# Patient Record
Sex: FEMALE | Race: BLACK OR AFRICAN AMERICAN | NOT HISPANIC OR LATINO
[De-identification: names, ages, dates, MRNs, and addresses within clinical notes are randomized per-mention and may not be internally consistent; named-entity substitution may affect disease eponyms.]

---

## 2017-01-19 ENCOUNTER — OTHER (OUTPATIENT)
Age: 77
End: 2017-01-19

## 2017-03-02 ENCOUNTER — APPOINTMENT (OUTPATIENT)
Dept: ORTHOPEDIC SURGERY | Facility: CLINIC | Age: 77
End: 2017-03-02

## 2017-03-02 VITALS
HEIGHT: 62 IN | WEIGHT: 161 LBS | BODY MASS INDEX: 29.63 KG/M2 | SYSTOLIC BLOOD PRESSURE: 120 MMHG | DIASTOLIC BLOOD PRESSURE: 72 MMHG | HEART RATE: 82 BPM

## 2017-05-05 ENCOUNTER — APPOINTMENT (OUTPATIENT)
Dept: ORTHOPEDIC SURGERY | Facility: CLINIC | Age: 77
End: 2017-05-05

## 2017-05-05 VITALS
DIASTOLIC BLOOD PRESSURE: 69 MMHG | WEIGHT: 161 LBS | HEIGHT: 62 IN | SYSTOLIC BLOOD PRESSURE: 108 MMHG | BODY MASS INDEX: 29.63 KG/M2 | HEART RATE: 76 BPM

## 2017-05-08 LAB
BASOPHILS # BLD AUTO: 0.03 K/UL
BASOPHILS NFR BLD AUTO: 0.5 %
CRP SERPL-MCNC: 9.3 MG/DL
EOSINOPHIL # BLD AUTO: 0.12 K/UL
EOSINOPHIL NFR BLD AUTO: 1.9 %
ERYTHROCYTE [SEDIMENTATION RATE] IN BLOOD BY WESTERGREN METHOD: 120 MM/HR
HCT VFR BLD CALC: 30.5 %
HGB BLD-MCNC: 10.3 G/DL
IMM GRANULOCYTES NFR BLD AUTO: 0.2 %
LYMPHOCYTES # BLD AUTO: 1.39 K/UL
LYMPHOCYTES NFR BLD AUTO: 22 %
MAN DIFF?: NORMAL
MCHC RBC-ENTMCNC: 32.4 PG
MCHC RBC-ENTMCNC: 33.8 GM/DL
MCV RBC AUTO: 95.9 FL
MONOCYTES # BLD AUTO: 0.51 K/UL
MONOCYTES NFR BLD AUTO: 8.1 %
NEUTROPHILS # BLD AUTO: 4.25 K/UL
NEUTROPHILS NFR BLD AUTO: 67.3 %
PLATELET # BLD AUTO: 259 K/UL
RBC # BLD: 3.18 M/UL
RBC # FLD: 17.5 %
WBC # FLD AUTO: 6.31 K/UL

## 2017-05-24 ENCOUNTER — EMERGENCY (EMERGENCY)
Facility: HOSPITAL | Age: 77
LOS: 1 days | Discharge: HOME | End: 2017-05-24
Attending: EMERGENCY MEDICINE | Admitting: EMERGENCY MEDICINE
Payer: MEDICARE

## 2017-05-24 VITALS
TEMPERATURE: 98 F | RESPIRATION RATE: 16 BRPM | HEART RATE: 75 BPM | SYSTOLIC BLOOD PRESSURE: 114 MMHG | DIASTOLIC BLOOD PRESSURE: 57 MMHG | OXYGEN SATURATION: 100 %

## 2017-05-24 LAB
ALBUMIN SERPL ELPH-MCNC: 3.4 G/DL — SIGNIFICANT CHANGE UP (ref 3.3–5)
ALP SERPL-CCNC: 162 U/L — HIGH (ref 40–120)
ALT FLD-CCNC: 43 U/L — HIGH (ref 4–33)
APPEARANCE UR: CLEAR — SIGNIFICANT CHANGE UP
AST SERPL-CCNC: 70 U/L — HIGH (ref 4–32)
BACTERIA # UR AUTO: SIGNIFICANT CHANGE UP
BASE EXCESS BLDV CALC-SCNC: 6.2 MMOL/L — SIGNIFICANT CHANGE UP
BASOPHILS # BLD AUTO: 0.02 K/UL — SIGNIFICANT CHANGE UP (ref 0–0.2)
BASOPHILS NFR BLD AUTO: 0.3 % — SIGNIFICANT CHANGE UP (ref 0–2)
BILIRUB SERPL-MCNC: < 0.2 MG/DL — LOW (ref 0.2–1.2)
BILIRUB UR-MCNC: NEGATIVE — SIGNIFICANT CHANGE UP
BLOOD GAS VENOUS - CREATININE: 1.03 MG/DL — SIGNIFICANT CHANGE UP (ref 0.5–1.3)
BLOOD UR QL VISUAL: NEGATIVE — SIGNIFICANT CHANGE UP
BUN SERPL-MCNC: 23 MG/DL — SIGNIFICANT CHANGE UP (ref 7–23)
CALCIUM SERPL-MCNC: 8.9 MG/DL — SIGNIFICANT CHANGE UP (ref 8.4–10.5)
CHLORIDE BLDV-SCNC: 104 MMOL/L — SIGNIFICANT CHANGE UP (ref 96–108)
CHLORIDE SERPL-SCNC: 98 MMOL/L — SIGNIFICANT CHANGE UP (ref 98–107)
CK MB BLD-MCNC: 1.2 — SIGNIFICANT CHANGE UP (ref 0–2.5)
CK MB BLD-MCNC: 2.04 NG/ML — SIGNIFICANT CHANGE UP (ref 1–4.7)
CK SERPL-CCNC: 167 U/L — SIGNIFICANT CHANGE UP (ref 25–170)
CO2 SERPL-SCNC: 26 MMOL/L — SIGNIFICANT CHANGE UP (ref 22–31)
COLOR SPEC: YELLOW — SIGNIFICANT CHANGE UP
CREAT SERPL-MCNC: 1.1 MG/DL — SIGNIFICANT CHANGE UP (ref 0.5–1.3)
EOSINOPHIL # BLD AUTO: 0.12 K/UL — SIGNIFICANT CHANGE UP (ref 0–0.5)
EOSINOPHIL NFR BLD AUTO: 1.8 % — SIGNIFICANT CHANGE UP (ref 0–6)
EPI CELLS # UR: SIGNIFICANT CHANGE UP
GAS PNL BLDV: 132 MMOL/L — LOW (ref 136–146)
GLUCOSE BLDV-MCNC: 126 — HIGH (ref 70–99)
GLUCOSE SERPL-MCNC: 125 MG/DL — HIGH (ref 70–99)
GLUCOSE UR-MCNC: NEGATIVE — SIGNIFICANT CHANGE UP
HCO3 BLDV-SCNC: 29 MMOL/L — HIGH (ref 20–27)
HCT VFR BLD CALC: 31.9 % — LOW (ref 34.5–45)
HCT VFR BLDV CALC: 32 % — LOW (ref 34.5–45)
HGB BLD-MCNC: 10.7 G/DL — LOW (ref 11.5–15.5)
HGB BLDV-MCNC: 10.4 G/DL — LOW (ref 11.5–15.5)
IMM GRANULOCYTES NFR BLD AUTO: 0.2 % — SIGNIFICANT CHANGE UP (ref 0–1.5)
KETONES UR-MCNC: NEGATIVE — SIGNIFICANT CHANGE UP
LACTATE BLDV-MCNC: 0.9 MMOL/L — SIGNIFICANT CHANGE UP (ref 0.5–2)
LEUKOCYTE ESTERASE UR-ACNC: HIGH
LYMPHOCYTES # BLD AUTO: 1.33 K/UL — SIGNIFICANT CHANGE UP (ref 1–3.3)
LYMPHOCYTES # BLD AUTO: 20 % — SIGNIFICANT CHANGE UP (ref 13–44)
MCHC RBC-ENTMCNC: 31.4 PG — SIGNIFICANT CHANGE UP (ref 27–34)
MCHC RBC-ENTMCNC: 33.5 % — SIGNIFICANT CHANGE UP (ref 32–36)
MCV RBC AUTO: 93.5 FL — SIGNIFICANT CHANGE UP (ref 80–100)
MONOCYTES # BLD AUTO: 0.59 K/UL — SIGNIFICANT CHANGE UP (ref 0–0.9)
MONOCYTES NFR BLD AUTO: 8.9 % — SIGNIFICANT CHANGE UP (ref 2–14)
NEUTROPHILS # BLD AUTO: 4.58 K/UL — SIGNIFICANT CHANGE UP (ref 1.8–7.4)
NEUTROPHILS NFR BLD AUTO: 68.8 % — SIGNIFICANT CHANGE UP (ref 43–77)
NITRITE UR-MCNC: NEGATIVE — SIGNIFICANT CHANGE UP
NT-PROBNP SERPL-SCNC: 20.15 PG/ML — SIGNIFICANT CHANGE UP
PCO2 BLDV: 49 MMHG — SIGNIFICANT CHANGE UP (ref 41–51)
PH BLDV: 7.41 PH — SIGNIFICANT CHANGE UP (ref 7.32–7.43)
PH UR: 6 — SIGNIFICANT CHANGE UP (ref 4.6–8)
PLATELET # BLD AUTO: 258 K/UL — SIGNIFICANT CHANGE UP (ref 150–400)
PMV BLD: 9.5 FL — SIGNIFICANT CHANGE UP (ref 7–13)
PO2 BLDV: 53 MMHG — HIGH (ref 35–40)
POTASSIUM BLDV-SCNC: 6.3 MMOL/L — CRITICAL HIGH (ref 3.4–4.5)
POTASSIUM SERPL-MCNC: 5.4 MMOL/L — HIGH (ref 3.5–5.3)
POTASSIUM SERPL-SCNC: 5.4 MMOL/L — HIGH (ref 3.5–5.3)
PROT SERPL-MCNC: 8.5 G/DL — HIGH (ref 6–8.3)
PROT UR-MCNC: 30 — HIGH
RBC # BLD: 3.41 M/UL — LOW (ref 3.8–5.2)
RBC # FLD: 16.3 % — HIGH (ref 10.3–14.5)
RBC CASTS # UR COMP ASSIST: SIGNIFICANT CHANGE UP (ref 0–?)
SAO2 % BLDV: 85 % — SIGNIFICANT CHANGE UP (ref 60–85)
SODIUM SERPL-SCNC: 138 MMOL/L — SIGNIFICANT CHANGE UP (ref 135–145)
SP GR SPEC: 1.02 — SIGNIFICANT CHANGE UP (ref 1–1.03)
TROPONIN T SERPL-MCNC: < 0.06 NG/ML — SIGNIFICANT CHANGE UP (ref 0–0.06)
UROBILINOGEN FLD QL: NORMAL E.U. — SIGNIFICANT CHANGE UP (ref 0.1–0.2)
WBC # BLD: 6.65 K/UL — SIGNIFICANT CHANGE UP (ref 3.8–10.5)
WBC # FLD AUTO: 6.65 K/UL — SIGNIFICANT CHANGE UP (ref 3.8–10.5)
WBC UR QL: >50 — HIGH (ref 0–?)

## 2017-05-24 PROCEDURE — 99220: CPT | Mod: GC

## 2017-05-24 PROCEDURE — 71020: CPT | Mod: 26

## 2017-05-24 RX ORDER — ACETAMINOPHEN 500 MG
650 TABLET ORAL ONCE
Qty: 0 | Refills: 0 | Status: COMPLETED | OUTPATIENT
Start: 2017-05-24 | End: 2017-05-24

## 2017-05-24 RX ORDER — ASPIRIN/CALCIUM CARB/MAGNESIUM 324 MG
81 TABLET ORAL DAILY
Qty: 0 | Refills: 0 | Status: DISCONTINUED | OUTPATIENT
Start: 2017-05-24 | End: 2017-05-28

## 2017-05-24 RX ORDER — GABAPENTIN 400 MG/1
100 CAPSULE ORAL THREE TIMES A DAY
Qty: 0 | Refills: 0 | Status: DISCONTINUED | OUTPATIENT
Start: 2017-05-24 | End: 2017-05-28

## 2017-05-24 RX ORDER — PANTOPRAZOLE SODIUM 20 MG/1
40 TABLET, DELAYED RELEASE ORAL
Qty: 0 | Refills: 0 | Status: DISCONTINUED | OUTPATIENT
Start: 2017-05-24 | End: 2017-05-28

## 2017-05-24 RX ORDER — AZITHROMYCIN 500 MG/1
500 TABLET, FILM COATED ORAL ONCE
Qty: 0 | Refills: 0 | Status: COMPLETED | OUTPATIENT
Start: 2017-05-24 | End: 2017-05-24

## 2017-05-24 RX ORDER — FUROSEMIDE 40 MG
40 TABLET ORAL DAILY
Qty: 0 | Refills: 0 | Status: DISCONTINUED | OUTPATIENT
Start: 2017-05-24 | End: 2017-05-28

## 2017-05-24 RX ORDER — IPRATROPIUM/ALBUTEROL SULFATE 18-103MCG
3 AEROSOL WITH ADAPTER (GRAM) INHALATION ONCE
Qty: 0 | Refills: 0 | Status: DISCONTINUED | OUTPATIENT
Start: 2017-05-24 | End: 2017-05-28

## 2017-05-24 RX ORDER — POTASSIUM CHLORIDE 20 MEQ
20 PACKET (EA) ORAL DAILY
Qty: 0 | Refills: 0 | Status: DISCONTINUED | OUTPATIENT
Start: 2017-05-24 | End: 2017-05-24

## 2017-05-24 RX ORDER — ATORVASTATIN CALCIUM 80 MG/1
80 TABLET, FILM COATED ORAL AT BEDTIME
Qty: 0 | Refills: 0 | Status: DISCONTINUED | OUTPATIENT
Start: 2017-05-24 | End: 2017-05-28

## 2017-05-24 RX ADMIN — ATORVASTATIN CALCIUM 80 MILLIGRAM(S): 80 TABLET, FILM COATED ORAL at 23:44

## 2017-05-24 RX ADMIN — AZITHROMYCIN 500 MILLIGRAM(S): 500 TABLET, FILM COATED ORAL at 23:45

## 2017-05-24 RX ADMIN — Medication 40 MILLIGRAM(S): at 23:45

## 2017-05-24 RX ADMIN — Medication 650 MILLIGRAM(S): at 19:16

## 2017-05-24 NOTE — ED PROVIDER NOTE - MEDICAL DECISION MAKING DETAILS
75 y/o female h/o chf, copd, htn, gerd here with uri sx x1 wk:  cough, sob, runny nose.  Over past 2 hours, however, chest tightness and sob which is new for her.  Viral URI vs pna vs acs vs copd exac vs chf exac.  Obtain cbc, cmp, ce's, bnp, cxr, ekg.  If neg infectious w/u, will need cardiac r/o.

## 2017-05-24 NOTE — ED ADULT TRIAGE NOTE - CHIEF COMPLAINT QUOTE
Pt c/o weakness x 7 days, recently dc'd off of oxycodone 2 weeks ago for knee pain. Pt states she is forcing herself to eat, denies chest pain, SOB, n/v/d, dizziness.

## 2017-05-24 NOTE — ED ADULT NURSE NOTE - OBJECTIVE STATEMENT
Pt presents to ED R#18 Aox4, in NAD, respirations even and unlabored, c/o generalized weakness, anorexia, and intermittent dyspnea on exertion  x 2 weeks. Denies N/V/D/ abdominal pain/ cough/ fevers/ chills/ other acute complaints. Ambulates with walker at baseline. VSS. Rectal temp 100.1; MD aware. Also c/o L knee pain. Denies acute trauma/ injury to the area; has been hospitalized for LLE cellulitis. IV inserted, BW collected and sent to lab. VSS. NSR on CM. Will continue to monitor.

## 2017-05-24 NOTE — ED PROVIDER NOTE - PMH
CHF (congestive heart failure)    COPD (chronic obstructive pulmonary disease)    GERD (gastroesophageal reflux disease)    HTN (hypertension)

## 2017-05-24 NOTE — ED PROVIDER NOTE - PROGRESS NOTE DETAILS
Cesar: PMD Raji Christiansen called. Left a Voicemail. MD CHO:  Pt will be obs in CDU for acs w/u, copd exac.  Voicemail x2 left for Dr. Christiansen notifying him that his pt will be in the CDU.

## 2017-05-24 NOTE — ED ADULT NURSE REASSESSMENT NOTE - NS ED NURSE REASSESS COMMENT FT1
report received at shift change from JACEY Porter, pt remains AAOx3, VS as noted, pt in NAD, awaiting report and transport to CDU, will continue to monitor pt.

## 2017-05-24 NOTE — ED PROVIDER NOTE - ATTENDING CONTRIBUTION TO CARE
DR. BUI, ATTENDING MD-  I performed a face to face bedside interview with patient regarding history of present illness, review of symptoms and past medical history. I completed an independent physical exam.  I have discussed patient's plan of care with the resident.   Documentation as above in the note.    75 y/o female h/o chf, copd, htn, gerd here with uri sx x1 wk:  cough, sob, runny nose.  Over past 2 hours, however, chest tightness and sob which is new for her.  Denies f/c, ha, neck stiffness, abd pain, n/v/d, dysuria, rash.  Afebrile, vs wnl, fatigued, diminished at bases otherwise ctabil, s1s2 rrr no m/r/g, abd soft non ttp no r/g, no cva tenderness b/l, leg swelling b/l, no rash.  Viral URI vs pna vs acs vs copd exac vs chf exac.  Obtain cbc, cmp, ce's, bnp, cxr, ekg.  If neg infectious w/u, will need cardiac r/o.

## 2017-05-24 NOTE — ED PROVIDER NOTE - OBJECTIVE STATEMENT
76yF hx COPD, CHF, HTN, HLD, CVA (residual rt side weakness), left knee replacement s/p septic knee s/p hardware removal, LINDA on Bipap, GERD, UE DVT (no on A/C 2/2 hx RP bleed) p/w midsternal chest tightness and sob x 2hrs (4-6pm today). Chest tightness is relieved when lying flat, still present but less severe, sob has since resolved. Pt has had a dry cough and rhinorrhea x 2d, no fever. Also has been feeling generally weak x 1wk, had an appt to see PMD Kuldip on Fri for this weakness but missed the appt.

## 2017-05-24 NOTE — ED PROVIDER NOTE - MUSCULOSKELETAL, MLM
Spine appears normal, range of motion is not limited, no muscle or joint tenderness. Left knee very swollen, pt states this is chronic, appears deformed, pt states this is also chronic, no lower extremity edema or tenderness

## 2017-05-24 NOTE — ED PROVIDER NOTE - CARE PLAN
Principal Discharge DX:	Chest tightness  Secondary Diagnosis:	COPD exacerbation  Secondary Diagnosis:	URI (upper respiratory infection)

## 2017-05-25 VITALS
SYSTOLIC BLOOD PRESSURE: 120 MMHG | HEART RATE: 69 BPM | DIASTOLIC BLOOD PRESSURE: 67 MMHG | TEMPERATURE: 98 F | RESPIRATION RATE: 14 BRPM | OXYGEN SATURATION: 95 %

## 2017-05-25 LAB
APPEARANCE UR: CLEAR — SIGNIFICANT CHANGE UP
BILIRUB UR-MCNC: NEGATIVE — SIGNIFICANT CHANGE UP
BLOOD UR QL VISUAL: NEGATIVE — SIGNIFICANT CHANGE UP
BUN SERPL-MCNC: 24 MG/DL — HIGH (ref 7–23)
CALCIUM SERPL-MCNC: 9.9 MG/DL — SIGNIFICANT CHANGE UP (ref 8.4–10.5)
CHLORIDE SERPL-SCNC: 98 MMOL/L — SIGNIFICANT CHANGE UP (ref 98–107)
CK MB BLD-MCNC: 1.82 NG/ML — SIGNIFICANT CHANGE UP (ref 1–4.7)
CK MB BLD-MCNC: SIGNIFICANT CHANGE UP (ref 0–2.5)
CK SERPL-CCNC: 141 U/L — SIGNIFICANT CHANGE UP (ref 25–170)
CO2 SERPL-SCNC: 28 MMOL/L — SIGNIFICANT CHANGE UP (ref 22–31)
COLOR SPEC: SIGNIFICANT CHANGE UP
CREAT SERPL-MCNC: 1.17 MG/DL — SIGNIFICANT CHANGE UP (ref 0.5–1.3)
GLUCOSE SERPL-MCNC: 107 MG/DL — HIGH (ref 70–99)
GLUCOSE UR-MCNC: NEGATIVE — SIGNIFICANT CHANGE UP
HBA1C BLD-MCNC: 6.3 % — HIGH (ref 4–5.6)
KETONES UR-MCNC: NEGATIVE — SIGNIFICANT CHANGE UP
LEUKOCYTE ESTERASE UR-ACNC: HIGH
MUCOUS THREADS # UR AUTO: SIGNIFICANT CHANGE UP
NITRITE UR-MCNC: NEGATIVE — SIGNIFICANT CHANGE UP
PH UR: 6 — SIGNIFICANT CHANGE UP (ref 4.6–8)
POTASSIUM SERPL-MCNC: 3.9 MMOL/L — SIGNIFICANT CHANGE UP (ref 3.5–5.3)
POTASSIUM SERPL-SCNC: 3.9 MMOL/L — SIGNIFICANT CHANGE UP (ref 3.5–5.3)
PROT UR-MCNC: NEGATIVE — SIGNIFICANT CHANGE UP
RBC CASTS # UR COMP ASSIST: SIGNIFICANT CHANGE UP (ref 0–?)
SODIUM SERPL-SCNC: 140 MMOL/L — SIGNIFICANT CHANGE UP (ref 135–145)
SP GR SPEC: 1.01 — SIGNIFICANT CHANGE UP (ref 1–1.03)
SPECIMEN SOURCE: SIGNIFICANT CHANGE UP
TROPONIN T SERPL-MCNC: < 0.06 NG/ML — SIGNIFICANT CHANGE UP (ref 0–0.06)
UROBILINOGEN FLD QL: NORMAL E.U. — SIGNIFICANT CHANGE UP (ref 0.1–0.2)
WBC UR QL: SIGNIFICANT CHANGE UP (ref 0–?)

## 2017-05-25 PROCEDURE — 99217: CPT | Mod: GC

## 2017-05-25 PROCEDURE — 78452 HT MUSCLE IMAGE SPECT MULT: CPT | Mod: 26

## 2017-05-25 PROCEDURE — 93018 CV STRESS TEST I&R ONLY: CPT | Mod: GC

## 2017-05-25 PROCEDURE — 93016 CV STRESS TEST SUPVJ ONLY: CPT | Mod: GC

## 2017-05-25 RX ORDER — ALBUTEROL 90 UG/1
2 AEROSOL, METERED ORAL
Qty: 1 | Refills: 0
Start: 2017-05-25 | End: 2017-06-24

## 2017-05-25 RX ORDER — CEPHALEXIN 500 MG
1 CAPSULE ORAL
Qty: 10 | Refills: 0
Start: 2017-05-25 | End: 2017-05-30

## 2017-05-25 RX ORDER — ALBUTEROL 90 UG/1
2 AEROSOL, METERED ORAL
Qty: 0 | Refills: 0 | DISCHARGE
Start: 2017-05-25 | End: 2017-06-24

## 2017-05-25 RX ORDER — CEFTRIAXONE 500 MG/1
1 INJECTION, POWDER, FOR SOLUTION INTRAMUSCULAR; INTRAVENOUS ONCE
Qty: 0 | Refills: 0 | Status: COMPLETED | OUTPATIENT
Start: 2017-05-25 | End: 2017-05-25

## 2017-05-25 RX ORDER — AZITHROMYCIN 500 MG/1
1 TABLET, FILM COATED ORAL
Qty: 5 | Refills: 0
Start: 2017-05-25 | End: 2017-05-30

## 2017-05-25 RX ADMIN — GABAPENTIN 100 MILLIGRAM(S): 400 CAPSULE ORAL at 06:48

## 2017-05-25 RX ADMIN — Medication 81 MILLIGRAM(S): at 11:58

## 2017-05-25 RX ADMIN — Medication 40 MILLIGRAM(S): at 06:50

## 2017-05-25 RX ADMIN — GABAPENTIN 100 MILLIGRAM(S): 400 CAPSULE ORAL at 15:31

## 2017-05-25 RX ADMIN — Medication 100 MILLIGRAM(S): at 18:22

## 2017-05-25 RX ADMIN — PANTOPRAZOLE SODIUM 40 MILLIGRAM(S): 20 TABLET, DELAYED RELEASE ORAL at 06:50

## 2017-05-25 RX ADMIN — CEFTRIAXONE 100 GRAM(S): 500 INJECTION, POWDER, FOR SOLUTION INTRAMUSCULAR; INTRAVENOUS at 12:28

## 2017-05-25 NOTE — ED CDU PROVIDER NOTE - OBJECTIVE STATEMENT
76yF hx COPD, CHF, HTN, HLD, CVA (residual rt side weakness), left knee replacement s/p septic knee s/p hardware removal, LINDA on Bipap, GERD, UE DVT (no on A/C 2/2 hx RP bleed) p/w midsternal chest tightness and sob x 2hrs (4-6pm today). Chest tightness is relieved when lying flat, still present but less severe, sob has since resolved. Pt has had a dry cough and rhinorrhea x 2d, no fever. Also has been feeling generally weak x 1wk, had an appt to see PMD Kuldip on Fri for this weakness but missed the appt.    CDU TAYLER Slade: Agree with above. Pt is a 75 y/o F hx COPD (not on home O2), CHF, HTN, CVA presenting with cough x 2 days with shortness of breath today. States she felt like she was coming down with a cold, however became short of breath during a coughing episode earlier today. Pt then developed chest tightness, which prompted ED visit. Pt was supposed to see her PMD Dr. Mcintyre and have an echo done, but missed her appt. Presently states she feels better after the breathing treatment. Sent to the CDU for ACS R/O and tx of COPD exacerbation. No acute complaints.

## 2017-05-25 NOTE — ED CDU PROVIDER NOTE - ATTENDING CONTRIBUTION TO CARE
7 y/o F with shortness of breath sp coughing episode earlier,  Sent to the CDU for abx/steroids to tx COPD exacerbation and stress test to R/O ACS. ***GEN - NAD; well appearing; A+O x3 ***HEAD - NC/AT ***EYES/NOSE - PEERL, EOMI, mucous membranes moist, no discharge ***PULMONARY - mild end expiratory wheeze, symmetric breath sounds. ***CARDIAC -s1s2, RRR, no M,G,R***ABDOMEN - +BS, ND, NT, soft, no guarding, no rebound, no masses e ***EXTREMITIES - symmetric pulsess, no edema ***SKIN - no rash or bruising ***NEUROLOGIC - alert, CN 2-12 intact ***PSYCH - insight and judgment nl  plan to obtain stress and treat copd excerbation

## 2017-05-25 NOTE — ED CDU PROVIDER NOTE - PROGRESS NOTE DETAILS
TAYLER Slade: Pt reassessed, c/o chronic knee pain for which she takes Oxycodone at home every 8 hours. Percocet given. CEx2 wnl, no events on tele, will f/u with stress and echo in the am. TAYLER Slade: Discusses necessity of echo with [facility] stress lab PA, no urgent need for echo to be done during the CDU stay as no concern for acute valvular pathology. Will continue with stress test to evaluate for cardiac ischemia.  pt reamined stable overnight no acute distress. pt awaiting stress this AM . also will repeat UA to evaluate for UTI due to conecern that initial sample might have been contaminated TAYLER Roblero - received sign out from TAYLER Oden. pt seen and examined by dr. escamilla and I. 77 y/o female with hx of COPD, CHF, HTN, HLD, CVA with right residual weakness, GERD, UE DVT, p/w CP and SOB x 1 day sent to CDU for r/o ACS. NAD, resting comfortably. no complaints overnight, no CP, SOB, palpitations. Plan: repeat U/A, awaiting stress test, will continue to monitor TAYLER Roblero - received sign out from TAYLER Oden. pt seen and examined by dr. escamilla and I. 75 y/o female with hx of COPD, CHF, HTN, HLD, CVA with right residual weakness, GERD, UE DVT, p/w CP and SOB x 1 day sent to CDU for r/o ACS. NAD, resting comfortably. no complaints overnight, no CP, SOB, palpitations. Plan: repeat U/A, awaiting stress test, will continue to monitor. TAYLER Roblero - pt stable, NAD. spoke with nuclear medicine, will perform resting imaging this afternoon, pt went for stress this morning. left message for Dr. Shai Strickland (outpt orthopedist) to arrange f/u for chronic knee pain, left call back number, however have not heard back. pt's left knee pain and swelling is at baseline. walks with walker at baseline. can f/u as outpt as per dr. escamilla. given percocet for pain, pt states she will f.u this week. TAYLER Roblero - called pcp dr. waldo pacheco left message with  at , awaiting call back. Discharge Note    pt remained stable no cp. I was informed by Dr. Steven Vergara of [facility] stress with [facility] in[facility]iro infaa[facility]ct and does not recommend admission or cath. pt recommended to undego cardiology follow up as out pt Dr. Xavier: I performed a face to face bedside interview with patient regarding history of present illness, review of symptoms and past medical history. I completed an independent physical exam.  I have discussed patient's plan of care with PA.   I agree with note as stated above, having amended the EMR as needed to reflect my findings.   This includes HISTORY OF PRESENT ILLNESS, HIV, PAST MEDICAL/SURGICAL/FAMILY/SOCIAL HISTORY, ALLERGIES AND HOME MEDICATIONS, REVIEW OF SYSTEMS, PHYSICAL EXAM, and any PROGRESS NOTES during the time I functioned as the attending physician for this patient. Return to the ER immediately for any worsening symptoms, concerns, chest pain, fevers, shortness of breath, vomiting, abdominal pain, rashes, neck pain, back pain, numbness, paresthesias, pain or any difficulties at all.  Please follow up with your own private physician or our medical clinic at [phone] in the next 2-3 days.  Find a doctor at [phone].  Copies of your tests were provided to you for follow-up.  You must address all your findings with your doctor. TAYLER Roblero - social work aware of pt, will try and arrange transportation home.

## 2017-05-25 NOTE — ED CDU PROVIDER NOTE - DETAILS
75 y/o F with shortness of breath sp coughing episode earlier, likely due to copd however pt subsequently developed chest tightness. Sent to the CDU for abx/steroids to tx COPD exacerbation and stress test to R/O ACS. Will also do echo, since pt was scheduled for this past Friday, but missed her appointment.

## 2017-05-25 NOTE — ED CDU PROVIDER NOTE - MEDICAL DECISION MAKING DETAILS
77 y/o F with shortness of breath sp coughing episode earlier, likely due to copd however pt subsequently developed chest tightness. Sent to the CDU for abx/steroids to tx COPD exacerbation and stress test to R/O ACS. Will also do echo, since pt was scheduled for this past Friday, but missed her appointment.

## 2017-05-25 NOTE — ED CDU PROVIDER NOTE - PLAN OF CARE
Follow up with your primary care physician within 24-48 hours. See Cardiology this week, referral list given. Take copy of results with you. You may take an over the counter aspirin once daily until further evaluation by a physician. Take antibiotics as prescribed sent to your pharmacy, make sure you finish the full course. Return to ER for worsening symptoms, fever, chills, cough, shortness of breathe, abdominal pain, nausea, sweating, vomiting, bloody urine, flank pain or any other concerns. Follow up with your primary care physician within 24-48 hours. See Cardiology this week, referral list given. Take copy of results with you. You may take an over the counter aspirin once daily until further evaluation by a physician. Take full course of antibiotics, use inhaler as needed as prescribed and take Prednisone (steroid) as prescribed sent to your pharmacy. Return to ER for worsening symptoms, fever, chills, cough, shortness of breathe, abdominal pain, nausea, sweating, vomiting, bloody urine, flank pain or any other concerns. Follow up with your primary care physician within 24-48 hours. See Cardiology within 48 hours, referral list given. Take copy of results with you. You may take an over the counter aspirin once daily until further evaluation by a physician. Take full course of antibiotics, use inhaler as needed as prescribed and take Prednisone (steroid) as prescribed sent to your pharmacy. Return to ER for worsening symptoms, fever, chills, cough, shortness of breathe, abdominal pain, nausea, sweating, vomiting, bloody urine, flank pain or any other concerns. Follow up with your primary care physician within 24-48 hours. See Cardiology within 48 hours, referral list given. Take copy of results with you. You may take an over the counter aspirin once daily until further evaluation by a physician. Take full course of antibiotics for cough and urinary tract infection. Use inhaler as needed as prescribed and take Prednisone (steroid) as prescribed sent to your pharmacy. Return to ER for worsening symptoms, fever, chills, cough, shortness of breathe, abdominal pain, nausea, sweating, vomiting, bloody urine, flank pain or any other concerns.

## 2017-05-26 LAB
BACTERIA UR CULT: SIGNIFICANT CHANGE UP
SPECIMEN SOURCE: SIGNIFICANT CHANGE UP

## 2017-05-27 LAB — BACTERIA UR CULT: SIGNIFICANT CHANGE UP

## 2017-05-29 LAB
BACTERIA BLD CULT: SIGNIFICANT CHANGE UP
BACTERIA BLD CULT: SIGNIFICANT CHANGE UP

## 2017-07-06 ENCOUNTER — APPOINTMENT (OUTPATIENT)
Dept: ORTHOPEDIC SURGERY | Facility: CLINIC | Age: 77
End: 2017-07-06

## 2017-07-06 VITALS
SYSTOLIC BLOOD PRESSURE: 121 MMHG | WEIGHT: 161 LBS | BODY MASS INDEX: 29.63 KG/M2 | DIASTOLIC BLOOD PRESSURE: 71 MMHG | HEIGHT: 62 IN | HEART RATE: 85 BPM

## 2018-07-30 ENCOUNTER — APPOINTMENT (OUTPATIENT)
Dept: PULMONOLOGY | Facility: CLINIC | Age: 78
End: 2018-07-30
Payer: MEDICARE

## 2018-07-30 VITALS
RESPIRATION RATE: 16 BRPM | SYSTOLIC BLOOD PRESSURE: 100 MMHG | DIASTOLIC BLOOD PRESSURE: 63 MMHG | HEART RATE: 60 BPM | TEMPERATURE: 98 F | BODY MASS INDEX: 29.63 KG/M2 | HEIGHT: 62 IN | OXYGEN SATURATION: 99 % | WEIGHT: 161 LBS

## 2018-07-30 PROCEDURE — 71046 X-RAY EXAM CHEST 2 VIEWS: CPT

## 2018-07-30 PROCEDURE — 99204 OFFICE O/P NEW MOD 45 MIN: CPT | Mod: 25

## 2018-07-30 PROCEDURE — 94060 EVALUATION OF WHEEZING: CPT

## 2018-08-07 ENCOUNTER — RECORD ABSTRACTING (OUTPATIENT)
Age: 78
End: 2018-08-07

## 2018-08-27 ENCOUNTER — APPOINTMENT (OUTPATIENT)
Dept: PULMONOLOGY | Facility: CLINIC | Age: 78
End: 2018-08-27

## 2019-01-01 ENCOUNTER — TRANSCRIPTION ENCOUNTER (OUTPATIENT)
Age: 79
End: 2019-01-01

## 2019-01-01 ENCOUNTER — INPATIENT (INPATIENT)
Facility: HOSPITAL | Age: 79
LOS: 5 days | Discharge: ANOTHER HOSPITAL | End: 2019-12-26
Attending: INTERNAL MEDICINE | Admitting: INTERNAL MEDICINE
Payer: MEDICARE

## 2019-01-01 ENCOUNTER — EMERGENCY (EMERGENCY)
Facility: HOSPITAL | Age: 79
LOS: 1 days | Discharge: HOME | End: 2019-01-01
Attending: EMERGENCY MEDICINE | Admitting: EMERGENCY MEDICINE
Payer: MEDICARE

## 2019-01-01 ENCOUNTER — RESULT REVIEW (OUTPATIENT)
Age: 79
End: 2019-01-01

## 2019-01-01 ENCOUNTER — EMERGENCY (EMERGENCY)
Facility: HOSPITAL | Age: 79
LOS: 1 days | Discharge: HOME | End: 2019-01-01
Attending: EMERGENCY MEDICINE
Payer: MEDICARE

## 2019-01-01 ENCOUNTER — APPOINTMENT (OUTPATIENT)
Dept: HOME HEALTH SERVICES | Facility: HOME HEALTH | Age: 79
End: 2019-01-01
Payer: MEDICARE

## 2019-01-01 ENCOUNTER — INPATIENT (INPATIENT)
Facility: HOSPITAL | Age: 79
LOS: 4 days | Discharge: HOME HEALTH | End: 2019-11-04
Attending: INTERNAL MEDICINE | Admitting: INTERNAL MEDICINE
Payer: MEDICARE

## 2019-01-01 VITALS — TEMPERATURE: 98 F | RESPIRATION RATE: 18 BRPM | HEART RATE: 105 BPM | OXYGEN SATURATION: 95 %

## 2019-01-01 VITALS
SYSTOLIC BLOOD PRESSURE: 116 MMHG | HEART RATE: 72 BPM | OXYGEN SATURATION: 99 % | TEMPERATURE: 97 F | DIASTOLIC BLOOD PRESSURE: 62 MMHG | RESPIRATION RATE: 18 BRPM

## 2019-01-01 VITALS
HEART RATE: 73 BPM | SYSTOLIC BLOOD PRESSURE: 132 MMHG | OXYGEN SATURATION: 100 % | TEMPERATURE: 98 F | DIASTOLIC BLOOD PRESSURE: 66 MMHG | RESPIRATION RATE: 16 BRPM

## 2019-01-01 VITALS
HEART RATE: 73 BPM | RESPIRATION RATE: 16 BRPM | SYSTOLIC BLOOD PRESSURE: 122 MMHG | DIASTOLIC BLOOD PRESSURE: 73 MMHG | TEMPERATURE: 98 F | OXYGEN SATURATION: 98 %

## 2019-01-01 VITALS
HEART RATE: 97 BPM | TEMPERATURE: 98 F | RESPIRATION RATE: 16 BRPM | DIASTOLIC BLOOD PRESSURE: 95 MMHG | SYSTOLIC BLOOD PRESSURE: 133 MMHG | OXYGEN SATURATION: 96 %

## 2019-01-01 VITALS
TEMPERATURE: 97.6 F | SYSTOLIC BLOOD PRESSURE: 104 MMHG | HEART RATE: 77 BPM | OXYGEN SATURATION: 98 % | DIASTOLIC BLOOD PRESSURE: 65 MMHG | RESPIRATION RATE: 16 BRPM

## 2019-01-01 VITALS
OXYGEN SATURATION: 100 % | SYSTOLIC BLOOD PRESSURE: 123 MMHG | HEART RATE: 78 BPM | RESPIRATION RATE: 18 BRPM | DIASTOLIC BLOOD PRESSURE: 73 MMHG | TEMPERATURE: 98 F

## 2019-01-01 VITALS
DIASTOLIC BLOOD PRESSURE: 67 MMHG | HEART RATE: 73 BPM | RESPIRATION RATE: 16 BRPM | OXYGEN SATURATION: 98 % | SYSTOLIC BLOOD PRESSURE: 109 MMHG | TEMPERATURE: 98.2 F

## 2019-01-01 VITALS
TEMPERATURE: 98 F | RESPIRATION RATE: 16 BRPM | HEART RATE: 102 BPM | DIASTOLIC BLOOD PRESSURE: 64 MMHG | SYSTOLIC BLOOD PRESSURE: 117 MMHG | OXYGEN SATURATION: 97 %

## 2019-01-01 VITALS
TEMPERATURE: 98 F | SYSTOLIC BLOOD PRESSURE: 97 MMHG | HEART RATE: 93 BPM | OXYGEN SATURATION: 99 % | DIASTOLIC BLOOD PRESSURE: 54 MMHG | RESPIRATION RATE: 18 BRPM

## 2019-01-01 DIAGNOSIS — N26.1 ATROPHY OF KIDNEY (TERMINAL): ICD-10-CM

## 2019-01-01 DIAGNOSIS — Z29.9 ENCOUNTER FOR PROPHYLACTIC MEASURES, UNSPECIFIED: ICD-10-CM

## 2019-01-01 DIAGNOSIS — Z98.890 OTHER SPECIFIED POSTPROCEDURAL STATES: Chronic | ICD-10-CM

## 2019-01-01 DIAGNOSIS — G81.93 CEREBROVASCULAR DISEASE, UNSPECIFIED: ICD-10-CM

## 2019-01-01 DIAGNOSIS — Z96.652 PRESENCE OF LEFT ARTIFICIAL KNEE JOINT: Chronic | ICD-10-CM

## 2019-01-01 DIAGNOSIS — Z90.5 ACQUIRED ABSENCE OF KIDNEY: Chronic | ICD-10-CM

## 2019-01-01 DIAGNOSIS — Z00.00 ENCOUNTER FOR GENERAL ADULT MEDICAL EXAMINATION W/OUT ABNORMAL FINDINGS: ICD-10-CM

## 2019-01-01 DIAGNOSIS — N20.0 CALCULUS OF KIDNEY: ICD-10-CM

## 2019-01-01 DIAGNOSIS — K85.80 OTHER ACUTE PANCREATITIS WITHOUT NECROSIS OR INFECTION: ICD-10-CM

## 2019-01-01 DIAGNOSIS — N39.0 URINARY TRACT INFECTION, SITE NOT SPECIFIED: ICD-10-CM

## 2019-01-01 DIAGNOSIS — I50.32 CHRONIC DIASTOLIC (CONGESTIVE) HEART FAILURE: ICD-10-CM

## 2019-01-01 DIAGNOSIS — I67.9 CEREBROVASCULAR DISEASE, UNSPECIFIED: ICD-10-CM

## 2019-01-01 DIAGNOSIS — J44.9 CHRONIC OBSTRUCTIVE PULMONARY DISEASE, UNSPECIFIED: ICD-10-CM

## 2019-01-01 DIAGNOSIS — R05 COUGH: ICD-10-CM

## 2019-01-01 DIAGNOSIS — I50.9 HEART FAILURE, UNSPECIFIED: ICD-10-CM

## 2019-01-01 DIAGNOSIS — I10 ESSENTIAL (PRIMARY) HYPERTENSION: ICD-10-CM

## 2019-01-01 DIAGNOSIS — R42 DIZZINESS AND GIDDINESS: ICD-10-CM

## 2019-01-01 DIAGNOSIS — E11.9 TYPE 2 DIABETES MELLITUS WITHOUT COMPLICATIONS: ICD-10-CM

## 2019-01-01 DIAGNOSIS — Z71.89 OTHER SPECIFIED COUNSELING: ICD-10-CM

## 2019-01-01 LAB
ALBUMIN SERPL ELPH-MCNC: 3.3 G/DL — SIGNIFICANT CHANGE UP (ref 3.3–5)
ALBUMIN SERPL ELPH-MCNC: 3.5 G/DL — SIGNIFICANT CHANGE UP (ref 3.3–5)
ALBUMIN SERPL ELPH-MCNC: 3.7 G/DL — SIGNIFICANT CHANGE UP (ref 3.3–5)
ALBUMIN SERPL ELPH-MCNC: 3.8 G/DL — SIGNIFICANT CHANGE UP (ref 3.3–5)
ALBUMIN SERPL ELPH-MCNC: 4.1 G/DL — SIGNIFICANT CHANGE UP (ref 3.3–5)
ALBUMIN SERPL ELPH-MCNC: 4.1 G/DL — SIGNIFICANT CHANGE UP (ref 3.3–5)
ALP SERPL-CCNC: 106 U/L — SIGNIFICANT CHANGE UP (ref 40–120)
ALP SERPL-CCNC: 110 U/L — SIGNIFICANT CHANGE UP (ref 40–120)
ALP SERPL-CCNC: 113 U/L — SIGNIFICANT CHANGE UP (ref 40–120)
ALP SERPL-CCNC: 79 U/L — SIGNIFICANT CHANGE UP (ref 40–120)
ALP SERPL-CCNC: 87 U/L — SIGNIFICANT CHANGE UP (ref 40–120)
ALP SERPL-CCNC: 87 U/L — SIGNIFICANT CHANGE UP (ref 40–120)
ALP SERPL-CCNC: 89 U/L — SIGNIFICANT CHANGE UP (ref 40–120)
ALP SERPL-CCNC: 89 U/L — SIGNIFICANT CHANGE UP (ref 40–120)
ALT FLD-CCNC: 16 U/L — SIGNIFICANT CHANGE UP (ref 4–33)
ALT FLD-CCNC: 17 U/L — SIGNIFICANT CHANGE UP (ref 4–33)
ALT FLD-CCNC: 19 U/L — SIGNIFICANT CHANGE UP (ref 4–33)
ALT FLD-CCNC: 19 U/L — SIGNIFICANT CHANGE UP (ref 4–33)
ALT FLD-CCNC: 21 U/L — SIGNIFICANT CHANGE UP (ref 4–33)
ALT FLD-CCNC: 24 U/L — SIGNIFICANT CHANGE UP (ref 4–33)
ANION GAP SERPL CALC-SCNC: 10 MMO/L — SIGNIFICANT CHANGE UP (ref 7–14)
ANION GAP SERPL CALC-SCNC: 10 MMO/L — SIGNIFICANT CHANGE UP (ref 7–14)
ANION GAP SERPL CALC-SCNC: 11 MMO/L — SIGNIFICANT CHANGE UP (ref 7–14)
ANION GAP SERPL CALC-SCNC: 12 MMO/L — SIGNIFICANT CHANGE UP (ref 7–14)
ANION GAP SERPL CALC-SCNC: 13 MMO/L — SIGNIFICANT CHANGE UP (ref 7–14)
ANION GAP SERPL CALC-SCNC: 14 MMO/L — SIGNIFICANT CHANGE UP (ref 7–14)
ANION GAP SERPL CALC-SCNC: 8 MMO/L — SIGNIFICANT CHANGE UP (ref 7–14)
APPEARANCE UR: CLEAR — SIGNIFICANT CHANGE UP
APPEARANCE: CLEAR
AST SERPL-CCNC: 21 U/L — SIGNIFICANT CHANGE UP (ref 4–32)
AST SERPL-CCNC: 22 U/L — SIGNIFICANT CHANGE UP (ref 4–32)
AST SERPL-CCNC: 22 U/L — SIGNIFICANT CHANGE UP (ref 4–32)
AST SERPL-CCNC: 23 U/L — SIGNIFICANT CHANGE UP (ref 4–32)
AST SERPL-CCNC: 24 U/L — SIGNIFICANT CHANGE UP (ref 4–32)
AST SERPL-CCNC: 24 U/L — SIGNIFICANT CHANGE UP (ref 4–32)
AST SERPL-CCNC: 29 U/L — SIGNIFICANT CHANGE UP (ref 4–32)
AST SERPL-CCNC: 30 U/L — SIGNIFICANT CHANGE UP (ref 4–32)
BACTERIA # UR AUTO: NEGATIVE — SIGNIFICANT CHANGE UP
BACTERIA # UR AUTO: SIGNIFICANT CHANGE UP
BACTERIA # UR AUTO: SIGNIFICANT CHANGE UP
BACTERIA UR CULT: NORMAL
BACTERIA UR CULT: SIGNIFICANT CHANGE UP
BACTERIA: NEGATIVE
BASE EXCESS BLDV CALC-SCNC: 3.6 MMOL/L — SIGNIFICANT CHANGE UP
BASE EXCESS BLDV CALC-SCNC: 7.6 MMOL/L — SIGNIFICANT CHANGE UP
BASOPHILS # BLD AUTO: 0.03 K/UL — SIGNIFICANT CHANGE UP (ref 0–0.2)
BASOPHILS # BLD AUTO: 0.04 K/UL — SIGNIFICANT CHANGE UP (ref 0–0.2)
BASOPHILS # BLD AUTO: 0.05 K/UL — SIGNIFICANT CHANGE UP (ref 0–0.2)
BASOPHILS NFR BLD AUTO: 0.4 % — SIGNIFICANT CHANGE UP (ref 0–2)
BASOPHILS NFR BLD AUTO: 0.4 % — SIGNIFICANT CHANGE UP (ref 0–2)
BASOPHILS NFR BLD AUTO: 0.5 % — SIGNIFICANT CHANGE UP (ref 0–2)
BASOPHILS NFR BLD AUTO: 0.5 % — SIGNIFICANT CHANGE UP (ref 0–2)
BASOPHILS NFR BLD AUTO: 0.6 % — SIGNIFICANT CHANGE UP (ref 0–2)
BASOPHILS NFR BLD AUTO: 0.7 % — SIGNIFICANT CHANGE UP (ref 0–2)
BASOPHILS NFR BLD AUTO: 0.8 % — SIGNIFICANT CHANGE UP (ref 0–2)
BILIRUB SERPL-MCNC: 0.2 MG/DL — SIGNIFICANT CHANGE UP (ref 0.2–1.2)
BILIRUB SERPL-MCNC: 0.3 MG/DL — SIGNIFICANT CHANGE UP (ref 0.2–1.2)
BILIRUB SERPL-MCNC: 0.3 MG/DL — SIGNIFICANT CHANGE UP (ref 0.2–1.2)
BILIRUB SERPL-MCNC: < 0.2 MG/DL — LOW (ref 0.2–1.2)
BILIRUB SERPL-MCNC: < 0.2 MG/DL — LOW (ref 0.2–1.2)
BILIRUB UR-MCNC: NEGATIVE — SIGNIFICANT CHANGE UP
BILIRUBIN URINE: NEGATIVE
BLOOD GAS VENOUS - CREATININE: 1.14 MG/DL — SIGNIFICANT CHANGE UP (ref 0.5–1.3)
BLOOD GAS VENOUS - FIO2: 21 — SIGNIFICANT CHANGE UP
BLOOD UR QL VISUAL: NEGATIVE — SIGNIFICANT CHANGE UP
BLOOD UR QL VISUAL: NEGATIVE — SIGNIFICANT CHANGE UP
BLOOD UR QL VISUAL: SIGNIFICANT CHANGE UP
BLOOD URINE: NEGATIVE
BUN SERPL-MCNC: 18 MG/DL — SIGNIFICANT CHANGE UP (ref 7–23)
BUN SERPL-MCNC: 20 MG/DL — SIGNIFICANT CHANGE UP (ref 7–23)
BUN SERPL-MCNC: 23 MG/DL — SIGNIFICANT CHANGE UP (ref 7–23)
BUN SERPL-MCNC: 23 MG/DL — SIGNIFICANT CHANGE UP (ref 7–23)
BUN SERPL-MCNC: 24 MG/DL — HIGH (ref 7–23)
BUN SERPL-MCNC: 24 MG/DL — HIGH (ref 7–23)
BUN SERPL-MCNC: 26 MG/DL — HIGH (ref 7–23)
BUN SERPL-MCNC: 27 MG/DL — HIGH (ref 7–23)
BUN SERPL-MCNC: 28 MG/DL — HIGH (ref 7–23)
BUN SERPL-MCNC: 28 MG/DL — HIGH (ref 7–23)
BUN SERPL-MCNC: 32 MG/DL — HIGH (ref 7–23)
BUN SERPL-MCNC: 34 MG/DL — HIGH (ref 7–23)
BUN SERPL-MCNC: 35 MG/DL — HIGH (ref 7–23)
CALCIUM SERPL-MCNC: 10.2 MG/DL — SIGNIFICANT CHANGE UP (ref 8.4–10.5)
CALCIUM SERPL-MCNC: 10.5 MG/DL — SIGNIFICANT CHANGE UP (ref 8.4–10.5)
CALCIUM SERPL-MCNC: 9.2 MG/DL — SIGNIFICANT CHANGE UP (ref 8.4–10.5)
CALCIUM SERPL-MCNC: 9.3 MG/DL — SIGNIFICANT CHANGE UP (ref 8.4–10.5)
CALCIUM SERPL-MCNC: 9.4 MG/DL — SIGNIFICANT CHANGE UP (ref 8.4–10.5)
CALCIUM SERPL-MCNC: 9.5 MG/DL — SIGNIFICANT CHANGE UP (ref 8.4–10.5)
CALCIUM SERPL-MCNC: 9.6 MG/DL — SIGNIFICANT CHANGE UP (ref 8.4–10.5)
CALCIUM SERPL-MCNC: 9.6 MG/DL — SIGNIFICANT CHANGE UP (ref 8.4–10.5)
CALCIUM SERPL-MCNC: 9.7 MG/DL — SIGNIFICANT CHANGE UP (ref 8.4–10.5)
CALCIUM SERPL-MCNC: 9.8 MG/DL — SIGNIFICANT CHANGE UP (ref 8.4–10.5)
CALCIUM SERPL-MCNC: 9.8 MG/DL — SIGNIFICANT CHANGE UP (ref 8.4–10.5)
CALCIUM SERPL-MCNC: 9.9 MG/DL — SIGNIFICANT CHANGE UP (ref 8.4–10.5)
CALCIUM SERPL-MCNC: 9.9 MG/DL — SIGNIFICANT CHANGE UP (ref 8.4–10.5)
CHLORIDE BLDV-SCNC: 100 MMOL/L — SIGNIFICANT CHANGE UP (ref 96–108)
CHLORIDE SERPL-SCNC: 100 MMOL/L — SIGNIFICANT CHANGE UP (ref 98–107)
CHLORIDE SERPL-SCNC: 95 MMOL/L — LOW (ref 98–107)
CHLORIDE SERPL-SCNC: 96 MMOL/L — LOW (ref 98–107)
CHLORIDE SERPL-SCNC: 97 MMOL/L — LOW (ref 98–107)
CHLORIDE SERPL-SCNC: 98 MMOL/L — SIGNIFICANT CHANGE UP (ref 98–107)
CHLORIDE SERPL-SCNC: 98 MMOL/L — SIGNIFICANT CHANGE UP (ref 98–107)
CHLORIDE SERPL-SCNC: 99 MMOL/L — SIGNIFICANT CHANGE UP (ref 98–107)
CHOLEST SERPL-MCNC: 123 MG/DL — SIGNIFICANT CHANGE UP (ref 120–199)
CO2 SERPL-SCNC: 24 MMOL/L — SIGNIFICANT CHANGE UP (ref 22–31)
CO2 SERPL-SCNC: 25 MMOL/L — SIGNIFICANT CHANGE UP (ref 22–31)
CO2 SERPL-SCNC: 28 MMOL/L — SIGNIFICANT CHANGE UP (ref 22–31)
CO2 SERPL-SCNC: 28 MMOL/L — SIGNIFICANT CHANGE UP (ref 22–31)
CO2 SERPL-SCNC: 29 MMOL/L — SIGNIFICANT CHANGE UP (ref 22–31)
CO2 SERPL-SCNC: 30 MMOL/L — SIGNIFICANT CHANGE UP (ref 22–31)
CO2 SERPL-SCNC: 31 MMOL/L — SIGNIFICANT CHANGE UP (ref 22–31)
CO2 SERPL-SCNC: 31 MMOL/L — SIGNIFICANT CHANGE UP (ref 22–31)
CO2 SERPL-SCNC: 32 MMOL/L — HIGH (ref 22–31)
CO2 SERPL-SCNC: 32 MMOL/L — HIGH (ref 22–31)
CO2 SERPL-SCNC: 36 MMOL/L — HIGH (ref 22–31)
COLOR SPEC: SIGNIFICANT CHANGE UP
COLOR SPEC: YELLOW — SIGNIFICANT CHANGE UP
COLOR SPEC: YELLOW — SIGNIFICANT CHANGE UP
COLOR: YELLOW
CREAT SERPL-MCNC: 0.88 MG/DL — SIGNIFICANT CHANGE UP (ref 0.5–1.3)
CREAT SERPL-MCNC: 0.9 MG/DL — SIGNIFICANT CHANGE UP (ref 0.5–1.3)
CREAT SERPL-MCNC: 0.9 MG/DL — SIGNIFICANT CHANGE UP (ref 0.5–1.3)
CREAT SERPL-MCNC: 0.92 MG/DL — SIGNIFICANT CHANGE UP (ref 0.5–1.3)
CREAT SERPL-MCNC: 0.95 MG/DL — SIGNIFICANT CHANGE UP (ref 0.5–1.3)
CREAT SERPL-MCNC: 0.97 MG/DL — SIGNIFICANT CHANGE UP (ref 0.5–1.3)
CREAT SERPL-MCNC: 0.97 MG/DL — SIGNIFICANT CHANGE UP (ref 0.5–1.3)
CREAT SERPL-MCNC: 1.02 MG/DL — SIGNIFICANT CHANGE UP (ref 0.5–1.3)
CREAT SERPL-MCNC: 1.06 MG/DL — SIGNIFICANT CHANGE UP (ref 0.5–1.3)
CREAT SERPL-MCNC: 1.08 MG/DL — SIGNIFICANT CHANGE UP (ref 0.5–1.3)
CREAT SERPL-MCNC: 1.11 MG/DL — SIGNIFICANT CHANGE UP (ref 0.5–1.3)
CREAT SERPL-MCNC: 1.13 MG/DL — SIGNIFICANT CHANGE UP (ref 0.5–1.3)
CREAT SERPL-MCNC: 1.17 MG/DL — SIGNIFICANT CHANGE UP (ref 0.5–1.3)
CREAT SERPL-MCNC: 1.18 MG/DL — SIGNIFICANT CHANGE UP (ref 0.5–1.3)
CREAT SERPL-MCNC: 1.18 MG/DL — SIGNIFICANT CHANGE UP (ref 0.5–1.3)
EOSINOPHIL # BLD AUTO: 0.09 K/UL — SIGNIFICANT CHANGE UP (ref 0–0.5)
EOSINOPHIL # BLD AUTO: 0.11 K/UL — SIGNIFICANT CHANGE UP (ref 0–0.5)
EOSINOPHIL # BLD AUTO: 0.11 K/UL — SIGNIFICANT CHANGE UP (ref 0–0.5)
EOSINOPHIL # BLD AUTO: 0.14 K/UL — SIGNIFICANT CHANGE UP (ref 0–0.5)
EOSINOPHIL # BLD AUTO: 0.15 K/UL — SIGNIFICANT CHANGE UP (ref 0–0.5)
EOSINOPHIL # BLD AUTO: 0.16 K/UL — SIGNIFICANT CHANGE UP (ref 0–0.5)
EOSINOPHIL # BLD AUTO: 0.17 K/UL — SIGNIFICANT CHANGE UP (ref 0–0.5)
EOSINOPHIL # BLD AUTO: 0.17 K/UL — SIGNIFICANT CHANGE UP (ref 0–0.5)
EOSINOPHIL # BLD AUTO: 0.19 K/UL — SIGNIFICANT CHANGE UP (ref 0–0.5)
EOSINOPHIL NFR BLD AUTO: 1.5 % — SIGNIFICANT CHANGE UP (ref 0–6)
EOSINOPHIL NFR BLD AUTO: 1.9 % — SIGNIFICANT CHANGE UP (ref 0–6)
EOSINOPHIL NFR BLD AUTO: 2 % — SIGNIFICANT CHANGE UP (ref 0–6)
EOSINOPHIL NFR BLD AUTO: 2 % — SIGNIFICANT CHANGE UP (ref 0–6)
EOSINOPHIL NFR BLD AUTO: 2.3 % — SIGNIFICANT CHANGE UP (ref 0–6)
EOSINOPHIL NFR BLD AUTO: 2.9 % — SIGNIFICANT CHANGE UP (ref 0–6)
EOSINOPHIL NFR BLD AUTO: 3 % — SIGNIFICANT CHANGE UP (ref 0–6)
EOSINOPHIL NFR BLD AUTO: 3.4 % — SIGNIFICANT CHANGE UP (ref 0–6)
EOSINOPHIL NFR BLD AUTO: 3.8 % — SIGNIFICANT CHANGE UP (ref 0–6)
EOSINOPHIL NFR BLD AUTO: 3.9 % — SIGNIFICANT CHANGE UP (ref 0–6)
EOSINOPHIL NFR BLD AUTO: 4 % — SIGNIFICANT CHANGE UP (ref 0–6)
ERYTHROCYTE [SEDIMENTATION RATE] IN BLOOD: 36 MM/HR — HIGH (ref 4–25)
ERYTHROCYTE [SEDIMENTATION RATE] IN BLOOD: 42 MM/HR — HIGH (ref 4–25)
FOLATE SERPL-MCNC: 16.7 NG/ML — SIGNIFICANT CHANGE UP (ref 4.7–20)
GAS PNL BLDV: 133 MMOL/L — LOW (ref 136–146)
GAS PNL BLDV: 135 MMOL/L — LOW (ref 136–146)
GLUCOSE BLDC GLUCOMTR-MCNC: 103 MG/DL — HIGH (ref 70–99)
GLUCOSE BLDC GLUCOMTR-MCNC: 104 MG/DL — HIGH (ref 70–99)
GLUCOSE BLDC GLUCOMTR-MCNC: 106 MG/DL — HIGH (ref 70–99)
GLUCOSE BLDC GLUCOMTR-MCNC: 106 MG/DL — HIGH (ref 70–99)
GLUCOSE BLDC GLUCOMTR-MCNC: 119 MG/DL — HIGH (ref 70–99)
GLUCOSE BLDC GLUCOMTR-MCNC: 122 MG/DL — HIGH (ref 70–99)
GLUCOSE BLDC GLUCOMTR-MCNC: 124 MG/DL — HIGH (ref 70–99)
GLUCOSE BLDC GLUCOMTR-MCNC: 125 MG/DL — HIGH (ref 70–99)
GLUCOSE BLDC GLUCOMTR-MCNC: 126 MG/DL — HIGH (ref 70–99)
GLUCOSE BLDC GLUCOMTR-MCNC: 127 MG/DL — HIGH (ref 70–99)
GLUCOSE BLDC GLUCOMTR-MCNC: 131 MG/DL — HIGH (ref 70–99)
GLUCOSE BLDC GLUCOMTR-MCNC: 131 MG/DL — HIGH (ref 70–99)
GLUCOSE BLDC GLUCOMTR-MCNC: 133 MG/DL — HIGH (ref 70–99)
GLUCOSE BLDC GLUCOMTR-MCNC: 138 MG/DL — HIGH (ref 70–99)
GLUCOSE BLDC GLUCOMTR-MCNC: 139 MG/DL — HIGH (ref 70–99)
GLUCOSE BLDC GLUCOMTR-MCNC: 139 MG/DL — HIGH (ref 70–99)
GLUCOSE BLDC GLUCOMTR-MCNC: 141 MG/DL — HIGH (ref 70–99)
GLUCOSE BLDC GLUCOMTR-MCNC: 143 MG/DL — HIGH (ref 70–99)
GLUCOSE BLDC GLUCOMTR-MCNC: 148 MG/DL — HIGH (ref 70–99)
GLUCOSE BLDC GLUCOMTR-MCNC: 156 MG/DL — HIGH (ref 70–99)
GLUCOSE BLDC GLUCOMTR-MCNC: 160 MG/DL — HIGH (ref 70–99)
GLUCOSE BLDC GLUCOMTR-MCNC: 166 MG/DL — HIGH (ref 70–99)
GLUCOSE BLDC GLUCOMTR-MCNC: 168 MG/DL — HIGH (ref 70–99)
GLUCOSE BLDC GLUCOMTR-MCNC: 170 MG/DL — HIGH (ref 70–99)
GLUCOSE BLDC GLUCOMTR-MCNC: 190 MG/DL — HIGH (ref 70–99)
GLUCOSE BLDC GLUCOMTR-MCNC: 206 MG/DL — HIGH (ref 70–99)
GLUCOSE BLDC GLUCOMTR-MCNC: 216 MG/DL — HIGH (ref 70–99)
GLUCOSE BLDC GLUCOMTR-MCNC: 217 MG/DL — HIGH (ref 70–99)
GLUCOSE BLDC GLUCOMTR-MCNC: 218 MG/DL — HIGH (ref 70–99)
GLUCOSE BLDC GLUCOMTR-MCNC: 222 MG/DL — HIGH (ref 70–99)
GLUCOSE BLDC GLUCOMTR-MCNC: 225 MG/DL — HIGH (ref 70–99)
GLUCOSE BLDC GLUCOMTR-MCNC: 264 MG/DL — HIGH (ref 70–99)
GLUCOSE BLDV-MCNC: 106 MG/DL — HIGH (ref 70–99)
GLUCOSE BLDV-MCNC: 107 MG/DL — HIGH (ref 70–99)
GLUCOSE QUALITATIVE U: NEGATIVE
GLUCOSE SERPL-MCNC: 105 MG/DL — HIGH (ref 70–99)
GLUCOSE SERPL-MCNC: 108 MG/DL — HIGH (ref 70–99)
GLUCOSE SERPL-MCNC: 116 MG/DL — HIGH (ref 70–99)
GLUCOSE SERPL-MCNC: 120 MG/DL — HIGH (ref 70–99)
GLUCOSE SERPL-MCNC: 121 MG/DL — HIGH (ref 70–99)
GLUCOSE SERPL-MCNC: 123 MG/DL — HIGH (ref 70–99)
GLUCOSE SERPL-MCNC: 141 MG/DL — HIGH (ref 70–99)
GLUCOSE SERPL-MCNC: 145 MG/DL — HIGH (ref 70–99)
GLUCOSE SERPL-MCNC: 147 MG/DL — HIGH (ref 70–99)
GLUCOSE SERPL-MCNC: 152 MG/DL — HIGH (ref 70–99)
GLUCOSE SERPL-MCNC: 152 MG/DL — HIGH (ref 70–99)
GLUCOSE SERPL-MCNC: 236 MG/DL — HIGH (ref 70–99)
GLUCOSE SERPL-MCNC: 279 MG/DL — HIGH (ref 70–99)
GLUCOSE SERPL-MCNC: 94 MG/DL — SIGNIFICANT CHANGE UP (ref 70–99)
GLUCOSE SERPL-MCNC: 99 MG/DL — SIGNIFICANT CHANGE UP (ref 70–99)
GLUCOSE UR-MCNC: NEGATIVE — SIGNIFICANT CHANGE UP
HBA1C BLD-MCNC: 5.8 % — HIGH (ref 4–5.6)
HCO3 BLDV-SCNC: 26 MMOL/L — SIGNIFICANT CHANGE UP (ref 20–27)
HCO3 BLDV-SCNC: 29 MMOL/L — HIGH (ref 20–27)
HCT VFR BLD CALC: 34.4 % — LOW (ref 34.5–45)
HCT VFR BLD CALC: 34.9 % — SIGNIFICANT CHANGE UP (ref 34.5–45)
HCT VFR BLD CALC: 35 % — SIGNIFICANT CHANGE UP (ref 34.5–45)
HCT VFR BLD CALC: 36 % — SIGNIFICANT CHANGE UP (ref 34.5–45)
HCT VFR BLD CALC: 36.1 % — SIGNIFICANT CHANGE UP (ref 34.5–45)
HCT VFR BLD CALC: 36.4 % — SIGNIFICANT CHANGE UP (ref 34.5–45)
HCT VFR BLD CALC: 36.4 % — SIGNIFICANT CHANGE UP (ref 34.5–45)
HCT VFR BLD CALC: 36.5 % — SIGNIFICANT CHANGE UP (ref 34.5–45)
HCT VFR BLD CALC: 37.1 % — SIGNIFICANT CHANGE UP (ref 34.5–45)
HCT VFR BLD CALC: 38.1 % — SIGNIFICANT CHANGE UP (ref 34.5–45)
HCT VFR BLD CALC: 38.8 % — SIGNIFICANT CHANGE UP (ref 34.5–45)
HCT VFR BLD CALC: 39.1 % — SIGNIFICANT CHANGE UP (ref 34.5–45)
HCT VFR BLD CALC: 39.3 % — SIGNIFICANT CHANGE UP (ref 34.5–45)
HCT VFR BLD CALC: 40.1 % — SIGNIFICANT CHANGE UP (ref 34.5–45)
HCT VFR BLD CALC: 40.5 % — SIGNIFICANT CHANGE UP (ref 34.5–45)
HCT VFR BLDV CALC: 35.8 % — SIGNIFICANT CHANGE UP (ref 34.5–45)
HCT VFR BLDV CALC: 36.5 % — SIGNIFICANT CHANGE UP (ref 34.5–45)
HDLC SERPL-MCNC: 48 MG/DL — SIGNIFICANT CHANGE UP (ref 45–65)
HGB BLD-MCNC: 10.9 G/DL — LOW (ref 11.5–15.5)
HGB BLD-MCNC: 11.3 G/DL — LOW (ref 11.5–15.5)
HGB BLD-MCNC: 11.4 G/DL — LOW (ref 11.5–15.5)
HGB BLD-MCNC: 11.6 G/DL — SIGNIFICANT CHANGE UP (ref 11.5–15.5)
HGB BLD-MCNC: 11.7 G/DL — SIGNIFICANT CHANGE UP (ref 11.5–15.5)
HGB BLD-MCNC: 11.9 G/DL — SIGNIFICANT CHANGE UP (ref 11.5–15.5)
HGB BLD-MCNC: 11.9 G/DL — SIGNIFICANT CHANGE UP (ref 11.5–15.5)
HGB BLD-MCNC: 12.2 G/DL — SIGNIFICANT CHANGE UP (ref 11.5–15.5)
HGB BLD-MCNC: 12.4 G/DL — SIGNIFICANT CHANGE UP (ref 11.5–15.5)
HGB BLD-MCNC: 12.4 G/DL — SIGNIFICANT CHANGE UP (ref 11.5–15.5)
HGB BLD-MCNC: 12.7 G/DL — SIGNIFICANT CHANGE UP (ref 11.5–15.5)
HGB BLD-MCNC: 12.9 G/DL — SIGNIFICANT CHANGE UP (ref 11.5–15.5)
HGB BLD-MCNC: 13.1 G/DL — SIGNIFICANT CHANGE UP (ref 11.5–15.5)
HGB BLDV-MCNC: 11.6 G/DL — SIGNIFICANT CHANGE UP (ref 11.5–15.5)
HGB BLDV-MCNC: 11.8 G/DL — SIGNIFICANT CHANGE UP (ref 11.5–15.5)
HYALINE CASTS # UR AUTO: NEGATIVE — SIGNIFICANT CHANGE UP
HYALINE CASTS: 0 /LPF
IMM GRANULOCYTES NFR BLD AUTO: 0.2 % — SIGNIFICANT CHANGE UP (ref 0–1.5)
IMM GRANULOCYTES NFR BLD AUTO: 0.3 % — SIGNIFICANT CHANGE UP (ref 0–1.5)
IMM GRANULOCYTES NFR BLD AUTO: 0.3 % — SIGNIFICANT CHANGE UP (ref 0–1.5)
IMM GRANULOCYTES NFR BLD AUTO: 0.4 % — SIGNIFICANT CHANGE UP (ref 0–1.5)
IMM GRANULOCYTES NFR BLD AUTO: 0.6 % — SIGNIFICANT CHANGE UP (ref 0–1.5)
KETONES UR-MCNC: NEGATIVE — SIGNIFICANT CHANGE UP
KETONES URINE: NEGATIVE
LACTATE BLDV-MCNC: 2.6 MMOL/L — HIGH (ref 0.5–2)
LEUKOCYTE ESTERASE UR-ACNC: SIGNIFICANT CHANGE UP
LEUKOCYTE ESTERASE URINE: ABNORMAL
LIDOCAIN IGE QN: 728.9 U/L — HIGH (ref 7–60)
LIPID PNL WITH DIRECT LDL SERPL: 75 MG/DL — SIGNIFICANT CHANGE UP
LYMPHOCYTES # BLD AUTO: 0.79 K/UL — LOW (ref 1–3.3)
LYMPHOCYTES # BLD AUTO: 0.97 K/UL — LOW (ref 1–3.3)
LYMPHOCYTES # BLD AUTO: 1 K/UL — SIGNIFICANT CHANGE UP (ref 1–3.3)
LYMPHOCYTES # BLD AUTO: 1.02 K/UL — SIGNIFICANT CHANGE UP (ref 1–3.3)
LYMPHOCYTES # BLD AUTO: 1.06 K/UL — SIGNIFICANT CHANGE UP (ref 1–3.3)
LYMPHOCYTES # BLD AUTO: 1.14 K/UL — SIGNIFICANT CHANGE UP (ref 1–3.3)
LYMPHOCYTES # BLD AUTO: 1.32 K/UL — SIGNIFICANT CHANGE UP (ref 1–3.3)
LYMPHOCYTES # BLD AUTO: 1.37 K/UL — SIGNIFICANT CHANGE UP (ref 1–3.3)
LYMPHOCYTES # BLD AUTO: 1.4 K/UL — SIGNIFICANT CHANGE UP (ref 1–3.3)
LYMPHOCYTES # BLD AUTO: 1.46 K/UL — SIGNIFICANT CHANGE UP (ref 1–3.3)
LYMPHOCYTES # BLD AUTO: 1.5 K/UL — SIGNIFICANT CHANGE UP (ref 1–3.3)
LYMPHOCYTES # BLD AUTO: 16.6 % — SIGNIFICANT CHANGE UP (ref 13–44)
LYMPHOCYTES # BLD AUTO: 18.4 % — SIGNIFICANT CHANGE UP (ref 13–44)
LYMPHOCYTES # BLD AUTO: 18.9 % — SIGNIFICANT CHANGE UP (ref 13–44)
LYMPHOCYTES # BLD AUTO: 19 % — SIGNIFICANT CHANGE UP (ref 13–44)
LYMPHOCYTES # BLD AUTO: 20.4 % — SIGNIFICANT CHANGE UP (ref 13–44)
LYMPHOCYTES # BLD AUTO: 20.6 % — SIGNIFICANT CHANGE UP (ref 13–44)
LYMPHOCYTES # BLD AUTO: 22.7 % — SIGNIFICANT CHANGE UP (ref 13–44)
LYMPHOCYTES # BLD AUTO: 23.6 % — SIGNIFICANT CHANGE UP (ref 13–44)
LYMPHOCYTES # BLD AUTO: 24 % — SIGNIFICANT CHANGE UP (ref 13–44)
LYMPHOCYTES # BLD AUTO: 27 % — SIGNIFICANT CHANGE UP (ref 13–44)
LYMPHOCYTES # BLD AUTO: 27.1 % — SIGNIFICANT CHANGE UP (ref 13–44)
MAGNESIUM SERPL-MCNC: 1.5 MG/DL — LOW (ref 1.6–2.6)
MAGNESIUM SERPL-MCNC: 1.6 MG/DL — SIGNIFICANT CHANGE UP (ref 1.6–2.6)
MAGNESIUM SERPL-MCNC: 1.7 MG/DL — SIGNIFICANT CHANGE UP (ref 1.6–2.6)
MAGNESIUM SERPL-MCNC: 1.8 MG/DL — SIGNIFICANT CHANGE UP (ref 1.6–2.6)
MAGNESIUM SERPL-MCNC: 1.9 MG/DL — SIGNIFICANT CHANGE UP (ref 1.6–2.6)
MAGNESIUM SERPL-MCNC: 1.9 MG/DL — SIGNIFICANT CHANGE UP (ref 1.6–2.6)
MCHC RBC-ENTMCNC: 30.8 PG — SIGNIFICANT CHANGE UP (ref 27–34)
MCHC RBC-ENTMCNC: 30.9 % — LOW (ref 32–36)
MCHC RBC-ENTMCNC: 30.9 PG — SIGNIFICANT CHANGE UP (ref 27–34)
MCHC RBC-ENTMCNC: 31 PG — SIGNIFICANT CHANGE UP (ref 27–34)
MCHC RBC-ENTMCNC: 31.2 % — LOW (ref 32–36)
MCHC RBC-ENTMCNC: 31.5 PG — SIGNIFICANT CHANGE UP (ref 27–34)
MCHC RBC-ENTMCNC: 31.7 % — LOW (ref 32–36)
MCHC RBC-ENTMCNC: 31.7 PG — SIGNIFICANT CHANGE UP (ref 27–34)
MCHC RBC-ENTMCNC: 31.8 PG — SIGNIFICANT CHANGE UP (ref 27–34)
MCHC RBC-ENTMCNC: 31.9 % — LOW (ref 32–36)
MCHC RBC-ENTMCNC: 32 PG — SIGNIFICANT CHANGE UP (ref 27–34)
MCHC RBC-ENTMCNC: 32.1 % — SIGNIFICANT CHANGE UP (ref 32–36)
MCHC RBC-ENTMCNC: 32.2 % — SIGNIFICANT CHANGE UP (ref 32–36)
MCHC RBC-ENTMCNC: 32.3 % — SIGNIFICANT CHANGE UP (ref 32–36)
MCHC RBC-ENTMCNC: 32.4 % — SIGNIFICANT CHANGE UP (ref 32–36)
MCHC RBC-ENTMCNC: 32.4 PG — SIGNIFICANT CHANGE UP (ref 27–34)
MCHC RBC-ENTMCNC: 32.4 PG — SIGNIFICANT CHANGE UP (ref 27–34)
MCHC RBC-ENTMCNC: 32.5 % — SIGNIFICANT CHANGE UP (ref 32–36)
MCHC RBC-ENTMCNC: 32.6 % — SIGNIFICANT CHANGE UP (ref 32–36)
MCHC RBC-ENTMCNC: 32.6 PG — SIGNIFICANT CHANGE UP (ref 27–34)
MCHC RBC-ENTMCNC: 32.7 % — SIGNIFICANT CHANGE UP (ref 32–36)
MCHC RBC-ENTMCNC: 32.7 % — SIGNIFICANT CHANGE UP (ref 32–36)
MCHC RBC-ENTMCNC: 32.7 PG — SIGNIFICANT CHANGE UP (ref 27–34)
MCHC RBC-ENTMCNC: 32.8 % — SIGNIFICANT CHANGE UP (ref 32–36)
MCV RBC AUTO: 100.3 FL — HIGH (ref 80–100)
MCV RBC AUTO: 101.4 FL — HIGH (ref 80–100)
MCV RBC AUTO: 103.3 FL — HIGH (ref 80–100)
MCV RBC AUTO: 97.1 FL — SIGNIFICANT CHANGE UP (ref 80–100)
MCV RBC AUTO: 97.5 FL — SIGNIFICANT CHANGE UP (ref 80–100)
MCV RBC AUTO: 98 FL — SIGNIFICANT CHANGE UP (ref 80–100)
MCV RBC AUTO: 98.3 FL — SIGNIFICANT CHANGE UP (ref 80–100)
MCV RBC AUTO: 98.4 FL — SIGNIFICANT CHANGE UP (ref 80–100)
MCV RBC AUTO: 98.9 FL — SIGNIFICANT CHANGE UP (ref 80–100)
MCV RBC AUTO: 99.2 FL — SIGNIFICANT CHANGE UP (ref 80–100)
MCV RBC AUTO: 99.2 FL — SIGNIFICANT CHANGE UP (ref 80–100)
MCV RBC AUTO: 99.5 FL — SIGNIFICANT CHANGE UP (ref 80–100)
MCV RBC AUTO: 99.8 FL — SIGNIFICANT CHANGE UP (ref 80–100)
MICROSCOPIC-UA: NORMAL
MONOCYTES # BLD AUTO: 0.39 K/UL — SIGNIFICANT CHANGE UP (ref 0–0.9)
MONOCYTES # BLD AUTO: 0.44 K/UL — SIGNIFICANT CHANGE UP (ref 0–0.9)
MONOCYTES # BLD AUTO: 0.45 K/UL — SIGNIFICANT CHANGE UP (ref 0–0.9)
MONOCYTES # BLD AUTO: 0.49 K/UL — SIGNIFICANT CHANGE UP (ref 0–0.9)
MONOCYTES # BLD AUTO: 0.59 K/UL — SIGNIFICANT CHANGE UP (ref 0–0.9)
MONOCYTES # BLD AUTO: 0.59 K/UL — SIGNIFICANT CHANGE UP (ref 0–0.9)
MONOCYTES # BLD AUTO: 0.62 K/UL — SIGNIFICANT CHANGE UP (ref 0–0.9)
MONOCYTES # BLD AUTO: 0.64 K/UL — SIGNIFICANT CHANGE UP (ref 0–0.9)
MONOCYTES # BLD AUTO: 0.64 K/UL — SIGNIFICANT CHANGE UP (ref 0–0.9)
MONOCYTES # BLD AUTO: 0.65 K/UL — SIGNIFICANT CHANGE UP (ref 0–0.9)
MONOCYTES # BLD AUTO: 0.73 K/UL — SIGNIFICANT CHANGE UP (ref 0–0.9)
MONOCYTES NFR BLD AUTO: 10.2 % — SIGNIFICANT CHANGE UP (ref 2–14)
MONOCYTES NFR BLD AUTO: 10.3 % — SIGNIFICANT CHANGE UP (ref 2–14)
MONOCYTES NFR BLD AUTO: 10.5 % — SIGNIFICANT CHANGE UP (ref 2–14)
MONOCYTES NFR BLD AUTO: 10.5 % — SIGNIFICANT CHANGE UP (ref 2–14)
MONOCYTES NFR BLD AUTO: 11.2 % — SIGNIFICANT CHANGE UP (ref 2–14)
MONOCYTES NFR BLD AUTO: 12 % — SIGNIFICANT CHANGE UP (ref 2–14)
MONOCYTES NFR BLD AUTO: 13.2 % — SIGNIFICANT CHANGE UP (ref 2–14)
MONOCYTES NFR BLD AUTO: 8.7 % — SIGNIFICANT CHANGE UP (ref 2–14)
MONOCYTES NFR BLD AUTO: 8.8 % — SIGNIFICANT CHANGE UP (ref 2–14)
MONOCYTES NFR BLD AUTO: 8.9 % — SIGNIFICANT CHANGE UP (ref 2–14)
MONOCYTES NFR BLD AUTO: 9.4 % — SIGNIFICANT CHANGE UP (ref 2–14)
NEUTROPHILS # BLD AUTO: 2.57 K/UL — SIGNIFICANT CHANGE UP (ref 1.8–7.4)
NEUTROPHILS # BLD AUTO: 2.65 K/UL — SIGNIFICANT CHANGE UP (ref 1.8–7.4)
NEUTROPHILS # BLD AUTO: 2.81 K/UL — SIGNIFICANT CHANGE UP (ref 1.8–7.4)
NEUTROPHILS # BLD AUTO: 3.08 K/UL — SIGNIFICANT CHANGE UP (ref 1.8–7.4)
NEUTROPHILS # BLD AUTO: 3.26 K/UL — SIGNIFICANT CHANGE UP (ref 1.8–7.4)
NEUTROPHILS # BLD AUTO: 3.31 K/UL — SIGNIFICANT CHANGE UP (ref 1.8–7.4)
NEUTROPHILS # BLD AUTO: 3.34 K/UL — SIGNIFICANT CHANGE UP (ref 1.8–7.4)
NEUTROPHILS # BLD AUTO: 3.76 K/UL — SIGNIFICANT CHANGE UP (ref 1.8–7.4)
NEUTROPHILS # BLD AUTO: 4.48 K/UL — SIGNIFICANT CHANGE UP (ref 1.8–7.4)
NEUTROPHILS # BLD AUTO: 4.91 K/UL — SIGNIFICANT CHANGE UP (ref 1.8–7.4)
NEUTROPHILS # BLD AUTO: 5.15 K/UL — SIGNIFICANT CHANGE UP (ref 1.8–7.4)
NEUTROPHILS NFR BLD AUTO: 56.9 % — SIGNIFICANT CHANGE UP (ref 43–77)
NEUTROPHILS NFR BLD AUTO: 58.1 % — SIGNIFICANT CHANGE UP (ref 43–77)
NEUTROPHILS NFR BLD AUTO: 59 % — SIGNIFICANT CHANGE UP (ref 43–77)
NEUTROPHILS NFR BLD AUTO: 61.9 % — SIGNIFICANT CHANGE UP (ref 43–77)
NEUTROPHILS NFR BLD AUTO: 61.9 % — SIGNIFICANT CHANGE UP (ref 43–77)
NEUTROPHILS NFR BLD AUTO: 66.4 % — SIGNIFICANT CHANGE UP (ref 43–77)
NEUTROPHILS NFR BLD AUTO: 67 % — SIGNIFICANT CHANGE UP (ref 43–77)
NEUTROPHILS NFR BLD AUTO: 67.1 % — SIGNIFICANT CHANGE UP (ref 43–77)
NEUTROPHILS NFR BLD AUTO: 68.6 % — SIGNIFICANT CHANGE UP (ref 43–77)
NEUTROPHILS NFR BLD AUTO: 70.1 % — SIGNIFICANT CHANGE UP (ref 43–77)
NEUTROPHILS NFR BLD AUTO: 70.4 % — SIGNIFICANT CHANGE UP (ref 43–77)
NITRITE UR-MCNC: NEGATIVE — SIGNIFICANT CHANGE UP
NITRITE URINE: NEGATIVE
NRBC # FLD: 0 K/UL — SIGNIFICANT CHANGE UP (ref 0–0)
PCO2 BLDV: 60 MMHG — HIGH (ref 41–51)
PCO2 BLDV: 62 MMHG — HIGH (ref 41–51)
PH BLDV: 7.3 PH — LOW (ref 7.32–7.43)
PH BLDV: 7.36 PH — SIGNIFICANT CHANGE UP (ref 7.32–7.43)
PH UR: 5.5 — SIGNIFICANT CHANGE UP (ref 5–8)
PH UR: 6 — SIGNIFICANT CHANGE UP (ref 5–8)
PH UR: 6 — SIGNIFICANT CHANGE UP (ref 5–8)
PH URINE: 7.5
PHOSPHATE SERPL-MCNC: 2.6 MG/DL — SIGNIFICANT CHANGE UP (ref 2.5–4.5)
PHOSPHATE SERPL-MCNC: 3.4 MG/DL — SIGNIFICANT CHANGE UP (ref 2.5–4.5)
PHOSPHATE SERPL-MCNC: 3.8 MG/DL — SIGNIFICANT CHANGE UP (ref 2.5–4.5)
PHOSPHATE SERPL-MCNC: 4.1 MG/DL — SIGNIFICANT CHANGE UP (ref 2.5–4.5)
PLATELET # BLD AUTO: 160 K/UL — SIGNIFICANT CHANGE UP (ref 150–400)
PLATELET # BLD AUTO: 176 K/UL — SIGNIFICANT CHANGE UP (ref 150–400)
PLATELET # BLD AUTO: 181 K/UL — SIGNIFICANT CHANGE UP (ref 150–400)
PLATELET # BLD AUTO: 183 K/UL — SIGNIFICANT CHANGE UP (ref 150–400)
PLATELET # BLD AUTO: 184 K/UL — SIGNIFICANT CHANGE UP (ref 150–400)
PLATELET # BLD AUTO: 186 K/UL — SIGNIFICANT CHANGE UP (ref 150–400)
PLATELET # BLD AUTO: 190 K/UL — SIGNIFICANT CHANGE UP (ref 150–400)
PLATELET # BLD AUTO: 193 K/UL — SIGNIFICANT CHANGE UP (ref 150–400)
PLATELET # BLD AUTO: 196 K/UL — SIGNIFICANT CHANGE UP (ref 150–400)
PLATELET # BLD AUTO: 196 K/UL — SIGNIFICANT CHANGE UP (ref 150–400)
PLATELET # BLD AUTO: 197 K/UL — SIGNIFICANT CHANGE UP (ref 150–400)
PLATELET # BLD AUTO: 202 K/UL — SIGNIFICANT CHANGE UP (ref 150–400)
PLATELET # BLD AUTO: 207 K/UL — SIGNIFICANT CHANGE UP (ref 150–400)
PLATELET # BLD AUTO: 210 K/UL — SIGNIFICANT CHANGE UP (ref 150–400)
PLATELET # BLD AUTO: 232 K/UL — SIGNIFICANT CHANGE UP (ref 150–400)
PMV BLD: 10.1 FL — SIGNIFICANT CHANGE UP (ref 7–13)
PMV BLD: 10.4 FL — SIGNIFICANT CHANGE UP (ref 7–13)
PMV BLD: 10.5 FL — SIGNIFICANT CHANGE UP (ref 7–13)
PMV BLD: 10.7 FL — SIGNIFICANT CHANGE UP (ref 7–13)
PMV BLD: 10.8 FL — SIGNIFICANT CHANGE UP (ref 7–13)
PMV BLD: 10.8 FL — SIGNIFICANT CHANGE UP (ref 7–13)
PMV BLD: 10.9 FL — SIGNIFICANT CHANGE UP (ref 7–13)
PMV BLD: 11 FL — SIGNIFICANT CHANGE UP (ref 7–13)
PMV BLD: 11.1 FL — SIGNIFICANT CHANGE UP (ref 7–13)
PMV BLD: 9.8 FL — SIGNIFICANT CHANGE UP (ref 7–13)
PMV BLD: 9.9 FL — SIGNIFICANT CHANGE UP (ref 7–13)
PO2 BLDV: 57 MMHG — HIGH (ref 35–40)
PO2 BLDV: < 24 MMHG — LOW (ref 35–40)
POTASSIUM BLDV-SCNC: 3.8 MMOL/L — SIGNIFICANT CHANGE UP (ref 3.4–4.5)
POTASSIUM BLDV-SCNC: 5.4 MMOL/L — HIGH (ref 3.4–4.5)
POTASSIUM SERPL-MCNC: 3.5 MMOL/L — SIGNIFICANT CHANGE UP (ref 3.5–5.3)
POTASSIUM SERPL-MCNC: 3.8 MMOL/L — SIGNIFICANT CHANGE UP (ref 3.5–5.3)
POTASSIUM SERPL-MCNC: 4 MMOL/L — SIGNIFICANT CHANGE UP (ref 3.5–5.3)
POTASSIUM SERPL-MCNC: 4.1 MMOL/L — SIGNIFICANT CHANGE UP (ref 3.5–5.3)
POTASSIUM SERPL-MCNC: 4.1 MMOL/L — SIGNIFICANT CHANGE UP (ref 3.5–5.3)
POTASSIUM SERPL-MCNC: 4.2 MMOL/L — SIGNIFICANT CHANGE UP (ref 3.5–5.3)
POTASSIUM SERPL-MCNC: 4.2 MMOL/L — SIGNIFICANT CHANGE UP (ref 3.5–5.3)
POTASSIUM SERPL-MCNC: 4.3 MMOL/L — SIGNIFICANT CHANGE UP (ref 3.5–5.3)
POTASSIUM SERPL-MCNC: 4.4 MMOL/L — SIGNIFICANT CHANGE UP (ref 3.5–5.3)
POTASSIUM SERPL-MCNC: 4.8 MMOL/L — SIGNIFICANT CHANGE UP (ref 3.5–5.3)
POTASSIUM SERPL-MCNC: 4.9 MMOL/L — SIGNIFICANT CHANGE UP (ref 3.5–5.3)
POTASSIUM SERPL-MCNC: 4.9 MMOL/L — SIGNIFICANT CHANGE UP (ref 3.5–5.3)
POTASSIUM SERPL-MCNC: 5 MMOL/L — SIGNIFICANT CHANGE UP (ref 3.5–5.3)
POTASSIUM SERPL-SCNC: 3.5 MMOL/L — SIGNIFICANT CHANGE UP (ref 3.5–5.3)
POTASSIUM SERPL-SCNC: 3.8 MMOL/L — SIGNIFICANT CHANGE UP (ref 3.5–5.3)
POTASSIUM SERPL-SCNC: 4 MMOL/L — SIGNIFICANT CHANGE UP (ref 3.5–5.3)
POTASSIUM SERPL-SCNC: 4.1 MMOL/L — SIGNIFICANT CHANGE UP (ref 3.5–5.3)
POTASSIUM SERPL-SCNC: 4.1 MMOL/L — SIGNIFICANT CHANGE UP (ref 3.5–5.3)
POTASSIUM SERPL-SCNC: 4.2 MMOL/L — SIGNIFICANT CHANGE UP (ref 3.5–5.3)
POTASSIUM SERPL-SCNC: 4.2 MMOL/L — SIGNIFICANT CHANGE UP (ref 3.5–5.3)
POTASSIUM SERPL-SCNC: 4.3 MMOL/L — SIGNIFICANT CHANGE UP (ref 3.5–5.3)
POTASSIUM SERPL-SCNC: 4.4 MMOL/L — SIGNIFICANT CHANGE UP (ref 3.5–5.3)
POTASSIUM SERPL-SCNC: 4.8 MMOL/L — SIGNIFICANT CHANGE UP (ref 3.5–5.3)
POTASSIUM SERPL-SCNC: 4.9 MMOL/L — SIGNIFICANT CHANGE UP (ref 3.5–5.3)
POTASSIUM SERPL-SCNC: 4.9 MMOL/L — SIGNIFICANT CHANGE UP (ref 3.5–5.3)
POTASSIUM SERPL-SCNC: 5 MMOL/L — SIGNIFICANT CHANGE UP (ref 3.5–5.3)
PROT SERPL-MCNC: 6.8 G/DL — SIGNIFICANT CHANGE UP (ref 6–8.3)
PROT SERPL-MCNC: 7.1 G/DL — SIGNIFICANT CHANGE UP (ref 6–8.3)
PROT SERPL-MCNC: 7.2 G/DL — SIGNIFICANT CHANGE UP (ref 6–8.3)
PROT SERPL-MCNC: 7.3 G/DL — SIGNIFICANT CHANGE UP (ref 6–8.3)
PROT SERPL-MCNC: 7.4 G/DL — SIGNIFICANT CHANGE UP (ref 6–8.3)
PROT SERPL-MCNC: 7.5 G/DL — SIGNIFICANT CHANGE UP (ref 6–8.3)
PROT SERPL-MCNC: 8.1 G/DL — SIGNIFICANT CHANGE UP (ref 6–8.3)
PROT SERPL-MCNC: 8.3 G/DL — SIGNIFICANT CHANGE UP (ref 6–8.3)
PROT UR-MCNC: 30 — SIGNIFICANT CHANGE UP
PROTEIN URINE: NEGATIVE
RBC # BLD: 3.33 M/UL — LOW (ref 3.8–5.2)
RBC # BLD: 3.49 M/UL — LOW (ref 3.8–5.2)
RBC # BLD: 3.55 M/UL — LOW (ref 3.8–5.2)
RBC # BLD: 3.56 M/UL — LOW (ref 3.8–5.2)
RBC # BLD: 3.65 M/UL — LOW (ref 3.8–5.2)
RBC # BLD: 3.67 M/UL — LOW (ref 3.8–5.2)
RBC # BLD: 3.71 M/UL — LOW (ref 3.8–5.2)
RBC # BLD: 3.74 M/UL — LOW (ref 3.8–5.2)
RBC # BLD: 3.75 M/UL — LOW (ref 3.8–5.2)
RBC # BLD: 3.83 M/UL — SIGNIFICANT CHANGE UP (ref 3.8–5.2)
RBC # BLD: 3.9 M/UL — SIGNIFICANT CHANGE UP (ref 3.8–5.2)
RBC # BLD: 3.93 M/UL — SIGNIFICANT CHANGE UP (ref 3.8–5.2)
RBC # BLD: 4.02 M/UL — SIGNIFICANT CHANGE UP (ref 3.8–5.2)
RBC # BLD: 4.03 M/UL — SIGNIFICANT CHANGE UP (ref 3.8–5.2)
RBC # BLD: 4.12 M/UL — SIGNIFICANT CHANGE UP (ref 3.8–5.2)
RBC # FLD: 15 % — HIGH (ref 10.3–14.5)
RBC # FLD: 15 % — HIGH (ref 10.3–14.5)
RBC # FLD: 15.1 % — HIGH (ref 10.3–14.5)
RBC # FLD: 15.2 % — HIGH (ref 10.3–14.5)
RBC # FLD: 15.4 % — HIGH (ref 10.3–14.5)
RBC # FLD: 15.5 % — HIGH (ref 10.3–14.5)
RBC # FLD: 15.8 % — HIGH (ref 10.3–14.5)
RBC # FLD: 15.9 % — HIGH (ref 10.3–14.5)
RBC # FLD: 15.9 % — HIGH (ref 10.3–14.5)
RBC CASTS # UR COMP ASSIST: SIGNIFICANT CHANGE UP (ref 0–?)
RED BLOOD CELLS URINE: 6 /HPF
SAO2 % BLDV: 32.3 % — LOW (ref 60–85)
SAO2 % BLDV: 84.3 % — SIGNIFICANT CHANGE UP (ref 60–85)
SODIUM SERPL-SCNC: 136 MMOL/L — SIGNIFICANT CHANGE UP (ref 135–145)
SODIUM SERPL-SCNC: 137 MMOL/L — SIGNIFICANT CHANGE UP (ref 135–145)
SODIUM SERPL-SCNC: 137 MMOL/L — SIGNIFICANT CHANGE UP (ref 135–145)
SODIUM SERPL-SCNC: 138 MMOL/L — SIGNIFICANT CHANGE UP (ref 135–145)
SODIUM SERPL-SCNC: 138 MMOL/L — SIGNIFICANT CHANGE UP (ref 135–145)
SODIUM SERPL-SCNC: 139 MMOL/L — SIGNIFICANT CHANGE UP (ref 135–145)
SODIUM SERPL-SCNC: 140 MMOL/L — SIGNIFICANT CHANGE UP (ref 135–145)
SODIUM SERPL-SCNC: 140 MMOL/L — SIGNIFICANT CHANGE UP (ref 135–145)
SODIUM SERPL-SCNC: 141 MMOL/L — SIGNIFICANT CHANGE UP (ref 135–145)
SODIUM SERPL-SCNC: 141 MMOL/L — SIGNIFICANT CHANGE UP (ref 135–145)
SODIUM SERPL-SCNC: 142 MMOL/L — SIGNIFICANT CHANGE UP (ref 135–145)
SODIUM SERPL-SCNC: 142 MMOL/L — SIGNIFICANT CHANGE UP (ref 135–145)
SP GR SPEC: 1.02 — SIGNIFICANT CHANGE UP (ref 1–1.04)
SPECIFIC GRAVITY URINE: 1.02
SPECIMEN SOURCE: SIGNIFICANT CHANGE UP
SQUAMOUS # UR AUTO: SIGNIFICANT CHANGE UP
SQUAMOUS EPITHELIAL CELLS: 5 /HPF
SURGICAL PATHOLOGY STUDY: SIGNIFICANT CHANGE UP
T PALLIDUM AB TITR SER: NEGATIVE — SIGNIFICANT CHANGE UP
TRIGL SERPL-MCNC: 32 MG/DL — SIGNIFICANT CHANGE UP (ref 10–149)
TRIGL SERPL-MCNC: 66 MG/DL — SIGNIFICANT CHANGE UP (ref 10–149)
TROPONIN T, HIGH SENSITIVITY: 49 NG/L — SIGNIFICANT CHANGE UP (ref ?–14)
TSH SERPL-MCNC: 1.95 UIU/ML — SIGNIFICANT CHANGE UP (ref 0.27–4.2)
UROBILINOGEN FLD QL: NORMAL — SIGNIFICANT CHANGE UP
UROBILINOGEN URINE: NORMAL
VIT B12 SERPL-MCNC: 868 PG/ML — SIGNIFICANT CHANGE UP (ref 200–900)
WBC # BLD: 4.16 K/UL — SIGNIFICANT CHANGE UP (ref 3.8–10.5)
WBC # BLD: 4.28 K/UL — SIGNIFICANT CHANGE UP (ref 3.8–10.5)
WBC # BLD: 4.75 K/UL — SIGNIFICANT CHANGE UP (ref 3.8–10.5)
WBC # BLD: 4.84 K/UL — SIGNIFICANT CHANGE UP (ref 3.8–10.5)
WBC # BLD: 4.99 K/UL — SIGNIFICANT CHANGE UP (ref 3.8–10.5)
WBC # BLD: 5.33 K/UL — SIGNIFICANT CHANGE UP (ref 3.8–10.5)
WBC # BLD: 5.41 K/UL — SIGNIFICANT CHANGE UP (ref 3.8–10.5)
WBC # BLD: 5.51 K/UL — SIGNIFICANT CHANGE UP (ref 3.8–10.5)
WBC # BLD: 5.53 K/UL — SIGNIFICANT CHANGE UP (ref 3.8–10.5)
WBC # BLD: 5.61 K/UL — SIGNIFICANT CHANGE UP (ref 3.8–10.5)
WBC # BLD: 5.99 K/UL — SIGNIFICANT CHANGE UP (ref 3.8–10.5)
WBC # BLD: 6.11 K/UL — SIGNIFICANT CHANGE UP (ref 3.8–10.5)
WBC # BLD: 6.66 K/UL — SIGNIFICANT CHANGE UP (ref 3.8–10.5)
WBC # BLD: 7.16 K/UL — SIGNIFICANT CHANGE UP (ref 3.8–10.5)
WBC # BLD: 7.35 K/UL — SIGNIFICANT CHANGE UP (ref 3.8–10.5)
WBC # FLD AUTO: 4.16 K/UL — SIGNIFICANT CHANGE UP (ref 3.8–10.5)
WBC # FLD AUTO: 4.28 K/UL — SIGNIFICANT CHANGE UP (ref 3.8–10.5)
WBC # FLD AUTO: 4.75 K/UL — SIGNIFICANT CHANGE UP (ref 3.8–10.5)
WBC # FLD AUTO: 4.84 K/UL — SIGNIFICANT CHANGE UP (ref 3.8–10.5)
WBC # FLD AUTO: 4.99 K/UL — SIGNIFICANT CHANGE UP (ref 3.8–10.5)
WBC # FLD AUTO: 5.33 K/UL — SIGNIFICANT CHANGE UP (ref 3.8–10.5)
WBC # FLD AUTO: 5.41 K/UL — SIGNIFICANT CHANGE UP (ref 3.8–10.5)
WBC # FLD AUTO: 5.51 K/UL — SIGNIFICANT CHANGE UP (ref 3.8–10.5)
WBC # FLD AUTO: 5.53 K/UL — SIGNIFICANT CHANGE UP (ref 3.8–10.5)
WBC # FLD AUTO: 5.61 K/UL — SIGNIFICANT CHANGE UP (ref 3.8–10.5)
WBC # FLD AUTO: 5.99 K/UL — SIGNIFICANT CHANGE UP (ref 3.8–10.5)
WBC # FLD AUTO: 6.11 K/UL — SIGNIFICANT CHANGE UP (ref 3.8–10.5)
WBC # FLD AUTO: 6.66 K/UL — SIGNIFICANT CHANGE UP (ref 3.8–10.5)
WBC # FLD AUTO: 7.16 K/UL — SIGNIFICANT CHANGE UP (ref 3.8–10.5)
WBC # FLD AUTO: 7.35 K/UL — SIGNIFICANT CHANGE UP (ref 3.8–10.5)
WBC UR QL: >50 — HIGH (ref 0–?)
WBC UR QL: >50 — HIGH (ref 0–?)
WBC UR QL: HIGH (ref 0–?)
WHITE BLOOD CELLS URINE: 34 /HPF
YEAST BUDDING # UR COMP ASSIST: SIGNIFICANT CHANGE UP

## 2019-01-01 PROCEDURE — 99223 1ST HOSP IP/OBS HIGH 75: CPT

## 2019-01-01 PROCEDURE — 99284 EMERGENCY DEPT VISIT MOD MDM: CPT

## 2019-01-01 PROCEDURE — 93306 TTE W/DOPPLER COMPLETE: CPT | Mod: 26

## 2019-01-01 PROCEDURE — 99345 HOME/RES VST NEW HIGH MDM 75: CPT | Mod: 25

## 2019-01-01 PROCEDURE — 99284 EMERGENCY DEPT VISIT MOD MDM: CPT | Mod: GC

## 2019-01-01 PROCEDURE — 94640 AIRWAY INHALATION TREATMENT: CPT

## 2019-01-01 PROCEDURE — 74174 CTA ABD&PLVS W/CONTRAST: CPT | Mod: 26

## 2019-01-01 PROCEDURE — 70498 CT ANGIOGRAPHY NECK: CPT | Mod: 26

## 2019-01-01 PROCEDURE — G0506: CPT

## 2019-01-01 PROCEDURE — 99350 HOME/RES VST EST HIGH MDM 60: CPT

## 2019-01-01 PROCEDURE — 71045 X-RAY EXAM CHEST 1 VIEW: CPT | Mod: 26

## 2019-01-01 PROCEDURE — 70496 CT ANGIOGRAPHY HEAD: CPT | Mod: 26

## 2019-01-01 PROCEDURE — 99283 EMERGENCY DEPT VISIT LOW MDM: CPT | Mod: 25

## 2019-01-01 PROCEDURE — 88312 SPECIAL STAINS GROUP 1: CPT | Mod: 26

## 2019-01-01 PROCEDURE — 99497 ADVNCD CARE PLAN 30 MIN: CPT

## 2019-01-01 PROCEDURE — 88305 TISSUE EXAM BY PATHOLOGIST: CPT | Mod: 26

## 2019-01-01 PROCEDURE — 99222 1ST HOSP IP/OBS MODERATE 55: CPT | Mod: GC

## 2019-01-01 RX ORDER — INSULIN LISPRO 100/ML
VIAL (ML) SUBCUTANEOUS AT BEDTIME
Refills: 0 | Status: DISCONTINUED | OUTPATIENT
Start: 2019-01-01 | End: 2019-01-01

## 2019-01-01 RX ORDER — GABAPENTIN 400 MG/1
100 CAPSULE ORAL THREE TIMES A DAY
Refills: 0 | Status: DISCONTINUED | OUTPATIENT
Start: 2019-01-01 | End: 2019-01-01

## 2019-01-01 RX ORDER — ONDANSETRON 8 MG/1
4 TABLET, FILM COATED ORAL ONCE
Refills: 0 | Status: COMPLETED | OUTPATIENT
Start: 2019-01-01 | End: 2019-01-01

## 2019-01-01 RX ORDER — MECLIZINE HCL 12.5 MG
1 TABLET ORAL
Qty: 30 | Refills: 0
Start: 2019-01-01 | End: 2019-01-01

## 2019-01-01 RX ORDER — ACETAMINOPHEN 500 MG
975 TABLET ORAL ONCE
Refills: 0 | Status: COMPLETED | OUTPATIENT
Start: 2019-01-01 | End: 2019-01-01

## 2019-01-01 RX ORDER — ASPIRIN/CALCIUM CARB/MAGNESIUM 324 MG
81 TABLET ORAL DAILY
Refills: 0 | Status: DISCONTINUED | OUTPATIENT
Start: 2019-01-01 | End: 2019-01-01

## 2019-01-01 RX ORDER — ACETAMINOPHEN 500 MG
650 TABLET ORAL EVERY 6 HOURS
Refills: 0 | Status: DISCONTINUED | OUTPATIENT
Start: 2019-01-01 | End: 2019-01-01

## 2019-01-01 RX ORDER — MORPHINE SULFATE 50 MG/1
4 CAPSULE, EXTENDED RELEASE ORAL ONCE
Refills: 0 | Status: DISCONTINUED | OUTPATIENT
Start: 2019-01-01 | End: 2019-01-01

## 2019-01-01 RX ORDER — ATORVASTATIN CALCIUM 80 MG/1
40 TABLET, FILM COATED ORAL AT BEDTIME
Refills: 0 | Status: DISCONTINUED | OUTPATIENT
Start: 2019-01-01 | End: 2019-01-01

## 2019-01-01 RX ORDER — SIMETHICONE 80 MG/1
80 TABLET, CHEWABLE ORAL ONCE
Refills: 0 | Status: COMPLETED | OUTPATIENT
Start: 2019-01-01 | End: 2019-01-01

## 2019-01-01 RX ORDER — ATORVASTATIN CALCIUM 80 MG/1
80 TABLET, FILM COATED ORAL AT BEDTIME
Refills: 0 | Status: DISCONTINUED | OUTPATIENT
Start: 2019-01-01 | End: 2019-01-01

## 2019-01-01 RX ORDER — MECLIZINE HCL 12.5 MG
12.5 TABLET ORAL DAILY
Refills: 0 | Status: DISCONTINUED | OUTPATIENT
Start: 2019-01-01 | End: 2019-01-01

## 2019-01-01 RX ORDER — FOSFOMYCIN TROMETHAMINE 3 G/1
3 POWDER ORAL ONCE
Refills: 0 | Status: COMPLETED | OUTPATIENT
Start: 2019-01-01 | End: 2019-01-01

## 2019-01-01 RX ORDER — CEFTRIAXONE 500 MG/1
1000 INJECTION, POWDER, FOR SOLUTION INTRAMUSCULAR; INTRAVENOUS EVERY 24 HOURS
Refills: 0 | Status: DISCONTINUED | OUTPATIENT
Start: 2019-01-01 | End: 2019-01-01

## 2019-01-01 RX ORDER — IPRATROPIUM/ALBUTEROL SULFATE 18-103MCG
3 AEROSOL WITH ADAPTER (GRAM) INHALATION ONCE
Refills: 0 | Status: COMPLETED | OUTPATIENT
Start: 2019-01-01 | End: 2019-01-01

## 2019-01-01 RX ORDER — PIPERACILLIN AND TAZOBACTAM 4; .5 G/20ML; G/20ML
3.38 INJECTION, POWDER, LYOPHILIZED, FOR SOLUTION INTRAVENOUS ONCE
Refills: 0 | Status: COMPLETED | OUTPATIENT
Start: 2019-01-01 | End: 2019-01-01

## 2019-01-01 RX ORDER — PREDNISONE 20 MG/1
20 TABLET ORAL
Qty: 10 | Refills: 0 | Status: DISCONTINUED | COMMUNITY
Start: 2019-01-01 | End: 2019-01-01

## 2019-01-01 RX ORDER — PANTOPRAZOLE SODIUM 20 MG/1
40 TABLET, DELAYED RELEASE ORAL
Refills: 0 | Status: DISCONTINUED | OUTPATIENT
Start: 2019-01-01 | End: 2019-01-01

## 2019-01-01 RX ORDER — TIOTROPIUM BROMIDE 18 UG/1
1 CAPSULE ORAL; RESPIRATORY (INHALATION) DAILY
Refills: 0 | Status: DISCONTINUED | OUTPATIENT
Start: 2019-01-01 | End: 2019-01-01

## 2019-01-01 RX ORDER — FAMOTIDINE 10 MG/ML
20 INJECTION INTRAVENOUS DAILY
Refills: 0 | Status: DISCONTINUED | OUTPATIENT
Start: 2019-01-01 | End: 2019-01-01

## 2019-01-01 RX ORDER — MECLIZINE HCL 12.5 MG
25 TABLET ORAL ONCE
Refills: 0 | Status: COMPLETED | OUTPATIENT
Start: 2019-01-01 | End: 2019-01-01

## 2019-01-01 RX ORDER — CEPHALEXIN 500 MG
500 CAPSULE ORAL ONCE
Refills: 0 | Status: COMPLETED | OUTPATIENT
Start: 2019-01-01 | End: 2019-01-01

## 2019-01-01 RX ORDER — FAMOTIDINE 10 MG/ML
20 INJECTION INTRAVENOUS ONCE
Refills: 0 | Status: COMPLETED | OUTPATIENT
Start: 2019-01-01 | End: 2019-01-01

## 2019-01-01 RX ORDER — ENOXAPARIN SODIUM 100 MG/ML
40 INJECTION SUBCUTANEOUS DAILY
Refills: 0 | Status: DISCONTINUED | OUTPATIENT
Start: 2019-01-01 | End: 2019-01-01

## 2019-01-01 RX ORDER — OXYCODONE HYDROCHLORIDE 30 MG/1
30 TABLET ORAL
Qty: 90 | Refills: 0 | Status: DISCONTINUED | COMMUNITY
Start: 2017-01-15 | End: 2019-01-01

## 2019-01-01 RX ORDER — MAGNESIUM SULFATE 500 MG/ML
1 VIAL (ML) INJECTION ONCE
Refills: 0 | Status: COMPLETED | OUTPATIENT
Start: 2019-01-01 | End: 2019-01-01

## 2019-01-01 RX ORDER — GLUCAGON INJECTION, SOLUTION 0.5 MG/.1ML
1 INJECTION, SOLUTION SUBCUTANEOUS ONCE
Refills: 0 | Status: DISCONTINUED | OUTPATIENT
Start: 2019-01-01 | End: 2019-01-01

## 2019-01-01 RX ORDER — SODIUM CHLORIDE 9 MG/ML
1000 INJECTION INTRAMUSCULAR; INTRAVENOUS; SUBCUTANEOUS ONCE
Refills: 0 | Status: COMPLETED | OUTPATIENT
Start: 2019-01-01 | End: 2019-01-01

## 2019-01-01 RX ORDER — CEPHALEXIN 500 MG
1 CAPSULE ORAL
Qty: 14 | Refills: 0
Start: 2019-01-01 | End: 2019-01-01

## 2019-01-01 RX ORDER — OXYCODONE 5 MG/1
5 TABLET ORAL EVERY 6 HOURS
Qty: 90 | Refills: 0 | Status: DISCONTINUED | COMMUNITY
Start: 2017-03-02 | End: 2019-01-01

## 2019-01-01 RX ORDER — LIDOCAINE 4 G/100G
1 CREAM TOPICAL EVERY 24 HOURS
Refills: 0 | Status: DISCONTINUED | OUTPATIENT
Start: 2019-01-01 | End: 2019-01-01

## 2019-01-01 RX ORDER — SODIUM CHLORIDE 9 MG/ML
1000 INJECTION, SOLUTION INTRAVENOUS
Refills: 0 | Status: DISCONTINUED | OUTPATIENT
Start: 2019-01-01 | End: 2019-01-01

## 2019-01-01 RX ORDER — SODIUM CHLORIDE 9 MG/ML
1000 INJECTION INTRAMUSCULAR; INTRAVENOUS; SUBCUTANEOUS
Refills: 0 | Status: DISCONTINUED | OUTPATIENT
Start: 2019-01-01 | End: 2019-01-01

## 2019-01-01 RX ORDER — PANTOPRAZOLE SODIUM 20 MG/1
1 TABLET, DELAYED RELEASE ORAL
Qty: 0 | Refills: 0 | DISCHARGE

## 2019-01-01 RX ORDER — POTASSIUM CHLORIDE 20 MEQ
1 PACKET (EA) ORAL
Qty: 0 | Refills: 0 | DISCHARGE

## 2019-01-01 RX ORDER — DEXTROSE 50 % IN WATER 50 %
12.5 SYRINGE (ML) INTRAVENOUS ONCE
Refills: 0 | Status: DISCONTINUED | OUTPATIENT
Start: 2019-01-01 | End: 2019-01-01

## 2019-01-01 RX ORDER — LEVOFLOXACIN 500 MG/1
500 TABLET, FILM COATED ORAL DAILY
Qty: 7 | Refills: 0 | Status: DISCONTINUED | COMMUNITY
Start: 2019-01-01 | End: 2019-01-01

## 2019-01-01 RX ORDER — CHOLECALCIFEROL (VITAMIN D3) 125 MCG
1000 CAPSULE ORAL DAILY
Refills: 0 | Status: DISCONTINUED | OUTPATIENT
Start: 2019-01-01 | End: 2019-01-01

## 2019-01-01 RX ORDER — DEXTROSE 50 % IN WATER 50 %
25 SYRINGE (ML) INTRAVENOUS ONCE
Refills: 0 | Status: DISCONTINUED | OUTPATIENT
Start: 2019-01-01 | End: 2019-01-01

## 2019-01-01 RX ORDER — CHOLECALCIFEROL (VITAMIN D3) 125 MCG
1 CAPSULE ORAL
Qty: 0 | Refills: 0 | DISCHARGE

## 2019-01-01 RX ORDER — DEXTROSE 50 % IN WATER 50 %
15 SYRINGE (ML) INTRAVENOUS ONCE
Refills: 0 | Status: DISCONTINUED | OUTPATIENT
Start: 2019-01-01 | End: 2019-01-01

## 2019-01-01 RX ORDER — INSULIN LISPRO 100/ML
VIAL (ML) SUBCUTANEOUS
Refills: 0 | Status: DISCONTINUED | OUTPATIENT
Start: 2019-01-01 | End: 2019-01-01

## 2019-01-01 RX ORDER — ALBUTEROL 90 UG/1
2 AEROSOL, METERED ORAL EVERY 4 HOURS
Refills: 0 | Status: DISCONTINUED | OUTPATIENT
Start: 2019-01-01 | End: 2019-01-01

## 2019-01-01 RX ORDER — ACLIDINIUM BROMIDE 400 UG/1
1 POWDER, METERED RESPIRATORY (INHALATION)
Qty: 0 | Refills: 0 | DISCHARGE

## 2019-01-01 RX ORDER — FERROUS SULFATE 325(65) MG
1 TABLET ORAL
Qty: 0 | Refills: 0 | DISCHARGE

## 2019-01-01 RX ORDER — CALCIUM CARBONATE 500(1250)
1 TABLET ORAL
Refills: 0 | Status: DISCONTINUED | OUTPATIENT
Start: 2019-01-01 | End: 2019-01-01

## 2019-01-01 RX ADMIN — CEFTRIAXONE 100 MILLIGRAM(S): 500 INJECTION, POWDER, FOR SOLUTION INTRAMUSCULAR; INTRAVENOUS at 05:50

## 2019-01-01 RX ADMIN — Medication 650 MILLIGRAM(S): at 05:40

## 2019-01-01 RX ADMIN — SODIUM CHLORIDE 42 MILLILITER(S): 9 INJECTION, SOLUTION INTRAVENOUS at 15:32

## 2019-01-01 RX ADMIN — TIOTROPIUM BROMIDE 1 CAPSULE(S): 18 CAPSULE ORAL; RESPIRATORY (INHALATION) at 13:20

## 2019-01-01 RX ADMIN — GABAPENTIN 100 MILLIGRAM(S): 400 CAPSULE ORAL at 05:32

## 2019-01-01 RX ADMIN — Medication 1 TABLET(S): at 17:23

## 2019-01-01 RX ADMIN — Medication 81 MILLIGRAM(S): at 12:35

## 2019-01-01 RX ADMIN — Medication 650 MILLIGRAM(S): at 11:30

## 2019-01-01 RX ADMIN — GABAPENTIN 100 MILLIGRAM(S): 400 CAPSULE ORAL at 22:28

## 2019-01-01 RX ADMIN — ENOXAPARIN SODIUM 40 MILLIGRAM(S): 100 INJECTION SUBCUTANEOUS at 13:21

## 2019-01-01 RX ADMIN — Medication 30 MILLILITER(S): at 22:43

## 2019-01-01 RX ADMIN — GABAPENTIN 100 MILLIGRAM(S): 400 CAPSULE ORAL at 07:01

## 2019-01-01 RX ADMIN — Medication 25 MILLIGRAM(S): at 16:29

## 2019-01-01 RX ADMIN — Medication 1 TABLET(S): at 12:45

## 2019-01-01 RX ADMIN — GABAPENTIN 100 MILLIGRAM(S): 400 CAPSULE ORAL at 05:42

## 2019-01-01 RX ADMIN — PANTOPRAZOLE SODIUM 40 MILLIGRAM(S): 20 TABLET, DELAYED RELEASE ORAL at 05:18

## 2019-01-01 RX ADMIN — PIPERACILLIN AND TAZOBACTAM 200 GRAM(S): 4; .5 INJECTION, POWDER, LYOPHILIZED, FOR SOLUTION INTRAVENOUS at 23:01

## 2019-01-01 RX ADMIN — GABAPENTIN 100 MILLIGRAM(S): 400 CAPSULE ORAL at 05:55

## 2019-01-01 RX ADMIN — GABAPENTIN 100 MILLIGRAM(S): 400 CAPSULE ORAL at 14:14

## 2019-01-01 RX ADMIN — Medication 81 MILLIGRAM(S): at 11:58

## 2019-01-01 RX ADMIN — LIDOCAINE 1 PATCH: 4 CREAM TOPICAL at 06:43

## 2019-01-01 RX ADMIN — GABAPENTIN 100 MILLIGRAM(S): 400 CAPSULE ORAL at 21:13

## 2019-01-01 RX ADMIN — Medication 4: at 13:05

## 2019-01-01 RX ADMIN — Medication 975 MILLIGRAM(S): at 01:16

## 2019-01-01 RX ADMIN — GABAPENTIN 100 MILLIGRAM(S): 400 CAPSULE ORAL at 11:58

## 2019-01-01 RX ADMIN — GABAPENTIN 100 MILLIGRAM(S): 400 CAPSULE ORAL at 22:48

## 2019-01-01 RX ADMIN — Medication 0: at 22:29

## 2019-01-01 RX ADMIN — Medication 650 MILLIGRAM(S): at 02:01

## 2019-01-01 RX ADMIN — Medication 1 TABLET(S): at 12:31

## 2019-01-01 RX ADMIN — Medication 81 MILLIGRAM(S): at 12:14

## 2019-01-01 RX ADMIN — Medication 81 MILLIGRAM(S): at 12:03

## 2019-01-01 RX ADMIN — Medication 3 MILLILITER(S): at 22:43

## 2019-01-01 RX ADMIN — ATORVASTATIN CALCIUM 80 MILLIGRAM(S): 80 TABLET, FILM COATED ORAL at 22:28

## 2019-01-01 RX ADMIN — Medication 650 MILLIGRAM(S): at 06:25

## 2019-01-01 RX ADMIN — Medication 1 TABLET(S): at 12:35

## 2019-01-01 RX ADMIN — Medication 12.5 MILLIGRAM(S): at 12:31

## 2019-01-01 RX ADMIN — GABAPENTIN 100 MILLIGRAM(S): 400 CAPSULE ORAL at 06:32

## 2019-01-01 RX ADMIN — LIDOCAINE 1 PATCH: 4 CREAM TOPICAL at 16:32

## 2019-01-01 RX ADMIN — Medication 81 MILLIGRAM(S): at 12:44

## 2019-01-01 RX ADMIN — SIMETHICONE 80 MILLIGRAM(S): 80 TABLET, CHEWABLE ORAL at 03:31

## 2019-01-01 RX ADMIN — Medication 81 MILLIGRAM(S): at 11:21

## 2019-01-01 RX ADMIN — Medication 1 TABLET(S): at 12:14

## 2019-01-01 RX ADMIN — Medication 1000 UNIT(S): at 12:14

## 2019-01-01 RX ADMIN — GABAPENTIN 100 MILLIGRAM(S): 400 CAPSULE ORAL at 21:20

## 2019-01-01 RX ADMIN — Medication 81 MILLIGRAM(S): at 11:40

## 2019-01-01 RX ADMIN — GABAPENTIN 100 MILLIGRAM(S): 400 CAPSULE ORAL at 12:45

## 2019-01-01 RX ADMIN — Medication 650 MILLIGRAM(S): at 13:01

## 2019-01-01 RX ADMIN — ATORVASTATIN CALCIUM 80 MILLIGRAM(S): 80 TABLET, FILM COATED ORAL at 21:54

## 2019-01-01 RX ADMIN — Medication 650 MILLIGRAM(S): at 11:10

## 2019-01-01 RX ADMIN — Medication 81 MILLIGRAM(S): at 13:59

## 2019-01-01 RX ADMIN — Medication 1 TABLET(S): at 12:38

## 2019-01-01 RX ADMIN — ENOXAPARIN SODIUM 40 MILLIGRAM(S): 100 INJECTION SUBCUTANEOUS at 12:30

## 2019-01-01 RX ADMIN — Medication 12.5 MILLIGRAM(S): at 13:21

## 2019-01-01 RX ADMIN — Medication 1: at 21:30

## 2019-01-01 RX ADMIN — Medication 1 TABLET(S): at 18:10

## 2019-01-01 RX ADMIN — LIDOCAINE 1 PATCH: 4 CREAM TOPICAL at 06:01

## 2019-01-01 RX ADMIN — Medication 4: at 18:25

## 2019-01-01 RX ADMIN — Medication 81 MILLIGRAM(S): at 13:20

## 2019-01-01 RX ADMIN — ATORVASTATIN CALCIUM 80 MILLIGRAM(S): 80 TABLET, FILM COATED ORAL at 22:22

## 2019-01-01 RX ADMIN — Medication 81 MILLIGRAM(S): at 12:30

## 2019-01-01 RX ADMIN — Medication 650 MILLIGRAM(S): at 04:40

## 2019-01-01 RX ADMIN — PANTOPRAZOLE SODIUM 40 MILLIGRAM(S): 20 TABLET, DELAYED RELEASE ORAL at 05:42

## 2019-01-01 RX ADMIN — Medication 1 TABLET(S): at 11:58

## 2019-01-01 RX ADMIN — MORPHINE SULFATE 4 MILLIGRAM(S): 50 CAPSULE, EXTENDED RELEASE ORAL at 02:00

## 2019-01-01 RX ADMIN — GABAPENTIN 100 MILLIGRAM(S): 400 CAPSULE ORAL at 13:59

## 2019-01-01 RX ADMIN — GABAPENTIN 100 MILLIGRAM(S): 400 CAPSULE ORAL at 21:54

## 2019-01-01 RX ADMIN — GABAPENTIN 100 MILLIGRAM(S): 400 CAPSULE ORAL at 06:02

## 2019-01-01 RX ADMIN — CEFTRIAXONE 100 MILLIGRAM(S): 500 INJECTION, POWDER, FOR SOLUTION INTRAMUSCULAR; INTRAVENOUS at 06:18

## 2019-01-01 RX ADMIN — Medication 2: at 08:57

## 2019-01-01 RX ADMIN — Medication 650 MILLIGRAM(S): at 10:51

## 2019-01-01 RX ADMIN — ATORVASTATIN CALCIUM 80 MILLIGRAM(S): 80 TABLET, FILM COATED ORAL at 21:14

## 2019-01-01 RX ADMIN — Medication 1 TABLET(S): at 18:31

## 2019-01-01 RX ADMIN — GABAPENTIN 100 MILLIGRAM(S): 400 CAPSULE ORAL at 05:18

## 2019-01-01 RX ADMIN — ONDANSETRON 4 MILLIGRAM(S): 8 TABLET, FILM COATED ORAL at 21:12

## 2019-01-01 RX ADMIN — ATORVASTATIN CALCIUM 80 MILLIGRAM(S): 80 TABLET, FILM COATED ORAL at 22:33

## 2019-01-01 RX ADMIN — TIOTROPIUM BROMIDE 1 CAPSULE(S): 18 CAPSULE ORAL; RESPIRATORY (INHALATION) at 12:45

## 2019-01-01 RX ADMIN — Medication 650 MILLIGRAM(S): at 10:47

## 2019-01-01 RX ADMIN — LIDOCAINE 1 PATCH: 4 CREAM TOPICAL at 20:19

## 2019-01-01 RX ADMIN — Medication 1: at 18:30

## 2019-01-01 RX ADMIN — Medication 650 MILLIGRAM(S): at 22:50

## 2019-01-01 RX ADMIN — GABAPENTIN 100 MILLIGRAM(S): 400 CAPSULE ORAL at 21:31

## 2019-01-01 RX ADMIN — Medication 12.5 MILLIGRAM(S): at 12:35

## 2019-01-01 RX ADMIN — GABAPENTIN 100 MILLIGRAM(S): 400 CAPSULE ORAL at 12:35

## 2019-01-01 RX ADMIN — ENOXAPARIN SODIUM 40 MILLIGRAM(S): 100 INJECTION SUBCUTANEOUS at 11:59

## 2019-01-01 RX ADMIN — Medication 2: at 09:00

## 2019-01-01 RX ADMIN — Medication 650 MILLIGRAM(S): at 11:52

## 2019-01-01 RX ADMIN — Medication 1000 UNIT(S): at 12:03

## 2019-01-01 RX ADMIN — LIDOCAINE 1 PATCH: 4 CREAM TOPICAL at 07:48

## 2019-01-01 RX ADMIN — LIDOCAINE 1 PATCH: 4 CREAM TOPICAL at 21:16

## 2019-01-01 RX ADMIN — PANTOPRAZOLE SODIUM 40 MILLIGRAM(S): 20 TABLET, DELAYED RELEASE ORAL at 07:01

## 2019-01-01 RX ADMIN — Medication 1000 UNIT(S): at 11:22

## 2019-01-01 RX ADMIN — PANTOPRAZOLE SODIUM 40 MILLIGRAM(S): 20 TABLET, DELAYED RELEASE ORAL at 05:36

## 2019-01-01 RX ADMIN — Medication 650 MILLIGRAM(S): at 02:50

## 2019-01-01 RX ADMIN — Medication 500 MILLIGRAM(S): at 19:37

## 2019-01-01 RX ADMIN — PANTOPRAZOLE SODIUM 40 MILLIGRAM(S): 20 TABLET, DELAYED RELEASE ORAL at 06:02

## 2019-01-01 RX ADMIN — PANTOPRAZOLE SODIUM 40 MILLIGRAM(S): 20 TABLET, DELAYED RELEASE ORAL at 06:43

## 2019-01-01 RX ADMIN — LIDOCAINE 1 PATCH: 4 CREAM TOPICAL at 09:09

## 2019-01-01 RX ADMIN — Medication 4: at 13:10

## 2019-01-01 RX ADMIN — Medication 12.5 MILLIGRAM(S): at 12:45

## 2019-01-01 RX ADMIN — GABAPENTIN 100 MILLIGRAM(S): 400 CAPSULE ORAL at 14:33

## 2019-01-01 RX ADMIN — LIDOCAINE 1 PATCH: 4 CREAM TOPICAL at 20:08

## 2019-01-01 RX ADMIN — ENOXAPARIN SODIUM 40 MILLIGRAM(S): 100 INJECTION SUBCUTANEOUS at 13:59

## 2019-01-01 RX ADMIN — GABAPENTIN 100 MILLIGRAM(S): 400 CAPSULE ORAL at 22:23

## 2019-01-01 RX ADMIN — Medication 2: at 18:10

## 2019-01-01 RX ADMIN — Medication 1000 UNIT(S): at 12:05

## 2019-01-01 RX ADMIN — Medication 650 MILLIGRAM(S): at 23:40

## 2019-01-01 RX ADMIN — Medication 650 MILLIGRAM(S): at 08:35

## 2019-01-01 RX ADMIN — Medication 1000 UNIT(S): at 12:35

## 2019-01-01 RX ADMIN — PANTOPRAZOLE SODIUM 40 MILLIGRAM(S): 20 TABLET, DELAYED RELEASE ORAL at 06:32

## 2019-01-01 RX ADMIN — Medication 2: at 18:06

## 2019-01-01 RX ADMIN — GABAPENTIN 100 MILLIGRAM(S): 400 CAPSULE ORAL at 13:20

## 2019-01-01 RX ADMIN — Medication 1000 UNIT(S): at 13:21

## 2019-01-01 RX ADMIN — MORPHINE SULFATE 4 MILLIGRAM(S): 50 CAPSULE, EXTENDED RELEASE ORAL at 01:50

## 2019-01-01 RX ADMIN — ATORVASTATIN CALCIUM 80 MILLIGRAM(S): 80 TABLET, FILM COATED ORAL at 22:48

## 2019-01-01 RX ADMIN — Medication 1 TABLET(S): at 11:21

## 2019-01-01 RX ADMIN — TIOTROPIUM BROMIDE 1 CAPSULE(S): 18 CAPSULE ORAL; RESPIRATORY (INHALATION) at 18:34

## 2019-01-01 RX ADMIN — ATORVASTATIN CALCIUM 80 MILLIGRAM(S): 80 TABLET, FILM COATED ORAL at 21:20

## 2019-01-01 RX ADMIN — GABAPENTIN 100 MILLIGRAM(S): 400 CAPSULE ORAL at 22:55

## 2019-01-01 RX ADMIN — Medication 1 TABLET(S): at 13:59

## 2019-01-01 RX ADMIN — Medication 650 MILLIGRAM(S): at 11:15

## 2019-01-01 RX ADMIN — Medication 1000 UNIT(S): at 12:44

## 2019-01-01 RX ADMIN — LIDOCAINE 1 PATCH: 4 CREAM TOPICAL at 18:23

## 2019-01-01 RX ADMIN — GABAPENTIN 100 MILLIGRAM(S): 400 CAPSULE ORAL at 12:38

## 2019-01-01 RX ADMIN — FAMOTIDINE 20 MILLIGRAM(S): 10 INJECTION INTRAVENOUS at 21:12

## 2019-01-01 RX ADMIN — Medication 81 MILLIGRAM(S): at 12:38

## 2019-01-01 RX ADMIN — Medication 1 TABLET(S): at 12:03

## 2019-01-01 RX ADMIN — Medication 650 MILLIGRAM(S): at 03:40

## 2019-01-01 RX ADMIN — Medication 12.5 MILLIGRAM(S): at 13:59

## 2019-01-01 RX ADMIN — PANTOPRAZOLE SODIUM 40 MILLIGRAM(S): 20 TABLET, DELAYED RELEASE ORAL at 06:18

## 2019-01-01 RX ADMIN — GABAPENTIN 100 MILLIGRAM(S): 400 CAPSULE ORAL at 15:49

## 2019-01-01 RX ADMIN — PANTOPRAZOLE SODIUM 40 MILLIGRAM(S): 20 TABLET, DELAYED RELEASE ORAL at 05:54

## 2019-01-01 RX ADMIN — LIDOCAINE 1 PATCH: 4 CREAM TOPICAL at 19:59

## 2019-01-01 RX ADMIN — Medication 12.5 MILLIGRAM(S): at 12:38

## 2019-01-01 RX ADMIN — FOSFOMYCIN TROMETHAMINE 3 GRAM(S): 3 POWDER ORAL at 22:11

## 2019-01-01 RX ADMIN — LIDOCAINE 1 PATCH: 4 CREAM TOPICAL at 08:54

## 2019-01-01 RX ADMIN — Medication 1 TABLET(S): at 12:05

## 2019-01-01 RX ADMIN — Medication 650 MILLIGRAM(S): at 11:39

## 2019-01-01 RX ADMIN — Medication 1000 UNIT(S): at 12:38

## 2019-01-01 RX ADMIN — Medication 12.5 MILLIGRAM(S): at 12:14

## 2019-01-01 RX ADMIN — Medication 1 TABLET(S): at 11:40

## 2019-01-01 RX ADMIN — Medication 100 GRAM(S): at 11:34

## 2019-01-01 RX ADMIN — Medication 81 MILLIGRAM(S): at 12:05

## 2019-01-01 RX ADMIN — GABAPENTIN 100 MILLIGRAM(S): 400 CAPSULE ORAL at 06:18

## 2019-01-01 RX ADMIN — LIDOCAINE 1 PATCH: 4 CREAM TOPICAL at 04:34

## 2019-01-01 RX ADMIN — Medication 12.5 MILLIGRAM(S): at 22:28

## 2019-01-01 RX ADMIN — LIDOCAINE 1 PATCH: 4 CREAM TOPICAL at 08:30

## 2019-01-01 RX ADMIN — GABAPENTIN 100 MILLIGRAM(S): 400 CAPSULE ORAL at 16:13

## 2019-01-01 RX ADMIN — Medication 12.5 MILLIGRAM(S): at 11:59

## 2019-01-01 RX ADMIN — ENOXAPARIN SODIUM 40 MILLIGRAM(S): 100 INJECTION SUBCUTANEOUS at 18:30

## 2019-01-01 RX ADMIN — TIOTROPIUM BROMIDE 1 CAPSULE(S): 18 CAPSULE ORAL; RESPIRATORY (INHALATION) at 11:58

## 2019-01-01 RX ADMIN — Medication 650 MILLIGRAM(S): at 10:10

## 2019-01-01 RX ADMIN — Medication 1 TABLET(S): at 22:48

## 2019-01-01 RX ADMIN — Medication 650 MILLIGRAM(S): at 08:02

## 2019-01-01 RX ADMIN — Medication 650 MILLIGRAM(S): at 12:39

## 2019-01-01 RX ADMIN — Medication 12.5 MILLIGRAM(S): at 12:05

## 2019-01-01 RX ADMIN — Medication 12.5 MILLIGRAM(S): at 11:21

## 2019-01-01 RX ADMIN — GABAPENTIN 100 MILLIGRAM(S): 400 CAPSULE ORAL at 18:36

## 2019-01-01 RX ADMIN — Medication 650 MILLIGRAM(S): at 10:44

## 2019-01-01 RX ADMIN — LIDOCAINE 1 PATCH: 4 CREAM TOPICAL at 08:00

## 2019-01-01 RX ADMIN — Medication 4: at 12:36

## 2019-01-01 RX ADMIN — Medication 1 TABLET(S): at 05:42

## 2019-01-01 RX ADMIN — Medication 1000 UNIT(S): at 13:59

## 2019-01-01 RX ADMIN — Medication 650 MILLIGRAM(S): at 03:50

## 2019-01-01 RX ADMIN — GABAPENTIN 100 MILLIGRAM(S): 400 CAPSULE ORAL at 06:43

## 2019-01-01 RX ADMIN — Medication 1000 UNIT(S): at 11:41

## 2019-01-01 RX ADMIN — Medication 1 TABLET(S): at 18:34

## 2019-01-01 RX ADMIN — ATORVASTATIN CALCIUM 80 MILLIGRAM(S): 80 TABLET, FILM COATED ORAL at 21:13

## 2019-01-01 RX ADMIN — GABAPENTIN 100 MILLIGRAM(S): 400 CAPSULE ORAL at 05:40

## 2019-01-01 RX ADMIN — GABAPENTIN 100 MILLIGRAM(S): 400 CAPSULE ORAL at 05:36

## 2019-01-01 RX ADMIN — Medication 975 MILLIGRAM(S): at 02:00

## 2019-01-01 RX ADMIN — Medication 1 TABLET(S): at 13:21

## 2019-01-01 RX ADMIN — Medication 12.5 MILLIGRAM(S): at 11:40

## 2019-01-01 RX ADMIN — Medication 1000 UNIT(S): at 11:58

## 2019-01-01 RX ADMIN — Medication 1000 UNIT(S): at 13:46

## 2019-01-01 RX ADMIN — PANTOPRAZOLE SODIUM 40 MILLIGRAM(S): 20 TABLET, DELAYED RELEASE ORAL at 05:40

## 2019-01-01 RX ADMIN — ATORVASTATIN CALCIUM 80 MILLIGRAM(S): 80 TABLET, FILM COATED ORAL at 21:15

## 2019-01-01 RX ADMIN — GABAPENTIN 100 MILLIGRAM(S): 400 CAPSULE ORAL at 22:33

## 2019-01-01 RX ADMIN — Medication 650 MILLIGRAM(S): at 01:01

## 2019-01-01 RX ADMIN — ATORVASTATIN CALCIUM 80 MILLIGRAM(S): 80 TABLET, FILM COATED ORAL at 21:30

## 2019-01-01 RX ADMIN — Medication 650 MILLIGRAM(S): at 12:31

## 2019-01-01 RX ADMIN — PANTOPRAZOLE SODIUM 40 MILLIGRAM(S): 20 TABLET, DELAYED RELEASE ORAL at 05:32

## 2019-06-07 ENCOUNTER — APPOINTMENT (OUTPATIENT)
Dept: ORTHOPEDIC SURGERY | Facility: CLINIC | Age: 79
End: 2019-06-07

## 2019-06-22 ENCOUNTER — INPATIENT (INPATIENT)
Facility: HOSPITAL | Age: 79
LOS: 6 days | Discharge: NURSING, REHAB OR LONG-TERM CARE | End: 2019-06-29
Attending: INTERNAL MEDICINE | Admitting: INTERNAL MEDICINE
Payer: MEDICARE

## 2019-06-22 VITALS
SYSTOLIC BLOOD PRESSURE: 135 MMHG | RESPIRATION RATE: 18 BRPM | TEMPERATURE: 100 F | DIASTOLIC BLOOD PRESSURE: 48 MMHG | HEART RATE: 61 BPM | OXYGEN SATURATION: 100 %

## 2019-06-22 DIAGNOSIS — Z98.890 OTHER SPECIFIED POSTPROCEDURAL STATES: Chronic | ICD-10-CM

## 2019-06-22 DIAGNOSIS — Z90.5 ACQUIRED ABSENCE OF KIDNEY: Chronic | ICD-10-CM

## 2019-06-22 DIAGNOSIS — Z96.652 PRESENCE OF LEFT ARTIFICIAL KNEE JOINT: Chronic | ICD-10-CM

## 2019-06-22 DIAGNOSIS — R26.2 DIFFICULTY IN WALKING, NOT ELSEWHERE CLASSIFIED: ICD-10-CM

## 2019-06-22 LAB
ALBUMIN SERPL ELPH-MCNC: 3.5 G/DL — SIGNIFICANT CHANGE UP (ref 3.3–5)
ALP SERPL-CCNC: 123 U/L — HIGH (ref 40–120)
ALT FLD-CCNC: 43 U/L — HIGH (ref 4–33)
ANION GAP SERPL CALC-SCNC: 14 MMO/L — SIGNIFICANT CHANGE UP (ref 7–14)
ANISOCYTOSIS BLD QL: SLIGHT — SIGNIFICANT CHANGE UP
APPEARANCE UR: CLEAR — SIGNIFICANT CHANGE UP
AST SERPL-CCNC: 66 U/L — HIGH (ref 4–32)
BACTERIA # UR AUTO: SIGNIFICANT CHANGE UP
BASOPHILS # BLD AUTO: 0.03 K/UL — SIGNIFICANT CHANGE UP (ref 0–0.2)
BASOPHILS NFR BLD AUTO: 0.4 % — SIGNIFICANT CHANGE UP (ref 0–2)
BASOPHILS NFR SPEC: 0.9 % — SIGNIFICANT CHANGE UP (ref 0–2)
BILIRUB SERPL-MCNC: 0.3 MG/DL — SIGNIFICANT CHANGE UP (ref 0.2–1.2)
BILIRUB UR-MCNC: NEGATIVE — SIGNIFICANT CHANGE UP
BLASTS # FLD: 0 % — SIGNIFICANT CHANGE UP (ref 0–0)
BLOOD UR QL VISUAL: SIGNIFICANT CHANGE UP
BUN SERPL-MCNC: 15 MG/DL — SIGNIFICANT CHANGE UP (ref 7–23)
CALCIUM SERPL-MCNC: 9.2 MG/DL — SIGNIFICANT CHANGE UP (ref 8.4–10.5)
CHLORIDE SERPL-SCNC: 99 MMOL/L — SIGNIFICANT CHANGE UP (ref 98–107)
CO2 SERPL-SCNC: 25 MMOL/L — SIGNIFICANT CHANGE UP (ref 22–31)
COLOR SPEC: YELLOW — SIGNIFICANT CHANGE UP
CREAT SERPL-MCNC: 1.18 MG/DL — SIGNIFICANT CHANGE UP (ref 0.5–1.3)
DACRYOCYTES BLD QL SMEAR: SLIGHT — SIGNIFICANT CHANGE UP
ELLIPTOCYTES BLD QL SMEAR: SLIGHT — SIGNIFICANT CHANGE UP
EOSINOPHIL # BLD AUTO: 0.08 K/UL — SIGNIFICANT CHANGE UP (ref 0–0.5)
EOSINOPHIL NFR BLD AUTO: 1.1 % — SIGNIFICANT CHANGE UP (ref 0–6)
EOSINOPHIL NFR FLD: 0 % — SIGNIFICANT CHANGE UP (ref 0–6)
GIANT PLATELETS BLD QL SMEAR: PRESENT — SIGNIFICANT CHANGE UP
GLUCOSE SERPL-MCNC: 140 MG/DL — HIGH (ref 70–99)
GLUCOSE UR-MCNC: NEGATIVE — SIGNIFICANT CHANGE UP
HCT VFR BLD CALC: 37 % — SIGNIFICANT CHANGE UP (ref 34.5–45)
HGB BLD-MCNC: 12.2 G/DL — SIGNIFICANT CHANGE UP (ref 11.5–15.5)
HYALINE CASTS # UR AUTO: NEGATIVE — SIGNIFICANT CHANGE UP
IMM GRANULOCYTES NFR BLD AUTO: 0.5 % — SIGNIFICANT CHANGE UP (ref 0–1.5)
KETONES UR-MCNC: NEGATIVE — SIGNIFICANT CHANGE UP
LEUKOCYTE ESTERASE UR-ACNC: SIGNIFICANT CHANGE UP
LYMPHOCYTES # BLD AUTO: 1.06 K/UL — SIGNIFICANT CHANGE UP (ref 1–3.3)
LYMPHOCYTES # BLD AUTO: 14.4 % — SIGNIFICANT CHANGE UP (ref 13–44)
LYMPHOCYTES NFR SPEC AUTO: 12.2 % — LOW (ref 13–44)
MACROCYTES BLD QL: SLIGHT — SIGNIFICANT CHANGE UP
MAGNESIUM SERPL-MCNC: 1.1 MG/DL — LOW (ref 1.6–2.6)
MCHC RBC-ENTMCNC: 32 PG — SIGNIFICANT CHANGE UP (ref 27–34)
MCHC RBC-ENTMCNC: 33 % — SIGNIFICANT CHANGE UP (ref 32–36)
MCV RBC AUTO: 97.1 FL — SIGNIFICANT CHANGE UP (ref 80–100)
METAMYELOCYTES # FLD: 0 % — SIGNIFICANT CHANGE UP (ref 0–1)
MICROCYTES BLD QL: SLIGHT — SIGNIFICANT CHANGE UP
MONOCYTES # BLD AUTO: 0.63 K/UL — SIGNIFICANT CHANGE UP (ref 0–0.9)
MONOCYTES NFR BLD AUTO: 8.5 % — SIGNIFICANT CHANGE UP (ref 2–14)
MONOCYTES NFR BLD: 6.9 % — SIGNIFICANT CHANGE UP (ref 2–9)
MYELOCYTES NFR BLD: 0 % — SIGNIFICANT CHANGE UP (ref 0–0)
NEUTROPHIL AB SER-ACNC: 77.4 % — HIGH (ref 43–77)
NEUTROPHILS # BLD AUTO: 5.53 K/UL — SIGNIFICANT CHANGE UP (ref 1.8–7.4)
NEUTROPHILS NFR BLD AUTO: 75.1 % — SIGNIFICANT CHANGE UP (ref 43–77)
NEUTS BAND # BLD: 0 % — SIGNIFICANT CHANGE UP (ref 0–6)
NITRITE UR-MCNC: NEGATIVE — SIGNIFICANT CHANGE UP
NRBC # FLD: 0 K/UL — SIGNIFICANT CHANGE UP (ref 0–0)
OTHER - HEMATOLOGY %: 0 — SIGNIFICANT CHANGE UP
PH UR: 6 — SIGNIFICANT CHANGE UP (ref 5–8)
PHOSPHATE SERPL-MCNC: 3.7 MG/DL — SIGNIFICANT CHANGE UP (ref 2.5–4.5)
PLATELET # BLD AUTO: 143 K/UL — LOW (ref 150–400)
PLATELET COUNT - ESTIMATE: SIGNIFICANT CHANGE UP
PMV BLD: 11.1 FL — SIGNIFICANT CHANGE UP (ref 7–13)
POIKILOCYTOSIS BLD QL AUTO: SLIGHT — SIGNIFICANT CHANGE UP
POTASSIUM SERPL-MCNC: 4.4 MMOL/L — SIGNIFICANT CHANGE UP (ref 3.5–5.3)
POTASSIUM SERPL-SCNC: 4.4 MMOL/L — SIGNIFICANT CHANGE UP (ref 3.5–5.3)
PROMYELOCYTES # FLD: 0 % — SIGNIFICANT CHANGE UP (ref 0–0)
PROT SERPL-MCNC: 7.9 G/DL — SIGNIFICANT CHANGE UP (ref 6–8.3)
PROT UR-MCNC: 50 — SIGNIFICANT CHANGE UP
RBC # BLD: 3.81 M/UL — SIGNIFICANT CHANGE UP (ref 3.8–5.2)
RBC # FLD: 14.7 % — HIGH (ref 10.3–14.5)
RBC CASTS # UR COMP ASSIST: HIGH (ref 0–?)
REVIEW TO FOLLOW: YES — SIGNIFICANT CHANGE UP
SCHISTOCYTES BLD QL AUTO: SLIGHT — SIGNIFICANT CHANGE UP
SODIUM SERPL-SCNC: 138 MMOL/L — SIGNIFICANT CHANGE UP (ref 135–145)
SP GR SPEC: 1.02 — SIGNIFICANT CHANGE UP (ref 1–1.04)
SQUAMOUS # UR AUTO: SIGNIFICANT CHANGE UP
TARGETS BLD QL SMEAR: SLIGHT — SIGNIFICANT CHANGE UP
UROBILINOGEN FLD QL: NORMAL — SIGNIFICANT CHANGE UP
VARIANT LYMPHS # BLD: 2.6 % — SIGNIFICANT CHANGE UP
WBC # BLD: 7.37 K/UL — SIGNIFICANT CHANGE UP (ref 3.8–10.5)
WBC # FLD AUTO: 7.37 K/UL — SIGNIFICANT CHANGE UP (ref 3.8–10.5)
WBC UR QL: >50 — HIGH (ref 0–?)

## 2019-06-22 PROCEDURE — 73564 X-RAY EXAM KNEE 4 OR MORE: CPT | Mod: 26,LT,RT

## 2019-06-22 PROCEDURE — 99223 1ST HOSP IP/OBS HIGH 75: CPT

## 2019-06-22 PROCEDURE — 71046 X-RAY EXAM CHEST 2 VIEWS: CPT | Mod: 26

## 2019-06-22 RX ORDER — CEFTRIAXONE 500 MG/1
1000 INJECTION, POWDER, FOR SOLUTION INTRAMUSCULAR; INTRAVENOUS EVERY 24 HOURS
Refills: 0 | Status: DISCONTINUED | OUTPATIENT
Start: 2019-06-23 | End: 2019-06-25

## 2019-06-22 RX ORDER — ASPIRIN/CALCIUM CARB/MAGNESIUM 324 MG
81 TABLET ORAL DAILY
Refills: 0 | Status: DISCONTINUED | OUTPATIENT
Start: 2019-06-22 | End: 2019-06-29

## 2019-06-22 RX ORDER — CEFTRIAXONE 500 MG/1
1000 INJECTION, POWDER, FOR SOLUTION INTRAMUSCULAR; INTRAVENOUS ONCE
Refills: 0 | Status: COMPLETED | OUTPATIENT
Start: 2019-06-22 | End: 2019-06-22

## 2019-06-22 RX ORDER — FERROUS SULFATE 325(65) MG
325 TABLET ORAL
Refills: 0 | Status: DISCONTINUED | OUTPATIENT
Start: 2019-06-22 | End: 2019-06-29

## 2019-06-22 RX ORDER — IPRATROPIUM/ALBUTEROL SULFATE 18-103MCG
3 AEROSOL WITH ADAPTER (GRAM) INHALATION ONCE
Refills: 0 | Status: COMPLETED | OUTPATIENT
Start: 2019-06-22 | End: 2019-06-22

## 2019-06-22 RX ORDER — PANTOPRAZOLE SODIUM 20 MG/1
40 TABLET, DELAYED RELEASE ORAL
Refills: 0 | Status: DISCONTINUED | OUTPATIENT
Start: 2019-06-22 | End: 2019-06-29

## 2019-06-22 RX ORDER — ENOXAPARIN SODIUM 100 MG/ML
40 INJECTION SUBCUTANEOUS EVERY 24 HOURS
Refills: 0 | Status: DISCONTINUED | OUTPATIENT
Start: 2019-06-22 | End: 2019-06-29

## 2019-06-22 RX ORDER — MAGNESIUM SULFATE 500 MG/ML
2 VIAL (ML) INJECTION ONCE
Refills: 0 | Status: COMPLETED | OUTPATIENT
Start: 2019-06-22 | End: 2019-06-22

## 2019-06-22 RX ORDER — POLYETHYLENE GLYCOL 3350 17 G/17G
17 POWDER, FOR SOLUTION ORAL AT BEDTIME
Refills: 0 | Status: DISCONTINUED | OUTPATIENT
Start: 2019-06-22 | End: 2019-06-29

## 2019-06-22 RX ORDER — SODIUM CHLORIDE 9 MG/ML
1000 INJECTION, SOLUTION INTRAVENOUS
Refills: 0 | Status: COMPLETED | OUTPATIENT
Start: 2019-06-22 | End: 2019-06-23

## 2019-06-22 RX ORDER — GABAPENTIN 400 MG/1
100 CAPSULE ORAL THREE TIMES A DAY
Refills: 0 | Status: DISCONTINUED | OUTPATIENT
Start: 2019-06-22 | End: 2019-06-29

## 2019-06-22 RX ORDER — ATORVASTATIN CALCIUM 80 MG/1
80 TABLET, FILM COATED ORAL AT BEDTIME
Refills: 0 | Status: DISCONTINUED | OUTPATIENT
Start: 2019-06-22 | End: 2019-06-29

## 2019-06-22 RX ORDER — CHOLECALCIFEROL (VITAMIN D3) 125 MCG
1000 CAPSULE ORAL DAILY
Refills: 0 | Status: DISCONTINUED | OUTPATIENT
Start: 2019-06-22 | End: 2019-06-29

## 2019-06-22 RX ORDER — OXYCODONE HYDROCHLORIDE 5 MG/1
5 TABLET ORAL EVERY 6 HOURS
Refills: 0 | Status: DISCONTINUED | OUTPATIENT
Start: 2019-06-22 | End: 2019-06-27

## 2019-06-22 RX ORDER — DOCUSATE SODIUM 100 MG
100 CAPSULE ORAL THREE TIMES A DAY
Refills: 0 | Status: DISCONTINUED | OUTPATIENT
Start: 2019-06-22 | End: 2019-06-29

## 2019-06-22 RX ORDER — IPRATROPIUM/ALBUTEROL SULFATE 18-103MCG
3 AEROSOL WITH ADAPTER (GRAM) INHALATION EVERY 6 HOURS
Refills: 0 | Status: DISCONTINUED | OUTPATIENT
Start: 2019-06-22 | End: 2019-06-29

## 2019-06-22 RX ADMIN — ENOXAPARIN SODIUM 40 MILLIGRAM(S): 100 INJECTION SUBCUTANEOUS at 23:12

## 2019-06-22 RX ADMIN — Medication 30 MILLIGRAM(S): at 19:43

## 2019-06-22 RX ADMIN — SODIUM CHLORIDE 100 MILLILITER(S): 9 INJECTION, SOLUTION INTRAVENOUS at 23:13

## 2019-06-22 RX ADMIN — Medication 3 MILLILITER(S): at 19:43

## 2019-06-22 RX ADMIN — ATORVASTATIN CALCIUM 80 MILLIGRAM(S): 80 TABLET, FILM COATED ORAL at 23:32

## 2019-06-22 RX ADMIN — Medication 50 GRAM(S): at 19:48

## 2019-06-22 RX ADMIN — CEFTRIAXONE 100 MILLIGRAM(S): 500 INJECTION, POWDER, FOR SOLUTION INTRAMUSCULAR; INTRAVENOUS at 19:43

## 2019-06-22 NOTE — H&P ADULT - PROBLEM SELECTOR PLAN 2
- occurring in ED, but w/o respiratory distress, wheezing or sign of increased respiratory effort at the time of being seen  - also complaining of cough productive of whitish sputum (no cough appreciated during evaluation by writer)  - on ventolin HFA and Tudorza Pressair PTA; held for now  - s/p prednisone 30 mg, magnesium 2 grams and Duo-nebs x 3 in the ED; continuing prednisone 20 mg x 3 doses, Duo-nebs  - already on ceftriaxone for UTI; azithromycin not added since patient improved  - HOB elevation  - if recurrent symptoms, would seek Pulmonology's input (per PMD's indication)

## 2019-06-22 NOTE — H&P ADULT - PROBLEM SELECTOR PLAN 4
- X-fay demonstrates "Age-indeterminate fracture through the left knee cement spacer with lateral displacement of the distal fragment. Surrounding soft tissue edema..."  - pain/tenderness, swelling of the left knee  - surgeries x 3 - including knee replacement in the past  - Orthopedic consult (Dr. Strickland)

## 2019-06-22 NOTE — H&P ADULT - PROBLEM SELECTOR PLAN 3
- chronic issue, and chiefly due to the left knee, which is significantly swollen, tender  - X-fay demonstrates "Age-indeterminate fracture through the left knee cement spacer with lateral displacement of the distal fragment. Surrounding soft tissue edema..."  - s/p L-knee surgery x 3 - per patient (2010, 2011, 2016)  - last fall ~ 1.5 months ago   - Orthopedic consult (Dr. Strickland)  - already on vitamin D supplement  - f/u AM lab-work  - out of bed to chair, fall precautions  - ambulation with walker when appropriate (uses walker at baseline)

## 2019-06-22 NOTE — ED ADULT NURSE NOTE - OBJECTIVE STATEMENT
Pt a&ox3 c/o inability to walk starting this morning, pt usually ambulatory with walker, pt reports increased weakness, breathing even and unlabored, denies cp/discomfort, denies headache/dizziness, abd soft, non-tender, non-distended, skin is cool dry and intact, ivl placed, labs sent, will continue to monitor.

## 2019-06-22 NOTE — ED PROVIDER NOTE - GASTROINTESTINAL NEGATIVE STATEMENT, MLM
no abdominal pain, no bloating, no constipation, no diarrhea, no nausea and no vomiting. EKG done in PST

## 2019-06-22 NOTE — ED PROVIDER NOTE - OBJECTIVE STATEMENT
77 yo female with PMH of left knee replacement, HTN, COPD, CHF, presents to the ED sent in by her PMD for difficulty ambulating at home. Pt states she has been feeling "shaky" over the past two weeks and noted particular difficulty getting around at home and getting up from lying or seated positions for about two days. Pt went to an urgent care Tuesday of this past week due to the weakness and shakiness and was given levofloxacin for "an infection." States she only had a physical exam at that time, no urine or bloodwork or CXR done at at that time. Denies improvement on antibiotic which shs states she has been taking. Pt lives at home with her dakayleene who is wheelchair bound due to illness and her grandson who has autism but helps her ambulate at home as much as he can.     PMD: Raji Larsen

## 2019-06-22 NOTE — H&P ADULT - PROBLEM SELECTOR PLAN 1
- burning & itching w/ micturition, sweating, chills, as well as L-CVA area pain/tenderness  - nitrite negative infection with microscopic hematuria  - likely causing generalized weakness, in baseline difficulty ambulating  - started on ceftriaxone; continued  - ensure optimal hydration  - f/u culture  - f/u renal ultrasound (only has left kidney, per pt)

## 2019-06-22 NOTE — H&P ADULT - NSHPSOCIALHISTORY_GEN_ALL_CORE
Lives with daughter who is wheelchair dependent and grandson who is autistic   Lives with daughter who is wheelchair dependent and grandson who is autistic  No personal history of smoking  History of second hand smoking  No history of alcohol use  No history of illegal drug use    Ambulates with the aid of a walker at baseline

## 2019-06-22 NOTE — H&P ADULT - PROBLEM SELECTOR PLAN 6
- no recent HgbA1c level for comparision (6.3 in 2017)  - consistent carb diet  - holding off on ISS per FS for now; recommend starting insulin coverage if HgbA1c level elevated (and especially since patient currently prescribed steroid)

## 2019-06-22 NOTE — H&P ADULT - NSICDXFAMILYHX_GEN_ALL_CORE_FT
FAMILY HISTORY:  No pertinent family history in first degree relatives FAMILY HISTORY:  FH: breast cancer, ~ mother  FH: CHF (congestive heart failure), ~ mother  FH: COPD (chronic obstructive pulmonary disease), ~ daughter (non-smoker)  FH: diabetes mellitus, ~ mother, grandmother

## 2019-06-22 NOTE — ED PROVIDER NOTE - CLINICAL SUMMARY MEDICAL DECISION MAKING FREE TEXT BOX
Anupam: Difficulty walking for a few days. No focal weakness. 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 Limited help at home. Labs, xrays.

## 2019-06-22 NOTE — ED PROVIDER NOTE - MUSCULOSKELETAL, MLM
Grossly deformed but non-tender left knee; limited ROM of left knee, no appreciable effusion, no overlying skin changes; Spine appears normal, other range of motion is not limited, no other muscle or joint tenderness

## 2019-06-22 NOTE — ED ADULT NURSE NOTE - NSIMPLEMENTINTERV_GEN_ALL_ED
Implemented All Universal Safety Interventions:  [place] to call system. Call bell, personal items and telephone within reach. Instruct patient to call for assistance. Room bathroom lighting operational. Non-slip footwear when patient is off stretcher. Physically safe environment: no spills, clutter or unnecessary equipment. Stretcher in lowest position, wheels locked, appropriate side rails in place.

## 2019-06-22 NOTE — H&P ADULT - HISTORY OF PRESENT ILLNESS
78 year old female, with past history significant for CHF, COPD, GERD, HTN, L-knee replacement, presented to the ED secondary to difficulty ambulating.  Seen and evaluated at bedside;    Vital signs upon ED presentation as follows: BP = 135/48, HR = 61, RR = 18, T = 37.5 C (99.5 F), O2 Sat = 100% on RA.  Noted with magnesium level of 1.1 and UA with signs of infection.  X-ray of B/L Knees significant for "Age-indeterminate fracture through the left knee cement spacer with lateral displacement of the distal fragment. Surrounding soft tissue edema." Diagnosed with Difficulty Walking.  Prescribed magnesium sulfate 2 grams, ceftriaxone 1 gram, Duo-nebs x 3 and prednisone 30 mg x one in the ED. 78 year old female, with past history significant for CHF, COPD, GERD, HTN, L-knee replacement, presented to the ED secondary to difficulty ambulating.  Seen and evaluated at bedside; NAD, but appears to be experiencing some painful discomfort on the left lower back area.  Patient relates feeling unwell for approximately 2 weeks.  [place] [facility] [place] prescribed an antibiotic - once daily x 7 days (?levofloxacin).  However, despite compliance, patient's condition did not improve.  Suffered with seating and chills throughout the period, with fever only on the weekend prior.  Had frequent cough productive of thick whitish phlegm, especially at nights.  Experiences midsternal pleuritic chest pain with coughing.  On the night prior to coming to the ED, patient had profound weakness, to the extent of not being able to go the bathroom with use of a walker; urinated in diaper.  Went to scheduled appointment with PMD today and was advised to come to the ED for evaluation/management.    Also, patient complains of significant pain of the left knee; has had knee replacements x 3 of said area, but continues to suffer with persistent pain and swelling of the area.  Has fallen due to same; last fall was approximately one month ago.  In addition, patient relates burning and itching with micturition, as well as left back pain.  Has suffered with same in the past.    Vital signs upon ED presentation as follows: BP = 135/48, HR = 61, RR = 18, T = 37.5 C (99.5 F), O2 Sat = 100% on RA.  Noted with magnesium level of 1.1 and UA with signs of infection.  X-ray of B/L Knees significant for "Age-indeterminate fracture through the left knee cement spacer with lateral displacement of the distal fragment. Surrounding soft tissue edema." Diagnosed with Difficulty Walking.  Prescribed magnesium sulfate 2 grams, ceftriaxone 1 gram, Duo-nebs x 3 and prednisone 30 mg x one in the ED.

## 2019-06-22 NOTE — H&P ADULT - NSHPLABSRESULTS_GEN_ALL_CORE
12.2   7.37  )-----------( 143      ( 2019 17:22 )             37.0           138  |  99  |  15  ----------------------------<  140<H>  4.4   |  25  |  1.18    Ca    9.2      2019 17:22  Phos  3.7       Mg     1.1         TPro  7.9  /  Alb  3.5  /  TBili  0.3  /  DBili  x   /  AST  66<H>  /  ALT  43<H>  /  AlkPhos  123<H>      Urinalysis Basic - ( 2019 17:22 )    Color: YELLOW / Appearance: CLEAR / S.023 / pH: 6.0  Gluc: NEGATIVE / Ketone: NEGATIVE  / Bili: NEGATIVE / Urobili: NORMAL   Blood: TRACE / Protein: 50 / Nitrite: NEGATIVE   Leuk Esterase: LARGE / RBC: 6-10 / WBC >50   Sq Epi: FEW / Non Sq Epi: x / Bacteria: FEW    Lactate Trend    CAPILLARY BLOOD GLUCOSE

## 2019-06-22 NOTE — H&P ADULT - NSICDXPASTSURGICALHX_GEN_ALL_CORE_FT
PAST SURGICAL HISTORY:  History of left knee surgery ~ replacement PAST SURGICAL HISTORY:  History of left knee replacement PAST SURGICAL HISTORY:  History of left knee replacement ~ x 3 (2010, 2011, 2016) PAST SURGICAL HISTORY:  H/O breast biopsy ~ 3 times on right, 2 times on left    H/O right nephrectomy ~ [facility]    History of left knee replacement ~ x 3 (2010, 2011, 2016)

## 2019-06-22 NOTE — ED ADULT TRIAGE NOTE - CHIEF COMPLAINT QUOTE
Patient brought to Er from PCP by EMS for decrease in ambulation. Pt had 3 knee replacements and c/o difficulty ambulating, non weight bearing and swelling to left knee.

## 2019-06-22 NOTE — H&P ADULT - ASSESSMENT
78 year old female, with past history significant for CHF, COPD, GERD, HTN, L-knee replacement, presented to the ED secondary to difficulty ambulating.  Vital signs upon ED presentation as follows: BP = 135/48, HR = 61, RR = 18, T = 37.5 C (99.5 F), O2 Sat = 100% on RA.  Noted with magnesium level of 1.1 and UA with signs of infection.  X-ray of B/L Knees significant for "Age-indeterminate fracture through the left knee cement spacer with lateral displacement of the distal fragment. Surrounding soft tissue edema." Diagnosed with Difficulty Walking.  Admitted for further management of:

## 2019-06-22 NOTE — ED PROVIDER NOTE - CARE PLAN
Principal Discharge DX:	Difficulty walking Principal Discharge DX:	Difficulty walking  Secondary Diagnosis:	COPD exacerbation

## 2019-06-22 NOTE — H&P ADULT - MUSCULOSKELETAL COMMENTS
persistent pain & swelling of the left knee, which limits movements.  Left lower back pain significant swelling of the left knee with area of bogginess, some warmth, tenderness

## 2019-06-22 NOTE — H&P ADULT - NSICDXPASTMEDICALHX_GEN_ALL_CORE_FT
PAST MEDICAL HISTORY:  CHF (congestive heart failure) ~ diastolic, Stage I    COPD (chronic obstructive pulmonary disease)     GERD (gastroesophageal reflux disease)     HTN (hypertension) PAST MEDICAL HISTORY:  CHF (congestive heart failure) ~ diastolic, Stage I    COPD (chronic obstructive pulmonary disease)     GERD (gastroesophageal reflux disease)     HTN (hypertension)     Type 2 diabetes mellitus PAST MEDICAL HISTORY:  CHF (congestive heart failure) ~ diastolic, Stage I    Constipation     COPD (chronic obstructive pulmonary disease)     Dyslipidemia     GERD (gastroesophageal reflux disease)     HTN (hypertension)     Kidney stones     Type 2 diabetes mellitus     Vitamin D deficiency

## 2019-06-22 NOTE — ED PROVIDER NOTE - ATTENDING CONTRIBUTION TO CARE
I performed a face-to-face evaluation of the patient and performed a history and physical examination. I agree with the history and physical examination.    Older F, COPD, p/w difficulty walking. UA shows UTI. Pt. had home O2; no longer has it; unsure why not. Appears SOB, coughing, accessory muscle use. Nebs, steroids, Abx, CXR.

## 2019-06-23 DIAGNOSIS — R26.2 DIFFICULTY IN WALKING, NOT ELSEWHERE CLASSIFIED: ICD-10-CM

## 2019-06-23 DIAGNOSIS — E83.42 HYPOMAGNESEMIA: ICD-10-CM

## 2019-06-23 DIAGNOSIS — J44.1 CHRONIC OBSTRUCTIVE PULMONARY DISEASE WITH (ACUTE) EXACERBATION: ICD-10-CM

## 2019-06-23 DIAGNOSIS — T84.019A BROKEN INTERNAL JOINT PROSTHESIS, UNSPECIFIED SITE, INITIAL ENCOUNTER: ICD-10-CM

## 2019-06-23 DIAGNOSIS — E11.69 TYPE 2 DIABETES MELLITUS WITH OTHER SPECIFIED COMPLICATION: ICD-10-CM

## 2019-06-23 DIAGNOSIS — E55.9 VITAMIN D DEFICIENCY, UNSPECIFIED: ICD-10-CM

## 2019-06-23 DIAGNOSIS — N39.0 URINARY TRACT INFECTION, SITE NOT SPECIFIED: ICD-10-CM

## 2019-06-23 LAB
ALBUMIN SERPL ELPH-MCNC: 3.5 G/DL — SIGNIFICANT CHANGE UP (ref 3.3–5)
ALP SERPL-CCNC: 117 U/L — SIGNIFICANT CHANGE UP (ref 40–120)
ALT FLD-CCNC: 38 U/L — HIGH (ref 4–33)
ANION GAP SERPL CALC-SCNC: 16 MMO/L — HIGH (ref 7–14)
AST SERPL-CCNC: 50 U/L — HIGH (ref 4–32)
BASOPHILS # BLD AUTO: 0.01 K/UL — SIGNIFICANT CHANGE UP (ref 0–0.2)
BASOPHILS NFR BLD AUTO: 0.1 % — SIGNIFICANT CHANGE UP (ref 0–2)
BILIRUB SERPL-MCNC: < 0.2 MG/DL — LOW (ref 0.2–1.2)
BUN SERPL-MCNC: 19 MG/DL — SIGNIFICANT CHANGE UP (ref 7–23)
CALCIUM SERPL-MCNC: 8.6 MG/DL — SIGNIFICANT CHANGE UP (ref 8.4–10.5)
CHLORIDE SERPL-SCNC: 96 MMOL/L — LOW (ref 98–107)
CO2 SERPL-SCNC: 24 MMOL/L — SIGNIFICANT CHANGE UP (ref 22–31)
CREAT SERPL-MCNC: 1.15 MG/DL — SIGNIFICANT CHANGE UP (ref 0.5–1.3)
EOSINOPHIL # BLD AUTO: 0 K/UL — SIGNIFICANT CHANGE UP (ref 0–0.5)
EOSINOPHIL NFR BLD AUTO: 0 % — SIGNIFICANT CHANGE UP (ref 0–6)
GLUCOSE BLDC GLUCOMTR-MCNC: 151 MG/DL — HIGH (ref 70–99)
GLUCOSE BLDC GLUCOMTR-MCNC: 154 MG/DL — HIGH (ref 70–99)
GLUCOSE BLDC GLUCOMTR-MCNC: 175 MG/DL — HIGH (ref 70–99)
GLUCOSE BLDC GLUCOMTR-MCNC: 232 MG/DL — HIGH (ref 70–99)
GLUCOSE SERPL-MCNC: 175 MG/DL — HIGH (ref 70–99)
HBA1C BLD-MCNC: 6.4 % — HIGH (ref 4–5.6)
HCT VFR BLD CALC: 34.2 % — LOW (ref 34.5–45)
HGB BLD-MCNC: 11.2 G/DL — LOW (ref 11.5–15.5)
IMM GRANULOCYTES NFR BLD AUTO: 0.3 % — SIGNIFICANT CHANGE UP (ref 0–1.5)
LYMPHOCYTES # BLD AUTO: 0.56 K/UL — LOW (ref 1–3.3)
LYMPHOCYTES # BLD AUTO: 8.1 % — LOW (ref 13–44)
MAGNESIUM SERPL-MCNC: 1.2 MG/DL — LOW (ref 1.6–2.6)
MCHC RBC-ENTMCNC: 31.6 PG — SIGNIFICANT CHANGE UP (ref 27–34)
MCHC RBC-ENTMCNC: 32.7 % — SIGNIFICANT CHANGE UP (ref 32–36)
MCV RBC AUTO: 96.6 FL — SIGNIFICANT CHANGE UP (ref 80–100)
MONOCYTES # BLD AUTO: 0.14 K/UL — SIGNIFICANT CHANGE UP (ref 0–0.9)
MONOCYTES NFR BLD AUTO: 2 % — SIGNIFICANT CHANGE UP (ref 2–14)
NEUTROPHILS # BLD AUTO: 6.17 K/UL — SIGNIFICANT CHANGE UP (ref 1.8–7.4)
NEUTROPHILS NFR BLD AUTO: 89.5 % — HIGH (ref 43–77)
NRBC # FLD: 0.03 K/UL — SIGNIFICANT CHANGE UP (ref 0–0)
PHOSPHATE SERPL-MCNC: 4 MG/DL — SIGNIFICANT CHANGE UP (ref 2.5–4.5)
PLATELET # BLD AUTO: 160 K/UL — SIGNIFICANT CHANGE UP (ref 150–400)
PMV BLD: 9.6 FL — SIGNIFICANT CHANGE UP (ref 7–13)
POTASSIUM SERPL-MCNC: 3.3 MMOL/L — LOW (ref 3.5–5.3)
POTASSIUM SERPL-SCNC: 3.3 MMOL/L — LOW (ref 3.5–5.3)
PROT SERPL-MCNC: 7.3 G/DL — SIGNIFICANT CHANGE UP (ref 6–8.3)
RBC # BLD: 3.54 M/UL — LOW (ref 3.8–5.2)
RBC # FLD: 14.7 % — HIGH (ref 10.3–14.5)
SODIUM SERPL-SCNC: 136 MMOL/L — SIGNIFICANT CHANGE UP (ref 135–145)
TSH SERPL-MCNC: 0.47 UIU/ML — SIGNIFICANT CHANGE UP (ref 0.27–4.2)
VIT B12 SERPL-MCNC: 989 PG/ML — HIGH (ref 200–900)
WBC # BLD: 6.9 K/UL — SIGNIFICANT CHANGE UP (ref 3.8–10.5)
WBC # FLD AUTO: 6.9 K/UL — SIGNIFICANT CHANGE UP (ref 3.8–10.5)

## 2019-06-23 PROCEDURE — 76770 US EXAM ABDO BACK WALL COMP: CPT | Mod: 26

## 2019-06-23 RX ORDER — INSULIN LISPRO 100/ML
VIAL (ML) SUBCUTANEOUS
Refills: 0 | Status: DISCONTINUED | OUTPATIENT
Start: 2019-06-23 | End: 2019-06-29

## 2019-06-23 RX ORDER — MAGNESIUM SULFATE 500 MG/ML
2 VIAL (ML) INJECTION ONCE
Refills: 0 | Status: COMPLETED | OUTPATIENT
Start: 2019-06-23 | End: 2019-06-23

## 2019-06-23 RX ORDER — DEXTROSE 50 % IN WATER 50 %
25 SYRINGE (ML) INTRAVENOUS ONCE
Refills: 0 | Status: DISCONTINUED | OUTPATIENT
Start: 2019-06-23 | End: 2019-06-29

## 2019-06-23 RX ORDER — GLUCAGON INJECTION, SOLUTION 0.5 MG/.1ML
1 INJECTION, SOLUTION SUBCUTANEOUS ONCE
Refills: 0 | Status: DISCONTINUED | OUTPATIENT
Start: 2019-06-23 | End: 2019-06-29

## 2019-06-23 RX ORDER — DEXTROSE 50 % IN WATER 50 %
15 SYRINGE (ML) INTRAVENOUS ONCE
Refills: 0 | Status: DISCONTINUED | OUTPATIENT
Start: 2019-06-23 | End: 2019-06-29

## 2019-06-23 RX ORDER — MAGNESIUM SULFATE 500 MG/ML
2 VIAL (ML) INJECTION ONCE
Refills: 0 | Status: DISCONTINUED | OUTPATIENT
Start: 2019-06-23 | End: 2019-06-23

## 2019-06-23 RX ORDER — POTASSIUM CHLORIDE 20 MEQ
40 PACKET (EA) ORAL EVERY 4 HOURS
Refills: 0 | Status: COMPLETED | OUTPATIENT
Start: 2019-06-23 | End: 2019-06-23

## 2019-06-23 RX ORDER — SODIUM CHLORIDE 9 MG/ML
1000 INJECTION, SOLUTION INTRAVENOUS
Refills: 0 | Status: DISCONTINUED | OUTPATIENT
Start: 2019-06-23 | End: 2019-06-29

## 2019-06-23 RX ORDER — DEXTROSE 50 % IN WATER 50 %
12.5 SYRINGE (ML) INTRAVENOUS ONCE
Refills: 0 | Status: DISCONTINUED | OUTPATIENT
Start: 2019-06-23 | End: 2019-06-29

## 2019-06-23 RX ADMIN — GABAPENTIN 100 MILLIGRAM(S): 400 CAPSULE ORAL at 21:05

## 2019-06-23 RX ADMIN — Medication 3 MILLILITER(S): at 16:04

## 2019-06-23 RX ADMIN — Medication 2: at 08:48

## 2019-06-23 RX ADMIN — PANTOPRAZOLE SODIUM 40 MILLIGRAM(S): 20 TABLET, DELAYED RELEASE ORAL at 05:45

## 2019-06-23 RX ADMIN — Medication 325 MILLIGRAM(S): at 18:33

## 2019-06-23 RX ADMIN — POLYETHYLENE GLYCOL 3350 17 GRAM(S): 17 POWDER, FOR SOLUTION ORAL at 21:05

## 2019-06-23 RX ADMIN — Medication 1 TABLET(S): at 13:44

## 2019-06-23 RX ADMIN — ENOXAPARIN SODIUM 40 MILLIGRAM(S): 100 INJECTION SUBCUTANEOUS at 21:06

## 2019-06-23 RX ADMIN — GABAPENTIN 100 MILLIGRAM(S): 400 CAPSULE ORAL at 05:45

## 2019-06-23 RX ADMIN — Medication 100 MILLIGRAM(S): at 05:45

## 2019-06-23 RX ADMIN — Medication 81 MILLIGRAM(S): at 13:43

## 2019-06-23 RX ADMIN — ATORVASTATIN CALCIUM 80 MILLIGRAM(S): 80 TABLET, FILM COATED ORAL at 21:05

## 2019-06-23 RX ADMIN — Medication 1: at 13:43

## 2019-06-23 RX ADMIN — Medication 20 MILLIGRAM(S): at 05:45

## 2019-06-23 RX ADMIN — Medication 40 MILLIEQUIVALENT(S): at 13:44

## 2019-06-23 RX ADMIN — Medication 40 MILLIEQUIVALENT(S): at 08:29

## 2019-06-23 RX ADMIN — Medication 50 GRAM(S): at 08:29

## 2019-06-23 RX ADMIN — Medication 325 MILLIGRAM(S): at 05:45

## 2019-06-23 RX ADMIN — SODIUM CHLORIDE 100 MILLILITER(S): 9 INJECTION, SOLUTION INTRAVENOUS at 08:28

## 2019-06-23 RX ADMIN — GABAPENTIN 100 MILLIGRAM(S): 400 CAPSULE ORAL at 13:44

## 2019-06-23 RX ADMIN — Medication 3 MILLILITER(S): at 04:52

## 2019-06-23 RX ADMIN — Medication 3 MILLILITER(S): at 23:38

## 2019-06-23 RX ADMIN — Medication 1000 UNIT(S): at 13:44

## 2019-06-23 RX ADMIN — Medication 3 MILLILITER(S): at 09:53

## 2019-06-23 RX ADMIN — Medication 100 MILLIGRAM(S): at 13:43

## 2019-06-23 RX ADMIN — Medication 40 MILLIEQUIVALENT(S): at 18:32

## 2019-06-23 RX ADMIN — Medication 100 MILLIGRAM(S): at 21:05

## 2019-06-23 RX ADMIN — Medication 3 MILLILITER(S): at 00:28

## 2019-06-23 RX ADMIN — CEFTRIAXONE 100 MILLIGRAM(S): 500 INJECTION, POWDER, FOR SOLUTION INTRAMUSCULAR; INTRAVENOUS at 18:32

## 2019-06-23 NOTE — CONSULT NOTE ADULT - SUBJECTIVE AND OBJECTIVE BOX
Patient is a 78y old  Female who presents with a chief complaint of Difficulty walking, UTI, L-knee fracture (2019 15:06)      HPI:  78 year old female, with past history significant for CHF, COPD, GERD, HTN, L-knee replacement, presented to the ED secondary to difficulty ambulating.  Seen and evaluated at bedside; NAD, but appears to be experiencing some painful discomfort on the left lower back area.  Patient relates feeling unwell for approximately 2 weeks.  Went to the [facility] [place] on Tuesday and was prescribed an antibiotic - once daily x 7 days (?levofloxacin).  However, despite compliance, patient's condition did not improve.  Suffered with seating and chills throughout the period, with fever only on the weekend prior.  Had frequent cough productive of thick whitish phlegm, especially at nights.  Experiences midsternal pleuritic chest pain with coughing.  On the night prior to coming to the ED, patient had profound weakness, to the extent of not being able to go the bathroom with use of a walker; urinated in diaper.  Went to scheduled appointment with PMD today and was advised to come to the ED for evaluation/management.    Also, patient complains of significant pain of the left knee; has had knee replacements x 3 of said area, but continues to suffer with persistent pain and swelling of the area.  Has fallen due to same; last fall was approximately one month ago.  In addition, patient relates burning and itching with micturition, as well as left back pain.  Has suffered with same in the past.    Vital signs upon ED presentation as follows: BP = 135/48, HR = 61, RR = 18, T = 37.5 C (99.5 F), O2 Sat = 100% on RA.  Noted with magnesium level of 1.1 and UA with signs of infection.  X-ray of B/L Knees significant for "Age-indeterminate fracture through the left knee cement spacer with lateral displacement of the distal fragment. Surrounding soft tissue edema." Diagnosed with Difficulty Walking.  Prescribed magnesium sulfate 2 grams, ceftriaxone 1 gram, Duo-nebs x 3 and prednisone 30 mg x one in the ED. (2019 20:48)      REVIEW OF SYSTEMS:    CONSTITUTIONAL: No fever, weight loss, or fatigue  EYES: No eye pain, visual disturbances, or discharge  ENMT:  No sore throat  NECK: No pain or stiffness  RESPIRATORY: No cough, wheezing, chills or hemoptysis; No shortness of breath  CARDIOVASCULAR: No chest pain, palpitations, dizziness, or leg swelling  GASTROINTESTINAL: No abdominal or epigastric pain. No nausea, vomiting, or hematemesis; No diarrhea or constipation. No melena or hematochezia.  GENITOURINARY: No dysuria, frequency, hematuria, or incontinence  NEUROLOGICAL: No headaches, memory loss, loss of strength, numbness, or tremors  SKIN: No itching, burning, rashes, or lesions   LYMPH NODES: No enlarged glands  MUSCULOSKELETAL: No joint pain or swelling; No muscle, back, or extremity pain      PAST MEDICAL & SURGICAL HISTORY:  Vitamin D deficiency  Dyslipidemia  Constipation  Kidney stones  Type 2 diabetes mellitus  GERD (gastroesophageal reflux disease)  COPD (chronic obstructive pulmonary disease)  HTN (hypertension)  CHF (congestive heart failure): ~ diastolic, Stage I  H/O breast biopsy: ~ 3 times on right, 2 times on left  H/O right nephrectomy: ~ [facility]  History of left knee replacement: ~ x 3 (, , )      Allergies    No Known Allergies    Intolerances        FAMILY HISTORY:  FH: COPD (chronic obstructive pulmonary disease): ~ daughter (non-smoker)  FH: diabetes mellitus: ~ mother, grandmother  FH: CHF (congestive heart failure): ~ mother  FH: breast cancer: ~ mother      SOCIAL HISTORY:        MEDICATIONS  (STANDING):  ALBUTerol/ipratropium for Nebulization 3 milliLiter(s) Nebulizer every 6 hours  aspirin enteric coated 81 milliGRAM(s) Oral daily  atorvastatin 80 milliGRAM(s) Oral at bedtime  calcium carbonate 1250 mG  + Vitamin D (OsCal 500 + D) 1 Tablet(s) Oral daily  cefTRIAXone   IVPB 1000 milliGRAM(s) IV Intermittent every 24 hours  cholecalciferol 1000 Unit(s) Oral daily  dextrose 5%. 1000 milliLiter(s) (50 mL/Hr) IV Continuous <Continuous>  dextrose 50% Injectable 12.5 Gram(s) IV Push once  dextrose 50% Injectable 25 Gram(s) IV Push once  dextrose 50% Injectable 25 Gram(s) IV Push once  docusate sodium 100 milliGRAM(s) Oral three times a day  enoxaparin Injectable 40 milliGRAM(s) SubCutaneous every 24 hours  ferrous    sulfate 325 milliGRAM(s) Oral two times a day  gabapentin 100 milliGRAM(s) Oral three times a day  insulin lispro (HumaLOG) corrective regimen sliding scale   SubCutaneous three times a day before meals  metolazone 5 milliGRAM(s) Oral daily  multivitamin 1 Tablet(s) Oral daily  pantoprazole    Tablet 40 milliGRAM(s) Oral before breakfast  polyethylene glycol 3350 17 Gram(s) Oral at bedtime  predniSONE   Tablet 20 milliGRAM(s) Oral daily    MEDICATIONS  (PRN):  dextrose 40% Gel 15 Gram(s) Oral once PRN Blood Glucose LESS THAN 70 milliGRAM(s)/deciliter  glucagon  Injectable 1 milliGRAM(s) IntraMuscular once PRN Glucose LESS THAN 70 milligrams/deciliter  oxyCODONE    IR 5 milliGRAM(s) Oral every 6 hours PRN Moderate Pain (4 - 6)      Vital Signs Last 24 Hrs  T(C): 36.8 (2019 12:06), Max: 37 (2019 05:43)  T(F): 98.3 (2019 12:06), Max: 98.6 (2019 05:43)  HR: 82 (2019 16:04) (71 - 89)  BP: 124/69 (2019 12:06) (111/63 - 134/82)  BP(mean): --  RR: 18 (2019 12:06) (17 - 18)  SpO2: 98% (2019 12:06) (97% - 98%)    PHYSICAL EXAM:    GENERAL: NAD, well-groomed  HEAD:  Atraumatic, Normocephalic  EYES: EOMI, PERRLA, conjunctiva and sclera clear  ENMT: No tonsillar erythema, exudates, or enlargement; Moist mucous membranes  NECK: Supple, No JVD  CHEST/LUNG: Clear to percussion bilaterally; No rales, rhonchi, wheezing, or rubs  HEART: Regular rate and rhythm; No murmurs, rubs, or gallops  ABDOMEN: Soft, Nontender, Nondistended; Bowel sounds present  EXTREMITIES:  2+ Peripheral Pulses, No clubbing, cyanosis, or edema  LYMPH: No lymphadenopathy noted  SKIN: No rashes or lesions    LABS:  CBC Full  -  ( 2019 04:55 )  WBC Count : 6.90 K/uL  RBC Count : 3.54 M/uL  Hemoglobin : 11.2 g/dL  Hematocrit : 34.2 %  Platelet Count - Automated : 160 K/uL  Mean Cell Volume : 96.6 fL  Mean Cell Hemoglobin : 31.6 pg  Mean Cell Hemoglobin Concentration : 32.7 %  Auto Neutrophil # : 6.17 K/uL  Auto Lymphocyte # : 0.56 K/uL  Auto Monocyte # : 0.14 K/uL  Auto Eosinophil # : 0.00 K/uL  Auto Basophil # : 0.01 K/uL  Auto Neutrophil % : 89.5 %  Auto Lymphocyte % : 8.1 %  Auto Monocyte % : 2.0 %  Auto Eosinophil % : 0.0 %  Auto Basophil % : 0.1 %          136  |  96<L>  |  19  ----------------------------<  175<H>  3.3<L>   |  24  |  1.15    Ca    8.6      2019 04:58  Phos  4.0       Mg     1.2         TPro  7.3  /  Alb  3.5  /  TBili  < 0.2<L>  /  DBili  x   /  AST  50<H>  /  ALT  38<H>  /  AlkPhos  117        LIVER FUNCTIONS - ( 2019 04:58 )  Alb: 3.5 g/dL / Pro: 7.3 g/dL / ALK PHOS: 117 u/L / ALT: 38 u/L / AST: 50 u/L / GGT: x                               MICROBIOLOGY:        Urinalysis Basic - ( 2019 17:22 )    Color: YELLOW / Appearance: CLEAR / S.023 / pH: 6.0  Gluc: NEGATIVE / Ketone: NEGATIVE  / Bili: NEGATIVE / Urobili: NORMAL   Blood: TRACE / Protein: 50 / Nitrite: NEGATIVE   Leuk Esterase: LARGE / RBC: 6-10 / WBC >50   Sq Epi: FEW / Non Sq Epi: x / Bacteria: FEW                RADIOLOGY: Patient is a 78y old  Female who presents with a chief complaint of Difficulty walking, UTI, L-knee fracture (2019 15:06)      HPI:  78 year old female, with past history significant for CHF, COPD, GERD, HTN, L-knee replacement, presented to the ED secondary to difficulty ambulating.  Seen and evaluated at bedside; NAD, but appears to be experiencing some painful discomfort on the left lower back area.  Patient relates feeling unwell for approximately 2 weeks.  [place] [facility] [place] prescribed an antibiotic - once daily x 7 days (?levofloxacin).  However, despite compliance, patient's condition did not improve.  Suffered with shaking and chills throughout the period, with fever only on the weekend prior.      Had frequent cough productive of thick whitish phlegm, especially at nights.  Experiences midsternal pleuritic chest pain with coughing.  On the night prior to coming to the ED, patient had profound weakness, to the extent of not being able to go the bathroom with use of a walker; urinated in diaper.  Went to scheduled appointment with PMD today and was advised to come to the ED for evaluation/management.    Also, patient complains of significant pain of the left knee; has had knee replacements x 3 of said area, but continues to suffer with persistent pain and swelling of the area.  Has fallen due to same; last fall was approximately one month ago.  In addition, patient relates burning and itching with micturition, as well as left back pain.  Has suffered with same in the past.    Vital signs upon ED presentation as follows: BP = 135/48, HR = 61, RR = 18, T = 37.5 C (99.5 F), O2 Sat = 100% on RA.  Noted with magnesium level of 1.1 and UA with signs of infection.  X-ray of B/L Knees significant for "Age-indeterminate fracture through the left knee cement spacer with lateral displacement of the distal fragment. Surrounding soft tissue edema." Diagnosed with Difficulty Walking.  Prescribed magnesium sulfate 2 grams, ceftriaxone 1 gram, Duo-nebs x 3 and prednisone 30 mg x one in the ED. (2019 20:48)      REVIEW OF SYSTEMS:    CONSTITUTIONAL: No fever, weight loss, or fatigue  EYES: No eye pain, visual disturbances, or discharge  ENMT:  No sore throat  NECK: No pain or stiffness  RESPIRATORY: No cough, wheezing, chills or hemoptysis; No shortness of breath  CARDIOVASCULAR: No chest pain, palpitations, dizziness, or leg swelling  GASTROINTESTINAL: No abdominal or epigastric pain. No nausea, vomiting, or hematemesis; No diarrhea or constipation. No melena or hematochezia.  GENITOURINARY: No dysuria, frequency, hematuria, or incontinence  NEUROLOGICAL: No headaches, memory loss, loss of strength, numbness, or tremors  SKIN: No itching, burning, rashes, or lesions   LYMPH NODES: No enlarged glands  MUSCULOSKELETAL: No joint pain or swelling; No muscle, back, or extremity pain      PAST MEDICAL & SURGICAL HISTORY:  Vitamin D deficiency  Dyslipidemia  Constipation  Kidney stones  Type 2 diabetes mellitus  GERD (gastroesophageal reflux disease)  COPD (chronic obstructive pulmonary disease)  HTN (hypertension)  CHF (congestive heart failure): ~ diastolic, Stage I  H/O breast biopsy: ~ 3 times on right, 2 times on left  H/O right nephrectomy: ~ [facility]  History of left knee replacement: ~ x 3 (, , )      Allergies    No Known Allergies    Intolerances        FAMILY HISTORY:  FH: COPD (chronic obstructive pulmonary disease): ~ daughter (non-smoker)  FH: diabetes mellitus: ~ mother, grandmother  FH: CHF (congestive heart failure): ~ mother  FH: breast cancer: ~ mother      SOCIAL HISTORY:      	Lives with daughter who is wheelchair dependent and grandson who is autistic  	No personal history of smoking  	History of second hand smoking  	No history of alcohol use  	No history of illegal drug use    	Ambulates with the aid of a walker at baseline      MEDICATIONS  (STANDING):  ALBUTerol/ipratropium for Nebulization 3 milliLiter(s) Nebulizer every 6 hours  aspirin enteric coated 81 milliGRAM(s) Oral daily  atorvastatin 80 milliGRAM(s) Oral at bedtime  calcium carbonate 1250 mG  + Vitamin D (OsCal 500 + D) 1 Tablet(s) Oral daily  cefTRIAXone   IVPB 1000 milliGRAM(s) IV Intermittent every 24 hours  cholecalciferol 1000 Unit(s) Oral daily  dextrose 5%. 1000 milliLiter(s) (50 mL/Hr) IV Continuous <Continuous>  dextrose 50% Injectable 12.5 Gram(s) IV Push once  dextrose 50% Injectable 25 Gram(s) IV Push once  dextrose 50% Injectable 25 Gram(s) IV Push once  docusate sodium 100 milliGRAM(s) Oral three times a day  enoxaparin Injectable 40 milliGRAM(s) SubCutaneous every 24 hours  ferrous    sulfate 325 milliGRAM(s) Oral two times a day  gabapentin 100 milliGRAM(s) Oral three times a day  insulin lispro (HumaLOG) corrective regimen sliding scale   SubCutaneous three times a day before meals  metolazone 5 milliGRAM(s) Oral daily  multivitamin 1 Tablet(s) Oral daily  pantoprazole    Tablet 40 milliGRAM(s) Oral before breakfast  polyethylene glycol 3350 17 Gram(s) Oral at bedtime  predniSONE   Tablet 20 milliGRAM(s) Oral daily    MEDICATIONS  (PRN):  dextrose 40% Gel 15 Gram(s) Oral once PRN Blood Glucose LESS THAN 70 milliGRAM(s)/deciliter  glucagon  Injectable 1 milliGRAM(s) IntraMuscular once PRN Glucose LESS THAN 70 milligrams/deciliter  oxyCODONE    IR 5 milliGRAM(s) Oral every 6 hours PRN Moderate Pain (4 - 6)      Vital Signs Last 24 Hrs  T(C): 36.8 (2019 12:06), Max: 37 (2019 05:43)  T(F): 98.3 (2019 12:06), Max: 98.6 (2019 05:43)  HR: 82 (2019 16:04) (71 - 89)  BP: 124/69 (2019 12:06) (111/63 - 134/82)  BP(mean): --  RR: 18 (2019 12:06) (17 - 18)  SpO2: 98% (2019 12:06) (97% - 98%)    PHYSICAL EXAM:    GENERAL: NAD, well-groomed  HEAD:  Atraumatic, Normocephalic  EYES: EOMI, PERRLA, conjunctiva and sclera clear  ENMT: No tonsillar erythema, exudates, or enlargement; Moist mucous membranes  NECK: Supple, No JVD  CHEST/LUNG: Clear to percussion bilaterally; No rales, rhonchi, wheezing, or rubs  HEART: Regular rate and rhythm; No murmurs, rubs, or gallops  ABDOMEN: Soft, Nontender, Nondistended; Bowel sounds present  EXTREMITIES:  2+ Peripheral Pulses, No clubbing, cyanosis, or edema  LYMPH: No lymphadenopathy noted  SKIN: No rashes or lesions    LABS:  CBC Full  -  ( 2019 04:55 )  WBC Count : 6.90 K/uL  RBC Count : 3.54 M/uL  Hemoglobin : 11.2 g/dL  Hematocrit : 34.2 %  Platelet Count - Automated : 160 K/uL  Mean Cell Volume : 96.6 fL  Mean Cell Hemoglobin : 31.6 pg  Mean Cell Hemoglobin Concentration : 32.7 %  Auto Neutrophil # : 6.17 K/uL  Auto Lymphocyte # : 0.56 K/uL  Auto Monocyte # : 0.14 K/uL  Auto Eosinophil # : 0.00 K/uL  Auto Basophil # : 0.01 K/uL  Auto Neutrophil % : 89.5 %  Auto Lymphocyte % : 8.1 %  Auto Monocyte % : 2.0 %  Auto Eosinophil % : 0.0 %  Auto Basophil % : 0.1 %          136  |  96<L>  |  19  ----------------------------<  175<H>  3.3<L>   |  24  |  1.15    Ca    8.6      2019 04:58  Phos  4.0       Mg     1.2         TPro  7.3  /  Alb  3.5  /  TBili  < 0.2<L>  /  DBili  x   /  AST  50<H>  /  ALT  38<H>  /  AlkPhos  117        LIVER FUNCTIONS - ( 2019 04:58 )  Alb: 3.5 g/dL / Pro: 7.3 g/dL / ALK PHOS: 117 u/L / ALT: 38 u/L / AST: 50 u/L / GGT: x                               MICROBIOLOGY:        Urinalysis Basic - ( 2019 17:22 )    Color: YELLOW / Appearance: CLEAR / S.023 / pH: 6.0  Gluc: NEGATIVE / Ketone: NEGATIVE  / Bili: NEGATIVE / Urobili: NORMAL   Blood: TRACE / Protein: 50 / Nitrite: NEGATIVE   Leuk Esterase: LARGE / RBC: 6-10 / WBC >50   Sq Epi: FEW / Non Sq Epi: x / Bacteria: FEW                RADIOLOGY:    < from: US Kidney and Bladder (19 @ 13:38) >  FINDINGS:    Right kidney: The right kidney is not visualized.    Left kidney:  9.8 cm. No renal mass, hydronephrosis or calculi.    Urinary bladder: The bladder is sonographically normal.    IMPRESSION:     1.  The right kidney is not visualized.  2.  No left hydronephrosis.  3.  A left renal calculus shown in 2016 is not visualized by sonography.    < end of copied text >        < from: Xray Chest 2 Views PA/Lat (19 @ 17:48) >  Impression:    Areas of linear atelectasis are noted throughout the lungs. No pleural   effusion or pneumothorax. The cardiac silhouette is poorly evaluated on   this projection. Unchanged severe S-shaped scoliosis of the spine and   degenerative changes of the bilateral shoulders.    < end of copied text >              < from: Xray Knee 4 Views, Bilateral (19 @ 17:47) >  FINDINGS:   Left:  Previously seen was a left-sided impregnated cement spacer with distal   femoral and proximal tibial metal plate reinforcement and surrounding   antibiotic impregnated cement beads inserted in its place. In the   interim, the patient has since development fracture through the cement   with resulting lateral displacement of the distal fragment. The soft   tissues are on the left knee are edematous and there are multiple   well-corticated osseous fragments likely representing heterotopic   ossification.    Right:  Status post right knee arthroplasty with patellar resurfacing. The   hardware is intact and there is no fracture or dislocation. The soft   tissues are unremarkable.      IMPRESSION:   Age-indeterminate fracture through the left knee cement spacer with   lateral displacement of the distal fragment. Surrounding soft tissue   edema.    < end of copied text > Patient is a 78y old  Female who presents with a chief complaint of Difficulty walking, UTI, L-knee fracture (2019 15:06)      HPI:  78 year old female, with past history significant for CHF, COPD, GERD, HTN, L-knee replacement, presented to the ED secondary to difficulty ambulating.  Seen and evaluated at bedside; NAD, but appears to be experiencing some painful discomfort on the left lower back area.  Patient relates feeling unwell for approximately 2 weeks.  [place] [facility] [place] and was prescribed an antibiotic - once daily x 7 days (?levofloxacin).  However, despite compliance, patient's condition did not improve.  Suffered with shaking and chills throughout the period, with fever only on the weekend prior.      Had frequent cough productive of thick whitish phlegm, especially at nights.  Experiences midsternal pleuritic chest pain with coughing.  On the night prior to coming to the ED, patient had profound weakness, to the extent of not being able to go the bathroom with use of a walker; urinated in diaper.  Went to scheduled appointment with PMD today and was advised to come to the ED for evaluation/management.    Also, patient complains of significant pain of the left knee; has had knee replacements x 3 of said area, but continues to suffer with persistent pain and swelling of the area.  Has fallen due to same; last fall was approximately one month ago.  In addition, patient relates burning and itching with micturition, as well as left back pain.  Has suffered with same in the past.    Vital signs upon ED presentation as follows: BP = 135/48, HR = 61, RR = 18, T = 37.5 C (99.5 F), O2 Sat = 100% on RA.  Noted with magnesium level of 1.1 and UA with signs of infection.  X-ray of B/L Knees significant for "Age-indeterminate fracture through the left knee cement spacer with lateral displacement of the distal fragment. Surrounding soft tissue edema." Diagnosed with Difficulty Walking.  Prescribed magnesium sulfate 2 grams, ceftriaxone 1 gram, Duo-nebs x 3 and prednisone 30 mg x one in the ED. (2019 20:48)      REVIEW OF SYSTEMS:    CONSTITUTIONAL: No fever, weight loss, or fatigue  EYES: No eye pain, visual disturbances, or discharge  ENMT:  No sore throat  NECK: No pain or stiffness  RESPIRATORY: No cough, wheezing, chills or hemoptysis; No shortness of breath  CARDIOVASCULAR: No chest pain, palpitations, dizziness, or leg swelling  GASTROINTESTINAL: No abdominal or epigastric pain. No nausea, vomiting, or hematemesis; No diarrhea or constipation. No melena or hematochezia.  GENITOURINARY: No dysuria, frequency, hematuria, or incontinence  NEUROLOGICAL: No headaches, memory loss, loss of strength, numbness, or tremors  SKIN: No itching, burning, rashes, or lesions   LYMPH NODES: No enlarged glands  MUSCULOSKELETAL: L flank pain, L knee pain      PAST MEDICAL & SURGICAL HISTORY:  Vitamin D deficiency  Dyslipidemia  Constipation  Kidney stones  Type 2 diabetes mellitus  GERD (gastroesophageal reflux disease)  COPD (chronic obstructive pulmonary disease)  HTN (hypertension)  CHF (congestive heart failure): ~ diastolic, Stage I  H/O breast biopsy: ~ 3 times on right, 2 times on left  H/O right nephrectomy: ~ [facility]  History of left knee replacement: ~ x 3 (, , )      Allergies    No Known Allergies    Intolerances        FAMILY HISTORY:  FH: COPD (chronic obstructive pulmonary disease): ~ daughter (non-smoker)  FH: diabetes mellitus: ~ mother, grandmother  FH: CHF (congestive heart failure): ~ mother  FH: breast cancer: ~ mother      SOCIAL HISTORY:      	Lives with daughter who is wheelchair dependent and grandson who is autistic  	No personal history of smoking  	History of second hand smoking  	No history of alcohol use  	No history of illegal drug use    	Ambulates with the aid of a walker at baseline      MEDICATIONS  (STANDING):  ALBUTerol/ipratropium for Nebulization 3 milliLiter(s) Nebulizer every 6 hours  aspirin enteric coated 81 milliGRAM(s) Oral daily  atorvastatin 80 milliGRAM(s) Oral at bedtime  calcium carbonate 1250 mG  + Vitamin D (OsCal 500 + D) 1 Tablet(s) Oral daily  cefTRIAXone   IVPB 1000 milliGRAM(s) IV Intermittent every 24 hours  cholecalciferol 1000 Unit(s) Oral daily  dextrose 5%. 1000 milliLiter(s) (50 mL/Hr) IV Continuous <Continuous>  dextrose 50% Injectable 12.5 Gram(s) IV Push once  dextrose 50% Injectable 25 Gram(s) IV Push once  dextrose 50% Injectable 25 Gram(s) IV Push once  docusate sodium 100 milliGRAM(s) Oral three times a day  enoxaparin Injectable 40 milliGRAM(s) SubCutaneous every 24 hours  ferrous    sulfate 325 milliGRAM(s) Oral two times a day  gabapentin 100 milliGRAM(s) Oral three times a day  insulin lispro (HumaLOG) corrective regimen sliding scale   SubCutaneous three times a day before meals  metolazone 5 milliGRAM(s) Oral daily  multivitamin 1 Tablet(s) Oral daily  pantoprazole    Tablet 40 milliGRAM(s) Oral before breakfast  polyethylene glycol 3350 17 Gram(s) Oral at bedtime  predniSONE   Tablet 20 milliGRAM(s) Oral daily    MEDICATIONS  (PRN):  dextrose 40% Gel 15 Gram(s) Oral once PRN Blood Glucose LESS THAN 70 milliGRAM(s)/deciliter  glucagon  Injectable 1 milliGRAM(s) IntraMuscular once PRN Glucose LESS THAN 70 milligrams/deciliter  oxyCODONE    IR 5 milliGRAM(s) Oral every 6 hours PRN Moderate Pain (4 - 6)      Vital Signs Last 24 Hrs  T(C): 36.8 (2019 12:06), Max: 37 (2019 05:43)  T(F): 98.3 (2019 12:06), Max: 98.6 (2019 05:43)  HR: 82 (2019 16:04) (71 - 89)  BP: 124/69 (2019 12:06) (111/63 - 134/82)  BP(mean): --  RR: 18 (2019 12:06) (17 - 18)  SpO2: 98% (2019 12:06) (97% - 98%)    PHYSICAL EXAM:    GENERAL: NAD, well-groomed  HEAD:  Atraumatic, Normocephalic  EYES: EOMI, PERRLA, conjunctiva and sclera clear  ENMT: No tonsillar erythema, exudates, or enlargement; Moist mucous membranes  NECK: Supple, No JVD  CHEST/LUNG: Clear to percussion bilaterally; No rales, rhonchi, wheezing, or rubs  HEART: Regular rate and rhythm; No murmurs, rubs, or gallops  ABDOMEN: Soft, Nontender, Nondistended; Bowel sounds present  EXTREMITIES:  2+ Peripheral Pulses, No clubbing, cyanosis, or edema  LYMPH: No lymphadenopathy noted  SKIN: b/l TKR scars.  L knee appears chronically swollen.  No erythema    LABS:  CBC Full  -  ( 2019 04:55 )  WBC Count : 6.90 K/uL  RBC Count : 3.54 M/uL  Hemoglobin : 11.2 g/dL  Hematocrit : 34.2 %  Platelet Count - Automated : 160 K/uL  Mean Cell Volume : 96.6 fL  Mean Cell Hemoglobin : 31.6 pg  Mean Cell Hemoglobin Concentration : 32.7 %  Auto Neutrophil # : 6.17 K/uL  Auto Lymphocyte # : 0.56 K/uL  Auto Monocyte # : 0.14 K/uL  Auto Eosinophil # : 0.00 K/uL  Auto Basophil # : 0.01 K/uL  Auto Neutrophil % : 89.5 %  Auto Lymphocyte % : 8.1 %  Auto Monocyte % : 2.0 %  Auto Eosinophil % : 0.0 %  Auto Basophil % : 0.1 %          136  |  96<L>  |  19  ----------------------------<  175<H>  3.3<L>   |  24  |  1.15    Ca    8.6      2019 04:58  Phos  4.0       Mg     1.2         TPro  7.3  /  Alb  3.5  /  TBili  < 0.2<L>  /  DBili  x   /  AST  50<H>  /  ALT  38<H>  /  AlkPhos  117        LIVER FUNCTIONS - ( 2019 04:58 )  Alb: 3.5 g/dL / Pro: 7.3 g/dL / ALK PHOS: 117 u/L / ALT: 38 u/L / AST: 50 u/L / GGT: x                               MICROBIOLOGY:        Urinalysis Basic - ( 2019 17:22 )    Color: YELLOW / Appearance: CLEAR / S.023 / pH: 6.0  Gluc: NEGATIVE / Ketone: NEGATIVE  / Bili: NEGATIVE / Urobili: NORMAL   Blood: TRACE / Protein: 50 / Nitrite: NEGATIVE   Leuk Esterase: LARGE / RBC: 6-10 / WBC >50   Sq Epi: FEW / Non Sq Epi: x / Bacteria: FEW                RADIOLOGY:    < from: US Kidney and Bladder (19 @ 13:38) >  FINDINGS:    Right kidney: The right kidney is not visualized.    Left kidney:  9.8 cm. No renal mass, hydronephrosis or calculi.    Urinary bladder: The bladder is sonographically normal.    IMPRESSION:     1.  The right kidney is not visualized.  2.  No left hydronephrosis.  3.  A left renal calculus shown in 2016 is not visualized by sonography.    < end of copied text >        < from: Xray Chest 2 Views PA/Lat (19 @ 17:48) >  Impression:    Areas of linear atelectasis are noted throughout the lungs. No pleural   effusion or pneumothorax. The cardiac silhouette is poorly evaluated on   this projection. Unchanged severe S-shaped scoliosis of the spine and   degenerative changes of the bilateral shoulders.    < end of copied text >              < from: Xray Knee 4 Views, Bilateral (19 @ 17:47) >  FINDINGS:   Left:  Previously seen was a left-sided impregnated cement spacer with distal   femoral and proximal tibial metal plate reinforcement and surrounding   antibiotic impregnated cement beads inserted in its place. In the   interim, the patient has since development fracture through the cement   with resulting lateral displacement of the distal fragment. The soft   tissues are on the left knee are edematous and there are multiple   well-corticated osseous fragments likely representing heterotopic   ossification.    Right:  Status post right knee arthroplasty with patellar resurfacing. The   hardware is intact and there is no fracture or dislocation. The soft   tissues are unremarkable.      IMPRESSION:   Age-indeterminate fracture through the left knee cement spacer with   lateral displacement of the distal fragment. Surrounding soft tissue   edema.    < end of copied text >

## 2019-06-23 NOTE — PROGRESS NOTE ADULT - PROBLEM SELECTOR PLAN 3
- chronic issue, and chiefly due to the left knee, which is significantly swollen, tender  - X-fay demonstrates "Age-indeterminate fracture through the left knee cement spacer with lateral displacement of the distal fragment. Surrounding soft tissue edema..."  - s/p L-knee surgery x 3 - per patient (2010, 2011, 2016)  - last fall ~ 1.5 months ago   - Orthopedic consult (Dr. Strickland)  - already on vitamin D supplement  - out of bed to chair, fall precautions  - ambulation with walker when appropriate (uses walker at baseline)

## 2019-06-23 NOTE — PROGRESS NOTE ADULT - ATTENDING COMMENTS
Advanced care planning was discussed with patient and family.  Advanced care planning forms were reviewed and discussed.  Differential diagnosis and plan of care discussed with patient after the evaluation.   Pain assessed and judicious use of narcotics when appropriate was discussed.  Importance of Fall prevention discussed.  Counseling on Smoking and Alcohol cessation was offered when appropriate.  Counseling on Diet, exercise, and medication compliance was done.

## 2019-06-23 NOTE — CONSULT NOTE ADULT - ASSESSMENT
78 year old female, with past history significant for CHF, COPD, GERD, HTN, L-knee replacement, presented to the ED secondary to difficulty ambulating.  Seen and evaluated at bedside; NAD, but appears to be experiencing some painful discomfort on the left lower back area.  Patient relates feeling unwell for approximately 2 weeks.  Went to the [facility] on Tuesday and was prescribed an antibiotic - once daily x 7 days (?levofloxacin).  However, despite compliance, patient's condition did not improve.  Suffered with shaking and chills throughout the period, with fever only on the weekend prior.      Had frequent cough productive of thick whitish phlegm, especially at nights.  Experiences midsternal pleuritic chest pain with coughing.  On the night prior to coming to the ED, patient had profound weakness, to the extent of not being able to go the bathroom with use of a walker; urinated in diaper.  Went to scheduled appointment with PMD today and was advised to come to the ED for evaluation/management.    Also, patient complains of significant pain of the left knee; has had knee replacements x 3 of said area, but continues to suffer with persistent pain and swelling of the area.  Has fallen due to same; last fall was approximately one month ago.  In addition, patient relates burning and itching with micturition, as well as left back pain.  Has suffered with same in the past.    Vital signs upon ED presentation as follows: BP = 135/48, HR = 61, RR = 18, T = 37.5 C (99.5 F), O2 Sat = 100% on RA.  Noted with magnesium level of 1.1 and UA with signs of infection.  X-ray of B/L Knees significant for "Age-indeterminate fracture through the left knee cement spacer with lateral displacement of the distal fragment. Surrounding soft tissue edema." Diagnosed with Difficulty Walking.  Prescribed magnesium sulfate 2 grams, ceftriaxone 1 gram, Duo-nebs x 3 and prednisone 30 mg x one in the ED. (22 Jun 2019 20:48)    UTI:    - Pt with L sided CVA tenderness, burning/itching on urination, c/o sweats/chills.  UA (+) with microscopic hematuria.   Agree with rocephin for acute cystitis vs. pylenephritis.  No obstructive stones seen in L kideny.  Pt w/ho R nephrectomy.     - f/u ucx, bcx      L knee swelling:     - Pt with h/o prosthetic knee, s/p removal and has cement spacer in place.  Xray shows   "Age-indeterminate fracture through the left knee cement spacer with lateral displacement of the distal fragment. Surrounding soft tissue edema...".   No erythema.  f/u with  Orthopedic consult (Dr. Strickland)      Will follow,    Gianna Sloan  [phone]

## 2019-06-24 ENCOUNTER — TRANSCRIPTION ENCOUNTER (OUTPATIENT)
Age: 79
End: 2019-06-24

## 2019-06-24 LAB
ALBUMIN SERPL ELPH-MCNC: 3.5 G/DL — SIGNIFICANT CHANGE UP (ref 3.3–5)
ALP SERPL-CCNC: 111 U/L — SIGNIFICANT CHANGE UP (ref 40–120)
ALT FLD-CCNC: 36 U/L — HIGH (ref 4–33)
ANION GAP SERPL CALC-SCNC: 11 MMO/L — SIGNIFICANT CHANGE UP (ref 7–14)
AST SERPL-CCNC: 41 U/L — HIGH (ref 4–32)
BACTERIA UR CULT: SIGNIFICANT CHANGE UP
BASOPHILS # BLD AUTO: 0.02 K/UL — SIGNIFICANT CHANGE UP (ref 0–0.2)
BASOPHILS NFR BLD AUTO: 0.2 % — SIGNIFICANT CHANGE UP (ref 0–2)
BILIRUB SERPL-MCNC: 0.2 MG/DL — SIGNIFICANT CHANGE UP (ref 0.2–1.2)
BUN SERPL-MCNC: 25 MG/DL — HIGH (ref 7–23)
CALCIUM SERPL-MCNC: 8.9 MG/DL — SIGNIFICANT CHANGE UP (ref 8.4–10.5)
CHLORIDE SERPL-SCNC: 101 MMOL/L — SIGNIFICANT CHANGE UP (ref 98–107)
CO2 SERPL-SCNC: 28 MMOL/L — SIGNIFICANT CHANGE UP (ref 22–31)
CREAT SERPL-MCNC: 1.12 MG/DL — SIGNIFICANT CHANGE UP (ref 0.5–1.3)
EOSINOPHIL # BLD AUTO: 0.05 K/UL — SIGNIFICANT CHANGE UP (ref 0–0.5)
EOSINOPHIL NFR BLD AUTO: 0.5 % — SIGNIFICANT CHANGE UP (ref 0–6)
GLUCOSE BLDC GLUCOMTR-MCNC: 133 MG/DL — HIGH (ref 70–99)
GLUCOSE BLDC GLUCOMTR-MCNC: 138 MG/DL — HIGH (ref 70–99)
GLUCOSE BLDC GLUCOMTR-MCNC: 168 MG/DL — HIGH (ref 70–99)
GLUCOSE BLDC GLUCOMTR-MCNC: 201 MG/DL — HIGH (ref 70–99)
GLUCOSE SERPL-MCNC: 134 MG/DL — HIGH (ref 70–99)
HCT VFR BLD CALC: 33.3 % — LOW (ref 34.5–45)
HGB BLD-MCNC: 11.2 G/DL — LOW (ref 11.5–15.5)
IMM GRANULOCYTES NFR BLD AUTO: 0.3 % — SIGNIFICANT CHANGE UP (ref 0–1.5)
LYMPHOCYTES # BLD AUTO: 1.18 K/UL — SIGNIFICANT CHANGE UP (ref 1–3.3)
LYMPHOCYTES # BLD AUTO: 11.9 % — LOW (ref 13–44)
MAGNESIUM SERPL-MCNC: 2 MG/DL — SIGNIFICANT CHANGE UP (ref 1.6–2.6)
MCHC RBC-ENTMCNC: 32.5 PG — SIGNIFICANT CHANGE UP (ref 27–34)
MCHC RBC-ENTMCNC: 33.6 % — SIGNIFICANT CHANGE UP (ref 32–36)
MCV RBC AUTO: 96.5 FL — SIGNIFICANT CHANGE UP (ref 80–100)
MONOCYTES # BLD AUTO: 0.78 K/UL — SIGNIFICANT CHANGE UP (ref 0–0.9)
MONOCYTES NFR BLD AUTO: 7.9 % — SIGNIFICANT CHANGE UP (ref 2–14)
NEUTROPHILS # BLD AUTO: 7.86 K/UL — HIGH (ref 1.8–7.4)
NEUTROPHILS NFR BLD AUTO: 79.2 % — HIGH (ref 43–77)
NRBC # FLD: 0 K/UL — SIGNIFICANT CHANGE UP (ref 0–0)
PHOSPHATE SERPL-MCNC: 2.7 MG/DL — SIGNIFICANT CHANGE UP (ref 2.5–4.5)
PLATELET # BLD AUTO: 165 K/UL — SIGNIFICANT CHANGE UP (ref 150–400)
PMV BLD: 10.2 FL — SIGNIFICANT CHANGE UP (ref 7–13)
POTASSIUM SERPL-MCNC: 4.5 MMOL/L — SIGNIFICANT CHANGE UP (ref 3.5–5.3)
POTASSIUM SERPL-SCNC: 4.5 MMOL/L — SIGNIFICANT CHANGE UP (ref 3.5–5.3)
PROT SERPL-MCNC: 7.1 G/DL — SIGNIFICANT CHANGE UP (ref 6–8.3)
RBC # BLD: 3.45 M/UL — LOW (ref 3.8–5.2)
RBC # FLD: 14.8 % — HIGH (ref 10.3–14.5)
SODIUM SERPL-SCNC: 140 MMOL/L — SIGNIFICANT CHANGE UP (ref 135–145)
SPECIMEN SOURCE: SIGNIFICANT CHANGE UP
WBC # BLD: 9.92 K/UL — SIGNIFICANT CHANGE UP (ref 3.8–10.5)
WBC # FLD AUTO: 9.92 K/UL — SIGNIFICANT CHANGE UP (ref 3.8–10.5)

## 2019-06-24 PROCEDURE — 73562 X-RAY EXAM OF KNEE 3: CPT | Mod: 26,LT

## 2019-06-24 RX ORDER — IPRATROPIUM/ALBUTEROL SULFATE 18-103MCG
3 AEROSOL WITH ADAPTER (GRAM) INHALATION ONCE
Refills: 0 | Status: COMPLETED | OUTPATIENT
Start: 2019-06-24 | End: 2019-06-24

## 2019-06-24 RX ORDER — CEFUROXIME AXETIL 250 MG
1 TABLET ORAL
Qty: 22 | Refills: 0
Start: 2019-06-24 | End: 2019-07-04

## 2019-06-24 RX ADMIN — Medication 3 MILLILITER(S): at 18:34

## 2019-06-24 RX ADMIN — Medication 325 MILLIGRAM(S): at 18:14

## 2019-06-24 RX ADMIN — Medication 3 MILLILITER(S): at 03:52

## 2019-06-24 RX ADMIN — ENOXAPARIN SODIUM 40 MILLIGRAM(S): 100 INJECTION SUBCUTANEOUS at 21:31

## 2019-06-24 RX ADMIN — Medication 20 MILLIGRAM(S): at 05:29

## 2019-06-24 RX ADMIN — Medication 100 MILLIGRAM(S): at 12:36

## 2019-06-24 RX ADMIN — GABAPENTIN 100 MILLIGRAM(S): 400 CAPSULE ORAL at 05:29

## 2019-06-24 RX ADMIN — Medication 3 MILLILITER(S): at 10:41

## 2019-06-24 RX ADMIN — Medication 81 MILLIGRAM(S): at 12:37

## 2019-06-24 RX ADMIN — Medication 325 MILLIGRAM(S): at 05:29

## 2019-06-24 RX ADMIN — Medication 3 MILLILITER(S): at 23:20

## 2019-06-24 RX ADMIN — Medication 1000 UNIT(S): at 12:36

## 2019-06-24 RX ADMIN — PANTOPRAZOLE SODIUM 40 MILLIGRAM(S): 20 TABLET, DELAYED RELEASE ORAL at 05:30

## 2019-06-24 RX ADMIN — GABAPENTIN 100 MILLIGRAM(S): 400 CAPSULE ORAL at 12:37

## 2019-06-24 RX ADMIN — Medication 1 TABLET(S): at 12:37

## 2019-06-24 RX ADMIN — Medication 100 MILLIGRAM(S): at 05:29

## 2019-06-24 RX ADMIN — GABAPENTIN 100 MILLIGRAM(S): 400 CAPSULE ORAL at 21:31

## 2019-06-24 RX ADMIN — Medication 2: at 12:36

## 2019-06-24 RX ADMIN — CEFTRIAXONE 100 MILLIGRAM(S): 500 INJECTION, POWDER, FOR SOLUTION INTRAMUSCULAR; INTRAVENOUS at 19:54

## 2019-06-24 RX ADMIN — ATORVASTATIN CALCIUM 80 MILLIGRAM(S): 80 TABLET, FILM COATED ORAL at 21:31

## 2019-06-24 RX ADMIN — Medication 100 MILLIGRAM(S): at 21:30

## 2019-06-24 NOTE — DISCHARGE NOTE PROVIDER - CARE PROVIDERS DIRECT ADDRESSES
[email or web address],DirectAddress_Unknown

## 2019-06-24 NOTE — CONSULT NOTE ADULT - SUBJECTIVE AND OBJECTIVE BOX
78y Female with a history of a chronic L knee abx fracture/dislocation >2 years presents with weakness, UTI, and COPD exacerbation. The patient had a R TKR > 10 yrs ago at [facility] and a L TKR at [facility] in 2009. She had a revision L TKR with abx spacer placement in 2016 with Dr. Strickland. Patient was last seen in the office by Dr. Ghosh in 2017 where XRays showed the abx spacer was fractured./dislocated. Since then the patient has been walking on the knee with a walker. She reports no new issues with the knee except for occasional pain. The patient had a fall 4 weeks ago but no recent falls since. Over the last week the patient reports subjective fever/chills and not feeling well so came to the hospital on Saturday 6/22. Found to be UTI + with a COPD exacerbation. Knee XRays showing the chronic fracture/dislocation. Patient denies current pain or any complaints regarding either of her knees.       ROS: 10 point review of systems otherwise negative unless noted in HPI    PMH:  Vitamin D deficiency  Dyslipidemia  Constipation  Kidney stones  Type 2 diabetes mellitus  GERD (gastroesophageal reflux disease)  COPD (chronic obstructive pulmonary disease)  HTN (hypertension)  CHF (congestive heart failure)  No pertinent past medical history    PSH:  H/O breast biopsy  H/O right nephrectomy  History of left knee replacement  History of left knee surgery  No significant past surgical history    AH:    Meds: See med rec    T(C): 36.4 (06-24-19 @ 05:28)  HR: 72 (06-24-19 @ 05:28)  BP: 122/76 (06-24-19 @ 05:28)  RR: 16 (06-24-19 @ 05:28)  SpO2: 97% (06-24-19 @ 05:28)  Wt(kg): --    Gen: NAD  Resp: Unlabored breathing  PE LLE:  Skin intact  Large visible deformity of the L knee  No erythema   Instability to vaurs/valgus stress  No pain with knee ROM, extend to 0 flex to 45 (limited due to physical block)  SILT DP/SP/Ashley/Saph,   +EHL/FHL/TA/Gastroc,   DP+, soft compartments, no calf ttp.      Imaging:  XR demonstrating fracture/dislocation of abx spacer

## 2019-06-24 NOTE — DISCHARGE NOTE PROVIDER - CARE PROVIDER_API CALL
Shai Strickland)  Orthopaedic Surgery  [address], [address]  [place] [address]  Phone: [phone]  Fax: [phone]  Follow Up Time: 2 weeks    Your Primary Care Provider,   Phone: (   )    -  Fax: (   )    -  Follow Up Time: Shai Strickland)  Orthopaedic Surgery  [address], [address]  [place] [address]  Phone: [phone]  Fax: [phone]  Follow Up Time: 2 weeks    Raji Christiansen)  Internal Medicine  [address] [place] [address]  Phone: [phone]  Fax: [phone]  Follow Up Time: 1 week Shai Strickland)  Orthopaedic Surgery  [address], [address]  [place] [address]  Phone: [phone]  Fax: [phone]  Follow Up Time: 1 week    Raji Christiansen)  Internal Medicine  [address] [place] [address]  Phone: [phone]  Fax: [phone]  Follow Up Time: 1 week

## 2019-06-24 NOTE — DISCHARGE NOTE PROVIDER - HOSPITAL COURSE
1. Urinary tract infection with pyuria.    - Urine Cx remains negative to date    - ID consulted: Continue Rocephin switch to ____ for total of 14 days    - Renal US (6/23): The right kidney is not visualized; No left hydronephrosis; A left renal calculus shown in 2016 is not visualized by sonography.        2.COPD exacerbation.    - Occurred in ED, but w/o respiratory distress, wheezing or sign of increased respiratory effort; cough productive of whitish sputum    - On Ventolin HFA rescue inhaler    - On Tudorza at home, held while on Duo-nebs x3    - s/p prednisone 30 mg, magnesium 2 grams; continued prednisone 20 mg x 3 doses    - On ceftriaxone for UTI; azithromycin not added since patient improved clinically and remained afebrile    - [facility] for symptomatic control and comfort        3. Difficulty walking.    - Chronic issue, and chiefly due to the left knee, which is significantly swollen, tender and dislocated    - X-ray demonstrates "Age-indeterminate fracture through the left knee cement spacer with lateral displacement of the distal fragment. Surrounding soft tissue edema."    - s/p L-knee surgery x 3 - per patient (2010, 2011, 2016)    - Orthopedic consult: no acute interventions needed at this time, follow up outpatient with Dr. Strickland within 7 - 10 day of discharge.    - Vitamin D supplementation    - Last fall ~ 1.5 months ago; ambulation with walker at baseline        4. Fracture of prosthetic knee, initial encounter.    - X-ray demonstrates "Age-indeterminate fracture through the left knee cement spacer with lateral displacement of the distal fragment. Surrounding soft tissue edema."    - Pain/tenderness, swelling of the left knee    - Orthopedic consult: follow up outpatient with Dr. Strickland within 7 - 10 day of discharge.        5. Hypomagnesemia.    - Repleated all electrolytes --> resolved        6. Type 2 diabetes mellitus.    - No recent HgbA1c level for comparison (6.3 in 2017)    - Consistent carb diet    - Holding off on ISS per FS for now; recommend starting insulin coverage if HgbA1c level elevated (and especially since patient currently prescribed steroid).        7. Vitamin D deficiency.     - Continued vitamin D3 1000 IU daily.         On ____ this case was reviewed with ____, the patient is medically stable and optimized for discharge. 1. Urinary tract infection with pyuria.    - Urine Cx remains negative to date    - Ceftriaxone 1g q24h    - ID consulted: Switch to Ceftin 250 mg BID for total of 14 days (6/23 - 7/6) to treat for possible pyelonephritis    - Renal US (6/23): The right kidney is not visualized; No left hydronephrosis; A left renal calculus shown in 2016 is not visualized by sonography.        2.COPD exacerbation.    - Occurred in ED, but w/o respiratory distress, wheezing or sign of increased respiratory effort; cough productive of whitish sputum    - On Ventolin HFA rescue inhaler    - On Tudorza at home, held while on Duo-nebs x3    - s/p prednisone 30 mg, magnesium 2 grams; continued prednisone 20 mg x 3 doses    - On ceftriaxone for UTI; azithromycin not added since patient improved clinically and remained afebrile    - [facility] for symptomatic control and comfort        3. Difficulty walking.    - Chronic issue, and chiefly due to the left knee, which is significantly swollen, tender and dislocated    - X-ray demonstrates "Age-indeterminate fracture through the left knee cement spacer with lateral displacement of the distal fragment. Surrounding soft tissue edema."    - s/p L-knee surgery x 3 - per patient (2010, 2011, 2016)    - Orthopedic consult: no acute interventions needed at this time, follow up outpatient with Dr. Strickland within 7 - 10 day of discharge.    - Vitamin D supplementation    - Last fall ~ 1.5 months ago; ambulation with walker at baseline        4. Fracture of prosthetic knee, initial encounter.    - X-ray demonstrates "Age-indeterminate fracture through the left knee cement spacer with lateral displacement of the distal fragment. Surrounding soft tissue edema."    - Pain/tenderness, swelling of the left knee    - Orthopedic consult: follow up outpatient with Dr. Strickland within 7 - 10 day of discharge.        5. Hypomagnesemia.    - Repleated all electrolytes --> resolved        6. Type 2 diabetes mellitus.    - No recent HgbA1c level for comparison (6.3 in 2017)    - Consistent carb diet    - Holding off on ISS per FS for now; recommend starting insulin coverage if HgbA1c level elevated (and especially since patient currently prescribed steroid).        7. Vitamin D deficiency.     - Continued vitamin D3 1000 IU daily.         On ____ this case was reviewed with Dr. Moyer , the patient is medically stable and optimized for discharge. 1. Urinary tract infection with pyuria.    - Urine Cx remains negative to date    - Ceftriaxone 1g q24h    - ID consulted: Switch to Cefuroxime 250 mg BID for total of 14 days (6/23 - 7/6) to treat for possible pyelonephritis    - Renal US (6/23): The right kidney is not visualized; No left hydronephrosis; A left renal calculus shown in 2016 is not visualized by sonography.        2.COPD exacerbation.    - Occurred in ED, but w/o respiratory distress, wheezing or sign of increased respiratory effort; cough productive of whitish sputum    - On Ventolin HFA rescue inhaler    - On Tudorza at home, held while on Duo-nebs x3    - s/p prednisone 30 mg, magnesium 2 grams; continued prednisone 20 mg x 3 doses    - On ceftriaxone for UTI; azithromycin not added since patient improved clinically and remained afebrile    - [facility] elevation for symptomatic control and comfort        3. Difficulty walking.    - Chronic issue, and chiefly due to the left knee, which is significantly swollen, tender and dislocated    - X-ray demonstrates "Age-indeterminate fracture through the left knee cement spacer with lateral displacement of the distal fragment. Surrounding soft tissue edema."    - s/p L-knee surgery x 3 - per patient (2010, 2011, 2016)    - Orthopedic consult: no acute interventions needed at this time, follow up outpatient with Dr. Strickland within 7 - 10 day of discharge.    - Vitamin D supplementation    - Last fall ~ 1.5 months ago; ambulation with walker at baseline        4. Fracture of prosthetic knee, initial encounter.    - X-ray demonstrates "Age-indeterminate fracture through the left knee cement spacer with lateral displacement of the distal fragment. Surrounding soft tissue edema."    - Pain/tenderness, swelling of the left knee    - X-ray L knee: Posterolaterally dislocated left knee with underlying tibiofemoral/patellofemoral cement spacer and 2 separate underlying cement reinforcing metal plates unchanged compared, to 6/22/2019 study, however, new since the 6/30/2016 study, Scattered lucent zones at the cement bone interfaces of the cement spacer material compatible with associated loosening/debonding.    - Orthopedic consult: follow up outpatient with Dr. Strickland within 7 - 10 day of discharge.        5. Hypomagnesemia.    - Repleated all electrolytes --> resolved        6. Type 2 diabetes mellitus.    - No recent HgbA1c level for comparison (6.3 in 2017)    - Consistent carb diet    - Holding off on ISS per FS for now; recommend starting insulin coverage if HgbA1c level elevated (and especially since patient currently prescribed steroid).        7. Vitamin D deficiency.     - Continued vitamin D3 1000 IU daily.         On ____ this case was reviewed with Dr. Moyer , the patient is medically stable and optimized for discharge. 1. Urinary tract infection with pyuria.    - Urine Cx remains negative to date    - Ceftriaxone 1g q24h    - ID consulted: Switch to Cefuroxime 250 mg BID for total of 14 days (6/23 - 7/6) to treat for possible pyelonephritis    - Renal US (6/23): The right kidney is not visualized; No left hydronephrosis; A left renal calculus shown in 2016 is not visualized by sonography.        2.COPD exacerbation.    - Occurred in ED, but w/o respiratory distress, wheezing or sign of increased respiratory effort; cough productive of whitish sputum    - On Ventolin HFA rescue inhaler    - On Tudorza at home, held while on Duo-nebs x3    - s/p prednisone 30 mg, magnesium 2 grams; continued prednisone 20 mg x 3 doses    - On ceftriaxone for UTI; azithromycin not added since patient improved clinically and remained afebrile    - [facility] elevation for symptomatic control and comfort        3. Difficulty walking.    - Chronic issue, and chiefly due to the left knee, which is significantly swollen, tender and dislocated    - X-ray demonstrates "Age-indeterminate fracture through the left knee cement spacer with lateral displacement of the distal fragment. Surrounding soft tissue edema."    - s/p L-knee surgery x 3 - per patient (2010, 2011, 2016)    - Orthopedic consult: no acute interventions needed at this time, follow up outpatient with Dr. Strickland within 7 - 10 day of discharge.    - Vitamin D supplementation    - Last fall ~ 1.5 months ago; ambulation with walker at baseline        4. Fracture of prosthetic knee, initial encounter.    - X-ray demonstrates "Age-indeterminate fracture through the left knee cement spacer with lateral displacement of the distal fragment. Surrounding soft tissue edema."    - Pain/tenderness, swelling of the left knee    - X-ray L knee: Posterolaterally dislocated left knee with underlying tibiofemoral/patellofemoral cement spacer and 2 separate underlying cement reinforcing metal plates unchanged compared, to 6/22/2019 study, however, new since the 6/30/2016 study, Scattered lucent zones at the cement bone interfaces of the cement spacer material compatible with associated loosening/debonding.    - Orthopedic consult: follow up outpatient with Dr. Strickland within 7 - 10 day of discharge.        5. Hypomagnesemia.    - Repleated all electrolytes --> resolved        6. Type 2 diabetes mellitus.    - No recent HgbA1c level for comparison (6.3 in 2017)    - Consistent carb diet    - Holding off on ISS per FS for now; recommend starting insulin coverage if HgbA1c level elevated (and especially since patient currently prescribed steroid).        7. Vitamin D deficiency.     - Continued vitamin D3 1000 IU daily.         On 6/25 this case was reviewed with Dr. Moyer , the patient is medically stable and optimized for discharge. 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 Patient relates feeling unwell for approximately 2 weeks.  Went to the [facility] and was prescribed an antibiotic - once daily x 7 days (?levofloxacin).  However, despite compliance, patient's condition did not improve.  Suffered with shaking and chills throughout the period, with fever only on the weekend prior. Had frequent cough productive of thick whitish phlegm, especially at nights.  Experiences midsternal pleuritic chest pain with coughing.  On the night prior to coming to the ED, patient had profound weakness, to the extent of not being able to go the bathroom with use of a walker; urinated in diaper.  Went to scheduled appointment with PMD today and was advised to come to the ED for evaluation/management.        Vital signs upon ED presentation as follows: BP = 135/48, HR = 61, RR = 18, T = 37.5 C (99.5 F), O2 Sat = 100% on RA. Pt with L sided CVA tenderness, burning/itching on urination, c/o sweats/chills.  UA (+) with microscopic hematuria, ID was consulted.  No obstructive stones seen in L kidney.  Pt w/ho R nephrectomy. UCx and BCx no growth to date.  Ucx may have been drawn after abx administered, so may be low yield.  Being treated on empiric basis, pt clinically improving. Was initially on Rocephin, switched to PO Ceftin 250mg BID to complete total 14 day course to treat for possible pyelo and probiotics.         Also, patient complained of significant pain of the left knee; has had knee replacements x 3 of said area, but continues to suffer with persistent pain and swelling of the area.  Has fallen due to same; last fall was approximately one month ago.  In addition, patient relates burning and itching with micturition, as well as left back pain. X-ray of B/L Knees significant for "Age-indeterminate fracture through the left knee cement spacer with lateral displacement of the distal fragment. Surrounding soft tissue edema." Diagnosed with Difficulty Walking. She had a revision L TKR with abx spacer placement in 2016 with Dr. Strickland. Patient was last seen in the office by Dr. Ghosh in 2017 where XRays showed the abx spacer was fractured./dislocated. Since then the patient has been walking on the knee with a walker. Ortho was consulted for chronic L knee abx spacer fx/dislocation, no inpatient interventions as patient  remains a poor surgical candidate. Patient to follow-up with Dr. Strickland in 7-10 days upon discharge.         Pt undergoing neurological w/u for LLE weakness/knee pain. CT head    and LE Duplex---    Patient is medically stable for discharge to rehab facility, no acute c/o SOB, chest pain, N/V, vision changes, headaches, abdominal pain. Cleared by ID and neurology. Patient to complete antibiotic course of PO Ceftin 250mg BID to until 7/6/19 possible pyelonephritis.          Urinary tract infection with pyuria    - +UA, Urine Cx remains negative to date    - Ceftriaxone 1g q24h    - ID consulted: Switch to Cefuroxime 250 mg BID for total of 14 days (6/23 - 7/6) to treat for possible pyelonephritis; probiotics     - Renal US (6/23): The right kidney is not visualized; No left hydronephrosis; A left renal calculus shown in 2016 is not visualized by sonography        COPD exacerbation    - Cough improved now, Improved    - Occurred in ED, but w/o respiratory distress, wheezing or sign of increased respiratory effort, cough productive of whitish sputum    - On Ventolin HFA rescue inhaler    - On Tudorza at home, held while on Duo-nebs     - s/p prednisone 30 mg, magnesium 2 grams; continued prednisone 20 mg x 3 doses    - [facility] elevation for symptomatic control and comfort        Difficulty walking    - Chronic issue, and chiefly due to the left knee, which is significantly swollen, tender and dislocated, has complained of same in the past     - X-ray demonstrates "Age-indeterminate fracture through the left knee cement spacer with lateral displacement of the distal fragment. Surrounding soft tissue edema."    - s/p L-knee surgery x 3 - per patient (2010, 2011, 2016)    - Orthopedic consulted: no acute interventions needed at this time as patient is poor surgical candidate, follow up outpatient with Dr. Strickland within 7 - 10 day of discharge.    - c/w Vitamin D supplementation    - c/w Oxy for pain, bowel regimen     - Last fall ~ 1.5 months ago; ambulation with walker at baseline        Fracture of prosthetic knee, initial encounter    - X-ray demonstrates "Age-indeterminate fracture through the left knee cement spacer with lateral displacement of the distal fragment. Surrounding soft tissue edema."    - Pain/tenderness, swelling of the left knee    - X-ray L knee: Posterolaterally dislocated left knee with underlying tibiofemoral/patellofemoral cement spacer and 2 separate underlying cement reinforcing metal plates unchanged compared, to 6/22/2019 study, however, new since the 6/30/2016 study, Scattered lucent zones at the cement bone interfaces of the cement spacer material compatible with associated loosening/debonding.    - Orthopedic consulted: follow up outpatient with Dr. Strickland within 7 - 10 day of discharge.        CHF/Hx stroke    - c/w ASA/lipitor , diuretic     - Neurology consulted for LLE weakness/pain-  LE Duplex- negative; CT Head----        Hypomagnesemia    - Repleated all electrolytes --> resolved        Type 2 diabetes mellitus    - No recent HgbA1c level for comparison (6.3 in 2017), currently 6.4%    - c/w Consistent carb diet    - ISS with hold parameters         Vitamin D deficiency    - c/w vitamin D3 1000 IU daily        DVT ppx: lovenox         DISPO: Rehab        6/29: Patient is medically stable and optimized for discharge to rehab facility, cleared by ID, Ortho and neurology. Patient to complete antibiotic course of PO Ceftin 250mg BID to until 7/6/19 possible pyelonephritis. Plan of care discussed with Dr. Moyer. 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 Patient relates feeling unwell for approximately 2 weeks.  Went to the [facility] and was prescribed an antibiotic - once daily x 7 days (?levofloxacin).  However, despite compliance, patient's condition did not improve.  Suffered with shaking and chills throughout the period, with fever only on the weekend prior. Had frequent cough productive of thick whitish phlegm, especially at nights.  Experiences midsternal pleuritic chest pain with coughing.  On the night prior to coming to the ED, patient had profound weakness, to the extent of not being able to go the bathroom with use of a walker; urinated in diaper.  Went to scheduled appointment with PMD today and was advised to come to the ED for evaluation/management.        Vital signs upon ED presentation as follows: BP = 135/48, HR = 61, RR = 18, T = 37.5 C (99.5 F), O2 Sat = 100% on RA. Pt with L sided CVA tenderness, burning/itching on urination, c/o sweats/chills.  UA (+) with microscopic hematuria, ID was consulted.  No obstructive stones seen in L kidney.  Pt w/ho R nephrectomy. UCx and BCx no growth to date.  Ucx may have been drawn after abx administered, so may be low yield.  Being treated on empiric basis, pt clinically improving. Was initially on Rocephin, switched to PO Ceftin 250mg BID to complete total 14 day course to treat for possible pyelo and probiotics.         Also, patient complained of significant pain of the left knee; has had knee replacements x 3 of said area, but continues to suffer with persistent pain and swelling of the area.  Has fallen due to same; last fall was approximately one month ago.  In addition, patient relates burning and itching with micturition, as well as left back pain. X-ray of B/L Knees significant for "Age-indeterminate fracture through the left knee cement spacer with lateral displacement of the distal fragment. Surrounding soft tissue edema." Diagnosed with Difficulty Walking. She had a revision L TKR with abx spacer placement in 2016 with Dr. Strickland. Patient was last seen in the office by Dr. Ghosh in 2017 where XRays showed the abx spacer was fractured./dislocated. Since then the patient has been walking on the knee with a walker. Ortho was consulted for chronic L knee abx spacer fx/dislocation, no inpatient interventions as patient  remains a poor surgical candidate. Patient to follow-up with Dr. Strickland in 7-10 days upon discharge.         Pt undergoing neurological w/u for LLE weakness/knee pain. CT head-negative and LE Duplex-negative. No further interventions at this time, d/w attending.     Patient is medically stable for discharge to rehab facility, no acute c/o SOB, chest pain, N/V, vision changes, headaches, abdominal pain. Cleared by ID and neurology. Patient to complete antibiotic course of PO Ceftin 250mg BID to until 7/6/19 possible pyelonephritis.          Urinary tract infection with pyuria    - +UA, Urine Cx remains negative to date    - Ceftriaxone 1g q24h    - ID consulted: Switch to Cefuroxime 250 mg BID for total of 14 days (6/23 - 7/6) to treat for possible pyelonephritis; probiotics     - Renal US (6/23): The right kidney is not visualized; No left hydronephrosis; A left renal calculus shown in 2016 is not visualized by sonography        COPD exacerbation    - Cough improved now, Improved    - Occurred in ED, but w/o respiratory distress, wheezing or sign of increased respiratory effort, cough productive of whitish sputum    - On Ventolin HFA rescue inhaler    - On Tudorza at home, held while on Duo-nebs     - s/p prednisone 30 mg, magnesium 2 grams; continued prednisone 20 mg x 3 doses    - [facility] elevation for symptomatic control and comfort        Difficulty walking    - Chronic issue, and chiefly due to the left knee, which is significantly swollen, tender and dislocated, has complained of same in the past     - X-ray demonstrates "Age-indeterminate fracture through the left knee cement spacer with lateral displacement of the distal fragment. Surrounding soft tissue edema."    - s/p L-knee surgery x 3 - per patient (2010, 2011, 2016)    - Orthopedic consulted: no acute interventions needed at this time as patient is poor surgical candidate, follow up outpatient with Dr. Strickland within 7 - 10 day of discharge.    - c/w Vitamin D supplementation    - c/w Oxy for pain, bowel regimen     - Last fall ~ 1.5 months ago; ambulation with walker at baseline        Fracture of prosthetic knee, initial encounter    - X-ray demonstrates "Age-indeterminate fracture through the left knee cement spacer with lateral displacement of the distal fragment. Surrounding soft tissue edema."    - Pain/tenderness, swelling of the left knee    - X-ray L knee: Posterolaterally dislocated left knee with underlying tibiofemoral/patellofemoral cement spacer and 2 separate underlying cement reinforcing metal plates unchanged compared, to 6/22/2019 study, however, new since the 6/30/2016 study, Scattered lucent zones at the cement bone interfaces of the cement spacer material compatible with associated loosening/debonding.    - Orthopedic consulted: follow up outpatient with Dr. Strickland within 7 - 10 day of discharge.        CHF/Hx stroke    - c/w ASA/lipitor , diuretic     - Neurology consulted for LLE weakness/pain-  LE Duplex- negative; CT Head----        Hypomagnesemia    - Repleated all electrolytes --> resolved        Type 2 diabetes mellitus    - No recent HgbA1c level for comparison (6.3 in 2017), currently 6.4%    - c/w Consistent carb diet    - ISS with hold parameters         Vitamin D deficiency    - c/w vitamin D3 1000 IU daily        DVT ppx: lovenox         DISPO: Rehab- family and pt agreeable for [facility] facility         6/29: Patient is medically stable and optimized for discharge to rehab facility, cleared by ID, Ortho and neurology. Patient to complete antibiotic course of PO Ceftin 250mg BID to until 7/6/19 possible pyelonephritis. Plan of care and CT head findings discussed with Dr. Moyer. Attempted to call daughter multiple times to inform of discharge, however currently unavailable and no voicemail option.

## 2019-06-24 NOTE — DISCHARGE NOTE PROVIDER - INSTRUCTIONS
Consisten Carb Diet  GERD Diet  DASH Diet Consistent Carbohydrates Diet w/ evening snack,  GERD Diet; low in salt and cholesterol

## 2019-06-24 NOTE — DISCHARGE NOTE PROVIDER - NSDCCPCAREPLAN_GEN_ALL_CORE_FT
PRINCIPAL DISCHARGE DIAGNOSIS  Diagnosis: Urinary tract infection with pyuria  Assessment and Plan of Treatment: You were treated with a course of Ceftriaxone while admitted and switched to ____. Please continue taking your antibiotics as directed for a total of 14 days. Continue to monitor for signs/symptoms of continued infection, such as, fever/chills, burning/pain with urination, urinary frequency/hesitancy, cloudy urine, or blood in urine. Always wipe from front to back after using the bathroom. Never wipe twice with the same tissue. Drink plenty of water.      SECONDARY DISCHARGE DIAGNOSES  Diagnosis: Vitamin D deficiency  Assessment and Plan of Treatment: Please continue taking your Vitamin D as directed. Please follow up with your primary care provider for further care and recommendations.    Diagnosis: Type 2 diabetes mellitus with other specified complication, without long-term current use of insulin  Assessment and Plan of Treatment: Please maintain a carbohydrate diet (Meaning eating the same amount of carbohydrates at the same time each day). Monitor for signs/symptoms of low blood glucose, such as, dizziness, altered mental status, or cool/clammy skin. In addition, monitor for signs/symptoms of high blood glucose, such as, feeling hot, dry, fatigued, or with increased thirst/urination. Make regular podiatry appointments in order to have feet checked for wounds and uncontrolled toe nail growth to prevent infections, as well as, appointments with an ophthalmologist to monitor your vision. Follow up with your primary care provider for further management within 1 - 2 weeks of discharge.      Diagnosis: Fracture of prosthetic knee, initial encounter  Assessment and Plan of Treatment: Orthopedics was consulted and determined there was no need for acute intervention at this time. Please follow up with Dr. Strickland within 7 - 10 days of discharge.    Diagnosis: Hypomagnesemia  Assessment and Plan of Treatment: You were noted to have a low magnesium level while admitted. You were given supplemental magnesium and your levels normalized. Please follow up with your primary care provider for further care and recommendations and blood work within 1-2 weeks of discharge.    Diagnosis: COPD exacerbation  Assessment and Plan of Treatment: Continue your inhalers and annual pulmonary function tests with your outpatient provider. Monitor for symptoms of exacerbation, such as, shortness of breath, hyperventilation, or any other difficulties breathing and report to the emergency room in the event that your rescue inhaler has not resolved your symptoms. Follow up with you primary care provider for continued monitoring within 1 week of discharge. PRINCIPAL DISCHARGE DIAGNOSIS  Diagnosis: Urinary tract infection with pyuria  Assessment and Plan of Treatment: You were treated with a course of Ceftriaxone while admitted and switched to Cefuroxime 250 mg orally, twice per day. Please continue taking your antibiotics as directed for a total of 14 days (until 7/6). Continue to monitor for signs/symptoms of continued infection, such as, fever/chills, burning/pain with urination, urinary frequency/hesitancy, cloudy urine, or blood in urine. Always wipe from front to back after using the bathroom. Never wipe twice with the same tissue. Drink plenty of water.      SECONDARY DISCHARGE DIAGNOSES  Diagnosis: Vitamin D deficiency  Assessment and Plan of Treatment: Please continue taking your Vitamin D as directed. Please follow up with your primary care provider for further care and recommendations.    Diagnosis: Type 2 diabetes mellitus with other specified complication, without long-term current use of insulin  Assessment and Plan of Treatment: Please maintain a carbohydrate diet (Meaning eating the same amount of carbohydrates at the same time each day). Monitor for signs/symptoms of low blood glucose, such as, dizziness, altered mental status, or cool/clammy skin. In addition, monitor for signs/symptoms of high blood glucose, such as, feeling hot, dry, fatigued, or with increased thirst/urination. Make regular podiatry appointments in order to have feet checked for wounds and uncontrolled toe nail growth to prevent infections, as well as, appointments with an ophthalmologist to monitor your vision. Follow up with your primary care provider for further management within 1 - 2 weeks of discharge.      Diagnosis: Fracture of prosthetic knee, initial encounter  Assessment and Plan of Treatment: Orthopedics was consulted and determined there was no need for acute intervention at this time. Please follow up with Dr. Strickland within 7 - 10 days of discharge.    Diagnosis: Hypomagnesemia  Assessment and Plan of Treatment: You were noted to have a low magnesium level while admitted. You were given supplemental magnesium and your levels normalized. Please follow up with your primary care provider for further care and recommendations and blood work within 1-2 weeks of discharge.    Diagnosis: COPD exacerbation  Assessment and Plan of Treatment: Continue your inhalers and annual pulmonary function tests with your outpatient provider. Monitor for symptoms of exacerbation, such as, shortness of breath, hyperventilation, or any other difficulties breathing and report to the emergency room in the event that your rescue inhaler has not resolved your symptoms. Follow up with you primary care provider for continued monitoring within 1 week of discharge. PRINCIPAL DISCHARGE DIAGNOSIS  Diagnosis: Urinary tract infection with pyuria  Assessment and Plan of Treatment: You were treated with a course of Ceftriaxone while admitted and switched to Cefuroxime 250 mg orally, twice per day. Take your first dose @ 8:30 PM tonight (6/25). Please continue taking your antibiotics as directed for a total of 14 days (until 7/6). Continue to monitor for signs/symptoms of continued infection, such as, fever/chills, burning/pain with urination, urinary frequency/hesitancy, cloudy urine, or blood in urine. Always wipe from front to back after using the bathroom. Never wipe twice with the same tissue. Drink plenty of water.      SECONDARY DISCHARGE DIAGNOSES  Diagnosis: Vitamin D deficiency  Assessment and Plan of Treatment: Please continue taking your Vitamin D as directed. Please follow up with your primary care provider for further care and recommendations.    Diagnosis: Type 2 diabetes mellitus with other specified complication, without long-term current use of insulin  Assessment and Plan of Treatment: Please maintain a carbohydrate diet (Meaning eating the same amount of carbohydrates at the same time each day). Monitor for signs/symptoms of low blood glucose, such as, dizziness, altered mental status, or cool/clammy skin. In addition, monitor for signs/symptoms of high blood glucose, such as, feeling hot, dry, fatigued, or with increased thirst/urination. Make regular podiatry appointments in order to have feet checked for wounds and uncontrolled toe nail growth to prevent infections, as well as, appointments with an ophthalmologist to monitor your vision. Follow up with your primary care provider for further management within 1 - 2 weeks of discharge.      Diagnosis: Fracture of prosthetic knee, initial encounter  Assessment and Plan of Treatment: Orthopedics was consulted and determined there was no need for acute intervention at this time. Please follow up with Dr. Strickland within 7 - 10 days of discharge.    Diagnosis: Hypomagnesemia  Assessment and Plan of Treatment: You were noted to have a low magnesium level while admitted. You were given supplemental magnesium and your levels normalized. Please follow up with your primary care provider for further care and recommendations and blood work within 1-2 weeks of discharge.    Diagnosis: COPD exacerbation  Assessment and Plan of Treatment: Continue your inhalers and annual pulmonary function tests with your outpatient provider. Monitor for symptoms of exacerbation, such as, shortness of breath, hyperventilation, or any other difficulties breathing and report to the emergency room in the event that your rescue inhaler has not resolved your symptoms. Follow up with you primary care provider for continued monitoring within 1 week of discharge. PRINCIPAL DISCHARGE DIAGNOSIS  Diagnosis: Urinary tract infection with pyuria  Assessment and Plan of Treatment: Continue your antibiotics: Cefuroxime 250 mg orally, twice per day. Please continue taking your antibiotics as directed for a total of 14 days (until 7/6). Continue probiotics as directed. Continue to monitor for signs/symptoms of continued infection, such as, fever/chills, burning/pain with urination, urinary frequency/hesitancy, cloudy urine, or blood in urine. Always wipe from front to back after using the bathroom. Never wipe twice with the same tissue. Drink plenty of water.      SECONDARY DISCHARGE DIAGNOSES  Diagnosis: COPD exacerbation  Assessment and Plan of Treatment: Improved. Continue your inhalers and annual pulmonary function tests with your outpatient provider. Maintain head of bed elevated for comfort and symptomatic control. Monitor for symptoms of exacerbation, such as, shortness of breath, hyperventilation, or any other difficulties breathing and report to the emergency room in the event that your rescue inhaler has not resolved your symptoms. Follow up with you primary care provider for continued monitoring within 1 week of discharge.    Diagnosis: Fracture of prosthetic knee, initial encounter  Assessment and Plan of Treatment: Orthopedics was consulted and determined there was no need for acute intervention or surgery at this time. Please follow up with Dr. Strickland within 7 - 10 days of discharge, call to make an appt. [phone]    Diagnosis: Difficulty walking  Assessment and Plan of Treatment: You reported you have a chronic issue, and chiefly due to the left knee, which is significantly swollen, tender and dislocated.  X-ray demonstrates "Age-indeterminate fracture through the left knee cement spacer with lateral displacement of the distal fragment. Surrounding soft tissue edema." Orthopedics was consulted and determined there was no need for acute intervention or surgery at this time.   Continue with supplements and pain medications as needed.  Maintain a bowel regimen. Use a walker to ambulate with assistance as tolerated.   Please follow up with Dr. Strickland within 7 - 10 days of discharge, call to make an appt. 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 You were seen by Neurology:   Continue to take your aspirin and lipitor.    Diagnosis: Hypomagnesemia  Assessment and Plan of Treatment: Resolved. Please follow up with your primary care provider for further care and recommendations and blood work within 1-2 weeks of discharge.    Diagnosis: Type 2 diabetes mellitus with other specified complication, without long-term current use of insulin  Assessment and Plan of Treatment: Stable, HgbA1C 6.4%. Please maintain a carbohydrate diet (Meaning eating the same amount of carbohydrates at the same time each day). Monitor for signs/symptoms of low blood glucose, such as, dizziness, altered mental status, or cool/clammy skin. In addition, monitor for signs/symptoms of high blood glucose, such as, feeling hot, dry, fatigued, or with increased thirst/urination. Make regular podiatry appointments in order to have feet checked for wounds and uncontrolled toe nail growth to prevent infections, as well as, appointments with an ophthalmologist to monitor your vision. Follow up with your primary care provider or endocrinologist for further management within 1 - 2 weeks of discharge.      Diagnosis: Vitamin D deficiency  Assessment and Plan of Treatment: Please continue taking your supplements as directed. Please follow up with your primary care provider for further care and recommendations. PRINCIPAL DISCHARGE DIAGNOSIS  Diagnosis: Urinary tract infection with pyuria  Assessment and Plan of Treatment: Continue your antibiotics: Cefuroxime 250 mg orally, twice per day. Please continue taking your antibiotics as directed for a total of 14 days (until 7/6). Continue probiotics as directed. Continue to monitor for signs/symptoms of continued infection, such as, fever/chills, burning/pain with urination, urinary frequency/hesitancy, cloudy urine, or blood in urine. Always wipe from front to back after using the bathroom. Never wipe twice with the same tissue. Drink plenty of water.      SECONDARY DISCHARGE DIAGNOSES  Diagnosis: COPD exacerbation  Assessment and Plan of Treatment: Improved. Continue your inhalers and annual pulmonary function tests with your outpatient provider. Maintain head of bed elevated for comfort and symptomatic control. Monitor for symptoms of exacerbation, such as, shortness of breath, hyperventilation, or any other difficulties breathing and report to the emergency room in the event that your rescue inhaler has not resolved your symptoms. Follow up with you primary care provider for continued monitoring within 1 week of discharge.    Diagnosis: Fracture of prosthetic knee, initial encounter  Assessment and Plan of Treatment: Orthopedics was consulted and determined there was no need for acute intervention or surgery at this time. Please follow up with Dr. Strickland within 7 - 10 days of discharge, call to make an appt. [phone]    Diagnosis: Difficulty walking  Assessment and Plan of Treatment: You reported you have a chronic issue, and chiefly due to the left knee, which is significantly swollen, tender and dislocated.  X-ray demonstrates "Age-indeterminate fracture through the left knee cement spacer with lateral displacement of the distal fragment. Surrounding soft tissue edema." Orthopedics was consulted and determined there was no need for acute intervention or surgery at this time.   Continue with supplements and pain medications as needed.  Maintain a bowel regimen. Use a walker to ambulate with assistance as tolerated.   Please follow up with Dr. Strickland within 7 - 10 days of discharge, call to make an appt. [phone]    Diagnosis: Weakness due to old stroke  Assessment and Plan of Treatment: You complained of left leg pain and weakness. You were seen by Neurology, no acuee findings on the CAT scan of head. Lower extremity ultrasound negative.  Continue to take your aspirin and lipitor.    Diagnosis: Hypomagnesemia  Assessment and Plan of Treatment: Resolved. Please follow up with your primary care provider for further care and recommendations and blood work within 1-2 weeks of discharge.    Diagnosis: Type 2 diabetes mellitus with other specified complication, without long-term current use of insulin  Assessment and Plan of Treatment: Stable, HgbA1C 6.4%. Please maintain a carbohydrate diet (Meaning eating the same amount of carbohydrates at the same time each day). Monitor for signs/symptoms of low blood glucose, such as, dizziness, altered mental status, or cool/clammy skin. In addition, monitor for signs/symptoms of high blood glucose, such as, feeling hot, dry, fatigued, or with increased thirst/urination. Make regular podiatry appointments in order to have feet checked for wounds and uncontrolled toe nail growth to prevent infections, as well as, appointments with an ophthalmologist to monitor your vision. Follow up with your primary care provider or endocrinologist for further management within 1 - 2 weeks of discharge.      Diagnosis: Vitamin D deficiency  Assessment and Plan of Treatment: Please continue taking your supplements as directed. Please follow up with your primary care provider for further care and recommendations.

## 2019-06-24 NOTE — DISCHARGE NOTE PROVIDER - NSDCACTIVITY_GEN_ALL_CORE
No restrictions Showering allowed/Walking - Outdoors allowed/Walking - Indoors allowed/Bathing allowed

## 2019-06-24 NOTE — DISCHARGE NOTE PROVIDER - NSFOLLOWUPCLINICS_GEN_ALL_ED_FT
[facility] Endocrinology  Endocrinology  [address] [place] [address]  Phone: [phone]  Fax:   Follow Up Time: Routine    [facility] Pulmonolgy and Sleep Medicine  Pulmonology  [address] [place] [address]  Phone: [phone]  Fax:   Follow Up Time: Routine

## 2019-06-24 NOTE — PROVIDER CONTACT NOTE (OTHER) - REASON
patient with increased work of breathing, O2 saturation 97% on room air,  patient missed 4pm, treatment due to be off the floor

## 2019-06-24 NOTE — CONSULT NOTE ADULT - ASSESSMENT
A/P: 78yoF with chronic L knee abx spacer fx/dislocation    - Patient with no complaints regarding L knee at this time, walks with walker, no recent change in ambulation status  - 7/17: Determined to be a poor surgical candidate for L TK revision  - Follow-up dedicated L knee Xrays   - Follow-up Ucx, Bcxs  - No acute orthopedic intervention at this time  - UTI/COPD exacerbation treatment per medicine  - Can follow-up with Dr. Strickland in 7-10 days on DC   - Will discuss with attending  [id number] A/P: 78yoF with chronic L knee abx spacer fx/dislocation    - Patient with no complaints regarding L knee at this time, walks with walker, no recent change in ambulation status  - 7/17: Determined to be a poor surgical candidate for L TK revision, remains a poor surgical candidate   - Follow-up dedicated L knee Xrays   - Follow-up Ucx, Bcxs  - No acute orthopedic intervention at this time  - UTI/COPD exacerbation treatment per medicine  - Can follow-up with Dr. Strickland in 7-10 days on discharge  - Discussed with Attending   [id number]

## 2019-06-24 NOTE — DISCHARGE NOTE PROVIDER - PROVIDER TOKENS
PROVIDER:[TOKEN:[[id number]],FOLLOWUP:[2 weeks]],FREE:[LAST:[Your Primary Care Provider],PHONE:[(   )    -],FAX:[(   )    -]] PROVIDER:[TOKEN:[[id number]],FOLLOWUP:[2 weeks]],PROVIDER:[TOKEN:[[id number]],FOLLOWUP:[1 week]] PROVIDER:[TOKEN:[[id number]],FOLLOWUP:[1 week]],PROVIDER:[TOKEN:[[id number]],FOLLOWUP:[1 week]]

## 2019-06-25 LAB
ALBUMIN SERPL ELPH-MCNC: 3.6 G/DL — SIGNIFICANT CHANGE UP (ref 3.3–5)
ALP SERPL-CCNC: 116 U/L — SIGNIFICANT CHANGE UP (ref 40–120)
ALT FLD-CCNC: 32 U/L — SIGNIFICANT CHANGE UP (ref 4–33)
ANION GAP SERPL CALC-SCNC: 11 MMO/L — SIGNIFICANT CHANGE UP (ref 7–14)
AST SERPL-CCNC: 33 U/L — HIGH (ref 4–32)
BASOPHILS # BLD AUTO: 0.02 K/UL — SIGNIFICANT CHANGE UP (ref 0–0.2)
BASOPHILS NFR BLD AUTO: 0.2 % — SIGNIFICANT CHANGE UP (ref 0–2)
BILIRUB SERPL-MCNC: 0.2 MG/DL — SIGNIFICANT CHANGE UP (ref 0.2–1.2)
BUN SERPL-MCNC: 40 MG/DL — HIGH (ref 7–23)
CALCIUM SERPL-MCNC: 9.7 MG/DL — SIGNIFICANT CHANGE UP (ref 8.4–10.5)
CHLORIDE SERPL-SCNC: 98 MMOL/L — SIGNIFICANT CHANGE UP (ref 98–107)
CO2 SERPL-SCNC: 28 MMOL/L — SIGNIFICANT CHANGE UP (ref 22–31)
CREAT SERPL-MCNC: 1.13 MG/DL — SIGNIFICANT CHANGE UP (ref 0.5–1.3)
EOSINOPHIL # BLD AUTO: 0.09 K/UL — SIGNIFICANT CHANGE UP (ref 0–0.5)
EOSINOPHIL NFR BLD AUTO: 0.9 % — SIGNIFICANT CHANGE UP (ref 0–6)
GLUCOSE BLDC GLUCOMTR-MCNC: 145 MG/DL — HIGH (ref 70–99)
GLUCOSE BLDC GLUCOMTR-MCNC: 152 MG/DL — HIGH (ref 70–99)
GLUCOSE BLDC GLUCOMTR-MCNC: 247 MG/DL — HIGH (ref 70–99)
GLUCOSE BLDC GLUCOMTR-MCNC: 260 MG/DL — HIGH (ref 70–99)
GLUCOSE SERPL-MCNC: 159 MG/DL — HIGH (ref 70–99)
HCT VFR BLD CALC: 36.5 % — SIGNIFICANT CHANGE UP (ref 34.5–45)
HGB BLD-MCNC: 11.9 G/DL — SIGNIFICANT CHANGE UP (ref 11.5–15.5)
IMM GRANULOCYTES NFR BLD AUTO: 0.2 % — SIGNIFICANT CHANGE UP (ref 0–1.5)
LYMPHOCYTES # BLD AUTO: 1.02 K/UL — SIGNIFICANT CHANGE UP (ref 1–3.3)
LYMPHOCYTES # BLD AUTO: 10.6 % — LOW (ref 13–44)
MAGNESIUM SERPL-MCNC: 1.7 MG/DL — SIGNIFICANT CHANGE UP (ref 1.6–2.6)
MCHC RBC-ENTMCNC: 32.2 PG — SIGNIFICANT CHANGE UP (ref 27–34)
MCHC RBC-ENTMCNC: 32.6 % — SIGNIFICANT CHANGE UP (ref 32–36)
MCV RBC AUTO: 98.6 FL — SIGNIFICANT CHANGE UP (ref 80–100)
MONOCYTES # BLD AUTO: 0.78 K/UL — SIGNIFICANT CHANGE UP (ref 0–0.9)
MONOCYTES NFR BLD AUTO: 8.1 % — SIGNIFICANT CHANGE UP (ref 2–14)
NEUTROPHILS # BLD AUTO: 7.72 K/UL — HIGH (ref 1.8–7.4)
NEUTROPHILS NFR BLD AUTO: 80 % — HIGH (ref 43–77)
NRBC # FLD: 0 K/UL — SIGNIFICANT CHANGE UP (ref 0–0)
PHOSPHATE SERPL-MCNC: 3 MG/DL — SIGNIFICANT CHANGE UP (ref 2.5–4.5)
PLATELET # BLD AUTO: 179 K/UL — SIGNIFICANT CHANGE UP (ref 150–400)
PMV BLD: 10.4 FL — SIGNIFICANT CHANGE UP (ref 7–13)
POTASSIUM SERPL-MCNC: 4.6 MMOL/L — SIGNIFICANT CHANGE UP (ref 3.5–5.3)
POTASSIUM SERPL-SCNC: 4.6 MMOL/L — SIGNIFICANT CHANGE UP (ref 3.5–5.3)
PROT SERPL-MCNC: 7.3 G/DL — SIGNIFICANT CHANGE UP (ref 6–8.3)
RBC # BLD: 3.7 M/UL — LOW (ref 3.8–5.2)
RBC # FLD: 15 % — HIGH (ref 10.3–14.5)
SODIUM SERPL-SCNC: 137 MMOL/L — SIGNIFICANT CHANGE UP (ref 135–145)
WBC # BLD: 9.65 K/UL — SIGNIFICANT CHANGE UP (ref 3.8–10.5)
WBC # FLD AUTO: 9.65 K/UL — SIGNIFICANT CHANGE UP (ref 3.8–10.5)

## 2019-06-25 RX ORDER — IPRATROPIUM/ALBUTEROL SULFATE 18-103MCG
3 AEROSOL WITH ADAPTER (GRAM) INHALATION ONCE
Refills: 0 | Status: COMPLETED | OUTPATIENT
Start: 2019-06-25 | End: 2019-06-25

## 2019-06-25 RX ORDER — CEFUROXIME AXETIL 250 MG
1 TABLET ORAL
Qty: 0 | Refills: 0 | DISCHARGE
Start: 2019-06-25 | End: 2019-07-06

## 2019-06-25 RX ORDER — CEFUROXIME AXETIL 250 MG
250 TABLET ORAL EVERY 12 HOURS
Refills: 0 | Status: DISCONTINUED | OUTPATIENT
Start: 2019-06-25 | End: 2019-06-29

## 2019-06-25 RX ORDER — CEFUROXIME AXETIL 250 MG
500 TABLET ORAL EVERY 12 HOURS
Refills: 0 | Status: DISCONTINUED | OUTPATIENT
Start: 2019-06-25 | End: 2019-06-25

## 2019-06-25 RX ORDER — CEFUROXIME AXETIL 250 MG
1 TABLET ORAL
Qty: 23 | Refills: 0
Start: 2019-06-25 | End: 2019-07-05

## 2019-06-25 RX ADMIN — Medication 100 MILLIGRAM(S): at 05:10

## 2019-06-25 RX ADMIN — Medication 3 MILLILITER(S): at 05:11

## 2019-06-25 RX ADMIN — Medication 3: at 12:41

## 2019-06-25 RX ADMIN — ENOXAPARIN SODIUM 40 MILLIGRAM(S): 100 INJECTION SUBCUTANEOUS at 20:57

## 2019-06-25 RX ADMIN — Medication 3 MILLILITER(S): at 22:55

## 2019-06-25 RX ADMIN — Medication 325 MILLIGRAM(S): at 17:05

## 2019-06-25 RX ADMIN — POLYETHYLENE GLYCOL 3350 17 GRAM(S): 17 POWDER, FOR SOLUTION ORAL at 17:49

## 2019-06-25 RX ADMIN — Medication 81 MILLIGRAM(S): at 11:16

## 2019-06-25 RX ADMIN — GABAPENTIN 100 MILLIGRAM(S): 400 CAPSULE ORAL at 20:57

## 2019-06-25 RX ADMIN — Medication 100 MILLIGRAM(S): at 20:57

## 2019-06-25 RX ADMIN — Medication 1 TABLET(S): at 11:16

## 2019-06-25 RX ADMIN — ATORVASTATIN CALCIUM 80 MILLIGRAM(S): 80 TABLET, FILM COATED ORAL at 20:57

## 2019-06-25 RX ADMIN — GABAPENTIN 100 MILLIGRAM(S): 400 CAPSULE ORAL at 05:10

## 2019-06-25 RX ADMIN — Medication 3 MILLILITER(S): at 10:00

## 2019-06-25 RX ADMIN — Medication 100 MILLIGRAM(S): at 13:18

## 2019-06-25 RX ADMIN — Medication 20 MILLIGRAM(S): at 05:09

## 2019-06-25 RX ADMIN — Medication 1000 UNIT(S): at 11:16

## 2019-06-25 RX ADMIN — Medication 3 MILLILITER(S): at 14:02

## 2019-06-25 RX ADMIN — OXYCODONE HYDROCHLORIDE 5 MILLIGRAM(S): 5 TABLET ORAL at 12:00

## 2019-06-25 RX ADMIN — GABAPENTIN 100 MILLIGRAM(S): 400 CAPSULE ORAL at 13:18

## 2019-06-25 RX ADMIN — Medication 250 MILLIGRAM(S): at 20:56

## 2019-06-25 RX ADMIN — Medication 3 MILLILITER(S): at 15:41

## 2019-06-25 RX ADMIN — PANTOPRAZOLE SODIUM 40 MILLIGRAM(S): 20 TABLET, DELAYED RELEASE ORAL at 05:10

## 2019-06-25 RX ADMIN — Medication 2: at 17:05

## 2019-06-25 RX ADMIN — OXYCODONE HYDROCHLORIDE 5 MILLIGRAM(S): 5 TABLET ORAL at 11:30

## 2019-06-25 RX ADMIN — Medication 325 MILLIGRAM(S): at 05:10

## 2019-06-26 LAB
ANION GAP SERPL CALC-SCNC: 12 MMO/L — SIGNIFICANT CHANGE UP (ref 7–14)
BUN SERPL-MCNC: 35 MG/DL — HIGH (ref 7–23)
CALCIUM SERPL-MCNC: 9.2 MG/DL — SIGNIFICANT CHANGE UP (ref 8.4–10.5)
CHLORIDE SERPL-SCNC: 97 MMOL/L — LOW (ref 98–107)
CO2 SERPL-SCNC: 29 MMOL/L — SIGNIFICANT CHANGE UP (ref 22–31)
CREAT SERPL-MCNC: 0.94 MG/DL — SIGNIFICANT CHANGE UP (ref 0.5–1.3)
GLUCOSE BLDC GLUCOMTR-MCNC: 173 MG/DL — HIGH (ref 70–99)
GLUCOSE BLDC GLUCOMTR-MCNC: 174 MG/DL — HIGH (ref 70–99)
GLUCOSE BLDC GLUCOMTR-MCNC: 223 MG/DL — HIGH (ref 70–99)
GLUCOSE BLDC GLUCOMTR-MCNC: 226 MG/DL — HIGH (ref 70–99)
GLUCOSE SERPL-MCNC: 168 MG/DL — HIGH (ref 70–99)
HCT VFR BLD CALC: 36 % — SIGNIFICANT CHANGE UP (ref 34.5–45)
HGB BLD-MCNC: 12 G/DL — SIGNIFICANT CHANGE UP (ref 11.5–15.5)
MAGNESIUM SERPL-MCNC: 1.6 MG/DL — SIGNIFICANT CHANGE UP (ref 1.6–2.6)
MCHC RBC-ENTMCNC: 32.3 PG — SIGNIFICANT CHANGE UP (ref 27–34)
MCHC RBC-ENTMCNC: 33.3 % — SIGNIFICANT CHANGE UP (ref 32–36)
MCV RBC AUTO: 97 FL — SIGNIFICANT CHANGE UP (ref 80–100)
NRBC # FLD: 0 K/UL — SIGNIFICANT CHANGE UP (ref 0–0)
PHOSPHATE SERPL-MCNC: 3.5 MG/DL — SIGNIFICANT CHANGE UP (ref 2.5–4.5)
PLATELET # BLD AUTO: 187 K/UL — SIGNIFICANT CHANGE UP (ref 150–400)
PMV BLD: 10.4 FL — SIGNIFICANT CHANGE UP (ref 7–13)
POTASSIUM SERPL-MCNC: 4.1 MMOL/L — SIGNIFICANT CHANGE UP (ref 3.5–5.3)
POTASSIUM SERPL-SCNC: 4.1 MMOL/L — SIGNIFICANT CHANGE UP (ref 3.5–5.3)
RBC # BLD: 3.71 M/UL — LOW (ref 3.8–5.2)
RBC # FLD: 15.1 % — HIGH (ref 10.3–14.5)
SODIUM SERPL-SCNC: 138 MMOL/L — SIGNIFICANT CHANGE UP (ref 135–145)
WBC # BLD: 8.67 K/UL — SIGNIFICANT CHANGE UP (ref 3.8–10.5)
WBC # FLD AUTO: 8.67 K/UL — SIGNIFICANT CHANGE UP (ref 3.8–10.5)

## 2019-06-26 RX ADMIN — ENOXAPARIN SODIUM 40 MILLIGRAM(S): 100 INJECTION SUBCUTANEOUS at 21:23

## 2019-06-26 RX ADMIN — Medication 100 MILLIGRAM(S): at 21:22

## 2019-06-26 RX ADMIN — Medication 3 MILLILITER(S): at 21:52

## 2019-06-26 RX ADMIN — Medication 3 MILLILITER(S): at 10:04

## 2019-06-26 RX ADMIN — ATORVASTATIN CALCIUM 80 MILLIGRAM(S): 80 TABLET, FILM COATED ORAL at 21:22

## 2019-06-26 RX ADMIN — Medication 1000 UNIT(S): at 11:46

## 2019-06-26 RX ADMIN — GABAPENTIN 100 MILLIGRAM(S): 400 CAPSULE ORAL at 14:15

## 2019-06-26 RX ADMIN — Medication 81 MILLIGRAM(S): at 11:46

## 2019-06-26 RX ADMIN — Medication 250 MILLIGRAM(S): at 08:53

## 2019-06-26 RX ADMIN — Medication 2: at 12:22

## 2019-06-26 RX ADMIN — GABAPENTIN 100 MILLIGRAM(S): 400 CAPSULE ORAL at 21:22

## 2019-06-26 RX ADMIN — Medication 325 MILLIGRAM(S): at 05:02

## 2019-06-26 RX ADMIN — Medication 1 TABLET(S): at 11:46

## 2019-06-26 RX ADMIN — GABAPENTIN 100 MILLIGRAM(S): 400 CAPSULE ORAL at 05:03

## 2019-06-26 RX ADMIN — Medication 100 MILLIGRAM(S): at 14:15

## 2019-06-26 RX ADMIN — Medication 325 MILLIGRAM(S): at 17:40

## 2019-06-26 RX ADMIN — OXYCODONE HYDROCHLORIDE 5 MILLIGRAM(S): 5 TABLET ORAL at 11:51

## 2019-06-26 RX ADMIN — OXYCODONE HYDROCHLORIDE 5 MILLIGRAM(S): 5 TABLET ORAL at 12:22

## 2019-06-26 RX ADMIN — Medication 1: at 17:40

## 2019-06-26 RX ADMIN — Medication 250 MILLIGRAM(S): at 21:23

## 2019-06-26 RX ADMIN — Medication 3 MILLILITER(S): at 15:42

## 2019-06-26 RX ADMIN — Medication 100 MILLIGRAM(S): at 05:03

## 2019-06-26 RX ADMIN — PANTOPRAZOLE SODIUM 40 MILLIGRAM(S): 20 TABLET, DELAYED RELEASE ORAL at 05:03

## 2019-06-26 RX ADMIN — Medication 1: at 08:52

## 2019-06-26 RX ADMIN — Medication 3 MILLILITER(S): at 04:11

## 2019-06-27 LAB
ANION GAP SERPL CALC-SCNC: 14 MMO/L — SIGNIFICANT CHANGE UP (ref 7–14)
BUN SERPL-MCNC: 34 MG/DL — HIGH (ref 7–23)
CALCIUM SERPL-MCNC: 9.6 MG/DL — SIGNIFICANT CHANGE UP (ref 8.4–10.5)
CHLORIDE SERPL-SCNC: 96 MMOL/L — LOW (ref 98–107)
CO2 SERPL-SCNC: 30 MMOL/L — SIGNIFICANT CHANGE UP (ref 22–31)
CREAT SERPL-MCNC: 0.9 MG/DL — SIGNIFICANT CHANGE UP (ref 0.5–1.3)
GLUCOSE BLDC GLUCOMTR-MCNC: 154 MG/DL — HIGH (ref 70–99)
GLUCOSE BLDC GLUCOMTR-MCNC: 173 MG/DL — HIGH (ref 70–99)
GLUCOSE BLDC GLUCOMTR-MCNC: 216 MG/DL — HIGH (ref 70–99)
GLUCOSE BLDC GLUCOMTR-MCNC: 227 MG/DL — HIGH (ref 70–99)
GLUCOSE SERPL-MCNC: 179 MG/DL — HIGH (ref 70–99)
POTASSIUM SERPL-MCNC: 3.8 MMOL/L — SIGNIFICANT CHANGE UP (ref 3.5–5.3)
POTASSIUM SERPL-SCNC: 3.8 MMOL/L — SIGNIFICANT CHANGE UP (ref 3.5–5.3)
SODIUM SERPL-SCNC: 140 MMOL/L — SIGNIFICANT CHANGE UP (ref 135–145)

## 2019-06-27 PROCEDURE — 93010 ELECTROCARDIOGRAM REPORT: CPT

## 2019-06-27 RX ORDER — OXYCODONE HYDROCHLORIDE 5 MG/1
5 TABLET ORAL EVERY 6 HOURS
Refills: 0 | Status: DISCONTINUED | OUTPATIENT
Start: 2019-06-27 | End: 2019-06-29

## 2019-06-27 RX ORDER — LACTOBACILLUS ACIDOPHILUS 100MM CELL
1 CAPSULE ORAL DAILY
Refills: 0 | Status: DISCONTINUED | OUTPATIENT
Start: 2019-06-27 | End: 2019-06-29

## 2019-06-27 RX ORDER — SENNA PLUS 8.6 MG/1
2 TABLET ORAL AT BEDTIME
Refills: 0 | Status: DISCONTINUED | OUTPATIENT
Start: 2019-06-27 | End: 2019-06-29

## 2019-06-27 RX ADMIN — Medication 3 MILLILITER(S): at 15:37

## 2019-06-27 RX ADMIN — Medication 30 MILLILITER(S): at 18:07

## 2019-06-27 RX ADMIN — Medication 100 MILLIGRAM(S): at 05:25

## 2019-06-27 RX ADMIN — Medication 3 MILLILITER(S): at 21:53

## 2019-06-27 RX ADMIN — Medication 81 MILLIGRAM(S): at 13:04

## 2019-06-27 RX ADMIN — Medication 1 TABLET(S): at 13:04

## 2019-06-27 RX ADMIN — GABAPENTIN 100 MILLIGRAM(S): 400 CAPSULE ORAL at 13:04

## 2019-06-27 RX ADMIN — ENOXAPARIN SODIUM 40 MILLIGRAM(S): 100 INJECTION SUBCUTANEOUS at 21:29

## 2019-06-27 RX ADMIN — GABAPENTIN 100 MILLIGRAM(S): 400 CAPSULE ORAL at 21:29

## 2019-06-27 RX ADMIN — Medication 325 MILLIGRAM(S): at 17:12

## 2019-06-27 RX ADMIN — GABAPENTIN 100 MILLIGRAM(S): 400 CAPSULE ORAL at 05:25

## 2019-06-27 RX ADMIN — Medication 1 TABLET(S): at 13:03

## 2019-06-27 RX ADMIN — POLYETHYLENE GLYCOL 3350 17 GRAM(S): 17 POWDER, FOR SOLUTION ORAL at 21:29

## 2019-06-27 RX ADMIN — Medication 3 MILLILITER(S): at 09:38

## 2019-06-27 RX ADMIN — Medication 2: at 08:36

## 2019-06-27 RX ADMIN — Medication 250 MILLIGRAM(S): at 21:29

## 2019-06-27 RX ADMIN — Medication 325 MILLIGRAM(S): at 05:25

## 2019-06-27 RX ADMIN — Medication 1: at 17:48

## 2019-06-27 RX ADMIN — Medication 100 MILLIGRAM(S): at 21:29

## 2019-06-27 RX ADMIN — ATORVASTATIN CALCIUM 80 MILLIGRAM(S): 80 TABLET, FILM COATED ORAL at 21:29

## 2019-06-27 RX ADMIN — Medication 250 MILLIGRAM(S): at 08:36

## 2019-06-27 RX ADMIN — Medication 2: at 13:03

## 2019-06-27 RX ADMIN — Medication 3 MILLILITER(S): at 03:45

## 2019-06-27 RX ADMIN — PANTOPRAZOLE SODIUM 40 MILLIGRAM(S): 20 TABLET, DELAYED RELEASE ORAL at 05:26

## 2019-06-27 RX ADMIN — Medication 1000 UNIT(S): at 13:04

## 2019-06-27 NOTE — CHART NOTE - NSCHARTNOTEFT_GEN_A_CORE
Pt c/o dizziness, nerve pain radiating down LLE, no chest pain, SOB, N/V, blurry vision. No tingling reported, full sensation, pedal pulses palpable.   Pt with age-indeterminate fracture to left knee; as per attending Dr. Moyer- need to r/o any neurological etiologies. Neurology consulted, will f/u official recs.

## 2019-06-27 NOTE — CHART NOTE - NSCHARTNOTEFT_GEN_A_CORE
This evening, patient c/o mid epigastric pain/discomfort. No c/o left side chest pain, SOB, N/V, vision changes. Pt states "feel like heartburn."   VSS. EKG ordered, will follow up. Maalox given. This evening, patient c/o mid epigastric pain/discomfort. No c/o left side chest pain, SOB, N/V, dizziness, or vision changes. Pt states "it feel like heartburn, taste bitter-like"   VSS, afebrile, HR stable. EKG ordered. Maalox given with some relief.

## 2019-06-27 NOTE — CHART NOTE - NSCHARTNOTEFT_GEN_A_CORE
Neurology Consult Service    Case discussed with Dr. Angel Bahena, attending neurologist. Recommend ordering lower extremity dopplers to rule out DVT.    Patient to be evaluated by neurology service on 6/28/19.    Alirio Eagle MD  PGY-2  Neurology

## 2019-06-28 ENCOUNTER — APPOINTMENT (OUTPATIENT)
Dept: ORTHOPEDIC SURGERY | Facility: CLINIC | Age: 79
End: 2019-06-28

## 2019-06-28 LAB
ANION GAP SERPL CALC-SCNC: 11 MMO/L — SIGNIFICANT CHANGE UP (ref 7–14)
BACTERIA BLD CULT: SIGNIFICANT CHANGE UP
BACTERIA BLD CULT: SIGNIFICANT CHANGE UP
BUN SERPL-MCNC: 37 MG/DL — HIGH (ref 7–23)
CALCIUM SERPL-MCNC: 9.7 MG/DL — SIGNIFICANT CHANGE UP (ref 8.4–10.5)
CHLORIDE SERPL-SCNC: 97 MMOL/L — LOW (ref 98–107)
CO2 SERPL-SCNC: 31 MMOL/L — SIGNIFICANT CHANGE UP (ref 22–31)
CREAT SERPL-MCNC: 0.96 MG/DL — SIGNIFICANT CHANGE UP (ref 0.5–1.3)
GLUCOSE BLDC GLUCOMTR-MCNC: 154 MG/DL — HIGH (ref 70–99)
GLUCOSE BLDC GLUCOMTR-MCNC: 179 MG/DL — HIGH (ref 70–99)
GLUCOSE BLDC GLUCOMTR-MCNC: 190 MG/DL — HIGH (ref 70–99)
GLUCOSE BLDC GLUCOMTR-MCNC: 193 MG/DL — HIGH (ref 70–99)
GLUCOSE SERPL-MCNC: 211 MG/DL — HIGH (ref 70–99)
POTASSIUM SERPL-MCNC: 4.1 MMOL/L — SIGNIFICANT CHANGE UP (ref 3.5–5.3)
POTASSIUM SERPL-SCNC: 4.1 MMOL/L — SIGNIFICANT CHANGE UP (ref 3.5–5.3)
SODIUM SERPL-SCNC: 139 MMOL/L — SIGNIFICANT CHANGE UP (ref 135–145)

## 2019-06-28 PROCEDURE — 99221 1ST HOSP IP/OBS SF/LOW 40: CPT | Mod: GC

## 2019-06-28 PROCEDURE — 93970 EXTREMITY STUDY: CPT | Mod: 26

## 2019-06-28 RX ADMIN — Medication 1 TABLET(S): at 13:42

## 2019-06-28 RX ADMIN — GABAPENTIN 100 MILLIGRAM(S): 400 CAPSULE ORAL at 21:21

## 2019-06-28 RX ADMIN — GABAPENTIN 100 MILLIGRAM(S): 400 CAPSULE ORAL at 05:56

## 2019-06-28 RX ADMIN — OXYCODONE HYDROCHLORIDE 5 MILLIGRAM(S): 5 TABLET ORAL at 14:13

## 2019-06-28 RX ADMIN — Medication 250 MILLIGRAM(S): at 21:21

## 2019-06-28 RX ADMIN — POLYETHYLENE GLYCOL 3350 17 GRAM(S): 17 POWDER, FOR SOLUTION ORAL at 21:21

## 2019-06-28 RX ADMIN — PANTOPRAZOLE SODIUM 40 MILLIGRAM(S): 20 TABLET, DELAYED RELEASE ORAL at 05:57

## 2019-06-28 RX ADMIN — ENOXAPARIN SODIUM 40 MILLIGRAM(S): 100 INJECTION SUBCUTANEOUS at 21:22

## 2019-06-28 RX ADMIN — Medication 100 MILLIGRAM(S): at 21:21

## 2019-06-28 RX ADMIN — Medication 325 MILLIGRAM(S): at 17:27

## 2019-06-28 RX ADMIN — OXYCODONE HYDROCHLORIDE 5 MILLIGRAM(S): 5 TABLET ORAL at 13:43

## 2019-06-28 RX ADMIN — Medication 100 MILLIGRAM(S): at 05:57

## 2019-06-28 RX ADMIN — SENNA PLUS 2 TABLET(S): 8.6 TABLET ORAL at 21:21

## 2019-06-28 RX ADMIN — ATORVASTATIN CALCIUM 80 MILLIGRAM(S): 80 TABLET, FILM COATED ORAL at 21:21

## 2019-06-28 RX ADMIN — Medication 325 MILLIGRAM(S): at 05:56

## 2019-06-28 RX ADMIN — Medication 250 MILLIGRAM(S): at 08:44

## 2019-06-28 RX ADMIN — Medication 1: at 17:27

## 2019-06-28 RX ADMIN — Medication 3 MILLILITER(S): at 03:37

## 2019-06-28 RX ADMIN — Medication 3 MILLILITER(S): at 15:56

## 2019-06-28 RX ADMIN — Medication 3 MILLILITER(S): at 09:36

## 2019-06-28 RX ADMIN — Medication 1000 UNIT(S): at 13:42

## 2019-06-28 RX ADMIN — Medication 1: at 13:44

## 2019-06-28 RX ADMIN — Medication 81 MILLIGRAM(S): at 13:42

## 2019-06-28 RX ADMIN — Medication 3 MILLILITER(S): at 23:14

## 2019-06-28 RX ADMIN — GABAPENTIN 100 MILLIGRAM(S): 400 CAPSULE ORAL at 13:43

## 2019-06-28 RX ADMIN — Medication 1: at 08:44

## 2019-06-28 NOTE — PROGRESS NOTE ADULT - SUBJECTIVE AND OBJECTIVE BOX
Infectious Diseases progress note:    Subjective:  Events noted.  Yesterday pt with dizziness, and pain radiating down L leg/knee.  Neurology eval in progress.      ROS:  CONSTITUTIONAL:  No fever, chills, rigors  CARDIOVASCULAR:  No chest pain or palpitations  RESPIRATORY:   No SOB, cough, dyspnea on exertion.  No wheezing  GASTROINTESTINAL:  No abd pain, N/V, diarrhea/constipation  EXTREMITIES:  No swelling or joint pain  GENITOURINARY:  No burning on urination, increased frequency or urgency.  No flank pain  NEUROLOGIC:  No HA, visual disturbances  SKIN: No rashes    Allergies    No Known Allergies    Intolerances        ANTIBIOTICS/RELEVANT:  antimicrobials  cefuroxime   Tablet 250 milliGRAM(s) Oral every 12 hours    immunologic:    OTHER:  ALBUTerol/ipratropium for Nebulization 3 milliLiter(s) Nebulizer every 6 hours  aspirin enteric coated 81 milliGRAM(s) Oral daily  atorvastatin 80 milliGRAM(s) Oral at bedtime  calcium carbonate 1250 mG  + Vitamin D (OsCal 500 + D) 1 Tablet(s) Oral daily  cholecalciferol 1000 Unit(s) Oral daily  dextrose 40% Gel 15 Gram(s) Oral once PRN  dextrose 5%. 1000 milliLiter(s) IV Continuous <Continuous>  dextrose 50% Injectable 12.5 Gram(s) IV Push once  dextrose 50% Injectable 25 Gram(s) IV Push once  dextrose 50% Injectable 25 Gram(s) IV Push once  docusate sodium 100 milliGRAM(s) Oral three times a day  enoxaparin Injectable 40 milliGRAM(s) SubCutaneous every 24 hours  ferrous    sulfate 325 milliGRAM(s) Oral two times a day  gabapentin 100 milliGRAM(s) Oral three times a day  glucagon  Injectable 1 milliGRAM(s) IntraMuscular once PRN  insulin lispro (HumaLOG) corrective regimen sliding scale   SubCutaneous three times a day before meals  lactobacillus acidophilus 1 Tablet(s) Oral daily  metolazone 5 milliGRAM(s) Oral daily  multivitamin 1 Tablet(s) Oral daily  oxyCODONE    IR 5 milliGRAM(s) Oral every 6 hours PRN  pantoprazole    Tablet 40 milliGRAM(s) Oral before breakfast  polyethylene glycol 3350 17 Gram(s) Oral at bedtime  senna 2 Tablet(s) Oral at bedtime      Objective:  Vital Signs Last 24 Hrs  T(C): 36.8 (28 Jun 2019 05:54), Max: 36.9 (27 Jun 2019 12:54)  T(F): 98.3 (28 Jun 2019 05:54), Max: 98.5 (27 Jun 2019 12:54)  HR: 82 (28 Jun 2019 09:36) (70 - 92)  BP: 157/73 (28 Jun 2019 05:54) (130/83 - 157/73)  BP(mean): --  RR: 16 (28 Jun 2019 05:54) (16 - 18)  SpO2: 98% (28 Jun 2019 09:36) (95% - 99%)    PHYSICAL EXAM:  Constitutional:NAD  Eyes:DON, EOMI  Ear/Nose/Throat: no thrush, mucositis.  Moist mucous membranes	  Neck:no JVD, no lymphadenopathy, supple  Respiratory: CTA vernon  Cardiovascular: S1S2 RRR, no murmurs  Gastrointestinal:soft, nontender,  nondistended (+) BS  Extremities: chronic appearing L knee swelling.  No erythema or open wounds  Skin:  no rashes, open wounds or ulcerations        LABS:    06-28    139  |  97<L>  |  37<H>  ----------------------------<  211<H>  4.1   |  31  |  0.96    Ca    9.7      28 Jun 2019 07:18              MICROBIOLOGY:    Culture - Blood (06.23.19 @ 01:09)    Culture - Blood:   NO ORGANISMS ISOLATED    Specimen Source: BLOOD VENOUS    Culture - Blood (06.23.19 @ 01:09)    Culture - Blood:   NO ORGANISMS ISOLATED    Specimen Source: BLOOD PERIPHERAL    Culture - Urine (06.22.19 @ 19:49)    Culture - Urine:   NO GROWTH AT 24 HOURS    Specimen Source: URINE MIDSTREAM          RADIOLOGY & ADDITIONAL STUDIES:    < from: Xray Knee 3 Views, Left (06.24.19 @ 18:07) >  IMPRESSION:  Redemonstrated posterolaterally dislocated left knee with underlying   tibiofemoral/patellofemoral cement spacer and 2 separate underlying   cement reinforcing metal plates unchanged compared, to 6/22/2019 study   however new since the 6/30/2016 study.    Scattered lucent zones at the cement bone interfaces of the cement spacer   material compatible with associated loosening/debonding.    Generalized osteopenia otherwise no discrete lytic or blastic lesions.    No tracking gas collections or gross radiographic evidence for   osteomyelitis.    < end of copied text >        < from: US Kidney and Bladder (06.23.19 @ 13:38) >  FINDINGS:    Right kidney: The right kidney is not visualized.    Left kidney:  9.8 cm. No renal mass, hydronephrosis or calculi.    Urinary bladder: The bladder is sonographically normal.    IMPRESSION:     1.  The right kidney is not visualized.  2.  No left hydronephrosis.  3.  A left renal calculus shown in 2016 is not visualized by sonography.    < end of copied text >        < from: Xray Chest 2 Views PA/Lat (06.22.19 @ 17:48) >  Impression:    Areas of linear atelectasis are noted throughout the lungs. No pleural   effusion or pneumothorax. The cardiac silhouette is poorly evaluated on   this projection. Unchanged severe S-shaped scoliosis of the spine and   degenerative changes of the bilateral shoulders.    < end of copied text >
Patient is a 78y old  Female who presents with a chief complaint of Difficulty walking, UTI, L-knee fracture (28 Jun 2019 12:21)      INTERVAL HPI/OVERNIGHT EVENTS:  T(C): 36.7 (06-28-19 @ 15:36), Max: 36.9 (06-27-19 @ 17:45)  HR: 80 (06-28-19 @ 15:57) (70 - 92)  BP: 144/73 (06-28-19 @ 15:36) (130/83 - 157/73)  RR: 18 (06-28-19 @ 15:36) (16 - 18)  SpO2: 97% (06-28-19 @ 15:57) (95% - 98%)  Wt(kg): --  I&O's Summary      LABS:    06-28    139  |  97<L>  |  37<H>  ----------------------------<  211<H>  4.1   |  31  |  0.96    Ca    9.7      28 Jun 2019 07:18          CAPILLARY BLOOD GLUCOSE      POCT Blood Glucose.: 154 mg/dL (28 Jun 2019 17:02)  POCT Blood Glucose.: 190 mg/dL (28 Jun 2019 13:40)  POCT Blood Glucose.: 179 mg/dL (28 Jun 2019 08:30)  POCT Blood Glucose.: 154 mg/dL (27 Jun 2019 21:23)  POCT Blood Glucose.: 173 mg/dL (27 Jun 2019 17:17)            MEDICATIONS  (STANDING):  ALBUTerol/ipratropium for Nebulization 3 milliLiter(s) Nebulizer every 6 hours  aspirin enteric coated 81 milliGRAM(s) Oral daily  atorvastatin 80 milliGRAM(s) Oral at bedtime  calcium carbonate 1250 mG  + Vitamin D (OsCal 500 + D) 1 Tablet(s) Oral daily  cefuroxime   Tablet 250 milliGRAM(s) Oral every 12 hours  cholecalciferol 1000 Unit(s) Oral daily  dextrose 5%. 1000 milliLiter(s) (50 mL/Hr) IV Continuous <Continuous>  dextrose 50% Injectable 12.5 Gram(s) IV Push once  dextrose 50% Injectable 25 Gram(s) IV Push once  dextrose 50% Injectable 25 Gram(s) IV Push once  docusate sodium 100 milliGRAM(s) Oral three times a day  enoxaparin Injectable 40 milliGRAM(s) SubCutaneous every 24 hours  ferrous    sulfate 325 milliGRAM(s) Oral two times a day  gabapentin 100 milliGRAM(s) Oral three times a day  insulin lispro (HumaLOG) corrective regimen sliding scale   SubCutaneous three times a day before meals  lactobacillus acidophilus 1 Tablet(s) Oral daily  metolazone 5 milliGRAM(s) Oral daily  multivitamin 1 Tablet(s) Oral daily  pantoprazole    Tablet 40 milliGRAM(s) Oral before breakfast  polyethylene glycol 3350 17 Gram(s) Oral at bedtime  senna 2 Tablet(s) Oral at bedtime    MEDICATIONS  (PRN):  dextrose 40% Gel 15 Gram(s) Oral once PRN Blood Glucose LESS THAN 70 milliGRAM(s)/deciliter  glucagon  Injectable 1 milliGRAM(s) IntraMuscular once PRN Glucose LESS THAN 70 milligrams/deciliter  oxyCODONE    IR 5 milliGRAM(s) Oral every 6 hours PRN Moderate Pain (4 - 6)          PHYSICAL EXAM:  GENERAL: NAD, well-groomed, well-developed  HEAD:  Atraumatic, Normocephalic  CHEST/LUNG: Clear to percussion bilaterally; No rales, rhonchi, wheezing, or rubs  HEART: Regular rate and rhythm; No murmurs, rubs, or gallops  ABDOMEN: Soft, Nontender, Nondistended; Bowel sounds present  EXTREMITIES:  2+ Peripheral Pulses, No clubbing, cyanosis, or edema  LYMPH: No lymphadenopathy noted  SKIN: No rashes or lesions    Care Discussed with Consultants/Other Providers [ ] YES  [ ] NO
Infectious Diseases progress note:    Subjective:  No acute o/n events.  No complaints, afebrile.  Pt pending placement.    ROS:  CONSTITUTIONAL:  No fever, chills, rigors  CARDIOVASCULAR:  No chest pain or palpitations  RESPIRATORY:   No SOB, cough, dyspnea on exertion.  No wheezing  GASTROINTESTINAL:  No abd pain, N/V, diarrhea/constipation  EXTREMITIES:  No swelling or joint pain  GENITOURINARY:  No burning on urination, increased frequency or urgency.  No flank pain  NEUROLOGIC:  No HA, visual disturbances  SKIN: No rashes    Allergies    No Known Allergies    Intolerances        ANTIBIOTICS/RELEVANT:  antimicrobials  cefuroxime   Tablet 250 milliGRAM(s) Oral every 12 hours    immunologic:    OTHER:  ALBUTerol/ipratropium for Nebulization 3 milliLiter(s) Nebulizer every 6 hours  aspirin enteric coated 81 milliGRAM(s) Oral daily  atorvastatin 80 milliGRAM(s) Oral at bedtime  calcium carbonate 1250 mG  + Vitamin D (OsCal 500 + D) 1 Tablet(s) Oral daily  cholecalciferol 1000 Unit(s) Oral daily  dextrose 40% Gel 15 Gram(s) Oral once PRN  dextrose 5%. 1000 milliLiter(s) IV Continuous <Continuous>  dextrose 50% Injectable 12.5 Gram(s) IV Push once  dextrose 50% Injectable 25 Gram(s) IV Push once  dextrose 50% Injectable 25 Gram(s) IV Push once  docusate sodium 100 milliGRAM(s) Oral three times a day  enoxaparin Injectable 40 milliGRAM(s) SubCutaneous every 24 hours  ferrous    sulfate 325 milliGRAM(s) Oral two times a day  gabapentin 100 milliGRAM(s) Oral three times a day  glucagon  Injectable 1 milliGRAM(s) IntraMuscular once PRN  insulin lispro (HumaLOG) corrective regimen sliding scale   SubCutaneous three times a day before meals  metolazone 5 milliGRAM(s) Oral daily  multivitamin 1 Tablet(s) Oral daily  oxyCODONE    IR 5 milliGRAM(s) Oral every 6 hours PRN  pantoprazole    Tablet 40 milliGRAM(s) Oral before breakfast  polyethylene glycol 3350 17 Gram(s) Oral at bedtime      Objective:  Vital Signs Last 24 Hrs  T(C): 36.9 (26 Jun 2019 12:17), Max: 36.9 (26 Jun 2019 12:17)  T(F): 98.4 (26 Jun 2019 12:17), Max: 98.4 (26 Jun 2019 12:17)  HR: 87 (26 Jun 2019 15:43) (72 - 96)  BP: 110/61 (26 Jun 2019 12:17) (110/61 - 125/64)  BP(mean): --  RR: 18 (26 Jun 2019 12:17) (17 - 18)  SpO2: 96% (26 Jun 2019 15:43) (96% - 99%)    PHYSICAL EXAM:  Constitutional:NAD  Eyes:DON, EOMI  Ear/Nose/Throat: no thrush, mucositis.  Moist mucous membranes	  Neck:no JVD, no lymphadenopathy, supple  Respiratory: CTA vernon  Cardiovascular: S1S2 RRR, no murmurs  Gastrointestinal:soft, nontender,  nondistended (+) BS  Extremities: L knee chronic swelling.  b/l TKR healed scars  Skin:  no rashes, open wounds or ulcerations        LABS:                        12.0   8.67  )-----------( 187      ( 26 Jun 2019 07:00 )             36.0     06-26    138  |  97<L>  |  35<H>  ----------------------------<  168<H>  4.1   |  29  |  0.94    Ca    9.2      26 Jun 2019 07:00  Phos  3.5     06-26  Mg     1.6     06-26    TPro  7.3  /  Alb  3.6  /  TBili  0.2  /  DBili  x   /  AST  33<H>  /  ALT  32  /  AlkPhos  116  06-25                            MICROBIOLOGY:    Culture - Blood (06.23.19 @ 01:09)    Culture - Blood:   NO ORGANISMS ISOLATED  NO ORGANISMS ISOLATED AT 72 HRS.    Specimen Source: BLOOD VENOUS    Culture - Blood (06.23.19 @ 01:09)    Culture - Blood:   NO ORGANISMS ISOLATED  NO ORGANISMS ISOLATED AT 72 HRS.    Specimen Source: BLOOD PERIPHERAL    Culture - Urine (06.22.19 @ 19:49)    Culture - Urine:   NO GROWTH AT 24 HOURS    Specimen Source: URINE MIDSTREAM          RADIOLOGY & ADDITIONAL STUDIES:    < from: Xray Knee 3 Views, Left (06.24.19 @ 18:07) >  IMPRESSION:  Redemonstrated posterolaterally dislocated left knee with underlying   tibiofemoral/patellofemoral cement spacer and 2 separate underlying   cement reinforcing metal plates unchanged compared, to 6/22/2019 study   however new since the 6/30/2016 study.    Scattered lucent zones at the cement bone interfaces of the cement spacer   material compatible with associated loosening/debonding.    Generalized osteopenia otherwise no discrete lytic or blastic lesions.    No tracking gas collections or gross radiographic evidence for   osteomyelitis.    < end of copied text >
Infectious Diseases progress note:    Subjective:  No acute o/n events.  No flank pain, dysuria, abd pain, diarrhea.  Felt slightly dizzy today when sitting up.  Othewise hemodynamically stable.      ROS:  CONSTITUTIONAL:  No fever, chills, rigors  CARDIOVASCULAR:  No chest pain or palpitations  RESPIRATORY:   No SOB, cough, dyspnea on exertion.  No wheezing  GASTROINTESTINAL:  No abd pain, N/V, diarrhea/constipation  EXTREMITIES:  No swelling or joint pain  GENITOURINARY:  No burning on urination, increased frequency or urgency.  No flank pain  NEUROLOGIC:  No HA, visual disturbances  SKIN: No rashes    Allergies    No Known Allergies    Intolerances        ANTIBIOTICS/RELEVANT:  antimicrobials  cefuroxime   Tablet 250 milliGRAM(s) Oral every 12 hours    immunologic:    OTHER:  ALBUTerol/ipratropium for Nebulization 3 milliLiter(s) Nebulizer every 6 hours  aspirin enteric coated 81 milliGRAM(s) Oral daily  atorvastatin 80 milliGRAM(s) Oral at bedtime  calcium carbonate 1250 mG  + Vitamin D (OsCal 500 + D) 1 Tablet(s) Oral daily  cholecalciferol 1000 Unit(s) Oral daily  dextrose 40% Gel 15 Gram(s) Oral once PRN  dextrose 5%. 1000 milliLiter(s) IV Continuous <Continuous>  dextrose 50% Injectable 12.5 Gram(s) IV Push once  dextrose 50% Injectable 25 Gram(s) IV Push once  dextrose 50% Injectable 25 Gram(s) IV Push once  docusate sodium 100 milliGRAM(s) Oral three times a day  enoxaparin Injectable 40 milliGRAM(s) SubCutaneous every 24 hours  ferrous    sulfate 325 milliGRAM(s) Oral two times a day  gabapentin 100 milliGRAM(s) Oral three times a day  glucagon  Injectable 1 milliGRAM(s) IntraMuscular once PRN  insulin lispro (HumaLOG) corrective regimen sliding scale   SubCutaneous three times a day before meals  lactobacillus acidophilus 1 Tablet(s) Oral daily  metolazone 5 milliGRAM(s) Oral daily  multivitamin 1 Tablet(s) Oral daily  oxyCODONE    IR 5 milliGRAM(s) Oral every 6 hours PRN  pantoprazole    Tablet 40 milliGRAM(s) Oral before breakfast  polyethylene glycol 3350 17 Gram(s) Oral at bedtime  senna 2 Tablet(s) Oral at bedtime      Objective:  Vital Signs Last 24 Hrs  T(C): 36.9 (27 Jun 2019 12:54), Max: 37 (26 Jun 2019 20:17)  T(F): 98.5 (27 Jun 2019 12:54), Max: 98.6 (26 Jun 2019 20:17)  HR: 88 (27 Jun 2019 12:54) (78 - 100)  BP: 133/77 (27 Jun 2019 12:54) (120/67 - 133/77)  BP(mean): --  RR: 18 (27 Jun 2019 12:54) (17 - 18)  SpO2: 99% (27 Jun 2019 12:54) (96% - 99%)    PHYSICAL EXAM:  Constitutional:NAD  Eyes:DON, EOMI  Ear/Nose/Throat: no thrush, mucositis.  Moist mucous membranes	  Neck:no JVD, no lymphadenopathy, supple  Respiratory: CTA vernon  Cardiovascular: S1S2 RRR, no murmurs  Gastrointestinal:soft, nontender,  nondistended (+) BS  Extremities:  L knee chronic swelling.  No erythema.  b/l TKR healed scars.   Skin:  no rashes, open wounds or ulcerations        LABS:                        12.0   8.67  )-----------( 187      ( 26 Jun 2019 07:00 )             36.0     06-27    140  |  96<L>  |  34<H>  ----------------------------<  179<H>  3.8   |  30  |  0.90    Ca    9.6      27 Jun 2019 06:06  Phos  3.5     06-26  Mg     1.6     06-26                MICROBIOLOGY:    Culture - Blood (06.23.19 @ 01:09)    Culture - Blood:   NO ORGANISMS ISOLATED  NO ORGANISMS ISOLATED AT 96 HOURS    Specimen Source: BLOOD VENOUS    Culture - Blood (06.23.19 @ 01:09)    Culture - Blood:   NO ORGANISMS ISOLATED  NO ORGANISMS ISOLATED AT 96 HOURS    Specimen Source: BLOOD PERIPHERAL    Culture - Urine (06.22.19 @ 19:49)    Culture - Urine:   NO GROWTH AT 24 HOURS    Specimen Source: URINE MIDSTREAM          RADIOLOGY & ADDITIONAL STUDIES:    < from: Xray Knee 3 Views, Left (06.24.19 @ 18:07) >    IMPRESSION:  Redemonstrated posterolaterally dislocated left knee with underlying   tibiofemoral/patellofemoral cement spacer and 2 separate underlying   cement reinforcing metal plates unchanged compared, to 6/22/2019 study   however new since the 6/30/2016 study.    Scattered lucent zones at the cement bone interfaces of the cement spacer   material compatible with associated loosening/debonding.    Generalized osteopenia otherwise no discrete lytic or blastic lesions.    No tracking gas collections or gross radiographic evidence for   osteomyelitis.    < end of copied text >        < from: US Kidney and Bladder (06.23.19 @ 13:38) >  FINDINGS:    Right kidney: The right kidney is not visualized.    Left kidney:  9.8 cm. No renal mass, hydronephrosis or calculi.    Urinary bladder: The bladder is sonographically normal.    IMPRESSION:     1.  The right kidney is not visualized.  2.  No left hydronephrosis.  3.  A left renal calculus shown in 2016 is not visualized by sonography.    < end of copied text >
Infectious Diseases progress note:    Subjective:  No acute o/n events.  States L flank pain improved.  No fever/chills.  Seen by Orthopedics earlier today.     ROS:  CONSTITUTIONAL:  No fever, chills, rigors  CARDIOVASCULAR:  No chest pain or palpitations  RESPIRATORY:   No SOB, cough, dyspnea on exertion.  No wheezing  GASTROINTESTINAL:  No abd pain, N/V, diarrhea/constipation  EXTREMITIES:  No swelling or joint pain  GENITOURINARY:  No burning on urination, increased frequency or urgency.  No flank pain  NEUROLOGIC:  No HA, visual disturbances  SKIN: No rashes    Allergies    No Known Allergies    Intolerances        ANTIBIOTICS/RELEVANT:  antimicrobials  cefTRIAXone   IVPB 1000 milliGRAM(s) IV Intermittent every 24 hours    immunologic:    OTHER:  ALBUTerol/ipratropium for Nebulization 3 milliLiter(s) Nebulizer every 6 hours  aspirin enteric coated 81 milliGRAM(s) Oral daily  atorvastatin 80 milliGRAM(s) Oral at bedtime  calcium carbonate 1250 mG  + Vitamin D (OsCal 500 + D) 1 Tablet(s) Oral daily  cholecalciferol 1000 Unit(s) Oral daily  dextrose 40% Gel 15 Gram(s) Oral once PRN  dextrose 5%. 1000 milliLiter(s) IV Continuous <Continuous>  dextrose 50% Injectable 12.5 Gram(s) IV Push once  dextrose 50% Injectable 25 Gram(s) IV Push once  dextrose 50% Injectable 25 Gram(s) IV Push once  docusate sodium 100 milliGRAM(s) Oral three times a day  enoxaparin Injectable 40 milliGRAM(s) SubCutaneous every 24 hours  ferrous    sulfate 325 milliGRAM(s) Oral two times a day  gabapentin 100 milliGRAM(s) Oral three times a day  glucagon  Injectable 1 milliGRAM(s) IntraMuscular once PRN  insulin lispro (HumaLOG) corrective regimen sliding scale   SubCutaneous three times a day before meals  metolazone 5 milliGRAM(s) Oral daily  multivitamin 1 Tablet(s) Oral daily  oxyCODONE    IR 5 milliGRAM(s) Oral every 6 hours PRN  pantoprazole    Tablet 40 milliGRAM(s) Oral before breakfast  polyethylene glycol 3350 17 Gram(s) Oral at bedtime  predniSONE   Tablet 20 milliGRAM(s) Oral daily      Objective:  Vital Signs Last 24 Hrs  T(C): 36.4 (2019 12:27), Max: 36.5 (2019 21:04)  T(F): 97.6 (2019 12:27), Max: 97.7 (2019 21:04)  HR: 78 (2019 12:27) (72 - 84)  BP: 123/75 (2019 12:27) (108/64 - 123/75)  BP(mean): --  RR: 18 (2019 12:27) (16 - 18)  SpO2: 97% (2019 12:27) (93% - 97%)    PHYSICAL EXAM:  Constitutional:NAD  Eyes:DON, EOMI  Ear/Nose/Throat: no thrush, mucositis.  Moist mucous membranes	  Neck:no JVD, no lymphadenopathy, supple  Respiratory: CTA vernon  Cardiovascular: S1S2 RRR, no murmurs  Gastrointestinal:soft, nontender,  nondistended (+) BS  Extremities: L knee swelling - stable.  No erythema or increased warmth  Skin:  no rashes, open wounds or ulcerations        LABS:                        11.2   9.92  )-----------( 165      ( 2019 05:40 )             33.3         140  |  101  |  25<H>  ----------------------------<  134<H>  4.5   |  28  |  1.12    Ca    8.9      2019 05:40  Phos  2.7       Mg     2.0         TPro  7.1  /  Alb  3.5  /  TBili  0.2  /  DBili  x   /  AST  41<H>  /  ALT  36<H>  /  AlkPhos  111        Urinalysis Basic - ( 2019 17:22 )    Color: YELLOW / Appearance: CLEAR / S.023 / pH: 6.0  Gluc: NEGATIVE / Ketone: NEGATIVE  / Bili: NEGATIVE / Urobili: NORMAL   Blood: TRACE / Protein: 50 / Nitrite: NEGATIVE   Leuk Esterase: LARGE / RBC: 6-10 / WBC >50   Sq Epi: FEW / Non Sq Epi: x / Bacteria: FEW                          MICROBIOLOGY:    Culture - Blood (19 @ 01:09)    Culture - Blood:   NO ORGANISMS ISOLATED  NO ORGANISMS ISOLATED AT 24 HOURS    Specimen Source: BLOOD VENOUS    Culture - Blood (19 @ 01:09)    Culture - Blood:   NO ORGANISMS ISOLATED  NO ORGANISMS ISOLATED AT 24 HOURS    Specimen Source: BLOOD PERIPHERAL    Culture - Urine (19 @ 19:49)    Culture - Urine:   NO GROWTH AT 24 HOURS    Specimen Source: URINE MIDSTREAM          RADIOLOGY & ADDITIONAL STUDIES:    < from: US Kidney and Bladder (19 @ 13:38) >  FINDINGS:    Right kidney: The right kidney is not visualized.    Left kidney:  9.8 cm. No renal mass, hydronephrosis or calculi.    Urinary bladder: The bladder is sonographically normal.    IMPRESSION:     1.  The right kidney is not visualized.  2.  No left hydronephrosis.  3.  A left renal calculus shown in 2016 is not visualized by sonography.    < end of copied text >          < from: Xray Chest 2 Views PA/Lat (19 @ 17:48) >  Impression:    Areas of linear atelectasis are noted throughout the lungs. No pleural   effusion or pneumothorax. The cardiac silhouette is poorly evaluated on   this projection. Unchanged severe S-shaped scoliosis of the spine and   degenerative changes of the bilateral shoulders.    < end of copied text >          < from: Xray Knee 4 Views, Bilateral (19 @ 17:47) >  FINDINGS:   Left:  Previously seen was a left-sided impregnated cement spacer with distal   femoral and proximal tibial metal plate reinforcement and surrounding   antibiotic impregnated cement beads inserted in its place. In the   interim, the patient has since development fracture through the cement   with resulting lateral displacement of the distal fragment. The soft   tissues are on the left knee are edematous and there are multiple   well-corticated osseous fragments likely representing heterotopic   ossification.    Right:  Status post right knee arthroplasty with patellar resurfacing. The   hardware is intact and there is no fracture or dislocation. The soft   tissues are unremarkable.      IMPRESSION:   Age-indeterminate fracture through the left knee cement spacer with   lateral displacement of the distal fragment. Surrounding soft tissue   edema.    < end of copied text >
Infectious Diseases progress note:    Subjective: NAD, afebrile.  Denies flank pain, dysuria/chills    ROS:  CONSTITUTIONAL:  No fever, chills, rigors  CARDIOVASCULAR:  No chest pain or palpitations  RESPIRATORY:   No SOB, cough, dyspnea on exertion.  No wheezing  GASTROINTESTINAL:  No abd pain, N/V, diarrhea/constipation  EXTREMITIES:  No swelling or joint pain  GENITOURINARY:  No burning on urination, increased frequency or urgency.  No flank pain  NEUROLOGIC:  No HA, visual disturbances  SKIN: No rashes    Allergies    No Known Allergies    Intolerances        ANTIBIOTICS/RELEVANT:  antimicrobials  cefTRIAXone   IVPB 1000 milliGRAM(s) IV Intermittent every 24 hours    immunologic:    OTHER:  ALBUTerol/ipratropium for Nebulization 3 milliLiter(s) Nebulizer every 6 hours  aspirin enteric coated 81 milliGRAM(s) Oral daily  atorvastatin 80 milliGRAM(s) Oral at bedtime  calcium carbonate 1250 mG  + Vitamin D (OsCal 500 + D) 1 Tablet(s) Oral daily  cholecalciferol 1000 Unit(s) Oral daily  dextrose 40% Gel 15 Gram(s) Oral once PRN  dextrose 5%. 1000 milliLiter(s) IV Continuous <Continuous>  dextrose 50% Injectable 12.5 Gram(s) IV Push once  dextrose 50% Injectable 25 Gram(s) IV Push once  dextrose 50% Injectable 25 Gram(s) IV Push once  docusate sodium 100 milliGRAM(s) Oral three times a day  enoxaparin Injectable 40 milliGRAM(s) SubCutaneous every 24 hours  ferrous    sulfate 325 milliGRAM(s) Oral two times a day  gabapentin 100 milliGRAM(s) Oral three times a day  glucagon  Injectable 1 milliGRAM(s) IntraMuscular once PRN  insulin lispro (HumaLOG) corrective regimen sliding scale   SubCutaneous three times a day before meals  metolazone 5 milliGRAM(s) Oral daily  multivitamin 1 Tablet(s) Oral daily  oxyCODONE    IR 5 milliGRAM(s) Oral every 6 hours PRN  pantoprazole    Tablet 40 milliGRAM(s) Oral before breakfast  polyethylene glycol 3350 17 Gram(s) Oral at bedtime      Objective:  Vital Signs Last 24 Hrs  T(C): 37 (25 Jun 2019 14:17), Max: 37 (25 Jun 2019 14:17)  T(F): 98.6 (25 Jun 2019 14:17), Max: 98.6 (25 Jun 2019 14:17)  HR: 88 (25 Jun 2019 14:17) (72 - 91)  BP: 127/74 (25 Jun 2019 14:17) (108/67 - 131/82)  BP(mean): --  RR: 18 (25 Jun 2019 14:17) (17 - 22)  SpO2: 99% (25 Jun 2019 14:17) (97% - 99%)    PHYSICAL EXAM:  Constitutional:NAD  Eyes:DON, EOMI  Ear/Nose/Throat: no thrush, mucositis.  Moist mucous membranes	  Neck:no JVD, no lymphadenopathy, supple  Respiratory: CTA vernon  Cardiovascular: S1S2 RRR, no murmurs  Gastrointestinal:soft, nontender,  nondistended (+) BS  Extremities: L knee joint swelling.  No erythema  Skin:  no rashes, open wounds or ulcerations        LABS:                        11.9   9.65  )-----------( 179      ( 25 Jun 2019 05:20 )             36.5     06-25    137  |  98  |  40<H>  ----------------------------<  159<H>  4.6   |  28  |  1.13    Ca    9.7      25 Jun 2019 05:20  Phos  3.0     06-25  Mg     1.7     06-25    TPro  7.3  /  Alb  3.6  /  TBili  0.2  /  DBili  x   /  AST  33<H>  /  ALT  32  /  AlkPhos  116  06-25                            MICROBIOLOGY:    Culture - Blood (06.23.19 @ 01:09)    Culture - Blood:   NO ORGANISMS ISOLATED  NO ORGANISMS ISOLATED AT 48 HRS.    Specimen Source: BLOOD VENOUS    Culture - Blood (06.23.19 @ 01:09)    Culture - Blood:   NO ORGANISMS ISOLATED  NO ORGANISMS ISOLATED AT 48 HRS.    Specimen Source: BLOOD PERIPHERAL    Culture - Urine (06.22.19 @ 19:49)    Culture - Urine:   NO GROWTH AT 24 HOURS    Specimen Source: URINE MIDSTREAM          RADIOLOGY & ADDITIONAL STUDIES:    < from: Xray Knee 3 Views, Left (06.24.19 @ 18:07) >  IMPRESSION:  Redemonstrated posterolaterally dislocated left knee with underlying   tibiofemoral/patellofemoral cement spacer and 2 separate underlying   cement reinforcing metal plates unchanged compared, to 6/22/2019 study   however new since the 6/30/2016 study.    Scattered lucent zones at the cement bone interfaces of the cement spacer   material compatible with associated loosening/debonding.    Generalized osteopenia otherwise no discrete lytic or blastic lesions.    No tracking gas collections or gross radiographic evidence for   osteomyelitis.    < end of copied text >        < from: US Kidney and Bladder (06.23.19 @ 13:38) >    FINDINGS:    Right kidney: The right kidney is not visualized.    Left kidney:  9.8 cm. No renal mass, hydronephrosis or calculi.    Urinary bladder: The bladder is sonographically normal.    IMPRESSION:     1.  The right kidney is not visualized.  2.  No left hydronephrosis.  3.  A left renal calculus shown in 2016 is not visualized by sonography.    < end of copied text >
Patient is a 78y old  Female who presents with a chief complaint of Difficulty walking, UTI, L-knee fracture (24 Jun 2019 17:17)      INTERVAL HPI/OVERNIGHT EVENTS:  T(C): 36.4 (06-24-19 @ 12:27), Max: 36.5 (06-23-19 @ 21:04)  HR: 78 (06-24-19 @ 12:27) (72 - 82)  BP: 123/75 (06-24-19 @ 12:27) (108/64 - 123/75)  RR: 18 (06-24-19 @ 12:27) (16 - 18)  SpO2: 97% (06-24-19 @ 12:27) (93% - 97%)  Wt(kg): --  I&O's Summary      LABS:                        11.2   9.92  )-----------( 165      ( 24 Jun 2019 05:40 )             33.3     06-24    140  |  101  |  25<H>  ----------------------------<  134<H>  4.5   |  28  |  1.12    Ca    8.9      24 Jun 2019 05:40  Phos  2.7     06-24  Mg     2.0     06-24    TPro  7.1  /  Alb  3.5  /  TBili  0.2  /  DBili  x   /  AST  41<H>  /  ALT  36<H>  /  AlkPhos  111  06-24        CAPILLARY BLOOD GLUCOSE      POCT Blood Glucose.: 133 mg/dL (24 Jun 2019 17:18)  POCT Blood Glucose.: 201 mg/dL (24 Jun 2019 12:20)  POCT Blood Glucose.: 138 mg/dL (24 Jun 2019 08:16)  POCT Blood Glucose.: 151 mg/dL (23 Jun 2019 21:35)            MEDICATIONS  (STANDING):  ALBUTerol/ipratropium for Nebulization 3 milliLiter(s) Nebulizer every 6 hours  aspirin enteric coated 81 milliGRAM(s) Oral daily  atorvastatin 80 milliGRAM(s) Oral at bedtime  calcium carbonate 1250 mG  + Vitamin D (OsCal 500 + D) 1 Tablet(s) Oral daily  cefTRIAXone   IVPB 1000 milliGRAM(s) IV Intermittent every 24 hours  cholecalciferol 1000 Unit(s) Oral daily  dextrose 5%. 1000 milliLiter(s) (50 mL/Hr) IV Continuous <Continuous>  dextrose 50% Injectable 12.5 Gram(s) IV Push once  dextrose 50% Injectable 25 Gram(s) IV Push once  dextrose 50% Injectable 25 Gram(s) IV Push once  docusate sodium 100 milliGRAM(s) Oral three times a day  enoxaparin Injectable 40 milliGRAM(s) SubCutaneous every 24 hours  ferrous    sulfate 325 milliGRAM(s) Oral two times a day  gabapentin 100 milliGRAM(s) Oral three times a day  insulin lispro (HumaLOG) corrective regimen sliding scale   SubCutaneous three times a day before meals  metolazone 5 milliGRAM(s) Oral daily  multivitamin 1 Tablet(s) Oral daily  pantoprazole    Tablet 40 milliGRAM(s) Oral before breakfast  polyethylene glycol 3350 17 Gram(s) Oral at bedtime  predniSONE   Tablet 20 milliGRAM(s) Oral daily    MEDICATIONS  (PRN):  dextrose 40% Gel 15 Gram(s) Oral once PRN Blood Glucose LESS THAN 70 milliGRAM(s)/deciliter  glucagon  Injectable 1 milliGRAM(s) IntraMuscular once PRN Glucose LESS THAN 70 milligrams/deciliter  oxyCODONE    IR 5 milliGRAM(s) Oral every 6 hours PRN Moderate Pain (4 - 6)          PHYSICAL EXAM:  GENERAL: frail  CHEST/LUNG: Clear to percussion bilaterally; No rales, rhonchi, wheezing, or rubs  HEART: Regular rate and rhythm; No murmurs, rubs, or gallops  ABDOMEN: Soft, Nontender, Nondistended; Bowel sounds present  EXTREMITIES:  edema+    Care Discussed with Consultants/Other Providers [ ] YES  [ ] NO
Patient is a 78y old  Female who presents with a chief complaint of Difficulty walking, UTI, L-knee fracture (24 Jun 2019 18:01)      INTERVAL HPI/OVERNIGHT EVENTS:  T(C): 37 (06-25-19 @ 14:17), Max: 37 (06-25-19 @ 14:17)  HR: 88 (06-25-19 @ 14:17) (72 - 91)  BP: 127/74 (06-25-19 @ 14:17) (108/67 - 131/82)  RR: 18 (06-25-19 @ 14:17) (17 - 22)  SpO2: 99% (06-25-19 @ 14:17) (97% - 99%)  Wt(kg): --  I&O's Summary      LABS:                        11.9   9.65  )-----------( 179      ( 25 Jun 2019 05:20 )             36.5     06-25    137  |  98  |  40<H>  ----------------------------<  159<H>  4.6   |  28  |  1.13    Ca    9.7      25 Jun 2019 05:20  Phos  3.0     06-25  Mg     1.7     06-25    TPro  7.3  /  Alb  3.6  /  TBili  0.2  /  DBili  x   /  AST  33<H>  /  ALT  32  /  AlkPhos  116  06-25        CAPILLARY BLOOD GLUCOSE      POCT Blood Glucose.: 260 mg/dL (25 Jun 2019 12:17)  POCT Blood Glucose.: 145 mg/dL (25 Jun 2019 08:25)  POCT Blood Glucose.: 168 mg/dL (24 Jun 2019 21:49)  POCT Blood Glucose.: 133 mg/dL (24 Jun 2019 17:18)            MEDICATIONS  (STANDING):  ALBUTerol/ipratropium for Nebulization 3 milliLiter(s) Nebulizer every 6 hours  aspirin enteric coated 81 milliGRAM(s) Oral daily  atorvastatin 80 milliGRAM(s) Oral at bedtime  calcium carbonate 1250 mG  + Vitamin D (OsCal 500 + D) 1 Tablet(s) Oral daily  cefTRIAXone   IVPB 1000 milliGRAM(s) IV Intermittent every 24 hours  cholecalciferol 1000 Unit(s) Oral daily  dextrose 5%. 1000 milliLiter(s) (50 mL/Hr) IV Continuous <Continuous>  dextrose 50% Injectable 12.5 Gram(s) IV Push once  dextrose 50% Injectable 25 Gram(s) IV Push once  dextrose 50% Injectable 25 Gram(s) IV Push once  docusate sodium 100 milliGRAM(s) Oral three times a day  enoxaparin Injectable 40 milliGRAM(s) SubCutaneous every 24 hours  ferrous    sulfate 325 milliGRAM(s) Oral two times a day  gabapentin 100 milliGRAM(s) Oral three times a day  insulin lispro (HumaLOG) corrective regimen sliding scale   SubCutaneous three times a day before meals  metolazone 5 milliGRAM(s) Oral daily  multivitamin 1 Tablet(s) Oral daily  pantoprazole    Tablet 40 milliGRAM(s) Oral before breakfast  polyethylene glycol 3350 17 Gram(s) Oral at bedtime    MEDICATIONS  (PRN):  dextrose 40% Gel 15 Gram(s) Oral once PRN Blood Glucose LESS THAN 70 milliGRAM(s)/deciliter  glucagon  Injectable 1 milliGRAM(s) IntraMuscular once PRN Glucose LESS THAN 70 milligrams/deciliter  oxyCODONE    IR 5 milliGRAM(s) Oral every 6 hours PRN Moderate Pain (4 - 6)          PHYSICAL EXAM:  GENERAL: NAD, well-groomed, well-developed  HEAD:  Atraumatic, Normocephalic  CHEST/LUNG: Clear to percussion bilaterally; No rales, rhonchi, wheezing, or rubs  HEART: Regular rate and rhythm; No murmurs, rubs, or gallops  ABDOMEN: Soft, Nontender, Nondistended; Bowel sounds present  EXTREMITIES:  2+ Peripheral Pulses, No clubbing, cyanosis, or edema  LYMPH: No lymphadenopathy noted  SKIN: No rashes or lesions    Care Discussed with Consultants/Other Providers [ ] YES  [ ] NO
Patient is a 78y old  Female who presents with a chief complaint of Difficulty walking, UTI, L-knee fracture (25 Jun 2019 15:30)      INTERVAL HPI/OVERNIGHT EVENTS:  T(C): 36.9 (06-26-19 @ 12:17), Max: 36.9 (06-26-19 @ 12:17)  HR: 87 (06-26-19 @ 15:43) (72 - 96)  BP: 110/61 (06-26-19 @ 12:17) (110/61 - 125/64)  RR: 18 (06-26-19 @ 12:17) (17 - 18)  SpO2: 96% (06-26-19 @ 15:43) (96% - 99%)  Wt(kg): --  I&O's Summary      LABS:                        12.0   8.67  )-----------( 187      ( 26 Jun 2019 07:00 )             36.0     06-26    138  |  97<L>  |  35<H>  ----------------------------<  168<H>  4.1   |  29  |  0.94    Ca    9.2      26 Jun 2019 07:00  Phos  3.5     06-26  Mg     1.6     06-26    TPro  7.3  /  Alb  3.6  /  TBili  0.2  /  DBili  x   /  AST  33<H>  /  ALT  32  /  AlkPhos  116  06-25        CAPILLARY BLOOD GLUCOSE      POCT Blood Glucose.: 173 mg/dL (26 Jun 2019 17:30)  POCT Blood Glucose.: 226 mg/dL (26 Jun 2019 12:13)  POCT Blood Glucose.: 174 mg/dL (26 Jun 2019 08:41)  POCT Blood Glucose.: 152 mg/dL (25 Jun 2019 21:27)            MEDICATIONS  (STANDING):  ALBUTerol/ipratropium for Nebulization 3 milliLiter(s) Nebulizer every 6 hours  aspirin enteric coated 81 milliGRAM(s) Oral daily  atorvastatin 80 milliGRAM(s) Oral at bedtime  calcium carbonate 1250 mG  + Vitamin D (OsCal 500 + D) 1 Tablet(s) Oral daily  cefuroxime   Tablet 250 milliGRAM(s) Oral every 12 hours  cholecalciferol 1000 Unit(s) Oral daily  dextrose 5%. 1000 milliLiter(s) (50 mL/Hr) IV Continuous <Continuous>  dextrose 50% Injectable 12.5 Gram(s) IV Push once  dextrose 50% Injectable 25 Gram(s) IV Push once  dextrose 50% Injectable 25 Gram(s) IV Push once  docusate sodium 100 milliGRAM(s) Oral three times a day  enoxaparin Injectable 40 milliGRAM(s) SubCutaneous every 24 hours  ferrous    sulfate 325 milliGRAM(s) Oral two times a day  gabapentin 100 milliGRAM(s) Oral three times a day  insulin lispro (HumaLOG) corrective regimen sliding scale   SubCutaneous three times a day before meals  metolazone 5 milliGRAM(s) Oral daily  multivitamin 1 Tablet(s) Oral daily  pantoprazole    Tablet 40 milliGRAM(s) Oral before breakfast  polyethylene glycol 3350 17 Gram(s) Oral at bedtime    MEDICATIONS  (PRN):  dextrose 40% Gel 15 Gram(s) Oral once PRN Blood Glucose LESS THAN 70 milliGRAM(s)/deciliter  glucagon  Injectable 1 milliGRAM(s) IntraMuscular once PRN Glucose LESS THAN 70 milligrams/deciliter  oxyCODONE    IR 5 milliGRAM(s) Oral every 6 hours PRN Moderate Pain (4 - 6)          PHYSICAL EXAM:  GENERAL: NAD, well-groomed, well-developed  HEAD:  Atraumatic, Normocephalic  CHEST/LUNG: Clear to percussion bilaterally; No rales, rhonchi, wheezing, or rubs  HEART: Regular rate and rhythm; No murmurs, rubs, or gallops  ABDOMEN: Soft, Nontender, Nondistended; Bowel sounds present  EXTREMITIES:  Left thigh wrapped in ACE wrap    Care Discussed with Consultants/Other Providers [ ] YES  [ ] NO
Patient is a 78y old  Female who presents with a chief complaint of Difficulty walking, UTI, L-knee fracture (27 Jun 2019 13:25)      INTERVAL HPI/OVERNIGHT EVENTS:  T(C): 36.9 (06-27-19 @ 17:45), Max: 37 (06-26-19 @ 20:17)  HR: 92 (06-27-19 @ 17:45) (78 - 100)  BP: 130/83 (06-27-19 @ 17:45) (120/67 - 133/77)  RR: 18 (06-27-19 @ 17:45) (17 - 18)  SpO2: 97% (06-27-19 @ 17:45) (96% - 99%)  Wt(kg): --  I&O's Summary      LABS:                        12.0   8.67  )-----------( 187      ( 26 Jun 2019 07:00 )             36.0     06-27    140  |  96<L>  |  34<H>  ----------------------------<  179<H>  3.8   |  30  |  0.90    Ca    9.6      27 Jun 2019 06:06  Phos  3.5     06-26  Mg     1.6     06-26          CAPILLARY BLOOD GLUCOSE      POCT Blood Glucose.: 173 mg/dL (27 Jun 2019 17:17)  POCT Blood Glucose.: 227 mg/dL (27 Jun 2019 12:54)  POCT Blood Glucose.: 216 mg/dL (27 Jun 2019 08:27)  POCT Blood Glucose.: 223 mg/dL (26 Jun 2019 22:11)            MEDICATIONS  (STANDING):  ALBUTerol/ipratropium for Nebulization 3 milliLiter(s) Nebulizer every 6 hours  aspirin enteric coated 81 milliGRAM(s) Oral daily  atorvastatin 80 milliGRAM(s) Oral at bedtime  calcium carbonate 1250 mG  + Vitamin D (OsCal 500 + D) 1 Tablet(s) Oral daily  cefuroxime   Tablet 250 milliGRAM(s) Oral every 12 hours  cholecalciferol 1000 Unit(s) Oral daily  dextrose 5%. 1000 milliLiter(s) (50 mL/Hr) IV Continuous <Continuous>  dextrose 50% Injectable 12.5 Gram(s) IV Push once  dextrose 50% Injectable 25 Gram(s) IV Push once  dextrose 50% Injectable 25 Gram(s) IV Push once  docusate sodium 100 milliGRAM(s) Oral three times a day  enoxaparin Injectable 40 milliGRAM(s) SubCutaneous every 24 hours  ferrous    sulfate 325 milliGRAM(s) Oral two times a day  gabapentin 100 milliGRAM(s) Oral three times a day  insulin lispro (HumaLOG) corrective regimen sliding scale   SubCutaneous three times a day before meals  lactobacillus acidophilus 1 Tablet(s) Oral daily  metolazone 5 milliGRAM(s) Oral daily  multivitamin 1 Tablet(s) Oral daily  pantoprazole    Tablet 40 milliGRAM(s) Oral before breakfast  polyethylene glycol 3350 17 Gram(s) Oral at bedtime  senna 2 Tablet(s) Oral at bedtime    MEDICATIONS  (PRN):  dextrose 40% Gel 15 Gram(s) Oral once PRN Blood Glucose LESS THAN 70 milliGRAM(s)/deciliter  glucagon  Injectable 1 milliGRAM(s) IntraMuscular once PRN Glucose LESS THAN 70 milligrams/deciliter  oxyCODONE    IR 5 milliGRAM(s) Oral every 6 hours PRN Moderate Pain (4 - 6)          PHYSICAL EXAM:  GENERAL: NAD, well-groomed, well-developed  HEAD:  Atraumatic, Normocephalic  CHEST/LUNG: Clear to percussion bilaterally; No rales, rhonchi, wheezing, or rubs  HEART: Regular rate and rhythm; No murmurs, rubs, or gallops  ABDOMEN: Soft, Nontender, Nondistended; Bowel sounds present  EXTREMITIES:  2+ Peripheral Pulses, No clubbing, cyanosis, or edema  LYMPH: No lymphadenopathy noted  SKIN: No rashes or lesions    Care Discussed with Consultants/Other Providers [ ] YES  [ ] NO
Subjective: Patient seen and examined. No new events except as noted.   doing better  mild dysuria     REVIEW OF SYSTEMS:    CONSTITUTIONAL: No weakness, fevers or chills  EYES/ENT: No visual changes;  No vertigo or throat pain   NECK: No pain or stiffness  RESPIRATORY: No cough, wheezing, hemoptysis; No shortness of breath  CARDIOVASCULAR: No chest pain or palpitations  GASTROINTESTINAL: No abdominal or epigastric pain. No nausea, vomiting, or hematemesis; No diarrhea or constipation. No melena or hematochezia.  GENITOURINARY: + dysuria   NEUROLOGICAL: No numbness or weakness  SKIN: No itching, burning, rashes, or lesions   All other review of systems is negative unless indicated above.    MEDICATIONS:  MEDICATIONS  (STANDING):  ALBUTerol/ipratropium for Nebulization 3 milliLiter(s) Nebulizer every 6 hours  aspirin enteric coated 81 milliGRAM(s) Oral daily  atorvastatin 80 milliGRAM(s) Oral at bedtime  calcium carbonate 1250 mG  + Vitamin D (OsCal 500 + D) 1 Tablet(s) Oral daily  cefTRIAXone   IVPB 1000 milliGRAM(s) IV Intermittent every 24 hours  cholecalciferol 1000 Unit(s) Oral daily  dextrose 5%. 1000 milliLiter(s) (50 mL/Hr) IV Continuous <Continuous>  dextrose 50% Injectable 12.5 Gram(s) IV Push once  dextrose 50% Injectable 25 Gram(s) IV Push once  dextrose 50% Injectable 25 Gram(s) IV Push once  docusate sodium 100 milliGRAM(s) Oral three times a day  enoxaparin Injectable 40 milliGRAM(s) SubCutaneous every 24 hours  ferrous    sulfate 325 milliGRAM(s) Oral two times a day  gabapentin 100 milliGRAM(s) Oral three times a day  insulin lispro (HumaLOG) corrective regimen sliding scale   SubCutaneous three times a day before meals  metolazone 5 milliGRAM(s) Oral daily  multivitamin 1 Tablet(s) Oral daily  pantoprazole    Tablet 40 milliGRAM(s) Oral before breakfast  polyethylene glycol 3350 17 Gram(s) Oral at bedtime  potassium chloride    Tablet ER 40 milliEquivalent(s) Oral every 4 hours  predniSONE   Tablet 20 milliGRAM(s) Oral daily      PHYSICAL EXAM:  T(C): 36.8 (19 @ 12:06), Max: 37.2 (19 @ 15:50)  HR: 83 (19 @ 12:06) (71 - 89)  BP: 124/69 (19 @ 12:06) (111/63 - 135/80)  RR: 18 (19 @ 12:06) (17 - 20)  SpO2: 98% (19 @ 12:06) (97% - 100%)  Wt(kg): --  I&O's Summary    Height (cm): 154.94 ( @ 22:23)  Weight (kg): 74.5 ( @ 22:23)  BMI (kg/m2): 31 ( @ 22:23)  BSA (m2): 1.74 ( @ 22:23)    Appearance: Normal, obese 	  HEENT:   Normal oral mucosa, PERRL, EOMI	  Lymphatic: No lymphadenopathy , no edema  Cardiovascular: Normal S1 S2, No JVD, No murmurs , Peripheral pulses palpable 2+ bilaterally  Respiratory: Lungs clear to auscultation, normal effort 	  Gastrointestinal:  Soft, Non-tender, + BS	  Skin: No rashes, No ecchymoses, No cyanosis, warm to touch  Musculoskeletal: Normal range of motion, normal strength  Psychiatry:  Mood & affect appropriate  Ext: No edema, LE chronic changes       All labs, Imaging and EKGs personally reviewed                           11.2   6.90  )-----------( 160      ( 2019 04:55 )             34.2                   136  |  96<L>  |  19  ----------------------------<  175<H>  3.3<L>   |  24  |  1.15    Ca    8.6      2019 04:58  Phos  4.0       Mg     1.2         TPro  7.3  /  Alb  3.5  /  TBili  < 0.2<L>  /  DBili  x   /  AST  50<H>  /  ALT  38<H>  /  AlkPhos  117                         Urinalysis Basic - ( 2019 17:22 )    Color: YELLOW / Appearance: CLEAR / S.023 / pH: 6.0  Gluc: NEGATIVE / Ketone: NEGATIVE  / Bili: NEGATIVE / Urobili: NORMAL   Blood: TRACE / Protein: 50 / Nitrite: NEGATIVE   Leuk Esterase: LARGE / RBC: 6-10 / WBC >50   Sq Epi: FEW / Non Sq Epi: x / Bacteria: FEW      < from: US Kidney and Bladder (19 @ 13:38) >  IMPRESSION:     1.  The right kidney is not visualized.  2.  No left hydronephrosis.  3.  A left renal calculus shown in 2016 is not visualized by sonography.    < end of copied text >

## 2019-06-28 NOTE — CONSULT NOTE ADULT - REASON FOR ADMISSION
Difficulty walking, UTI, L-knee fracture

## 2019-06-28 NOTE — CONSULT NOTE ADULT - SUBJECTIVE AND OBJECTIVE BOX
history obtained from chart     HPI:      78 year old female, with past history significant for CHF, stroke with right sided residual weakness,COPD, GERD, HTN, L-knee replacement, presented to the ED secondary to difficulty ambulating.  Patient complains of significant pain of the left knee; has had knee replacements x 3 of said area, but continues to suffer with persistent pain and swelling of the area.  Has fallen due to same; last fall was approximately one month ago.  Patient is currently getting treatment for UTI.   X-ray of B/L Knees significant for "Age-indeterminate fracture through the left knee cement spacer with lateral displacement of the distal fragment. Surrounding soft tissue edema.      MEDICATIONS  (STANDING):  ALBUTerol/ipratropium for Nebulization 3 milliLiter(s) Nebulizer every 6 hours  aspirin enteric coated 81 milliGRAM(s) Oral daily  atorvastatin 80 milliGRAM(s) Oral at bedtime  calcium carbonate 1250 mG  + Vitamin D (OsCal 500 + D) 1 Tablet(s) Oral daily  cefuroxime   Tablet 250 milliGRAM(s) Oral every 12 hours  cholecalciferol 1000 Unit(s) Oral daily  dextrose 5%. 1000 milliLiter(s) (50 mL/Hr) IV Continuous <Continuous>  dextrose 50% Injectable 12.5 Gram(s) IV Push once  dextrose 50% Injectable 25 Gram(s) IV Push once  dextrose 50% Injectable 25 Gram(s) IV Push once  docusate sodium 100 milliGRAM(s) Oral three times a day  enoxaparin Injectable 40 milliGRAM(s) SubCutaneous every 24 hours  ferrous    sulfate 325 milliGRAM(s) Oral two times a day  gabapentin 100 milliGRAM(s) Oral three times a day  insulin lispro (HumaLOG) corrective regimen sliding scale   SubCutaneous three times a day before meals  lactobacillus acidophilus 1 Tablet(s) Oral daily  metolazone 5 milliGRAM(s) Oral daily  multivitamin 1 Tablet(s) Oral daily  pantoprazole    Tablet 40 milliGRAM(s) Oral before breakfast  polyethylene glycol 3350 17 Gram(s) Oral at bedtime  senna 2 Tablet(s) Oral at bedtime    MEDICATIONS  (PRN):  dextrose 40% Gel 15 Gram(s) Oral once PRN Blood Glucose LESS THAN 70 milliGRAM(s)/deciliter  glucagon  Injectable 1 milliGRAM(s) IntraMuscular once PRN Glucose LESS THAN 70 milligrams/deciliter  oxyCODONE    IR 5 milliGRAM(s) Oral every 6 hours PRN Moderate Pain (4 - 6)      PAST MEDICAL & SURGICAL HISTORY:  Vitamin D deficiency  Dyslipidemia  Constipation  Kidney stones  Type 2 diabetes mellitus  GERD (gastroesophageal reflux disease)  COPD (chronic obstructive pulmonary disease)  HTN (hypertension)  CHF (congestive heart failure): ~ diastolic, Stage I  H/O breast biopsy: ~ 3 times on right, 2 times on left  H/O right nephrectomy: ~ [facility]  History of left knee replacement: ~ x 3 (2010, 2011, 2016)      FAMILY HISTORY:  FH: COPD (chronic obstructive pulmonary disease): ~ daughter (non-smoker)  FH: diabetes mellitus: ~ mother, grandmother  FH: CHF (congestive heart failure): ~ mother  FH: breast cancer: ~ mother      Allergies    No Known Allergies    Intolerances    SHx - No smoking, No ETOH, No drug abuse      Vital Signs Last 24 Hrs  T(C): 36.8 (28 Jun 2019 05:54), Max: 36.9 (27 Jun 2019 12:54)  T(F): 98.3 (28 Jun 2019 05:54), Max: 98.5 (27 Jun 2019 12:54)  HR: 70 (28 Jun 2019 05:54) (70 - 100)  BP: 157/73 (28 Jun 2019 05:54) (128/84 - 157/73)  BP(mean): --  RR: 16 (28 Jun 2019 05:54) (16 - 18)  SpO2: 97% (28 Jun 2019 05:54) (95% - 99%)    General Exam:   General appearance: No acute distress             Neurological Exam:    Mental Status: Orientated to self, date and place.  Attention intact.  No dysarthria, aphasia or neglect.  Cranial Nerves: PERRL, EOMI, CN V1-3 intact to light touch and pinprick. nasolabial flatteing on right,  Tongue midline.    Motor:   Strength:  drift in RUE and RLE (from old stoke), minimal movement in LLE due to pain   Dysmetria: None to finger-nose-finger on LUE   Tremor: No resting, postural or action tremor.  No myoclonus.  Sensation: intact to light touch,   Gait: deferred     Other:    06-28    139  |  97<L>  |  37<H>  ----------------------------<  211<H>  4.1   |  31  |  0.96    Ca    9.7      28 Jun 2019 07:18      06-28    139  |  97<L>  |  37<H>  ----------------------------<  211<H>  4.1   |  31  |  0.96    Ca    9.7      28 Jun 2019 07:18          Radiology    CT head     pending

## 2019-06-28 NOTE — PROGRESS NOTE ADULT - REASON FOR ADMISSION
Difficulty walking, UTI, L-knee fracture

## 2019-06-28 NOTE — PROGRESS NOTE ADULT - PROVIDER SPECIALTY LIST ADULT
Hospitalist
Infectious Disease
Hospitalist
Infectious Disease
Internal Medicine

## 2019-06-28 NOTE — CONSULT NOTE ADULT - ASSESSMENT
78 year old female, with past history significant for CHF, stroke with right sided residual weakness, COPD, GERD, HTN, L-knee replacement, presented to the ED secondary to difficulty ambulating.  Patient complains of significant pain of the left knee; has had knee replacements x 3 of said area, but continues to suffer with persistent pain and swelling of the area.  Has fallen due to same; last fall was approximately one month ago.  Patient is currently getting treatment for UTI.   X-ray of B/L Knees significant for "Age-indeterminate fracture through the left knee cement spacer with lateral displacement of the distal fragment. Surrounding soft tissue edema. Neuro exam showed right sided weakness due to old stroke.   LLE was painful around knee for last few weeks.     Impression     Left lower extremity weakness from painful knee     Plan     CT head to rule out any new stroke   cont medical care   continue Aspirin 81 mg 78 year old female, with past history significant for CHF, stroke with right sided residual weakness, COPD, GERD, HTN, L-knee replacement, presented to the ED secondary to difficulty ambulating.  Patient complains of significant pain of the left knee; has had knee replacements x 3 of said area, but continues to suffer with persistent pain and swelling of the area.  Has fallen due to same; last fall was approximately one month ago.  Patient is currently getting treatment for UTI.   X-ray of B/L Knees significant for "Age-indeterminate fracture through the left knee cement spacer with lateral displacement of the distal fragment. Surrounding soft tissue edema. Neuro exam showed right sided weakness due to old stroke.   LLE was painful around knee for last few weeks.     Impression     Left lower extremity weakness from painful knee     Plan     CT head to rule out any new stroke   cont medical care   continue Aspirin 81 mg   Lower extremity dopplers reviewed and were negative for DVT

## 2019-06-28 NOTE — CONSULT NOTE ADULT - ATTENDING COMMENTS
Patient seen and examined with neurology team and above note reviewed and edited and I agree with assessment and plan as outlined. LEft knee pain assessed and LE extremity doppler obtained and negative.  Would obtain CT head to rule out potential for ARCELIA territory infarct.  Continue medical management and supportive care.

## 2019-06-28 NOTE — PROGRESS NOTE ADULT - ASSESSMENT
78 year old female, with past history significant for CHF, COPD, GERD, HTN, L-knee replacement, presented to the ED secondary to difficulty ambulating.  Seen and evaluated at bedside; NAD, but appears to be experiencing some painful discomfort on the left lower back area.  Patient relates feeling unwell for approximately 2 weeks.  Went to the [facility] [place] on Tuesday and was prescribed an antibiotic - once daily x 7 days (?levofloxacin).  However, despite compliance, patient's condition did not improve.  Suffered with shaking and chills throughout the period, with fever only on the weekend prior.      Had frequent cough productive of thick whitish phlegm, especially at nights.  Experiences midsternal pleuritic chest pain with coughing.  On the night prior to coming to the ED, patient had profound weakness, to the extent of not being able to go the bathroom with use of a walker; urinated in diaper.  Went to scheduled appointment with PMD today and was advised to come to the ED for evaluation/management.    Also, patient complains of significant pain of the left knee; has had knee replacements x 3 of said area, but continues to suffer with persistent pain and swelling of the area.  Has fallen due to same; last fall was approximately one month ago.  In addition, patient relates burning and itching with micturition, as well as left back pain.  Has suffered with same in the past.    Vital signs upon ED presentation as follows: BP = 135/48, HR = 61, RR = 18, T = 37.5 C (99.5 F), O2 Sat = 100% on RA.  Noted with magnesium level of 1.1 and UA with signs of infection.  X-ray of B/L Knees significant for "Age-indeterminate fracture through the left knee cement spacer with lateral displacement of the distal fragment. Surrounding soft tissue edema." Diagnosed with Difficulty Walking.  Prescribed magnesium sulfate 2 grams, ceftriaxone 1 gram, Duo-nebs x 3 and prednisone 30 mg x one in the ED. (22 Jun 2019 20:48)    UTI:    - Pt with L sided CVA tenderness, burning/itching on urination, c/o sweats/chills.  UA (+) with microscopic hematuria.   Agree with rocephin for acute cystitis vs. pylenephritis.  No obstructive stones seen in L vangie.  Pt w/ho R nephrectomy.     - Ucx, bcx no growth to date.  Ucx may have been drawn after abx administered, so may be low yield.  Would treat on empiric basis, pt clinically improving.      - Rocephin, switched to PO ceftin 250mg bid to complete total 14 day course to treat for possible pyelo.     - Probiotics      L knee swelling:     - Pt with h/o prosthetic knee, s/p removal and has cement spacer in place.  Xray shows   "Age-indeterminate fracture through the left knee cement spacer with lateral displacement of the distal fragment. Surrounding soft tissue edema...".   No erythema.  Orthopedic consult noted - for outpt f/u.  No acute surgical intervention.       * Ok to d/c from ID standpoint.  Pt undergoing neurological w/u for LLE weakness/knee pain.  Pending CT head.      Gianna Sloan  [phone]
Problem/Plan - 1:  ·  Problem: Urinary tract infection with pyuria.  Plan: UA/Urine Cx  Cont PO antibiotics  ID eval appreciated    Problem/Plan - 2:  ·  Problem: COPD exacerbation.  Plan: improved cough   occurring in ED, but w/o respiratory distress, wheezing or sign of increased respiratory effort at the time of being seen  - also complaining of cough productive of whitish sputum (no cough appreciated during evaluation by writer)  - on ventolin HFA and Tudorza Pressair PTA; held for now- s/p prednisone 30 mg, magnesium 2 grams and Duo-nebs x 3 in the ED; continuing prednisone 20 mg x 3 doses, Duo-nebs  - improved    Problem/Plan - 3:  ·  Problem: Difficulty walking.  Plan: - chronic issue, and chiefly due to the left knee, which is significantly swollen, tender  - X-fay demonstrates "Age-indeterminate fracture through the left knee cement spacer with lateral displacement of the distal fragment. Surrounding soft tissue edema..."  - s/p L-knee surgery x 3 - per patient (2010, 2011, 2016)  - last fall ~ 1.5 months ago - Orthopedic consult  appreciated    Problem/Plan - 4:  ·  Problem: Fracture of prosthetic knee, initial encounter.  Plan: - X-fay demonstrates "Age-indeterminate fracture through the left knee cement spacer with lateral displacement of the distal fragment. Surrounding soft tissue edema..."  - pain/tenderness, swelling of the left knee  - surgeries x 3 - including knee replacement in the past- Orthopedic consult (Dr. Strickland).     stable for dc
78 year old female, with past history significant for CHF, COPD, GERD, HTN, L-knee replacement, presented to the ED secondary to difficulty ambulating.  Seen and evaluated at bedside; NAD, but appears to be experiencing some painful discomfort on the left lower back area.  Patient relates feeling unwell for approximately 2 weeks.  Went to the [facility] [place] on Tuesday and was prescribed an antibiotic - once daily x 7 days (?levofloxacin).  However, despite compliance, patient's condition did not improve.  Suffered with shaking and chills throughout the period, with fever only on the weekend prior.      Had frequent cough productive of thick whitish phlegm, especially at nights.  Experiences midsternal pleuritic chest pain with coughing.  On the night prior to coming to the ED, patient had profound weakness, to the extent of not being able to go the bathroom with use of a walker; urinated in diaper.  Went to scheduled appointment with PMD today and was advised to come to the ED for evaluation/management.    Also, patient complains of significant pain of the left knee; has had knee replacements x 3 of said area, but continues to suffer with persistent pain and swelling of the area.  Has fallen due to same; last fall was approximately one month ago.  In addition, patient relates burning and itching with micturition, as well as left back pain.  Has suffered with same in the past.    Vital signs upon ED presentation as follows: BP = 135/48, HR = 61, RR = 18, T = 37.5 C (99.5 F), O2 Sat = 100% on RA.  Noted with magnesium level of 1.1 and UA with signs of infection.  X-ray of B/L Knees significant for "Age-indeterminate fracture through the left knee cement spacer with lateral displacement of the distal fragment. Surrounding soft tissue edema." Diagnosed with Difficulty Walking.  Prescribed magnesium sulfate 2 grams, ceftriaxone 1 gram, Duo-nebs x 3 and prednisone 30 mg x one in the ED. (22 Jun 2019 20:48)    UTI:    - Pt with L sided CVA tenderness, burning/itching on urination, c/o sweats/chills.  UA (+) with microscopic hematuria.   Agree with rocephin for acute cystitis vs. pylenephritis.  No obstructive stones seen in L vangie.  Pt w/ho R nephrectomy.     - Ucx, bcx no growth to date.  Ucx may have been drawn after abx administered, so may be low yield.  Would treat on empiric basis, pt clinically improving.      - Rocephin, switched to PO ceftin 250mg bid to complete total 14 day course to treat for possible pyelo.       L knee swelling:     - Pt with h/o prosthetic knee, s/p removal and has cement spacer in place.  Xray shows   "Age-indeterminate fracture through the left knee cement spacer with lateral displacement of the distal fragment. Surrounding soft tissue edema...".   No erythema.  Orthopedic consult noted - for outpt f/u.  No acute surgical intervention.       * Ok to d/c from ID standpoint.  Pt pending placement      Gianna Sloan  [phone]
78 year old female, with past history significant for CHF, COPD, GERD, HTN, L-knee replacement, presented to the ED secondary to difficulty ambulating.  Seen and evaluated at bedside; NAD, but appears to be experiencing some painful discomfort on the left lower back area.  Patient relates feeling unwell for approximately 2 weeks.  Went to the [facility] [place] on Tuesday and was prescribed an antibiotic - once daily x 7 days (?levofloxacin).  However, despite compliance, patient's condition did not improve.  Suffered with shaking and chills throughout the period, with fever only on the weekend prior.      Had frequent cough productive of thick whitish phlegm, especially at nights.  Experiences midsternal pleuritic chest pain with coughing.  On the night prior to coming to the ED, patient had profound weakness, to the extent of not being able to go the bathroom with use of a walker; urinated in diaper.  Went to scheduled appointment with PMD today and was advised to come to the ED for evaluation/management.    Also, patient complains of significant pain of the left knee; has had knee replacements x 3 of said area, but continues to suffer with persistent pain and swelling of the area.  Has fallen due to same; last fall was approximately one month ago.  In addition, patient relates burning and itching with micturition, as well as left back pain.  Has suffered with same in the past.    Vital signs upon ED presentation as follows: BP = 135/48, HR = 61, RR = 18, T = 37.5 C (99.5 F), O2 Sat = 100% on RA.  Noted with magnesium level of 1.1 and UA with signs of infection.  X-ray of B/L Knees significant for "Age-indeterminate fracture through the left knee cement spacer with lateral displacement of the distal fragment. Surrounding soft tissue edema." Diagnosed with Difficulty Walking.  Prescribed magnesium sulfate 2 grams, ceftriaxone 1 gram, Duo-nebs x 3 and prednisone 30 mg x one in the ED. (22 Jun 2019 20:48)    UTI:    - Pt with L sided CVA tenderness, burning/itching on urination, c/o sweats/chills.  UA (+) with microscopic hematuria.   Agree with rocephin for acute cystitis vs. pylenephritis.  No obstructive stones seen in L vangie.  Pt w/ho R nephrectomy.     - Ucx, bcx no growth to date.  Ucx may have been drawn after abx administered, so may be low yield.  Would treat on empiric basis, pt clinically improving.      - Cont rocephin, can switch to PO ceftin 250mg bid to complete total 14 day course to treat for possible pyelo.       L knee swelling:     - Pt with h/o prosthetic knee, s/p removal and has cement spacer in place.  Xray shows   "Age-indeterminate fracture through the left knee cement spacer with lateral displacement of the distal fragment. Surrounding soft tissue edema...".   No erythema.  Orthopedic consult noted - for outpt f/u.  No acute surgical intervention.       Will follow,    Gianna Sloan  [phone]
78 year old female, with past history significant for CHF, COPD, GERD, HTN, L-knee replacement, presented to the ED secondary to difficulty ambulating.  Seen and evaluated at bedside; NAD, but appears to be experiencing some painful discomfort on the left lower back area.  Patient relates feeling unwell for approximately 2 weeks.  Went to the [facility] [place] on Tuesday and was prescribed an antibiotic - once daily x 7 days (?levofloxacin).  However, despite compliance, patient's condition did not improve.  Suffered with shaking and chills throughout the period, with fever only on the weekend prior.      Had frequent cough productive of thick whitish phlegm, especially at nights.  Experiences midsternal pleuritic chest pain with coughing.  On the night prior to coming to the ED, patient had profound weakness, to the extent of not being able to go the bathroom with use of a walker; urinated in diaper.  Went to scheduled appointment with PMD today and was advised to come to the ED for evaluation/management.    Also, patient complains of significant pain of the left knee; has had knee replacements x 3 of said area, but continues to suffer with persistent pain and swelling of the area.  Has fallen due to same; last fall was approximately one month ago.  In addition, patient relates burning and itching with micturition, as well as left back pain.  Has suffered with same in the past.    Vital signs upon ED presentation as follows: BP = 135/48, HR = 61, RR = 18, T = 37.5 C (99.5 F), O2 Sat = 100% on RA.  Noted with magnesium level of 1.1 and UA with signs of infection.  X-ray of B/L Knees significant for "Age-indeterminate fracture through the left knee cement spacer with lateral displacement of the distal fragment. Surrounding soft tissue edema." Diagnosed with Difficulty Walking.  Prescribed magnesium sulfate 2 grams, ceftriaxone 1 gram, Duo-nebs x 3 and prednisone 30 mg x one in the ED. (22 Jun 2019 20:48)    UTI:    - Pt with L sided CVA tenderness, burning/itching on urination, c/o sweats/chills.  UA (+) with microscopic hematuria.   Agree with rocephin for acute cystitis vs. pylenephritis.  No obstructive stones seen in L vangie.  Pt w/ho R nephrectomy.     - Ucx, bcx no growth to date.  Ucx may have been drawn after abx administered, so may be low yield.  Would treat on empiric basis, pt clinically improving.      - Rocephin, switched to PO ceftin 250mg bid to complete total 14 day course to treat for possible pyelo.     - Probiotics      L knee swelling:     - Pt with h/o prosthetic knee, s/p removal and has cement spacer in place.  Xray shows   "Age-indeterminate fracture through the left knee cement spacer with lateral displacement of the distal fragment. Surrounding soft tissue edema...".   No erythema.  Orthopedic consult noted - for outpt f/u.  No acute surgical intervention.       * Ok to d/c from ID standpoint.  Pt pending placement      Gianna Maddoxi  [phone]
78 year old female, with past history significant for CHF, COPD, GERD, HTN, L-knee replacement, presented to the ED secondary to difficulty ambulating.  Seen and evaluated at bedside; NAD, but appears to be experiencing some painful discomfort on the left lower back area.  Patient relates feeling unwell for approximately 2 weeks.  Went to the [facility] [place] on Tuesday and was prescribed an antibiotic - once daily x 7 days (?levofloxacin).  However, despite compliance, patient's condition did not improve.  Suffered with shaking and chills throughout the period, with fever only on the weekend prior.      Had frequent cough productive of thick whitish phlegm, especially at nights.  Experiences midsternal pleuritic chest pain with coughing.  On the night prior to coming to the ED, patient had profound weakness, to the extent of not being able to go the bathroom with use of a walker; urinated in diaper.  Went to scheduled appointment with PMD today and was advised to come to the ED for evaluation/management.    Also, patient complains of significant pain of the left knee; has had knee replacements x 3 of said area, but continues to suffer with persistent pain and swelling of the area.  Has fallen due to same; last fall was approximately one month ago.  In addition, patient relates burning and itching with micturition, as well as left back pain.  Has suffered with same in the past.    Vital signs upon ED presentation as follows: BP = 135/48, HR = 61, RR = 18, T = 37.5 C (99.5 F), O2 Sat = 100% on RA.  Noted with magnesium level of 1.1 and UA with signs of infection.  X-ray of B/L Knees significant for "Age-indeterminate fracture through the left knee cement spacer with lateral displacement of the distal fragment. Surrounding soft tissue edema." Diagnosed with Difficulty Walking.  Prescribed magnesium sulfate 2 grams, ceftriaxone 1 gram, Duo-nebs x 3 and prednisone 30 mg x one in the ED. (22 Jun 2019 20:48)    UTI:    - Pt with L sided CVA tenderness, burning/itching on urination, c/o sweats/chills.  UA (+) with microscopic hematuria.   Agree with rocephin for acute cystitis vs. pylenephritis.  No obstructive stones seen in L vangie.  Pt w/ho R nephrectomy.     - Ucx, bcx no growth to date.  Ucx may have been drawn after abx administered, so may be low yield.  Would treat on empiric basis, pt clinically improving.      - Cont rocephin, can switch to PO ceftin 250mg bid to complete total 14 day course to treat for possible pyelo.       L knee swelling:     - Pt with h/o prosthetic knee, s/p removal and has cement spacer in place.  Xray shows   "Age-indeterminate fracture through the left knee cement spacer with lateral displacement of the distal fragment. Surrounding soft tissue edema...".   No erythema.  Orthopedic consult noted - for outpt f/u.  No acute surgical intervention.       * Ok to d/c from ID standpoint      Gianna Sloan  [phone]
Problem/Plan - 1:  ·  Problem: Urinary tract infection with pyuria.  Plan: UA/Urine Cx  Cont PO antibiotics  ID eval appreciated    Problem/Plan - 2:  ·  Problem: COPD exacerbation.  Plan: improved cough   occurring in ED, but w/o respiratory distress, wheezing or sign of increased respiratory effort at the time of being seen  - also complaining of cough productive of whitish sputum (no cough appreciated during evaluation by writer)  - on ventolin HFA and Tudorza Pressair PTA; held for now- s/p prednisone 30 mg, magnesium 2 grams and Duo-nebs x 3 in the ED; continuing prednisone 20 mg x 3 doses, Duo-nebs  - improved    Problem/Plan - 3:  ·  Problem: Difficulty walking.  Plan: - chronic issue, and chiefly due to the left knee, which is significantly swollen, tender  - X-fay demonstrates "Age-indeterminate fracture through the left knee cement spacer with lateral displacement of the distal fragment. Surrounding soft tissue edema..."  - s/p L-knee surgery x 3 - per patient (2010, 2011, 2016)  - last fall ~ 1.5 months ago   - Orthopedic consult  appreciated    Problem/Plan - 4:  ·  Problem: Fracture of prosthetic knee, initial encounter.  Plan: - X-fay demonstrates "Age-indeterminate fracture through the left knee cement spacer with lateral displacement of the distal fragment. Surrounding soft tissue edema..."  - pain/tenderness, swelling of the left knee  - surgeries x 3 - including knee replacement in the past- Orthopedic consult (Dr. Strickland).       Medically stable for dc , pending placement
Problem/Plan - 1:  ·  Problem: Urinary tract infection with pyuria.  Plan: UA/Urine Cx  Cont PO antibiotics  ID eval appreciated    Problem/Plan - 2:  ·  Problem: COPD exacerbation.  Plan: improved cough   occurring in ED, but w/o respiratory distress, wheezing or sign of increased respiratory effort at the time of being seen  - also complaining of cough productive of whitish sputum (no cough appreciated during evaluation by writer)  - on ventolin HFA and Tudorza Pressair PTA; held for now- s/p prednisone 30 mg, magnesium 2 grams and Duo-nebs x 3 in the ED; continuing prednisone 20 mg x 3 doses, Duo-nebs  - improved    Problem/Plan - 3:  ·  Problem: Difficulty walking.  Plan: - chronic issue, and chiefly due to the left knee, which is significantly swollen, tender  - X-fay demonstrates "Age-indeterminate fracture through the left knee cement spacer with lateral displacement of the distal fragment. Surrounding soft tissue edema..."  - s/p L-knee surgery x 3 - per patient (2010, 2011, 2016)  - last fall ~ 1.5 months ago - Orthopedic consult  appreciated    Problem/Plan - 4:  ·  Problem: Fracture of prosthetic knee, initial encounter.  Plan: - X-fay demonstrates "Age-indeterminate fracture through the left knee cement spacer with lateral displacement of the distal fragment. Surrounding soft tissue edema..."  - pain/tenderness, swelling of the left knee  - surgeries x 3 - including knee replacement in the past- Orthopedic consult (Dr. Strickland).       Medically stable for dc , pending placement
Problem/Plan - 1:  ·  Problem: Urinary tract infection with pyuria.  Plan: UA/Urine Cx  Cont rocephin  ID eval appreciated    Problem/Plan - 2:  ·  Problem: COPD exacerbation.  Plan: improved cough   occurring in ED, but w/o respiratory distress, wheezing or sign of increased respiratory effort at the time of being seen  - also complaining of cough productive of whitish sputum (no cough appreciated during evaluation by writer)  - on ventolin HFA and Tudorza Pressair PTA; held for now- s/p prednisone 30 mg, magnesium 2 grams and Duo-nebs x 3 in the ED; continuing prednisone 20 mg x 3 doses, Duo-nebs  - improved    Problem/Plan - 3:  ·  Problem: Difficulty walking.  Plan: - chronic issue, and chiefly due to the left knee, which is significantly swollen, tender  - X-fay demonstrates "Age-indeterminate fracture through the left knee cement spacer with lateral displacement of the distal fragment. Surrounding soft tissue edema..."  - s/p L-knee surgery x 3 - per patient (2010, 2011, 2016)  - last fall ~ 1.5 months ago   - Orthopedic consult  appreciated    Problem/Plan - 4:  ·  Problem: Fracture of prosthetic knee, initial encounter.  Plan: - X-fay demonstrates "Age-indeterminate fracture through the left knee cement spacer with lateral displacement of the distal fragment. Surrounding soft tissue edema..."  - pain/tenderness, swelling of the left knee  - surgeries x 3 - including knee replacement in the past  - Orthopedic consult (Dr. Strickland).     Problem/Plan - 5:  ·  Problem: Hypomagnesemia.  Plan: replace all electrolytes.       dc planning in am if stable .
Problem/Plan - 1:  ·  Problem: Urinary tract infection with pyuria.  Plan: UA/Urine Cx  Cont rocephin  ID eval appreciated    Problem/Plan - 2:  ·  Problem: COPD exacerbation.  Plan: improved cough   occurring in ED, but w/o respiratory distress, wheezing or sign of increased respiratory effort at the time of being seen  - also complaining of cough productive of whitish sputum (no cough appreciated during evaluation by writer)  - on ventolin HFA and Tudorza Pressair PTA; held for now- s/p prednisone 30 mg, magnesium 2 grams and Duo-nebs x 3 in the ED; continuing prednisone 20 mg x 3 doses, Duo-nebs  - improved    Problem/Plan - 3:  ·  Problem: Difficulty walking.  Plan: - chronic issue, and chiefly due to the left knee, which is significantly swollen, tender  - X-fay demonstrates "Age-indeterminate fracture through the left knee cement spacer with lateral displacement of the distal fragment. Surrounding soft tissue edema..."  - s/p L-knee surgery x 3 - per patient (2010, 2011, 2016)  - last fall ~ 1.5 months ago   - Orthopedic consult  appreciated    Problem/Plan - 4:  ·  Problem: Fracture of prosthetic knee, initial encounter.  Plan: - X-fay demonstrates "Age-indeterminate fracture through the left knee cement spacer with lateral displacement of the distal fragment. Surrounding soft tissue edema..."  - pain/tenderness, swelling of the left knee  - surgeries x 3 - including knee replacement in the past  - Orthopedic consult (Dr. Strickland).   Problem/Plan - 5:  ·  Problem: Hypomagnesemia.  Plan: replace all electrolytes.       dc planning today
78 year old female, with past history significant for CHF, COPD, GERD, HTN, L-knee replacement, presented to the ED secondary to difficulty ambulating.  Vital signs upon ED presentation as follows: BP = 135/48, HR = 61, RR = 18, T = 37.5 C (99.5 F), O2 Sat = 100% on RA.  Noted with magnesium level of 1.1 and UA with signs of infection.  X-ray of B/L Knees significant for "Age-indeterminate fracture through the left knee cement spacer with lateral displacement of the distal fragment. Surrounding soft tissue edema." Diagnosed with Difficulty Walking.  Admitted for further management of:

## 2019-06-29 ENCOUNTER — TRANSCRIPTION ENCOUNTER (OUTPATIENT)
Age: 79
End: 2019-06-29

## 2019-06-29 VITALS
SYSTOLIC BLOOD PRESSURE: 116 MMHG | HEART RATE: 87 BPM | TEMPERATURE: 99 F | DIASTOLIC BLOOD PRESSURE: 67 MMHG | OXYGEN SATURATION: 99 % | RESPIRATION RATE: 17 BRPM

## 2019-06-29 DIAGNOSIS — I69.998 OTHER SEQUELAE FOLLOWING UNSPECIFIED CEREBROVASCULAR DISEASE: ICD-10-CM

## 2019-06-29 DIAGNOSIS — Z86.79 PERSONAL HISTORY OF OTHER DISEASES OF THE CIRCULATORY SYSTEM: ICD-10-CM

## 2019-06-29 LAB
ANION GAP SERPL CALC-SCNC: 11 MMO/L — SIGNIFICANT CHANGE UP (ref 7–14)
BUN SERPL-MCNC: 34 MG/DL — HIGH (ref 7–23)
CALCIUM SERPL-MCNC: 10 MG/DL — SIGNIFICANT CHANGE UP (ref 8.4–10.5)
CHLORIDE SERPL-SCNC: 98 MMOL/L — SIGNIFICANT CHANGE UP (ref 98–107)
CO2 SERPL-SCNC: 31 MMOL/L — SIGNIFICANT CHANGE UP (ref 22–31)
CREAT SERPL-MCNC: 0.94 MG/DL — SIGNIFICANT CHANGE UP (ref 0.5–1.3)
GLUCOSE BLDC GLUCOMTR-MCNC: 121 MG/DL — HIGH (ref 70–99)
GLUCOSE BLDC GLUCOMTR-MCNC: 133 MG/DL — HIGH (ref 70–99)
GLUCOSE BLDC GLUCOMTR-MCNC: 154 MG/DL — HIGH (ref 70–99)
GLUCOSE SERPL-MCNC: 137 MG/DL — HIGH (ref 70–99)
POTASSIUM SERPL-MCNC: 4.1 MMOL/L — SIGNIFICANT CHANGE UP (ref 3.5–5.3)
POTASSIUM SERPL-SCNC: 4.1 MMOL/L — SIGNIFICANT CHANGE UP (ref 3.5–5.3)
SODIUM SERPL-SCNC: 140 MMOL/L — SIGNIFICANT CHANGE UP (ref 135–145)

## 2019-06-29 PROCEDURE — 70450 CT HEAD/BRAIN W/O DYE: CPT | Mod: 26

## 2019-06-29 RX ORDER — SENNA PLUS 8.6 MG/1
2 TABLET ORAL
Qty: 0 | Refills: 0 | DISCHARGE
Start: 2019-06-29

## 2019-06-29 RX ORDER — LACTOBACILLUS ACIDOPHILUS 100MM CELL
1 CAPSULE ORAL
Qty: 0 | Refills: 0 | DISCHARGE
Start: 2019-06-29

## 2019-06-29 RX ORDER — OXYCODONE HYDROCHLORIDE 5 MG/1
1 TABLET ORAL
Qty: 0 | Refills: 0 | DISCHARGE
Start: 2019-06-29

## 2019-06-29 RX ADMIN — PANTOPRAZOLE SODIUM 40 MILLIGRAM(S): 20 TABLET, DELAYED RELEASE ORAL at 05:30

## 2019-06-29 RX ADMIN — Medication 3 MILLILITER(S): at 10:04

## 2019-06-29 RX ADMIN — Medication 1 TABLET(S): at 14:05

## 2019-06-29 RX ADMIN — Medication 1000 UNIT(S): at 14:07

## 2019-06-29 RX ADMIN — Medication 81 MILLIGRAM(S): at 14:05

## 2019-06-29 RX ADMIN — Medication 250 MILLIGRAM(S): at 09:00

## 2019-06-29 RX ADMIN — Medication 325 MILLIGRAM(S): at 05:30

## 2019-06-29 RX ADMIN — Medication 1 TABLET(S): at 14:07

## 2019-06-29 RX ADMIN — Medication 100 MILLIGRAM(S): at 14:08

## 2019-06-29 RX ADMIN — GABAPENTIN 100 MILLIGRAM(S): 400 CAPSULE ORAL at 05:30

## 2019-06-29 RX ADMIN — GABAPENTIN 100 MILLIGRAM(S): 400 CAPSULE ORAL at 14:06

## 2019-06-29 NOTE — DISCHARGE NOTE NURSING/CASE MANAGEMENT/SOCIAL WORK - NSDCDPATPORTLINK_GEN_ALL_CORE
You can access the [facility] Patient Portal, offered by [facility], by registering with the following website: [email or web address]

## 2019-07-29 ENCOUNTER — INBOUND DOCUMENT (OUTPATIENT)
Age: 79
End: 2019-07-29

## 2019-10-30 PROBLEM — K59.00 CONSTIPATION, UNSPECIFIED: Chronic | Status: ACTIVE | Noted: 2019-06-22

## 2019-10-30 PROBLEM — N20.0 CALCULUS OF KIDNEY: Chronic | Status: ACTIVE | Noted: 2019-06-22

## 2019-10-30 PROBLEM — E11.9 TYPE 2 DIABETES MELLITUS WITHOUT COMPLICATIONS: Chronic | Status: ACTIVE | Noted: 2019-06-22

## 2019-10-30 PROBLEM — E78.5 HYPERLIPIDEMIA, UNSPECIFIED: Chronic | Status: ACTIVE | Noted: 2019-06-22

## 2019-10-30 PROBLEM — E55.9 VITAMIN D DEFICIENCY, UNSPECIFIED: Chronic | Status: ACTIVE | Noted: 2019-06-22

## 2019-10-30 NOTE — ED PROVIDER NOTE - PMH
CHF (congestive heart failure)  ~ diastolic, Stage I  Constipation    COPD (chronic obstructive pulmonary disease)    Dyslipidemia    GERD (gastroesophageal reflux disease)    HTN (hypertension)    Kidney stones    Type 2 diabetes mellitus    Vitamin D deficiency

## 2019-10-30 NOTE — CONSULT NOTE ADULT - ATTENDING COMMENTS
Pt with hx of CHF, COPD, HTN, left knee replacement, p/w complaint of "not feeling good". Pt describes general sense of dizziness when she first came to the hospital, that has improved. She is also SOB. She describes previous hospitalization for something similar and was attributed to anemia. She is a poor historian but describes vaguely being told in the past that she was pre-diabetic, she's unsure of her diagnosis of CHF.    On review of chart pt has a nuclear stress test in 2017 that showed right ventricular dysfunction c/w diastolic heart failure. She also has had A1c from 2019 of 6.7, which fluctuated between 6.2 and 6.8 since then. last checked in June of this year and was 6.2. She is not on any diabetic medications.    ` Pt with hx of CHF, COPD, HTN, left knee replacement, p/w complaint of "not feeling good". Pt describes general sense of dizziness when she first came to the hospital, that has improved. She is also SOB. She describes previous hospitalization for something similar and was attributed to anemia. She is a poor historian but describes vaguely being told in the past that she was pre-diabetic, she's unsure of her diagnosis of CHF.    On review of chart pt has a nuclear stress test in 2017 that showed right ventricular dysfunction c/w diastolic heart failure. She also has had A1c from 2019 of 6.7, which fluctuated between 6.2 and 6.8 since then. last checked in June of this year and was 6.2. She is not on any diabetic medications.    CTH and CTA were normal. Exam notable only for large osseous growth on the distal end of the femur. On review of previous x-rays pt has dislocation of the knee joint with rods, due to previous infected hardware and dehiscence of the bone which was unable to be repaired.     - Would check TTE  - check A1c, ABG/VBG for hypercarbia due to COPD, TSH, B12, Folate,   - dizziness does not seems to be vertigo or of central origin.     `

## 2019-10-30 NOTE — ED PROVIDER NOTE - ADMIT DISPOSITION PRESENT ON ADMISSION SEPSIS
EXAM DATE/TIME:  03/11/2018 15:26 

 

[facility] COMPARISON:     

No previous studies available for comparison.

 

 

INDICATIONS :     

Dizziness today.

                      

 

RADIATION DOSE:     

56.35 CTDIvol (mGy) 

 

 

 

MEDICAL HISTORY :     

Multple sclerosis.  

 

SURGICAL HISTORY :      

Appendectomy. 

 

ENCOUNTER:      

Initial

 

ACUITY:      

1 day

 

PAIN SCALE:      

0/10

 

LOCATION:       

Bilateral  head

 

TECHNIQUE:     

Multiple contiguous axial images were obtained of the head.  Using automated exposure control and adj
ustment of the mA and/or kV according to patient size, radiation dose was kept as low as reasonably a
chievable to obtain optimal diagnostic quality images.   DICOM format image data is available electro
nically for review and comparison.  

 

FINDINGS:     

 

CEREBRUM:     

The ventricles are normal for age.  No evidence of midline shift, mass lesion, hemorrhage or acute in
farction.  No extra-axial fluid collections are seen.

 

POSTERIOR FOSSA:     

The cerebellum and brainstem are intact.  The 4th ventricle is midline.  The cerebellopontine angle i
s unremarkable.

 

EXTRACRANIAL:     

The visualized portion of the orbits is intact. Fluid in right maxillary sinus.

 

SKULL:     

The calvaria is intact.  No evidence of skull fracture.

 

CONCLUSION:     

1. No acute intracranial abnormalities. Right maxillary sinusitis.

 

 

 

 Flaco Smith MD on March 11, 2018 at 15:57           

Board Certified Radiologist.

 This report was verified electronically.
No

## 2019-10-30 NOTE — CONSULT NOTE ADULT - SUBJECTIVE AND OBJECTIVE BOX
HPI: 78 year old female, with past history significant for CHF, stroke with right sided residual weakness,COPD, GERD, HTN, L-knee replacement p/w dizziness x2 weeks.     ROS: as above    PAST MEDICAL & SURGICAL HISTORY:  Vitamin D deficiency  Dyslipidemia  Constipation  Kidney stones  Type 2 diabetes mellitus  GERD (gastroesophageal reflux disease)  COPD (chronic obstructive pulmonary disease)  HTN (hypertension)  CHF (congestive heart failure): ~ diastolic, Stage I  H/O breast biopsy: ~ 3 times on right, 2 times on left  H/O right nephrectomy: ~ [facility]  History of left knee replacement: ~ x 3 (2010, 2011, 2016)    FAMILY HISTORY:  FH: COPD (chronic obstructive pulmonary disease): ~ daughter (non-smoker)  FH: diabetes mellitus: ~ mother, grandmother  FH: CHF (congestive heart failure): ~ mother  FH: breast cancer: ~ mother    SOCIAL HISTORY:   T/E/D:   Occupation:   Lives with:     MEDICATIONS (HOME):  Home Medications:  aspirin 81 mg oral delayed release tablet: 1 tab(s) orally once a day (05 Oct 2016 11:26)  atorvastatin 80 mg oral tablet: 1 tab(s) orally once a day (at bedtime) (05 Oct 2016 11:26)  Calcium 500+D oral tablet, chewable: 1 tab(s) orally 2 times a day (29 Sep 2016 20:02)  cefuroxime 250 mg oral tablet: 1 tab(s) orally 2 times a day, for UTI, dosing @ 8:30 and 20:30 through 7/6 (26 Jun 2019 11:08)  docusate sodium 100 mg oral capsule: 1 cap(s) orally 3 times a day (29 Sep 2016 20:02)  ferrous sulfate 325 mg (65 mg elemental iron) oral tablet: 1 tab(s) orally 2 times a day (22 Jun 2019 23:19)  gabapentin 100 mg oral capsule: 1 cap(s) orally 3 times a day (05 Oct 2016 15:12)  Klor-Con 20 mEq oral powder for reconstitution: 1 each orally once a day (29 Sep 2016 20:02)  lactobacillus acidophilus oral capsule: 1 tab(s) orally once a day (29 Jun 2019 08:07)  multivitamin: 1 tab(s) orally once a day (29 Sep 2016 20:02)  oxyCODONE 5 mg oral tablet: 1 tab(s) orally every 6 hours, As needed, Moderate Pain (4 - 6) (29 Jun 2019 08:07)  pantoprazole 40 mg oral delayed release tablet: 1 tab(s) orally once a day (22 Jun 2019 23:22)  polyethylene glycol 3350 oral powder for reconstitution: 17 gram(s) orally once a day (29 Sep 2016 20:02)  senna oral tablet: 2 tab(s) orally once a day (at bedtime) (29 Jun 2019 08:07)  Tudorza Pressair 400 mcg/inh inhalation powder: 1 puff(s) inhaled 2 times a day (22 Jun 2019 23:22)  Ventolin HFA 90 mcg/inh inhalation aerosol: 2 puff(s) inhaled every 4 hours, As Needed (22 Jun 2019 23:18)  Vitamin D3 1000 intl units oral tablet: 1 tab(s) orally once a day (22 Jun 2019 23:23)  Zaroxolyn: 5 milligram(s) orally once a day (29 Sep 2016 20:02)    MEDICATIONS  (STANDING):  meclizine 25 milliGRAM(s) Oral Once    MEDICATIONS  (PRN):    ALLERGIES/INTOLERANCES:  Allergies  No Known Allergies    Intolerances    VITALS & EXAMINATION:  Vital Signs Last 24 Hrs  T(C): 36.8 (30 Oct 2019 12:48), Max: 36.8 (30 Oct 2019 12:48)  T(F): 98.3 (30 Oct 2019 12:48), Max: 98.3 (30 Oct 2019 12:48)  HR: 73 (30 Oct 2019 12:48) (73 - 73)  BP: 122/73 (30 Oct 2019 12:48) (122/73 - 122/73)  BP(mean): --  RR: 16 (30 Oct 2019 12:48) (16 - 16)  SpO2: 98% (30 Oct 2019 12:48) (98% - 98%)    MS: Awake, alert, oriented to person, place, situation, time. Normal affect. Follows all commands.    Language: Speech is clear, fluent with good repetition & comprehension     CNs: eyes moving spontaneously in all directions. well developed masseter muscles b/l. No facial asymmetry b/l. Symmetric palate elevation in midline. Gag reflex deferred. Tongue midline, normal movements, no atrophy.    Motor: Normal muscle bulk & tone. No noticeable tremor or seizure. No pronator drift.  Moving all extremities equally without laterality    Sensation: Intact to LT    Coordination: No dysmetria to FTN      LABORATORY:  CBC   Chem       LFTs   Coagulopathy   Lipid Panel   A1c   Cardiac enzymes     U/A   CSF  Immunological  Other    STUDIES & IMAGING:  Studies (EKG, EEG, EMG, etc):     Radiology (XR, CT, MR, U/S, TTE/STEVE): HPI: 78 year old female, with past history significant for CHF, stroke with right sided residual weakness,COPD, GERD, HTN, L-knee replacement p/w dizziness x2 weeks. Patient a poor historian; as per patient, issue has been going on for the past two weeks on and off but reports events happening in August, reports that it has occurred both with anemia and low blood sugar but has been unable to say when this occurred or if her blood sugar was checked during prior events. As per patient, not lightheadedness, more room spinning in quality. Denies focal weakness although present on exam, altered sensorium, blurred vision, slurred speech.     NIHSS 0, MRS 0    ROS: as above    PAST MEDICAL & SURGICAL HISTORY:  Vitamin D deficiency  Dyslipidemia  Constipation  Kidney stones  Type 2 diabetes mellitus  GERD (gastroesophageal reflux disease)  COPD (chronic obstructive pulmonary disease)  HTN (hypertension)  CHF (congestive heart failure): ~ diastolic, Stage I  H/O breast biopsy: ~ 3 times on right, 2 times on left  H/O right nephrectomy: ~ [facility]  History of left knee replacement: ~ x 3 (2010, 2011, 2016)    FAMILY HISTORY:  FH: COPD (chronic obstructive pulmonary disease): ~ daughter (non-smoker)  FH: diabetes mellitus: ~ mother, grandmother  FH: CHF (congestive heart failure): ~ mother  FH: breast cancer: ~ mother    SOCIAL HISTORY:   T/E/D:   Occupation:   Lives with:     MEDICATIONS (HOME):  Home Medications:  aspirin 81 mg oral delayed release tablet: 1 tab(s) orally once a day (05 Oct 2016 11:26)  atorvastatin 80 mg oral tablet: 1 tab(s) orally once a day (at bedtime) (05 Oct 2016 11:26)  Calcium 500+D oral tablet, chewable: 1 tab(s) orally 2 times a day (29 Sep 2016 20:02)  cefuroxime 250 mg oral tablet: 1 tab(s) orally 2 times a day, for UTI, dosing @ 8:30 and 20:30 through 7/6 (26 Jun 2019 11:08)  docusate sodium 100 mg oral capsule: 1 cap(s) orally 3 times a day (29 Sep 2016 20:02)  ferrous sulfate 325 mg (65 mg elemental iron) oral tablet: 1 tab(s) orally 2 times a day (22 Jun 2019 23:19)  gabapentin 100 mg oral capsule: 1 cap(s) orally 3 times a day (05 Oct 2016 15:12)  Klor-Con 20 mEq oral powder for reconstitution: 1 each orally once a day (29 Sep 2016 20:02)  lactobacillus acidophilus oral capsule: 1 tab(s) orally once a day (29 Jun 2019 08:07)  multivitamin: 1 tab(s) orally once a day (29 Sep 2016 20:02)  oxyCODONE 5 mg oral tablet: 1 tab(s) orally every 6 hours, As needed, Moderate Pain (4 - 6) (29 Jun 2019 08:07)  pantoprazole 40 mg oral delayed release tablet: 1 tab(s) orally once a day (22 Jun 2019 23:22)  polyethylene glycol 3350 oral powder for reconstitution: 17 gram(s) orally once a day (29 Sep 2016 20:02)  senna oral tablet: 2 tab(s) orally once a day (at bedtime) (29 Jun 2019 08:07)  Tudorza Pressair 400 mcg/inh inhalation powder: 1 puff(s) inhaled 2 times a day (22 Jun 2019 23:22)  Ventolin HFA 90 mcg/inh inhalation aerosol: 2 puff(s) inhaled every 4 hours, As Needed (22 Jun 2019 23:18)  Vitamin D3 1000 intl units oral tablet: 1 tab(s) orally once a day (22 Jun 2019 23:23)  Zaroxolyn: 5 milligram(s) orally once a day (29 Sep 2016 20:02)    MEDICATIONS  (STANDING):  meclizine 25 milliGRAM(s) Oral Once    MEDICATIONS  (PRN):    ALLERGIES/INTOLERANCES:  Allergies  No Known Allergies    Intolerances    VITALS & EXAMINATION:  Vital Signs Last 24 Hrs  T(C): 36.8 (30 Oct 2019 12:48), Max: 36.8 (30 Oct 2019 12:48)  T(F): 98.3 (30 Oct 2019 12:48), Max: 98.3 (30 Oct 2019 12:48)  HR: 73 (30 Oct 2019 12:48) (73 - 73)  BP: 122/73 (30 Oct 2019 12:48) (122/73 - 122/73)  BP(mean): --  RR: 16 (30 Oct 2019 12:48) (16 - 16)  SpO2: 98% (30 Oct 2019 12:48) (98% - 98%)    MS: Awake, alert, oriented to person, place, situation, time. Normal affect. Follows all commands.    Language: Speech is clear, fluent with good repetition & comprehension     CNs: eyes moving spontaneously in all directions, HINTS -, no nystagmus. well developed masseter muscles b/l. No facial asymmetry b/l. Symmetric palate elevation in midline. Gag reflex deferred. Tongue midline, normal movements, no atrophy.    Motor: Normal muscle bulk & tone. No noticeable tremor or seizure. No pronator drift.  RLE 4-/5 hip flex, 4/5 knee flex/ext, 5/5 dorsi/plantar  LLE 2/5 hip flex, unable to test knee flex/ext, 5/5 dorsi/plantar    Sensation: Intact to LT    Coordination: No dysmetria to FTN, CHAGO intact      LABORATORY:  CBC   Chem       LFTs   Coagulopathy   Lipid Panel   A1c   Cardiac enzymes     U/A   CSF  Immunological  Other    STUDIES & IMAGING:  Studies (EKG, EEG, EMG, etc):     Radiology (XR, CT, MR, U/S, TTE/STEVE):

## 2019-10-30 NOTE — ED PROVIDER NOTE - OBJECTIVE STATEMENT
Pt is a 80 y/o F PMHx COPD, GERD, CVA (right sided weakness), CHF, Left knee replacement requiring revision 2/2 infection p/w dizziness x 2 weeks.  Pt reports intermittent room spinning dizziness for past 2 weeks w/o any specific triggering, aggravating, or alleviating factors lasting for seconds to minutes.  Pt states she is able to walk with her walker at baseline w/o any difficulty.  Pt states she has had same room spinning dizziness in the past for which she states she was told to take Metformin.  Pt denies any fevers, chills, nausea, vomiting, chest pain, SOB, palpitations, headache, lightheadedness, neck pain/stiffness, visual/auditory disturbances, numbness, weakness, head trauma, falls, use of blood thinners or any other specific complaints.

## 2019-10-30 NOTE — ED PROVIDER NOTE - ATTENDING CONTRIBUTION TO CARE
Dr Bloch, ATTENDING MD-  I performed a face to face bedside interview with patient regarding history of present illness, review of symptoms and past medical history. I completed an independent physical exam.  I have discussed patient's plan of care with PA.   Documentation as above in the note.  Patien alert, oriented, HEENT full EOM, Perrl, 2-12 intact, no nystagmus, neck no bruits, heart sounds nml lungs clear abd soft nontender, left knee deformity, swollen no heat, nontender( s/p knee replacement infection).no edema, pulses intact. sensation intact. right arm weakness.( old cva)

## 2019-10-30 NOTE — ED PROVIDER NOTE - PROGRESS NOTE DETAILS
Mylene Davis M.D. Resident  Patient can ambulate very slowly with walker and states it is hard for her to get to her appointments. Unlikely to get follow up/ additional testing recommended by neuro. Admit. PMD Dr. Christiansen admits to Dr. ELIGIO Moyer. Dr Moyer called and accepted patient.

## 2019-10-30 NOTE — ED ADULT NURSE NOTE - OBJECTIVE STATEMENT
Pt awake and alert x 3 vs wnl pt reports feeling dizzy  , Pt reports ambulates with cane at home has bad knees. Pt denies sob or chest pain at this time. Awaiting further evaluation and dispo.

## 2019-10-30 NOTE — CONSULT NOTE ADULT - ASSESSMENT
78 year old female, with past history significant for CHF, stroke with right sided residual weakness,COPD, GERD, HTN, L-knee replacement p/w dizziness x2 weeks. 78 year old female, with past history significant for CHF, stroke with right sided residual weakness,COPD, GERD, HTN, L-knee replacement p/w dizziness x2 weeks. Intermittent room spinning dizziness of uncertain etiology; most likely peripheral given episodic nature of events but would rule out cardiac dysfunction such as arrhythmia. Possibly 2/2 TIA, although patient without overt neurologic dysfunction during event; would consider vessel imaging to rule out vertebrobasilar hypoperfusion.     Recs:  CT head w/o contrast and CTA H/N to look at the cerebral vasculature.     If negative:  MR head w/o contrast(can be done outpatient)  Aspirin for secondary stroke prevention at this time. Atorvastatin 80, titrate the dose according to LDL.   TTE with bubble study and telemetry to look for a cardiac source of embolism(can be done outpatient).   HbA1C and LDL.     Follow up outpatient w/ Dr. Isai Ndiaye([phone]) for further workup and management 78 year old female, with past history significant for CHF, stroke with right sided residual weakness,COPD, GERD, HTN, L-knee replacement p/w dizziness x2 weeks. Intermittent room spinning dizziness of uncertain etiology; most likely peripheral given episodic nature of events but would rule out cardiac dysfunction such as arrhythmia. Possibly 2/2 TIA, although patient without overt neurologic dysfunction during event; would consider vessel imaging to rule out vertebrobasilar hypoperfusion.     Recs:  CT head w/o contrast and CTA H/N to look at the cerebral vasculature.     If negative:  MR head w/o contrast(can be done outpatient)  Aspirin for secondary stroke prevention at this time. Atorvastatin 80, titrate the dose according to LDL.   TTE with bubble study and telemetry to look for a cardiac source of embolism(can be done outpatient).   HbA1C and LDL.   Orthostatic vitals    Follow up outpatient w/ Dr. Isai Ndiaye([phone]) for further workup and management

## 2019-10-30 NOTE — ED PROVIDER NOTE - PSH
H/O breast biopsy  ~ 3 times on right, 2 times on left  H/O right nephrectomy  ~ [facility]  History of left knee replacement  ~ x 3 (2010, 2011, 2016)

## 2019-10-30 NOTE — ED ADULT TRIAGE NOTE - CHIEF COMPLAINT QUOTE
Pt c/o intermittent dizziness and weakness x  few weeks.  Denies N/V, chest pain Pt c/o intermittent dizziness and weakness x  few weeks.  Denies N/V, chest pain.

## 2019-10-30 NOTE — ED ADULT NURSE NOTE - ISOLATION TYPE:

## 2019-10-30 NOTE — ED PROCEDURE NOTE - PROCEDURE ADDITIONAL DETAILS
ED focused US guided PIV placement [id number].    Indication poor access.      Findings: compressible ( left      )  AC vein.  Using direct US guidance, under clean conditions, a long 20Ga peripheral catheter introduced into vessel.  US performed after procedure, catheter in vein, no complications.     Impression:  successful US guided (left     ) AC PIV placement.  See report in chart.

## 2019-10-31 NOTE — H&P ADULT - PROBLEM SELECTOR PLAN 1
- Neuro consulted. Likely 2/2 CVA vs. UTI.  - MR head w/o contrast ordered  - Aspirin for secondary stroke prevention at this time. Atorvastatin 40, titrate the dose according to LDL.   - TTE with bubble study and telemetry to look for a cardiac source of embolism if all other w/u negative.  - HbA1C and LDL.   Orthostatic vitals

## 2019-10-31 NOTE — PROGRESS NOTE ADULT - ASSESSMENT
79 year old female, with past history significant for CHF, stroke with right sided residual weakness,COPD, GERD, HTN, L-knee replacement p/w dizziness x2 weeks.    Problem/Plan - 1:  ·  Problem: Dizziness.  Plan: - Neuro consulted. Likely 2/2 CVA vs. UTI.  - MR head w/o contrast ordered  - Aspirin for secondary stroke prevention at this time. Atorvastatin 40, titrate the dose according to LDL.   - TTE with bubble study and telemetry to look for a cardiac source of embolism if all other w/u negative.  - HbA1C and LDL.   Orthostatic vitals.     Problem/Plan - 2:  ·  Problem: UTI (urinary tract infection).  Plan: - UA positive. Will treat empirically with ceftriaxone for 3 days.  - Check UCx.     Problem/Plan - 3:  ·  Problem: COPD (chronic obstructive pulmonary disease).  Plan: - Resp stable.  - Will resume home meds once med rec available.     Problem/Plan - 4:  ·  Problem: HTN (hypertension).  Plan: - BP within normal limit.  - continue to monitor.   - Will resume home meds once med rec available. Pharmacist emailed.     Problem/Plan - 5:  ·  Problem: Need for prophylactic measure.  Plan: - DVT ppx w/ lovenox.

## 2019-10-31 NOTE — H&P ADULT - PROBLEM SELECTOR PLAN 4
- BP within normal limit.  - continue to monitor.   - Will resume home meds once med rec available. Pharmacist emailed.

## 2019-10-31 NOTE — H&P ADULT - ASSESSMENT
78 year old female, with past history significant for CHF, stroke with right sided residual weakness,COPD, GERD, HTN, L-knee replacement p/w dizziness x2 weeks. 79 year old female, with past history significant for CHF, stroke with right sided residual weakness,COPD, GERD, HTN, L-knee replacement p/w dizziness x2 weeks.

## 2019-10-31 NOTE — H&P ADULT - NSICDXPASTSURGICALHX_GEN_ALL_CORE_FT
PAST SURGICAL HISTORY:  H/O breast biopsy ~ 3 times on right, 2 times on left    H/O right nephrectomy ~ [facility]    History of left knee replacement ~ x 3 (2010, 2011, 2016)

## 2019-10-31 NOTE — DISCHARGE NOTE PROVIDER - HOSPITAL COURSE
79 F PMHx COPD, CHF, CVA (Rt sided residual weakness), GERD, HTN, S/P Lt knee replacement p/w dizziness x 2 weeks. 79 F PMHx COPD, CHF, CVA (Rt sided residual weakness), GERD, HTN, S/P Lt knee replacement p/w dizziness x 2 weeks.     Dizziness.      -Likely 2/2 CVA vs. UTI.    -Neuro Cx - does not seem like vertigo or of central origin     -Meclizine     -MR head w/o contrast - pt unable to lie flat. Neuro aware, may be done outpatient     -CXR with B/L subsegmental atelectasis.    -Orthostatics negative     -echo - EF 62%     1. Mitral annular calcification, otherwise normal mitral    valve. Minimal mitral regurgitation.    2. Normal left ventricular internal dimensions and wall    thicknesses.    3. Normal left ventricular systolic function. No segmental    wall motion abnormalities.    4. Mild diastolic dysfunction (Stage I).    5. Normalright ventricular size and function.    6. Estimated right ventricular systolic pressure equals 71    mm Hg, assuming right atrial pressure equals 10 mm Hg,    consistent with severe pulmonary hypertension.    7. A bubble study was performed with the intravenous    injection of agitated saline.  Following contrast    injection, no obvious bubbles were seen in the left heart.    8. The coronary sinus is markedly dilated.  A separate    bubble study was performed with the intravenous injection    of agitated saline contrast from a left upper extremity    vein.  This bubble study confirmed the presence of a    persistent left superior vena cava (LSVC).            UTI     -UA positive.     -CTX x3 days        CVA    -ASA, statin     -Lipid panel WNL         COPD     -Albuterol PRN, Tudorxa (interchange with Spiriva)     -Stable        PPX meausre    -Lovenox         11/4 stable for d/c today to rehab but family is refusing. d/c home with home care

## 2019-10-31 NOTE — H&P ADULT - NSHPLABSRESULTS_GEN_ALL_CORE
(10-30 @ 15:20)                      12.2  5.53 )-----------( 210                 39.1    Neutrophils = 3.26 (59.0%)  Lymphocytes = 1.50 (27.1%)  Eosinophils = 0.11 (2.0%)  Basophils = 0.03 (0.5%)  Monocytes = 0.62 (11.2%)  Bands = --%    10-30    140  |  99  |  26<H>  ----------------------------<  121<H>  4.8   |  29  |  1.13    Ca    9.8      30 Oct 2019 15:20    TPro  8.3  /  Alb  4.1  /  TBili  0.2  /  DBili  x   /  AST  30  /  ALT  24  /  AlkPhos  113  10-30    Urinalysis Basic - ( 30 Oct 2019 17:25 )    Color: LIGHT YELLOW / Appearance: CLEAR / S.022 / pH: 6.0  Gluc: NEGATIVE / Ketone: NEGATIVE  / Bili: NEGATIVE / Urobili: NORMAL   Blood: NEGATIVE / Protein: 30 / Nitrite: NEGATIVE   Leuk Esterase: LARGE / RBC: 0-2 / WBC >50   Sq Epi: FEW / Non Sq Epi: x / Bacteria: NEGATIVE    EKG: occasional PVCs, sinus

## 2019-10-31 NOTE — H&P ADULT - HISTORY OF PRESENT ILLNESS
78 year old female, with past history significant for CHF, stroke with right sided residual weakness,COPD, GERD, HTN, L-knee replacement p/w dizziness x2 weeks. Patient a poor historian; as per patient, issue has been going on for the past two weeks on and off but reports events happening in August, reports that it has occurred both with anemia and low blood sugar but has been unable to say when this occurred or if her blood sugar was checked during prior events. As per patient, not lightheadedness, more room spinning in quality. Denies focal weakness although present on exam, altered sensorium, blurred vision, slurred speech. 79 year old female, with past history significant for CHF, stroke with right sided residual weakness,COPD, GERD, HTN, L-knee replacement p/w dizziness x2 weeks. Patient a poor historian; as per patient, issue has been going on for the past two weeks on and off but reports events happening in August, reports that it has occurred both with anemia and low blood sugar but has been unable to say when this occurred or if her blood sugar was checked during prior events. As per patient, not lightheadedness, more room spinning in quality. Denies focal weakness although present on exam, altered sensorium, blurred vision, slurred speech.

## 2019-10-31 NOTE — H&P ADULT - NSICDXPASTMEDICALHX_GEN_ALL_CORE_FT
PAST MEDICAL HISTORY:  CHF (congestive heart failure) ~ diastolic, Stage I    Constipation     COPD (chronic obstructive pulmonary disease)     Dyslipidemia     GERD (gastroesophageal reflux disease)     HTN (hypertension)     Kidney stones     Type 2 diabetes mellitus     Vitamin D deficiency

## 2019-10-31 NOTE — PHYSICAL THERAPY INITIAL EVALUATION ADULT - RANGE OF MOTION EXAMINATION, REHAB EVAL
Left UE ROM was WFL (within functional limits)/bilateral lower extremity ROM was WFL (within functional limits)

## 2019-10-31 NOTE — DISCHARGE NOTE PROVIDER - CARE PROVIDERS DIRECT ADDRESSES
[facility] ED  [place] [address]  Phone: [phone]             October 8, 2017    Patient: Juan Mendes   YOB: 2003   Date of Visit: 10/8/2017       To Whom It May Concern:    Juan Mendes was seen and treated in our emergency department on 10/8/2017. He may return to school on 10/10/17.       Sincerely,             Signature:____[facility] Emergency Room Dept______________________________
,DirectAddress_Unknown

## 2019-10-31 NOTE — DISCHARGE NOTE PROVIDER - NSDCMRMEDTOKEN_GEN_ALL_CORE_FT
aspirin 81 mg oral delayed release tablet: 1 tab(s) orally once a day  atorvastatin 80 mg oral tablet: 1 tab(s) orally once a day (at bedtime)  Calcium 600+D oral tablet: 1 tab(s) orally 2 times a day  gabapentin 100 mg oral capsule: 1 cap(s) orally 3 times a day  metFORMIN 500 mg oral tablet: 1 tab(s) orally once a day with lunch  metOLazone 5 mg oral tablet: 1 tab(s) orally once a day  Multiple Vitamins oral tablet: 1 tab(s) orally once a day  pantoprazole 40 mg oral delayed release tablet: 1 tab(s) orally once a day  potassium chloride 20 mEq oral tablet, extended release: 1 tab(s) orally once a day  Tudorza Pressair 400 mcg/inh inhalation powder: 1 puff(s) inhaled 2 times a day  Ventolin HFA 90 mcg/inh inhalation aerosol: 2 puff(s) inhaled every 4 hours, As Needed  Vitamin D3 1000 intl units oral tablet: 1 tab(s) orally once a day aspirin 81 mg oral delayed release tablet: 1 tab(s) orally once a day  atorvastatin 80 mg oral tablet: 1 tab(s) orally once a day (at bedtime)  gabapentin 100 mg oral capsule: 1 cap(s) orally 3 times a day  meclizine 12.5 mg oral tablet: 1 tab(s) orally once a day  metFORMIN 500 mg oral tablet: 1 tab(s) orally once a day with lunch  Multiple Vitamins oral tablet: 1 tab(s) orally once a day  pantoprazole 40 mg oral delayed release tablet: 1 tab(s) orally once a day  Tudorza Pressair 400 mcg/inh inhalation powder: 1 puff(s) inhaled 2 times a day  Vitamin D3 1000 intl units oral tablet: 1 tab(s) orally once a day

## 2019-10-31 NOTE — CHART NOTE - NSCHARTNOTEFT_GEN_A_CORE
Pt unable to tolerate MRI 2/2 unable to lie flat secondary to COPD. Neurology aware.     ADS  [phone] (pager)

## 2019-10-31 NOTE — PHYSICAL THERAPY INITIAL EVALUATION ADULT - ACTIVE RANGE OF MOTION EXAMINATION, REHAB EVAL
bilateral  lower extremity Active ROM was WFL (within functional limits)/Left UE Active ROM was WFL (within functional limits)/right shoulder flexion ~45 degrees, right elbow/hand/wrist WFL, irregularity of left Knee

## 2019-10-31 NOTE — PHYSICAL THERAPY INITIAL EVALUATION ADULT - ADDITIONAL COMMENTS
Pt reports that she lives in a private house with her daughter and grandson with no steps to enter; ramp outside; bedroom/bathroom on first floor. Prior to hospital admission pt reports that she was completely independent and used a rolling walker with ambulation. Pt denies any recent falls.    Pt left comfortable in bed, NAD, all lines intact, all precautions maintained, with call bell in reach, bed alarm on, and RN aware of PT evaluation.

## 2019-10-31 NOTE — PATIENT PROFILE ADULT - LIVES WITH
pt lives with autistic grandson and daughter who cannot walk; daughter has home attendant/adult child(mariluz)

## 2019-10-31 NOTE — PHYSICAL THERAPY INITIAL EVALUATION ADULT - PERTINENT HX OF CURRENT PROBLEM, REHAB EVAL
79 year old female, with past history significant for CHF, stroke with right sided residual weakness, COPD, GERD, HTN, Left knee replacement p/w dizziness x2 weeks.

## 2019-10-31 NOTE — DISCHARGE NOTE PROVIDER - NSDCCPCAREPLAN_GEN_ALL_CORE_FT
PRINCIPAL DISCHARGE DIAGNOSIS  Diagnosis: Dizziness  Assessment and Plan of Treatment:       SECONDARY DISCHARGE DIAGNOSES  Diagnosis: COPD (chronic obstructive pulmonary disease)  Assessment and Plan of Treatment: COPD (chronic obstructive pulmonary disease)    Diagnosis: HTN (hypertension)  Assessment and Plan of Treatment: HTN (hypertension) PRINCIPAL DISCHARGE DIAGNOSIS  Diagnosis: Dizziness  Assessment and Plan of Treatment: continue present medications  f/u with pmd in 2-3 days      SECONDARY DISCHARGE DIAGNOSES  Diagnosis: COPD (chronic obstructive pulmonary disease)  Assessment and Plan of Treatment: Smoking cessation, continue current medications as prescribed. Follow up with your routine physician's appointments.    Diagnosis: HTN (hypertension)  Assessment and Plan of Treatment: Low sodium and fat diet, continue anti-hypertensive medications, and follow up with primary care physician.

## 2019-10-31 NOTE — PHYSICAL THERAPY INITIAL EVALUATION ADULT - DIAGNOSIS, PT EVAL
Pt admitted for Dizziness; pt presents with decreased strength, decreased balance, decreased safety awareness, and decreased aerobic capacity/endurance.

## 2019-10-31 NOTE — DISCHARGE NOTE PROVIDER - CARE PROVIDER_API CALL
Raji Christiansen)  Internal Medicine  [address] [place] [address]  Phone: [phone]  Fax: [phone]  Follow Up Time:

## 2019-10-31 NOTE — H&P ADULT - NSHPPHYSICALEXAM_GEN_ALL_CORE
T(C): 36.7 (10-30-19 @ 16:16), Max: 36.8 (10-30-19 @ 12:48)  HR: 67 (10-30-19 @ 19:58) (67 - 78)  BP: 137/77 (10-30-19 @ 19:58) (122/73 - 137/77)  RR: 18 (10-30-19 @ 19:58) (16 - 18)  SpO2: 100% (10-30-19 @ 19:58) (98% - 100%)  Wt(kg): --  GENERAL: NAD, well-developed  HEAD:  Atraumatic, Normocephalic  EYES: EOMI, PERRLA, conjunctiva and sclera clear  NECK: Supple, No JVD  CHEST/LUNG: Clear to auscultation bilaterally; No wheeze  HEART: Regular rate and rhythm; No murmurs, rubs, or gallops  ABDOMEN: Soft, Nontender, Nondistended; Bowel sounds present  EXTREMITIES:  2+ Peripheral Pulses, No clubbing, cyanosis, or edema  PSYCH: AAOx3  NEUROLOGY: non-focal  SKIN: No rashes or lesions

## 2019-10-31 NOTE — PROGRESS NOTE ADULT - SUBJECTIVE AND OBJECTIVE BOX
Patient is a 79y old  Female who presents with a chief complaint of Dizziness (31 Oct 2019 13:35)      INTERVAL HPI/OVERNIGHT EVENTS:  T(C): 36.7 (10-31-19 @ 18:46), Max: 37.2 (10-31-19 @ 13:58)  HR: 74 (10-31-19 @ 18:46) (65 - 74)  BP: 116/75 (10-31-19 @ 18:46) (111/58 - 126/75)  RR: 16 (10-31-19 @ 18:46) (16 - 18)  SpO2: 100% (10-31-19 @ 18:46) (100% - 100%)  Wt(kg): --  I&O's Summary      LABS:                        11.7   5.41  )-----------( 186      ( 31 Oct 2019 06:15 )             36.5     10-31    137  |  100  |  28<H>  ----------------------------<  94  4.9   |  25  |  1.18    Ca    9.4      31 Oct 2019 06:15  Phos  4.1     10-31  Mg     1.6     10-31    TPro  7.5  /  Alb  3.5  /  TBili  < 0.2<L>  /  DBili  x   /  AST  29  /  ALT  21  /  AlkPhos  106  10-31      Urinalysis Basic - ( 30 Oct 2019 17:25 )    Color: LIGHT YELLOW / Appearance: CLEAR / S.022 / pH: 6.0  Gluc: NEGATIVE / Ketone: NEGATIVE  / Bili: NEGATIVE / Urobili: NORMAL   Blood: NEGATIVE / Protein: 30 / Nitrite: NEGATIVE   Leuk Esterase: LARGE / RBC: 0-2 / WBC >50   Sq Epi: FEW / Non Sq Epi: x / Bacteria: NEGATIVE      CAPILLARY BLOOD GLUCOSE            Urinalysis Basic - ( 30 Oct 2019 17:25 )    Color: LIGHT YELLOW / Appearance: CLEAR / S.022 / pH: 6.0  Gluc: NEGATIVE / Ketone: NEGATIVE  / Bili: NEGATIVE / Urobili: NORMAL   Blood: NEGATIVE / Protein: 30 / Nitrite: NEGATIVE   Leuk Esterase: LARGE / RBC: 0-2 / WBC >50   Sq Epi: FEW / Non Sq Epi: x / Bacteria: NEGATIVE        MEDICATIONS  (STANDING):  aspirin enteric coated 81 milliGRAM(s) Oral daily  atorvastatin 80 milliGRAM(s) Oral at bedtime  calcium carbonate    500 mG (Tums) Chewable 1 Tablet(s) Chew two times a day  cefTRIAXone   IVPB 1000 milliGRAM(s) IV Intermittent every 24 hours  cholecalciferol 1000 Unit(s) Oral daily  enoxaparin Injectable 40 milliGRAM(s) SubCutaneous daily  gabapentin 100 milliGRAM(s) Oral three times a day  meclizine 12.5 milliGRAM(s) Oral daily  multivitamin 1 Tablet(s) Oral daily  pantoprazole    Tablet 40 milliGRAM(s) Oral before breakfast  tiotropium 18 MICROgram(s) Capsule 1 Capsule(s) Inhalation daily    MEDICATIONS  (PRN):  acetaminophen   Tablet .. 650 milliGRAM(s) Oral every 6 hours PRN Temp greater or equal to 38C (100.4F), Mild Pain (1 - 3)  ALBUTerol    90 MICROgram(s) HFA Inhaler 2 Puff(s) Inhalation every 4 hours PRN Shortness of Breath and/or Wheezing          PHYSICAL EXAM:  GENERAL: NAD, well-groomed, well-developed  HEAD:  Atraumatic, Normocephalic  CHEST/LUNG: Clear to percussion bilaterally; No rales, rhonchi, wheezing, or rubs  HEART: Regular rate and rhythm; No murmurs, rubs, or gallops  ABDOMEN: Soft, Nontender, Nondistended; Bowel sounds present  EXTREMITIES:  2+ Peripheral Pulses, No clubbing, cyanosis, or edema  LYMPH: No lymphadenopathy noted  SKIN: No rashes or lesions    Care Discussed with Consultants/Other Providers [ ] YES  [ ] NO

## 2019-10-31 NOTE — PHYSICAL THERAPY INITIAL EVALUATION ADULT - PASSIVE RANGE OF MOTION EXAMINATION, REHAB EVAL
bilateral lower extremity Passive ROM was WFL (within functional limits)/right shoulder flexion ~80 degrees, right elbow/hand/wrist WFL/Left UE Passive ROM was WFL (within functional limits)

## 2019-10-31 NOTE — H&P ADULT - NSHPREVIEWOFSYSTEMS_GEN_ALL_CORE
CONSTITUTIONAL: No weakness, fevers or chills  EYES/ENT: No visual changes;  No vertigo or throat pain   NECK: No pain or stiffness  RESPIRATORY: No cough, wheezing, hemoptysis; No shortness of breath  CARDIOVASCULAR: No chest pain or palpitations  GASTROINTESTINAL: No abdominal or epigastric pain. No nausea, vomiting, or hematemesis; No diarrhea or constipation. No melena or hematochezia.  GENITOURINARY: No dysuria, frequency or hematuria  NEUROLOGICAL: dizziness intermittently  SKIN: No itching, burning, rashes, or lesions   PSYCH: No Depression, no anxiety  All other review of systems is negative unless indicated above.

## 2019-11-01 NOTE — CONSULT NOTE ADULT - SUBJECTIVE AND OBJECTIVE BOX
HISTORY OF PRESENT ILLNESS: HPI:    79 year old female, with past history significant for CHF, stroke (2016) with right sided residual weakness, COPD, GERD, HTN, L-knee replacement p/w dizziness x2 weeks. [Patient does not follow with cardiology and has not had any recent ischemic work up. Last NST in May of 2017 was abnormal noted for defect at inferior wall c/w infarct, no evidence of ischemia. Patient states that symptoms have been ongoing for the past two weeks. Patient had a recent admission in June of this year for difficulty ambulating, was treated and d/c. Of note pt has hx of prev left knee surgery, LE noted asymmetrical to right L>R, no c/o pain, looks swollen. Reports previous episodes of hypoglycemia. Patient endorses dizziness and lightheadedness without syncope or LOC. Denies chest pain, sob, bass, palpitations, abd pain, n/v/c, le edema. Cardiology consulted for CHF management, dizziness w/up.        PAST MEDICAL & SURGICAL HISTORY:  Vitamin D deficiency  Dyslipidemia  Constipation  Kidney stones  Type 2 diabetes mellitus  GERD (gastroesophageal reflux disease)  COPD (chronic obstructive pulmonary disease)  HTN (hypertension)  CHF (congestive heart failure): ~ diastolic, Stage I  H/O breast biopsy: ~ 3 times on right, 2 times on left  H/O right nephrectomy: ~ [facility]  History of left knee replacement: ~ x 3 (2010, 2011, 2016)      MEDICATIONS:  MEDICATIONS  (STANDING):  aspirin enteric coated 81 milliGRAM(s) Oral daily  atorvastatin 80 milliGRAM(s) Oral at bedtime  calcium carbonate    500 mG (Tums) Chewable 1 Tablet(s) Chew two times a day  cholecalciferol 1000 Unit(s) Oral daily  enoxaparin Injectable 40 milliGRAM(s) SubCutaneous daily  gabapentin 100 milliGRAM(s) Oral three times a day  meclizine 12.5 milliGRAM(s) Oral daily  multivitamin 1 Tablet(s) Oral daily  pantoprazole    Tablet 40 milliGRAM(s) Oral before breakfast  tiotropium 18 MICROgram(s) Capsule 1 Capsule(s) Inhalation daily      Allergies    No Known Allergies    Intolerances      FAMILY HISTORY:  FH: COPD (chronic obstructive pulmonary disease): ~ daughter (non-smoker)  FH: diabetes mellitus: ~ mother, grandmother  FH: CHF (congestive heart failure): ~ mother  FH: breast cancer: ~ mother    Non-contributary for premature coronary disease or sudden cardiac death    SOCIAL HISTORY:    [ X] Non-smoker  [ ] Smoker  [ ] Alcohol      REVIEW OF SYSTEMS:  [ ]chest pain  [  ]shortness of breath  [  ]palpitations  [  ]syncope  [ ]near syncope [ ]upper extremity weakness   [ ] lower extremity weakness  [  ]diplopia  [  ]altered mental status   [  ]fevers  [ ]chills [ ]nausea  [ ]vomitting  [  ]dysphagia    [ ]abdominal pain  [ ]melena  [ ]BRBPR    [  ]epistaxis  [  ]rash    [ ]lower extremity edema ? Left Lower Ext. / Knee  - appears chronic        [X ] All others negative	  [ ] Unable to obtain      LABS:	 	    CARDIAC MARKERS:                          11.6   5.33  )-----------( 190      ( 01 Nov 2019 06:15 )             36.4     Hb Trend: 11.6<--    11-01    139  |  98  |  26<H>  ----------------------------<  99  4.2   |  31  |  1.11    Ca    9.3      01 Nov 2019 06:15  Phos  3.8     11-01  Mg     1.6     11-01    TPro  7.5  /  Alb  3.5  /  TBili  < 0.2<L>  /  DBili  x   /  AST  29  /  ALT  21  /  AlkPhos  106  10-31    Creatinine Trend: 1.11<--, 1.18<--, 1.13<--    Coags:      proBNP:   Lipid Profile:   HgA1c: Hemoglobin A1C, Whole Blood: 5.8 % (11-01 @ 06:15)    TSH: Thyroid Stimulating Hormone, Serum: 1.95 uIU/mL (11-01 @ 06:15)    PHYSICAL EXAM:  T(C): 36.7 (11-01-19 @ 13:55), Max: 36.8 (10-31-19 @ 22:46)  HR: 75 (11-01-19 @ 13:55) (64 - 75)  BP: 117/71 (11-01-19 @ 13:55) (108/75 - 117/71)  RR: 18 (11-01-19 @ 13:55) (16 - 18)  SpO2: 100% (11-01-19 @ 13:55) (100% - 100%)  Wt(kg): --  I&O's Summary    31 Oct 2019 07:01  -  01 Nov 2019 07:00  --------------------------------------------------------  IN: 437 mL / OUT: 600 mL / NET: -163 mL      Gen: Appears well in NAD  HEENT:  (-)icterus (-)pallor  CV: N S1 S2 1/6 DAMIAN (+)2 Pulses B/l  Resp:  Clear to ausculatation B/L, normal effort  GI: (+) BS Soft, NT, ND  Lymph:  (-)Edema, (-)obvious lymphadenopathy  Skin: Warm to touch, Normal turgor  Psych: Appropriate mood and affect      TELEMETRY: 	  SR with PVCs     ECG:  	  < from: 12 Lead ECG (10.30.19 @ 13:00) >  Sinus rhythm with occasional Premature ventricular complexes  Possible Left atrial enlargement  Borderline ECG    < end of copied text >    RADIOLOGY:         CXR:     < from: Xray Chest 1 View AP/PA (10.30.19 @ 17:21) >  IMPRESSION:     Bilateral subsegmental atelectasis.    DAVIN MÉNDEZ M.D., RADIOLOGY RESIDENT  This document has been electronically signed.  PREET MADDOX M.D., ATTENDING RADIOLOGIST  This document has been electronically signed. Oct 31 2019  9:03AM    < end of copied text >    < from: Transthoracic Echocardiogram (11.01.19 @ 08:33) >  CONCLUSIONS:  1. Mitral annular calcification, otherwise normal mitral  valve. Minimal mitral regurgitation.  2. Normal left ventricular internal dimensions and wall  thicknesses.  3. Normal left ventricular systolic function. No segmental  wall motion abnormalities.  4. Mild diastolic dysfunction (Stage I).  5. Normalright ventricular size and function.  6. Estimated right ventricular systolic pressure equals 71  mm Hg, assuming right atrial pressure equals 10 mm Hg,  consistent with severe pulmonary hypertension.  7. A bubble study was performed with the intravenous  injection of agitated saline.  Following contrast  injection, no obvious bubbles were seen in the left heart.  8. The coronary sinus is markedly dilated.  A separate  bubble study was performed with the intravenous injection  of agitated saline contrast from a left upper extremity  vein.  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  ------------------------------------------------------------------------  Confirmed on  11/1/2019 - 11:25:15 by Reinaldo Edwards M.D.    < end of copied text >      < from: CT Angio Neck w/ IV Cont (10.30.19 @ 18:44) >  IMPRESSION:   CT BRAIN:   No CT evidence of acute territorial infarct, intracranial hemorrhage,   brain edema, or mass effect.     CT ANGIOGRAPHY NECK:   No significant stenosis. No dissection.    CT ANGIOGRAPHY BRAIN:   No significant stenosis or aneurysm.      PATRICK MCMILLAN M.D., RADIOLOGY RESIDENT  This document has been electronically signed.  KAMERON BEAL M.D., ATTENDING RADIOLOGIST  This document has been electronically signed. Oct 30 2019  8:28PM    < end of copied text >        ASSESSMENT/PLAN: 	    79 year old female, with past history significant for CHF, stroke (2016) with right sided residual weakness, COPD, GERD, HTN, L-knee replacement p/w dizziness x2 weeks. [Patient does not follow with cardiology and has not had any recent ischemic work up. Last NST in May of 2017 was abnormal noted for defect at inferior wall c/w infarct, no evidence of ischemia. Patient states that symptoms have been ongoing for the past two weeks.  Cardiology consulted for CHF management, dizziness w/up.     -- no cp, sob, or anginal symptoms   -- echo as noted above  -- CTA Head and Neck as noted - negative   -- would check orthostatics  -- dizziness w/u per neuro, note appreciated, f/u reccs per primary team   -- HISTORY OF PRESENT ILLNESS: HPI:    79 year old female, with past history significant for CHF, stroke (2016) with right sided residual weakness, COPD, GERD, HTN, L-knee replacement p/w dizziness x2 weeks. [Patient does not follow with cardiology and has not had any recent ischemic work up. Last NST in May of 2017 was abnormal noted for defect at inferior wall c/w infarct, no evidence of ischemia. Patient states that symptoms have been ongoing for the past two weeks. Patient had a recent admission in June of this year for difficulty ambulating, was treated and d/c. Of note pt has hx of prev left knee surgery, LE noted asymmetrical to right L>R, no c/o pain, looks swollen. Reports previous episodes of hypoglycemia. Patient endorses dizziness and lightheadedness without syncope or LOC. Denies chest pain, sob, bass, palpitations, abd pain, n/v/c, le edema. Cardiology consulted for CHF management, dizziness from cardio embolic source.         PAST MEDICAL & SURGICAL HISTORY:  Vitamin D deficiency  Dyslipidemia  Constipation  Kidney stones  Type 2 diabetes mellitus  GERD (gastroesophageal reflux disease)  COPD (chronic obstructive pulmonary disease)  HTN (hypertension)  CHF (congestive heart failure): ~ diastolic, Stage I  H/O breast biopsy: ~ 3 times on right, 2 times on left  H/O right nephrectomy: ~ [facility]  History of left knee replacement: ~ x 3 (2010, 2011, 2016)      MEDICATIONS:  MEDICATIONS  (STANDING):  aspirin enteric coated 81 milliGRAM(s) Oral daily  atorvastatin 80 milliGRAM(s) Oral at bedtime  calcium carbonate    500 mG (Tums) Chewable 1 Tablet(s) Chew two times a day  cholecalciferol 1000 Unit(s) Oral daily  enoxaparin Injectable 40 milliGRAM(s) SubCutaneous daily  gabapentin 100 milliGRAM(s) Oral three times a day  meclizine 12.5 milliGRAM(s) Oral daily  multivitamin 1 Tablet(s) Oral daily  pantoprazole    Tablet 40 milliGRAM(s) Oral before breakfast  tiotropium 18 MICROgram(s) Capsule 1 Capsule(s) Inhalation daily      Allergies    No Known Allergies    Intolerances      FAMILY HISTORY:  FH: COPD (chronic obstructive pulmonary disease): ~ daughter (non-smoker)  FH: diabetes mellitus: ~ mother, grandmother  FH: CHF (congestive heart failure): ~ mother  FH: breast cancer: ~ mother    Non-contributary for premature coronary disease or sudden cardiac death    SOCIAL HISTORY:    [ X] Non-smoker  [ ] Smoker  [ ] Alcohol      REVIEW OF SYSTEMS:  [ ]chest pain  [  ]shortness of breath  [  ]palpitations  [  ]syncope  [ ]near syncope [ ]upper extremity weakness   [ ] lower extremity weakness  [  ]diplopia  [  ]altered mental status   [  ]fevers  [ ]chills [ ]nausea  [ ]vomitting  [  ]dysphagia    [ ]abdominal pain  [ ]melena  [ ]BRBPR    [  ]epistaxis  [  ]rash    [ ]lower extremity edema ? Left Lower Ext. / Knee  - appears chronic        [X ] All others negative	  [ ] Unable to obtain      LABS:	 	    CARDIAC MARKERS:                          11.6   5.33  )-----------( 190      ( 01 Nov 2019 06:15 )             36.4     Hb Trend: 11.6<--    11-01    139  |  98  |  26<H>  ----------------------------<  99  4.2   |  31  |  1.11    Ca    9.3      01 Nov 2019 06:15  Phos  3.8     11-01  Mg     1.6     11-01    TPro  7.5  /  Alb  3.5  /  TBili  < 0.2<L>  /  DBili  x   /  AST  29  /  ALT  21  /  AlkPhos  106  10-31    Creatinine Trend: 1.11<--, 1.18<--, 1.13<--    Coags:      proBNP:   Lipid Profile:   HgA1c: Hemoglobin A1C, Whole Blood: 5.8 % (11-01 @ 06:15)    TSH: Thyroid Stimulating Hormone, Serum: 1.95 uIU/mL (11-01 @ 06:15)    PHYSICAL EXAM:  T(C): 36.7 (11-01-19 @ 13:55), Max: 36.8 (10-31-19 @ 22:46)  HR: 75 (11-01-19 @ 13:55) (64 - 75)  BP: 117/71 (11-01-19 @ 13:55) (108/75 - 117/71)  RR: 18 (11-01-19 @ 13:55) (16 - 18)  SpO2: 100% (11-01-19 @ 13:55) (100% - 100%)  Wt(kg): --  I&O's Summary    31 Oct 2019 07:01  -  01 Nov 2019 07:00  --------------------------------------------------------  IN: 437 mL / OUT: 600 mL / NET: -163 mL      Gen: Appears well in NAD  HEENT:  (-)icterus (-)pallor  CV: N S1 S2 1/6 DAMIAN (+)2 Pulses B/l  Resp:  Clear to ausculatation B/L, normal effort  GI: (+) BS Soft, NT, ND  Lymph:  (-)Edema, (-)obvious lymphadenopathy  Skin: Warm to touch, Normal turgor  Psych: Appropriate mood and affect      TELEMETRY: 	  SR with PVCs     ECG:  	  < from: 12 Lead ECG (10.30.19 @ 13:00) >  Sinus rhythm with occasional Premature ventricular complexes  Possible Left atrial enlargement  Borderline ECG    < end of copied text >    RADIOLOGY:         CXR:     < from: Xray Chest 1 View AP/PA (10.30.19 @ 17:21) >  IMPRESSION:     Bilateral subsegmental atelectasis.    DAVIN MÉNDEZ M.D., RADIOLOGY RESIDENT  This document has been electronically signed.  PREET MADDOX M.D., ATTENDING RADIOLOGIST  This document has been electronically signed. Oct 31 2019  9:03AM    < end of copied text >    < from: Transthoracic Echocardiogram (11.01.19 @ 08:33) >  CONCLUSIONS:  1. Mitral annular calcification, otherwise normal mitral  valve. Minimal mitral regurgitation.  2. Normal left ventricular internal dimensions and wall  thicknesses.  3. Normal left ventricular systolic function. No segmental  wall motion abnormalities.  4. Mild diastolic dysfunction (Stage I).  5. Normalright ventricular size and function.  6. Estimated right ventricular systolic pressure equals 71  mm Hg, assuming right atrial pressure equals 10 mm Hg,  consistent with severe pulmonary hypertension.  7. A bubble study was performed with the intravenous  injection of agitated saline.  Following contrast  injection, no obvious bubbles were seen in the left heart.  8. The coronary sinus is markedly dilated.  A separate  bubble study was performed with the intravenous injection  of agitated saline contrast from a left upper extremity  vein.  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  ------------------------------------------------------------------------  Confirmed on  11/1/2019 - 11:25:15 by Reinaldo Edwards M.D.    < end of copied text >      < from: CT Angio Neck w/ IV Cont (10.30.19 @ 18:44) >  IMPRESSION:   CT BRAIN:   No CT evidence of acute territorial infarct, intracranial hemorrhage,   brain edema, or mass effect.     CT ANGIOGRAPHY NECK:   No significant stenosis. No dissection.    CT ANGIOGRAPHY BRAIN:   No significant stenosis or aneurysm.      PATRICK MCMILLAN M.D., RADIOLOGY RESIDENT  This document has been electronically signed.  KAMERON BEAL M.D., ATTENDING RADIOLOGIST  This document has been electronically signed. Oct 30 2019  8:28PM    < end of copied text >        ASSESSMENT/PLAN: 	    79 year old female, with past history significant for CHF, stroke (2016) with right sided residual weakness, COPD, GERD, HTN, L-knee replacement p/w dizziness x2 weeks. [Patient does not follow with cardiology and has not had any recent ischemic work up. Last NST in May of 2017 was abnormal noted for defect at inferior wall c/w infarct, no evidence of ischemia. Patient states that symptoms have been ongoing for the past two weeks.  Cardiology consulted for CHF management, dizziness from cardio embolic source.    -- no cp, sob, or anginal symptoms   -- echo as noted above  -- cont telemetry, monitor for malignant arrhythmia  -- CTA Head and Neck as noted - negative   -- would check orthostatics  -- dizziness w/u per neuro, note appreciated, f/u reccs per primary team   -- further work up pending pts clinical course and discussion w/attending

## 2019-11-01 NOTE — PROGRESS NOTE ADULT - ASSESSMENT
Problem/Plan - 1:  ·  Problem: Dizziness.  Plan: - Neuro consulted. Likely 2/2 CVA vs. UTI.  - neuro and cards fu   - management as per neuro     Problem/Plan - 2:  ·  Problem: UTI (urinary tract infection).  Plan: - UA positive.   - ID fu   - Check UCx.     Problem/Plan - 3:  ·  Problem: COPD (chronic obstructive pulmonary disease).  Plan: - Resp stable.  - Will resume home meds once med rec available.     Problem/Plan - 4:  ·  Problem: HTN (hypertension).  Plan: - BP within normal limit.  - continue to monitor.   - Will resume home meds once med rec available. Pharmacist emailed.

## 2019-11-01 NOTE — CONSULT NOTE ADULT - PROVIDER SPECIALTY LIST ADULT
Infectious Disease
Cardiology
Neurology
I will SWITCH the dose or number of times a day I take the medications listed below when I get home from the hospital:  None

## 2019-11-01 NOTE — PROGRESS NOTE ADULT - SUBJECTIVE AND OBJECTIVE BOX
Patient is a 79y old  Female who presents with a chief complaint of Dizziness (2019 15:31)      INTERVAL HPI/OVERNIGHT EVENTS:  T(C): 36.7 (19 @ 13:55), Max: 36.8 (10-31-19 @ 22:46)  HR: 75 (19 @ 13:55) (64 - 75)  BP: 117/71 (19 @ 13:55) (108/75 - 117/71)  RR: 18 (19 @ 13:55) (16 - 18)  SpO2: 100% (19 @ 13:55) (100% - 100%)  Wt(kg): --  I&O's Summary    31 Oct 2019 07:01  -  2019 07:00  --------------------------------------------------------  IN: 437 mL / OUT: 600 mL / NET: -163 mL        LABS:                        11.6   5.33  )-----------( 190      ( 2019 06:15 )             36.4         139  |  98  |  26<H>  ----------------------------<  99  4.2   |  31  |  1.11    Ca    9.3      2019 06:15  Phos  3.8       Mg     1.6         TPro  7.5  /  Alb  3.5  /  TBili  < 0.2<L>  /  DBili  x   /  AST  29  /  ALT  21  /  AlkPhos  106  10-31      Urinalysis Basic - ( 30 Oct 2019 17:25 )    Color: LIGHT YELLOW / Appearance: CLEAR / S.022 / pH: 6.0  Gluc: NEGATIVE / Ketone: NEGATIVE  / Bili: NEGATIVE / Urobili: NORMAL   Blood: NEGATIVE / Protein: 30 / Nitrite: NEGATIVE   Leuk Esterase: LARGE / RBC: 0-2 / WBC >50   Sq Epi: FEW / Non Sq Epi: x / Bacteria: NEGATIVE      CAPILLARY BLOOD GLUCOSE            Urinalysis Basic - ( 30 Oct 2019 17:25 )    Color: LIGHT YELLOW / Appearance: CLEAR / S.022 / pH: 6.0  Gluc: NEGATIVE / Ketone: NEGATIVE  / Bili: NEGATIVE / Urobili: NORMAL   Blood: NEGATIVE / Protein: 30 / Nitrite: NEGATIVE   Leuk Esterase: LARGE / RBC: 0-2 / WBC >50   Sq Epi: FEW / Non Sq Epi: x / Bacteria: NEGATIVE        MEDICATIONS  (STANDING):  aspirin enteric coated 81 milliGRAM(s) Oral daily  atorvastatin 80 milliGRAM(s) Oral at bedtime  calcium carbonate    500 mG (Tums) Chewable 1 Tablet(s) Chew two times a day  cholecalciferol 1000 Unit(s) Oral daily  enoxaparin Injectable 40 milliGRAM(s) SubCutaneous daily  gabapentin 100 milliGRAM(s) Oral three times a day  meclizine 12.5 milliGRAM(s) Oral daily  multivitamin 1 Tablet(s) Oral daily  pantoprazole    Tablet 40 milliGRAM(s) Oral before breakfast  tiotropium 18 MICROgram(s) Capsule 1 Capsule(s) Inhalation daily    MEDICATIONS  (PRN):  acetaminophen   Tablet .. 650 milliGRAM(s) Oral every 6 hours PRN Temp greater or equal to 38C (100.4F), Mild Pain (1 - 3)  ALBUTerol    90 MICROgram(s) HFA Inhaler 2 Puff(s) Inhalation every 4 hours PRN Shortness of Breath and/or Wheezing          PHYSICAL EXAM:  GENERAL: NAD, well-groomed, well-developed  HEAD:  Atraumatic, Normocephalic  CHEST/LUNG: Clear to percussion bilaterally; No rales, rhonchi, wheezing, or rubs  HEART: Regular rate and rhythm; No murmurs, rubs, or gallops  ABDOMEN: Soft, Nontender, Nondistended; Bowel sounds present  EXTREMITIES:  2+ Peripheral Pulses, No clubbing, cyanosis, or edema  LYMPH: No lymphadenopathy noted  SKIN: No rashes or lesions    Care Discussed with Consultants/Other Providers [ ] YES  [ ] NO

## 2019-11-01 NOTE — CONSULT NOTE ADULT - ASSESSMENT
79 year old female, with past history significant for CHF, stroke with right sided residual weakness,COPD, GERD, HTN, L-knee replacement p/w dizziness x2 weeks. Patient a poor historian; as per patient, issue has been going on for the past two weeks on and off but reports events happening in August, reports that it has occurred both with anemia and low blood sugar but has been unable to say when this occurred or if her blood sugar was checked during prior events. As per patient, not lightheadedness, more room spinning in quality. Denies focal weakness although present on exam, altered sensorium, blurred vision, slurred speech. (31 Oct 2019 00:16)    ER vss.  Pt afebrile, no leukocytosis.  UA large LE, >50 WBCs, <50K nl  musa.   Pt currently denies dysuria, frequency, flank pain.  ID consult called for further recommendations.      Asymptomatic bacteruria:    - Doubt UTI.  Pt with < 50K normal  musa.  Currently denies urinary symptoms.  Urine culture results likely reflect colonization, not infection.    - D/c rocephin and monitor off abx.     - Monitor for worsening fever, WBC, urinary symptoms, abd pain.    Will follow,    Gianna Sloan  [phone]

## 2019-11-01 NOTE — CONSULT NOTE ADULT - ATTENDING COMMENTS
Patient seen and examined.  Agree with above.   Admitted with dizziness with no LOC  TTE with persistent SVC and normal LV function  Monitor on tele - tele remains unremarkable thus far  Check orthostatics    Lewis Snow MD

## 2019-11-02 NOTE — PROGRESS NOTE ADULT - SUBJECTIVE AND OBJECTIVE BOX
Infectious Diseases progress note:    Subjective: NAD, afebrile.  No acute o/n events.  No leukocytosis.     ROS:  CONSTITUTIONAL:  No fever, chills, rigors  CARDIOVASCULAR:  No chest pain or palpitations  RESPIRATORY:   No SOB, cough, dyspnea on exertion.  No wheezing  GASTROINTESTINAL:  No abd pain, N/V, diarrhea/constipation  EXTREMITIES:  No swelling or joint pain  GENITOURINARY:  No burning on urination, increased frequency or urgency.  No flank pain  NEUROLOGIC:  No HA, visual disturbances  SKIN: No rashes    Allergies    No Known Allergies    Intolerances        ANTIBIOTICS/RELEVANT:  antimicrobials    immunologic:    OTHER:  acetaminophen   Tablet .. 650 milliGRAM(s) Oral every 6 hours PRN  ALBUTerol    90 MICROgram(s) HFA Inhaler 2 Puff(s) Inhalation every 4 hours PRN  aspirin enteric coated 81 milliGRAM(s) Oral daily  atorvastatin 80 milliGRAM(s) Oral at bedtime  calcium carbonate    500 mG (Tums) Chewable 1 Tablet(s) Chew two times a day  cholecalciferol 1000 Unit(s) Oral daily  dextrose 40% Gel 15 Gram(s) Oral once PRN  dextrose 5%. 1000 milliLiter(s) IV Continuous <Continuous>  dextrose 50% Injectable 12.5 Gram(s) IV Push once  dextrose 50% Injectable 25 Gram(s) IV Push once  dextrose 50% Injectable 25 Gram(s) IV Push once  enoxaparin Injectable 40 milliGRAM(s) SubCutaneous daily  gabapentin 100 milliGRAM(s) Oral three times a day  glucagon  Injectable 1 milliGRAM(s) IntraMuscular once PRN  insulin lispro (HumaLOG) corrective regimen sliding scale   SubCutaneous three times a day before meals  insulin lispro (HumaLOG) corrective regimen sliding scale   SubCutaneous at bedtime  meclizine 12.5 milliGRAM(s) Oral daily  multivitamin 1 Tablet(s) Oral daily  pantoprazole    Tablet 40 milliGRAM(s) Oral before breakfast  tiotropium 18 MICROgram(s) Capsule 1 Capsule(s) Inhalation daily      Objective:  Vital Signs Last 24 Hrs  T(C): 36.8 (02 Nov 2019 13:18), Max: 36.8 (01 Nov 2019 20:33)  T(F): 98.2 (02 Nov 2019 13:18), Max: 98.2 (01 Nov 2019 20:33)  HR: 74 (02 Nov 2019 13:18) (66 - 87)  BP: 127/69 (02 Nov 2019 13:18) (113/64 - 127/69)  BP(mean): --  RR: 18 (02 Nov 2019 13:18) (18 - 18)  SpO2: 100% (02 Nov 2019 13:18) (100% - 100%)    PHYSICAL EXAM:  Constitutional:NAD  Eyes:DON, EOMI  Ear/Nose/Throat: no thrush, mucositis.  Moist mucous membranes	  Neck:no JVD, no lymphadenopathy, supple  Respiratory: CTA vernon  Cardiovascular: S1S2 RRR, no murmurs  Gastrointestinal:soft, nontender,  nondistended (+) BS  Extremities:no e/e/c  Skin:  no rashes, open wounds or ulcerations        LABS:                        12.9   4.28  )-----------( 193      ( 02 Nov 2019 06:13 )             39.3     11-02    142  |  99  |  28<H>  ----------------------------<  145<H>  4.1   |  32<H>  |  1.02    Ca    9.6      02 Nov 2019 06:13  Phos  3.8     11-01  Mg     1.7     11-02            MICROBIOLOGY:    Culture - Urine in AM (10.30.19 @ 18:24)    Culture - Urine:   NGUF^Normal genitourinary musa  COLONY COUNT: 10,000-49,000 CFU/mL    Specimen Source: URINE MIDSTREAM    Urinalysis (10.30.19 @ 17:25)    Color: LIGHT YELLOW    Urine Appearance: CLEAR    Glucose: NEGATIVE    Bilirubin: NEGATIVE    Ketone - Urine: NEGATIVE    Specific Gravity: 1.022    Blood: NEGATIVE    pH - Urine: 6.0    Protein, Urine: 30    Urobilinogen: NORMAL    Nitrite: NEGATIVE    Leukocyte Esterase Concentration: LARGE    Red Blood Cell - Urine: 0-2    White Blood Cell - Urine: >50    Hyaline Casts: NEGATIVE    Bacteria: NEGATIVE    Squamous Epithelial: FEW          RADIOLOGY & ADDITIONAL STUDIES:    < from: Xray Chest 1 View AP/PA (10.30.19 @ 17:21) >  FINDINGS:    LUNGS: Bilateral subsegmental atelectasis.    PLEURA: No pleural effusion or pneumothorax.    HEART AND MEDIASTINUM: Heart size cannot be assessed in this single   projection.    SKELETON: S-shaped scoliosis of the spine. 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      IMPRESSION:     Bilateral subsegmental atelectasis.    < end of copied text >

## 2019-11-02 NOTE — PROGRESS NOTE ADULT - SUBJECTIVE AND OBJECTIVE BOX
Patient is a 79y old  Female who presents with a chief complaint of Dizziness (02 Nov 2019 16:06)      INTERVAL HPI/OVERNIGHT EVENTS:  T(C): 37.1 (11-02-19 @ 18:11), Max: 37.1 (11-02-19 @ 18:11)  HR: 91 (11-02-19 @ 18:11) (66 - 91)  BP: 142/83 (11-02-19 @ 18:11) (113/64 - 142/83)  RR: 18 (11-02-19 @ 18:11) (18 - 18)  SpO2: 99% (11-02-19 @ 18:11) (99% - 100%)  Wt(kg): --  I&O's Summary    01 Nov 2019 07:01  -  02 Nov 2019 07:00  --------------------------------------------------------  IN: 890 mL / OUT: 1065 mL / NET: -175 mL    02 Nov 2019 08:01  -  02 Nov 2019 18:29  --------------------------------------------------------  IN: 0 mL / OUT: 125 mL / NET: -125 mL        LABS:                        12.9   4.28  )-----------( 193      ( 02 Nov 2019 06:13 )             39.3     11-02    142  |  99  |  28<H>  ----------------------------<  145<H>  4.1   |  32<H>  |  1.02    Ca    9.6      02 Nov 2019 06:13  Phos  3.8     11-01  Mg     1.7     11-02          CAPILLARY BLOOD GLUCOSE      POCT Blood Glucose.: 131 mg/dL (02 Nov 2019 17:21)  POCT Blood Glucose.: 135 mg/dL (02 Nov 2019 12:41)  POCT Blood Glucose.: 121 mg/dL (02 Nov 2019 08:53)            MEDICATIONS  (STANDING):  aspirin enteric coated 81 milliGRAM(s) Oral daily  atorvastatin 80 milliGRAM(s) Oral at bedtime  calcium carbonate    500 mG (Tums) Chewable 1 Tablet(s) Chew two times a day  cholecalciferol 1000 Unit(s) Oral daily  dextrose 5%. 1000 milliLiter(s) (50 mL/Hr) IV Continuous <Continuous>  dextrose 50% Injectable 12.5 Gram(s) IV Push once  dextrose 50% Injectable 25 Gram(s) IV Push once  dextrose 50% Injectable 25 Gram(s) IV Push once  enoxaparin Injectable 40 milliGRAM(s) SubCutaneous daily  gabapentin 100 milliGRAM(s) Oral three times a day  insulin lispro (HumaLOG) corrective regimen sliding scale   SubCutaneous three times a day before meals  insulin lispro (HumaLOG) corrective regimen sliding scale   SubCutaneous at bedtime  meclizine 12.5 milliGRAM(s) Oral daily  multivitamin 1 Tablet(s) Oral daily  pantoprazole    Tablet 40 milliGRAM(s) Oral before breakfast  tiotropium 18 MICROgram(s) Capsule 1 Capsule(s) Inhalation daily    MEDICATIONS  (PRN):  acetaminophen   Tablet .. 650 milliGRAM(s) Oral every 6 hours PRN Temp greater or equal to 38C (100.4F), Mild Pain (1 - 3)  ALBUTerol    90 MICROgram(s) HFA Inhaler 2 Puff(s) Inhalation every 4 hours PRN Shortness of Breath and/or Wheezing  dextrose 40% Gel 15 Gram(s) Oral once PRN Blood Glucose LESS THAN 70 milliGRAM(s)/deciliter  glucagon  Injectable 1 milliGRAM(s) IntraMuscular once PRN Glucose LESS THAN 70 milligrams/deciliter          PHYSICAL EXAM:  GENERAL: NAD, well-groomed, well-developed  HEAD:  Atraumatic, Normocephalic  CHEST/LUNG: Clear to percussion bilaterally; No rales, rhonchi, wheezing, or rubs  HEART: Regular rate and rhythm; No murmurs, rubs, or gallops  ABDOMEN: Soft, Nontender, Nondistended; Bowel sounds present  EXTREMITIES:  2+ Peripheral Pulses, No clubbing, cyanosis, or edema  LYMPH: No lymphadenopathy noted  SKIN: No rashes or lesions    Care Discussed with Consultants/Other Providers [ ] YES  [ ] NO

## 2019-11-02 NOTE — PROGRESS NOTE ADULT - ASSESSMENT
79 year old female, with past history significant for CHF, stroke with right sided residual weakness,COPD, GERD, HTN, L-knee replacement p/w dizziness x2 weeks. Patient a poor historian; as per patient, issue has been going on for the past two weeks on and off but reports events happening in August, reports that it has occurred both with anemia and low blood sugar but has been unable to say when this occurred or if her blood sugar was checked during prior events. As per patient, not lightheadedness, more room spinning in quality. Denies focal weakness although present on exam, altered sensorium, blurred vision, slurred speech. (31 Oct 2019 00:16)    ER vss.  Pt afebrile, no leukocytosis.  UA large LE, >50 WBCs, <50K nl  musa.   Pt currently denies dysuria, frequency, flank pain.  ID consult called for further recommendations.      Asymptomatic bacteruria:    - Doubt UTI.  Pt with < 50K normal  musa.  Currently denies urinary symptoms.  Urine culture results likely reflect colonization, not infection.    - D/c rocephin and monitor off abx.     - Monitor for worsening fever, WBC, urinary symptoms, abd pain.  Pt remains stable off abx.    No acute ID issues at this time.      Will follow,    Gianna Sloan  [phone]

## 2019-11-02 NOTE — PROGRESS NOTE ADULT - ATTENDING COMMENTS
Patient seen and examined.  Agree with above.   Follow up neuro  Monitor tele  Consider pulm eval for severe pulmonary HTN    Lewis Snow MD

## 2019-11-02 NOTE — PROGRESS NOTE ADULT - SUBJECTIVE AND OBJECTIVE BOX
Patient denies chest pain or shortness of breath.   Review of systems otherwise (-)  	    MEDICATIONS:  MEDICATIONS  (STANDING):  aspirin enteric coated 81 milliGRAM(s) Oral daily  atorvastatin 80 milliGRAM(s) Oral at bedtime  calcium carbonate    500 mG (Tums) Chewable 1 Tablet(s) Chew two times a day  cholecalciferol 1000 Unit(s) Oral daily  dextrose 5%. 1000 milliLiter(s) (50 mL/Hr) IV Continuous <Continuous>  dextrose 50% Injectable 12.5 Gram(s) IV Push once  dextrose 50% Injectable 25 Gram(s) IV Push once  dextrose 50% Injectable 25 Gram(s) IV Push once  enoxaparin Injectable 40 milliGRAM(s) SubCutaneous daily  gabapentin 100 milliGRAM(s) Oral three times a day  insulin lispro (HumaLOG) corrective regimen sliding scale   SubCutaneous three times a day before meals  insulin lispro (HumaLOG) corrective regimen sliding scale   SubCutaneous at bedtime  meclizine 12.5 milliGRAM(s) Oral daily  multivitamin 1 Tablet(s) Oral daily  pantoprazole    Tablet 40 milliGRAM(s) Oral before breakfast  tiotropium 18 MICROgram(s) Capsule 1 Capsule(s) Inhalation daily      LABS:	 	    CARDIAC MARKERS:                          12.9   4.28  )-----------( 193      ( 02 Nov 2019 06:13 )             39.3     Hemoglobin: 12.9 g/dL (11-02 @ 06:13)  Hemoglobin: 11.6 g/dL (11-01 @ 06:15)  Hemoglobin: 11.7 g/dL (10-31 @ 06:15)  Hemoglobin: 12.2 g/dL (10-30 @ 15:20)      11-02    142  |  99  |  28<H>  ----------------------------<  145<H>  4.1   |  32<H>  |  1.02    Ca    9.6      02 Nov 2019 06:13  Phos  3.8     11-01  Mg     1.7     11-02      Creatinine Trend: 1.02<--, 1.11<--, 1.18<--, 1.13<--    COAGS:       proBNP:   Lipid Profile:   HgA1c:   TSH:       PHYSICAL EXAM:  T(C): 36.4 (11-02-19 @ 05:37), Max: 36.8 (11-01-19 @ 20:33)  HR: 66 (11-02-19 @ 05:37) (66 - 87)  BP: 113/64 (11-02-19 @ 05:37) (113/64 - 120/61)  RR: 18 (11-02-19 @ 05:37) (18 - 18)  SpO2: 100% (11-02-19 @ 05:37) (100% - 100%)  Wt(kg): --  I&O's Summary    01 Nov 2019 07:01  -  02 Nov 2019 07:00  --------------------------------------------------------  IN: 890 mL / OUT: 1065 mL / NET: -175 mL      Weight (kg): 76.5 (11-01 @ 20:33)    Gen: Appears well in NAD  HEENT:  (-)icterus (-)pallor  CV: N S1 S2 1/6 DAMIAN (+)2 Pulses B/l  Resp:  Clear to ausculatation B/L, normal effort  GI: (+) BS Soft, NT, ND  Lymph:  (-)Edema, (-)obvious lymphadenopathy  Skin: Warm to touch, Normal turgor  Psych: Appropriate mood and affect      TELEMETRY: 	  SR 70's, PVCs     ASSESSMENT/PLAN: 	    79 year old female, with past history significant for CHF, stroke (2016) with right sided residual weakness, COPD, GERD, HTN, L-knee replacement p/w dizziness x2 weeks. [Patient does not follow with cardiology and has not had any recent ischemic work up. Last NST in May of 2017 was abnormal noted for defect at inferior wall c/w infarct, no evidence of ischemia. Patient states that symptoms have been ongoing for the past two weeks.  Cardiology consulted for CHF management, dizziness from cardio embolic source.    -- no cp, sob, or anginal symptoms   -- orthostatics negative    -- cont telemetry, monitor for malignant arrhythmia  -- echo as noted above  -- CTA Head and Neck as noted - negative   -- dizziness w/u per neuro, note appreciated, f/u reccs per primary team   -- do not anticipate inpt cardiac work up at this time

## 2019-11-03 NOTE — PROGRESS NOTE ADULT - SUBJECTIVE AND OBJECTIVE BOX
Patient denies chest pain or shortness of breath.   Review of systems otherwise (-)  	    MEDICATIONS  (STANDING):  aspirin enteric coated 81 milliGRAM(s) Oral daily  atorvastatin 80 milliGRAM(s) Oral at bedtime  calcium carbonate    500 mG (Tums) Chewable 1 Tablet(s) Chew two times a day  cholecalciferol 1000 Unit(s) Oral daily  dextrose 5%. 1000 milliLiter(s) (50 mL/Hr) IV Continuous <Continuous>  dextrose 50% Injectable 12.5 Gram(s) IV Push once  dextrose 50% Injectable 25 Gram(s) IV Push once  dextrose 50% Injectable 25 Gram(s) IV Push once  enoxaparin Injectable 40 milliGRAM(s) SubCutaneous daily  gabapentin 100 milliGRAM(s) Oral three times a day  insulin lispro (HumaLOG) corrective regimen sliding scale   SubCutaneous three times a day before meals  insulin lispro (HumaLOG) corrective regimen sliding scale   SubCutaneous at bedtime  meclizine 12.5 milliGRAM(s) Oral daily  multivitamin 1 Tablet(s) Oral daily  pantoprazole    Tablet 40 milliGRAM(s) Oral before breakfast  tiotropium 18 MICROgram(s) Capsule 1 Capsule(s) Inhalation daily    MEDICATIONS  (PRN):  acetaminophen   Tablet .. 650 milliGRAM(s) Oral every 6 hours PRN Temp greater or equal to 38C (100.4F), Mild Pain (1 - 3)  ALBUTerol    90 MICROgram(s) HFA Inhaler 2 Puff(s) Inhalation every 4 hours PRN Shortness of Breath and/or Wheezing  dextrose 40% Gel 15 Gram(s) Oral once PRN Blood Glucose LESS THAN 70 milliGRAM(s)/deciliter  glucagon  Injectable 1 milliGRAM(s) IntraMuscular once PRN Glucose LESS THAN 70 milligrams/deciliter      LABS:                            13.1   4.16  )-----------( 196      ( 03 Nov 2019 07:53 )             40.5     137  |  95<L>  |  24<H>  ----------------------------<  152<H>  3.5   |  32<H>  |  0.95    Ca    9.7      03 Nov 2019 07:53  Mg     1.8     11-03    Creatinine Trend: 0.95<--, 1.02<--, 1.11<--, 1.18<--, 1.13<--     PHYSICAL EXAM  Vital Signs Last 24 Hrs  T(C): 36.7 (03 Nov 2019 05:31), Max: 37.1 (02 Nov 2019 18:11)  T(F): 98 (03 Nov 2019 05:31), Max: 98.7 (02 Nov 2019 18:11)  HR: 69 (03 Nov 2019 05:31) (69 - 91)  BP: 119/72 (03 Nov 2019 05:31) (119/72 - 142/83)  RR: 18 (03 Nov 2019 05:31) (18 - 18)  SpO2: 98% (03 Nov 2019 05:31) (98% - 99%)      Gen: Appears well in NAD  HEENT:  (-)icterus (-)pallor  CV: N S1 S2 1/6 DAMIAN (+)2 Pulses B/l  Resp:  Clear to ausculatation B/L, normal effort  GI: (+) BS Soft, NT, ND  Lymph:  (-)Edema, (-)obvious lymphadenopathy  Skin: Warm to touch, Normal turgor  Psych: Appropriate mood and affect      TELEMETRY: 	  SR 70's, PVCs       < from: Transthoracic Echocardiogram (11.01.19 @ 08:33) >  CONCLUSIONS:  1. Mitral annular calcification, otherwise normal mitral  valve. Minimal mitral regurgitation.  2. Normal left ventricular internal dimensions and wall  thicknesses.  3. Normal left ventricular systolic function. No segmental  wall motion abnormalities.  4. Mild diastolic dysfunction (Stage I).  5. Normalright ventricular size and function.  6. Estimated right ventricular systolic pressure equals 71  mm Hg, assuming right atrial pressure equals 10 mm Hg,  consistent with severe pulmonary hypertension.  7. A bubble study was performed with the intravenous  injection of agitated saline.  Following contrast  injection, no obvious bubbles were seen in the left heart.  8. The coronary sinus is markedly dilated.  A separate  bubble study was performed with the intravenous injection  of agitated saline contrast from a left upper extremity  vein.  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  ------------------------------------------------------------------------  Confirmed on  11/1/2019 - 11:25:15 by Reinaldo Edwards M.D.    < end of copied text >    < from: Nuclear Stress Test-Pharmacologic (05.25.17 @ 09:28) >  GATED ANALYSIS:  Post-stress gated wall motion analysis was performed (LVEF  > 70%;LVEDV= 43 ml.), revealing normal LV function. The  RV appears midly enlarged and diffusely hypocontractile.  ------------------------------------------------------------------------  IMPRESSIONS:Abnormal Study  * Myocardial Perfusion SPECT results are mildly abnormal.  * There is a small, mild defect in basal inferior wall  that is fixed, suggestive of infarct. However, the  observed defect may be due to attenuation from the  diaphragm. The patient could not lay prone for attenuation  corrected imaging.  * No clear evidence of ischemia.  * Post-stress gated wall motion analysis was performed  (LVEF > 70%;LVEDV = 43 ml.), revealing normal LV function.  The RV appears midly enlarged and diffusely  hypocontractile.  ------------------------------------------------------------------------  Confirmed on  5/25/2017 - 17:27:43 by Max Cabrales M.D.    < end of copied text >      ASSESSMENT/PLAN: 	    79 year old female, with past history significant for CHF, stroke (2016) with right sided residual weakness, COPD, GERD, HTN, L-knee replacement p/w dizziness x2 weeks. Patient does not follow with cardiology and has not had any recent ischemic work up. Last NST in May of 2017 was abnormal noted for defect at inferior wall c/w infarct, no evidence of ischemia. Patient states that symptoms have been ongoing for the past two weeks.      -- no cp, sob, or anginal symptoms   -- orthostatics negative    -- echo as noted above, consider Pulm eval for severe Pulm HTN  -- CTA Head and Neck- negative   -- dizziness w/u per neuro, note appreciated, f/u reccs per primary team

## 2019-11-03 NOTE — PROGRESS NOTE ADULT - ASSESSMENT
Problem/Plan - 1:  ·  Problem: Dizziness.  Plan: - Neuro consulted. Likely 2/2 CVA vs. UTI.  - neuro and cards fu   - management as per neuro     Problem/Plan - 2:  ·  Problem: UTI (urinary tract infection).  Plan: - UA positive.   - ID fu   - Check UCx.   - antibiotics as per ID     Problem/Plan - 3:  ·  Problem: COPD (chronic obstructive pulmonary disease).  Plan: - Resp stable.  - Will resume home meds once med rec available.     Problem/Plan - 4:  ·  Problem: HTN (hypertension).  Plan: - BP within normal limit.  - continue to monitor.

## 2019-11-03 NOTE — PROGRESS NOTE ADULT - SUBJECTIVE AND OBJECTIVE BOX
Patient is a 79y old  Female who presents with a chief complaint of Dizziness (03 Nov 2019 14:06)      INTERVAL HPI/OVERNIGHT EVENTS:  T(C): 36.7 (11-03-19 @ 17:22), Max: 37.1 (11-02-19 @ 18:11)  HR: 65 (11-03-19 @ 17:22) (65 - 91)  BP: 101/63 (11-03-19 @ 17:22) (101/63 - 142/83)  RR: 17 (11-03-19 @ 17:22) (17 - 18)  SpO2: 99% (11-03-19 @ 17:22) (98% - 99%)  Wt(kg): --  I&O's Summary    02 Nov 2019 08:01  -  03 Nov 2019 07:00  --------------------------------------------------------  IN: 0 mL / OUT: 425 mL / NET: -425 mL    03 Nov 2019 07:01  -  03 Nov 2019 17:27  --------------------------------------------------------  IN: 500 mL / OUT: 350 mL / NET: 150 mL        LABS:                        13.1   4.16  )-----------( 196      ( 03 Nov 2019 07:53 )             40.5     11-03    137  |  95<L>  |  24<H>  ----------------------------<  152<H>  3.5   |  32<H>  |  0.95    Ca    9.7      03 Nov 2019 07:53  Mg     1.8     11-03          CAPILLARY BLOOD GLUCOSE      POCT Blood Glucose.: 126 mg/dL (03 Nov 2019 17:26)  POCT Blood Glucose.: 104 mg/dL (03 Nov 2019 12:40)  POCT Blood Glucose.: 123 mg/dL (03 Nov 2019 08:30)  POCT Blood Glucose.: 143 mg/dL (02 Nov 2019 22:08)            MEDICATIONS  (STANDING):  aspirin enteric coated 81 milliGRAM(s) Oral daily  atorvastatin 80 milliGRAM(s) Oral at bedtime  calcium carbonate    500 mG (Tums) Chewable 1 Tablet(s) Chew two times a day  cholecalciferol 1000 Unit(s) Oral daily  dextrose 5%. 1000 milliLiter(s) (50 mL/Hr) IV Continuous <Continuous>  dextrose 50% Injectable 12.5 Gram(s) IV Push once  dextrose 50% Injectable 25 Gram(s) IV Push once  dextrose 50% Injectable 25 Gram(s) IV Push once  enoxaparin Injectable 40 milliGRAM(s) SubCutaneous daily  gabapentin 100 milliGRAM(s) Oral three times a day  insulin lispro (HumaLOG) corrective regimen sliding scale   SubCutaneous three times a day before meals  insulin lispro (HumaLOG) corrective regimen sliding scale   SubCutaneous at bedtime  meclizine 12.5 milliGRAM(s) Oral daily  multivitamin 1 Tablet(s) Oral daily  pantoprazole    Tablet 40 milliGRAM(s) Oral before breakfast  tiotropium 18 MICROgram(s) Capsule 1 Capsule(s) Inhalation daily    MEDICATIONS  (PRN):  acetaminophen   Tablet .. 650 milliGRAM(s) Oral every 6 hours PRN Temp greater or equal to 38C (100.4F), Mild Pain (1 - 3)  ALBUTerol    90 MICROgram(s) HFA Inhaler 2 Puff(s) Inhalation every 4 hours PRN Shortness of Breath and/or Wheezing  dextrose 40% Gel 15 Gram(s) Oral once PRN Blood Glucose LESS THAN 70 milliGRAM(s)/deciliter  glucagon  Injectable 1 milliGRAM(s) IntraMuscular once PRN Glucose LESS THAN 70 milligrams/deciliter          PHYSICAL EXAM:  GENERAL: NAD, well-groomed, well-developed  HEAD:  Atraumatic, Normocephalic  CHEST/LUNG: Clear to percussion bilaterally; No rales, rhonchi, wheezing, or rubs  HEART: Regular rate and rhythm; No murmurs, rubs, or gallops  ABDOMEN: Soft, Nontender, Nondistended; Bowel sounds present  EXTREMITIES:  2+ Peripheral Pulses, No clubbing, cyanosis, or edema  LYMPH: No lymphadenopathy noted  SKIN: No rashes or lesions    Care Discussed with Consultants/Other Providers [ ] YES  [ ] NO

## 2019-11-04 NOTE — PROGRESS NOTE ADULT - ASSESSMENT
79 year old female, with past history significant for CHF, stroke with right sided residual weakness,COPD, GERD, HTN, L-knee replacement p/w dizziness x2 weeks. Patient a poor historian; as per patient, issue has been going on for the past two weeks on and off but reports events happening in August, reports that it has occurred both with anemia and low blood sugar but has been unable to say when this occurred or if her blood sugar was checked during prior events. As per patient, not lightheadedness, more room spinning in quality. Denies focal weakness although present on exam, altered sensorium, blurred vision, slurred speech. (31 Oct 2019 00:16)    ER vss.  Pt afebrile, no leukocytosis.  UA large LE, >50 WBCs, <50K nl  musa.   Pt currently denies dysuria, frequency, flank pain.  ID consult called for further recommendations.      Asymptomatic bacteruria:    - Doubt UTI.  Pt with < 50K normal  msua.  Currently denies urinary symptoms.  Urine culture results likely reflect colonization, not infection.    - D/c rocephin and monitor off abx.     - Monitor for worsening fever, WBC, urinary symptoms, abd pain.  Pt remains stable off abx.    No new localizing symptoms on clinical exam.  No further ID w/u indicated at this time.      Will follow,    Gianna Sloan  [phone]

## 2019-11-04 NOTE — PROGRESS NOTE ADULT - ASSESSMENT
Problem/Plan - 1:  ·  Problem: Dizziness.  Plan: - Neuro consulted. Likely 2/2 CVA vs. UTI.  - neuro and cards fu   - management as per neuro     Problem/Plan - 2:  ·  Problem: UTI (urinary tract infection).  Plan: - UA positive.   - ID fu   - off antibiotics    Problem/Plan - 3:  ·  Problem: COPD (chronic obstructive pulmonary disease).  Plan: - Resp stable.  - Will resume home meds once med rec available.     Problem/Plan - 4:  ·  Problem: HTN (hypertension).  Plan: - BP within normal limit.  - continue to monitor.     stable for dc

## 2019-11-04 NOTE — PROGRESS NOTE ADULT - SUBJECTIVE AND OBJECTIVE BOX
Infectious Diseases progress note:    Subjective: Feeling better.  Denies fever/chills/abd pain/dysuria.  No leukocytosis.      ROS:  CONSTITUTIONAL:  No fever, chills, rigors  CARDIOVASCULAR:  No chest pain or palpitations  RESPIRATORY:   No SOB, cough, dyspnea on exertion.  No wheezing  GASTROINTESTINAL:  No abd pain, N/V, diarrhea/constipation  EXTREMITIES:  No swelling or joint pain  GENITOURINARY:  No burning on urination, increased frequency or urgency.  No flank pain  NEUROLOGIC:  No HA, visual disturbances  SKIN: No rashes    Allergies    No Known Allergies    Intolerances        ANTIBIOTICS/RELEVANT:  antimicrobials    immunologic:    OTHER:  acetaminophen   Tablet .. 650 milliGRAM(s) Oral every 6 hours PRN  ALBUTerol    90 MICROgram(s) HFA Inhaler 2 Puff(s) Inhalation every 4 hours PRN  aspirin enteric coated 81 milliGRAM(s) Oral daily  atorvastatin 80 milliGRAM(s) Oral at bedtime  calcium carbonate    500 mG (Tums) Chewable 1 Tablet(s) Chew two times a day  cholecalciferol 1000 Unit(s) Oral daily  dextrose 40% Gel 15 Gram(s) Oral once PRN  dextrose 5%. 1000 milliLiter(s) IV Continuous <Continuous>  dextrose 50% Injectable 12.5 Gram(s) IV Push once  dextrose 50% Injectable 25 Gram(s) IV Push once  dextrose 50% Injectable 25 Gram(s) IV Push once  enoxaparin Injectable 40 milliGRAM(s) SubCutaneous daily  gabapentin 100 milliGRAM(s) Oral three times a day  glucagon  Injectable 1 milliGRAM(s) IntraMuscular once PRN  insulin lispro (HumaLOG) corrective regimen sliding scale   SubCutaneous three times a day before meals  insulin lispro (HumaLOG) corrective regimen sliding scale   SubCutaneous at bedtime  meclizine 12.5 milliGRAM(s) Oral daily  multivitamin 1 Tablet(s) Oral daily  pantoprazole    Tablet 40 milliGRAM(s) Oral before breakfast  tiotropium 18 MICROgram(s) Capsule 1 Capsule(s) Inhalation daily      Objective:  Vital Signs Last 24 Hrs  T(C): 36.3 (04 Nov 2019 11:57), Max: 36.7 (03 Nov 2019 17:22)  T(F): 97.4 (04 Nov 2019 11:57), Max: 98.1 (03 Nov 2019 17:22)  HR: 72 (04 Nov 2019 11:57) (65 - 72)  BP: 116/62 (04 Nov 2019 11:57) (101/63 - 116/62)  BP(mean): --  RR: 18 (04 Nov 2019 11:57) (17 - 18)  SpO2: 99% (04 Nov 2019 11:57) (99% - 99%)    PHYSICAL EXAM:  Constitutional:NAD  Eyes:DON, EOMI  Ear/Nose/Throat: no thrush, mucositis.  Moist mucous membranes	  Neck:no JVD, no lymphadenopathy, supple  Respiratory: CTA vernon  Cardiovascular: S1S2 RRR, no murmurs  Gastrointestinal:soft, nontender,  nondistended (+) BS  Extremities:no e/e/c  Skin:  no rashes, open wounds or ulcerations        LABS:                        12.4   4.84  )-----------( 183      ( 04 Nov 2019 05:35 )             40.1     11-04    140  |  97<L>  |  32<H>  ----------------------------<  120<H>  4.4   |  30  |  1.17    Ca    9.6      04 Nov 2019 05:35  Mg     1.9     11-04            MICROBIOLOGY:    Culture - Urine in AM (10.30.19 @ 18:24)    Culture - Urine:   NGUF^Normal genitourinary musa  COLONY COUNT: 10,000-49,000 CFU/mL    Specimen Source: URINE MIDSTREAM          RADIOLOGY & ADDITIONAL STUDIES:

## 2019-11-04 NOTE — PROGRESS NOTE ADULT - SUBJECTIVE AND OBJECTIVE BOX
S: Denies chest pain or SOB. Review of systems otherwise (-)  	    MEDICATIONS  (STANDING):  aspirin enteric coated 81 milliGRAM(s) Oral daily  atorvastatin 80 milliGRAM(s) Oral at bedtime  calcium carbonate    500 mG (Tums) Chewable 1 Tablet(s) Chew two times a day  cholecalciferol 1000 Unit(s) Oral daily  dextrose 5%. 1000 milliLiter(s) (50 mL/Hr) IV Continuous <Continuous>  dextrose 50% Injectable 12.5 Gram(s) IV Push once  dextrose 50% Injectable 25 Gram(s) IV Push once  dextrose 50% Injectable 25 Gram(s) IV Push once  enoxaparin Injectable 40 milliGRAM(s) SubCutaneous daily  gabapentin 100 milliGRAM(s) Oral three times a day  insulin lispro (HumaLOG) corrective regimen sliding scale   SubCutaneous three times a day before meals  insulin lispro (HumaLOG) corrective regimen sliding scale   SubCutaneous at bedtime  meclizine 12.5 milliGRAM(s) Oral daily  multivitamin 1 Tablet(s) Oral daily  pantoprazole    Tablet 40 milliGRAM(s) Oral before breakfast  tiotropium 18 MICROgram(s) Capsule 1 Capsule(s) Inhalation daily    MEDICATIONS  (PRN):  acetaminophen   Tablet .. 650 milliGRAM(s) Oral every 6 hours PRN Temp greater or equal to 38C (100.4F), Mild Pain (1 - 3)  ALBUTerol    90 MICROgram(s) HFA Inhaler 2 Puff(s) Inhalation every 4 hours PRN Shortness of Breath and/or Wheezing  dextrose 40% Gel 15 Gram(s) Oral once PRN Blood Glucose LESS THAN 70 milliGRAM(s)/deciliter  glucagon  Injectable 1 milliGRAM(s) IntraMuscular once PRN Glucose LESS THAN 70 milligrams/deciliter      LABS:                            12.4   4.84  )-----------( 183      ( 04 Nov 2019 05:35 )             40.1     Hemoglobin: 12.4 g/dL (11-04 @ 05:35)  Hemoglobin: 13.1 g/dL (11-03 @ 07:53)  Hemoglobin: 12.9 g/dL (11-02 @ 06:13)  Hemoglobin: 11.6 g/dL (11-01 @ 06:15)  Hemoglobin: 11.7 g/dL (10-31 @ 06:15)    11-04    140  |  97<L>  |  32<H>  ----------------------------<  120<H>  4.4   |  30  |  1.17    Ca    9.6      04 Nov 2019 05:35  Mg     1.9     11-04      Creatinine Trend: 1.17<--, 0.95<--, 1.02<--, 1.11<--, 1.18<--, 1.13<--             11-03-19 @ 07:01  -  11-04-19 @ 07:00  --------------------------------------------------------  IN: 900 mL / OUT: 1150 mL / NET: -250 mL        PHYSICAL EXAM  Vital Signs Last 24 Hrs  T(C): 36.4 (04 Nov 2019 05:41), Max: 36.7 (03 Nov 2019 17:22)  T(F): 97.6 (04 Nov 2019 05:41), Max: 98.1 (03 Nov 2019 17:22)  HR: 71 (04 Nov 2019 05:41) (65 - 71)  BP: 113/71 (04 Nov 2019 05:41) (101/63 - 113/71)  BP(mean): --  RR: 17 (04 Nov 2019 05:41) (17 - 17)  SpO2: 99% (04 Nov 2019 05:41) (99% - 99%)      Gen: Appears well in NAD  HEENT:  (-)icterus (-)pallor  CV: N S1 S2 1/6 DAMIAN (+)2 Pulses B/l  Resp:  Clear to auscultation B/L, normal effort  GI: (+) BS Soft, NT, ND  Lymph:  (-)Edema, (-)obvious lymphadenopathy  Skin: Warm to touch, Normal turgor  Psych: Appropriate mood and affect    TELEMETRY: SR    < from: Transthoracic Echocardiogram (11.01.19 @ 08:33) >  CONCLUSIONS:  1. Mitral annular calcification, otherwise normal mitral  valve. Minimal mitral regurgitation.  2. Normal left ventricular internal dimensions and wall  thicknesses.  3. Normal left ventricular systolic function. No segmental  wall motion abnormalities.  4. Mild diastolic dysfunction (Stage I).  5. Normalright ventricular size and function.  6. Estimated right ventricular systolic pressure equals 71  mm Hg, assuming right atrial pressure equals 10 mm Hg,  consistent with severe pulmonary hypertension.  7. A bubble study was performed with the intravenous  injection of agitated saline.  Following contrast  injection, no obvious bubbles were seen in the left heart.  8. The coronary sinus is markedly dilated.  A separate  bubble study was performed with the intravenous injection  of agitated saline contrast from a left upper extremity  vein.  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  ------------------------------------------------------------------------  Confirmed on  11/1/2019 - 11:25:15 by Reinaldo Edwards M.D.    < end of copied text >    < from: Nuclear Stress Test-Pharmacologic (05.25.17 @ 09:28) >  GATED ANALYSIS:  Post-stress gated wall motion analysis was performed (LVEF  > 70%;LVEDV= 43 ml.), revealing normal LV function. The  RV appears midly enlarged and diffusely hypocontractile.  ------------------------------------------------------------------------  IMPRESSIONS:Abnormal Study  * Myocardial Perfusion SPECT results are mildly abnormal.  * There is a small, mild defect in basal inferior wall  that is fixed, suggestive of infarct. However, the  observed defect may be due to attenuation from the  diaphragm. The patient could not lay prone for attenuation  corrected imaging.  * No clear evidence of ischemia.  * Post-stress gated wall motion analysis was performed  (LVEF > 70%;LVEDV = 43 ml.), revealing normal LV function.  The RV appears midly enlarged and diffusely  hypocontractile.  ------------------------------------------------------------------------  Confirmed on  5/25/2017 - 17:27:43 by Max Cabrales M.D.    < end of copied text >      ASSESSMENT/PLAN: 	    79 year old female, with past history significant for CHF, stroke (2016) with right sided residual weakness, COPD, GERD, HTN, L-knee replacement p/w dizziness x2 weeks. Patient does not follow with cardiology and has not had any recent ischemic work up. Last NST in May of 2017 was abnormal noted for defect at inferior wall c/w infarct, no evidence of ischemia. Patient states that symptoms have been ongoing for the past two weeks.      -- Tele remains stable  -- No anginal symptoms  -- Orthostatics negative    -- TTE noted above - consider Pulm eval for severe Pulm HTN  -- CTA Head and Neck- negative   -- Neuro f/u  -- No further inpatient cardiac w/u needed at this time    Ochoa Miranda PA-C  [facility]  Pager: [phone]

## 2019-11-04 NOTE — DISCHARGE NOTE NURSING/CASE MANAGEMENT/SOCIAL WORK - PATIENT PORTAL LINK FT
You can access the FollowMyHealth Patient Portal offered by [facility] by registering at the following website: [email or web address]. By joining [facility]’s FollowMyHealth portal, you will also be able to view your health information using other applications (apps) compatible with our system.

## 2019-11-04 NOTE — PROGRESS NOTE ADULT - SUBJECTIVE AND OBJECTIVE BOX
Patient is a 79y old  Female who presents with a chief complaint of Dizziness (04 Nov 2019 17:19)      INTERVAL HPI/OVERNIGHT EVENTS:  T(C): 36.3 (11-04-19 @ 11:57), Max: 36.7 (11-03-19 @ 21:10)  HR: 72 (11-04-19 @ 11:57) (67 - 72)  BP: 116/62 (11-04-19 @ 11:57) (107/57 - 116/62)  RR: 18 (11-04-19 @ 11:57) (17 - 18)  SpO2: 99% (11-04-19 @ 11:57) (99% - 99%)  Wt(kg): --  I&O's Summary    03 Nov 2019 07:01  -  04 Nov 2019 07:00  --------------------------------------------------------  IN: 900 mL / OUT: 1150 mL / NET: -250 mL        LABS:                        12.4   4.84  )-----------( 183      ( 04 Nov 2019 05:35 )             40.1     11-04    140  |  97<L>  |  32<H>  ----------------------------<  120<H>  4.4   |  30  |  1.17    Ca    9.6      04 Nov 2019 05:35  Mg     1.9     11-04          CAPILLARY BLOOD GLUCOSE      POCT Blood Glucose.: 170 mg/dL (04 Nov 2019 17:48)  POCT Blood Glucose.: 124 mg/dL (04 Nov 2019 12:10)  POCT Blood Glucose.: 139 mg/dL (04 Nov 2019 08:26)  POCT Blood Glucose.: 156 mg/dL (03 Nov 2019 23:00)            MEDICATIONS  (STANDING):  aspirin enteric coated 81 milliGRAM(s) Oral daily  atorvastatin 80 milliGRAM(s) Oral at bedtime  calcium carbonate    500 mG (Tums) Chewable 1 Tablet(s) Chew two times a day  cholecalciferol 1000 Unit(s) Oral daily  dextrose 5%. 1000 milliLiter(s) (50 mL/Hr) IV Continuous <Continuous>  dextrose 50% Injectable 12.5 Gram(s) IV Push once  dextrose 50% Injectable 25 Gram(s) IV Push once  dextrose 50% Injectable 25 Gram(s) IV Push once  enoxaparin Injectable 40 milliGRAM(s) SubCutaneous daily  gabapentin 100 milliGRAM(s) Oral three times a day  insulin lispro (HumaLOG) corrective regimen sliding scale   SubCutaneous three times a day before meals  insulin lispro (HumaLOG) corrective regimen sliding scale   SubCutaneous at bedtime  meclizine 12.5 milliGRAM(s) Oral daily  multivitamin 1 Tablet(s) Oral daily  pantoprazole    Tablet 40 milliGRAM(s) Oral before breakfast  tiotropium 18 MICROgram(s) Capsule 1 Capsule(s) Inhalation daily    MEDICATIONS  (PRN):  acetaminophen   Tablet .. 650 milliGRAM(s) Oral every 6 hours PRN Temp greater or equal to 38C (100.4F), Mild Pain (1 - 3)  ALBUTerol    90 MICROgram(s) HFA Inhaler 2 Puff(s) Inhalation every 4 hours PRN Shortness of Breath and/or Wheezing  dextrose 40% Gel 15 Gram(s) Oral once PRN Blood Glucose LESS THAN 70 milliGRAM(s)/deciliter  glucagon  Injectable 1 milliGRAM(s) IntraMuscular once PRN Glucose LESS THAN 70 milligrams/deciliter          PHYSICAL EXAM:  GENERAL: NAD, well-groomed, well-developed  HEAD:  Atraumatic, Normocephalic  CHEST/LUNG: Clear to percussion bilaterally; No rales, rhonchi, wheezing, or rubs  HEART: Regular rate and rhythm; No murmurs, rubs, or gallops  ABDOMEN: Soft, Nontender, Nondistended; Bowel sounds present  EXTREMITIES:  2+ Peripheral Pulses, No clubbing, cyanosis, or edema  LYMPH: No lymphadenopathy noted  SKIN: No rashes or lesions    Care Discussed with Consultants/Other Providers [ ] YES  [ ] NO

## 2019-11-25 PROBLEM — I67.9: Status: ACTIVE | Noted: 2019-01-01

## 2019-11-26 PROBLEM — N26.1 ATROPHY OF RIGHT KIDNEY: Status: ACTIVE | Noted: 2019-01-01

## 2019-11-26 PROBLEM — N20.0 RECURRENT NEPHROLITHIASIS: Status: ACTIVE | Noted: 2019-01-01

## 2019-11-26 PROBLEM — Z71.89 ADVANCE CARE PLANNING: Status: ACTIVE | Noted: 2019-01-01

## 2019-11-26 NOTE — CHRONIC CARE ASSESSMENT
[Can not Exercise (Disability)] : Exercise: The patient can not exercise due to disability [None] : The patient does not exercise [Diabetic Diet] : diabetic [Low Salt Diet] : low salt [General Adherence] : and is generally adherent [PPS Score: ____] : Palliative Performance Scale (PPS) Score: [unfilled]

## 2019-11-26 NOTE — REASON FOR VISIT
[Initial Annual Medicare Wellness Visit] : an initial annual Medicare wellness visit [Family Member] : family member [Pre-Visit Preparation] : pre-visit preparation was done [Intercurrent Specialty/Sub-specialty Visits] : the patient has intercurrent specialty/sub-specialty visits [FreeTextEntry3] : cardiology [FreeTextEntry1] : COPD, chronic diastolic heart failure, prediabetes, h/o CVA with residual right-sided hemiparesis

## 2019-11-26 NOTE — HISTORY OF PRESENT ILLNESS
[Patient] : patient [Family Member] : family member [FreeTextEntry2] : Ms. Klein is a 79 year old femal with COPD, chronic diastolic heart failure, prediabetes, h/o CVA with residual right-sided hemiparesis. Had recent hospitalization for vertigo which was thought to be due to TIA versus UTI. She continues to have occasional vertigo but it has lessened. Taking meclizine as needed once daily with improvement in symptoms. Symptoms usually last only for a couple of hours. \par \par Patient has COPD and severe pulmonary hypertension. Plans to establish with cardiology and pulmonary. Only using ventolin inhaler, does not have a nebulizer. Was intended to have Tudorza, but never received this from the hospital. \par \mishel Has chronic left knee pain after knee surgery. 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 causes chronic pain. Takes aspirin and tylenol for symptoms. Also has severe scoliosis. \par \mishel Currently complains of cough x 1 week. Nonproductive. Felt warm a few days ago, but no known fevers. Not taking anything for symptoms. Reports on and off shortness of breath which is chronic.

## 2019-11-26 NOTE — PHYSICAL EXAM
[No Acute Distress] : no acute distress [Well Nourished] : well nourished [Well Developed] : well developed [Normal Sclera/Conjunctiva] : normal sclera/conjunctiva [Normal Outer Ear/Nose] : the ears and nose were normal in appearance [EOMI] : extra ocular movement intact [Normal Oropharynx] : the oropharynx was normal [Clear to Auscultation] : lungs were clear to auscultation bilaterally [No Respiratory Distress] : no respiratory distress [No Accessory Muscle Use] : no accessory muscle use [Normal Rate] : heart rate was normal  [Regular Rhythm] : with a regular rhythm [Normal S1, S2] : normal S1 and S2 [No Murmurs] : no murmurs heard [No Edema] : there was no peripheral edema [Normal Bowel Sounds] : normal bowel sounds [Non Tender] : non-tender [Soft] : abdomen soft [Normal Post Cervical Nodes] : no posterior cervical lymphadenopathy [Not Distended] : not distended [Normal Anterior Cervical Nodes] : no anterior cervical lymphadenopathy [No CVA Tenderness] : no ~M costovertebral angle tenderness [No Spinal Tenderness] : no spinal tenderness [Normal Gait] : normal gait [Normal Strength/Tone] : muscle strength and tone were normal [Oriented x3] : oriented to person, place, and time [No Rash] : no rash [Normal Affect] : the affect was normal

## 2019-12-06 NOTE — ED ADULT NURSE NOTE - EXTENSIONS OF SELF_ADULT
Faxed form received from [facility] regarding bilateral baseline screening mammogram.    RN contacted [facility] and was informed that the faxed copy was also faxed to Dr. Maria Eugenia Street's office as well, and that pt had requested that a copy be cc to Dr. Romero's office as well.     Form was placed for scanning into pt medical chart.     LOV: 8/14/19  Scheduled office visit: 2/19/20.  
walker

## 2019-12-13 NOTE — ED PROVIDER NOTE - NS ED ROS FT
GENERAL: no fever, no chills, +generalized weakness  EYES: no change in vision  HEENT: no trouble swallowing or speaking  CARDIAC: no chest pain, no palpitations   PULMONARY: no cough, no shortness of breath, no wheezing  GI: no abdominal pain, no nausea, no vomiting, +diarrhea, no constipation  : no changes in urination, no dysuria, no frequency  SKIN: no rashes  NEURO: no headache, no numbness  MSK: no joint pain, no muscle pain, no back pain, no calf pain     -Joshua Montes, PGY-1

## 2019-12-13 NOTE — PROVIDER CONTACT NOTE (OTHER) - ASSESSMENT
I called [facility], spoke with Steven. He said he can not send another crew to take the walker, he has to send email to his management. But pt needs walker tonight, can not ambulate with out walker at home. So I told Steven to arrange an  to come to ED and take the walker, drop it off with pt at home. Bill back to  Dept.  Trip# [id number]. spoke with daughter Murphy [phone], notified her about the plan. She is in agreement. Notified Deanna RN/Mgr and charge nurse. The walker is at [facility].

## 2019-12-13 NOTE — ED PROVIDER NOTE - CLINICAL SUMMARY MEDICAL DECISION MAKING FREE TEXT BOX
DH: 79 year old female p/w 1 week general malaise, 3 days generalized weakness s/p taking "Prednisone for UTI". Denies fever, chills, cough, abd pain, or dysuria. No new focal deficits on exam. Plan for labs, EKG, CXR, UA/UC, reassess.

## 2019-12-13 NOTE — ED PROVIDER NOTE - NSFOLLOWUPINSTRUCTIONS_ED_ALL_ED_FT
1) You were seen in the emergency department for general malaise. Your urinalysis was positive for an infection. You were given a dose of antibiotics in the emergency department and the rest of the antibiotic was sent to your pharmacy. Please  your antibiotics from the pharmacy and take them as prescribed. Continue taking until finished, even if you feel completely better. Inform your primary doctor if you experience rash, shortness of breath, abdominal pain, or diarrhea.   2) Continue taking all medications as prescribed.   3) Return to the ER for any new or concerning symptoms. 1) You were seen in the emergency department for general malaise. Your urinalysis was positive for an infection. You do not need to take the antibiotics at the pharmacy as you had a one time dose of fosfomycin here that is adequate to treat your urinary tract infection. Inform your primary doctor if you experience rash, shortness of breath, abdominal pain, or diarrhea.   2) Continue taking all medications as prescribed.   3) Return to the ER for any new or concerning symptoms.

## 2019-12-13 NOTE — ED ADULT TRIAGE NOTE - CHIEF COMPLAINT QUOTE
Pt BIBA from home, c/o general malaise, was recently treated for a UTI. Pt was also prescribed Prednisone, but stopped taking after second dose because she felt unwell. Denies fevers, denies cough, denies N/V

## 2019-12-13 NOTE — ED PROVIDER NOTE - OBJECTIVE STATEMENT
79 year old female PMH COPD, HLD, NIDDM, CHF, p/w general malaise. Patient states she was feeling unwell last week, had house call from unknown medical staff that assessed patient, performed UA, and sent patient script for Prednisone. Patient states she took Prednisone for 2 days and began feeling very weak, so she discontinued it. Patient endorses 2 days of watery diarrhea, ~3 episodes per day. Denies fever, chills, cp, sob, abd pain, n/v, dysuria, back pain, or numbness/tingling.

## 2019-12-13 NOTE — ED PROVIDER NOTE - ATTENDING CONTRIBUTION TO CARE
Pt was seen and evaluated by me. Pt is a 78 y/o female with PMHx of COPD, HLD, DM, and CHF who presented to the ED for general malaise X 2 days. Pt states over the past 2 days she has been feeling week. Pt notes she was started on Prednisone last week and notes after a few doses started to have some weakness and had some watery diarrhea. Denies any fever, chills, SOB, chest pain, or abd pain. Pt also notes she was started on Levaquin but not sure why and didn't take any. Lungs CTA b/l. RRR. Abd soft, non-tender. No focal deficits.

## 2019-12-13 NOTE — ED PROVIDER NOTE - PSH
H/O breast biopsy  ~ 3 times on right, 2 times on left  H/O right nephrectomy  ~ [facility]  History of left knee replacement  ~ x 3 (2010, 2011, 2016)

## 2019-12-13 NOTE — ED PROVIDER NOTE - PROGRESS NOTE DETAILS
DH: UA positive - will give abx dose in ED and d/c w/ script. Will attempt to ambulate patient. Repeat trop pending. Patient likely TBDC if delta trop negative. Patient CAOx3, NAD, VSS at this time. Will sign out to incoming resident. DH: Daughter would like to be called after dispo decision - [phone]. Fred PGY1: rpt trop 43 > 49, CHF so less than 20% change, not suspcious for ACS. Fred PGY1: rpt trop 43 > 49, CHF so less than 20% change, not suspcious for ACS. spoke with daughter and aware patient coming home. Patient walking with walker. Patient reevaluated and feeling better. VSS. Reviewed and discussed results with patient. Discussed importance of follow up and return precautions. Patient agrees with plan. Fred PGY1: rpt trop 43 > 49, CHF so less than 20% change, not suspcious for ACS. spoke with daughter and aware patient coming home. Patient walking with walker. Patient very frustrated ambulette is delayed and is concerned about going home in the rain. Also says has no way to get her pharmacy meds over the weekend. Will give fosfomycin 3g here as a one time dose treatment for UTI. Fred PGY1: had patient speak with daughter, aware mother coming home with ambullete and patient more calm now but still frustrated with timing of departure to home and the fact that it's raining. Daughter said very sternly "do not let anything happen to my mother" multiple times on the phone call with me. Will call daughter with update of ambulette time. Fred PGY1: patient has ramp at home, daughter will be at home to let her in. [facility] here to assist patient into home. Spoke with daughter and aware. Given fosfomycin. VSS. Reviewed and discussed results with patient. Discussed importance of follow up and return precautions. Patient agrees with plan.

## 2019-12-13 NOTE — ED PROVIDER NOTE - NEURO NEGATIVE STATEMENT, MLM
no loss of consciousness, no gait abnormality, no headache, no sensory deficits. + generalized weakness

## 2019-12-13 NOTE — ED PROVIDER NOTE - FAMILY HISTORY
Mother  Still living? Unknown  FH: breast cancer, Age at diagnosis: Age Unknown  FH: CHF (congestive heart failure), Age at diagnosis: Age Unknown  FH: diabetes mellitus, Age at diagnosis: Age Unknown     Grandparent  Still living? Unknown  FH: diabetes mellitus, Age at diagnosis: Age Unknown     Child  Still living? Unknown  FH: COPD (chronic obstructive pulmonary disease), Age at diagnosis: Age Unknown

## 2019-12-13 NOTE — PROVIDER CONTACT NOTE (OTHER) - ASSESSMENT
Medical team referred this case as pt needs a ride to her house. Pt is 78 y/o female and as per medical team informed that pt needs BLS for safe discharge and weakness.   Address: [phone] [address]. Writer contacted [facility] [phone] spoke with Ms. Cintron  and confirmation # is [id number] and Marjan contacted [facility] and  time 9:00 pm. Medical team and pt  was notified about the pickup time. Non Emergent ambulance form was signed by MD and has been attached to the pts envelope for EMS. Non Emergent ambulance form has been signed by MD and attached to the pts envelope for EMS. Writer also faxed to [facility] [phone]   and [facility] trip #  [id number] . Writer contacted pts daughter Murphy [phone] and discussed the ETA and plan.

## 2019-12-13 NOTE — ED ADULT NURSE NOTE - OBJECTIVE STATEMENT
Pt is a 79yr old female, A&Ox4 and ambulatory with a walker, complaining of weakness x1 week. Pt reports being diagnosed with a UTI last week and started on Prednisone however did not finish the pack because she began to feel "unwell". Pt had 3 days of watery diarrhea. Upon assessment, pt has residual weakness on the right arm from a previous stroke. +ROM in all extremities. Abdomen is soft, nondistended, and nontender. Breathing even and unlabored. Denies chest pain, SOB, dysuria, increased urinary frequency, headache, dizziness, N/V, and abd. pain at this time. VS as noted. Will continue to monitor.

## 2019-12-13 NOTE — ED PROVIDER NOTE - PHYSICAL EXAMINATION
GENERAL: A&Ox3, non-toxic appearing, no acute distress  HEENT: NCAT, EOMI, oral mucosa moist, normal conjunctiva  RESP: CTAB, no respiratory distress, no wheezes/rhonchi/rales, speaking in full sentences  CV: RRR, no murmurs/rubs/gallops  ABDOMEN: soft, non-tender, non-distended, no guarding, no CVA tenderness  MSK: no visible deformities  NEURO: ARTEAGA, mild residual weakness of RUE   SKIN: warm, normal color, well perfused, no rash  PSYCH: normal affect, animated and talkative    -Joshua Montes, PGY-1 GENERAL: A&Ox3, non-toxic appearing, no acute distress  HEENT: NCAT, EOMI, oral mucosa moist, normal conjunctiva  RESP: CTAB, no respiratory distress, no wheezes/rhonchi/rales, speaking in full sentences  CV: RRR, no murmurs/rubs/gallops  ABDOMEN: soft, non-tender, non-distended, no guarding, no CVA tenderness  MSK: no visible deformities  NEURO: ARTEAGA, mild residual weakness of RUE, left knee w/ chronic instability from prior TKA and washout  SKIN: warm, normal color, well perfused, no rash  PSYCH: normal affect, animated and talkative    -Joshua Montes, PGY-1

## 2019-12-13 NOTE — ED PROVIDER NOTE - DURATION
----- Message from Kemi Carlson sent at 6/1/2017  9:16 AM CDT -----  Contact: pt  Requesting a Hosp F/U,  In a week, pt inpatient at [facility] for; infection of leg  Call back on # [phone] or   thanks   day(s)/2

## 2019-12-14 NOTE — ED CLERICAL - NS ED CLERK NOTE PRE-ARRIVAL INFORMATION; ADDITIONAL PRE-ARRIVAL INFORMATION

## 2019-12-14 NOTE — ED ADULT NURSE NOTE - NSIMPLEMENTINTERV_GEN_ALL_ED
Implemented All Fall Risk Interventions:  [place] to call system. Call bell, personal items and telephone within reach. Instruct patient to call for assistance. Room bathroom lighting operational. Non-slip footwear when patient is off stretcher. Physically safe environment: no spills, clutter or unnecessary equipment. Stretcher in lowest position, wheels locked, appropriate side rails in place. Provide visual cue, wrist band, yellow gown, etc. Monitor gait and stability. Monitor for mental status changes and reorient to person, place, and time. Review medications for side effects contributing to fall risk. Reinforce activity limits and safety measures with patient and family.

## 2019-12-14 NOTE — ED PROVIDER NOTE - PROGRESS NOTE DETAILS
received signout. d/w pt, her epigastric burning is improved with pepcid and zofran. Pt tolerating PO (fig newtons). Pt states she has suffered from this epigastric pain since 2016, no different today. As per pt was sent from daughter because she was concerned after pt had 4 episodes of NBNB vomiting at home. As per chart review alternative MRN pt had 1 time dose of fosfomycin in [facility] so does not need Rx of Keflex for UTI. Called pt daughter Murphy with pt permission, explained ED course and plan for DC, Murphy confirms story and that sh sent pt in because she was worried about having pt at home sick or vomiting, and did not want "something tragic to happen". Daughter is elated to hear mother is tolerating PO and feeling better. Pt is ready and stable for DC, daughter aware pt will be coming home and will await her return. Daughter also aware and verbalizes understanding that non-emergent transport may take several hours to arrive with pt. Called [facility] and arranged for urgent f/u 24-48 hours. All pt and family member questions answered, pt ready and stable for DC. -Teofilo Wright PA-C

## 2019-12-14 NOTE — ED PROVIDER NOTE - ATTENDING CONTRIBUTION TO CARE
79F  now presenting to the ED complaining of vomiting and epigastric pain x1 day, states that she took Prednisone 1 week ago that has been bothering her stomach. Reports chronic gastritis. Patient had four episodes of non bilious non bloody emesis, very small amount. Patient reported the pain is burning in nature and intermittent. Patient also reported diarrhea that started after taking Prednisone last week, which she stopped after only 1 - 2 doses..  Patient was seen at [facility] ED diagnosed with UTI treated with Fosfomycin 3 grams.     ALT MRN [id number]    PMHx: CHF (congestive heart failure)  ~ diastolic, Stage I, Constipation, COPD (chronic obstructive pulmonary disease), Dyslipidemia, GERD (gastroesophageal reflux disease), HTN (hypertension), Kidney stones, Type 2 diabetes mellitus, Vitamin D deficiency.  PSHx: H/O breast biopsy  ~ 3 times on right, 2 times on left, H/O right nephrectomy  ~ [facility], History of left knee replacement  ~ x 3 (2010, 2011, 2016).  Allergies: NKDa  SocHx: Denies ETOH/drugs/smoking    PHYSICAL EXAMINATION:  VITALS REVIEWED.  VS normal  GENERALIZED APPEARANCE:  Comfortable, no acute distress, ambulating without difficulty  SKIN:  Warm, dry, no cyanosis  HEAD:  No obvious scalp lesions  EYES:  Conjunctiva pink, no icterus  ENMT:  Mucus membranes moist, no stridor  NECK:  Supple, non-tender  CHEST AND RESPIRATORY:  Clear to auscultation B/L, good air entry B/L, equal chest expansion  HEART AND CARDIOVASCULAR:  Regular rate, no obvious murmur  ABDOMEN AND GI:  Soft, non-tender, non-distended.  No rebound, no guarding, no CVA-area tenderness  EXTREMITIES:  No deformity, edema, or calf tenderness  NEURO: AAOx3, gross motor and sensory intact    Impression:  UTI diagnosed yesterday at [facility] treated with Fosfomycin; no emergent conditions, abdomen soft, non-tender, patient with chronic gastritis, reports she took Prednisone a week ago which has disrupted her stomach; symptomatic treatment, re-evaluation, if tolerating PO can follow up outpatient as had extensive work up at [facility] yesterday, MRN # [id number]  now presenting to the ED complaining of vomiting and epigastric pain x1 day, states that she took Prednisone 1 week ago that has been bothering her stomach. Reports chronic gastritis. Patient had four episodes of non bilious non bloody emesis, very small amount. Patient reported the pain is burning in nature and intermittent. Patient also reported diarrhea that started after taking Prednisone last week, which she stopped after only 1 - 2 doses..  Patient was seen at [facility] ED diagnosed with UTI treated with Fosfomycin 3 grams.     ALT MRN [id number]    PMHx: CHF (congestive heart failure)  ~ diastolic, Stage I, Constipation, COPD (chronic obstructive pulmonary disease), Dyslipidemia, GERD (gastroesophageal reflux disease), HTN (hypertension), Kidney stones, Type 2 diabetes mellitus, Vitamin D deficiency.  PSHx: H/O breast biopsy  ~ 3 times on right, 2 times on left, H/O right nephrectomy  ~ [facility], History of left knee replacement  ~ x 3 (2010, 2011, 2016).  Allergies: NKDa  SocHx: Denies ETOH/drugs/smoking    PHYSICAL EXAMINATION:  VITALS REVIEWED.  VS normal  GENERALIZED APPEARANCE:  Comfortable, no acute distress, lying in bed  SKIN:  Warm, dry, no cyanosis  HEAD:  No obvious scalp lesions  EYES:  Conjunctiva pink, no icterus  ENMT:  Mucus membranes moist, no stridor  NECK:  Supple, non-tender  CHEST AND RESPIRATORY:  Clear to auscultation B/L, good air entry B/L, equal chest expansion  HEART AND CARDIOVASCULAR:  Regular rate, no obvious murmur  ABDOMEN AND GI:  Soft, non-tender, non-distended.  No rebound, no guarding, no CVA-area tenderness  EXTREMITIES:  No deformity, edema, or calf tenderness  NEURO: AAOx3, gross motor and sensory intact    Impression:  UTI diagnosed yesterday at [facility] treated with Fosfomycin; no emergent conditions, abdomen soft, non-tender, patient with chronic gastritis, reports she took Prednisone a week ago which has disrupted her stomach; symptomatic treatment, re-evaluation, if tolerating PO can follow up outpatient as had extensive work up at [facility] yesterday, MRN #

## 2019-12-14 NOTE — ED PROVIDER NOTE - OBJECTIVE STATEMENT
78 y/o female PMHx CHF, stroke with right sided residual weakness, COPD, GERD, HTN, pre-DM now presenting to the ED complaining of vomiting and epigastric pain x1 day. Patient had four episodes of non bilious non bloody emesis. Patient reported the pain is burning in nature and intermittent. Patient also reported diarrhea that started after taking prednisone last week.  Patient was seen at [facility] ED diagnosed with UTI and prescribed a course of keflex. Patient denied change in cough, CP, SOB, fever chills, back pain, neck pain, dizziness weakness. Patient daughter picked up medications this morning

## 2019-12-14 NOTE — ED PROVIDER NOTE - PATIENT PORTAL LINK FT
You can access the FollowMyHealth Patient Portal offered by [facility] by registering at the following website: [email or web address]. By joining [facility]’s FollowMyHealth portal, you will also be able to view your health information using other applications (apps) compatible with our system.

## 2019-12-14 NOTE — ED ADULT NURSE NOTE - OBJECTIVE STATEMENT
80 yo female from home BIB EMS A&OX4 c/o nausea w/ vomiting, chills/ shakiness, and "slight fever." Pt seen at [facility] yesterday dx w. UTI, administered 1 dose antibiotics yesterday did not  prescription from pharmacy. Pt denies abd pn, diarrhea/dizziness. abd soft nontender. ls equal clr bilat apices ot bases, denies SOB/chx pn. Pt afebrile, VS stable. PT has 20G IV in place on arrival. MD exam pending, will continue to monitor.

## 2019-12-14 NOTE — ED PROVIDER NOTE - NSFOLLOWUPINSTRUCTIONS_ED_ALL_ED_FT
1) Follow-up with your primary care provider in 1-2 days.    2) Continue to take all medications as prescribed. DO NOT TAKE THE ANTIBIOTIC (Keflex) FOR UTI.    3) Rest and drink plenty of fluids. Pain can be managed with Acetaminophen (aka Tylenol) over the counter as directed.    4) Return to the ER for any new or worsening symptoms or concerns.

## 2019-12-14 NOTE — ED PROVIDER NOTE - PMH
CHF (congestive heart failure)    COPD (chronic obstructive pulmonary disease)    HLD (hyperlipidemia)    HTN (hypertension)

## 2019-12-16 NOTE — COUNSELING
[Full Code] : Code Status: Full Code [Limited] : Treatment Guidelines: Limited [Trial of Intubation] : Intubation: Trial of Intubation [[facility] Ver[facility] Date: _____] [facility]/last verification date: [unfilled] [_____] : HCP: [unfilled]

## 2019-12-17 PROBLEM — R05 COUGH: Status: ACTIVE | Noted: 2018-07-30

## 2019-12-17 NOTE — HISTORY OF PRESENT ILLNESS
[Patient] : patient [Family Member] : family member [FreeTextEntry2] : Ms. Klein is a 79 year old femal with COPD, chronic diastolic heart failure, prediabetes, h/o CVA with residual right-sided hemiparesis. \par \par Patient being seen for follow up ED visit on 12/14/19. Went in for nausea, vomiting, lethargy. Found to have UTI and treated with fosfomycin, discharged with Keflex. Reports she still feels weak but getting better. Appetite is stable. No fevers, chills.\par \par Cough is better but still present. Spoke with daughter who reports she is going to schedule patient with pulmonary for regular COPD follow up. \par \par Patient denies chest pain, palpitations, headaches, dizziness, leg swelling, nausea, vomiting, diarrhea, constipation.

## 2019-12-19 NOTE — ED PROVIDER NOTE - PHYSICAL EXAMINATION
GENERAL: no acute distress, non-toxic appearing  HEAD: normocephalic, atraumatic  HEENT: normal conjunctiva, oral mucosa moist, neck supple  CARDIAC: regular rate and rhythm, normal S1 and S2,  no appreciable murmurs  PULM: clear to ascultation bilaterally, no crackles, rales, rhonchi, or wheezing  GI: abdomen nondistended, soft, nontender, no guarding or rebound tenderness  NEURO: alert and oriented x 3, normal speech, PERRLA, EOMI, no focal motor or sensory deficits  MSK: no visible deformities, no peripheral edema, calf tenderness/redness/swelling  SKIN: no visible rashes, dry, well-perfused  PSYCH: appropriate mood and affect

## 2019-12-19 NOTE — ED PROVIDER NOTE - NS ED ROS FT
GENERAL: no fever, chills  HEENT: no cough, congestion, odynophagia, dysphagia  CARDIAC: no chest pain, palpitations, lightheadedness  PULM: no dyspnea, wheezing   GI: + abdominal pain, nausea, vomiting, diarrhea  : no urinary dysuria, frequency, incontinence, hematuria  NEURO: no headache, motor weakness, sensory changes  MSK: no joint or muscle pain  SKIN: no rashes, hives  HEME: no active bleeding, bruising

## 2019-12-19 NOTE — ED PROVIDER NOTE - CLINICAL SUMMARY MEDICAL DECISION MAKING FREE TEXT BOX
79F with hx of COPD, CHF, HTN, HLD, DM presenting with intermittent nausea, vomiting, diarrhea for the past week. Plan - labs, imaging pending labs, supportive care.

## 2019-12-19 NOTE — ED PROVIDER NOTE - OBJECTIVE STATEMENT
79F with hx of COPD, CHF, HTN, HLD, DM presenting with intermittent nausea, vomiting, diarrhea for the past week. Was seen twice in the ED over that time period and sent home after basic labs and supportive care. Patient last threw up yesterday, 1-2 episodes. Felt nauseous today, but did not throw up. Had a few episodes of yellow-colored diarrhea. Tolerating PO. Associated with vague epigastric pain, which is chronic for her. No fever, chills, chest pain, shortness of breath, flank or abdominal pain. No abdominal surgeries. Recently treated for UTI (today last day of Keflex). Denies urinary symptoms.

## 2019-12-19 NOTE — ED ADULT NURSE NOTE - NSIMPLEMENTINTERV_GEN_ALL_ED
Implemented All Fall Risk Interventions:  [place] to call system. Call bell, personal items and telephone within reach. Instruct patient to call for assistance. Room bathroom lighting operational. Non-slip footwear when patient is off stretcher. Physically safe environment: no spills, clutter or unnecessary equipment. Stretcher in lowest position, wheels locked, appropriate side rails in place. Provide visual cue, wrist band, yellow gown, etc. Monitor gait and stability. Monitor for mental status changes and reorient to person, place, and time. Review medications for side effects contributing to fall risk. Reinforce activity limits and safety measures with patient and family.

## 2019-12-19 NOTE — ED ADULT TRIAGE NOTE - CHIEF COMPLAINT QUOTE
Pt stataes that she has had diarrhea since Friday, pt was seen in ED on 12/13 and 12/14, not feeling better.  Past medical history: HTN, HLD, COPD, CHF, DM

## 2019-12-19 NOTE — ED CLERICAL - NS ED CLERK NOTE PRE-ARRIVAL INFORMATION; ADDITIONAL PRE-ARRIVAL INFORMATION

## 2019-12-19 NOTE — ED PROVIDER NOTE - PROGRESS NOTE DETAILS
Tong: elevated lactated on VBG. Will obtain CTA a/p to eval for mesenteric ischemia and provide IVF. Last EF wnl. Jarrod: updated daughter over the phone to let her know the patient will be admitted. Tong: CT negative. Stable for admission for pancreatitis.

## 2019-12-19 NOTE — ED ADULT NURSE NOTE - OBJECTIVE STATEMENT
Pt rcvd to room 24 c/o N/V/D x apprx 1 week. Pt is Aox4, lives at home. Ambulatory with walker at baseline. Skin intact. Pt denies abd pain. States she was recently treated with antbx for UTI. Pts abd is soft/nontender on palpation. Hx: COPD, CHF, HTN, DM, HLD. Pt has significant swelling to L knee which appears deformed. As per pt, this is chronic. Pt able to move all four extremities. Appears mildly SOB at this time. Respirations 17-20. Pending MD orders. Will continue to monitor.

## 2019-12-20 PROBLEM — I10 ESSENTIAL (PRIMARY) HYPERTENSION: Chronic | Status: ACTIVE | Noted: 2019-01-01

## 2019-12-20 PROBLEM — E78.5 HYPERLIPIDEMIA, UNSPECIFIED: Chronic | Status: ACTIVE | Noted: 2019-01-01

## 2019-12-20 PROBLEM — I50.9 HEART FAILURE, UNSPECIFIED: Chronic | Status: ACTIVE | Noted: 2019-01-01

## 2019-12-20 PROBLEM — J44.9 CHRONIC OBSTRUCTIVE PULMONARY DISEASE, UNSPECIFIED: Chronic | Status: ACTIVE | Noted: 2019-01-01

## 2019-12-20 NOTE — H&P ADULT - ASSESSMENT
Patient is a 80 y/o F PMH HTN, HLD, DM2, CVA w/ R sided residual weakness, CHF,  COPD p/w pancreatitis

## 2019-12-20 NOTE — H&P ADULT - NSHPLABSRESULTS_GEN_ALL_CORE
136  |  100  |  27<H>  ----------------------------<  116<H>  4.9   |  24  |  1.06    Ca    9.8      19 Dec 2019 19:30    TPro  7.2  /  Alb  3.5  /  TBili  0.2  /  DBili  x   /  AST  24  /  ALT  17  /  AlkPhos  87                          11.3   7.16  )-----------( 202      ( 19 Dec 2019 19:30 )             34.9     LIVER FUNCTIONS - ( 19 Dec 2019 19:30 )  Alb: 3.5 g/dL / Pro: 7.2 g/dL / ALK PHOS: 87 u/L / ALT: 17 u/L / AST: 24 u/L / GGT: x           Urinalysis Basic - ( 19 Dec 2019 21:10 )    Color: YELLOW / Appearance: CLEAR / S.023 / pH: 5.5  Gluc: NEGATIVE / Ketone: NEGATIVE  / Bili: NEGATIVE / Urobili: NORMAL   Blood: TRACE / Protein: 30 / Nitrite: NEGATIVE   Leuk Esterase: LARGE / RBC: 0-2 / WBC >50   Sq Epi: FEW / Non Sq Epi: x / Bacteria: FEW

## 2019-12-20 NOTE — H&P ADULT - NSICDXPASTSURGICALHX_GEN_ALL_CORE_FT
PAST SURGICAL HISTORY:  H/O breast biopsy ~ 3 times on right, 2 times on left    H/O right nephrectomy ~ [facility]    History of left knee replacement ~ x 3 (2010, 2011, 2016)    No significant past surgical history

## 2019-12-20 NOTE — H&P ADULT - NSICDXPASTMEDICALHX_GEN_ALL_CORE_FT
PAST MEDICAL HISTORY:  CHF (congestive heart failure) ~ diastolic, Stage I    CHF (congestive heart failure)     Constipation     COPD (chronic obstructive pulmonary disease)     COPD (chronic obstructive pulmonary disease)     Dyslipidemia     GERD (gastroesophageal reflux disease)     HLD (hyperlipidemia)     HTN (hypertension)     HTN (hypertension)     Kidney stones     Type 2 diabetes mellitus     Vitamin D deficiency

## 2019-12-20 NOTE — CHART NOTE - NSCHARTNOTEFT_GEN_A_CORE
Notified by Hasmukh MARI pt was c/o dizziness stating she feels her blood sugar is low.  Patient was seen and evaluated. Patient states she takes Meclizine daily for dizziness. Denies diaphoresis, n, v, abd pain, visual disturbances, near-syncope/syncope.   Pt's chart was reviewed pt takes Meclizine 12.5 mg PO daily  Orthostatics were negative. Finger stick blood sugar was negative for hypoglycemia.  Patient started on Meclizine 12.5 mg PO daily.  Will continue to monitor       Vital Signs Last 24 Hrs  T(C): 36.9 (21 Dec 2019 02:29), Max: 36.9 (21 Dec 2019 02:29)  T(F): 98.4 (21 Dec 2019 02:29), Max: 98.4 (21 Dec 2019 02:29)  HR: 79 (21 Dec 2019 02:29) (70 - 91)  BP: 134/86 (21 Dec 2019 02:29) (126/74 - 138/56)  BP(mean): --  RR: 16 (21 Dec 2019 02:29) (16 - 18)  SpO2: 100% (21 Dec 2019 02:29) (100% - 100%)    CAPILLARY BLOOD GLUCOSE      POCT Blood Glucose.: 126 mg/dL (21 Dec 2019 02:23)  POCT Blood Glucose.: 139 mg/dL (20 Dec 2019 21:52)  POCT Blood Glucose.: 216 mg/dL (20 Dec 2019 19:48)  POCT Blood Glucose.: 125 mg/dL (20 Dec 2019 17:41)  POCT Blood Glucose.: 122 mg/dL (20 Dec 2019 12:30)  POCT Blood Glucose.: 106 mg/dL (20 Dec 2019 08:51)

## 2019-12-20 NOTE — CONSULT NOTE ADULT - SUBJECTIVE AND OBJECTIVE BOX
Requesting Physician : Dr. Moyer     Reason for Consultation: Chest pain     HISTORY OF PRESENT ILLNESS:  80 yo F with history of HTN, HLD, DM, history of CVA with residual right sided weakness, COPD, who was admitted with abdominal pain, nausea, and vomiting.  The patient is a poor historian but per the chart her symptoms have been persistent for the past week.  She was admitted and diagnosed with pancreatitis.  The patient reports an episode of chest pain a few weeks ago but cannot provide any further history.  She denies chest pain currently.  She was recently admitted in Nov of 2019 with dizziness at which time TTE was performed showing normal LV function and persistent left SVC.        PAST MEDICAL & SURGICAL HISTORY:  COPD (chronic obstructive pulmonary disease)  CHF (congestive heart failure)  HLD (hyperlipidemia)  HTN (hypertension)  Vitamin D deficiency  Dyslipidemia  Constipation  Kidney stones  Type 2 diabetes mellitus  GERD (gastroesophageal reflux disease)  COPD (chronic obstructive pulmonary disease)  HTN (hypertension)  CHF (congestive heart failure): ~ diastolic, Stage I  No significant past surgical history  H/O breast biopsy: ~ 3 times on right, 2 times on left  H/O right nephrectomy: ~ [facility]  History of left knee replacement: ~ x 3 (2010, 2011, 2016)          MEDICATIONS:  MEDICATIONS  (STANDING):  aspirin enteric coated 81 milliGRAM(s) Oral daily  atorvastatin 80 milliGRAM(s) Oral at bedtime  cholecalciferol 1000 Unit(s) Oral daily  gabapentin 100 milliGRAM(s) Oral three times a day  insulin lispro (HumaLOG) corrective regimen sliding scale   SubCutaneous three times a day before meals  insulin lispro (HumaLOG) corrective regimen sliding scale   SubCutaneous at bedtime  multivitamin 1 Tablet(s) Oral daily  pantoprazole    Tablet 40 milliGRAM(s) Oral before breakfast      Allergies    No Known Allergies    Intolerances        FAMILY HISTORY:  FH: COPD (chronic obstructive pulmonary disease) (Child): ~ daughter (non-smoker)  FH: diabetes mellitus (Grandparent): ~ mother, grandmother  FH: CHF (congestive heart failure): ~ mother  FH: breast cancer: ~ mother    Non-contributary for premature coronary disease or sudden cardiac death    SOCIAL HISTORY:    [x ] Non-smoker  [ ] Smoker  [ ] Alcohol      REVIEW OF SYSTEMS:  [ ]chest pain  [  ]shortness of breath  [  ]palpitations  [  ]syncope  [ ]near syncope [ ]upper extremity weakness   [ ] lower extremity weakness  [  ]diplopia  [  ]altered mental status   [  ]fevers  [ ]chills [x]nausea  [x ]vomitting  [  ]dysphagia    [x ]abdominal pain  [ ]melena  [ ]BRBPR    [  ]epistaxis  [  ]rash    [ ]lower extremity edema        [x ] All others negative	  [ ] Unable to obtain    PHYSICAL EXAM:  T(C): 36.6 (12-20-19 @ 11:59), Max: 36.9 (12-19-19 @ 23:03)  HR: 78 (12-20-19 @ 11:59) (70 - 81)  BP: 126/74 (12-20-19 @ 11:59) (115/78 - 138/56)  RR: 17 (12-20-19 @ 11:59) (16 - 18)  SpO2: 100% (12-20-19 @ 11:59) (100% - 100%)  Wt(kg): --  I&O's Summary    20 Dec 2019 07:01  -  20 Dec 2019 16:36  --------------------------------------------------------  IN: 200 mL / OUT: 1350 mL / NET: -1150 mL        	  Lymphatic: No lymphadenopathy , no edema  Cardiovascular: Normal S1 S2, No JVD, No murmurs , Peripheral pulses palpable 2+ bilaterally  Respiratory: Lungs clear to auscultation, normal effort 	  Gastrointestinal:  Soft, Non-tender, + BS	  Skin: No rashes, No ecchymoses, No cyanosis, warm to touch  Ext: + swelling of left knee (chronic per patient)      TELEMETRY: 	    ECG: SR, PVC's 	  RADIOLOGY:  OTHER:     DIAGNOSTIC TESTING:  [ ] Echocardiogram: < from: Transthoracic Echocardiogram (11.01.19 @ 08:33) >  CONCLUSIONS:  1. Mitral annular calcification, otherwise normal mitral  valve. Minimal mitral regurgitation.  2. Normal left ventricular internal dimensions and wall  thicknesses.  3. Normal left ventricular systolic function. No segmental  wall motion abnormalities.  4. Mild diastolic dysfunction (Stage I).  5. Normalright ventricular size and function.  6. Estimated right ventricular systolic pressure equals 71  mm Hg, assuming right atrial pressure equals 10 mm Hg,  consistent with severe pulmonary hypertension.  7. A bubble study was performed with the intravenous  injection of agitated saline.  Following contrast  injection, no obvious bubbles were seen in the left heart.  8. The coronary sinus is markedly dilated.  A separate  bubble study was performed with the intravenous injection  of agitated saline contrast from a left upper extremity  vein.  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  ---------------------------------------------------------    < end of copied text >    [ ]  Catheterization:  [ ] Stress Test:    	  	  LABS:	 	    CARDIAC MARKERS:                              11.9   4.99  )-----------( 207      ( 20 Dec 2019 09:59 )             37.1     12-20    139  |  97<L>  |  20  ----------------------------<  152<H>  4.3   |  28  |  0.97    Ca    9.5      20 Dec 2019 09:59  Phos  3.4     12-20  Mg     1.5     12-20    TPro  7.3  /  Alb  3.7  /  TBili  0.2  /  DBili  x   /  AST  24  /  ALT  19  /  AlkPhos  89  12-20    proBNP:   Lipid Profile:   HgA1c:   TSH:     ASSESSMENT/PLAN:  80 yo F with history of HTN, HLD, DM, history of CVA with residual right sided weakness, COPD, who was admitted with abdominal pain, nausea, and vomiting    -pt. reports a ? episode of chest pain a few weeks ago that has since resolved  -ECG with no acute st-t changes  -Given atypical, non-anginal symptoms and current pancreatitis, no ischemic evaluation needed at this time  -Treatment of pancreatitis per primary team    Lewis Snow MD

## 2019-12-20 NOTE — H&P ADULT - NSHPPHYSICALEXAM_GEN_ALL_CORE
T(C): 36.9 (12-19-19 @ 23:03), Max: 36.9 (12-19-19 @ 23:03)  HR: 72 (12-20-19 @ 03:18) (72 - 97)  BP: 138/56 (12-20-19 @ 03:18) (115/78 - 138/56)  RR: 17 (12-20-19 @ 03:18) (16 - 18)  SpO2: 100% (12-20-19 @ 03:18) (96% - 100%)    Constitutional: NAD, well-developed, well-nourished  Ears, Nose, Mouth, and Throat: normal external ears and nose, normal hearing, moist oral mucosa, poor dentition  Eyes: normal conjunctiva, EOMI, PERRL  Neck: supple, no JVD  Respiratory: Clear to auscultation bilaterally. No wheezes, rales or rhonchi. Normal respiratory effort  Cardiovascular: RRR, no M/R/G, no edema, 2+ Peripheral Pulses  Gastrointestinal: soft, nontender, nondistended, +BS, no hernia  Skin: warm, dry, no rash  Neurologic: sensation grossly intact, CN grossly intact, +dysarthria, otherwise non-focal exam  Musculoskeletal: no clubbing, no cyanosis, no joint swelling  Psychiatric: AOX3, appropriate mood, affect

## 2019-12-20 NOTE — H&P ADULT - NSHPREVIEWOFSYSTEMS_GEN_ALL_CORE
Constitutional Symptoms: No weakness, fevers, chills, weight loss  Eyes: No visual changes, headache, eye pain, double vision  Ears, Nose, Mouth, Throat: No runny nose, sinus pain, ear pain, tinnitus, sore throat, dysphagia, odynophagia  Cardiovascular: No chest pain, palpitations, edema  Respiratory: No cough, wheezing, hemoptysis, shortness of breath  Gastrointestinal: +abdominal pain, anorexia, nausea/vomiting/diarrhea, no constipation, hematemesis, BRBPR, melena  Genitourinary: No dysuria, frequency, hematuria  Musculoskeletal: No joint pain, joint swelling, decreased ROM  Skin: No pruritus, rashes, lesions, wounds  Neurologic:  No seizures, headache, paraesthesia, numbness, limb weakness    Positives and pertinent negatives noted and all other systems negative.

## 2019-12-20 NOTE — ED ADULT NURSE REASSESSMENT NOTE - NS ED NURSE REASSESS COMMENT FT1
Report received from JACEY Singh. Pt laying on stretcher, a&ox3, calm and cooperative, c/o of back pain, requesting pain meds, provider aware, pending med order. Respirations even/unlabored, nad noted otherwise. will continue to monitor

## 2019-12-20 NOTE — H&P ADULT - HISTORY OF PRESENT ILLNESS
****Patient is a poor historian******  Patient is a 80 y/o F PMH HTN, HLD, DM2, CVA w/ R sided residual weakness, CHF,  COPD p/w intermittent N/V/D, epigastric pain X1 week. 2-3 episodes of NBNB watery diarrhea daily, decreased PO intake (last ate 12/19 during lunch). Unable to describe the abdominal pain any further, unknown alleviating/aggravating factors.

## 2019-12-20 NOTE — H&P ADULT - PROBLEM SELECTOR PLAN 1
Patient not on any new medications that would explain pancreatitis, no ETOH, LFTs WNL, will check triglycerides, s/p 1 L IVNS, morphine, reports feeling well at this time, requesting to trial PO, will f/u. Patient not on any new medications that would explain pancreatitis, no ETOH, LFTs WNL, will check triglycerides, s/p 1 L IVNS, morphine, reports feeling well at this time, requesting to trial PO, avoid IVF further IVF in CHF patient for now, will f/u.

## 2019-12-20 NOTE — PATIENT PROFILE ADULT - LIVES WITH
adult child(mariluz)/pt lives with autistic grandson and daughter who cannot walk; daughter has home attendant

## 2019-12-20 NOTE — H&P ADULT - NSICDXFAMILYHX_GEN_ALL_CORE_FT
FAMILY HISTORY:  FH: breast cancer, ~ mother  FH: CHF (congestive heart failure), ~ mother    Child  Still living? Unknown  FH: COPD (chronic obstructive pulmonary disease), Age at diagnosis: Age Unknown    Grandparent  Still living? Unknown  FH: diabetes mellitus, Age at diagnosis: Age Unknown

## 2019-12-21 NOTE — PROGRESS NOTE ADULT - ASSESSMENT
78 y/o F PMH HTN, HLD, DM2, CVA w/ R sided residual weakness, CHF,  COPD p/w pancreatitis    Problem/Plan - 1:  ·  Problem: Other acute pancreatitis without infection or necrosis.  Plan: gi fu  ivf  advance diet as tolerated     Problem/Plan - 2:  ·  Problem: Essential hypertension.  Plan: c/w home meds.     Problem/Plan - 3:  ·  Problem: Diabetes mellitus type 2, noninsulin dependent.  Plan: FS AC/QHS w/ sliding scale.     Problem/Plan - 4:  ·  Problem: Chronic diastolic congestive heart failure.  Plan: monitor fluid status

## 2019-12-21 NOTE — CONSULT NOTE ADULT - CONSULT REASON
The patient was instructed to follow our dietary recommendations regarding a high lean protein, low carb and low fat diet. Reviewed appropriate amount of water to intake daily. Be sure to take vitamins as recommended. Patient was instructed to get at least 150 minutes of exercise weekly including both cardio and strength training.   
Abdominal pain
Chest pain

## 2019-12-21 NOTE — CONSULT NOTE ADULT - SUBJECTIVE AND OBJECTIVE BOX
HISTORY OF PRESENT ILLNESS:  78 yo F with history of HTN, HLD, DM, history of CVA with residual right sided weakness, COPD, who was admitted with abdominal pain, nausea, and vomiting.  The patient is a poor historian but per the chart her symptoms have been persistent for the past week.  She was admitted and diagnosed with pancreatitis.  The patient reports an episode of chest pain a few weeks ago but cannot provide any further history.  She denies chest pain currently.  She was recently admitted in  with dizziness at which time TTE was performed showing normal LV function and persistent left SVC.        Patient is a 79y old  Female who presents with a chief complaint of N/V/D (21 Dec 2019 19:29)    HPI: 79y Female  presents with a chief complaint of  abdominal pain. Patient states that  her abdominal pain has resolved. She states that she does have chronic GERD and reports episodes of spontaneous vomiting.  She reports episodic chest pain as well.     REVIEW OF SYSTEMS  Constitutional:   No fever, no fatigue, no pallor, no night sweats, no weight loss.  HEENT:   No eye pain, no vision changes, no icterus, no mouth ulcers.  Respiratory:   No shortness of breath, no cough, no respiratory distress.   Cardiovascular:   No chest pain, no palpitations.   Gastrointestinal: No abdominal pain, no nausea, no vomiting , no diarrhea no constipation, no hematochezia, no melena.  Skin:   No rashes, no jaundice, no eczema.   Musculoskeletal:   No joint pain, no swelling, no myalgia.   Neurologic:   No headache, no seizure, no weakness.   Genitourinary:   No dysuria, no decreased urine output.  Psychiatric:  No depression, no anxiety,   Endocrine:   No thyroid disease, no diabetes.  Heme/Lymphatic:   No anemia, no blood transfusions, no lymph node enlargement, no bleeding, no bruising.  ___________________________________________________________________________________________  Allergies    No Known Allergies    Intolerances      MEDICATIONS  (STANDING):  aspirin enteric coated 81 milliGRAM(s) Oral daily  atorvastatin 80 milliGRAM(s) Oral at bedtime  cholecalciferol 1000 Unit(s) Oral daily  gabapentin 100 milliGRAM(s) Oral three times a day  insulin lispro (HumaLOG) corrective regimen sliding scale   SubCutaneous three times a day before meals  insulin lispro (HumaLOG) corrective regimen sliding scale   SubCutaneous at bedtime  lidocaine   Patch 1 Patch Transdermal every 24 hours  meclizine 12.5 milliGRAM(s) Oral daily  multivitamin 1 Tablet(s) Oral daily  pantoprazole    Tablet 40 milliGRAM(s) Oral before breakfast    MEDICATIONS  (PRN):  acetaminophen   Tablet .. 650 milliGRAM(s) Oral every 6 hours PRN Mild Pain (1 - 3), Moderate Pain (4 - 6), Severe Pain (7 - 10)      PAST MEDICAL & SURGICAL HISTORY:  COPD (chronic obstructive pulmonary disease)  CHF (congestive heart failure)  HLD (hyperlipidemia)  HTN (hypertension)  Vitamin D deficiency  Dyslipidemia  Constipation  Kidney stones  Type 2 diabetes mellitus  GERD (gastroesophageal reflux disease)  COPD (chronic obstructive pulmonary disease)  HTN (hypertension)  CHF (congestive heart failure): ~ diastolic, Stage I  No significant past surgical history  H/O breast biopsy: ~ 3 times on right, 2 times on left  H/O right nephrectomy: ~ [facility]  History of left knee replacement: ~ x 3 (, , )    FAMILY HISTORY:  FH: COPD (chronic obstructive pulmonary disease) (Child): ~ daughter (non-smoker)  FH: diabetes mellitus (Grandparent): ~ mother, grandmother  FH: CHF (congestive heart failure): ~ mother  FH: breast cancer: ~ mother    Social History: No hsitory of : Tobacco use, IVDA, EToH  ______________________________________________________________________________________    PHYSICAL EXAM    Daily     Daily Weight in k.5 (21 Dec 2019 07:11)  BMI: 31 (12-20 @ 05:00)  Change in Weight:  Vital Signs Last 24 Hrs  T(C): 36.9 (21 Dec 2019 10:51), Max: 36.9 (21 Dec 2019 02:29)  T(F): 98.4 (21 Dec 2019 10:51), Max: 98.4 (21 Dec 2019 02:29)  HR: 80 (21 Dec 2019 10:51) (79 - 80)  BP: 144/76 (21 Dec 2019 10:51) (134/86 - 147/78)  BP(mean): --  RR: 17 (21 Dec 2019 10:51) (16 - 17)  SpO2: 93% (21 Dec 2019 10:51) (93% - 100%)    General:  Well developed, well nourished, alert and active, no pallor, NAD.  HEENT:    Normal appearance of conjunctiva, ears, nose, lips, oropharynx, and oral mucosa, anicteric.  Neck:  No masses, no asymmetry.  Lymph Nodes:  No lymphadenopathy.   Cardiovascular:  RRR normal S1/S2, no murmur.  Respiratory:  CTA B/L, normal respiratory effort.   Abdominal:   soft, no masses or tenderness, normoactive BS, NT/ND, no HSM.  Extremities:   No clubbing or cyanosis, normal capillary refill, no edema.   Skin:   No rash, jaundice, lesions, eczema.   Musculoskeletal:  No joint swelling, erythema or tenderness.   Neuro: No focal deficits.   Other:   _______________________________________________________________________________________________  Lab Results:                          11.6   6.66  )-----------( 196      ( 21 Dec 2019 07:00 )             36.1     12-    138  |  96<L>  |  18  ----------------------------<  105<H>  4.2   |  29  |  0.92    Ca    10.2      21 Dec 2019 07:00  Phos  3.4     12-20  Mg     1.7     12-    TPro  7.1  /  Alb  3.5  /  TBili  0.2  /  DBili  x   /  AST  23  /  ALT  16  /  AlkPhos  89  12-    LIVER FUNCTIONS - ( 21 Dec 2019 07:00 )  Alb: 3.5 g/dL / Pro: 7.1 g/dL / ALK PHOS: 89 u/L / ALT: 16 u/L / AST: 23 u/L / GGT: x                   Stool Results:          RADIOLOGY RESULTS:    SURGICAL PATHOLOGY:     EXAM:  CT ANGIO ABD PELV (W)AW IC        PROCEDURE DATE:  Dec 20 2019         INTERPRETATION:  CLINICAL INFORMATION: Abdominal pain. Diarrhea.     COMPARISON: CT abdomen pelvis 7/3/2016    PROCEDURE:   CT Angiography of the Abdomen and Pelvis with and without intravenous contrast.  Noncontrast imaging was performed followed by arterial phase and venous phases.  Intravenous contrast: 90 ml Omnipaque 350. 10 ml discarded.  Oral contrast: None.  Sagittal and coronal reformats were performed as well as 3D (MIP) reconstructions.    FINDINGS:    LOWER CHEST: Cardiomegaly. Bibasilar subsegmental atelectasis.    LIVER: Within normal limits.  BILE DUCTS: Normal caliber.  GALLBLADDER: Within normal limits.  SPLEEN: Within normal limits.  PANCREAS: Within normal limits.  ADRENALS: Within normal limits.  KIDNEYS/URETERS: Atrophic right kidney with multiple parenchymal calcification. Bilateral renal cysts. Additional tiny hypodense foci, too small to characterize. Nonobstructive left renal calculus measures 3 mm. No hydronephrosis.    BLADDER: Within normal limits.  REPRODUCTIVE ORGANS: Uterus and adnexa within normal limits.    BOWEL: No bowel obstruction. No hypoenhancing bowel loops. No mesenteric fat infiltration.  PERITONEUM: No ascites.  VESSELS: Atherosclerotic changes.  RETROPERITONEUM/LYMPH NODES: No lymphadenopathy.    ABDOMINAL WALL: Within normal limits.  BONES: Severe thoracolumbar levoscoliosis. Degenerative changes.    IMPRESSION:     No evidence of mesenteric ischemia.                  BRADY CABRERA M.D., RADIOLOGY RESIDENT  This document has been electronically signed.  JOSE ANGEL JEFFERSON M.D., ATTENDING RADIOLOGIST  This document has been electronically signed. Dec 20 2019  1:45AM

## 2019-12-21 NOTE — PHYSICAL THERAPY INITIAL EVALUATION ADULT - MANUAL MUSCLE TESTING RESULTS, REHAB EVAL
Bilateral UE muscle strength grossly 3/5 Throughout; Bilateral LE muscle strength grossly 3/5 Throughout. however, left knee strength grossly 2+/5 throughout.

## 2019-12-21 NOTE — PROGRESS NOTE ADULT - SUBJECTIVE AND OBJECTIVE BOX
Patient is a 79y old  Female who presents with a chief complaint of N/V/D (21 Dec 2019 12:22)      INTERVAL HPI/OVERNIGHT EVENTS:  T(C): 36.9 (19 @ 10:51), Max: 36.9 (19 @ 02:29)  HR: 80 (19 @ 10:51) (79 - 91)  BP: 144/76 (19 @ 10:51) (130/67 - 147/78)  RR: 17 (19 @ 10:51) (16 - 18)  SpO2: 93% (19 @ 10:51) (93% - 100%)  Wt(kg): --  I&O's Summary    20 Dec 2019 07:01  -  21 Dec 2019 07:00  --------------------------------------------------------  IN: 300 mL / OUT: 1650 mL / NET: -1350 mL        LABS:                        11.6   6.66  )-----------( 196      ( 21 Dec 2019 07:00 )             36.1     12-    138  |  96<L>  |  18  ----------------------------<  105<H>  4.2   |  29  |  0.92    Ca    10.2      21 Dec 2019 07:00  Phos  3.4     12-20  Mg     1.7         TPro  7.1  /  Alb  3.5  /  TBili  0.2  /  DBili  x   /  AST  23  /  ALT  16  /  AlkPhos  89  -      Urinalysis Basic - ( 19 Dec 2019 21:10 )    Color: YELLOW / Appearance: CLEAR / S.023 / pH: 5.5  Gluc: NEGATIVE / Ketone: NEGATIVE  / Bili: NEGATIVE / Urobili: NORMAL   Blood: TRACE / Protein: 30 / Nitrite: NEGATIVE   Leuk Esterase: LARGE / RBC: 0-2 / WBC >50   Sq Epi: FEW / Non Sq Epi: x / Bacteria: FEW      CAPILLARY BLOOD GLUCOSE      POCT Blood Glucose.: 103 mg/dL (21 Dec 2019 12:33)  POCT Blood Glucose.: 124 mg/dL (21 Dec 2019 08:35)  POCT Blood Glucose.: 126 mg/dL (21 Dec 2019 02:23)  POCT Blood Glucose.: 139 mg/dL (20 Dec 2019 21:52)  POCT Blood Glucose.: 216 mg/dL (20 Dec 2019 19:48)        Urinalysis Basic - ( 19 Dec 2019 21:10 )    Color: YELLOW / Appearance: CLEAR / S.023 / pH: 5.5  Gluc: NEGATIVE / Ketone: NEGATIVE  / Bili: NEGATIVE / Urobili: NORMAL   Blood: TRACE / Protein: 30 / Nitrite: NEGATIVE   Leuk Esterase: LARGE / RBC: 0-2 / WBC >50   Sq Epi: FEW / Non Sq Epi: x / Bacteria: FEW        MEDICATIONS  (STANDING):  aspirin enteric coated 81 milliGRAM(s) Oral daily  atorvastatin 80 milliGRAM(s) Oral at bedtime  cholecalciferol 1000 Unit(s) Oral daily  gabapentin 100 milliGRAM(s) Oral three times a day  insulin lispro (HumaLOG) corrective regimen sliding scale   SubCutaneous three times a day before meals  insulin lispro (HumaLOG) corrective regimen sliding scale   SubCutaneous at bedtime  lidocaine   Patch 1 Patch Transdermal every 24 hours  meclizine 12.5 milliGRAM(s) Oral daily  multivitamin 1 Tablet(s) Oral daily  pantoprazole    Tablet 40 milliGRAM(s) Oral before breakfast    MEDICATIONS  (PRN):  acetaminophen   Tablet .. 650 milliGRAM(s) Oral every 6 hours PRN Mild Pain (1 - 3), Moderate Pain (4 - 6), Severe Pain (7 - 10)          PHYSICAL EXAM:  GENERAL: NAD, well-groomed, well-developed  HEAD:  Atraumatic, Normocephalic  CHEST/LUNG: Clear to percussion bilaterally; No rales, rhonchi, wheezing, or rubs  HEART: Regular rate and rhythm; No murmurs, rubs, or gallops  ABDOMEN: Soft, Nontender, Nondistended; Bowel sounds present  EXTREMITIES:  2+ Peripheral Pulses, No clubbing, cyanosis, or edema  LYMPH: No lymphadenopathy noted  SKIN: No rashes or lesions    Care Discussed with Consultants/Other Providers [ ] YES  [ ] NO

## 2019-12-21 NOTE — PHYSICAL THERAPY INITIAL EVALUATION ADULT - PERTINENT HX OF CURRENT PROBLEM, REHAB EVAL
Pt. is a 79 year old female admitted to [facility] with heart failure. PMH: COPD, CHF, HLD, HTN,, GERD,

## 2019-12-21 NOTE — PHYSICAL THERAPY INITIAL EVALUATION ADULT - LEVEL OF INDEPENDENCE: SIT/STAND, REHAB EVAL
unable to perform at this time due to pt. presenting with weakness and postural instability, will progress as appropriate.

## 2019-12-21 NOTE — PROGRESS NOTE ADULT - SUBJECTIVE AND OBJECTIVE BOX
Patient denies chest pain or shortness of breath.   Review of systems otherwise (-)  	    MEDICATIONS  (STANDING):  aspirin enteric coated 81 milliGRAM(s) Oral daily  atorvastatin 80 milliGRAM(s) Oral at bedtime  cholecalciferol 1000 Unit(s) Oral daily  gabapentin 100 milliGRAM(s) Oral three times a day  insulin lispro (HumaLOG) corrective regimen sliding scale   SubCutaneous three times a day before meals  insulin lispro (HumaLOG) corrective regimen sliding scale   SubCutaneous at bedtime  meclizine 12.5 milliGRAM(s) Oral daily  multivitamin 1 Tablet(s) Oral daily  pantoprazole    Tablet 40 milliGRAM(s) Oral before breakfast      LABS:	 	                      11.6   6.66  )-----------( 196      ( 21 Dec 2019 07:00 )             36.1     138  |  96<L>  |  18  ----------------------------<  105<H>  4.2   |  29  |  0.92    Ca    10.2      21 Dec 2019 07:00  Phos  3.4     12-20  Mg     1.7     12-21    TPro  7.1  /  Alb  3.5  /  TBili  0.2  /  DBili  x   /  AST  23  /  ALT  16  /  AlkPhos  89  12-21    Creatinine Trend: 0.92<--, 0.97<--, 1.06<--, 1.18<--    PHYSICAL EXAM:  T(C): 36.9 (12-21-19 @ 10:51), Max: 36.9 (12-21-19 @ 02:29)  HR: 80 (12-21-19 @ 10:51) (79 - 91)  BP: 144/76 (12-21-19 @ 10:51) (130/67 - 147/78)  RR: 17 (12-21-19 @ 10:51) (16 - 18)  SpO2: 93% (12-21-19 @ 10:51) (93% - 100%)    Gen: Appears well in NAD  HEENT:  (-)icterus (-)pallor  CV: N S1 S2 1/6 DAMIAN (+)2 Pulses B/l  Resp:  Clear to ausculatation B/L, normal effort  GI: (+) BS Soft, NT, ND  Lymph:  (-)Edema, (-)obvious lymphadenopathy  Skin: Warm to touch, Normal turgor  Psych: Appropriate mood and affect    TELEMETRY: SR	      < from: Transthoracic Echocardiogram (11.01.19 @ 08:33) >  CONCLUSIONS:  1. Mitral annular calcification, otherwise normal mitral  valve. Minimal mitral regurgitation.  2. Normal left ventricular internal dimensions and wall  thicknesses.  3. Normal left ventricular systolic function. No segmental  wall motion abnormalities.  4. Mild diastolic dysfunction (Stage I).  5. Normalright ventricular size and function.  6. Estimated right ventricular systolic pressure equals 71  mm Hg, assuming right atrial pressure equals 10 mm Hg,  consistent with severe pulmonary hypertension.  7. A bubble study was performed with the intravenous  injection of agitated saline.  Following contrast  injection, no obvious bubbles were seen in the left heart.  8. The coronary sinus is markedly dilated.  A separate  bubble study was performed with the intravenous injection  of agitated saline contrast from a left upper extremity  vein.  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  ------------------------------------------------------------------------  Confirmed on  11/1/2019 - 11:25:15 by Reinaldo Edwards M.D.    < end of copied text >      ASSESSMENT/PLAN: 	      78 yo F with history of HTN, HLD, DM, history of CVA with residual right sided weakness, COPD, who was admitted with abdominal pain, nausea, and vomiting    -pt. reports a ? episode of chest pain a few weeks ago that has since resolved  -ECG with no acute st-t changes  -Given atypical, non-anginal symptoms and current pancreatitis, no ischemic evaluation needed at this time  -Treatment of pancreatitis per primary team

## 2019-12-21 NOTE — CONSULT NOTE ADULT - ASSESSMENT
79 year old female with abdominal pain and elevated lipase.       Abdominal pain.   Clinically seems to have resolved.   A warning sign however is the episodes of vomiting   Plan for EGD on Monday   NPO post MN Sunday night   Low fiber diet for tomorrow       Elevated Lipase   ? Vomiting   No Clinical or  radiographic evidence of pancreatitis   Would monitor levels for now     Knee pain   As per primary team   avoid NSAIDs     Advanced care planning was discussed with patient and family.  Advanced care planning forms were reviewed and discussed.  Risks, benefits and alternatives of gastroenterologic procedures were discussed in detail and all questions were answered.

## 2019-12-22 NOTE — PROGRESS NOTE ADULT - SUBJECTIVE AND OBJECTIVE BOX
Summary:   79y  Female      Subjective:       Objective:    MEDICATIONS  (STANDING):  aspirin enteric coated 81 milliGRAM(s) Oral daily  atorvastatin 80 milliGRAM(s) Oral at bedtime  cholecalciferol 1000 Unit(s) Oral daily  gabapentin 100 milliGRAM(s) Oral three times a day  insulin lispro (HumaLOG) corrective regimen sliding scale   SubCutaneous three times a day before meals  insulin lispro (HumaLOG) corrective regimen sliding scale   SubCutaneous at bedtime  lidocaine   Patch 1 Patch Transdermal every 24 hours  meclizine 12.5 milliGRAM(s) Oral daily  multivitamin 1 Tablet(s) Oral daily  pantoprazole    Tablet 40 milliGRAM(s) Oral before breakfast    MEDICATIONS  (PRN):  acetaminophen   Tablet .. 650 milliGRAM(s) Oral every 6 hours PRN Mild Pain (1 - 3), Moderate Pain (4 - 6), Severe Pain (7 - 10)              Vital Signs Last 24 Hrs  T(C): 36.8 (22 Dec 2019 12:01), Max: 36.8 (22 Dec 2019 12:01)  T(F): 98.3 (22 Dec 2019 12:01), Max: 98.3 (22 Dec 2019 12:01)  HR: 77 (22 Dec 2019 12:01) (73 - 77)  BP: 108/56 (22 Dec 2019 12:01) (108/56 - 127/61)  BP(mean): --  RR: 15 (22 Dec 2019 12:01) (15 - 16)  SpO2: 99% (22 Dec 2019 12:01) (99% - 100%)      General:  Well developed, well nourished, alert and active, no pallor, NAD.  HEENT:    Normal appearance of conjunctiva, ears, nose, lips, oropharynx, and oral mucosa, anicteric.  Neck:  No masses, no asymmetry.  Lymph Nodes:  No lymphadenopathy.   Cardiovascular:  RRR normal S1/S2, no murmur.  Respiratory:  CTA B/L, normal respiratory effort.   Abdominal:   soft, no masses or tenderness, normoactive BS, NT/ND, no HSM.  Extremities:   No clubbing or cyanosis, normal capillary refill, no edema.   Skin:   No rash, jaundice, lesions, eczema.   Musculoskeletal:  No joint swelling, erythema or tenderness.   Neuro: No focal deficits.   Other:       LABS:                        11.4   5.61  )-----------( 197      ( 22 Dec 2019 05:58 )             35.0     12-22    139  |  96<L>  |  23  ----------------------------<  123<H>  3.8   |  30  |  0.90    Ca    9.2      22 Dec 2019 05:58  Mg     1.6     12-22    TPro  6.8  /  Alb  3.3  /  TBili  0.3  /  DBili  x   /  AST  22  /  ALT  17  /  AlkPhos  79  12-22          RADIOLOGY & ADDITIONAL TESTS:

## 2019-12-22 NOTE — PROGRESS NOTE ADULT - ASSESSMENT
80 y/o F PMH HTN, HLD, DM2, CVA w/ R sided residual weakness, CHF,  COPD p/w pancreatitis    Problem/Plan - 1:  ·  Problem: Other acute pancreatitis without infection or necrosis.  Plan: gi fu  ivf  advance diet as tolerated   EGD in am     Problem/Plan - 2:  ·  Problem: Essential hypertension.  Plan: c/w home meds.     Problem/Plan - 3:  ·  Problem: Diabetes mellitus type 2, noninsulin dependent.  Plan: FS AC/QHS w/ sliding scale.     Problem/Plan - 4:  ·  Problem: Chronic diastolic congestive heart failure.  Plan: monitor fluid status

## 2019-12-22 NOTE — PROGRESS NOTE ADULT - SUBJECTIVE AND OBJECTIVE BOX
Patient denies chest pain or shortness of breath.   tolerating diet and c/o feeling hungry .  Review of systems otherwise (-)  	    MEDICATIONS  (STANDING):  aspirin enteric coated 81 milliGRAM(s) Oral daily  atorvastatin 80 milliGRAM(s) Oral at bedtime  cholecalciferol 1000 Unit(s) Oral daily  gabapentin 100 milliGRAM(s) Oral three times a day  insulin lispro (HumaLOG) corrective regimen sliding scale   SubCutaneous three times a day before meals  insulin lispro (HumaLOG) corrective regimen sliding scale   SubCutaneous at bedtime  lidocaine   Patch 1 Patch Transdermal every 24 hours  meclizine 12.5 milliGRAM(s) Oral daily  multivitamin 1 Tablet(s) Oral daily  pantoprazole    Tablet 40 milliGRAM(s) Oral before breakfast    MEDICATIONS  (PRN):  acetaminophen   Tablet .. 650 milliGRAM(s) Oral every 6 hours PRN Mild Pain (1 - 3), Moderate Pain (4 - 6), Severe Pain (7 - 10)      LABS:                            11.4   5.61  )-----------( 197      ( 22 Dec 2019 05:58 )             35.0     Hemoglobin: 11.4 g/dL (12-22 @ 05:58)  Hemoglobin: 11.6 g/dL (12-21 @ 07:00)  Hemoglobin: 11.9 g/dL (12-20 @ 09:59)  Hemoglobin: 11.3 g/dL (12-19 @ 19:30)    12-22    139  |  96<L>  |  23  ----------------------------<  123<H>  3.8   |  30  |  0.90    Ca    9.2      22 Dec 2019 05:58  Mg     1.6     12-22    TPro  6.8  /  Alb  3.3  /  TBili  0.3  /  DBili  x   /  AST  22  /  ALT  17  /  AlkPhos  79  12-22    Creatinine Trend: 0.90<--, 0.92<--, 0.97<--, 1.06<--, 1.18<--           PHYSICAL EXAM  Vital Signs Last 24 Hrs  T(C): 36.8 (22 Dec 2019 12:01), Max: 36.8 (22 Dec 2019 12:01)  T(F): 98.3 (22 Dec 2019 12:01), Max: 98.3 (22 Dec 2019 12:01)  HR: 77 (22 Dec 2019 12:01) (73 - 77)  BP: 108/56 (22 Dec 2019 12:01) (108/56 - 127/61)  BP(mean): --  RR: 15 (22 Dec 2019 12:01) (15 - 16)  SpO2: 99% (22 Dec 2019 12:01) (99% - 100%)    Gen: Appears well in NAD  HEENT:  (-)icterus (-)pallor  CV: N S1 S2 1/6 DAMIAN (+)2 Pulses B/l  Resp:  Clear to ausculatation B/L, normal effort  GI: (+) BS Soft, NT, ND  Lymph:  (-)Edema, (-)obvious lymphadenopathy  Skin: Warm to touch, Normal turgor  Psych: Appropriate mood and affect    TELEMETRY:  not on tele    < from: Nuclear Stress Test-Pharmacologic (05.25.17 @ 09:28) >  IMPRESSIONS:Abnormal Study  * Myocardial Perfusion SPECT results are mildly abnormal.  * There is a small, mild defect in basal inferior wall  that is fixed, suggestive of infarct. However, the  observed defect may be due to attenuation from the  diaphragm. The patient could not lay prone for attenuation  corrected imaging.  * No clear evidence of ischemia.  * Post-stress gated wall motion analysis was performed  (LVEF > 70%;LVEDV = 43 ml.), revealing normal LV function.  The RV appears midly enlarged and diffusely  hypocontractile.    < end of copied text >        < from: Transthoracic Echocardiogram (11.01.19 @ 08:33) >  CONCLUSIONS:  1. Mitral annular calcification, otherwise normal mitral  valve. Minimal mitral regurgitation.  2. Normal left ventricular internal dimensions and wall  thicknesses.  3. Normal left ventricular systolic function. No segmental  wall motion abnormalities.  4. Mild diastolic dysfunction (Stage I).  5. Normalright ventricular size and function.  6. Estimated right ventricular systolic pressure equals 71  mm Hg, assuming right atrial pressure equals 10 mm Hg,  consistent with severe pulmonary hypertension.  7. A bubble study was performed with the intravenous  injection of agitated saline.  Following contrast  injection, no obvious bubbles were seen in the left heart.  8. The coronary sinus is markedly dilated.  A separate  bubble study was performed with the intravenous injection  of agitated saline contrast from a left upper extremity  vein.  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  ------------------------------------------------------------------------  Confirmed on  11/1/2019 - 11:25:15 by Reinaldo Edwards M.D.      < from: CT Angio Abdomen and Pelvis w/ IV Cont (12.20.19 @ 00:03) >    IMPRESSION:     No evidence of mesenteric ischemia.    < end of copied text >        ASSESSMENT/PLAN: 	      78 yo F with history of HTN, HLD, DM, history of CVA with residual right sided weakness, COPD, with historically normal LV function on recent TTE with no clear ischemia on NST in 2017 who was admitted with abdominal pain, nausea, and vomiting :    -pt. reports a possible  episode of chest pain a few weeks ago that has since resolved  -ECG with no acute ischemia  -Given atypical, non-anginal symptoms and current pancreatitis, no ischemic evaluation needed at this time  -Abdominal pain resolved at this time - tolerating PO  -GI noted - patient planned for EGD tomorrow   - per her RCRI score, she is low risk for a low risk procedure Patient denies chest pain or shortness of breath.   tolerating diet and c/o feeling hungry .  Review of systems otherwise (-)  	    MEDICATIONS  (STANDING):  aspirin enteric coated 81 milliGRAM(s) Oral daily  atorvastatin 80 milliGRAM(s) Oral at bedtime  cholecalciferol 1000 Unit(s) Oral daily  gabapentin 100 milliGRAM(s) Oral three times a day  insulin lispro (HumaLOG) corrective regimen sliding scale   SubCutaneous three times a day before meals  insulin lispro (HumaLOG) corrective regimen sliding scale   SubCutaneous at bedtime  lidocaine   Patch 1 Patch Transdermal every 24 hours  meclizine 12.5 milliGRAM(s) Oral daily  multivitamin 1 Tablet(s) Oral daily  pantoprazole    Tablet 40 milliGRAM(s) Oral before breakfast    MEDICATIONS  (PRN):  acetaminophen   Tablet .. 650 milliGRAM(s) Oral every 6 hours PRN Mild Pain (1 - 3), Moderate Pain (4 - 6), Severe Pain (7 - 10)      LABS:                            11.4   5.61  )-----------( 197      ( 22 Dec 2019 05:58 )             35.0     Hemoglobin: 11.4 g/dL (12-22 @ 05:58)  Hemoglobin: 11.6 g/dL (12-21 @ 07:00)  Hemoglobin: 11.9 g/dL (12-20 @ 09:59)  Hemoglobin: 11.3 g/dL (12-19 @ 19:30)    12-22    139  |  96<L>  |  23  ----------------------------<  123<H>  3.8   |  30  |  0.90    Ca    9.2      22 Dec 2019 05:58  Mg     1.6     12-22    TPro  6.8  /  Alb  3.3  /  TBili  0.3  /  DBili  x   /  AST  22  /  ALT  17  /  AlkPhos  79  12-22    Creatinine Trend: 0.90<--, 0.92<--, 0.97<--, 1.06<--, 1.18<--           PHYSICAL EXAM  Vital Signs Last 24 Hrs  T(C): 36.8 (22 Dec 2019 12:01), Max: 36.8 (22 Dec 2019 12:01)  T(F): 98.3 (22 Dec 2019 12:01), Max: 98.3 (22 Dec 2019 12:01)  HR: 77 (22 Dec 2019 12:01) (73 - 77)  BP: 108/56 (22 Dec 2019 12:01) (108/56 - 127/61)  BP(mean): --  RR: 15 (22 Dec 2019 12:01) (15 - 16)  SpO2: 99% (22 Dec 2019 12:01) (99% - 100%)    Gen: Appears well in NAD  HEENT:  (-)icterus (-)pallor  CV: N S1 S2 1/6 DAMIAN (+)2 Pulses B/l  Resp:  Clear to ausculatation B/L, normal effort  GI: (+) BS Soft, NT, ND  Lymph:  (-)Edema, (-)obvious lymphadenopathy  Skin: Warm to touch, Normal turgor  Psych: Appropriate mood and affect    TELEMETRY:  not on tele    < from: Nuclear Stress Test-Pharmacologic (05.25.17 @ 09:28) >  IMPRESSIONS:Abnormal Study  * Myocardial Perfusion SPECT results are mildly abnormal.  * There is a small, mild defect in basal inferior wall  that is fixed, suggestive of infarct. However, the  observed defect may be due to attenuation from the  diaphragm. The patient could not lay prone for attenuation  corrected imaging.  * No clear evidence of ischemia.  * Post-stress gated wall motion analysis was performed  (LVEF > 70%;LVEDV = 43 ml.), revealing normal LV function.  The RV appears midly enlarged and diffusely  hypocontractile.    < end of copied text >        < from: Transthoracic Echocardiogram (11.01.19 @ 08:33) >  CONCLUSIONS:  1. Mitral annular calcification, otherwise normal mitral  valve. Minimal mitral regurgitation.  2. Normal left ventricular internal dimensions and wall  thicknesses.  3. Normal left ventricular systolic function. No segmental  wall motion abnormalities.  4. Mild diastolic dysfunction (Stage I).  5. Normalright ventricular size and function.  6. Estimated right ventricular systolic pressure equals 71  mm Hg, assuming right atrial pressure equals 10 mm Hg,  consistent with severe pulmonary hypertension.  7. A bubble study was performed with the intravenous  injection of agitated saline.  Following contrast  injection, no obvious bubbles were seen in the left heart.  8. The coronary sinus is markedly dilated.  A separate  bubble study was performed with the intravenous injection  of agitated saline contrast from a left upper extremity  vein.  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  ------------------------------------------------------------------------  Confirmed on  11/1/2019 - 11:25:15 by Reinaldo Edwards M.D.      < from: CT Angio Abdomen and Pelvis w/ IV Cont (12.20.19 @ 00:03) >    IMPRESSION:     No evidence of mesenteric ischemia.    < end of copied text >        ASSESSMENT/PLAN: 	      80 yo F with history of HTN, HLD, DM, history of CVA with residual right sided weakness, COPD, with historically normal LV function on recent TTE with no clear ischemia on NST in 2017 who was admitted with abdominal pain, nausea, and vomiting :    -pt. reports a possible  episode of chest pain a few weeks ago that has since resolved - when I asked the patient about this episode, she is unable to clearly recall however, she denies any anginal chest pain. She was reportedly recently prescribed prednisone which her daughter states gave the patient "palpitations" -when she stopped the prednisone, her symptoms resolved.   -ECG with no acute ischemia; recent TTE with no acute ischemia   -Given atypical, non-anginal symptoms and current pancreatitis, no ischemic evaluation needed at this time  -Abdominal pain resolved at this time - tolerating PO  -GI noted - patient planned for EGD tomorrow   - The patient's RCRI score is 1 - thus she is low risk for a low risk procedure   - HD stable no evidence CHF

## 2019-12-23 NOTE — PROGRESS NOTE ADULT - SUBJECTIVE AND OBJECTIVE BOX
Patient denies chest pain or shortness of breath.   c/o feeling hungry, pending EGD today .  Review of systems otherwise (-)  	      acetaminophen   Tablet .. 650 milliGRAM(s) Oral every 6 hours PRN  aspirin enteric coated 81 milliGRAM(s) Oral daily  atorvastatin 80 milliGRAM(s) Oral at bedtime  cholecalciferol 1000 Unit(s) Oral daily  gabapentin 100 milliGRAM(s) Oral three times a day  insulin lispro (HumaLOG) corrective regimen sliding scale   SubCutaneous three times a day before meals  insulin lispro (HumaLOG) corrective regimen sliding scale   SubCutaneous at bedtime  lidocaine   Patch 1 Patch Transdermal every 24 hours  meclizine 12.5 milliGRAM(s) Oral daily  multivitamin 1 Tablet(s) Oral daily  pantoprazole    Tablet 40 milliGRAM(s) Oral before breakfast                          12.7   4.75  )-----------( 184      ( 23 Dec 2019 09:45 )             38.8       Hemoglobin: 12.7 g/dL (12-23 @ 09:45)  Hemoglobin: 11.4 g/dL (12-22 @ 05:58)  Hemoglobin: 11.6 g/dL (12-21 @ 07:00)  Hemoglobin: 11.9 g/dL (12-20 @ 09:59)  Hemoglobin: 11.3 g/dL (12-19 @ 19:30)      12-23    141  |  97<L>  |  23  ----------------------------<  141<H>  4.1   |  36<H>  |  0.97    Ca    9.9      23 Dec 2019 09:45  Mg     1.7     12-23    TPro  7.4  /  Alb  3.8  /  TBili  0.3  /  DBili  x   /  AST  21  /  ALT  17  /  AlkPhos  87  12-23    Creatinine Trend: 0.97<--, 0.90<--, 0.92<--, 0.97<--, 1.06<--, 1.18<--    COAGS:       PHYSICAL EXAM:  Vital Signs last 24 Hours   T(C): 37.1 (12-23-19 @ 10:23), Max: 37.1 (12-23-19 @ 10:21)  HR: 71 (12-23-19 @ 10:23) (71 - 84)  BP: 126/76 (12-23-19 @ 10:23) (108/56 - 126/76)  RR: 18 (12-23-19 @ 10:23) (14 - 18)  SpO2: 100% (12-23-19 @ 10:23) (99% - 100%)  Wt(kg): --    I&O's Summary    22 Dec 2019 07:01  -  23 Dec 2019 07:00  --------------------------------------------------------  IN: 200 mL / OUT: 300 mL / NET: -100 mL        Gen: Appears well in NAD  HEENT:  (-)icterus (-)pallor  CV: N S1 S2 1/6 DAMIAN (+)2 Pulses B/l  Resp:  Clear to auscultation B/L, normal effort  GI: (+) BS Soft, NT, ND  Lymph:  (-)Edema, (-)obvious lymphadenopathy  Skin: Warm to touch, Normal turgor  Psych: Appropriate mood and affect      TELEMETRY:  not on tele    < from: Nuclear Stress Test-Pharmacologic (05.25.17 @ 09:28) >  IMPRESSIONS:Abnormal Study  * Myocardial Perfusion SPECT results are mildly abnormal.  * There is a small, mild defect in basal inferior wall  that is fixed, suggestive of infarct. However, the  observed defect may be due to attenuation from the  diaphragm. The patient could not lay prone for attenuation  corrected imaging.  * No clear evidence of ischemia.  * Post-stress gated wall motion analysis was performed  (LVEF > 70%;LVEDV = 43 ml.), revealing normal LV function.  The RV appears midly enlarged and diffusely  hypocontractile.    < end of copied text >      < from: Transthoracic Echocardiogram (11.01.19 @ 08:33) >  CONCLUSIONS:  1. Mitral annular calcification, otherwise normal mitral  valve. Minimal mitral regurgitation.  2. Normal left ventricular internal dimensions and wall  thicknesses.  3. Normal left ventricular systolic function. No segmental  wall motion abnormalities.  4. Mild diastolic dysfunction (Stage I).  5. Normalright ventricular size and function.  6. Estimated right ventricular systolic pressure equals 71  mm Hg, assuming right atrial pressure equals 10 mm Hg,  consistent with severe pulmonary hypertension.  7. A bubble study was performed with the intravenous  injection of agitated saline.  Following contrast  injection, no obvious bubbles were seen in the left heart.  8. The coronary sinus is markedly dilated.  A separate  bubble study was performed with the intravenous injection  of agitated saline contrast from a left upper extremity  vein.  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  ------------------------------------------------------------------------  Confirmed on  11/1/2019 - 11:25:15 by Reinaldo Edwards M.D.      < from: CT Angio Abdomen and Pelvis w/ IV Cont (12.20.19 @ 00:03) >    IMPRESSION:     No evidence of mesenteric ischemia.    < end of copied text >        ASSESSMENT/PLAN: 	      80 yo F with history of HTN, HLD, DM, history of CVA with residual right sided weakness, COPD, with historically normal LV function on recent TTE with no clear ischemia on NST in 2017 who was admitted with abdominal pain, nausea, and vomiting.       -pt without cp, sob or anginal symptoms   -ECG with no acute ischemia; recent TTE with no acute ischemia   -Given atypical, non-anginal symptoms and current pancreatitis, no ischemic evaluation needed at this time  -GI noted, Abdominal pain resolved, plan for EGD today, f/u results   -The patient's RCRI score is 1 - thus she is low risk for a low risk procedure   -HD stable no evidence CHF   -will follow with you

## 2019-12-23 NOTE — PROGRESS NOTE ADULT - SUBJECTIVE AND OBJECTIVE BOX
Patient is a 79y old  Female who presents with a chief complaint of N/V/D (23 Dec 2019 11:50)      INTERVAL HPI/OVERNIGHT EVENTS:  T(C): 36.2 (12-23-19 @ 16:43), Max: 37.1 (12-23-19 @ 10:21)  HR: 78 (12-23-19 @ 18:00) (71 - 93)  BP: 118/75 (12-23-19 @ 18:00) (98/72 - 126/76)  RR: 25 (12-23-19 @ 18:00) (15 - 25)  SpO2: 95% (12-23-19 @ 18:00) (93% - 100%)  Wt(kg): --  I&O's Summary    22 Dec 2019 07:01  -  23 Dec 2019 07:00  --------------------------------------------------------  IN: 200 mL / OUT: 300 mL / NET: -100 mL    23 Dec 2019 07:01  -  23 Dec 2019 18:16  --------------------------------------------------------  IN: 300 mL / OUT: 0 mL / NET: 300 mL        LABS:                        12.7   4.75  )-----------( 184      ( 23 Dec 2019 09:45 )             38.8     12-23    141  |  97<L>  |  23  ----------------------------<  141<H>  4.1   |  36<H>  |  0.97    Ca    9.9      23 Dec 2019 09:45  Mg     1.7     12-23    TPro  7.4  /  Alb  3.8  /  TBili  0.3  /  DBili  x   /  AST  21  /  ALT  17  /  AlkPhos  87  12-23        CAPILLARY BLOOD GLUCOSE      POCT Blood Glucose.: 131 mg/dL (23 Dec 2019 12:58)  POCT Blood Glucose.: 124 mg/dL (23 Dec 2019 08:41)  POCT Blood Glucose.: 106 mg/dL (22 Dec 2019 21:51)            MEDICATIONS  (STANDING):  aspirin enteric coated 81 milliGRAM(s) Oral daily  atorvastatin 80 milliGRAM(s) Oral at bedtime  cholecalciferol 1000 Unit(s) Oral daily  gabapentin 100 milliGRAM(s) Oral three times a day  insulin lispro (HumaLOG) corrective regimen sliding scale   SubCutaneous three times a day before meals  insulin lispro (HumaLOG) corrective regimen sliding scale   SubCutaneous at bedtime  lactated ringers. 1000 milliLiter(s) (42 mL/Hr) IV Continuous <Continuous>  lidocaine   Patch 1 Patch Transdermal every 24 hours  meclizine 12.5 milliGRAM(s) Oral daily  multivitamin 1 Tablet(s) Oral daily  pantoprazole    Tablet 40 milliGRAM(s) Oral before breakfast    MEDICATIONS  (PRN):  acetaminophen   Tablet .. 650 milliGRAM(s) Oral every 6 hours PRN Mild Pain (1 - 3), Moderate Pain (4 - 6), Severe Pain (7 - 10)          PHYSICAL EXAM:  GENERAL: NAD, well-groomed, well-developed  HEAD:  Atraumatic, Normocephalic  CHEST/LUNG: Clear to percussion bilaterally; No rales, rhonchi, wheezing, or rubs  HEART: Regular rate and rhythm; No murmurs, rubs, or gallops  ABDOMEN: Soft, Nontender, Nondistended; Bowel sounds present  EXTREMITIES:  2+ Peripheral Pulses, No clubbing, cyanosis, or edema  LYMPH: No lymphadenopathy noted  SKIN: No rashes or lesions    Care Discussed with Consultants/Other Providers [ ] YES  [ ] NO

## 2019-12-23 NOTE — PROGRESS NOTE ADULT - ATTENDING COMMENTS
Patient seen and examined.  Agree with above.   No further inpatient cardiac workup needed at this time.   Low risk based on RCRI score for procedures    Lewis Snow MD

## 2019-12-23 NOTE — PROGRESS NOTE ADULT - ASSESSMENT
Problem/Plan - 1:  ·  Problem: Other acute pancreatitis without infection or necrosis.  Plan: gi fu  ivf  advance diet as tolerated   EGD today     Problem/Plan - 2:  ·  Problem: Essential hypertension.  Plan: c/w home meds.     Problem/Plan - 3:  ·  Problem: Diabetes mellitus type 2, noninsulin dependent.  Plan: FS AC/QHS w/ sliding scale.     Problem/Plan - 4:  ·  Problem: Chronic diastolic congestive heart failure.  Plan: monitor fluid status

## 2019-12-24 NOTE — PROGRESS NOTE ADULT - ASSESSMENT
Problem/Plan - 1:  ·  Problem: Other acute pancreatitis without infection or necrosis.  Plan: gi fu  ivf  advance diet as tolerated   sp EGD    Problem/Plan - 2:  ·  Problem: Essential hypertension.  Plan: c/w home meds.     Problem/Plan - 3:  ·  Problem: Diabetes mellitus type 2, noninsulin dependent.  Plan: FS AC/QHS w/ sliding scale.     Problem/Plan - 4:  ·  Problem: Chronic diastolic congestive heart failure.  Plan: monitor fluid status

## 2019-12-24 NOTE — PROGRESS NOTE ADULT - ASSESSMENT
79 year old female with abdominal pain and elevated lipase.       Abdominal pain.   Clinically seems to have resolved.   Advance diet   follow up pathology   Small frequent low fat meals       Elevated Lipase   ? 2/2 Vomiting       Knee pain   As per primary team   avoid NSAIDs     Advanced care planning was discussed with patient and family.  Advanced care planning forms were reviewed and discussed.  Risks, benefits and alternatives of gastroenterologic procedures were discussed in detail and all questions were answered.

## 2019-12-24 NOTE — PROGRESS NOTE ADULT - SUBJECTIVE AND OBJECTIVE BOX
Patient is a 79y old  Female who presents with a chief complaint of N/V/D (24 Dec 2019 15:35)      INTERVAL HPI/OVERNIGHT EVENTS:  T(C): 36.8 (12-24-19 @ 11:32), Max: 37.1 (12-23-19 @ 18:36)  HR: 92 (12-24-19 @ 11:32) (74 - 92)  BP: 118/78 (12-24-19 @ 11:32) (116/77 - 136/69)  RR: 18 (12-24-19 @ 11:32) (17 - 25)  SpO2: 98% (12-24-19 @ 11:32) (95% - 100%)  Wt(kg): --  I&O's Summary    23 Dec 2019 07:01  -  24 Dec 2019 07:00  --------------------------------------------------------  IN: 300 mL / OUT: 0 mL / NET: 300 mL        LABS:                        12.4   5.51  )-----------( 181      ( 24 Dec 2019 06:00 )             38.1     12-24    138  |  96<L>  |  26<H>  ----------------------------<  147<H>  4.0   |  29  |  0.90    Ca    9.9      24 Dec 2019 06:00  Mg     1.7     12-24    TPro  7.4  /  Alb  3.8  /  TBili  0.3  /  DBili  x   /  AST  21  /  ALT  17  /  AlkPhos  87  12-23        CAPILLARY BLOOD GLUCOSE      POCT Blood Glucose.: 218 mg/dL (24 Dec 2019 12:49)  POCT Blood Glucose.: 141 mg/dL (24 Dec 2019 08:32)  POCT Blood Glucose.: 225 mg/dL (23 Dec 2019 21:44)  POCT Blood Glucose.: 127 mg/dL (23 Dec 2019 18:48)            MEDICATIONS  (STANDING):  aspirin enteric coated 81 milliGRAM(s) Oral daily  atorvastatin 80 milliGRAM(s) Oral at bedtime  cholecalciferol 1000 Unit(s) Oral daily  gabapentin 100 milliGRAM(s) Oral three times a day  insulin lispro (HumaLOG) corrective regimen sliding scale   SubCutaneous three times a day before meals  insulin lispro (HumaLOG) corrective regimen sliding scale   SubCutaneous at bedtime  lactated ringers. 1000 milliLiter(s) (42 mL/Hr) IV Continuous <Continuous>  lidocaine   Patch 1 Patch Transdermal every 24 hours  meclizine 12.5 milliGRAM(s) Oral daily  multivitamin 1 Tablet(s) Oral daily  pantoprazole    Tablet 40 milliGRAM(s) Oral before breakfast    MEDICATIONS  (PRN):  acetaminophen   Tablet .. 650 milliGRAM(s) Oral every 6 hours PRN Mild Pain (1 - 3), Moderate Pain (4 - 6), Severe Pain (7 - 10)          PHYSICAL EXAM:  GENERAL: NAD, well-groomed, well-developed  HEAD:  Atraumatic, Normocephalic  CHEST/LUNG: Clear to percussion bilaterally; No rales, rhonchi, wheezing, or rubs  HEART: Regular rate and rhythm; No murmurs, rubs, or gallops  ABDOMEN: Soft, Nontender, Nondistended; Bowel sounds present  EXTREMITIES:  2+ Peripheral Pulses, No clubbing, cyanosis, or edema  LYMPH: No lymphadenopathy noted  SKIN: No rashes or lesions    Care Discussed with Consultants/Other Providers [ ] YES  [ ] NO

## 2019-12-24 NOTE — PROGRESS NOTE ADULT - SUBJECTIVE AND OBJECTIVE BOX
Patient denies chest pain or shortness of breath.    Review of systems otherwise (-)  	      MEDICATIONS  (STANDING):  aspirin enteric coated 81 milliGRAM(s) Oral daily  atorvastatin 80 milliGRAM(s) Oral at bedtime  cholecalciferol 1000 Unit(s) Oral daily  gabapentin 100 milliGRAM(s) Oral three times a day  insulin lispro (HumaLOG) corrective regimen sliding scale   SubCutaneous three times a day before meals  insulin lispro (HumaLOG) corrective regimen sliding scale   SubCutaneous at bedtime  lactated ringers. 1000 milliLiter(s) (42 mL/Hr) IV Continuous <Continuous>  lidocaine   Patch 1 Patch Transdermal every 24 hours  meclizine 12.5 milliGRAM(s) Oral daily  multivitamin 1 Tablet(s) Oral daily  pantoprazole    Tablet 40 milliGRAM(s) Oral before breakfast    MEDICATIONS  (PRN):  acetaminophen   Tablet .. 650 milliGRAM(s) Oral every 6 hours PRN Mild Pain (1 - 3), Moderate Pain (4 - 6), Severe Pain (7 - 10)      LABS:                            12.4   5.51  )-----------( 181      ( 24 Dec 2019 06:00 )             38.1     Hemoglobin: 12.4 g/dL (12-24 @ 06:00)  Hemoglobin: 12.7 g/dL (12-23 @ 09:45)  Hemoglobin: 11.4 g/dL (12-22 @ 05:58)  Hemoglobin: 11.6 g/dL (12-21 @ 07:00)  Hemoglobin: 11.9 g/dL (12-20 @ 09:59)    12-24    138  |  96<L>  |  26<H>  ----------------------------<  147<H>  4.0   |  29  |  0.90    Ca    9.9      24 Dec 2019 06:00  Mg     1.7     12-24    TPro  7.4  /  Alb  3.8  /  TBili  0.3  /  DBili  x   /  AST  21  /  ALT  17  /  AlkPhos  87  12-23    Creatinine Trend: 0.90<--, 0.97<--, 0.90<--, 0.92<--, 0.97<--, 1.06<--           PHYSICAL EXAM  Vital Signs Last 24 Hrs  T(C): 36.8 (24 Dec 2019 11:32), Max: 37.1 (23 Dec 2019 18:36)  T(F): 98.2 (24 Dec 2019 11:32), Max: 98.8 (23 Dec 2019 18:36)  HR: 92 (24 Dec 2019 11:32) (73 - 93)  BP: 118/78 (24 Dec 2019 11:32) (98/72 - 136/69)  BP(mean): --  RR: 18 (24 Dec 2019 11:32) (17 - 25)  SpO2: 98% (24 Dec 2019 11:32) (93% - 100%)        Gen: Appears well in NAD  HEENT:  (-)icterus (-)pallor  CV: N S1 S2 1/6 DAMIAN (+)2 Pulses B/l  Resp:  Clear to auscultation B/L, normal effort  GI: (+) BS Soft, NT, ND  Lymph:  (-)Edema, (-)obvious lymphadenopathy  Skin: Warm to touch, Normal turgor  Psych: Appropriate mood and affect      TELEMETRY:  not on tele    < from: Nuclear Stress Test-Pharmacologic (05.25.17 @ 09:28) >  IMPRESSIONS:Abnormal Study  * Myocardial Perfusion SPECT results are mildly abnormal.  * There is a small, mild defect in basal inferior wall  that is fixed, suggestive of infarct. However, the  observed defect may be due to attenuation from the  diaphragm. The patient could not lay prone for attenuation  corrected imaging.  * No clear evidence of ischemia.  * Post-stress gated wall motion analysis was performed  (LVEF > 70%;LVEDV = 43 ml.), revealing normal LV function.  The RV appears midly enlarged and diffusely  hypocontractile.    < end of copied text >      < from: Transthoracic Echocardiogram (11.01.19 @ 08:33) >  CONCLUSIONS:  1. Mitral annular calcification, otherwise normal mitral  valve. Minimal mitral regurgitation.  2. Normal left ventricular internal dimensions and wall  thicknesses.  3. Normal left ventricular systolic function. No segmental  wall motion abnormalities.  4. Mild diastolic dysfunction (Stage I).  5. Normalright ventricular size and function.  6. Estimated right ventricular systolic pressure equals 71  mm Hg, assuming right atrial pressure equals 10 mm Hg,  consistent with severe pulmonary hypertension.  7. A bubble study was performed with the intravenous  injection of agitated saline.  Following contrast  injection, no obvious bubbles were seen in the left heart.  8. The coronary sinus is markedly dilated.  A separate  bubble study was performed with the intravenous injection  of agitated saline contrast from a left upper extremity  vein.  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  ------------------------------------------------------------------------  Confirmed on  11/1/2019 - 11:25:15 by Reinaldo Edwards M.D.      < from: CT Angio Abdomen and Pelvis w/ IV Cont (12.20.19 @ 00:03) >    IMPRESSION:     No evidence of mesenteric ischemia.    < end of copied text >      < from: Upper Endoscopy (12.23.19 @ 16:10) >  Impression:          - Focal Gastritits                       -No pathology to explain nausea/vomiting  Recommendation:      - [facility] to [facility].                       - Advance diet as tolerated.                       - Await pathology results.    < end of copied text >        ASSESSMENT/PLAN: 	   78 yo F with history of HTN, HLD, DM, history of CVA with residual right sided weakness, COPD, with historically normal LV function on recent TTE with no clear ischemia on NST in 2017 who was admitted with abdominal pain, nausea, and vomiting.       -pt without cp, sob or anginal symptoms   -ECG with no acute ischemia; recent TTE with no acute ischemia   -Given atypical, non-anginal symptoms and current pancreatitis, no ischemic evaluation needed at this time  -GI noted, Abdominal pain resolved - EGD notable for gastritis - patient tolerated procedure well   -orthostatics negative   -HD stable no evidence CHF   -no plans for further inpt cardiac workup

## 2019-12-24 NOTE — PROGRESS NOTE ADULT - SUBJECTIVE AND OBJECTIVE BOX
Summary:   79y  Female      Subjective:       Objective:    MEDICATIONS  (STANDING):  aspirin enteric coated 81 milliGRAM(s) Oral daily  atorvastatin 80 milliGRAM(s) Oral at bedtime  cholecalciferol 1000 Unit(s) Oral daily  gabapentin 100 milliGRAM(s) Oral three times a day  insulin lispro (HumaLOG) corrective regimen sliding scale   SubCutaneous three times a day before meals  insulin lispro (HumaLOG) corrective regimen sliding scale   SubCutaneous at bedtime  lactated ringers. 1000 milliLiter(s) (42 mL/Hr) IV Continuous <Continuous>  lidocaine   Patch 1 Patch Transdermal every 24 hours  meclizine 12.5 milliGRAM(s) Oral daily  multivitamin 1 Tablet(s) Oral daily  pantoprazole    Tablet 40 milliGRAM(s) Oral before breakfast    MEDICATIONS  (PRN):  acetaminophen   Tablet .. 650 milliGRAM(s) Oral every 6 hours PRN Mild Pain (1 - 3), Moderate Pain (4 - 6), Severe Pain (7 - 10)              Vital Signs Last 24 Hrs  T(C): 36.8 (24 Dec 2019 11:32), Max: 37.1 (23 Dec 2019 18:36)  T(F): 98.2 (24 Dec 2019 11:32), Max: 98.8 (23 Dec 2019 18:36)  HR: 92 (24 Dec 2019 11:32) (73 - 93)  BP: 118/78 (24 Dec 2019 11:32) (98/72 - 136/69)  BP(mean): --  RR: 18 (24 Dec 2019 11:32) (17 - 25)  SpO2: 98% (24 Dec 2019 11:32) (93% - 100%)      General:  Well developed, well nourished, alert and active, no pallor, NAD.  HEENT:    Normal appearance of conjunctiva, ears, nose, lips, oropharynx, and oral mucosa, anicteric.  Neck:  No masses, no asymmetry.  Lymph Nodes:  No lymphadenopathy.   Cardiovascular:  RRR normal S1/S2, no murmur.  Respiratory:  CTA B/L, normal respiratory effort.   Abdominal:   soft, no masses or tenderness, normoactive BS, NT/ND, no HSM.  Extremities:   No clubbing or cyanosis, normal capillary refill, no edema.   Skin:   No rash, jaundice, lesions, eczema.   Musculoskeletal:  No joint swelling, erythema or tenderness.   Neuro: No focal deficits.   Other:       LABS:                        12.4   5.51  )-----------( 181      ( 24 Dec 2019 06:00 )             38.1     12-24    138  |  96<L>  |  26<H>  ----------------------------<  147<H>  4.0   |  29  |  0.90    Ca    9.9      24 Dec 2019 06:00  Mg     1.7     12-24    TPro  7.4  /  Alb  3.8  /  TBili  0.3  /  DBili  x   /  AST  21  /  ALT  17  /  AlkPhos  87  12-23          RADIOLOGY & ADDITIONAL TESTS:

## 2019-12-24 NOTE — PROGRESS NOTE ADULT - ATTENDING COMMENTS
Patient seen and examined.  Agree with above.   No further inpatient cardiac workup needed at this time.     Lewis Snow MD

## 2019-12-25 NOTE — PROGRESS NOTE ADULT - SUBJECTIVE AND OBJECTIVE BOX
Patient is a 79y old  Female who presents with a chief complaint of N/V/D (25 Dec 2019 15:22)      INTERVAL HPI/OVERNIGHT EVENTS:  T(C): 36.7 (12-25-19 @ 12:35), Max: 36.9 (12-24-19 @ 21:18)  HR: 72 (12-25-19 @ 12:35) (72 - 80)  BP: 111/72 (12-25-19 @ 12:35) (111/72 - 125/74)  RR: 17 (12-25-19 @ 12:35) (16 - 17)  SpO2: 100% (12-25-19 @ 12:35) (98% - 100%)  Wt(kg): --  I&O's Summary    24 Dec 2019 07:01  -  25 Dec 2019 07:00  --------------------------------------------------------  IN: 600 mL / OUT: 1200 mL / NET: -600 mL        LABS:                        11.6   6.11  )-----------( 176      ( 25 Dec 2019 07:07 )             36.0     12-25    142  |  100  |  35<H>  ----------------------------<  236<H>  4.0   |  30  |  1.08    Ca    9.3      25 Dec 2019 07:07  Mg     1.7     12-25          CAPILLARY BLOOD GLUCOSE      POCT Blood Glucose.: 168 mg/dL (25 Dec 2019 17:22)  POCT Blood Glucose.: 217 mg/dL (25 Dec 2019 12:33)  POCT Blood Glucose.: 166 mg/dL (25 Dec 2019 08:49)  POCT Blood Glucose.: 148 mg/dL (24 Dec 2019 21:38)            MEDICATIONS  (STANDING):  aspirin enteric coated 81 milliGRAM(s) Oral daily  atorvastatin 80 milliGRAM(s) Oral at bedtime  cholecalciferol 1000 Unit(s) Oral daily  gabapentin 100 milliGRAM(s) Oral three times a day  insulin lispro (HumaLOG) corrective regimen sliding scale   SubCutaneous three times a day before meals  insulin lispro (HumaLOG) corrective regimen sliding scale   SubCutaneous at bedtime  lactated ringers. 1000 milliLiter(s) (42 mL/Hr) IV Continuous <Continuous>  lidocaine   Patch 1 Patch Transdermal every 24 hours  meclizine 12.5 milliGRAM(s) Oral daily  multivitamin 1 Tablet(s) Oral daily  pantoprazole    Tablet 40 milliGRAM(s) Oral before breakfast    MEDICATIONS  (PRN):  acetaminophen   Tablet .. 650 milliGRAM(s) Oral every 6 hours PRN Mild Pain (1 - 3), Moderate Pain (4 - 6), Severe Pain (7 - 10)          PHYSICAL EXAM:  GENERAL: NAD, well-groomed, well-developed  HEAD:  Atraumatic, Normocephalic  CHEST/LUNG: Clear to percussion bilaterally; No rales, rhonchi, wheezing, or rubs  HEART: Regular rate and rhythm; No murmurs, rubs, or gallops  ABDOMEN: Soft, Nontender, Nondistended; Bowel sounds present  EXTREMITIES:  2+ Peripheral Pulses, No clubbing, cyanosis, or edema  LYMPH: No lymphadenopathy noted  SKIN: No rashes or lesions    Care Discussed with Consultants/Other Providers [ ] YES  [ ] NO

## 2019-12-25 NOTE — PROGRESS NOTE ADULT - SUBJECTIVE AND OBJECTIVE BOX
Patient denies chest pain or shortness of breath.    Review of systems otherwise (-)  	      acetaminophen   Tablet .. 650 milliGRAM(s) Oral every 6 hours PRN  aspirin enteric coated 81 milliGRAM(s) Oral daily  atorvastatin 80 milliGRAM(s) Oral at bedtime  cholecalciferol 1000 Unit(s) Oral daily  gabapentin 100 milliGRAM(s) Oral three times a day  insulin lispro (HumaLOG) corrective regimen sliding scale   SubCutaneous three times a day before meals  insulin lispro (HumaLOG) corrective regimen sliding scale   SubCutaneous at bedtime  lactated ringers. 1000 milliLiter(s) IV Continuous <Continuous>  lidocaine   Patch 1 Patch Transdermal every 24 hours  meclizine 12.5 milliGRAM(s) Oral daily  multivitamin 1 Tablet(s) Oral daily  pantoprazole    Tablet 40 milliGRAM(s) Oral before breakfast                          11.6   6.11  )-----------( 176      ( 25 Dec 2019 07:07 )             36.0       Hemoglobin: 11.6 g/dL (12-25 @ 07:07)  Hemoglobin: 12.4 g/dL (12-24 @ 06:00)  Hemoglobin: 12.7 g/dL (12-23 @ 09:45)  Hemoglobin: 11.4 g/dL (12-22 @ 05:58)  Hemoglobin: 11.6 g/dL (12-21 @ 07:00)      12-25    142  |  100  |  35<H>  ----------------------------<  236<H>  4.0   |  30  |  1.08    Ca    9.3      25 Dec 2019 07:07  Mg     1.7     12-25      Creatinine Trend: 1.08<--, 0.90<--, 0.97<--, 0.90<--, 0.92<--, 0.97<--    COAGS:       PHYSICAL EXAM:  Vital Signs last 24 Hours   T(C): 36.8 (12-25-19 @ 05:15), Max: 36.9 (12-24-19 @ 21:18)  HR: 73 (12-25-19 @ 05:15) (73 - 92)  BP: 125/74 (12-25-19 @ 05:15) (118/78 - 125/74)  RR: 16 (12-25-19 @ 05:15) (16 - 18)  SpO2: 98% (12-25-19 @ 05:15) (98% - 100%)  Wt(kg): --    I&O's Summary    24 Dec 2019 07:01  -  25 Dec 2019 07:00  --------------------------------------------------------  IN: 600 mL / OUT: 1200 mL / NET: -600 mL        Gen: Appears well in NAD  HEENT:  (-)icterus (-)pallor  CV: N S1 S2 RRR, (+)2 Pulses B/l  Resp:  Clear to auscultation B/L, normal effort  GI: (+) BS Soft, NT, ND  Lymph:  (-)Edema, (-)obvious lymphadenopathy  Skin: Warm to touch, Normal turgor  Psych: Appropriate mood and affect      TELEMETRY:  not on tele    < from: Nuclear Stress Test-Pharmacologic (05.25.17 @ 09:28) >  IMPRESSIONS:Abnormal Study  * Myocardial Perfusion SPECT results are mildly abnormal.  * There is a small, mild defect in basal inferior wall  that is fixed, suggestive of infarct. However, the  observed defect may be due to attenuation from the  diaphragm. The patient could not lay prone for attenuation  corrected imaging.  * No clear evidence of ischemia.  * Post-stress gated wall motion analysis was performed  (LVEF > 70%;LVEDV = 43 ml.), revealing normal LV function.  The RV appears midly enlarged and diffusely  hypocontractile.    < end of copied text >      < from: Transthoracic Echocardiogram (11.01.19 @ 08:33) >  CONCLUSIONS:  1. Mitral annular calcification, otherwise normal mitral  valve. Minimal mitral regurgitation.  2. Normal left ventricular internal dimensions and wall  thicknesses.  3. Normal left ventricular systolic function. No segmental  wall motion abnormalities.  4. Mild diastolic dysfunction (Stage I).  5. Normalright ventricular size and function.  6. Estimated right ventricular systolic pressure equals 71  mm Hg, assuming right atrial pressure equals 10 mm Hg,  consistent with severe pulmonary hypertension.  7. A bubble study was performed with the intravenous  injection of agitated saline.  Following contrast  injection, no obvious bubbles were seen in the left heart.  8. The coronary sinus is markedly dilated.  A separate  bubble study was performed with the intravenous injection  of agitated saline contrast from a left upper extremity  vein.  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  ------------------------------------------------------------------------  Confirmed on  11/1/2019 - 11:25:15 by Reinaldo Edwards M.D.      < from: CT Angio Abdomen and Pelvis w/ IV Cont (12.20.19 @ 00:03) >    IMPRESSION:     No evidence of mesenteric ischemia.    < end of copied text >      < from: Upper Endoscopy (12.23.19 @ 16:10) >  Impression:          - Focal Gastritits                       -No pathology to explain nausea/vomiting  Recommendation:      - Return patient to [facility] for [facility] care.                       - Advance diet as tolerated.                       - Await pathology results.    < end of copied text >        ASSESSMENT/PLAN:   	   80 yo F with history of HTN, HLD, DM, history of CVA with residual right sided weakness, COPD, with historically normal LV function on recent TTE with no clear ischemia on NST in 2017 who was admitted with abdominal pain, nausea, and vomiting.       -pt without cp, sob or anginal symptoms   -ECG with no acute ischemia; recent TTE with no acute ischemia   -Given atypical, non-anginal symptoms and current pancreatitis, no ischemic evaluation needed at this time  -GI noted, Abdominal pain resolved - EGD notable for gastritis - patient tolerated procedure well   -orthostatics negative, HD stable no evidence CHF   -no plans for further inpt cardiac workup   -d/c planning per primary team, pending bed availability at rehab facility

## 2019-12-25 NOTE — PROGRESS NOTE ADULT - ASSESSMENT
79 year old female with abdominal pain and elevated lipase.       Abdominal pain.   Clinically seems to have resolved.   Advance diet   follow up pathology   Small frequent low fat meals       Elevated Lipase   ? 2/2 Vomiting       Knee pain   As per primary team   avoid NSAIDs       Discharge planning       Advanced care planning was discussed with patient and family.  Advanced care planning forms were reviewed and discussed.  Risks, benefits and alternatives of gastroenterologic procedures were discussed in detail and all questions were answered.

## 2019-12-25 NOTE — PROGRESS NOTE ADULT - SUBJECTIVE AND OBJECTIVE BOX
Summary:   79y  Female      Subjective:   No complaints     Objective:    MEDICATIONS  (STANDING):  aspirin enteric coated 81 milliGRAM(s) Oral daily  atorvastatin 80 milliGRAM(s) Oral at bedtime  cholecalciferol 1000 Unit(s) Oral daily  gabapentin 100 milliGRAM(s) Oral three times a day  insulin lispro (HumaLOG) corrective regimen sliding scale   SubCutaneous three times a day before meals  insulin lispro (HumaLOG) corrective regimen sliding scale   SubCutaneous at bedtime  lactated ringers. 1000 milliLiter(s) (42 mL/Hr) IV Continuous <Continuous>  lidocaine   Patch 1 Patch Transdermal every 24 hours  meclizine 12.5 milliGRAM(s) Oral daily  multivitamin 1 Tablet(s) Oral daily  pantoprazole    Tablet 40 milliGRAM(s) Oral before breakfast    MEDICATIONS  (PRN):  acetaminophen   Tablet .. 650 milliGRAM(s) Oral every 6 hours PRN Mild Pain (1 - 3), Moderate Pain (4 - 6), Severe Pain (7 - 10)              Vital Signs Last 24 Hrs  T(C): 36.8 (24 Dec 2019 11:32), Max: 37.1 (23 Dec 2019 18:36)  T(F): 98.2 (24 Dec 2019 11:32), Max: 98.8 (23 Dec 2019 18:36)  HR: 92 (24 Dec 2019 11:32) (73 - 93)  BP: 118/78 (24 Dec 2019 11:32) (98/72 - 136/69)  BP(mean): --  RR: 18 (24 Dec 2019 11:32) (17 - 25)  SpO2: 98% (24 Dec 2019 11:32) (93% - 100%)      General:  Well developed, well nourished, alert and active, no pallor, NAD.  HEENT:    Normal appearance of conjunctiva, ears, nose, lips, oropharynx, and oral mucosa, anicteric.  Neck:  No masses, no asymmetry.  Lymph Nodes:  No lymphadenopathy.   Cardiovascular:  RRR normal S1/S2, no murmur.  Respiratory:  CTA B/L, normal respiratory effort.   Abdominal:   soft, no masses or tenderness, normoactive BS, NT/ND, no HSM.  Extremities:   No clubbing or cyanosis, normal capillary refill, no edema.   Skin:   No rash, jaundice, lesions, eczema.   Musculoskeletal:  No joint swelling, erythema or tenderness.   Neuro: No focal deficits.   Other:       LABS:                        12.4   5.51  )-----------( 181      ( 24 Dec 2019 06:00 )             38.1     12-24    138  |  96<L>  |  26<H>  ----------------------------<  147<H>  4.0   |  29  |  0.90    Ca    9.9      24 Dec 2019 06:00  Mg     1.7     12-24    TPro  7.4  /  Alb  3.8  /  TBili  0.3  /  DBili  x   /  AST  21  /  ALT  17  /  AlkPhos  87  12-23          RADIOLOGY & ADDITIONAL TESTS:

## 2019-12-26 NOTE — DISCHARGE NOTE NURSING/CASE MANAGEMENT/SOCIAL WORK - PATIENT PORTAL LINK FT
You can access the FollowMyHealth Patient Portal offered by [facility] by registering at the following website: [email or web address]. By joining [employer]’s FollowMyHealth portal, you will also be able to view your health information using other applications (apps) compatible with our system.

## 2019-12-26 NOTE — DISCHARGE NOTE PROVIDER - CARE PROVIDER_API CALL
Raji Mcintyre  Follow up with your PCP within 1 week of discharge from rehab  Phone: (   )    -  Fax: (   )    -  Follow Up Time:     Cardiologist,   Phone: (   )    -  Fax: (   )    -  Follow Up Time:

## 2019-12-26 NOTE — DISCHARGE NOTE PROVIDER - NSDCCPCAREPLAN_GEN_ALL_CORE_FT
PRINCIPAL DISCHARGE DIAGNOSIS  Diagnosis: Pancreatitis  Assessment and Plan of Treatment: PRINCIPAL DISCHARGE DIAGNOSIS  Diagnosis: Pancreatitis  Assessment and Plan of Treatment: You were admitted for pancreatitis which resolved on its own.  Continue a low fat diet. You had an EGD which showed gastritis but no other reason for your symptoms, biopsies were sent.   Follow up with GI upon discharge from rehab for biopsy results.          SECONDARY DISCHARGE DIAGNOSES  Diagnosis: Diabetes mellitus type 2, noninsulin dependent  Assessment and Plan of Treatment: Continue consistent carbohydrate diet.  Monitor blood glucose levels throughout the day before meals and at bedtime.  Record blood sugars and bring to outpatient providers appointment in order to be reviewed by your doctor for management modifications.  Be aware of diabetes management symptoms including feeling cool and clammy may be related to low glucose levels.  Feeling hot and dry may indicate high glucose levels.  If  you feel these symptoms, check your blood sugar.  Make regular podiatry appointments in order to have feet checked for wounds and toe nails cut by a doctor to prevent infections.    Diagnosis: Essential hypertension  Assessment and Plan of Treatment: Continue current blood pressure medication regimen as directed. Monitor for any visual changes, headaches or dizziness.  Monitor blood pressure regularly.  Follow up with your PCP for further management for high blood pressure, please call to make appointment within 1 week of discharge    Diagnosis: Chronic diastolic congestive heart failure  Assessment and Plan of Treatment: Continue recommended medication regimen, fluid restriction (Less than 1.5 Liters per day). Monitor for signs/symptoms of fluid overload and electrolyte abnormalities, such as, shortness of breath, cough, swelling, chest discomfort, changes in heart rate, dizziness, fainting, or changes in mental status. Keep track of your weight and call your outpatient physician if there are abrupt changes in weight. Follow-up with your outpatient provider after you've been discharged from the hospital for further care/recommendations.   Continue to take your medications as ordered.  Follow up with your PCP and cardiologist upon discharge from rehab

## 2019-12-26 NOTE — PROGRESS NOTE ADULT - ATTENDING COMMENTS
Patient seen and examined.  Agree with above.   No clinical heart failure or anginal symptoms  No further inpatient cardiac workup needed at this time.     Lewis Snow MD

## 2019-12-26 NOTE — DISCHARGE NOTE PROVIDER - NSDCMRMEDTOKEN_GEN_ALL_CORE_FT
aspirin 81 mg oral delayed release tablet: 1 tab(s) orally once a day  atorvastatin 80 mg oral tablet: 1 tab(s) orally once a day (at bedtime)  gabapentin 100 mg oral capsule: 1 cap(s) orally 3 times a day  meclizine 12.5 mg oral tablet: 1 tab(s) orally once a day  metFORMIN 500 mg oral tablet: 1 tab(s) orally once a day with lunch  Multiple Vitamins oral tablet: 1 tab(s) orally once a day  pantoprazole 40 mg oral delayed release tablet: 1 tab(s) orally once a day  Tudorza Pressair 400 mcg/inh inhalation powder: 1 puff(s) inhaled 2 times a day  Vitamin D3 1000 intl units oral tablet: 1 tab(s) orally once a day

## 2019-12-26 NOTE — DISCHARGE NOTE PROVIDER - PROVIDER TOKENS
FREE:[LAST:[Francisco Javier],FIRST:[Raji],PHONE:[(   )    -],FAX:[(   )    -],ADDRESS:[Follow up with your PCP within 1 week of discharge from rehab]],FREE:[LAST:[Cardiologist],PHONE:[(   )    -],FAX:[(   )    -]]

## 2019-12-26 NOTE — PROGRESS NOTE ADULT - SUBJECTIVE AND OBJECTIVE BOX
INTERVAL HPI/OVERNIGHT EVENTS:    eating well; finished entire dish of breakfast  no abd pain   no n/v  reports bm overnight and this morning; no rectal bleeding, no melena     MEDICATIONS  (STANDING):  aspirin enteric coated 81 milliGRAM(s) Oral daily  atorvastatin 80 milliGRAM(s) Oral at bedtime  cholecalciferol 1000 Unit(s) Oral daily  gabapentin 100 milliGRAM(s) Oral three times a day  insulin lispro (HumaLOG) corrective regimen sliding scale   SubCutaneous three times a day before meals  insulin lispro (HumaLOG) corrective regimen sliding scale   SubCutaneous at bedtime  lactated ringers. 1000 milliLiter(s) (42 mL/Hr) IV Continuous <Continuous>  lidocaine   Patch 1 Patch Transdermal every 24 hours  meclizine 12.5 milliGRAM(s) Oral daily  multivitamin 1 Tablet(s) Oral daily  pantoprazole    Tablet 40 milliGRAM(s) Oral before breakfast    MEDICATIONS  (PRN):  acetaminophen   Tablet .. 650 milliGRAM(s) Oral every 6 hours PRN Mild Pain (1 - 3), Moderate Pain (4 - 6), Severe Pain (7 - 10)      Allergies    No Known Allergies    Intolerances        Review of Systems:    General:  No wt loss, fevers, chills, night sweats, fatigue   Eyes:  Good vision, no reported pain  ENT:  No sore throat, pain, runny nose, dysphagia  CV:  No pain, palpitations, hypo/hypertension  Resp:  No dyspnea, cough, tachypnea, wheezing  GI:  No pain, No nausea, No vomiting, No diarrhea, No constipation, No weight loss, No fever, No pruritis, No rectal bleeding, No melena, No dysphagia  :  No pain, bleeding, incontinence, nocturia  Muscle:  No pain, weakness  Neuro:  No weakness, tingling, memory problems  Psych:  No fatigue, insomnia, mood problems, depression  Endocrine:  No polyuria, polydypsia, cold/heat intolerance  Heme:  No petechiae, ecchymosis, easy bruisability  Skin:  No rash, tattoos, scars, edema      Vital Signs Last 24 Hrs  T(C): 36.8 (26 Dec 2019 05:32), Max: 36.8 (26 Dec 2019 05:32)  T(F): 98.2 (26 Dec 2019 05:32), Max: 98.2 (26 Dec 2019 05:32)  HR: 73 (26 Dec 2019 05:32) (72 - 86)  BP: 121/65 (26 Dec 2019 05:32) (111/72 - 125/86)  BP(mean): --  RR: 17 (26 Dec 2019 05:32) (17 - 18)  SpO2: 98% (26 Dec 2019 05:32) (98% - 100%)    PHYSICAL EXAM:    Constitutional: NAD  HEENT: EOMI, throat clear  Neck: No LAD, supple  Respiratory: CTA and P  Cardiovascular: S1 and S2, RRR, no M  Gastrointestinal: BS+, soft, NT/ND, neg HSM,  Extremities: No peripheral edema, neg clubbing, cyanosis  Vascular: 2+ peripheral pulses  Neurological: A/O x 3, no focal deficits  Psychiatric: Normal mood, normal affect  Skin: No rashes      LABS:                        10.9   5.99  )-----------( 160      ( 26 Dec 2019 06:45 )             34.4     12-26    141  |  98  |  34<H>  ----------------------------<  279<H>  4.0   |  31  |  0.88    Ca    9.3      26 Dec 2019 06:45  Phos  2.6     12-26  Mg     1.9     12-26            RADIOLOGY & ADDITIONAL TESTS:

## 2019-12-26 NOTE — PROGRESS NOTE ADULT - ASSESSMENT
79 year old female with abdominal pain and elevated lipase.       Abdominal pain.   resolved   s/p EGD with Gastritis; fu pathology   continue protonix   maalox prn   Small frequent low fat diet   no gi objection to dc planning per primary team       Elevated Lipase   ? 2/2 Vomiting       Knee pain   As per primary team   avoid NSAIDs           Advanced care planning was discussed with patient and family.  Advanced care planning forms were reviewed and discussed.  Risks, benefits and alternatives of gastroenterologic procedures were discussed in detail and all questions were answered.

## 2019-12-26 NOTE — DISCHARGE NOTE PROVIDER - HOSPITAL COURSE
79 year old female with abdominal pain and elevated lipase            Abdominal pain.     resolved     s/p EGD with Gastritis; fu pathology     continue protonix     maalox prn     Small frequent low fat diet     no gi objection to dc planning per primary team             Elevated Lipase     ? 2/2 Vomiting             Knee pain     As per primary team     avoid NSAIDs         -pt. reports a ? episode of chest pain a few weeks ago that has since resolved    -ECG with no acute st-t changes    -Given atypical, non-anginal symptoms and current pancreatitis, no ischemic evaluation needed at this time    -Treatment of pancreatitis per primary team            -pt is chest pain free, no sob or anginal symptoms, acute pancreatitis - no ischemic eval at this time     -ECG with no acute ischemia; recent TTE with no acute ischemia    -s/p EGD for c/o abd pain, now resolved - EGD w/Gastritis     -orthostatics negative, HD stable no evidence CHF     -no plans for further inpt cardiac workup    -appreciate PT f/u      -d/c planning per primary team, pending bed availability at rehab facility Patient is a 78 y/o F PMH HTN, HLD, DM2, CVA w/ R sided residual weakness, CHF,  COPD p/w abdominal pain, found to have elevated lipase.  Admitted for treatment of acute pancreatitis without infection or necrosis.  Plan: Patient not on any new medications that would explain pancreatitis, no ETOH, LFTs WNL, triglycerides WNL.  In ED patient received 1 L bolus NS, morphine, reports feeling well at this time, requesting to trial PO, further IVF were avoided due to patients underlying CHF.  CT abd and pelvis performed and negative for mesenteric ischemia.  Urine cultures and blood cultures with no growth to date.  Supportive care provided for abdominal pain and it has resolved.  EGD performed and notable for gastritis, biopsies were sent and patient is to follow up as an outpatient.  Patient seen and examined by cardiology, patient was chest pain free on their exam and has no complaints of sob or anginal symptoms and recommends no ischemic eval at this time.  ECG reviewed by cardiology with no acute ischemia.  throughout hospitalization patient complained of intermittent knee pain which is chronic in nature, treated with tylenol, NSAIDS were avoided d/t gastritis.  Patient stable for discharge, discussed with Dr. Moyer.  Patient to go to rehab.

## 2019-12-26 NOTE — PROGRESS NOTE ADULT - SUBJECTIVE AND OBJECTIVE BOX
Patient denies chest pain or shortness of breath.  Review of systems otherwise (-)  	      acetaminophen   Tablet .. 650 milliGRAM(s) Oral every 6 hours PRN  aspirin enteric coated 81 milliGRAM(s) Oral daily  atorvastatin 80 milliGRAM(s) Oral at bedtime  cholecalciferol 1000 Unit(s) Oral daily  gabapentin 100 milliGRAM(s) Oral three times a day  insulin lispro (HumaLOG) corrective regimen sliding scale   SubCutaneous three times a day before meals  insulin lispro (HumaLOG) corrective regimen sliding scale   SubCutaneous at bedtime  lactated ringers. 1000 milliLiter(s) IV Continuous <Continuous>  lidocaine   Patch 1 Patch Transdermal every 24 hours  meclizine 12.5 milliGRAM(s) Oral daily  multivitamin 1 Tablet(s) Oral daily  pantoprazole    Tablet 40 milliGRAM(s) Oral before breakfast                        10.9   5.99  )-----------( 160      ( 26 Dec 2019 06:45 )             34.4       Hemoglobin: 10.9 g/dL (12-26 @ 06:45)  Hemoglobin: 11.6 g/dL (12-25 @ 07:07)  Hemoglobin: 12.4 g/dL (12-24 @ 06:00)  Hemoglobin: 12.7 g/dL (12-23 @ 09:45)  Hemoglobin: 11.4 g/dL (12-22 @ 05:58)      12-26    141  |  98  |  34<H>  ----------------------------<  279<H>  4.0   |  31  |  0.88    Ca    9.3      26 Dec 2019 06:45  Phos  2.6     12-26  Mg     1.9     12-26      Creatinine Trend: 0.88<--, 1.08<--, 0.90<--, 0.97<--, 0.90<--, 0.92<--    COAGS:       PHYSICAL EXAM:  Vital Signs last 24 Hours   T(C): 36.8 (12-26-19 @ 05:32), Max: 36.8 (12-26-19 @ 05:32)  HR: 73 (12-26-19 @ 05:32) (72 - 86)  BP: 121/65 (12-26-19 @ 05:32) (111/72 - 125/86)  RR: 17 (12-26-19 @ 05:32) (17 - 18)  SpO2: 98% (12-26-19 @ 05:32) (98% - 100%)  Wt(kg): --    I&O's Summary      Gen: Appears well in NAD  HEENT:  (-)icterus (-)pallor  CV: N S1 S2 RRR, (+)2 Pulses B/l  Resp:  Clear to auscultation B/L, normal effort  GI: (+) BS Soft, NT, ND  Lymph:  (-)Edema, (-)obvious lymphadenopathy  Skin: Warm to touch, Normal turgor  Psych: Appropriate mood and affect      TELEMETRY:    not on tele    < from: Nuclear Stress Test-Pharmacologic (05.25.17 @ 09:28) >  IMPRESSIONS:Abnormal Study  * Myocardial Perfusion SPECT results are mildly abnormal.  * There is a small, mild defect in basal inferior wall  that is fixed, suggestive of infarct. However, the  observed defect may be due to attenuation from the  diaphragm. The patient could not lay prone for attenuation  corrected imaging.  * No clear evidence of ischemia.  * Post-stress gated wall motion analysis was performed  (LVEF > 70%;LVEDV = 43 ml.), revealing normal LV function.  The RV appears midly enlarged and diffusely  hypocontractile.    < end of copied text >      < from: Transthoracic Echocardiogram (11.01.19 @ 08:33) >  CONCLUSIONS:  1. Mitral annular calcification, otherwise normal mitral  valve. Minimal mitral regurgitation.  2. Normal left ventricular internal dimensions and wall  thicknesses.  3. Normal left ventricular systolic function. No segmental  wall motion abnormalities.  4. Mild diastolic dysfunction (Stage I).  5. Normalright ventricular size and function.  6. Estimated right ventricular systolic pressure equals 71  mm Hg, assuming right atrial pressure equals 10 mm Hg,  consistent with severe pulmonary hypertension.  7. A bubble study was performed with the intravenous  injection of agitated saline.  Following contrast  injection, no obvious bubbles were seen in the left heart.  8. The coronary sinus is markedly dilated.  A separate  bubble study was performed with the intravenous injection  of agitated saline contrast from a left upper extremity  vein.  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  ------------------------------------------------------------------------  Confirmed on  11/1/2019 - 11:25:15 by Reinaldo Edwards M.D.      < from: CT Angio Abdomen and Pelvis w/ IV Cont (12.20.19 @ 00:03) >    IMPRESSION:     No evidence of mesenteric ischemia.    < end of copied text >      < from: Upper Endoscopy (12.23.19 @ 16:10) >  Impression:          - Focal Gastritits                       -No pathology to explain nausea/vomiting  Recommendation:      - Return patient to [facility] for [facility] care.                       - Advance diet as tolerated.                       - Await pathology results.    < end of copied text >        ASSESSMENT/PLAN:   	   80 yo F with history of HTN, HLD, DM, history of CVA with residual right sided weakness, COPD, with historically normal LV function on recent TTE with no clear ischemia on NST in 2017 who was admitted with abdominal pain, nausea, and vomiting.       -pt is chest pain free, no sob or anginal symptoms, acute pancreatitis - no ischemic eval at this time   -ECG with no acute ischemia; recent TTE with no acute ischemia  -s/p EGD for c/o abd pain, now resolved - EGD w/Gastritis   -orthostatics negative, HD stable no evidence CHF   -no plans for further inpt cardiac workup  -appreciate PT f/u    -d/c planning per primary team, pending bed availability at rehab facility

## 2020-01-01 ENCOUNTER — APPOINTMENT (OUTPATIENT)
Dept: HOME HEALTH SERVICES | Facility: HOME HEALTH | Age: 80
End: 2020-01-01
Payer: MEDICARE

## 2020-01-01 ENCOUNTER — APPOINTMENT (OUTPATIENT)
Dept: HOME HEALTH SERVICES | Facility: HOME HEALTH | Age: 80
End: 2020-01-01

## 2020-01-01 ENCOUNTER — TRANSCRIPTION ENCOUNTER (OUTPATIENT)
Age: 80
End: 2020-01-01

## 2020-01-01 ENCOUNTER — INPATIENT (INPATIENT)
Facility: HOSPITAL | Age: 80
LOS: 5 days | Discharge: NURSING, REHAB OR LONG-TERM CARE | End: 2020-07-21
Attending: INTERNAL MEDICINE | Admitting: INTERNAL MEDICINE
Payer: MEDICARE

## 2020-01-01 ENCOUNTER — INPATIENT (INPATIENT)
Facility: HOSPITAL | Age: 80
LOS: 17 days | Discharge: NURSING, REHAB OR LONG-TERM CARE | DRG: 871 | End: 2020-10-09
Attending: INTERNAL MEDICINE | Admitting: INTERNAL MEDICINE
Payer: COMMERCIAL

## 2020-01-01 ENCOUNTER — LABORATORY RESULT (OUTPATIENT)
Age: 80
End: 2020-01-01

## 2020-01-01 VITALS
SYSTOLIC BLOOD PRESSURE: 122 MMHG | HEART RATE: 67 BPM | OXYGEN SATURATION: 98 % | RESPIRATION RATE: 16 BRPM | TEMPERATURE: 98.3 F | DIASTOLIC BLOOD PRESSURE: 64 MMHG

## 2020-01-01 VITALS
OXYGEN SATURATION: 100 % | HEIGHT: 61 IN | HEART RATE: 74 BPM | TEMPERATURE: 98 F | WEIGHT: 149.91 LBS | RESPIRATION RATE: 16 BRPM | SYSTOLIC BLOOD PRESSURE: 144 MMHG | DIASTOLIC BLOOD PRESSURE: 74 MMHG

## 2020-01-01 VITALS
DIASTOLIC BLOOD PRESSURE: 94 MMHG | RESPIRATION RATE: 18 BRPM | WEIGHT: 119.93 LBS | OXYGEN SATURATION: 98 % | TEMPERATURE: 100 F | HEART RATE: 114 BPM | SYSTOLIC BLOOD PRESSURE: 114 MMHG

## 2020-01-01 VITALS
RESPIRATION RATE: 17 BRPM | SYSTOLIC BLOOD PRESSURE: 119 MMHG | OXYGEN SATURATION: 100 % | TEMPERATURE: 99 F | DIASTOLIC BLOOD PRESSURE: 64 MMHG | HEART RATE: 87 BPM

## 2020-01-01 VITALS
DIASTOLIC BLOOD PRESSURE: 87 MMHG | HEART RATE: 101 BPM | OXYGEN SATURATION: 97 % | RESPIRATION RATE: 20 BRPM | TEMPERATURE: 98 F | SYSTOLIC BLOOD PRESSURE: 152 MMHG

## 2020-01-01 DIAGNOSIS — T38.0X5A ADVERSE EFFECT OF GLUCOCORTICOIDS AND SYNTHETIC ANALOGUES, INITIAL ENCOUNTER: ICD-10-CM

## 2020-01-01 DIAGNOSIS — J90 PLEURAL EFFUSION, NOT ELSEWHERE CLASSIFIED: ICD-10-CM

## 2020-01-01 DIAGNOSIS — K21.9 GASTRO-ESOPHAGEAL REFLUX DISEASE W/OUT ESOPHAGITIS: ICD-10-CM

## 2020-01-01 DIAGNOSIS — Z96.652 PRESENCE OF LEFT ARTIFICIAL KNEE JOINT: ICD-10-CM

## 2020-01-01 DIAGNOSIS — R42 DIZZINESS AND GIDDINESS: ICD-10-CM

## 2020-01-01 DIAGNOSIS — F05 DELIRIUM DUE TO KNOWN PHYSIOLOGICAL CONDITION: ICD-10-CM

## 2020-01-01 DIAGNOSIS — Z99.81 DEPENDENCE ON SUPPLEMENTAL OXYGEN: ICD-10-CM

## 2020-01-01 DIAGNOSIS — R73.03 PREDIABETES.: ICD-10-CM

## 2020-01-01 DIAGNOSIS — I69.351 HEMIPLEGIA AND HEMIPARESIS FOLLOWING CEREBRAL INFARCTION AFFECTING RIGHT DOMINANT SIDE: ICD-10-CM

## 2020-01-01 DIAGNOSIS — Z98.890 OTHER SPECIFIED POSTPROCEDURAL STATES: Chronic | ICD-10-CM

## 2020-01-01 DIAGNOSIS — I50.32 CHRONIC DIASTOLIC (CONGESTIVE) HEART FAILURE: ICD-10-CM

## 2020-01-01 DIAGNOSIS — K85.00 IDIOPATHIC ACUTE PANCREATITIS WITHOUT NECROSIS OR INFECTION: ICD-10-CM

## 2020-01-01 DIAGNOSIS — E11.9 TYPE 2 DIABETES MELLITUS WITHOUT COMPLICATIONS: ICD-10-CM

## 2020-01-01 DIAGNOSIS — K21.9 GASTRO-ESOPHAGEAL REFLUX DISEASE WITHOUT ESOPHAGITIS: ICD-10-CM

## 2020-01-01 DIAGNOSIS — I27.20 PULMONARY HYPERTENSION, UNSPECIFIED: ICD-10-CM

## 2020-01-01 DIAGNOSIS — Z79.51 LONG TERM (CURRENT) USE OF INHALED STEROIDS: ICD-10-CM

## 2020-01-01 DIAGNOSIS — E78.5 HYPERLIPIDEMIA, UNSPECIFIED: ICD-10-CM

## 2020-01-01 DIAGNOSIS — R73.9 HYPERGLYCEMIA, UNSPECIFIED: ICD-10-CM

## 2020-01-01 DIAGNOSIS — I11.0 HYPERTENSIVE HEART DISEASE WITH HEART FAILURE: ICD-10-CM

## 2020-01-01 DIAGNOSIS — Z96.652 PRESENCE OF LEFT ARTIFICIAL KNEE JOINT: Chronic | ICD-10-CM

## 2020-01-01 DIAGNOSIS — R35.0 FREQUENCY OF MICTURITION: ICD-10-CM

## 2020-01-01 DIAGNOSIS — Z90.5 ACQUIRED ABSENCE OF KIDNEY: Chronic | ICD-10-CM

## 2020-01-01 DIAGNOSIS — J43.9 EMPHYSEMA, UNSPECIFIED: ICD-10-CM

## 2020-01-01 DIAGNOSIS — Z09 ENCOUNTER FOR FOLLOW-UP EXAMINATION AFTER COMPLETED TREATMENT FOR CONDITIONS OTHER THAN MALIGNANT NEOPLASM: ICD-10-CM

## 2020-01-01 DIAGNOSIS — R25.1 TREMOR, UNSPECIFIED: ICD-10-CM

## 2020-01-01 DIAGNOSIS — J45.901 UNSPECIFIED ASTHMA WITH (ACUTE) EXACERBATION: ICD-10-CM

## 2020-01-01 DIAGNOSIS — J44.9 CHRONIC OBSTRUCTIVE PULMONARY DISEASE, UNSPECIFIED: ICD-10-CM

## 2020-01-01 DIAGNOSIS — J98.11 ATELECTASIS: ICD-10-CM

## 2020-01-01 DIAGNOSIS — I70.0 ATHEROSCLEROSIS OF AORTA: ICD-10-CM

## 2020-01-01 DIAGNOSIS — I50.9 HEART FAILURE, UNSPECIFIED: ICD-10-CM

## 2020-01-01 DIAGNOSIS — N39.0 URINARY TRACT INFECTION, SITE NOT SPECIFIED: ICD-10-CM

## 2020-01-01 DIAGNOSIS — E55.9 VITAMIN D DEFICIENCY, UNSPECIFIED: ICD-10-CM

## 2020-01-01 DIAGNOSIS — Z90.5 ACQUIRED ABSENCE OF KIDNEY: ICD-10-CM

## 2020-01-01 DIAGNOSIS — T48.6X5A ADVERSE EFFECT OF ANTIASTHMATICS, INITIAL ENCOUNTER: ICD-10-CM

## 2020-01-01 DIAGNOSIS — E87.6 HYPOKALEMIA: ICD-10-CM

## 2020-01-01 DIAGNOSIS — D64.9 ANEMIA, UNSPECIFIED: ICD-10-CM

## 2020-01-01 DIAGNOSIS — Z79.4 LONG TERM (CURRENT) USE OF INSULIN: ICD-10-CM

## 2020-01-01 DIAGNOSIS — E11.65 TYPE 2 DIABETES MELLITUS WITH HYPERGLYCEMIA: ICD-10-CM

## 2020-01-01 LAB
-  AMIKACIN: SIGNIFICANT CHANGE UP
-  AMOXICILLIN/CLAVULANIC ACID: SIGNIFICANT CHANGE UP
-  AMPICILLIN/SULBACTAM: SIGNIFICANT CHANGE UP
-  AMPICILLIN: SIGNIFICANT CHANGE UP
-  AZTREONAM: SIGNIFICANT CHANGE UP
-  CEFAZOLIN: SIGNIFICANT CHANGE UP
-  CEFEPIME: SIGNIFICANT CHANGE UP
-  CEFOXITIN: SIGNIFICANT CHANGE UP
-  CEFTRIAXONE: SIGNIFICANT CHANGE UP
-  CIPROFLOXACIN: SIGNIFICANT CHANGE UP
-  COAGULASE NEGATIVE STAPHYLOCOCCUS: SIGNIFICANT CHANGE UP
-  COAGULASE NEGATIVE STAPHYLOCOCCUS: SIGNIFICANT CHANGE UP
-  ERTAPENEM: SIGNIFICANT CHANGE UP
-  GENTAMICIN: SIGNIFICANT CHANGE UP
-  IMIPENEM: SIGNIFICANT CHANGE UP
-  LEVOFLOXACIN: SIGNIFICANT CHANGE UP
-  MEROPENEM: SIGNIFICANT CHANGE UP
-  NITROFURANTOIN: SIGNIFICANT CHANGE UP
-  PIPERACILLIN/TAZOBACTAM: SIGNIFICANT CHANGE UP
-  STREPTOCOCCUS SP. (NOT GRP A, B OR S PNEUMONIAE): SIGNIFICANT CHANGE UP
-  TIGECYCLINE: SIGNIFICANT CHANGE UP
-  TOBRAMYCIN: SIGNIFICANT CHANGE UP
-  TRIMETHOPRIM/SULFAMETHOXAZOLE: SIGNIFICANT CHANGE UP
A1C WITH ESTIMATED AVERAGE GLUCOSE RESULT: 6.7 % — HIGH (ref 4–5.6)
A1C WITH ESTIMATED AVERAGE GLUCOSE RESULT: 7.1 % — HIGH (ref 4–5.6)
A1C WITH ESTIMATED AVERAGE GLUCOSE RESULT: 8.1 % — HIGH (ref 4–5.6)
ALBUMIN SERPL ELPH-MCNC: 2.5 G/DL — LOW (ref 3.3–5)
ALBUMIN SERPL ELPH-MCNC: 2.9 G/DL — LOW (ref 3.3–5)
ALBUMIN SERPL ELPH-MCNC: 2.9 G/DL — LOW (ref 3.3–5)
ALBUMIN SERPL ELPH-MCNC: 3.1 G/DL — LOW (ref 3.3–5)
ALBUMIN SERPL ELPH-MCNC: 3.2 G/DL — LOW (ref 3.3–5)
ALBUMIN SERPL ELPH-MCNC: 3.3 G/DL — SIGNIFICANT CHANGE UP (ref 3.3–5)
ALBUMIN SERPL ELPH-MCNC: 3.6 G/DL — SIGNIFICANT CHANGE UP (ref 3.3–5)
ALBUMIN SERPL ELPH-MCNC: 4 G/DL
ALP BLD-CCNC: 101 U/L
ALP SERPL-CCNC: 108 U/L — SIGNIFICANT CHANGE UP (ref 40–120)
ALP SERPL-CCNC: 114 U/L — SIGNIFICANT CHANGE UP (ref 40–120)
ALP SERPL-CCNC: 134 U/L — HIGH (ref 40–120)
ALP SERPL-CCNC: 79 U/L — SIGNIFICANT CHANGE UP (ref 40–120)
ALP SERPL-CCNC: 81 U/L — SIGNIFICANT CHANGE UP (ref 40–120)
ALP SERPL-CCNC: 86 U/L — SIGNIFICANT CHANGE UP (ref 40–120)
ALP SERPL-CCNC: 89 U/L — SIGNIFICANT CHANGE UP (ref 40–120)
ALT FLD-CCNC: 29 U/L — SIGNIFICANT CHANGE UP (ref 12–78)
ALT FLD-CCNC: 30 U/L — SIGNIFICANT CHANGE UP (ref 10–45)
ALT FLD-CCNC: 33 U/L — SIGNIFICANT CHANGE UP (ref 12–78)
ALT FLD-CCNC: 38 U/L — SIGNIFICANT CHANGE UP (ref 10–45)
ALT FLD-CCNC: 38 U/L — SIGNIFICANT CHANGE UP (ref 12–78)
ALT FLD-CCNC: 47 U/L — HIGH (ref 10–45)
ALT FLD-CCNC: 52 U/L — HIGH (ref 10–45)
ALT SERPL-CCNC: 25 U/L
AMMONIA BLD-MCNC: 33 UMOL/L — SIGNIFICANT CHANGE UP (ref 11–55)
ANION GAP SERPL CALC-SCNC: 1 MMOL/L — LOW (ref 5–17)
ANION GAP SERPL CALC-SCNC: 10 MMOL/L — SIGNIFICANT CHANGE UP (ref 5–17)
ANION GAP SERPL CALC-SCNC: 12 MMOL/L — SIGNIFICANT CHANGE UP (ref 5–17)
ANION GAP SERPL CALC-SCNC: 13 MMOL/L — SIGNIFICANT CHANGE UP (ref 5–17)
ANION GAP SERPL CALC-SCNC: 14 MMOL/L — SIGNIFICANT CHANGE UP (ref 5–17)
ANION GAP SERPL CALC-SCNC: 15 MMOL/L
ANION GAP SERPL CALC-SCNC: 15 MMOL/L — SIGNIFICANT CHANGE UP (ref 5–17)
ANION GAP SERPL CALC-SCNC: 18 MMOL/L — HIGH (ref 5–17)
ANION GAP SERPL CALC-SCNC: 3 MMOL/L — LOW (ref 5–17)
ANION GAP SERPL CALC-SCNC: 4 MMOL/L — LOW (ref 5–17)
ANION GAP SERPL CALC-SCNC: 5 MMOL/L — SIGNIFICANT CHANGE UP (ref 5–17)
ANION GAP SERPL CALC-SCNC: 5 MMOL/L — SIGNIFICANT CHANGE UP (ref 5–17)
ANION GAP SERPL CALC-SCNC: 6 MMOL/L — SIGNIFICANT CHANGE UP (ref 5–17)
ANION GAP SERPL CALC-SCNC: 6 MMOL/L — SIGNIFICANT CHANGE UP (ref 5–17)
ANION GAP SERPL CALC-SCNC: 7 MMOL/L — SIGNIFICANT CHANGE UP (ref 5–17)
ANION GAP SERPL CALC-SCNC: 8 MMOL/L — SIGNIFICANT CHANGE UP (ref 5–17)
ANION GAP SERPL CALC-SCNC: 9 MMOL/L — SIGNIFICANT CHANGE UP (ref 5–17)
ANION GAP SERPL CALC-SCNC: 9 MMOL/L — SIGNIFICANT CHANGE UP (ref 5–17)
APAP SERPL-MCNC: <15 UG/ML — SIGNIFICANT CHANGE UP (ref 10–30)
APPEARANCE UR: CLEAR — SIGNIFICANT CHANGE UP
APTT BLD: 24.3 SEC — LOW (ref 27.5–35.5)
AST SERPL-CCNC: 24 U/L — SIGNIFICANT CHANGE UP (ref 15–37)
AST SERPL-CCNC: 24 U/L — SIGNIFICANT CHANGE UP (ref 15–37)
AST SERPL-CCNC: 26 U/L
AST SERPL-CCNC: 26 U/L — SIGNIFICANT CHANGE UP (ref 10–40)
AST SERPL-CCNC: 26 U/L — SIGNIFICANT CHANGE UP (ref 10–40)
AST SERPL-CCNC: 27 U/L — SIGNIFICANT CHANGE UP (ref 15–37)
AST SERPL-CCNC: 29 U/L — SIGNIFICANT CHANGE UP (ref 10–40)
AST SERPL-CCNC: 42 U/L — HIGH (ref 10–40)
B-OH-BUTYR SERPL-SCNC: 0.1 MMOL/L — SIGNIFICANT CHANGE UP
BACTERIA # UR AUTO: ABNORMAL
BACTERIA # UR AUTO: ABNORMAL
BACTERIA # UR AUTO: NEGATIVE — SIGNIFICANT CHANGE UP
BASE EXCESS BLDA CALC-SCNC: 11.5 MMOL/L — HIGH (ref -2–2)
BASE EXCESS BLDA CALC-SCNC: 12.1 MMOL/L — HIGH (ref -2–2)
BASE EXCESS BLDA CALC-SCNC: 14.2 MMOL/L — HIGH (ref -2–2)
BASE EXCESS BLDA CALC-SCNC: 25.3 MMOL/L — HIGH (ref -2–2)
BASE EXCESS BLDA CALC-SCNC: 5.2 MMOL/L — HIGH (ref -2–2)
BASE EXCESS BLDV CALC-SCNC: 18.9 MMOL/L — HIGH (ref -2–2)
BASOPHILS # BLD AUTO: 0 K/UL — SIGNIFICANT CHANGE UP (ref 0–0.2)
BASOPHILS # BLD AUTO: 0 K/UL — SIGNIFICANT CHANGE UP (ref 0–0.2)
BASOPHILS # BLD AUTO: 0.01 K/UL — SIGNIFICANT CHANGE UP (ref 0–0.2)
BASOPHILS # BLD AUTO: 0.02 K/UL
BASOPHILS # BLD AUTO: 0.03 K/UL — SIGNIFICANT CHANGE UP (ref 0–0.2)
BASOPHILS NFR BLD AUTO: 0 % — SIGNIFICANT CHANGE UP (ref 0–2)
BASOPHILS NFR BLD AUTO: 0 % — SIGNIFICANT CHANGE UP (ref 0–2)
BASOPHILS NFR BLD AUTO: 0.1 % — SIGNIFICANT CHANGE UP (ref 0–2)
BASOPHILS NFR BLD AUTO: 0.3 %
BASOPHILS NFR BLD AUTO: 0.5 % — SIGNIFICANT CHANGE UP (ref 0–2)
BILIRUB SERPL-MCNC: 0.2 MG/DL
BILIRUB SERPL-MCNC: 0.2 MG/DL — SIGNIFICANT CHANGE UP (ref 0.2–1.2)
BILIRUB SERPL-MCNC: 0.2 MG/DL — SIGNIFICANT CHANGE UP (ref 0.2–1.2)
BILIRUB SERPL-MCNC: 0.3 MG/DL — SIGNIFICANT CHANGE UP (ref 0.2–1.2)
BILIRUB UR-MCNC: NEGATIVE — SIGNIFICANT CHANGE UP
BLOOD GAS COMMENTS: SIGNIFICANT CHANGE UP
BLOOD GAS SOURCE: SIGNIFICANT CHANGE UP
BUN SERPL-MCNC: 22 MG/DL
BUN SERPL-MCNC: 22 MG/DL — SIGNIFICANT CHANGE UP (ref 7–23)
BUN SERPL-MCNC: 24 MG/DL — HIGH (ref 7–23)
BUN SERPL-MCNC: 24 MG/DL — HIGH (ref 7–23)
BUN SERPL-MCNC: 25 MG/DL — HIGH (ref 7–23)
BUN SERPL-MCNC: 25 MG/DL — HIGH (ref 7–23)
BUN SERPL-MCNC: 28 MG/DL — HIGH (ref 7–23)
BUN SERPL-MCNC: 29 MG/DL — HIGH (ref 7–23)
BUN SERPL-MCNC: 29 MG/DL — HIGH (ref 7–23)
BUN SERPL-MCNC: 30 MG/DL — HIGH (ref 7–23)
BUN SERPL-MCNC: 30 MG/DL — HIGH (ref 7–23)
BUN SERPL-MCNC: 31 MG/DL — HIGH (ref 7–23)
BUN SERPL-MCNC: 31 MG/DL — HIGH (ref 7–23)
BUN SERPL-MCNC: 32 MG/DL — HIGH (ref 7–23)
BUN SERPL-MCNC: 33 MG/DL — HIGH (ref 7–23)
BUN SERPL-MCNC: 34 MG/DL — HIGH (ref 7–23)
BUN SERPL-MCNC: 34 MG/DL — HIGH (ref 7–23)
BUN SERPL-MCNC: 35 MG/DL — HIGH (ref 7–23)
BUN SERPL-MCNC: 35 MG/DL — HIGH (ref 7–23)
BUN SERPL-MCNC: 36 MG/DL — HIGH (ref 7–23)
BUN SERPL-MCNC: 37 MG/DL — HIGH (ref 7–23)
BUN SERPL-MCNC: 46 MG/DL — HIGH (ref 7–23)
CA-I SERPL-SCNC: 1.06 MMOL/L — LOW (ref 1.12–1.3)
CALCIUM SERPL-MCNC: 8.9 MG/DL — SIGNIFICANT CHANGE UP (ref 8.5–10.1)
CALCIUM SERPL-MCNC: 9 MG/DL — SIGNIFICANT CHANGE UP (ref 8.4–10.5)
CALCIUM SERPL-MCNC: 9 MG/DL — SIGNIFICANT CHANGE UP (ref 8.4–10.5)
CALCIUM SERPL-MCNC: 9.1 MG/DL — SIGNIFICANT CHANGE UP (ref 8.4–10.5)
CALCIUM SERPL-MCNC: 9.1 MG/DL — SIGNIFICANT CHANGE UP (ref 8.4–10.5)
CALCIUM SERPL-MCNC: 9.2 MG/DL — SIGNIFICANT CHANGE UP (ref 8.5–10.1)
CALCIUM SERPL-MCNC: 9.3 MG/DL — SIGNIFICANT CHANGE UP (ref 8.4–10.5)
CALCIUM SERPL-MCNC: 9.4 MG/DL — SIGNIFICANT CHANGE UP (ref 8.4–10.5)
CALCIUM SERPL-MCNC: 9.4 MG/DL — SIGNIFICANT CHANGE UP (ref 8.4–10.5)
CALCIUM SERPL-MCNC: 9.4 MG/DL — SIGNIFICANT CHANGE UP (ref 8.5–10.1)
CALCIUM SERPL-MCNC: 9.4 MG/DL — SIGNIFICANT CHANGE UP (ref 8.5–10.1)
CALCIUM SERPL-MCNC: 9.5 MG/DL — SIGNIFICANT CHANGE UP (ref 8.4–10.5)
CALCIUM SERPL-MCNC: 9.5 MG/DL — SIGNIFICANT CHANGE UP (ref 8.4–10.5)
CALCIUM SERPL-MCNC: 9.5 MG/DL — SIGNIFICANT CHANGE UP (ref 8.5–10.1)
CALCIUM SERPL-MCNC: 9.6 MG/DL — SIGNIFICANT CHANGE UP (ref 8.4–10.5)
CALCIUM SERPL-MCNC: 9.7 MG/DL
CALCIUM SERPL-MCNC: 9.7 MG/DL — SIGNIFICANT CHANGE UP (ref 8.4–10.5)
CHLORIDE BLDV-SCNC: 91 MMOL/L — LOW (ref 96–108)
CHLORIDE SERPL-SCNC: 100 MMOL/L — SIGNIFICANT CHANGE UP (ref 96–108)
CHLORIDE SERPL-SCNC: 102 MMOL/L — SIGNIFICANT CHANGE UP (ref 96–108)
CHLORIDE SERPL-SCNC: 104 MMOL/L — SIGNIFICANT CHANGE UP (ref 96–108)
CHLORIDE SERPL-SCNC: 84 MMOL/L — LOW (ref 96–108)
CHLORIDE SERPL-SCNC: 87 MMOL/L — LOW (ref 96–108)
CHLORIDE SERPL-SCNC: 88 MMOL/L — LOW (ref 96–108)
CHLORIDE SERPL-SCNC: 89 MMOL/L — LOW (ref 96–108)
CHLORIDE SERPL-SCNC: 91 MMOL/L — LOW (ref 96–108)
CHLORIDE SERPL-SCNC: 93 MMOL/L — LOW (ref 96–108)
CHLORIDE SERPL-SCNC: 94 MMOL/L — LOW (ref 96–108)
CHLORIDE SERPL-SCNC: 95 MMOL/L — LOW (ref 96–108)
CHLORIDE SERPL-SCNC: 95 MMOL/L — LOW (ref 96–108)
CHLORIDE SERPL-SCNC: 96 MMOL/L
CHLORIDE SERPL-SCNC: 96 MMOL/L — SIGNIFICANT CHANGE UP (ref 96–108)
CHLORIDE SERPL-SCNC: 97 MMOL/L — SIGNIFICANT CHANGE UP (ref 96–108)
CHLORIDE SERPL-SCNC: 97 MMOL/L — SIGNIFICANT CHANGE UP (ref 96–108)
CHLORIDE SERPL-SCNC: 99 MMOL/L — SIGNIFICANT CHANGE UP (ref 96–108)
CK MB CFR SERPL CALC: 3.1 NG/ML — SIGNIFICANT CHANGE UP (ref 0.5–3.6)
CO2 BLDA-SCNC: 41 MMOL/L — HIGH (ref 22–30)
CO2 BLDA-SCNC: 41 MMOL/L — HIGH (ref 22–30)
CO2 BLDA-SCNC: 42 MMOL/L — HIGH (ref 22–30)
CO2 BLDA-SCNC: 57 MMOL/L — HIGH (ref 22–30)
CO2 BLDV-SCNC: 49 MMOL/L — HIGH (ref 22–30)
CO2 SERPL-SCNC: 29 MMOL/L — SIGNIFICANT CHANGE UP (ref 22–31)
CO2 SERPL-SCNC: 31 MMOL/L — SIGNIFICANT CHANGE UP (ref 22–31)
CO2 SERPL-SCNC: 33 MMOL/L
CO2 SERPL-SCNC: 34 MMOL/L — HIGH (ref 22–31)
CO2 SERPL-SCNC: 34 MMOL/L — HIGH (ref 22–31)
CO2 SERPL-SCNC: 35 MMOL/L — HIGH (ref 22–31)
CO2 SERPL-SCNC: 36 MMOL/L — HIGH (ref 22–31)
CO2 SERPL-SCNC: 37 MMOL/L — HIGH (ref 22–31)
CO2 SERPL-SCNC: 38 MMOL/L — HIGH (ref 22–31)
CO2 SERPL-SCNC: 39 MMOL/L — HIGH (ref 22–31)
CO2 SERPL-SCNC: 39 MMOL/L — HIGH (ref 22–31)
CO2 SERPL-SCNC: 42 MMOL/L — HIGH (ref 22–31)
CO2 SERPL-SCNC: 43 MMOL/L — HIGH (ref 22–31)
CO2 SERPL-SCNC: 44 MMOL/L — HIGH (ref 22–31)
CO2 SERPL-SCNC: 44 MMOL/L — HIGH (ref 22–31)
CO2 SERPL-SCNC: 45 MMOL/L — CRITICAL HIGH (ref 22–31)
CO2 SERPL-SCNC: >45 MMOL/L — CRITICAL HIGH (ref 22–31)
CO2 SERPL-SCNC: >45 MMOL/L — CRITICAL HIGH (ref 22–31)
COLOR SPEC: SIGNIFICANT CHANGE UP
COLOR SPEC: YELLOW — SIGNIFICANT CHANGE UP
COLOR SPEC: YELLOW — SIGNIFICANT CHANGE UP
COMMENT - URINE: SIGNIFICANT CHANGE UP
CREAT SERPL-MCNC: 0.68 MG/DL — SIGNIFICANT CHANGE UP (ref 0.5–1.3)
CREAT SERPL-MCNC: 0.7 MG/DL — SIGNIFICANT CHANGE UP (ref 0.5–1.3)
CREAT SERPL-MCNC: 0.71 MG/DL — SIGNIFICANT CHANGE UP (ref 0.5–1.3)
CREAT SERPL-MCNC: 0.71 MG/DL — SIGNIFICANT CHANGE UP (ref 0.5–1.3)
CREAT SERPL-MCNC: 0.72 MG/DL — SIGNIFICANT CHANGE UP (ref 0.5–1.3)
CREAT SERPL-MCNC: 0.75 MG/DL — SIGNIFICANT CHANGE UP (ref 0.5–1.3)
CREAT SERPL-MCNC: 0.76 MG/DL — SIGNIFICANT CHANGE UP (ref 0.5–1.3)
CREAT SERPL-MCNC: 0.78 MG/DL — SIGNIFICANT CHANGE UP (ref 0.5–1.3)
CREAT SERPL-MCNC: 0.79 MG/DL — SIGNIFICANT CHANGE UP (ref 0.5–1.3)
CREAT SERPL-MCNC: 0.8 MG/DL — SIGNIFICANT CHANGE UP (ref 0.5–1.3)
CREAT SERPL-MCNC: 0.81 MG/DL — SIGNIFICANT CHANGE UP (ref 0.5–1.3)
CREAT SERPL-MCNC: 0.83 MG/DL — SIGNIFICANT CHANGE UP (ref 0.5–1.3)
CREAT SERPL-MCNC: 0.94 MG/DL — SIGNIFICANT CHANGE UP (ref 0.5–1.3)
CREAT SERPL-MCNC: 0.98 MG/DL — SIGNIFICANT CHANGE UP (ref 0.5–1.3)
CREAT SERPL-MCNC: 1.07 MG/DL — SIGNIFICANT CHANGE UP (ref 0.5–1.3)
CREAT SERPL-MCNC: 1.08 MG/DL — SIGNIFICANT CHANGE UP (ref 0.5–1.3)
CREAT SERPL-MCNC: 1.09 MG/DL
CREAT SERPL-MCNC: 1.09 MG/DL — SIGNIFICANT CHANGE UP (ref 0.5–1.3)
CREAT SERPL-MCNC: 1.09 MG/DL — SIGNIFICANT CHANGE UP (ref 0.5–1.3)
CREAT SERPL-MCNC: 1.1 MG/DL — SIGNIFICANT CHANGE UP (ref 0.5–1.3)
CREAT SERPL-MCNC: 1.12 MG/DL — SIGNIFICANT CHANGE UP (ref 0.5–1.3)
CREAT SERPL-MCNC: 1.12 MG/DL — SIGNIFICANT CHANGE UP (ref 0.5–1.3)
CREAT SERPL-MCNC: 1.2 MG/DL — SIGNIFICANT CHANGE UP (ref 0.5–1.3)
CREAT SERPL-MCNC: 1.23 MG/DL — SIGNIFICANT CHANGE UP (ref 0.5–1.3)
CRP SERPL-MCNC: <0.06 MG/DL — SIGNIFICANT CHANGE UP (ref 0–0.4)
CULTURE RESULTS: SIGNIFICANT CHANGE UP
D DIMER BLD IA.RAPID-MCNC: 1040 NG/ML DDU — HIGH
DIFF PNL FLD: ABNORMAL
EOSINOPHIL # BLD AUTO: 0 K/UL — SIGNIFICANT CHANGE UP (ref 0–0.5)
EOSINOPHIL # BLD AUTO: 0 K/UL — SIGNIFICANT CHANGE UP (ref 0–0.5)
EOSINOPHIL # BLD AUTO: 0.01 K/UL — SIGNIFICANT CHANGE UP (ref 0–0.5)
EOSINOPHIL # BLD AUTO: 0.1 K/UL
EOSINOPHIL # BLD AUTO: 0.12 K/UL — SIGNIFICANT CHANGE UP (ref 0–0.5)
EOSINOPHIL NFR BLD AUTO: 0 % — SIGNIFICANT CHANGE UP (ref 0–6)
EOSINOPHIL NFR BLD AUTO: 0 % — SIGNIFICANT CHANGE UP (ref 0–6)
EOSINOPHIL NFR BLD AUTO: 0.1 % — SIGNIFICANT CHANGE UP (ref 0–6)
EOSINOPHIL NFR BLD AUTO: 1.4 %
EOSINOPHIL NFR BLD AUTO: 1.9 % — SIGNIFICANT CHANGE UP (ref 0–6)
EPI CELLS # UR: 1 /HPF — SIGNIFICANT CHANGE UP
EPI CELLS # UR: SIGNIFICANT CHANGE UP
ERYTHROCYTE [SEDIMENTATION RATE] IN BLOOD: 29 MM/HR — HIGH (ref 0–20)
ESTIMATED AVERAGE GLUCOSE: 143 MG/DL
ESTIMATED AVERAGE GLUCOSE: 146 MG/DL — HIGH (ref 68–114)
ESTIMATED AVERAGE GLUCOSE: 157 MG/DL — HIGH (ref 68–114)
ESTIMATED AVERAGE GLUCOSE: 186 MG/DL — HIGH (ref 68–114)
ETHANOL SERPL-MCNC: SIGNIFICANT CHANGE UP MG/DL (ref 0–10)
GAS PNL BLDA: SIGNIFICANT CHANGE UP
GAS PNL BLDV: 137 MMOL/L — SIGNIFICANT CHANGE UP (ref 135–145)
GAS PNL BLDV: SIGNIFICANT CHANGE UP
GLUCOSE BLDC GLUCOMTR-MCNC: 100 MG/DL — HIGH (ref 70–99)
GLUCOSE BLDC GLUCOMTR-MCNC: 104 MG/DL — HIGH (ref 70–99)
GLUCOSE BLDC GLUCOMTR-MCNC: 108 MG/DL — HIGH (ref 70–99)
GLUCOSE BLDC GLUCOMTR-MCNC: 113 MG/DL — HIGH (ref 70–99)
GLUCOSE BLDC GLUCOMTR-MCNC: 120 MG/DL — HIGH (ref 70–99)
GLUCOSE BLDC GLUCOMTR-MCNC: 120 MG/DL — HIGH (ref 70–99)
GLUCOSE BLDC GLUCOMTR-MCNC: 121 MG/DL — HIGH (ref 70–99)
GLUCOSE BLDC GLUCOMTR-MCNC: 123 MG/DL — HIGH (ref 70–99)
GLUCOSE BLDC GLUCOMTR-MCNC: 128 MG/DL — HIGH (ref 70–99)
GLUCOSE BLDC GLUCOMTR-MCNC: 132 MG/DL — HIGH (ref 70–99)
GLUCOSE BLDC GLUCOMTR-MCNC: 133 MG/DL — HIGH (ref 70–99)
GLUCOSE BLDC GLUCOMTR-MCNC: 154 MG/DL — HIGH (ref 70–99)
GLUCOSE BLDC GLUCOMTR-MCNC: 155 MG/DL — HIGH (ref 70–99)
GLUCOSE BLDC GLUCOMTR-MCNC: 158 MG/DL — HIGH (ref 70–99)
GLUCOSE BLDC GLUCOMTR-MCNC: 159 MG/DL — HIGH (ref 70–99)
GLUCOSE BLDC GLUCOMTR-MCNC: 172 MG/DL — HIGH (ref 70–99)
GLUCOSE BLDC GLUCOMTR-MCNC: 178 MG/DL — HIGH (ref 70–99)
GLUCOSE BLDC GLUCOMTR-MCNC: 179 MG/DL — HIGH (ref 70–99)
GLUCOSE BLDC GLUCOMTR-MCNC: 179 MG/DL — HIGH (ref 70–99)
GLUCOSE BLDC GLUCOMTR-MCNC: 181 MG/DL — HIGH (ref 70–99)
GLUCOSE BLDC GLUCOMTR-MCNC: 182 MG/DL — HIGH (ref 70–99)
GLUCOSE BLDC GLUCOMTR-MCNC: 182 MG/DL — HIGH (ref 70–99)
GLUCOSE BLDC GLUCOMTR-MCNC: 183 MG/DL — HIGH (ref 70–99)
GLUCOSE BLDC GLUCOMTR-MCNC: 185 MG/DL — HIGH (ref 70–99)
GLUCOSE BLDC GLUCOMTR-MCNC: 191 MG/DL — HIGH (ref 70–99)
GLUCOSE BLDC GLUCOMTR-MCNC: 192 MG/DL — HIGH (ref 70–99)
GLUCOSE BLDC GLUCOMTR-MCNC: 192 MG/DL — HIGH (ref 70–99)
GLUCOSE BLDC GLUCOMTR-MCNC: 193 MG/DL — HIGH (ref 70–99)
GLUCOSE BLDC GLUCOMTR-MCNC: 196 MG/DL — HIGH (ref 70–99)
GLUCOSE BLDC GLUCOMTR-MCNC: 197 MG/DL — HIGH (ref 70–99)
GLUCOSE BLDC GLUCOMTR-MCNC: 198 MG/DL — HIGH (ref 70–99)
GLUCOSE BLDC GLUCOMTR-MCNC: 199 MG/DL — HIGH (ref 70–99)
GLUCOSE BLDC GLUCOMTR-MCNC: 200 MG/DL — HIGH (ref 70–99)
GLUCOSE BLDC GLUCOMTR-MCNC: 202 MG/DL — HIGH (ref 70–99)
GLUCOSE BLDC GLUCOMTR-MCNC: 202 MG/DL — HIGH (ref 70–99)
GLUCOSE BLDC GLUCOMTR-MCNC: 211 MG/DL — HIGH (ref 70–99)
GLUCOSE BLDC GLUCOMTR-MCNC: 212 MG/DL — HIGH (ref 70–99)
GLUCOSE BLDC GLUCOMTR-MCNC: 213 MG/DL — HIGH (ref 70–99)
GLUCOSE BLDC GLUCOMTR-MCNC: 214 MG/DL — HIGH (ref 70–99)
GLUCOSE BLDC GLUCOMTR-MCNC: 216 MG/DL — HIGH (ref 70–99)
GLUCOSE BLDC GLUCOMTR-MCNC: 217 MG/DL — HIGH (ref 70–99)
GLUCOSE BLDC GLUCOMTR-MCNC: 217 MG/DL — HIGH (ref 70–99)
GLUCOSE BLDC GLUCOMTR-MCNC: 218 MG/DL — HIGH (ref 70–99)
GLUCOSE BLDC GLUCOMTR-MCNC: 219 MG/DL — HIGH (ref 70–99)
GLUCOSE BLDC GLUCOMTR-MCNC: 220 MG/DL — HIGH (ref 70–99)
GLUCOSE BLDC GLUCOMTR-MCNC: 222 MG/DL — HIGH (ref 70–99)
GLUCOSE BLDC GLUCOMTR-MCNC: 223 MG/DL — HIGH (ref 70–99)
GLUCOSE BLDC GLUCOMTR-MCNC: 224 MG/DL — HIGH (ref 70–99)
GLUCOSE BLDC GLUCOMTR-MCNC: 231 MG/DL — HIGH (ref 70–99)
GLUCOSE BLDC GLUCOMTR-MCNC: 233 MG/DL — HIGH (ref 70–99)
GLUCOSE BLDC GLUCOMTR-MCNC: 233 MG/DL — HIGH (ref 70–99)
GLUCOSE BLDC GLUCOMTR-MCNC: 234 MG/DL — HIGH (ref 70–99)
GLUCOSE BLDC GLUCOMTR-MCNC: 237 MG/DL — HIGH (ref 70–99)
GLUCOSE BLDC GLUCOMTR-MCNC: 238 MG/DL — HIGH (ref 70–99)
GLUCOSE BLDC GLUCOMTR-MCNC: 240 MG/DL — HIGH (ref 70–99)
GLUCOSE BLDC GLUCOMTR-MCNC: 241 MG/DL — HIGH (ref 70–99)
GLUCOSE BLDC GLUCOMTR-MCNC: 242 MG/DL — HIGH (ref 70–99)
GLUCOSE BLDC GLUCOMTR-MCNC: 243 MG/DL — HIGH (ref 70–99)
GLUCOSE BLDC GLUCOMTR-MCNC: 244 MG/DL — HIGH (ref 70–99)
GLUCOSE BLDC GLUCOMTR-MCNC: 250 MG/DL — HIGH (ref 70–99)
GLUCOSE BLDC GLUCOMTR-MCNC: 251 MG/DL — HIGH (ref 70–99)
GLUCOSE BLDC GLUCOMTR-MCNC: 251 MG/DL — HIGH (ref 70–99)
GLUCOSE BLDC GLUCOMTR-MCNC: 252 MG/DL — HIGH (ref 70–99)
GLUCOSE BLDC GLUCOMTR-MCNC: 262 MG/DL — HIGH (ref 70–99)
GLUCOSE BLDC GLUCOMTR-MCNC: 265 MG/DL — HIGH (ref 70–99)
GLUCOSE BLDC GLUCOMTR-MCNC: 268 MG/DL — HIGH (ref 70–99)
GLUCOSE BLDC GLUCOMTR-MCNC: 270 MG/DL — HIGH (ref 70–99)
GLUCOSE BLDC GLUCOMTR-MCNC: 271 MG/DL — HIGH (ref 70–99)
GLUCOSE BLDC GLUCOMTR-MCNC: 276 MG/DL — HIGH (ref 70–99)
GLUCOSE BLDC GLUCOMTR-MCNC: 282 MG/DL — HIGH (ref 70–99)
GLUCOSE BLDC GLUCOMTR-MCNC: 291 MG/DL — HIGH (ref 70–99)
GLUCOSE BLDC GLUCOMTR-MCNC: 294 MG/DL — HIGH (ref 70–99)
GLUCOSE BLDC GLUCOMTR-MCNC: 295 MG/DL — HIGH (ref 70–99)
GLUCOSE BLDC GLUCOMTR-MCNC: 298 MG/DL — HIGH (ref 70–99)
GLUCOSE BLDC GLUCOMTR-MCNC: 299 MG/DL — HIGH (ref 70–99)
GLUCOSE BLDC GLUCOMTR-MCNC: 303 MG/DL — HIGH (ref 70–99)
GLUCOSE BLDC GLUCOMTR-MCNC: 303 MG/DL — HIGH (ref 70–99)
GLUCOSE BLDC GLUCOMTR-MCNC: 305 MG/DL — HIGH (ref 70–99)
GLUCOSE BLDC GLUCOMTR-MCNC: 306 MG/DL — HIGH (ref 70–99)
GLUCOSE BLDC GLUCOMTR-MCNC: 307 MG/DL — HIGH (ref 70–99)
GLUCOSE BLDC GLUCOMTR-MCNC: 309 MG/DL — HIGH (ref 70–99)
GLUCOSE BLDC GLUCOMTR-MCNC: 317 MG/DL — HIGH (ref 70–99)
GLUCOSE BLDC GLUCOMTR-MCNC: 325 MG/DL — HIGH (ref 70–99)
GLUCOSE BLDC GLUCOMTR-MCNC: 327 MG/DL — HIGH (ref 70–99)
GLUCOSE BLDC GLUCOMTR-MCNC: 327 MG/DL — HIGH (ref 70–99)
GLUCOSE BLDC GLUCOMTR-MCNC: 332 MG/DL — HIGH (ref 70–99)
GLUCOSE BLDC GLUCOMTR-MCNC: 336 MG/DL — HIGH (ref 70–99)
GLUCOSE BLDC GLUCOMTR-MCNC: 352 MG/DL — HIGH (ref 70–99)
GLUCOSE BLDC GLUCOMTR-MCNC: 358 MG/DL — HIGH (ref 70–99)
GLUCOSE BLDC GLUCOMTR-MCNC: 364 MG/DL — HIGH (ref 70–99)
GLUCOSE BLDC GLUCOMTR-MCNC: 364 MG/DL — HIGH (ref 70–99)
GLUCOSE BLDC GLUCOMTR-MCNC: 375 MG/DL — HIGH (ref 70–99)
GLUCOSE BLDC GLUCOMTR-MCNC: 375 MG/DL — HIGH (ref 70–99)
GLUCOSE BLDC GLUCOMTR-MCNC: 39 MG/DL — CRITICAL LOW (ref 70–99)
GLUCOSE BLDC GLUCOMTR-MCNC: 41 MG/DL — CRITICAL LOW (ref 70–99)
GLUCOSE BLDC GLUCOMTR-MCNC: 42 MG/DL — CRITICAL LOW (ref 70–99)
GLUCOSE BLDC GLUCOMTR-MCNC: 52 MG/DL — LOW (ref 70–99)
GLUCOSE BLDC GLUCOMTR-MCNC: 64 MG/DL — LOW (ref 70–99)
GLUCOSE BLDC GLUCOMTR-MCNC: 67 MG/DL — LOW (ref 70–99)
GLUCOSE BLDC GLUCOMTR-MCNC: 73 MG/DL — SIGNIFICANT CHANGE UP (ref 70–99)
GLUCOSE BLDC GLUCOMTR-MCNC: 84 MG/DL — SIGNIFICANT CHANGE UP (ref 70–99)
GLUCOSE BLDC GLUCOMTR-MCNC: 86 MG/DL — SIGNIFICANT CHANGE UP (ref 70–99)
GLUCOSE BLDC GLUCOMTR-MCNC: 86 MG/DL — SIGNIFICANT CHANGE UP (ref 70–99)
GLUCOSE BLDC GLUCOMTR-MCNC: 94 MG/DL — SIGNIFICANT CHANGE UP (ref 70–99)
GLUCOSE BLDV-MCNC: 395 MG/DL — HIGH (ref 70–99)
GLUCOSE SERPL-MCNC: 103 MG/DL — HIGH (ref 70–99)
GLUCOSE SERPL-MCNC: 117 MG/DL — HIGH (ref 70–99)
GLUCOSE SERPL-MCNC: 152 MG/DL — HIGH (ref 70–99)
GLUCOSE SERPL-MCNC: 156 MG/DL — HIGH (ref 70–99)
GLUCOSE SERPL-MCNC: 170 MG/DL — HIGH (ref 70–99)
GLUCOSE SERPL-MCNC: 186 MG/DL — HIGH (ref 70–99)
GLUCOSE SERPL-MCNC: 199 MG/DL — HIGH (ref 70–99)
GLUCOSE SERPL-MCNC: 199 MG/DL — HIGH (ref 70–99)
GLUCOSE SERPL-MCNC: 224 MG/DL — HIGH (ref 70–99)
GLUCOSE SERPL-MCNC: 232 MG/DL — HIGH (ref 70–99)
GLUCOSE SERPL-MCNC: 245 MG/DL — HIGH (ref 70–99)
GLUCOSE SERPL-MCNC: 246 MG/DL — HIGH (ref 70–99)
GLUCOSE SERPL-MCNC: 252 MG/DL — HIGH (ref 70–99)
GLUCOSE SERPL-MCNC: 261 MG/DL — HIGH (ref 70–99)
GLUCOSE SERPL-MCNC: 275 MG/DL — HIGH (ref 70–99)
GLUCOSE SERPL-MCNC: 282 MG/DL — HIGH (ref 70–99)
GLUCOSE SERPL-MCNC: 297 MG/DL — HIGH (ref 70–99)
GLUCOSE SERPL-MCNC: 300 MG/DL — HIGH (ref 70–99)
GLUCOSE SERPL-MCNC: 317 MG/DL — HIGH (ref 70–99)
GLUCOSE SERPL-MCNC: 325 MG/DL — HIGH (ref 70–99)
GLUCOSE SERPL-MCNC: 375 MG/DL — HIGH (ref 70–99)
GLUCOSE SERPL-MCNC: 480 MG/DL — CRITICAL HIGH (ref 70–99)
GLUCOSE SERPL-MCNC: 57 MG/DL — LOW (ref 70–99)
GLUCOSE UR QL: 100 MG/DL
GLUCOSE UR QL: ABNORMAL
GLUCOSE UR QL: NEGATIVE MG/DL — SIGNIFICANT CHANGE UP
GRAM STN FLD: SIGNIFICANT CHANGE UP
GRAM STN FLD: SIGNIFICANT CHANGE UP
HBA1C MFR BLD HPLC: 6.6 %
HCO3 BLDA-SCNC: 29 MMOL/L — SIGNIFICANT CHANGE UP (ref 21–29)
HCO3 BLDA-SCNC: 39 MMOL/L — HIGH (ref 21–29)
HCO3 BLDA-SCNC: 39 MMOL/L — HIGH (ref 21–29)
HCO3 BLDA-SCNC: 40 MMOL/L — HIGH (ref 21–29)
HCO3 BLDA-SCNC: 54 MMOL/L — HIGH (ref 21–29)
HCO3 BLDV-SCNC: 47 MMOL/L — HIGH (ref 21–29)
HCT VFR BLD CALC: 31.9 % — LOW (ref 34.5–45)
HCT VFR BLD CALC: 33.3 % — LOW (ref 34.5–45)
HCT VFR BLD CALC: 33.5 % — LOW (ref 34.5–45)
HCT VFR BLD CALC: 33.6 % — LOW (ref 34.5–45)
HCT VFR BLD CALC: 33.9 % — LOW (ref 34.5–45)
HCT VFR BLD CALC: 33.9 % — LOW (ref 34.5–45)
HCT VFR BLD CALC: 34.5 % — SIGNIFICANT CHANGE UP (ref 34.5–45)
HCT VFR BLD CALC: 35.3 % — SIGNIFICANT CHANGE UP (ref 34.5–45)
HCT VFR BLD CALC: 35.5 % — SIGNIFICANT CHANGE UP (ref 34.5–45)
HCT VFR BLD CALC: 35.8 % — SIGNIFICANT CHANGE UP (ref 34.5–45)
HCT VFR BLD CALC: 36.1 % — SIGNIFICANT CHANGE UP (ref 34.5–45)
HCT VFR BLD CALC: 36.8 % — SIGNIFICANT CHANGE UP (ref 34.5–45)
HCT VFR BLD CALC: 37.7 %
HCT VFR BLD CALC: 39 % — SIGNIFICANT CHANGE UP (ref 34.5–45)
HCT VFR BLD CALC: 39.6 % — SIGNIFICANT CHANGE UP (ref 34.5–45)
HCT VFR BLD CALC: 40.5 % — SIGNIFICANT CHANGE UP (ref 34.5–45)
HCT VFR BLD CALC: 41.1 % — SIGNIFICANT CHANGE UP (ref 34.5–45)
HCT VFR BLD CALC: 43.9 % — SIGNIFICANT CHANGE UP (ref 34.5–45)
HCT VFR BLD CALC: 44.7 % — SIGNIFICANT CHANGE UP (ref 34.5–45)
HCT VFR BLDA CALC: 44 % — SIGNIFICANT CHANGE UP (ref 39–50)
HGB BLD CALC-MCNC: 14.2 G/DL — SIGNIFICANT CHANGE UP (ref 11.5–15.5)
HGB BLD-MCNC: 10 G/DL — LOW (ref 11.5–15.5)
HGB BLD-MCNC: 10.8 G/DL — LOW (ref 11.5–15.5)
HGB BLD-MCNC: 11.1 G/DL — LOW (ref 11.5–15.5)
HGB BLD-MCNC: 11.2 G/DL — LOW (ref 11.5–15.5)
HGB BLD-MCNC: 11.2 G/DL — LOW (ref 11.5–15.5)
HGB BLD-MCNC: 11.7 G/DL — SIGNIFICANT CHANGE UP (ref 11.5–15.5)
HGB BLD-MCNC: 11.8 G/DL
HGB BLD-MCNC: 11.8 G/DL — SIGNIFICANT CHANGE UP (ref 11.5–15.5)
HGB BLD-MCNC: 12.1 G/DL — SIGNIFICANT CHANGE UP (ref 11.5–15.5)
HGB BLD-MCNC: 12.4 G/DL — SIGNIFICANT CHANGE UP (ref 11.5–15.5)
HGB BLD-MCNC: 12.5 G/DL — SIGNIFICANT CHANGE UP (ref 11.5–15.5)
HGB BLD-MCNC: 13 G/DL — SIGNIFICANT CHANGE UP (ref 11.5–15.5)
HGB BLD-MCNC: 13.1 G/DL — SIGNIFICANT CHANGE UP (ref 11.5–15.5)
HGB BLD-MCNC: 13.8 G/DL — SIGNIFICANT CHANGE UP (ref 11.5–15.5)
HGB BLD-MCNC: 14.4 G/DL — SIGNIFICANT CHANGE UP (ref 11.5–15.5)
HOROWITZ INDEX BLDA+IHG-RTO: 0.21 — SIGNIFICANT CHANGE UP
HOROWITZ INDEX BLDA+IHG-RTO: 28 — SIGNIFICANT CHANGE UP
HOROWITZ INDEX BLDA+IHG-RTO: 28 — SIGNIFICANT CHANGE UP
HYALINE CASTS # UR AUTO: 3 /LPF — HIGH (ref 0–2)
IMM GRANULOCYTES NFR BLD AUTO: 0.3 %
IMM GRANULOCYTES NFR BLD AUTO: 0.3 % — SIGNIFICANT CHANGE UP (ref 0–1.5)
IMM GRANULOCYTES NFR BLD AUTO: 0.4 % — SIGNIFICANT CHANGE UP (ref 0–1.5)
IMM GRANULOCYTES NFR BLD AUTO: 0.5 % — SIGNIFICANT CHANGE UP (ref 0–1.5)
INR BLD: 1.16 RATIO — SIGNIFICANT CHANGE UP (ref 0.88–1.16)
INR BLD: 1.18 RATIO — HIGH (ref 0.88–1.16)
KETONES UR-MCNC: NEGATIVE — SIGNIFICANT CHANGE UP
LACTATE BLDV-MCNC: 3.9 MMOL/L — HIGH (ref 0.7–2)
LEUKOCYTE ESTERASE UR-ACNC: ABNORMAL
LYMPHOCYTES # BLD AUTO: 0.16 K/UL — LOW (ref 1–3.3)
LYMPHOCYTES # BLD AUTO: 0.33 K/UL — LOW (ref 1–3.3)
LYMPHOCYTES # BLD AUTO: 0.51 K/UL — LOW (ref 1–3.3)
LYMPHOCYTES # BLD AUTO: 1.13 K/UL
LYMPHOCYTES # BLD AUTO: 1.44 K/UL — SIGNIFICANT CHANGE UP (ref 1–3.3)
LYMPHOCYTES # BLD AUTO: 2 % — LOW (ref 13–44)
LYMPHOCYTES # BLD AUTO: 23.2 % — SIGNIFICANT CHANGE UP (ref 13–44)
LYMPHOCYTES # BLD AUTO: 4.4 % — LOW (ref 13–44)
LYMPHOCYTES # BLD AUTO: 4.9 % — LOW (ref 13–44)
LYMPHOCYTES NFR BLD AUTO: 16 %
MAGNESIUM SERPL-MCNC: 1.1 MG/DL — LOW (ref 1.6–2.6)
MAGNESIUM SERPL-MCNC: 1.7 MG/DL — SIGNIFICANT CHANGE UP (ref 1.6–2.6)
MAGNESIUM SERPL-MCNC: 1.9 MG/DL — SIGNIFICANT CHANGE UP (ref 1.6–2.6)
MAGNESIUM SERPL-MCNC: 2 MG/DL — SIGNIFICANT CHANGE UP (ref 1.6–2.6)
MAGNESIUM SERPL-MCNC: 2.1 MG/DL — SIGNIFICANT CHANGE UP (ref 1.6–2.6)
MAN DIFF?: NORMAL
MANUAL SMEAR VERIFICATION: SIGNIFICANT CHANGE UP
MCHC RBC-ENTMCNC: 30.6 GM/DL — LOW (ref 32–36)
MCHC RBC-ENTMCNC: 31 GM/DL — LOW (ref 32–36)
MCHC RBC-ENTMCNC: 31.3 GM/DL
MCHC RBC-ENTMCNC: 31.3 GM/DL — LOW (ref 32–36)
MCHC RBC-ENTMCNC: 31.3 PG — SIGNIFICANT CHANGE UP (ref 27–34)
MCHC RBC-ENTMCNC: 31.4 GM/DL — LOW (ref 32–36)
MCHC RBC-ENTMCNC: 31.5 PG — SIGNIFICANT CHANGE UP (ref 27–34)
MCHC RBC-ENTMCNC: 31.6 GM/DL — LOW (ref 32–36)
MCHC RBC-ENTMCNC: 31.9 PG — SIGNIFICANT CHANGE UP (ref 27–34)
MCHC RBC-ENTMCNC: 31.9 PG — SIGNIFICANT CHANGE UP (ref 27–34)
MCHC RBC-ENTMCNC: 32 PG — SIGNIFICANT CHANGE UP (ref 27–34)
MCHC RBC-ENTMCNC: 32 PG — SIGNIFICANT CHANGE UP (ref 27–34)
MCHC RBC-ENTMCNC: 32.1 GM/DL — SIGNIFICANT CHANGE UP (ref 32–36)
MCHC RBC-ENTMCNC: 32.1 PG
MCHC RBC-ENTMCNC: 32.1 PG — SIGNIFICANT CHANGE UP (ref 27–34)
MCHC RBC-ENTMCNC: 32.2 GM/DL — SIGNIFICANT CHANGE UP (ref 32–36)
MCHC RBC-ENTMCNC: 32.2 GM/DL — SIGNIFICANT CHANGE UP (ref 32–36)
MCHC RBC-ENTMCNC: 32.2 PG — SIGNIFICANT CHANGE UP (ref 27–34)
MCHC RBC-ENTMCNC: 32.2 PG — SIGNIFICANT CHANGE UP (ref 27–34)
MCHC RBC-ENTMCNC: 32.3 GM/DL — SIGNIFICANT CHANGE UP (ref 32–36)
MCHC RBC-ENTMCNC: 32.4 GM/DL — SIGNIFICANT CHANGE UP (ref 32–36)
MCHC RBC-ENTMCNC: 32.4 PG — SIGNIFICANT CHANGE UP (ref 27–34)
MCHC RBC-ENTMCNC: 32.4 PG — SIGNIFICANT CHANGE UP (ref 27–34)
MCHC RBC-ENTMCNC: 32.5 PG — SIGNIFICANT CHANGE UP (ref 27–34)
MCHC RBC-ENTMCNC: 32.6 PG — SIGNIFICANT CHANGE UP (ref 27–34)
MCHC RBC-ENTMCNC: 32.6 PG — SIGNIFICANT CHANGE UP (ref 27–34)
MCHC RBC-ENTMCNC: 32.7 GM/DL — SIGNIFICANT CHANGE UP (ref 32–36)
MCHC RBC-ENTMCNC: 32.7 PG — SIGNIFICANT CHANGE UP (ref 27–34)
MCHC RBC-ENTMCNC: 32.7 PG — SIGNIFICANT CHANGE UP (ref 27–34)
MCHC RBC-ENTMCNC: 33 GM/DL — SIGNIFICANT CHANGE UP (ref 32–36)
MCHC RBC-ENTMCNC: 33.1 GM/DL — SIGNIFICANT CHANGE UP (ref 32–36)
MCHC RBC-ENTMCNC: 33.7 GM/DL — SIGNIFICANT CHANGE UP (ref 32–36)
MCV RBC AUTO: 100 FL — SIGNIFICANT CHANGE UP (ref 80–100)
MCV RBC AUTO: 100.3 FL — HIGH (ref 80–100)
MCV RBC AUTO: 101.3 FL — HIGH (ref 80–100)
MCV RBC AUTO: 101.5 FL — HIGH (ref 80–100)
MCV RBC AUTO: 101.9 FL — HIGH (ref 80–100)
MCV RBC AUTO: 102.4 FL
MCV RBC AUTO: 102.5 FL — HIGH (ref 80–100)
MCV RBC AUTO: 102.6 FL — HIGH (ref 80–100)
MCV RBC AUTO: 102.8 FL — HIGH (ref 80–100)
MCV RBC AUTO: 103 FL — HIGH (ref 80–100)
MCV RBC AUTO: 103.1 FL — HIGH (ref 80–100)
MCV RBC AUTO: 96.8 FL — SIGNIFICANT CHANGE UP (ref 80–100)
MCV RBC AUTO: 97.4 FL — SIGNIFICANT CHANGE UP (ref 80–100)
MCV RBC AUTO: 98.1 FL — SIGNIFICANT CHANGE UP (ref 80–100)
MCV RBC AUTO: 98.4 FL — SIGNIFICANT CHANGE UP (ref 80–100)
MCV RBC AUTO: 98.8 FL — SIGNIFICANT CHANGE UP (ref 80–100)
MCV RBC AUTO: 98.9 FL — SIGNIFICANT CHANGE UP (ref 80–100)
MCV RBC AUTO: 99 FL — SIGNIFICANT CHANGE UP (ref 80–100)
MCV RBC AUTO: 99.4 FL — SIGNIFICANT CHANGE UP (ref 80–100)
METHOD TYPE: SIGNIFICANT CHANGE UP
MONOCYTES # BLD AUTO: 0 K/UL — SIGNIFICANT CHANGE UP (ref 0–0.9)
MONOCYTES # BLD AUTO: 0.41 K/UL — SIGNIFICANT CHANGE UP (ref 0–0.9)
MONOCYTES # BLD AUTO: 0.57 K/UL — SIGNIFICANT CHANGE UP (ref 0–0.9)
MONOCYTES # BLD AUTO: 0.66 K/UL — SIGNIFICANT CHANGE UP (ref 0–0.9)
MONOCYTES # BLD AUTO: 0.91 K/UL
MONOCYTES NFR BLD AUTO: 0 % — LOW (ref 2–14)
MONOCYTES NFR BLD AUTO: 10.6 % — SIGNIFICANT CHANGE UP (ref 2–14)
MONOCYTES NFR BLD AUTO: 12.9 %
MONOCYTES NFR BLD AUTO: 5.5 % — SIGNIFICANT CHANGE UP (ref 2–14)
MONOCYTES NFR BLD AUTO: 5.5 % — SIGNIFICANT CHANGE UP (ref 2–14)
NEUTROPHILS # BLD AUTO: 3.93 K/UL — SIGNIFICANT CHANGE UP (ref 1.8–7.4)
NEUTROPHILS # BLD AUTO: 4.9 K/UL
NEUTROPHILS # BLD AUTO: 6.69 K/UL — SIGNIFICANT CHANGE UP (ref 1.8–7.4)
NEUTROPHILS # BLD AUTO: 7.77 K/UL — HIGH (ref 1.8–7.4)
NEUTROPHILS # BLD AUTO: 9.31 K/UL — HIGH (ref 1.8–7.4)
NEUTROPHILS NFR BLD AUTO: 63.5 % — SIGNIFICANT CHANGE UP (ref 43–77)
NEUTROPHILS NFR BLD AUTO: 69.1 %
NEUTROPHILS NFR BLD AUTO: 89 % — HIGH (ref 43–77)
NEUTROPHILS NFR BLD AUTO: 89.6 % — HIGH (ref 43–77)
NEUTROPHILS NFR BLD AUTO: 98 % — HIGH (ref 43–77)
NITRITE UR-MCNC: NEGATIVE — SIGNIFICANT CHANGE UP
NRBC # BLD: 0 /100 WBCS — SIGNIFICANT CHANGE UP (ref 0–0)
NRBC # BLD: 0 /100 — SIGNIFICANT CHANGE UP (ref 0–0)
NRBC # BLD: SIGNIFICANT CHANGE UP /100 WBCS (ref 0–0)
NT-PROBNP SERPL-SCNC: 46 PG/ML — SIGNIFICANT CHANGE UP (ref 0–450)
NT-PROBNP SERPL-SCNC: 544 PG/ML — HIGH (ref 0–300)
ORGANISM # SPEC MICROSCOPIC CNT: SIGNIFICANT CHANGE UP
PCO2 BLDA: 43 MMHG — SIGNIFICANT CHANGE UP (ref 32–46)
PCO2 BLDA: 61 MMHG — HIGH (ref 32–46)
PCO2 BLDA: 66 MMHG — HIGH (ref 32–46)
PCO2 BLDA: 68 MMHG — HIGH (ref 32–46)
PCO2 BLDA: 72 MMHG — CRITICAL HIGH (ref 32–46)
PCO2 BLDV: 63 MMHG — HIGH (ref 35–50)
PH BLD: 7.45 — SIGNIFICANT CHANGE UP (ref 7.35–7.45)
PH BLDA: 7.38 — SIGNIFICANT CHANGE UP (ref 7.35–7.45)
PH BLDA: 7.39 — SIGNIFICANT CHANGE UP (ref 7.35–7.45)
PH BLDA: 7.44 — SIGNIFICANT CHANGE UP (ref 7.35–7.45)
PH BLDA: 7.49 — HIGH (ref 7.35–7.45)
PH BLDV: 7.48 — HIGH (ref 7.35–7.45)
PH UR: 5 — SIGNIFICANT CHANGE UP (ref 5–8)
PH UR: 5 — SIGNIFICANT CHANGE UP (ref 5–8)
PH UR: 6.5 — SIGNIFICANT CHANGE UP (ref 5–8)
PHOSPHATE SERPL-MCNC: 2.6 MG/DL — SIGNIFICANT CHANGE UP (ref 2.5–4.5)
PHOSPHATE SERPL-MCNC: 3.7 MG/DL — SIGNIFICANT CHANGE UP (ref 2.5–4.5)
PHOSPHATE SERPL-MCNC: 3.7 MG/DL — SIGNIFICANT CHANGE UP (ref 2.5–4.5)
PLAT MORPH BLD: NORMAL — SIGNIFICANT CHANGE UP
PLATELET # BLD AUTO: 113 K/UL — LOW (ref 150–400)
PLATELET # BLD AUTO: 124 K/UL — LOW (ref 150–400)
PLATELET # BLD AUTO: 126 K/UL — LOW (ref 150–400)
PLATELET # BLD AUTO: 130 K/UL — LOW (ref 150–400)
PLATELET # BLD AUTO: 131 K/UL — LOW (ref 150–400)
PLATELET # BLD AUTO: 138 K/UL — LOW (ref 150–400)
PLATELET # BLD AUTO: 138 K/UL — LOW (ref 150–400)
PLATELET # BLD AUTO: 142 K/UL — LOW (ref 150–400)
PLATELET # BLD AUTO: 144 K/UL — LOW (ref 150–400)
PLATELET # BLD AUTO: 169 K/UL — SIGNIFICANT CHANGE UP (ref 150–400)
PLATELET # BLD AUTO: 170 K/UL — SIGNIFICANT CHANGE UP (ref 150–400)
PLATELET # BLD AUTO: 170 K/UL — SIGNIFICANT CHANGE UP (ref 150–400)
PLATELET # BLD AUTO: 175 K/UL — SIGNIFICANT CHANGE UP (ref 150–400)
PLATELET # BLD AUTO: 178 K/UL — SIGNIFICANT CHANGE UP (ref 150–400)
PLATELET # BLD AUTO: 181 K/UL — SIGNIFICANT CHANGE UP (ref 150–400)
PLATELET # BLD AUTO: 190 K/UL — SIGNIFICANT CHANGE UP (ref 150–400)
PLATELET # BLD AUTO: 190 K/UL — SIGNIFICANT CHANGE UP (ref 150–400)
PLATELET # BLD AUTO: 203 K/UL
PLATELET # BLD AUTO: 203 K/UL — SIGNIFICANT CHANGE UP (ref 150–400)
PO2 BLDA: 110 MMHG — HIGH (ref 74–108)
PO2 BLDA: 139 MMHG — HIGH (ref 74–108)
PO2 BLDA: 149 MMHG — HIGH (ref 74–108)
PO2 BLDA: 72 MMHG — LOW (ref 74–108)
PO2 BLDA: 99 MMHG — SIGNIFICANT CHANGE UP (ref 74–108)
PO2 BLDV: 41 MMHG — SIGNIFICANT CHANGE UP (ref 25–45)
POTASSIUM BLDV-SCNC: 2.7 MMOL/L — CRITICAL LOW (ref 3.5–5.3)
POTASSIUM SERPL-MCNC: 2.4 MMOL/L — CRITICAL LOW (ref 3.5–5.3)
POTASSIUM SERPL-MCNC: 2.7 MMOL/L — CRITICAL LOW (ref 3.5–5.3)
POTASSIUM SERPL-MCNC: 3.2 MMOL/L — LOW (ref 3.5–5.3)
POTASSIUM SERPL-MCNC: 3.3 MMOL/L — LOW (ref 3.5–5.3)
POTASSIUM SERPL-MCNC: 3.4 MMOL/L — LOW (ref 3.5–5.3)
POTASSIUM SERPL-MCNC: 3.6 MMOL/L — SIGNIFICANT CHANGE UP (ref 3.5–5.3)
POTASSIUM SERPL-MCNC: 3.6 MMOL/L — SIGNIFICANT CHANGE UP (ref 3.5–5.3)
POTASSIUM SERPL-MCNC: 3.7 MMOL/L — SIGNIFICANT CHANGE UP (ref 3.5–5.3)
POTASSIUM SERPL-MCNC: 3.8 MMOL/L — SIGNIFICANT CHANGE UP (ref 3.5–5.3)
POTASSIUM SERPL-MCNC: 3.9 MMOL/L — SIGNIFICANT CHANGE UP (ref 3.5–5.3)
POTASSIUM SERPL-MCNC: 3.9 MMOL/L — SIGNIFICANT CHANGE UP (ref 3.5–5.3)
POTASSIUM SERPL-MCNC: 4.1 MMOL/L — SIGNIFICANT CHANGE UP (ref 3.5–5.3)
POTASSIUM SERPL-MCNC: 4.1 MMOL/L — SIGNIFICANT CHANGE UP (ref 3.5–5.3)
POTASSIUM SERPL-MCNC: 4.2 MMOL/L — SIGNIFICANT CHANGE UP (ref 3.5–5.3)
POTASSIUM SERPL-MCNC: 4.3 MMOL/L — SIGNIFICANT CHANGE UP (ref 3.5–5.3)
POTASSIUM SERPL-MCNC: 4.4 MMOL/L — SIGNIFICANT CHANGE UP (ref 3.5–5.3)
POTASSIUM SERPL-MCNC: 4.5 MMOL/L — SIGNIFICANT CHANGE UP (ref 3.5–5.3)
POTASSIUM SERPL-MCNC: 4.5 MMOL/L — SIGNIFICANT CHANGE UP (ref 3.5–5.3)
POTASSIUM SERPL-MCNC: 4.7 MMOL/L — SIGNIFICANT CHANGE UP (ref 3.5–5.3)
POTASSIUM SERPL-SCNC: 2.4 MMOL/L — CRITICAL LOW (ref 3.5–5.3)
POTASSIUM SERPL-SCNC: 2.7 MMOL/L — CRITICAL LOW (ref 3.5–5.3)
POTASSIUM SERPL-SCNC: 3.2 MMOL/L — LOW (ref 3.5–5.3)
POTASSIUM SERPL-SCNC: 3.3 MMOL/L — LOW (ref 3.5–5.3)
POTASSIUM SERPL-SCNC: 3.4 MMOL/L — LOW (ref 3.5–5.3)
POTASSIUM SERPL-SCNC: 3.5 MMOL/L
POTASSIUM SERPL-SCNC: 3.6 MMOL/L — SIGNIFICANT CHANGE UP (ref 3.5–5.3)
POTASSIUM SERPL-SCNC: 3.6 MMOL/L — SIGNIFICANT CHANGE UP (ref 3.5–5.3)
POTASSIUM SERPL-SCNC: 3.7 MMOL/L — SIGNIFICANT CHANGE UP (ref 3.5–5.3)
POTASSIUM SERPL-SCNC: 3.8 MMOL/L — SIGNIFICANT CHANGE UP (ref 3.5–5.3)
POTASSIUM SERPL-SCNC: 3.9 MMOL/L — SIGNIFICANT CHANGE UP (ref 3.5–5.3)
POTASSIUM SERPL-SCNC: 3.9 MMOL/L — SIGNIFICANT CHANGE UP (ref 3.5–5.3)
POTASSIUM SERPL-SCNC: 4.1 MMOL/L — SIGNIFICANT CHANGE UP (ref 3.5–5.3)
POTASSIUM SERPL-SCNC: 4.1 MMOL/L — SIGNIFICANT CHANGE UP (ref 3.5–5.3)
POTASSIUM SERPL-SCNC: 4.2 MMOL/L — SIGNIFICANT CHANGE UP (ref 3.5–5.3)
POTASSIUM SERPL-SCNC: 4.3 MMOL/L — SIGNIFICANT CHANGE UP (ref 3.5–5.3)
POTASSIUM SERPL-SCNC: 4.4 MMOL/L — SIGNIFICANT CHANGE UP (ref 3.5–5.3)
POTASSIUM SERPL-SCNC: 4.5 MMOL/L — SIGNIFICANT CHANGE UP (ref 3.5–5.3)
POTASSIUM SERPL-SCNC: 4.5 MMOL/L — SIGNIFICANT CHANGE UP (ref 3.5–5.3)
POTASSIUM SERPL-SCNC: 4.7 MMOL/L — SIGNIFICANT CHANGE UP (ref 3.5–5.3)
PROCALCITONIN SERPL-MCNC: 0.11 NG/ML — HIGH (ref 0.02–0.1)
PROT SERPL-MCNC: 5.6 G/DL — LOW (ref 6–8.3)
PROT SERPL-MCNC: 6 G/DL — SIGNIFICANT CHANGE UP (ref 6–8.3)
PROT SERPL-MCNC: 6 G/DL — SIGNIFICANT CHANGE UP (ref 6–8.3)
PROT SERPL-MCNC: 6.8 G/DL
PROT SERPL-MCNC: 6.9 GM/DL — SIGNIFICANT CHANGE UP (ref 6–8.3)
PROT SERPL-MCNC: 7.2 G/DL — SIGNIFICANT CHANGE UP (ref 6–8.3)
PROT SERPL-MCNC: 7.4 GM/DL — SIGNIFICANT CHANGE UP (ref 6–8.3)
PROT SERPL-MCNC: 7.5 GM/DL — SIGNIFICANT CHANGE UP (ref 6–8.3)
PROT UR-MCNC: 100 MG/DL
PROT UR-MCNC: 30 MG/DL
PROT UR-MCNC: ABNORMAL
PROTHROM AB SERPL-ACNC: 12.9 SEC — SIGNIFICANT CHANGE UP (ref 10.6–13.6)
PROTHROM AB SERPL-ACNC: 13.1 SEC — SIGNIFICANT CHANGE UP (ref 10.6–13.6)
RBC # BLD: 3.19 M/UL — LOW (ref 3.8–5.2)
RBC # BLD: 3.3 M/UL — LOW (ref 3.8–5.2)
RBC # BLD: 3.32 M/UL — LOW (ref 3.8–5.2)
RBC # BLD: 3.35 M/UL — LOW (ref 3.8–5.2)
RBC # BLD: 3.41 M/UL — LOW (ref 3.8–5.2)
RBC # BLD: 3.43 M/UL — LOW (ref 3.8–5.2)
RBC # BLD: 3.45 M/UL — LOW (ref 3.8–5.2)
RBC # BLD: 3.46 M/UL — LOW (ref 3.8–5.2)
RBC # BLD: 3.51 M/UL — LOW (ref 3.8–5.2)
RBC # BLD: 3.6 M/UL — LOW (ref 3.8–5.2)
RBC # BLD: 3.64 M/UL — LOW (ref 3.8–5.2)
RBC # BLD: 3.68 M/UL
RBC # BLD: 3.8 M/UL — SIGNIFICANT CHANGE UP (ref 3.8–5.2)
RBC # BLD: 3.84 M/UL — SIGNIFICANT CHANGE UP (ref 3.8–5.2)
RBC # BLD: 3.85 M/UL — SIGNIFICANT CHANGE UP (ref 3.8–5.2)
RBC # BLD: 4.01 M/UL — SIGNIFICANT CHANGE UP (ref 3.8–5.2)
RBC # BLD: 4.09 M/UL — SIGNIFICANT CHANGE UP (ref 3.8–5.2)
RBC # BLD: 4.31 M/UL — SIGNIFICANT CHANGE UP (ref 3.8–5.2)
RBC # BLD: 4.52 M/UL — SIGNIFICANT CHANGE UP (ref 3.8–5.2)
RBC # FLD: 14.1 % — SIGNIFICANT CHANGE UP (ref 10.3–14.5)
RBC # FLD: 14.3 % — SIGNIFICANT CHANGE UP (ref 10.3–14.5)
RBC # FLD: 14.4 % — SIGNIFICANT CHANGE UP (ref 10.3–14.5)
RBC # FLD: 14.4 % — SIGNIFICANT CHANGE UP (ref 10.3–14.5)
RBC # FLD: 14.6 % — HIGH (ref 10.3–14.5)
RBC # FLD: 14.7 % — HIGH (ref 10.3–14.5)
RBC # FLD: 14.7 % — HIGH (ref 10.3–14.5)
RBC # FLD: 14.8 % — HIGH (ref 10.3–14.5)
RBC # FLD: 14.8 % — HIGH (ref 10.3–14.5)
RBC # FLD: 14.9 % — HIGH (ref 10.3–14.5)
RBC # FLD: 15.1 % — HIGH (ref 10.3–14.5)
RBC # FLD: 15.1 % — HIGH (ref 10.3–14.5)
RBC # FLD: 15.2 % — HIGH (ref 10.3–14.5)
RBC # FLD: 15.2 % — HIGH (ref 10.3–14.5)
RBC # FLD: 15.3 % — HIGH (ref 10.3–14.5)
RBC # FLD: 15.3 % — HIGH (ref 10.3–14.5)
RBC # FLD: 16.2 %
RBC BLD AUTO: NORMAL — SIGNIFICANT CHANGE UP
RBC CASTS # UR COMP ASSIST: 9 /HPF — HIGH (ref 0–4)
RBC CASTS # UR COMP ASSIST: SIGNIFICANT CHANGE UP /HPF (ref 0–4)
SALICYLATES SERPL-MCNC: 10.1 MG/DL — LOW (ref 15–30)
SAO2 % BLDA: 100 % — HIGH (ref 92–96)
SAO2 % BLDA: 94 % — SIGNIFICANT CHANGE UP (ref 92–96)
SAO2 % BLDA: 98 % — HIGH (ref 92–96)
SAO2 % BLDA: 99 % — HIGH (ref 92–96)
SAO2 % BLDA: 99 % — HIGH (ref 92–96)
SAO2 % BLDV: 74 % — SIGNIFICANT CHANGE UP (ref 67–88)
SARS-COV-2 IGG SERPL QL IA: NEGATIVE — SIGNIFICANT CHANGE UP
SARS-COV-2 IGG SERPL QL IA: NEGATIVE — SIGNIFICANT CHANGE UP
SARS-COV-2 IGM SERPL IA-ACNC: <0.1 INDEX — SIGNIFICANT CHANGE UP
SARS-COV-2 IGM SERPL IA-ACNC: <3.8 AU/ML — SIGNIFICANT CHANGE UP
SARS-COV-2 RNA SPEC QL NAA+PROBE: SIGNIFICANT CHANGE UP
SODIUM SERPL-SCNC: 136 MMOL/L — SIGNIFICANT CHANGE UP (ref 135–145)
SODIUM SERPL-SCNC: 136 MMOL/L — SIGNIFICANT CHANGE UP (ref 135–145)
SODIUM SERPL-SCNC: 138 MMOL/L — SIGNIFICANT CHANGE UP (ref 135–145)
SODIUM SERPL-SCNC: 138 MMOL/L — SIGNIFICANT CHANGE UP (ref 135–145)
SODIUM SERPL-SCNC: 139 MMOL/L — SIGNIFICANT CHANGE UP (ref 135–145)
SODIUM SERPL-SCNC: 140 MMOL/L — SIGNIFICANT CHANGE UP (ref 135–145)
SODIUM SERPL-SCNC: 141 MMOL/L — SIGNIFICANT CHANGE UP (ref 135–145)
SODIUM SERPL-SCNC: 142 MMOL/L — SIGNIFICANT CHANGE UP (ref 135–145)
SODIUM SERPL-SCNC: 142 MMOL/L — SIGNIFICANT CHANGE UP (ref 135–145)
SODIUM SERPL-SCNC: 144 MMOL/L
SODIUM SERPL-SCNC: 144 MMOL/L — SIGNIFICANT CHANGE UP (ref 135–145)
SODIUM SERPL-SCNC: 144 MMOL/L — SIGNIFICANT CHANGE UP (ref 135–145)
SODIUM SERPL-SCNC: 145 MMOL/L — SIGNIFICANT CHANGE UP (ref 135–145)
SODIUM SERPL-SCNC: 146 MMOL/L — HIGH (ref 135–145)
SODIUM SERPL-SCNC: 147 MMOL/L — HIGH (ref 135–145)
SODIUM SERPL-SCNC: 147 MMOL/L — HIGH (ref 135–145)
SP GR SPEC: 1.02 — SIGNIFICANT CHANGE UP (ref 1.01–1.02)
SPECIMEN SOURCE: SIGNIFICANT CHANGE UP
T4 FREE SERPL-MCNC: 1 NG/DL — SIGNIFICANT CHANGE UP (ref 0.9–1.8)
TROPONIN I SERPL-MCNC: <.015 NG/ML — SIGNIFICANT CHANGE UP (ref 0.01–0.04)
TROPONIN I SERPL-MCNC: <.015 NG/ML — SIGNIFICANT CHANGE UP (ref 0.01–0.04)
TROPONIN T, HIGH SENSITIVITY RESULT: 54 NG/L — HIGH (ref 0–51)
TSH SERPL-MCNC: 0.92 UIU/ML — SIGNIFICANT CHANGE UP (ref 0.27–4.2)
TSH SERPL-MCNC: 1.51 UIU/ML — SIGNIFICANT CHANGE UP (ref 0.27–4.2)
UROBILINOGEN FLD QL: NEGATIVE MG/DL — SIGNIFICANT CHANGE UP
UROBILINOGEN FLD QL: NEGATIVE MG/DL — SIGNIFICANT CHANGE UP
UROBILINOGEN FLD QL: NEGATIVE — SIGNIFICANT CHANGE UP
VIT B12 SERPL-MCNC: 1030 PG/ML — SIGNIFICANT CHANGE UP (ref 232–1245)
WBC # BLD: 10.45 K/UL — SIGNIFICANT CHANGE UP (ref 3.8–10.5)
WBC # BLD: 5.22 K/UL — SIGNIFICANT CHANGE UP (ref 3.8–10.5)
WBC # BLD: 5.49 K/UL — SIGNIFICANT CHANGE UP (ref 3.8–10.5)
WBC # BLD: 5.57 K/UL — SIGNIFICANT CHANGE UP (ref 3.8–10.5)
WBC # BLD: 6.07 K/UL — SIGNIFICANT CHANGE UP (ref 3.8–10.5)
WBC # BLD: 6.2 K/UL — SIGNIFICANT CHANGE UP (ref 3.8–10.5)
WBC # BLD: 6.26 K/UL — SIGNIFICANT CHANGE UP (ref 3.8–10.5)
WBC # BLD: 6.3 K/UL — SIGNIFICANT CHANGE UP (ref 3.8–10.5)
WBC # BLD: 7.47 K/UL — SIGNIFICANT CHANGE UP (ref 3.8–10.5)
WBC # BLD: 7.6 K/UL — SIGNIFICANT CHANGE UP (ref 3.8–10.5)
WBC # BLD: 7.93 K/UL — SIGNIFICANT CHANGE UP (ref 3.8–10.5)
WBC # BLD: 8.16 K/UL — SIGNIFICANT CHANGE UP (ref 3.8–10.5)
WBC # BLD: 8.31 K/UL — SIGNIFICANT CHANGE UP (ref 3.8–10.5)
WBC # BLD: 8.66 K/UL — SIGNIFICANT CHANGE UP (ref 3.8–10.5)
WBC # BLD: 8.76 K/UL — SIGNIFICANT CHANGE UP (ref 3.8–10.5)
WBC # BLD: 9.01 K/UL — SIGNIFICANT CHANGE UP (ref 3.8–10.5)
WBC # BLD: 9.33 K/UL — SIGNIFICANT CHANGE UP (ref 3.8–10.5)
WBC # BLD: 9.8 K/UL — SIGNIFICANT CHANGE UP (ref 3.8–10.5)
WBC # FLD AUTO: 10.45 K/UL — SIGNIFICANT CHANGE UP (ref 3.8–10.5)
WBC # FLD AUTO: 5.22 K/UL — SIGNIFICANT CHANGE UP (ref 3.8–10.5)
WBC # FLD AUTO: 5.49 K/UL — SIGNIFICANT CHANGE UP (ref 3.8–10.5)
WBC # FLD AUTO: 5.57 K/UL — SIGNIFICANT CHANGE UP (ref 3.8–10.5)
WBC # FLD AUTO: 6.07 K/UL — SIGNIFICANT CHANGE UP (ref 3.8–10.5)
WBC # FLD AUTO: 6.2 K/UL — SIGNIFICANT CHANGE UP (ref 3.8–10.5)
WBC # FLD AUTO: 6.26 K/UL — SIGNIFICANT CHANGE UP (ref 3.8–10.5)
WBC # FLD AUTO: 6.3 K/UL — SIGNIFICANT CHANGE UP (ref 3.8–10.5)
WBC # FLD AUTO: 7.08 K/UL
WBC # FLD AUTO: 7.47 K/UL — SIGNIFICANT CHANGE UP (ref 3.8–10.5)
WBC # FLD AUTO: 7.6 K/UL — SIGNIFICANT CHANGE UP (ref 3.8–10.5)
WBC # FLD AUTO: 7.93 K/UL — SIGNIFICANT CHANGE UP (ref 3.8–10.5)
WBC # FLD AUTO: 8.16 K/UL — SIGNIFICANT CHANGE UP (ref 3.8–10.5)
WBC # FLD AUTO: 8.31 K/UL — SIGNIFICANT CHANGE UP (ref 3.8–10.5)
WBC # FLD AUTO: 8.66 K/UL — SIGNIFICANT CHANGE UP (ref 3.8–10.5)
WBC # FLD AUTO: 8.76 K/UL — SIGNIFICANT CHANGE UP (ref 3.8–10.5)
WBC # FLD AUTO: 9.01 K/UL — SIGNIFICANT CHANGE UP (ref 3.8–10.5)
WBC # FLD AUTO: 9.33 K/UL — SIGNIFICANT CHANGE UP (ref 3.8–10.5)
WBC # FLD AUTO: 9.8 K/UL — SIGNIFICANT CHANGE UP (ref 3.8–10.5)
WBC UR QL: 59 /HPF — HIGH (ref 0–5)
WBC UR QL: ABNORMAL
WBC UR QL: ABNORMAL

## 2020-01-01 PROCEDURE — 85025 COMPLETE CBC W/AUTO DIFF WBC: CPT

## 2020-01-01 PROCEDURE — 86140 C-REACTIVE PROTEIN: CPT

## 2020-01-01 PROCEDURE — 99232 SBSQ HOSP IP/OBS MODERATE 35: CPT

## 2020-01-01 PROCEDURE — 71045 X-RAY EXAM CHEST 1 VIEW: CPT

## 2020-01-01 PROCEDURE — 85014 HEMATOCRIT: CPT

## 2020-01-01 PROCEDURE — 97166 OT EVAL MOD COMPLEX 45 MIN: CPT

## 2020-01-01 PROCEDURE — 93010 ELECTROCARDIOGRAM REPORT: CPT

## 2020-01-01 PROCEDURE — 71275 CT ANGIOGRAPHY CHEST: CPT

## 2020-01-01 PROCEDURE — 86769 SARS-COV-2 COVID-19 ANTIBODY: CPT

## 2020-01-01 PROCEDURE — 94660 CPAP INITIATION&MGMT: CPT

## 2020-01-01 PROCEDURE — 71275 CT ANGIOGRAPHY CHEST: CPT | Mod: 26

## 2020-01-01 PROCEDURE — 82962 GLUCOSE BLOOD TEST: CPT

## 2020-01-01 PROCEDURE — 82947 ASSAY GLUCOSE BLOOD QUANT: CPT

## 2020-01-01 PROCEDURE — 70450 CT HEAD/BRAIN W/O DYE: CPT | Mod: 26

## 2020-01-01 PROCEDURE — 87150 DNA/RNA AMPLIFIED PROBE: CPT

## 2020-01-01 PROCEDURE — 82435 ASSAY OF BLOOD CHLORIDE: CPT

## 2020-01-01 PROCEDURE — 84484 ASSAY OF TROPONIN QUANT: CPT

## 2020-01-01 PROCEDURE — 99223 1ST HOSP IP/OBS HIGH 75: CPT

## 2020-01-01 PROCEDURE — 99232 SBSQ HOSP IP/OBS MODERATE 35: CPT | Mod: GC

## 2020-01-01 PROCEDURE — 99285 EMERGENCY DEPT VISIT HI MDM: CPT

## 2020-01-01 PROCEDURE — 93005 ELECTROCARDIOGRAM TRACING: CPT

## 2020-01-01 PROCEDURE — 82010 KETONE BODYS QUAN: CPT

## 2020-01-01 PROCEDURE — 82330 ASSAY OF CALCIUM: CPT

## 2020-01-01 PROCEDURE — 80048 BASIC METABOLIC PNL TOTAL CA: CPT

## 2020-01-01 PROCEDURE — 84439 ASSAY OF FREE THYROXINE: CPT

## 2020-01-01 PROCEDURE — 97530 THERAPEUTIC ACTIVITIES: CPT

## 2020-01-01 PROCEDURE — 99350 HOME/RES VST EST HIGH MDM 60: CPT | Mod: 95

## 2020-01-01 PROCEDURE — 71045 X-RAY EXAM CHEST 1 VIEW: CPT | Mod: 26

## 2020-01-01 PROCEDURE — 85652 RBC SED RATE AUTOMATED: CPT

## 2020-01-01 PROCEDURE — 99222 1ST HOSP IP/OBS MODERATE 55: CPT

## 2020-01-01 PROCEDURE — U0003: CPT

## 2020-01-01 PROCEDURE — 99349 HOME/RES VST EST MOD MDM 40: CPT | Mod: 95

## 2020-01-01 PROCEDURE — 82803 BLOOD GASES ANY COMBINATION: CPT

## 2020-01-01 PROCEDURE — 80053 COMPREHEN METABOLIC PANEL: CPT

## 2020-01-01 PROCEDURE — 99350 HOME/RES VST EST HIGH MDM 60: CPT

## 2020-01-01 PROCEDURE — 82565 ASSAY OF CREATININE: CPT

## 2020-01-01 PROCEDURE — 84100 ASSAY OF PHOSPHORUS: CPT

## 2020-01-01 PROCEDURE — 81001 URINALYSIS AUTO W/SCOPE: CPT

## 2020-01-01 PROCEDURE — 87086 URINE CULTURE/COLONY COUNT: CPT

## 2020-01-01 PROCEDURE — 83880 ASSAY OF NATRIURETIC PEPTIDE: CPT

## 2020-01-01 PROCEDURE — 83605 ASSAY OF LACTIC ACID: CPT

## 2020-01-01 PROCEDURE — 70450 CT HEAD/BRAIN W/O DYE: CPT

## 2020-01-01 PROCEDURE — 84132 ASSAY OF SERUM POTASSIUM: CPT

## 2020-01-01 PROCEDURE — 36600 WITHDRAWAL OF ARTERIAL BLOOD: CPT

## 2020-01-01 PROCEDURE — 84443 ASSAY THYROID STIM HORMONE: CPT

## 2020-01-01 PROCEDURE — 93970 EXTREMITY STUDY: CPT | Mod: 26

## 2020-01-01 PROCEDURE — 99238 HOSP IP/OBS DSCHRG MGMT 30/<: CPT

## 2020-01-01 PROCEDURE — 85018 HEMOGLOBIN: CPT

## 2020-01-01 PROCEDURE — 83735 ASSAY OF MAGNESIUM: CPT

## 2020-01-01 PROCEDURE — 82607 VITAMIN B-12: CPT

## 2020-01-01 PROCEDURE — 94640 AIRWAY INHALATION TREATMENT: CPT

## 2020-01-01 PROCEDURE — 96374 THER/PROPH/DIAG INJ IV PUSH: CPT

## 2020-01-01 PROCEDURE — 97162 PT EVAL MOD COMPLEX 30 MIN: CPT

## 2020-01-01 PROCEDURE — 83036 HEMOGLOBIN GLYCOSYLATED A1C: CPT

## 2020-01-01 PROCEDURE — 99285 EMERGENCY DEPT VISIT HI MDM: CPT | Mod: 25

## 2020-01-01 PROCEDURE — 97110 THERAPEUTIC EXERCISES: CPT

## 2020-01-01 PROCEDURE — 80307 DRUG TEST PRSMV CHEM ANLYZR: CPT

## 2020-01-01 PROCEDURE — 82140 ASSAY OF AMMONIA: CPT

## 2020-01-01 PROCEDURE — 85027 COMPLETE CBC AUTOMATED: CPT

## 2020-01-01 PROCEDURE — 99233 SBSQ HOSP IP/OBS HIGH 50: CPT

## 2020-01-01 PROCEDURE — 87040 BLOOD CULTURE FOR BACTERIA: CPT

## 2020-01-01 PROCEDURE — 84295 ASSAY OF SERUM SODIUM: CPT

## 2020-01-01 RX ORDER — INSULIN GLARGINE 100 [IU]/ML
15 INJECTION, SOLUTION SUBCUTANEOUS ONCE
Refills: 0 | Status: COMPLETED | OUTPATIENT
Start: 2020-01-01 | End: 2020-01-01

## 2020-01-01 RX ORDER — INSULIN GLARGINE 100 [IU]/ML
15 INJECTION, SOLUTION SUBCUTANEOUS
Qty: 0 | Refills: 0 | DISCHARGE
Start: 2020-01-01

## 2020-01-01 RX ORDER — DEXTROSE 50 % IN WATER 50 %
15 SYRINGE (ML) INTRAVENOUS ONCE
Refills: 0 | Status: DISCONTINUED | OUTPATIENT
Start: 2020-01-01 | End: 2020-01-01

## 2020-01-01 RX ORDER — INSULIN HUMAN 100 [IU]/ML
5 INJECTION, SOLUTION SUBCUTANEOUS ONCE
Refills: 0 | Status: COMPLETED | OUTPATIENT
Start: 2020-01-01 | End: 2020-01-01

## 2020-01-01 RX ORDER — INSULIN LISPRO 100/ML
8 VIAL (ML) SUBCUTANEOUS
Refills: 0 | Status: DISCONTINUED | OUTPATIENT
Start: 2020-01-01 | End: 2020-01-01

## 2020-01-01 RX ORDER — FUROSEMIDE 40 MG
1 TABLET ORAL
Qty: 0 | Refills: 0 | DISCHARGE
Start: 2020-01-01

## 2020-01-01 RX ORDER — INSULIN GLARGINE 100 [IU]/ML
42 INJECTION, SOLUTION SUBCUTANEOUS AT BEDTIME
Refills: 0 | Status: DISCONTINUED | OUTPATIENT
Start: 2020-01-01 | End: 2020-01-01

## 2020-01-01 RX ORDER — INSULIN GLARGINE 100 [IU]/ML
25 INJECTION, SOLUTION SUBCUTANEOUS ONCE
Refills: 0 | Status: COMPLETED | OUTPATIENT
Start: 2020-01-01 | End: 2020-01-01

## 2020-01-01 RX ORDER — INSULIN GLARGINE 100 [IU]/ML
36 INJECTION, SOLUTION SUBCUTANEOUS AT BEDTIME
Refills: 0 | Status: DISCONTINUED | OUTPATIENT
Start: 2020-01-01 | End: 2020-01-01

## 2020-01-01 RX ORDER — IPRATROPIUM/ALBUTEROL SULFATE 18-103MCG
3 AEROSOL WITH ADAPTER (GRAM) INHALATION
Qty: 0 | Refills: 0 | DISCHARGE
Start: 2020-01-01

## 2020-01-01 RX ORDER — CEFTRIAXONE 500 MG/1
1000 INJECTION, POWDER, FOR SOLUTION INTRAMUSCULAR; INTRAVENOUS ONCE
Refills: 0 | Status: COMPLETED | OUTPATIENT
Start: 2020-01-01 | End: 2020-01-01

## 2020-01-01 RX ORDER — HALOPERIDOL DECANOATE 100 MG/ML
1 INJECTION INTRAMUSCULAR EVERY 6 HOURS
Refills: 0 | Status: DISCONTINUED | OUTPATIENT
Start: 2020-01-01 | End: 2020-01-01

## 2020-01-01 RX ORDER — IPRATROPIUM/ALBUTEROL SULFATE 18-103MCG
3 AEROSOL WITH ADAPTER (GRAM) INHALATION EVERY 6 HOURS
Refills: 0 | Status: DISCONTINUED | OUTPATIENT
Start: 2020-01-01 | End: 2020-01-01

## 2020-01-01 RX ORDER — INSULIN GLARGINE 100 [IU]/ML
15 INJECTION, SOLUTION SUBCUTANEOUS EVERY MORNING
Refills: 0 | Status: DISCONTINUED | OUTPATIENT
Start: 2020-01-01 | End: 2020-01-01

## 2020-01-01 RX ORDER — AZITHROMYCIN 500 MG/1
500 TABLET, FILM COATED ORAL ONCE
Refills: 0 | Status: COMPLETED | OUTPATIENT
Start: 2020-01-01 | End: 2020-01-01

## 2020-01-01 RX ORDER — ASPIRIN/CALCIUM CARB/MAGNESIUM 324 MG
1 TABLET ORAL
Qty: 0 | Refills: 0 | DISCHARGE
Start: 2020-01-01

## 2020-01-01 RX ORDER — IPRATROPIUM/ALBUTEROL SULFATE 18-103MCG
3 AEROSOL WITH ADAPTER (GRAM) INHALATION THREE TIMES A DAY
Refills: 0 | Status: DISCONTINUED | OUTPATIENT
Start: 2020-01-01 | End: 2020-01-01

## 2020-01-01 RX ORDER — INSULIN LISPRO 100/ML
VIAL (ML) SUBCUTANEOUS AT BEDTIME
Refills: 0 | Status: DISCONTINUED | OUTPATIENT
Start: 2020-01-01 | End: 2020-01-01

## 2020-01-01 RX ORDER — MECLIZINE HYDROCHLORIDE 12.5 MG/1
12.5 TABLET ORAL 3 TIMES DAILY
Qty: 25 | Refills: 0 | Status: ACTIVE | COMMUNITY
Start: 2020-01-01 | End: 1900-01-01

## 2020-01-01 RX ORDER — SODIUM CHLORIDE 9 MG/ML
1000 INJECTION, SOLUTION INTRAVENOUS
Refills: 0 | Status: DISCONTINUED | OUTPATIENT
Start: 2020-01-01 | End: 2020-01-01

## 2020-01-01 RX ORDER — INSULIN LISPRO 100/ML
15 VIAL (ML) SUBCUTANEOUS
Refills: 0 | Status: DISCONTINUED | OUTPATIENT
Start: 2020-01-01 | End: 2020-01-01

## 2020-01-01 RX ORDER — POTASSIUM CHLORIDE 20 MEQ
40 PACKET (EA) ORAL EVERY 4 HOURS
Refills: 0 | Status: COMPLETED | OUTPATIENT
Start: 2020-01-01 | End: 2020-01-01

## 2020-01-01 RX ORDER — MENTHOL 1.4 %
10-15 ADHESIVE PATCH, MEDICATED TOPICAL
Qty: 1 | Refills: 3 | Status: ACTIVE | COMMUNITY
Start: 2020-01-01

## 2020-01-01 RX ORDER — ACETAMINOPHEN 500 MG
650 TABLET ORAL EVERY 6 HOURS
Refills: 0 | Status: DISCONTINUED | OUTPATIENT
Start: 2020-01-01 | End: 2020-01-01

## 2020-01-01 RX ORDER — DEXTROSE 50 % IN WATER 50 %
12.5 SYRINGE (ML) INTRAVENOUS ONCE
Refills: 0 | Status: DISCONTINUED | OUTPATIENT
Start: 2020-01-01 | End: 2020-01-01

## 2020-01-01 RX ORDER — IPRATROPIUM BROMIDE AND ALBUTEROL SULFATE 2.5; .5 MG/3ML; MG/3ML
0.5-2.5 (3) SOLUTION RESPIRATORY (INHALATION)
Qty: 1 | Refills: 3 | Status: ACTIVE | COMMUNITY
Start: 2020-01-01 | End: 1900-01-01

## 2020-01-01 RX ORDER — POTASSIUM CHLORIDE 20 MEQ
10 PACKET (EA) ORAL
Refills: 0 | Status: COMPLETED | OUTPATIENT
Start: 2020-01-01 | End: 2020-01-01

## 2020-01-01 RX ORDER — SENNA PLUS 8.6 MG/1
2 TABLET ORAL
Qty: 0 | Refills: 0 | DISCHARGE
Start: 2020-01-01

## 2020-01-01 RX ORDER — POTASSIUM CHLORIDE 20 MEQ
40 PACKET (EA) ORAL ONCE
Refills: 0 | Status: COMPLETED | OUTPATIENT
Start: 2020-01-01 | End: 2020-01-01

## 2020-01-01 RX ORDER — FAMOTIDINE 10 MG/ML
1 INJECTION INTRAVENOUS
Qty: 0 | Refills: 0 | DISCHARGE
Start: 2020-01-01

## 2020-01-01 RX ORDER — POLYETHYLENE GLYCOL 3350 17 G/17G
17 POWDER, FOR SOLUTION ORAL
Qty: 0 | Refills: 0 | DISCHARGE
Start: 2020-01-01

## 2020-01-01 RX ORDER — GABAPENTIN 400 MG/1
100 CAPSULE ORAL THREE TIMES A DAY
Refills: 0 | Status: DISCONTINUED | OUTPATIENT
Start: 2020-01-01 | End: 2020-01-01

## 2020-01-01 RX ORDER — METFORMIN HYDROCHLORIDE 500 MG/1
500 TABLET, COATED ORAL
Qty: 90 | Refills: 3 | Status: DISCONTINUED | COMMUNITY
Start: 2019-01-01 | End: 2020-01-01

## 2020-01-01 RX ORDER — METOLAZONE 5 MG/1
5 TABLET ORAL DAILY
Qty: 90 | Refills: 3 | Status: ACTIVE | COMMUNITY
Start: 2020-01-01

## 2020-01-01 RX ORDER — DEXTROSE MONOHYDRATE, SODIUM CHLORIDE, AND POTASSIUM CHLORIDE 50; .745; 4.5 G/1000ML; G/1000ML; G/1000ML
1000 INJECTION, SOLUTION INTRAVENOUS
Refills: 0 | Status: DISCONTINUED | OUTPATIENT
Start: 2020-01-01 | End: 2020-01-01

## 2020-01-01 RX ORDER — INSULIN GLARGINE 100 [IU]/ML
30 INJECTION, SOLUTION SUBCUTANEOUS AT BEDTIME
Refills: 0 | Status: DISCONTINUED | OUTPATIENT
Start: 2020-01-01 | End: 2020-01-01

## 2020-01-01 RX ORDER — ALBUTEROL 90 UG/1
1 AEROSOL, METERED ORAL EVERY 4 HOURS
Refills: 0 | Status: DISCONTINUED | OUTPATIENT
Start: 2020-01-01 | End: 2020-01-01

## 2020-01-01 RX ORDER — FUROSEMIDE 40 MG
40 TABLET ORAL DAILY
Refills: 0 | Status: DISCONTINUED | OUTPATIENT
Start: 2020-01-01 | End: 2020-01-01

## 2020-01-01 RX ORDER — DEXTROSE 50 % IN WATER 50 %
25 SYRINGE (ML) INTRAVENOUS ONCE
Refills: 0 | Status: DISCONTINUED | OUTPATIENT
Start: 2020-01-01 | End: 2020-01-01

## 2020-01-01 RX ORDER — BUDESONIDE AND FORMOTEROL FUMARATE DIHYDRATE 160; 4.5 UG/1; UG/1
2 AEROSOL RESPIRATORY (INHALATION)
Refills: 0 | Status: DISCONTINUED | OUTPATIENT
Start: 2020-01-01 | End: 2020-01-01

## 2020-01-01 RX ORDER — SENNA PLUS 8.6 MG/1
2 TABLET ORAL AT BEDTIME
Refills: 0 | Status: DISCONTINUED | OUTPATIENT
Start: 2020-01-01 | End: 2020-01-01

## 2020-01-01 RX ORDER — ACETAZOLAMIDE 250 MG/1
250 TABLET ORAL ONCE
Refills: 0 | Status: COMPLETED | OUTPATIENT
Start: 2020-01-01 | End: 2020-01-01

## 2020-01-01 RX ORDER — INSULIN LISPRO 100/ML
0 VIAL (ML) SUBCUTANEOUS
Qty: 0 | Refills: 0 | DISCHARGE
Start: 2020-01-01

## 2020-01-01 RX ORDER — INSULIN LISPRO 100/ML
VIAL (ML) SUBCUTANEOUS
Refills: 0 | Status: DISCONTINUED | OUTPATIENT
Start: 2020-01-01 | End: 2020-01-01

## 2020-01-01 RX ORDER — INSULIN GLARGINE 100 [IU]/ML
25 INJECTION, SOLUTION SUBCUTANEOUS
Qty: 0 | Refills: 0 | DISCHARGE
Start: 2020-01-01

## 2020-01-01 RX ORDER — ACETYLCYSTEINE 200 MG/ML
2.5 VIAL (ML) MISCELLANEOUS THREE TIMES A DAY
Refills: 0 | Status: COMPLETED | OUTPATIENT
Start: 2020-01-01 | End: 2020-01-01

## 2020-01-01 RX ORDER — INSULIN LISPRO 100/ML
13 VIAL (ML) SUBCUTANEOUS
Refills: 0 | Status: DISCONTINUED | OUTPATIENT
Start: 2020-01-01 | End: 2020-01-01

## 2020-01-01 RX ORDER — FAMOTIDINE 10 MG/ML
20 INJECTION INTRAVENOUS DAILY
Refills: 0 | Status: DISCONTINUED | OUTPATIENT
Start: 2020-01-01 | End: 2020-01-01

## 2020-01-01 RX ORDER — NATEGLINIDE 60 MG/1
60 TABLET, COATED ORAL 3 TIMES DAILY
Qty: 270 | Refills: 3 | Status: DISCONTINUED | COMMUNITY
Start: 2020-01-01 | End: 2020-01-01

## 2020-01-01 RX ORDER — ATORVASTATIN CALCIUM 80 MG/1
1 TABLET, FILM COATED ORAL
Qty: 0 | Refills: 0 | DISCHARGE
Start: 2020-01-01

## 2020-01-01 RX ORDER — IPRATROPIUM/ALBUTEROL SULFATE 18-103MCG
3 AEROSOL WITH ADAPTER (GRAM) INHALATION EVERY 4 HOURS
Refills: 0 | Status: DISCONTINUED | OUTPATIENT
Start: 2020-01-01 | End: 2020-01-01

## 2020-01-01 RX ORDER — INFLUENZA VIRUS VACCINE 15; 15; 15; 15 UG/.5ML; UG/.5ML; UG/.5ML; UG/.5ML
0.5 SUSPENSION INTRAMUSCULAR ONCE
Refills: 0 | Status: COMPLETED | OUTPATIENT
Start: 2020-01-01 | End: 2020-01-01

## 2020-01-01 RX ORDER — INSULIN LISPRO 100/ML
10 VIAL (ML) SUBCUTANEOUS
Refills: 0 | Status: DISCONTINUED | OUTPATIENT
Start: 2020-01-01 | End: 2020-01-01

## 2020-01-01 RX ORDER — INSULIN LISPRO 100/ML
11 VIAL (ML) SUBCUTANEOUS
Refills: 0 | Status: DISCONTINUED | OUTPATIENT
Start: 2020-01-01 | End: 2020-01-01

## 2020-01-01 RX ORDER — IPRATROPIUM/ALBUTEROL SULFATE 18-103MCG
3 AEROSOL WITH ADAPTER (GRAM) INHALATION ONCE
Refills: 0 | Status: COMPLETED | OUTPATIENT
Start: 2020-01-01 | End: 2020-01-01

## 2020-01-01 RX ORDER — AZITHROMYCIN 500 MG/1
TABLET, FILM COATED ORAL
Refills: 0 | Status: DISCONTINUED | OUTPATIENT
Start: 2020-01-01 | End: 2020-01-01

## 2020-01-01 RX ORDER — ACLIDINIUM BROMIDE 400 UG/1
1 POWDER, METERED RESPIRATORY (INHALATION)
Qty: 0 | Refills: 0 | DISCHARGE

## 2020-01-01 RX ORDER — INSULIN GLARGINE 100 [IU]/ML
16 INJECTION, SOLUTION SUBCUTANEOUS AT BEDTIME
Refills: 0 | Status: DISCONTINUED | OUTPATIENT
Start: 2020-01-01 | End: 2020-01-01

## 2020-01-01 RX ORDER — INSULIN GLARGINE 100 [IU]/ML
12 INJECTION, SOLUTION SUBCUTANEOUS AT BEDTIME
Refills: 0 | Status: DISCONTINUED | OUTPATIENT
Start: 2020-01-01 | End: 2020-01-01

## 2020-01-01 RX ORDER — SODIUM CHLORIDE 9 MG/ML
1000 INJECTION INTRAMUSCULAR; INTRAVENOUS; SUBCUTANEOUS
Refills: 0 | Status: DISCONTINUED | OUTPATIENT
Start: 2020-01-01 | End: 2020-01-01

## 2020-01-01 RX ORDER — INSULIN LISPRO 100/ML
4 VIAL (ML) SUBCUTANEOUS
Refills: 0 | Status: DISCONTINUED | OUTPATIENT
Start: 2020-01-01 | End: 2020-01-01

## 2020-01-01 RX ORDER — TIOTROPIUM BROMIDE 18 UG/1
1 CAPSULE ORAL; RESPIRATORY (INHALATION)
Qty: 0 | Refills: 0 | DISCHARGE
Start: 2020-01-01

## 2020-01-01 RX ORDER — ACLIDINIUM BROMIDE 400 UG/1
400 POWDER, METERED RESPIRATORY (INHALATION)
Qty: 1 | Refills: 4 | Status: ACTIVE | COMMUNITY
Start: 2019-01-01

## 2020-01-01 RX ORDER — GUAIFENESIN 1200 MG/1
1200 TABLET, EXTENDED RELEASE ORAL
Qty: 60 | Refills: 0 | Status: DISCONTINUED | COMMUNITY
Start: 2019-01-01 | End: 2020-01-01

## 2020-01-01 RX ORDER — INSULIN GLARGINE 100 [IU]/ML
32 INJECTION, SOLUTION SUBCUTANEOUS AT BEDTIME
Refills: 0 | Status: DISCONTINUED | OUTPATIENT
Start: 2020-01-01 | End: 2020-01-01

## 2020-01-01 RX ORDER — ENOXAPARIN SODIUM 100 MG/ML
40 INJECTION SUBCUTANEOUS DAILY
Refills: 0 | Status: DISCONTINUED | OUTPATIENT
Start: 2020-01-01 | End: 2020-01-01

## 2020-01-01 RX ORDER — INSULIN GLARGINE 100 [IU]/ML
40 INJECTION, SOLUTION SUBCUTANEOUS AT BEDTIME
Refills: 0 | Status: DISCONTINUED | OUTPATIENT
Start: 2020-01-01 | End: 2020-01-01

## 2020-01-01 RX ORDER — INSULIN GLARGINE 100 [IU]/ML
8 INJECTION, SOLUTION SUBCUTANEOUS AT BEDTIME
Refills: 0 | Status: DISCONTINUED | OUTPATIENT
Start: 2020-01-01 | End: 2020-01-01

## 2020-01-01 RX ORDER — ALPRAZOLAM 0.25 MG
0.25 TABLET ORAL ONCE
Refills: 0 | Status: DISCONTINUED | OUTPATIENT
Start: 2020-01-01 | End: 2020-01-01

## 2020-01-01 RX ORDER — METFORMIN HYDROCHLORIDE 850 MG/1
1 TABLET ORAL
Qty: 0 | Refills: 0 | DISCHARGE

## 2020-01-01 RX ORDER — BUDESONIDE AND FORMOTEROL FUMARATE DIHYDRATE 160; 4.5 UG/1; UG/1
0 AEROSOL RESPIRATORY (INHALATION)
Qty: 0 | Refills: 0 | DISCHARGE
Start: 2020-01-01

## 2020-01-01 RX ORDER — POTASSIUM CHLORIDE 20 MEQ
40 PACKET (EA) ORAL
Refills: 0 | Status: DISCONTINUED | OUTPATIENT
Start: 2020-01-01 | End: 2020-01-01

## 2020-01-01 RX ORDER — ACETAZOLAMIDE 250 MG/1
250 TABLET ORAL ONCE
Refills: 0 | Status: DISCONTINUED | OUTPATIENT
Start: 2020-01-01 | End: 2020-01-01

## 2020-01-01 RX ORDER — POTASSIUM CHLORIDE 20 MEQ
20 PACKET (EA) ORAL ONCE
Refills: 0 | Status: COMPLETED | OUTPATIENT
Start: 2020-01-01 | End: 2020-01-01

## 2020-01-01 RX ORDER — ACETAMINOPHEN 500 MG
2 TABLET ORAL
Qty: 0 | Refills: 0 | DISCHARGE
Start: 2020-01-01

## 2020-01-01 RX ORDER — INSULIN GLARGINE 100 [IU]/ML
26 INJECTION, SOLUTION SUBCUTANEOUS AT BEDTIME
Refills: 0 | Status: DISCONTINUED | OUTPATIENT
Start: 2020-01-01 | End: 2020-01-01

## 2020-01-01 RX ORDER — DEXTROSE 50 % IN WATER 50 %
15 SYRINGE (ML) INTRAVENOUS ONCE
Refills: 0 | Status: COMPLETED | OUTPATIENT
Start: 2020-01-01 | End: 2020-01-01

## 2020-01-01 RX ORDER — INSULIN LISPRO 100/ML
12 VIAL (ML) SUBCUTANEOUS
Refills: 0 | Status: DISCONTINUED | OUTPATIENT
Start: 2020-01-01 | End: 2020-01-01

## 2020-01-01 RX ORDER — MAGNESIUM SULFATE 500 MG/ML
1 VIAL (ML) INJECTION DAILY
Refills: 0 | Status: DISCONTINUED | OUTPATIENT
Start: 2020-01-01 | End: 2020-01-01

## 2020-01-01 RX ORDER — MAGNESIUM SULFATE 500 MG/ML
2 VIAL (ML) INJECTION ONCE
Refills: 0 | Status: COMPLETED | OUTPATIENT
Start: 2020-01-01 | End: 2020-01-01

## 2020-01-01 RX ORDER — CEFTRIAXONE 500 MG/1
1000 INJECTION, POWDER, FOR SOLUTION INTRAMUSCULAR; INTRAVENOUS EVERY 24 HOURS
Refills: 0 | Status: DISCONTINUED | OUTPATIENT
Start: 2020-01-01 | End: 2020-01-01

## 2020-01-01 RX ORDER — INSULIN GLARGINE 100 [IU]/ML
8 INJECTION, SOLUTION SUBCUTANEOUS EVERY MORNING
Refills: 0 | Status: DISCONTINUED | OUTPATIENT
Start: 2020-01-01 | End: 2020-01-01

## 2020-01-01 RX ORDER — INSULIN GLARGINE 100 [IU]/ML
45 INJECTION, SOLUTION SUBCUTANEOUS AT BEDTIME
Refills: 0 | Status: DISCONTINUED | OUTPATIENT
Start: 2020-01-01 | End: 2020-01-01

## 2020-01-01 RX ORDER — HALOPERIDOL DECANOATE 100 MG/ML
1 INJECTION INTRAMUSCULAR ONCE
Refills: 0 | Status: COMPLETED | OUTPATIENT
Start: 2020-01-01 | End: 2020-01-01

## 2020-01-01 RX ORDER — ERTAPENEM SODIUM 1 G/1
1000 INJECTION, POWDER, LYOPHILIZED, FOR SOLUTION INTRAMUSCULAR; INTRAVENOUS EVERY 24 HOURS
Refills: 0 | Status: DISCONTINUED | OUTPATIENT
Start: 2020-01-01 | End: 2020-01-01

## 2020-01-01 RX ORDER — INSULIN GLARGINE 100 [IU]/ML
35 INJECTION, SOLUTION SUBCUTANEOUS AT BEDTIME
Refills: 0 | Status: DISCONTINUED | OUTPATIENT
Start: 2020-01-01 | End: 2020-01-01

## 2020-01-01 RX ORDER — TIOTROPIUM BROMIDE 18 UG/1
1 CAPSULE ORAL; RESPIRATORY (INHALATION) DAILY
Refills: 0 | Status: DISCONTINUED | OUTPATIENT
Start: 2020-01-01 | End: 2020-01-01

## 2020-01-01 RX ORDER — POLYETHYLENE GLYCOL 3350 17 G/17G
17 POWDER, FOR SOLUTION ORAL DAILY
Refills: 0 | Status: DISCONTINUED | OUTPATIENT
Start: 2020-01-01 | End: 2020-01-01

## 2020-01-01 RX ORDER — ATORVASTATIN CALCIUM 80 MG/1
80 TABLET, FILM COATED ORAL AT BEDTIME
Refills: 0 | Status: DISCONTINUED | OUTPATIENT
Start: 2020-01-01 | End: 2020-01-01

## 2020-01-01 RX ORDER — ALBUTEROL 90 UG/1
1 AEROSOL, METERED ORAL
Qty: 0 | Refills: 0 | DISCHARGE
Start: 2020-01-01

## 2020-01-01 RX ORDER — GLUCAGON INJECTION, SOLUTION 0.5 MG/.1ML
1 INJECTION, SOLUTION SUBCUTANEOUS ONCE
Refills: 0 | Status: DISCONTINUED | OUTPATIENT
Start: 2020-01-01 | End: 2020-01-01

## 2020-01-01 RX ORDER — AZITHROMYCIN 500 MG/1
500 TABLET, FILM COATED ORAL DAILY
Refills: 0 | Status: COMPLETED | OUTPATIENT
Start: 2020-01-01 | End: 2020-01-01

## 2020-01-01 RX ORDER — ASPIRIN/CALCIUM CARB/MAGNESIUM 324 MG
81 TABLET ORAL DAILY
Refills: 0 | Status: DISCONTINUED | OUTPATIENT
Start: 2020-01-01 | End: 2020-01-01

## 2020-01-01 RX ORDER — FUROSEMIDE 40 MG
40 TABLET ORAL ONCE
Refills: 0 | Status: COMPLETED | OUTPATIENT
Start: 2020-01-01 | End: 2020-01-01

## 2020-01-01 RX ORDER — INSULIN GLARGINE 100 [IU]/ML
25 INJECTION, SOLUTION SUBCUTANEOUS AT BEDTIME
Refills: 0 | Status: DISCONTINUED | OUTPATIENT
Start: 2020-01-01 | End: 2020-01-01

## 2020-01-01 RX ORDER — POTASSIUM CHLORIDE 20 MEQ
40 PACKET (EA) ORAL
Refills: 0 | Status: COMPLETED | OUTPATIENT
Start: 2020-01-01 | End: 2020-01-01

## 2020-01-01 RX ORDER — FUROSEMIDE 40 MG
40 TABLET ORAL ONCE
Refills: 0 | Status: DISCONTINUED | OUTPATIENT
Start: 2020-01-01 | End: 2020-01-01

## 2020-01-01 RX ORDER — CEPHALEXIN 500 MG/1
500 CAPSULE ORAL
Qty: 14 | Refills: 0 | Status: DISCONTINUED | COMMUNITY
Start: 2019-01-01 | End: 2020-01-01

## 2020-01-01 RX ORDER — INSULIN LISPRO 100/ML
14 VIAL (ML) SUBCUTANEOUS
Refills: 0 | Status: DISCONTINUED | OUTPATIENT
Start: 2020-01-01 | End: 2020-01-01

## 2020-01-01 RX ORDER — HALOPERIDOL DECANOATE 100 MG/ML
1 INJECTION INTRAMUSCULAR
Qty: 0 | Refills: 0 | DISCHARGE
Start: 2020-01-01

## 2020-01-01 RX ORDER — ALBUTEROL 90 UG/1
2.5 AEROSOL, METERED ORAL
Refills: 0 | Status: DISCONTINUED | OUTPATIENT
Start: 2020-01-01 | End: 2020-01-01

## 2020-01-01 RX ORDER — INSULIN LISPRO 100/ML
7 VIAL (ML) SUBCUTANEOUS
Refills: 0 | Status: DISCONTINUED | OUTPATIENT
Start: 2020-01-01 | End: 2020-01-01

## 2020-01-01 RX ORDER — INSULIN GLARGINE 100 [IU]/ML
33 INJECTION, SOLUTION SUBCUTANEOUS AT BEDTIME
Refills: 0 | Status: DISCONTINUED | OUTPATIENT
Start: 2020-01-01 | End: 2020-01-01

## 2020-01-01 RX ORDER — LIDOCAINE 5% 700 MG/1
5 PATCH TOPICAL
Qty: 1 | Refills: 4 | Status: DISCONTINUED | COMMUNITY
Start: 2020-01-01 | End: 2020-01-01

## 2020-01-01 RX ORDER — FUROSEMIDE 40 MG/1
40 TABLET ORAL
Qty: 90 | Refills: 0 | Status: DISCONTINUED | COMMUNITY
Start: 2017-02-22 | End: 2020-01-01

## 2020-01-01 RX ORDER — INSULIN GLARGINE 100 [IU]/ML
22 INJECTION, SOLUTION SUBCUTANEOUS AT BEDTIME
Refills: 0 | Status: DISCONTINUED | OUTPATIENT
Start: 2020-01-01 | End: 2020-01-01

## 2020-01-01 RX ORDER — AZITHROMYCIN 500 MG/1
500 TABLET, FILM COATED ORAL EVERY 24 HOURS
Refills: 0 | Status: DISCONTINUED | OUTPATIENT
Start: 2020-01-01 | End: 2020-01-01

## 2020-01-01 RX ORDER — LACTULOSE 10 G/15ML
20 SOLUTION ORAL ONCE
Refills: 0 | Status: COMPLETED | OUTPATIENT
Start: 2020-01-01 | End: 2020-01-01

## 2020-01-01 RX ORDER — MECLIZINE HYDROCHLORIDE 12.5 MG/1
12.5 TABLET ORAL DAILY
Qty: 90 | Refills: 3 | Status: DISCONTINUED | COMMUNITY
Start: 2019-01-01 | End: 2020-01-01

## 2020-01-01 RX ORDER — PANTOPRAZOLE 40 MG/1
40 TABLET, DELAYED RELEASE ORAL DAILY
Qty: 90 | Refills: 2 | Status: ACTIVE | COMMUNITY
Start: 2017-02-14

## 2020-01-01 RX ORDER — INSULIN LISPRO 100/ML
13 VIAL (ML) SUBCUTANEOUS
Qty: 0 | Refills: 0 | DISCHARGE
Start: 2020-01-01

## 2020-01-01 RX ADMIN — Medication 20 MILLIGRAM(S): at 18:43

## 2020-01-01 RX ADMIN — Medication 0.5 MILLIGRAM(S): at 07:49

## 2020-01-01 RX ADMIN — ALBUTEROL 1 PUFF(S): 90 AEROSOL, METERED ORAL at 05:10

## 2020-01-01 RX ADMIN — BUDESONIDE AND FORMOTEROL FUMARATE DIHYDRATE 2 PUFF(S): 160; 4.5 AEROSOL RESPIRATORY (INHALATION) at 06:22

## 2020-01-01 RX ADMIN — BUDESONIDE AND FORMOTEROL FUMARATE DIHYDRATE 2 PUFF(S): 160; 4.5 AEROSOL RESPIRATORY (INHALATION) at 05:09

## 2020-01-01 RX ADMIN — POLYETHYLENE GLYCOL 3350 17 GRAM(S): 17 POWDER, FOR SOLUTION ORAL at 09:07

## 2020-01-01 RX ADMIN — ATORVASTATIN CALCIUM 80 MILLIGRAM(S): 80 TABLET, FILM COATED ORAL at 21:23

## 2020-01-01 RX ADMIN — Medication 650 MILLIGRAM(S): at 21:58

## 2020-01-01 RX ADMIN — GABAPENTIN 100 MILLIGRAM(S): 400 CAPSULE ORAL at 22:26

## 2020-01-01 RX ADMIN — ATORVASTATIN CALCIUM 80 MILLIGRAM(S): 80 TABLET, FILM COATED ORAL at 21:43

## 2020-01-01 RX ADMIN — Medication 14 UNIT(S): at 12:44

## 2020-01-01 RX ADMIN — Medication 1: at 17:08

## 2020-01-01 RX ADMIN — Medication 20 MILLIGRAM(S): at 05:16

## 2020-01-01 RX ADMIN — Medication 4 UNIT(S): at 16:35

## 2020-01-01 RX ADMIN — Medication 4 UNIT(S): at 08:20

## 2020-01-01 RX ADMIN — GABAPENTIN 100 MILLIGRAM(S): 400 CAPSULE ORAL at 21:33

## 2020-01-01 RX ADMIN — INSULIN GLARGINE 22 UNIT(S): 100 INJECTION, SOLUTION SUBCUTANEOUS at 21:58

## 2020-01-01 RX ADMIN — Medication 650 MILLIGRAM(S): at 11:30

## 2020-01-01 RX ADMIN — Medication 1: at 08:47

## 2020-01-01 RX ADMIN — ALBUTEROL 1 PUFF(S): 90 AEROSOL, METERED ORAL at 17:49

## 2020-01-01 RX ADMIN — GABAPENTIN 100 MILLIGRAM(S): 400 CAPSULE ORAL at 05:15

## 2020-01-01 RX ADMIN — Medication 2.5 MILLILITER(S): at 14:42

## 2020-01-01 RX ADMIN — ACETAZOLAMIDE 250 MILLIGRAM(S): 250 TABLET ORAL at 09:36

## 2020-01-01 RX ADMIN — Medication 2: at 13:29

## 2020-01-01 RX ADMIN — Medication 3 MILLILITER(S): at 21:52

## 2020-01-01 RX ADMIN — Medication 3 MILLILITER(S): at 15:18

## 2020-01-01 RX ADMIN — BUDESONIDE AND FORMOTEROL FUMARATE DIHYDRATE 2 PUFF(S): 160; 4.5 AEROSOL RESPIRATORY (INHALATION) at 06:15

## 2020-01-01 RX ADMIN — Medication 3: at 17:46

## 2020-01-01 RX ADMIN — Medication 650 MILLIGRAM(S): at 05:56

## 2020-01-01 RX ADMIN — Medication 4 UNIT(S): at 11:39

## 2020-01-01 RX ADMIN — Medication 40 MILLIGRAM(S): at 04:58

## 2020-01-01 RX ADMIN — Medication 100 MILLIEQUIVALENT(S): at 22:26

## 2020-01-01 RX ADMIN — INSULIN GLARGINE 12 UNIT(S): 100 INJECTION, SOLUTION SUBCUTANEOUS at 22:43

## 2020-01-01 RX ADMIN — SENNA PLUS 2 TABLET(S): 8.6 TABLET ORAL at 21:44

## 2020-01-01 RX ADMIN — Medication 100 GRAM(S): at 11:28

## 2020-01-01 RX ADMIN — BUDESONIDE AND FORMOTEROL FUMARATE DIHYDRATE 2 PUFF(S): 160; 4.5 AEROSOL RESPIRATORY (INHALATION) at 05:32

## 2020-01-01 RX ADMIN — INSULIN GLARGINE 8 UNIT(S): 100 INJECTION, SOLUTION SUBCUTANEOUS at 08:26

## 2020-01-01 RX ADMIN — GABAPENTIN 100 MILLIGRAM(S): 400 CAPSULE ORAL at 13:05

## 2020-01-01 RX ADMIN — Medication 1: at 19:57

## 2020-01-01 RX ADMIN — GABAPENTIN 100 MILLIGRAM(S): 400 CAPSULE ORAL at 05:09

## 2020-01-01 RX ADMIN — Medication 40 MILLIGRAM(S): at 05:35

## 2020-01-01 RX ADMIN — Medication 1: at 13:21

## 2020-01-01 RX ADMIN — BUDESONIDE AND FORMOTEROL FUMARATE DIHYDRATE 2 PUFF(S): 160; 4.5 AEROSOL RESPIRATORY (INHALATION) at 18:32

## 2020-01-01 RX ADMIN — Medication 3 MILLILITER(S): at 12:11

## 2020-01-01 RX ADMIN — ALBUTEROL 2.5 MILLIGRAM(S): 90 AEROSOL, METERED ORAL at 16:12

## 2020-01-01 RX ADMIN — Medication 650 MILLIGRAM(S): at 02:02

## 2020-01-01 RX ADMIN — Medication 7 UNIT(S): at 12:48

## 2020-01-01 RX ADMIN — Medication 4: at 13:14

## 2020-01-01 RX ADMIN — Medication 650 MILLIGRAM(S): at 21:29

## 2020-01-01 RX ADMIN — Medication 15 UNIT(S): at 13:28

## 2020-01-01 RX ADMIN — INSULIN GLARGINE 26 UNIT(S): 100 INJECTION, SOLUTION SUBCUTANEOUS at 23:10

## 2020-01-01 RX ADMIN — Medication 40 MILLIGRAM(S): at 05:16

## 2020-01-01 RX ADMIN — ALBUTEROL 2.5 MILLIGRAM(S): 90 AEROSOL, METERED ORAL at 16:57

## 2020-01-01 RX ADMIN — BUDESONIDE AND FORMOTEROL FUMARATE DIHYDRATE 2 PUFF(S): 160; 4.5 AEROSOL RESPIRATORY (INHALATION) at 05:10

## 2020-01-01 RX ADMIN — Medication 3 MILLILITER(S): at 17:34

## 2020-01-01 RX ADMIN — Medication 100 GRAM(S): at 08:47

## 2020-01-01 RX ADMIN — Medication 100 GRAM(S): at 13:32

## 2020-01-01 RX ADMIN — ATORVASTATIN CALCIUM 80 MILLIGRAM(S): 80 TABLET, FILM COATED ORAL at 21:29

## 2020-01-01 RX ADMIN — Medication 2.5 MILLILITER(S): at 05:01

## 2020-01-01 RX ADMIN — Medication 40 MILLIGRAM(S): at 05:56

## 2020-01-01 RX ADMIN — ALBUTEROL 1 PUFF(S): 90 AEROSOL, METERED ORAL at 14:37

## 2020-01-01 RX ADMIN — Medication 20 MILLIGRAM(S): at 17:34

## 2020-01-01 RX ADMIN — Medication 3 MILLILITER(S): at 05:06

## 2020-01-01 RX ADMIN — ALBUTEROL 1 PUFF(S): 90 AEROSOL, METERED ORAL at 10:05

## 2020-01-01 RX ADMIN — Medication 81 MILLIGRAM(S): at 12:12

## 2020-01-01 RX ADMIN — Medication 1: at 17:33

## 2020-01-01 RX ADMIN — BUDESONIDE AND FORMOTEROL FUMARATE DIHYDRATE 2 PUFF(S): 160; 4.5 AEROSOL RESPIRATORY (INHALATION) at 22:25

## 2020-01-01 RX ADMIN — Medication 3 MILLILITER(S): at 05:39

## 2020-01-01 RX ADMIN — Medication 81 MILLIGRAM(S): at 17:45

## 2020-01-01 RX ADMIN — Medication 7 UNIT(S): at 09:24

## 2020-01-01 RX ADMIN — Medication 4 UNIT(S): at 12:33

## 2020-01-01 RX ADMIN — Medication 3 MILLILITER(S): at 13:06

## 2020-01-01 RX ADMIN — Medication 40 MILLIEQUIVALENT(S): at 13:36

## 2020-01-01 RX ADMIN — BUDESONIDE AND FORMOTEROL FUMARATE DIHYDRATE 2 PUFF(S): 160; 4.5 AEROSOL RESPIRATORY (INHALATION) at 06:08

## 2020-01-01 RX ADMIN — Medication 40 MILLIEQUIVALENT(S): at 17:48

## 2020-01-01 RX ADMIN — SENNA PLUS 2 TABLET(S): 8.6 TABLET ORAL at 21:53

## 2020-01-01 RX ADMIN — Medication 81 MILLIGRAM(S): at 12:02

## 2020-01-01 RX ADMIN — INSULIN GLARGINE 12 UNIT(S): 100 INJECTION, SOLUTION SUBCUTANEOUS at 22:08

## 2020-01-01 RX ADMIN — GABAPENTIN 100 MILLIGRAM(S): 400 CAPSULE ORAL at 05:33

## 2020-01-01 RX ADMIN — Medication 81 MILLIGRAM(S): at 08:46

## 2020-01-01 RX ADMIN — Medication 4 UNIT(S): at 17:08

## 2020-01-01 RX ADMIN — ATORVASTATIN CALCIUM 80 MILLIGRAM(S): 80 TABLET, FILM COATED ORAL at 23:11

## 2020-01-01 RX ADMIN — ATORVASTATIN CALCIUM 80 MILLIGRAM(S): 80 TABLET, FILM COATED ORAL at 21:37

## 2020-01-01 RX ADMIN — SENNA PLUS 2 TABLET(S): 8.6 TABLET ORAL at 22:06

## 2020-01-01 RX ADMIN — CEFTRIAXONE 100 MILLIGRAM(S): 500 INJECTION, POWDER, FOR SOLUTION INTRAMUSCULAR; INTRAVENOUS at 16:20

## 2020-01-01 RX ADMIN — Medication 81 MILLIGRAM(S): at 13:28

## 2020-01-01 RX ADMIN — Medication 4: at 12:33

## 2020-01-01 RX ADMIN — BUDESONIDE AND FORMOTEROL FUMARATE DIHYDRATE 2 PUFF(S): 160; 4.5 AEROSOL RESPIRATORY (INHALATION) at 18:06

## 2020-01-01 RX ADMIN — Medication 4 UNIT(S): at 07:26

## 2020-01-01 RX ADMIN — ALBUTEROL 1 PUFF(S): 90 AEROSOL, METERED ORAL at 13:12

## 2020-01-01 RX ADMIN — Medication 3 MILLILITER(S): at 05:01

## 2020-01-01 RX ADMIN — GABAPENTIN 100 MILLIGRAM(S): 400 CAPSULE ORAL at 13:15

## 2020-01-01 RX ADMIN — ENOXAPARIN SODIUM 40 MILLIGRAM(S): 100 INJECTION SUBCUTANEOUS at 12:03

## 2020-01-01 RX ADMIN — Medication 2: at 17:56

## 2020-01-01 RX ADMIN — Medication 2: at 17:39

## 2020-01-01 RX ADMIN — Medication 4 UNIT(S): at 12:27

## 2020-01-01 RX ADMIN — BUDESONIDE AND FORMOTEROL FUMARATE DIHYDRATE 2 PUFF(S): 160; 4.5 AEROSOL RESPIRATORY (INHALATION) at 05:06

## 2020-01-01 RX ADMIN — ALBUTEROL 1 PUFF(S): 90 AEROSOL, METERED ORAL at 05:54

## 2020-01-01 RX ADMIN — BUDESONIDE AND FORMOTEROL FUMARATE DIHYDRATE 2 PUFF(S): 160; 4.5 AEROSOL RESPIRATORY (INHALATION) at 05:23

## 2020-01-01 RX ADMIN — Medication 20 MILLIGRAM(S): at 05:33

## 2020-01-01 RX ADMIN — ENOXAPARIN SODIUM 40 MILLIGRAM(S): 100 INJECTION SUBCUTANEOUS at 11:49

## 2020-01-01 RX ADMIN — Medication 100 GRAM(S): at 12:39

## 2020-01-01 RX ADMIN — BUDESONIDE AND FORMOTEROL FUMARATE DIHYDRATE 2 PUFF(S): 160; 4.5 AEROSOL RESPIRATORY (INHALATION) at 05:15

## 2020-01-01 RX ADMIN — Medication 3 MILLILITER(S): at 18:32

## 2020-01-01 RX ADMIN — ENOXAPARIN SODIUM 40 MILLIGRAM(S): 100 INJECTION SUBCUTANEOUS at 09:07

## 2020-01-01 RX ADMIN — Medication 4: at 12:48

## 2020-01-01 RX ADMIN — Medication 5: at 09:01

## 2020-01-01 RX ADMIN — Medication 40 MILLIGRAM(S): at 06:21

## 2020-01-01 RX ADMIN — GABAPENTIN 100 MILLIGRAM(S): 400 CAPSULE ORAL at 05:55

## 2020-01-01 RX ADMIN — Medication 3 MILLILITER(S): at 12:39

## 2020-01-01 RX ADMIN — GABAPENTIN 100 MILLIGRAM(S): 400 CAPSULE ORAL at 21:35

## 2020-01-01 RX ADMIN — Medication 1: at 08:20

## 2020-01-01 RX ADMIN — Medication 40 MILLIEQUIVALENT(S): at 21:50

## 2020-01-01 RX ADMIN — GABAPENTIN 100 MILLIGRAM(S): 400 CAPSULE ORAL at 05:23

## 2020-01-01 RX ADMIN — Medication 81 MILLIGRAM(S): at 12:25

## 2020-01-01 RX ADMIN — FAMOTIDINE 20 MILLIGRAM(S): 10 INJECTION INTRAVENOUS at 12:28

## 2020-01-01 RX ADMIN — Medication 3: at 07:50

## 2020-01-01 RX ADMIN — ENOXAPARIN SODIUM 40 MILLIGRAM(S): 100 INJECTION SUBCUTANEOUS at 12:39

## 2020-01-01 RX ADMIN — GABAPENTIN 100 MILLIGRAM(S): 400 CAPSULE ORAL at 05:17

## 2020-01-01 RX ADMIN — SENNA PLUS 2 TABLET(S): 8.6 TABLET ORAL at 21:29

## 2020-01-01 RX ADMIN — Medication 1: at 09:02

## 2020-01-01 RX ADMIN — Medication 13 UNIT(S): at 19:56

## 2020-01-01 RX ADMIN — Medication 14 UNIT(S): at 10:39

## 2020-01-01 RX ADMIN — Medication 50 MILLIGRAM(S): at 05:31

## 2020-01-01 RX ADMIN — Medication 650 MILLIGRAM(S): at 09:20

## 2020-01-01 RX ADMIN — Medication 13 UNIT(S): at 17:33

## 2020-01-01 RX ADMIN — GABAPENTIN 100 MILLIGRAM(S): 400 CAPSULE ORAL at 13:14

## 2020-01-01 RX ADMIN — Medication 5: at 12:27

## 2020-01-01 RX ADMIN — ENOXAPARIN SODIUM 40 MILLIGRAM(S): 100 INJECTION SUBCUTANEOUS at 13:28

## 2020-01-01 RX ADMIN — INSULIN GLARGINE 32 UNIT(S): 100 INJECTION, SOLUTION SUBCUTANEOUS at 22:04

## 2020-01-01 RX ADMIN — Medication 2: at 13:01

## 2020-01-01 RX ADMIN — Medication 81 MILLIGRAM(S): at 12:40

## 2020-01-01 RX ADMIN — Medication 2: at 12:38

## 2020-01-01 RX ADMIN — ATORVASTATIN CALCIUM 80 MILLIGRAM(S): 80 TABLET, FILM COATED ORAL at 21:33

## 2020-01-01 RX ADMIN — ATORVASTATIN CALCIUM 80 MILLIGRAM(S): 80 TABLET, FILM COATED ORAL at 21:13

## 2020-01-01 RX ADMIN — ALBUTEROL 1 PUFF(S): 90 AEROSOL, METERED ORAL at 09:34

## 2020-01-01 RX ADMIN — SENNA PLUS 2 TABLET(S): 8.6 TABLET ORAL at 21:23

## 2020-01-01 RX ADMIN — BUDESONIDE AND FORMOTEROL FUMARATE DIHYDRATE 2 PUFF(S): 160; 4.5 AEROSOL RESPIRATORY (INHALATION) at 18:18

## 2020-01-01 RX ADMIN — AZITHROMYCIN 500 MILLIGRAM(S): 500 TABLET, FILM COATED ORAL at 13:55

## 2020-01-01 RX ADMIN — ALBUTEROL 1 PUFF(S): 90 AEROSOL, METERED ORAL at 17:02

## 2020-01-01 RX ADMIN — Medication 81 MILLIGRAM(S): at 12:39

## 2020-01-01 RX ADMIN — BUDESONIDE AND FORMOTEROL FUMARATE DIHYDRATE 2 PUFF(S): 160; 4.5 AEROSOL RESPIRATORY (INHALATION) at 05:22

## 2020-01-01 RX ADMIN — Medication 3 MILLILITER(S): at 14:42

## 2020-01-01 RX ADMIN — Medication 3: at 11:39

## 2020-01-01 RX ADMIN — Medication 3 MILLILITER(S): at 05:17

## 2020-01-01 RX ADMIN — ALBUTEROL 1 PUFF(S): 90 AEROSOL, METERED ORAL at 13:20

## 2020-01-01 RX ADMIN — GABAPENTIN 100 MILLIGRAM(S): 400 CAPSULE ORAL at 21:03

## 2020-01-01 RX ADMIN — Medication 3: at 08:02

## 2020-01-01 RX ADMIN — ALBUTEROL 1 PUFF(S): 90 AEROSOL, METERED ORAL at 21:34

## 2020-01-01 RX ADMIN — INSULIN GLARGINE 16 UNIT(S): 100 INJECTION, SOLUTION SUBCUTANEOUS at 21:37

## 2020-01-01 RX ADMIN — Medication 4: at 11:50

## 2020-01-01 RX ADMIN — ENOXAPARIN SODIUM 40 MILLIGRAM(S): 100 INJECTION SUBCUTANEOUS at 11:31

## 2020-01-01 RX ADMIN — Medication 3 MILLILITER(S): at 09:26

## 2020-01-01 RX ADMIN — Medication 20 MILLIGRAM(S): at 18:32

## 2020-01-01 RX ADMIN — BUDESONIDE AND FORMOTEROL FUMARATE DIHYDRATE 2 PUFF(S): 160; 4.5 AEROSOL RESPIRATORY (INHALATION) at 17:55

## 2020-01-01 RX ADMIN — Medication 8 UNIT(S): at 09:30

## 2020-01-01 RX ADMIN — Medication 10 UNIT(S): at 19:24

## 2020-01-01 RX ADMIN — Medication 2: at 21:55

## 2020-01-01 RX ADMIN — GABAPENTIN 100 MILLIGRAM(S): 400 CAPSULE ORAL at 21:13

## 2020-01-01 RX ADMIN — ALBUTEROL 1 PUFF(S): 90 AEROSOL, METERED ORAL at 13:14

## 2020-01-01 RX ADMIN — ATORVASTATIN CALCIUM 80 MILLIGRAM(S): 80 TABLET, FILM COATED ORAL at 22:25

## 2020-01-01 RX ADMIN — ATORVASTATIN CALCIUM 80 MILLIGRAM(S): 80 TABLET, FILM COATED ORAL at 21:53

## 2020-01-01 RX ADMIN — Medication 2: at 13:28

## 2020-01-01 RX ADMIN — ERTAPENEM SODIUM 120 MILLIGRAM(S): 1 INJECTION, POWDER, LYOPHILIZED, FOR SOLUTION INTRAMUSCULAR; INTRAVENOUS at 22:08

## 2020-01-01 RX ADMIN — Medication 3 MILLILITER(S): at 04:58

## 2020-01-01 RX ADMIN — Medication 2: at 08:13

## 2020-01-01 RX ADMIN — SENNA PLUS 2 TABLET(S): 8.6 TABLET ORAL at 21:28

## 2020-01-01 RX ADMIN — Medication 8 UNIT(S): at 17:56

## 2020-01-01 RX ADMIN — Medication 40 MILLIGRAM(S): at 05:06

## 2020-01-01 RX ADMIN — Medication 10 UNIT(S): at 18:36

## 2020-01-01 RX ADMIN — ALBUTEROL 1 PUFF(S): 90 AEROSOL, METERED ORAL at 09:20

## 2020-01-01 RX ADMIN — ACETAZOLAMIDE 250 MILLIGRAM(S): 250 TABLET ORAL at 17:45

## 2020-01-01 RX ADMIN — Medication 81 MILLIGRAM(S): at 09:06

## 2020-01-01 RX ADMIN — Medication 14 UNIT(S): at 18:00

## 2020-01-01 RX ADMIN — GABAPENTIN 100 MILLIGRAM(S): 400 CAPSULE ORAL at 05:06

## 2020-01-01 RX ADMIN — Medication 3 MILLILITER(S): at 05:56

## 2020-01-01 RX ADMIN — Medication 8 UNIT(S): at 08:47

## 2020-01-01 RX ADMIN — INSULIN GLARGINE 45 UNIT(S): 100 INJECTION, SOLUTION SUBCUTANEOUS at 21:23

## 2020-01-01 RX ADMIN — Medication 650 MILLIGRAM(S): at 15:56

## 2020-01-01 RX ADMIN — ALBUTEROL 1 PUFF(S): 90 AEROSOL, METERED ORAL at 13:00

## 2020-01-01 RX ADMIN — GABAPENTIN 100 MILLIGRAM(S): 400 CAPSULE ORAL at 15:20

## 2020-01-01 RX ADMIN — Medication 81 MILLIGRAM(S): at 09:20

## 2020-01-01 RX ADMIN — ALBUTEROL 1 PUFF(S): 90 AEROSOL, METERED ORAL at 21:13

## 2020-01-01 RX ADMIN — ALBUTEROL 1 PUFF(S): 90 AEROSOL, METERED ORAL at 17:12

## 2020-01-01 RX ADMIN — Medication 650 MILLIGRAM(S): at 13:30

## 2020-01-01 RX ADMIN — GABAPENTIN 100 MILLIGRAM(S): 400 CAPSULE ORAL at 05:22

## 2020-01-01 RX ADMIN — Medication 4 UNIT(S): at 09:07

## 2020-01-01 RX ADMIN — Medication 4: at 12:26

## 2020-01-01 RX ADMIN — Medication 40 MILLIGRAM(S): at 12:39

## 2020-01-01 RX ADMIN — Medication 3 MILLILITER(S): at 21:10

## 2020-01-01 RX ADMIN — Medication 5: at 12:39

## 2020-01-01 RX ADMIN — Medication 650 MILLIGRAM(S): at 12:43

## 2020-01-01 RX ADMIN — Medication 650 MILLIGRAM(S): at 17:54

## 2020-01-01 RX ADMIN — Medication 14 UNIT(S): at 12:16

## 2020-01-01 RX ADMIN — Medication 650 MILLIGRAM(S): at 19:50

## 2020-01-01 RX ADMIN — Medication 8 UNIT(S): at 09:20

## 2020-01-01 RX ADMIN — Medication 10 UNIT(S): at 07:51

## 2020-01-01 RX ADMIN — Medication 2: at 17:41

## 2020-01-01 RX ADMIN — Medication 13 UNIT(S): at 13:06

## 2020-01-01 RX ADMIN — Medication 81 MILLIGRAM(S): at 11:30

## 2020-01-01 RX ADMIN — Medication 81 MILLIGRAM(S): at 12:56

## 2020-01-01 RX ADMIN — Medication 100 GRAM(S): at 12:58

## 2020-01-01 RX ADMIN — POLYETHYLENE GLYCOL 3350 17 GRAM(S): 17 POWDER, FOR SOLUTION ORAL at 12:39

## 2020-01-01 RX ADMIN — ALBUTEROL 1 PUFF(S): 90 AEROSOL, METERED ORAL at 09:06

## 2020-01-01 RX ADMIN — ENOXAPARIN SODIUM 40 MILLIGRAM(S): 100 INJECTION SUBCUTANEOUS at 13:07

## 2020-01-01 RX ADMIN — POLYETHYLENE GLYCOL 3350 17 GRAM(S): 17 POWDER, FOR SOLUTION ORAL at 12:24

## 2020-01-01 RX ADMIN — Medication 40 MILLIGRAM(S): at 05:17

## 2020-01-01 RX ADMIN — INSULIN GLARGINE 36 UNIT(S): 100 INJECTION, SOLUTION SUBCUTANEOUS at 22:08

## 2020-01-01 RX ADMIN — Medication 2: at 10:06

## 2020-01-01 RX ADMIN — Medication 13 UNIT(S): at 13:03

## 2020-01-01 RX ADMIN — AZITHROMYCIN 500 MILLIGRAM(S): 500 TABLET, FILM COATED ORAL at 12:28

## 2020-01-01 RX ADMIN — ENOXAPARIN SODIUM 40 MILLIGRAM(S): 100 INJECTION SUBCUTANEOUS at 17:45

## 2020-01-01 RX ADMIN — Medication 100 MILLIEQUIVALENT(S): at 00:13

## 2020-01-01 RX ADMIN — ALBUTEROL 1 PUFF(S): 90 AEROSOL, METERED ORAL at 09:26

## 2020-01-01 RX ADMIN — ENOXAPARIN SODIUM 40 MILLIGRAM(S): 100 INJECTION SUBCUTANEOUS at 12:05

## 2020-01-01 RX ADMIN — Medication 650 MILLIGRAM(S): at 14:38

## 2020-01-01 RX ADMIN — ALBUTEROL 1 PUFF(S): 90 AEROSOL, METERED ORAL at 01:32

## 2020-01-01 RX ADMIN — ENOXAPARIN SODIUM 40 MILLIGRAM(S): 100 INJECTION SUBCUTANEOUS at 11:33

## 2020-01-01 RX ADMIN — Medication 650 MILLIGRAM(S): at 11:21

## 2020-01-01 RX ADMIN — Medication 3 MILLILITER(S): at 00:47

## 2020-01-01 RX ADMIN — Medication 1: at 09:29

## 2020-01-01 RX ADMIN — Medication 1: at 22:05

## 2020-01-01 RX ADMIN — Medication 3 MILLILITER(S): at 18:39

## 2020-01-01 RX ADMIN — ALBUTEROL 1 PUFF(S): 90 AEROSOL, METERED ORAL at 17:28

## 2020-01-01 RX ADMIN — GABAPENTIN 100 MILLIGRAM(S): 400 CAPSULE ORAL at 21:53

## 2020-01-01 RX ADMIN — INSULIN GLARGINE 8 UNIT(S): 100 INJECTION, SOLUTION SUBCUTANEOUS at 09:00

## 2020-01-01 RX ADMIN — Medication 650 MILLIGRAM(S): at 13:45

## 2020-01-01 RX ADMIN — GABAPENTIN 100 MILLIGRAM(S): 400 CAPSULE ORAL at 21:28

## 2020-01-01 RX ADMIN — Medication 3 MILLILITER(S): at 05:33

## 2020-01-01 RX ADMIN — Medication 4 UNIT(S): at 08:13

## 2020-01-01 RX ADMIN — Medication 100 GRAM(S): at 12:42

## 2020-01-01 RX ADMIN — BUDESONIDE AND FORMOTEROL FUMARATE DIHYDRATE 2 PUFF(S): 160; 4.5 AEROSOL RESPIRATORY (INHALATION) at 17:07

## 2020-01-01 RX ADMIN — GABAPENTIN 100 MILLIGRAM(S): 400 CAPSULE ORAL at 13:02

## 2020-01-01 RX ADMIN — ALBUTEROL 1 PUFF(S): 90 AEROSOL, METERED ORAL at 23:11

## 2020-01-01 RX ADMIN — Medication 40 MILLIGRAM(S): at 05:39

## 2020-01-01 RX ADMIN — INSULIN GLARGINE 15 UNIT(S): 100 INJECTION, SOLUTION SUBCUTANEOUS at 16:18

## 2020-01-01 RX ADMIN — Medication 50 MILLIGRAM(S): at 13:55

## 2020-01-01 RX ADMIN — ENOXAPARIN SODIUM 40 MILLIGRAM(S): 100 INJECTION SUBCUTANEOUS at 12:41

## 2020-01-01 RX ADMIN — INSULIN GLARGINE 30 UNIT(S): 100 INJECTION, SOLUTION SUBCUTANEOUS at 21:32

## 2020-01-01 RX ADMIN — ALBUTEROL 1 PUFF(S): 90 AEROSOL, METERED ORAL at 06:51

## 2020-01-01 RX ADMIN — Medication 650 MILLIGRAM(S): at 09:52

## 2020-01-01 RX ADMIN — GABAPENTIN 100 MILLIGRAM(S): 400 CAPSULE ORAL at 13:07

## 2020-01-01 RX ADMIN — Medication 4 UNIT(S): at 17:42

## 2020-01-01 RX ADMIN — Medication 8 UNIT(S): at 17:46

## 2020-01-01 RX ADMIN — LACTULOSE 20 GRAM(S): 10 SOLUTION ORAL at 13:14

## 2020-01-01 RX ADMIN — BUDESONIDE AND FORMOTEROL FUMARATE DIHYDRATE 2 PUFF(S): 160; 4.5 AEROSOL RESPIRATORY (INHALATION) at 17:49

## 2020-01-01 RX ADMIN — Medication 15 GRAM(S): at 14:38

## 2020-01-01 RX ADMIN — Medication 13 UNIT(S): at 17:36

## 2020-01-01 RX ADMIN — FAMOTIDINE 20 MILLIGRAM(S): 10 INJECTION INTRAVENOUS at 11:50

## 2020-01-01 RX ADMIN — GABAPENTIN 100 MILLIGRAM(S): 400 CAPSULE ORAL at 21:23

## 2020-01-01 RX ADMIN — Medication 81 MILLIGRAM(S): at 12:41

## 2020-01-01 RX ADMIN — GABAPENTIN 100 MILLIGRAM(S): 400 CAPSULE ORAL at 05:12

## 2020-01-01 RX ADMIN — INSULIN GLARGINE 35 UNIT(S): 100 INJECTION, SOLUTION SUBCUTANEOUS at 21:44

## 2020-01-01 RX ADMIN — Medication 40 MILLIGRAM(S): at 06:07

## 2020-01-01 RX ADMIN — Medication 3 MILLILITER(S): at 12:56

## 2020-01-01 RX ADMIN — Medication 20 MILLIEQUIVALENT(S): at 20:20

## 2020-01-01 RX ADMIN — GABAPENTIN 100 MILLIGRAM(S): 400 CAPSULE ORAL at 23:11

## 2020-01-01 RX ADMIN — Medication 1: at 08:13

## 2020-01-01 RX ADMIN — Medication 650 MILLIGRAM(S): at 06:07

## 2020-01-01 RX ADMIN — Medication 3 MILLILITER(S): at 14:18

## 2020-01-01 RX ADMIN — Medication 81 MILLIGRAM(S): at 12:18

## 2020-01-01 RX ADMIN — Medication 12 UNIT(S): at 12:18

## 2020-01-01 RX ADMIN — INSULIN GLARGINE 15 UNIT(S): 100 INJECTION, SOLUTION SUBCUTANEOUS at 10:24

## 2020-01-01 RX ADMIN — Medication 10 UNIT(S): at 12:39

## 2020-01-01 RX ADMIN — Medication 8 UNIT(S): at 13:14

## 2020-01-01 RX ADMIN — BUDESONIDE AND FORMOTEROL FUMARATE DIHYDRATE 2 PUFF(S): 160; 4.5 AEROSOL RESPIRATORY (INHALATION) at 04:58

## 2020-01-01 RX ADMIN — BUDESONIDE AND FORMOTEROL FUMARATE DIHYDRATE 2 PUFF(S): 160; 4.5 AEROSOL RESPIRATORY (INHALATION) at 17:08

## 2020-01-01 RX ADMIN — BUDESONIDE AND FORMOTEROL FUMARATE DIHYDRATE 2 PUFF(S): 160; 4.5 AEROSOL RESPIRATORY (INHALATION) at 05:56

## 2020-01-01 RX ADMIN — Medication 3: at 18:36

## 2020-01-01 RX ADMIN — BUDESONIDE AND FORMOTEROL FUMARATE DIHYDRATE 2 PUFF(S): 160; 4.5 AEROSOL RESPIRATORY (INHALATION) at 06:17

## 2020-01-01 RX ADMIN — BUDESONIDE AND FORMOTEROL FUMARATE DIHYDRATE 2 PUFF(S): 160; 4.5 AEROSOL RESPIRATORY (INHALATION) at 17:29

## 2020-01-01 RX ADMIN — ENOXAPARIN SODIUM 40 MILLIGRAM(S): 100 INJECTION SUBCUTANEOUS at 12:36

## 2020-01-01 RX ADMIN — GABAPENTIN 100 MILLIGRAM(S): 400 CAPSULE ORAL at 05:10

## 2020-01-01 RX ADMIN — ENOXAPARIN SODIUM 40 MILLIGRAM(S): 100 INJECTION SUBCUTANEOUS at 08:47

## 2020-01-01 RX ADMIN — Medication 3 MILLILITER(S): at 18:17

## 2020-01-01 RX ADMIN — Medication 125 MILLIGRAM(S): at 16:12

## 2020-01-01 RX ADMIN — GABAPENTIN 100 MILLIGRAM(S): 400 CAPSULE ORAL at 13:20

## 2020-01-01 RX ADMIN — Medication 40 MILLIEQUIVALENT(S): at 15:01

## 2020-01-01 RX ADMIN — ATORVASTATIN CALCIUM 80 MILLIGRAM(S): 80 TABLET, FILM COATED ORAL at 21:19

## 2020-01-01 RX ADMIN — Medication 100 GRAM(S): at 13:40

## 2020-01-01 RX ADMIN — Medication 4 UNIT(S): at 09:08

## 2020-01-01 RX ADMIN — Medication 40 MILLIEQUIVALENT(S): at 00:01

## 2020-01-01 RX ADMIN — Medication 12 UNIT(S): at 08:45

## 2020-01-01 RX ADMIN — Medication 650 MILLIGRAM(S): at 13:21

## 2020-01-01 RX ADMIN — ENOXAPARIN SODIUM 40 MILLIGRAM(S): 100 INJECTION SUBCUTANEOUS at 12:19

## 2020-01-01 RX ADMIN — Medication 3 MILLILITER(S): at 00:53

## 2020-01-01 RX ADMIN — BUDESONIDE AND FORMOTEROL FUMARATE DIHYDRATE 2 PUFF(S): 160; 4.5 AEROSOL RESPIRATORY (INHALATION) at 05:54

## 2020-01-01 RX ADMIN — Medication 100 GRAM(S): at 11:18

## 2020-01-01 RX ADMIN — SENNA PLUS 2 TABLET(S): 8.6 TABLET ORAL at 22:04

## 2020-01-01 RX ADMIN — Medication 3 MILLILITER(S): at 13:28

## 2020-01-01 RX ADMIN — BUDESONIDE AND FORMOTEROL FUMARATE DIHYDRATE 2 PUFF(S): 160; 4.5 AEROSOL RESPIRATORY (INHALATION) at 17:12

## 2020-01-01 RX ADMIN — Medication 13 UNIT(S): at 13:01

## 2020-01-01 RX ADMIN — INSULIN GLARGINE 40 UNIT(S): 100 INJECTION, SOLUTION SUBCUTANEOUS at 21:28

## 2020-01-01 RX ADMIN — SENNA PLUS 2 TABLET(S): 8.6 TABLET ORAL at 21:59

## 2020-01-01 RX ADMIN — Medication 20 MILLIGRAM(S): at 05:56

## 2020-01-01 RX ADMIN — INSULIN GLARGINE 33 UNIT(S): 100 INJECTION, SOLUTION SUBCUTANEOUS at 21:23

## 2020-01-01 RX ADMIN — GABAPENTIN 100 MILLIGRAM(S): 400 CAPSULE ORAL at 13:28

## 2020-01-01 RX ADMIN — ALBUTEROL 1 PUFF(S): 90 AEROSOL, METERED ORAL at 17:08

## 2020-01-01 RX ADMIN — Medication 4 UNIT(S): at 11:50

## 2020-01-01 RX ADMIN — ENOXAPARIN SODIUM 40 MILLIGRAM(S): 100 INJECTION SUBCUTANEOUS at 12:56

## 2020-01-01 RX ADMIN — Medication 1: at 09:19

## 2020-01-01 RX ADMIN — Medication 4 UNIT(S): at 17:25

## 2020-01-01 RX ADMIN — ATORVASTATIN CALCIUM 80 MILLIGRAM(S): 80 TABLET, FILM COATED ORAL at 21:03

## 2020-01-01 RX ADMIN — Medication 3: at 07:52

## 2020-01-01 RX ADMIN — Medication 3: at 17:25

## 2020-01-01 RX ADMIN — Medication 1: at 17:46

## 2020-01-01 RX ADMIN — Medication 40 MILLIGRAM(S): at 05:40

## 2020-01-01 RX ADMIN — Medication 20 MILLIGRAM(S): at 04:57

## 2020-01-01 RX ADMIN — Medication 20 MILLIGRAM(S): at 21:36

## 2020-01-01 RX ADMIN — Medication 3: at 23:17

## 2020-01-01 RX ADMIN — Medication 650 MILLIGRAM(S): at 11:18

## 2020-01-01 RX ADMIN — SODIUM CHLORIDE 50 MILLILITER(S): 9 INJECTION INTRAMUSCULAR; INTRAVENOUS; SUBCUTANEOUS at 14:30

## 2020-01-01 RX ADMIN — Medication 100 GRAM(S): at 12:25

## 2020-01-01 RX ADMIN — ALBUTEROL 1 PUFF(S): 90 AEROSOL, METERED ORAL at 02:39

## 2020-01-01 RX ADMIN — AZITHROMYCIN 500 MILLIGRAM(S): 500 TABLET, FILM COATED ORAL at 11:50

## 2020-01-01 RX ADMIN — GABAPENTIN 100 MILLIGRAM(S): 400 CAPSULE ORAL at 21:21

## 2020-01-01 RX ADMIN — Medication 1: at 12:43

## 2020-01-01 RX ADMIN — AZITHROMYCIN 255 MILLIGRAM(S): 500 TABLET, FILM COATED ORAL at 07:53

## 2020-01-01 RX ADMIN — FAMOTIDINE 20 MILLIGRAM(S): 10 INJECTION INTRAVENOUS at 11:30

## 2020-01-01 RX ADMIN — HALOPERIDOL DECANOATE 1 MILLIGRAM(S): 100 INJECTION INTRAMUSCULAR at 12:49

## 2020-01-01 RX ADMIN — Medication 3: at 13:03

## 2020-01-01 RX ADMIN — Medication 40 MILLIGRAM(S): at 06:15

## 2020-01-01 RX ADMIN — ALBUTEROL 1 PUFF(S): 90 AEROSOL, METERED ORAL at 00:59

## 2020-01-01 RX ADMIN — GABAPENTIN 100 MILLIGRAM(S): 400 CAPSULE ORAL at 06:54

## 2020-01-01 RX ADMIN — Medication 8 UNIT(S): at 12:39

## 2020-01-01 RX ADMIN — SENNA PLUS 2 TABLET(S): 8.6 TABLET ORAL at 21:33

## 2020-01-01 RX ADMIN — Medication 2: at 09:19

## 2020-01-01 RX ADMIN — INSULIN GLARGINE 15 UNIT(S): 100 INJECTION, SOLUTION SUBCUTANEOUS at 10:18

## 2020-01-01 RX ADMIN — Medication 14 UNIT(S): at 09:03

## 2020-01-01 RX ADMIN — Medication 1: at 08:48

## 2020-01-01 RX ADMIN — Medication 8 UNIT(S): at 17:39

## 2020-01-01 RX ADMIN — Medication 650 MILLIGRAM(S): at 12:51

## 2020-01-01 RX ADMIN — ALBUTEROL 1 PUFF(S): 90 AEROSOL, METERED ORAL at 17:31

## 2020-01-01 RX ADMIN — GABAPENTIN 100 MILLIGRAM(S): 400 CAPSULE ORAL at 05:07

## 2020-01-01 RX ADMIN — Medication 1: at 22:40

## 2020-01-01 RX ADMIN — BUDESONIDE AND FORMOTEROL FUMARATE DIHYDRATE 2 PUFF(S): 160; 4.5 AEROSOL RESPIRATORY (INHALATION) at 17:03

## 2020-01-01 RX ADMIN — ALBUTEROL 1 PUFF(S): 90 AEROSOL, METERED ORAL at 10:14

## 2020-01-01 RX ADMIN — Medication 3 MILLILITER(S): at 00:39

## 2020-01-01 RX ADMIN — Medication 3 MILLILITER(S): at 00:26

## 2020-01-01 RX ADMIN — ENOXAPARIN SODIUM 40 MILLIGRAM(S): 100 INJECTION SUBCUTANEOUS at 11:17

## 2020-01-01 RX ADMIN — BUDESONIDE AND FORMOTEROL FUMARATE DIHYDRATE 2 PUFF(S): 160; 4.5 AEROSOL RESPIRATORY (INHALATION) at 17:39

## 2020-01-01 RX ADMIN — GABAPENTIN 100 MILLIGRAM(S): 400 CAPSULE ORAL at 13:33

## 2020-01-01 RX ADMIN — AZITHROMYCIN 255 MILLIGRAM(S): 500 TABLET, FILM COATED ORAL at 08:01

## 2020-01-01 RX ADMIN — GABAPENTIN 100 MILLIGRAM(S): 400 CAPSULE ORAL at 21:36

## 2020-01-01 RX ADMIN — Medication 8 UNIT(S): at 08:08

## 2020-01-01 RX ADMIN — Medication 20 MILLIGRAM(S): at 15:20

## 2020-01-01 RX ADMIN — Medication 3 MILLILITER(S): at 14:08

## 2020-01-01 RX ADMIN — SENNA PLUS 2 TABLET(S): 8.6 TABLET ORAL at 21:13

## 2020-01-01 RX ADMIN — GABAPENTIN 100 MILLIGRAM(S): 400 CAPSULE ORAL at 06:21

## 2020-01-01 RX ADMIN — Medication 7 UNIT(S): at 13:30

## 2020-01-01 RX ADMIN — ENOXAPARIN SODIUM 40 MILLIGRAM(S): 100 INJECTION SUBCUTANEOUS at 12:28

## 2020-01-01 RX ADMIN — Medication 650 MILLIGRAM(S): at 06:26

## 2020-01-01 RX ADMIN — Medication 7 UNIT(S): at 17:29

## 2020-01-01 RX ADMIN — ENOXAPARIN SODIUM 40 MILLIGRAM(S): 100 INJECTION SUBCUTANEOUS at 12:29

## 2020-01-01 RX ADMIN — INSULIN HUMAN 5 UNIT(S): 100 INJECTION, SOLUTION SUBCUTANEOUS at 16:18

## 2020-01-01 RX ADMIN — Medication 40 MILLIGRAM(S): at 05:22

## 2020-01-01 RX ADMIN — Medication 3 MILLILITER(S): at 06:22

## 2020-01-01 RX ADMIN — ATORVASTATIN CALCIUM 80 MILLIGRAM(S): 80 TABLET, FILM COATED ORAL at 21:35

## 2020-01-01 RX ADMIN — ENOXAPARIN SODIUM 40 MILLIGRAM(S): 100 INJECTION SUBCUTANEOUS at 22:25

## 2020-01-01 RX ADMIN — GABAPENTIN 100 MILLIGRAM(S): 400 CAPSULE ORAL at 05:16

## 2020-01-01 RX ADMIN — Medication 8 UNIT(S): at 12:54

## 2020-01-01 RX ADMIN — ALBUTEROL 1 PUFF(S): 90 AEROSOL, METERED ORAL at 18:19

## 2020-01-01 RX ADMIN — ALBUTEROL 1 PUFF(S): 90 AEROSOL, METERED ORAL at 12:00

## 2020-01-01 RX ADMIN — SENNA PLUS 2 TABLET(S): 8.6 TABLET ORAL at 23:11

## 2020-01-01 RX ADMIN — Medication 650 MILLIGRAM(S): at 05:37

## 2020-01-01 RX ADMIN — Medication 14 UNIT(S): at 17:31

## 2020-01-01 RX ADMIN — Medication 3 MILLILITER(S): at 13:05

## 2020-01-01 RX ADMIN — FAMOTIDINE 20 MILLIGRAM(S): 10 INJECTION INTRAVENOUS at 14:08

## 2020-01-01 RX ADMIN — SENNA PLUS 2 TABLET(S): 8.6 TABLET ORAL at 21:21

## 2020-01-01 RX ADMIN — Medication 2: at 09:07

## 2020-01-01 RX ADMIN — Medication 0: at 22:04

## 2020-01-01 RX ADMIN — POLYETHYLENE GLYCOL 3350 17 GRAM(S): 17 POWDER, FOR SOLUTION ORAL at 12:40

## 2020-01-01 RX ADMIN — BUDESONIDE AND FORMOTEROL FUMARATE DIHYDRATE 2 PUFF(S): 160; 4.5 AEROSOL RESPIRATORY (INHALATION) at 18:39

## 2020-01-01 RX ADMIN — Medication 3 MILLILITER(S): at 22:00

## 2020-01-01 RX ADMIN — Medication 2: at 08:07

## 2020-01-01 RX ADMIN — ALBUTEROL 1 PUFF(S): 90 AEROSOL, METERED ORAL at 13:16

## 2020-01-01 RX ADMIN — AZITHROMYCIN 500 MILLIGRAM(S): 500 TABLET, FILM COATED ORAL at 14:08

## 2020-01-01 RX ADMIN — ALBUTEROL 1 PUFF(S): 90 AEROSOL, METERED ORAL at 05:23

## 2020-01-01 RX ADMIN — BUDESONIDE AND FORMOTEROL FUMARATE DIHYDRATE 2 PUFF(S): 160; 4.5 AEROSOL RESPIRATORY (INHALATION) at 17:34

## 2020-01-01 RX ADMIN — ENOXAPARIN SODIUM 40 MILLIGRAM(S): 100 INJECTION SUBCUTANEOUS at 12:18

## 2020-01-01 RX ADMIN — GABAPENTIN 100 MILLIGRAM(S): 400 CAPSULE ORAL at 17:45

## 2020-01-01 RX ADMIN — Medication 3 MILLILITER(S): at 12:41

## 2020-01-01 RX ADMIN — Medication 3 MILLILITER(S): at 21:43

## 2020-01-01 RX ADMIN — GABAPENTIN 100 MILLIGRAM(S): 400 CAPSULE ORAL at 13:01

## 2020-01-01 RX ADMIN — Medication 40 MILLIGRAM(S): at 14:47

## 2020-01-01 RX ADMIN — Medication 20 MILLIGRAM(S): at 06:22

## 2020-01-01 RX ADMIN — Medication 4: at 13:06

## 2020-01-01 RX ADMIN — Medication 13 UNIT(S): at 09:01

## 2020-01-01 RX ADMIN — Medication 12 UNIT(S): at 18:02

## 2020-01-01 RX ADMIN — ALBUTEROL 1 PUFF(S): 90 AEROSOL, METERED ORAL at 21:23

## 2020-01-01 RX ADMIN — BUDESONIDE AND FORMOTEROL FUMARATE DIHYDRATE 2 PUFF(S): 160; 4.5 AEROSOL RESPIRATORY (INHALATION) at 05:16

## 2020-01-01 RX ADMIN — Medication 15 UNIT(S): at 18:32

## 2020-01-01 RX ADMIN — Medication 10 UNIT(S): at 18:21

## 2020-01-01 RX ADMIN — BUDESONIDE AND FORMOTEROL FUMARATE DIHYDRATE 2 PUFF(S): 160; 4.5 AEROSOL RESPIRATORY (INHALATION) at 05:55

## 2020-01-01 RX ADMIN — ALBUTEROL 1 PUFF(S): 90 AEROSOL, METERED ORAL at 10:42

## 2020-01-01 RX ADMIN — Medication 81 MILLIGRAM(S): at 13:07

## 2020-01-01 RX ADMIN — Medication 2.5 MILLILITER(S): at 21:52

## 2020-01-01 RX ADMIN — ALBUTEROL 1 PUFF(S): 90 AEROSOL, METERED ORAL at 05:15

## 2020-01-01 RX ADMIN — Medication 4 UNIT(S): at 17:46

## 2020-01-01 RX ADMIN — Medication 3: at 12:20

## 2020-01-01 RX ADMIN — Medication 650 MILLIGRAM(S): at 21:53

## 2020-01-01 RX ADMIN — Medication 5: at 11:46

## 2020-01-01 RX ADMIN — SENNA PLUS 2 TABLET(S): 8.6 TABLET ORAL at 21:36

## 2020-01-01 RX ADMIN — Medication 1: at 08:45

## 2020-01-01 RX ADMIN — BUDESONIDE AND FORMOTEROL FUMARATE DIHYDRATE 2 PUFF(S): 160; 4.5 AEROSOL RESPIRATORY (INHALATION) at 17:35

## 2020-01-01 RX ADMIN — DEXTROSE MONOHYDRATE, SODIUM CHLORIDE, AND POTASSIUM CHLORIDE 70 MILLILITER(S): 50; .745; 4.5 INJECTION, SOLUTION INTRAVENOUS at 17:29

## 2020-01-01 RX ADMIN — Medication 8 UNIT(S): at 13:02

## 2020-01-01 RX ADMIN — POLYETHYLENE GLYCOL 3350 17 GRAM(S): 17 POWDER, FOR SOLUTION ORAL at 08:46

## 2020-01-01 RX ADMIN — Medication 650 MILLIGRAM(S): at 21:23

## 2020-01-01 RX ADMIN — ATORVASTATIN CALCIUM 80 MILLIGRAM(S): 80 TABLET, FILM COATED ORAL at 21:59

## 2020-01-01 RX ADMIN — ALBUTEROL 1 PUFF(S): 90 AEROSOL, METERED ORAL at 14:38

## 2020-01-01 RX ADMIN — Medication 2: at 09:06

## 2020-01-01 RX ADMIN — INSULIN GLARGINE 22 UNIT(S): 100 INJECTION, SOLUTION SUBCUTANEOUS at 23:09

## 2020-01-01 RX ADMIN — GABAPENTIN 100 MILLIGRAM(S): 400 CAPSULE ORAL at 05:56

## 2020-01-01 RX ADMIN — Medication 1: at 08:25

## 2020-01-01 RX ADMIN — INSULIN GLARGINE 22 UNIT(S): 100 INJECTION, SOLUTION SUBCUTANEOUS at 21:59

## 2020-01-01 RX ADMIN — Medication 650 MILLIGRAM(S): at 14:25

## 2020-01-01 RX ADMIN — BUDESONIDE AND FORMOTEROL FUMARATE DIHYDRATE 2 PUFF(S): 160; 4.5 AEROSOL RESPIRATORY (INHALATION) at 17:31

## 2020-01-01 RX ADMIN — Medication 8 UNIT(S): at 13:22

## 2020-01-01 RX ADMIN — Medication 8 UNIT(S): at 09:07

## 2020-01-01 RX ADMIN — Medication 650 MILLIGRAM(S): at 20:20

## 2020-01-01 RX ADMIN — ENOXAPARIN SODIUM 40 MILLIGRAM(S): 100 INJECTION SUBCUTANEOUS at 09:20

## 2020-01-01 RX ADMIN — Medication 650 MILLIGRAM(S): at 20:45

## 2020-01-01 RX ADMIN — Medication 2: at 18:03

## 2020-01-01 RX ADMIN — Medication 3 MILLILITER(S): at 05:32

## 2020-01-01 RX ADMIN — Medication 20 MILLIGRAM(S): at 12:24

## 2020-01-01 RX ADMIN — SENNA PLUS 2 TABLET(S): 8.6 TABLET ORAL at 21:35

## 2020-01-01 RX ADMIN — ATORVASTATIN CALCIUM 80 MILLIGRAM(S): 80 TABLET, FILM COATED ORAL at 21:27

## 2020-01-01 RX ADMIN — ALBUTEROL 1 PUFF(S): 90 AEROSOL, METERED ORAL at 22:53

## 2020-01-01 RX ADMIN — POLYETHYLENE GLYCOL 3350 17 GRAM(S): 17 POWDER, FOR SOLUTION ORAL at 11:31

## 2020-01-01 RX ADMIN — BUDESONIDE AND FORMOTEROL FUMARATE DIHYDRATE 2 PUFF(S): 160; 4.5 AEROSOL RESPIRATORY (INHALATION) at 18:03

## 2020-01-01 RX ADMIN — Medication 3 MILLILITER(S): at 00:00

## 2020-01-01 RX ADMIN — GABAPENTIN 100 MILLIGRAM(S): 400 CAPSULE ORAL at 13:43

## 2020-01-01 RX ADMIN — Medication 3 MILLILITER(S): at 05:42

## 2020-01-01 RX ADMIN — Medication 3: at 12:16

## 2020-01-01 RX ADMIN — Medication 2: at 22:22

## 2020-01-01 RX ADMIN — Medication 40 MILLIGRAM(S): at 06:51

## 2020-01-01 RX ADMIN — GABAPENTIN 100 MILLIGRAM(S): 400 CAPSULE ORAL at 21:29

## 2020-01-01 RX ADMIN — Medication 15 UNIT(S): at 08:01

## 2020-01-01 RX ADMIN — Medication 13 UNIT(S): at 08:49

## 2020-01-01 RX ADMIN — Medication 3 MILLILITER(S): at 23:03

## 2020-01-01 RX ADMIN — ENOXAPARIN SODIUM 40 MILLIGRAM(S): 100 INJECTION SUBCUTANEOUS at 12:25

## 2020-01-01 RX ADMIN — GABAPENTIN 100 MILLIGRAM(S): 400 CAPSULE ORAL at 14:17

## 2020-01-01 RX ADMIN — Medication 2: at 12:18

## 2020-01-01 RX ADMIN — INSULIN GLARGINE 8 UNIT(S): 100 INJECTION, SOLUTION SUBCUTANEOUS at 22:41

## 2020-01-01 RX ADMIN — GABAPENTIN 100 MILLIGRAM(S): 400 CAPSULE ORAL at 21:37

## 2020-01-01 RX ADMIN — Medication 1: at 08:02

## 2020-01-01 RX ADMIN — Medication 10 UNIT(S): at 08:22

## 2020-01-01 RX ADMIN — INSULIN GLARGINE 25 UNIT(S): 100 INJECTION, SOLUTION SUBCUTANEOUS at 23:17

## 2020-01-01 RX ADMIN — Medication 4 UNIT(S): at 08:14

## 2020-01-01 RX ADMIN — Medication 10 UNIT(S): at 12:26

## 2020-01-01 RX ADMIN — Medication 3: at 22:45

## 2020-01-01 RX ADMIN — ALBUTEROL 1 PUFF(S): 90 AEROSOL, METERED ORAL at 22:48

## 2020-01-01 RX ADMIN — GABAPENTIN 100 MILLIGRAM(S): 400 CAPSULE ORAL at 16:48

## 2020-01-01 RX ADMIN — TIOTROPIUM BROMIDE 1 CAPSULE(S): 18 CAPSULE ORAL; RESPIRATORY (INHALATION) at 14:25

## 2020-01-01 RX ADMIN — FAMOTIDINE 20 MILLIGRAM(S): 10 INJECTION INTRAVENOUS at 13:55

## 2020-01-01 RX ADMIN — Medication 3: at 10:39

## 2020-01-01 RX ADMIN — Medication 8 UNIT(S): at 17:31

## 2020-01-01 RX ADMIN — ALBUTEROL 1 PUFF(S): 90 AEROSOL, METERED ORAL at 01:46

## 2020-01-01 RX ADMIN — INSULIN GLARGINE 33 UNIT(S): 100 INJECTION, SOLUTION SUBCUTANEOUS at 22:21

## 2020-01-01 RX ADMIN — GABAPENTIN 100 MILLIGRAM(S): 400 CAPSULE ORAL at 21:19

## 2020-01-01 RX ADMIN — Medication 81 MILLIGRAM(S): at 12:05

## 2020-01-01 RX ADMIN — POLYETHYLENE GLYCOL 3350 17 GRAM(S): 17 POWDER, FOR SOLUTION ORAL at 11:18

## 2020-01-01 RX ADMIN — Medication 3: at 08:20

## 2020-01-01 RX ADMIN — DEXTROSE MONOHYDRATE, SODIUM CHLORIDE, AND POTASSIUM CHLORIDE 70 MILLILITER(S): 50; .745; 4.5 INJECTION, SOLUTION INTRAVENOUS at 06:26

## 2020-01-01 RX ADMIN — Medication 650 MILLIGRAM(S): at 21:25

## 2020-01-01 RX ADMIN — Medication 650 MILLIGRAM(S): at 02:45

## 2020-01-01 RX ADMIN — GABAPENTIN 100 MILLIGRAM(S): 400 CAPSULE ORAL at 12:05

## 2020-01-01 RX ADMIN — Medication 3 MILLILITER(S): at 23:28

## 2020-01-01 RX ADMIN — Medication 650 MILLIGRAM(S): at 13:15

## 2020-01-01 RX ADMIN — Medication 40 MILLIEQUIVALENT(S): at 17:30

## 2020-01-01 RX ADMIN — Medication 100 MILLIEQUIVALENT(S): at 01:45

## 2020-01-01 RX ADMIN — Medication 4: at 13:02

## 2020-01-01 RX ADMIN — Medication 2: at 19:24

## 2020-01-01 RX ADMIN — ALBUTEROL 1 PUFF(S): 90 AEROSOL, METERED ORAL at 21:28

## 2020-01-01 RX ADMIN — Medication 650 MILLIGRAM(S): at 21:59

## 2020-01-01 RX ADMIN — Medication 4: at 12:54

## 2020-01-01 RX ADMIN — Medication 1: at 17:29

## 2020-01-01 RX ADMIN — Medication 650 MILLIGRAM(S): at 21:55

## 2020-01-01 RX ADMIN — POLYETHYLENE GLYCOL 3350 17 GRAM(S): 17 POWDER, FOR SOLUTION ORAL at 12:43

## 2020-01-01 RX ADMIN — Medication 650 MILLIGRAM(S): at 14:00

## 2020-01-01 RX ADMIN — Medication 20 MILLIGRAM(S): at 14:17

## 2020-01-01 RX ADMIN — GABAPENTIN 100 MILLIGRAM(S): 400 CAPSULE ORAL at 06:14

## 2020-01-01 RX ADMIN — Medication 3 MILLILITER(S): at 17:55

## 2020-01-01 RX ADMIN — BUDESONIDE AND FORMOTEROL FUMARATE DIHYDRATE 2 PUFF(S): 160; 4.5 AEROSOL RESPIRATORY (INHALATION) at 05:12

## 2020-01-01 RX ADMIN — ALBUTEROL 1 PUFF(S): 90 AEROSOL, METERED ORAL at 06:15

## 2020-01-01 RX ADMIN — GABAPENTIN 100 MILLIGRAM(S): 400 CAPSULE ORAL at 21:43

## 2020-01-01 RX ADMIN — INSULIN GLARGINE 15 UNIT(S): 100 INJECTION, SOLUTION SUBCUTANEOUS at 11:41

## 2020-01-01 RX ADMIN — BUDESONIDE AND FORMOTEROL FUMARATE DIHYDRATE 2 PUFF(S): 160; 4.5 AEROSOL RESPIRATORY (INHALATION) at 05:39

## 2020-01-01 RX ADMIN — INSULIN GLARGINE 25 UNIT(S): 100 INJECTION, SOLUTION SUBCUTANEOUS at 21:52

## 2020-01-01 RX ADMIN — POLYETHYLENE GLYCOL 3350 17 GRAM(S): 17 POWDER, FOR SOLUTION ORAL at 12:12

## 2020-01-01 RX ADMIN — Medication 4: at 18:20

## 2020-01-01 RX ADMIN — GABAPENTIN 100 MILLIGRAM(S): 400 CAPSULE ORAL at 21:59

## 2020-01-01 RX ADMIN — ALBUTEROL 1 PUFF(S): 90 AEROSOL, METERED ORAL at 21:50

## 2020-01-01 RX ADMIN — Medication 20 MILLIGRAM(S): at 05:17

## 2020-01-01 RX ADMIN — ATORVASTATIN CALCIUM 80 MILLIGRAM(S): 80 TABLET, FILM COATED ORAL at 21:21

## 2020-01-01 RX ADMIN — ATORVASTATIN CALCIUM 80 MILLIGRAM(S): 80 TABLET, FILM COATED ORAL at 21:28

## 2020-01-01 RX ADMIN — AZITHROMYCIN 500 MILLIGRAM(S): 500 TABLET, FILM COATED ORAL at 11:30

## 2020-01-01 RX ADMIN — INSULIN GLARGINE 15 UNIT(S): 100 INJECTION, SOLUTION SUBCUTANEOUS at 12:27

## 2020-01-01 RX ADMIN — ALBUTEROL 1 PUFF(S): 90 AEROSOL, METERED ORAL at 13:30

## 2020-01-01 RX ADMIN — GABAPENTIN 100 MILLIGRAM(S): 400 CAPSULE ORAL at 04:58

## 2020-01-01 RX ADMIN — POLYETHYLENE GLYCOL 3350 17 GRAM(S): 17 POWDER, FOR SOLUTION ORAL at 12:25

## 2020-01-01 RX ADMIN — POLYETHYLENE GLYCOL 3350 17 GRAM(S): 17 POWDER, FOR SOLUTION ORAL at 12:03

## 2020-01-01 RX ADMIN — Medication 40 MILLIGRAM(S): at 18:23

## 2020-01-01 RX ADMIN — Medication 3 MILLILITER(S): at 11:48

## 2020-01-01 RX ADMIN — BUDESONIDE AND FORMOTEROL FUMARATE DIHYDRATE 2 PUFF(S): 160; 4.5 AEROSOL RESPIRATORY (INHALATION) at 05:07

## 2020-01-01 RX ADMIN — Medication 3 MILLILITER(S): at 23:21

## 2020-01-01 RX ADMIN — Medication 3 MILLILITER(S): at 06:53

## 2020-01-01 RX ADMIN — BUDESONIDE AND FORMOTEROL FUMARATE DIHYDRATE 2 PUFF(S): 160; 4.5 AEROSOL RESPIRATORY (INHALATION) at 05:18

## 2020-01-01 RX ADMIN — ALBUTEROL 1 PUFF(S): 90 AEROSOL, METERED ORAL at 05:12

## 2020-01-01 RX ADMIN — Medication 2: at 16:35

## 2020-01-01 RX ADMIN — Medication 81 MILLIGRAM(S): at 11:17

## 2020-01-01 RX ADMIN — Medication 10 UNIT(S): at 10:06

## 2020-01-01 RX ADMIN — ALBUTEROL 1 PUFF(S): 90 AEROSOL, METERED ORAL at 21:40

## 2020-01-01 RX ADMIN — Medication 3 MILLILITER(S): at 17:04

## 2020-01-01 RX ADMIN — ALBUTEROL 1 PUFF(S): 90 AEROSOL, METERED ORAL at 21:32

## 2020-01-01 RX ADMIN — GABAPENTIN 100 MILLIGRAM(S): 400 CAPSULE ORAL at 12:41

## 2020-01-01 RX ADMIN — Medication 50 GRAM(S): at 14:26

## 2020-01-01 RX ADMIN — Medication 40 MILLIGRAM(S): at 05:55

## 2020-01-01 RX ADMIN — INSULIN GLARGINE 25 UNIT(S): 100 INJECTION, SOLUTION SUBCUTANEOUS at 21:41

## 2020-01-01 RX ADMIN — BUDESONIDE AND FORMOTEROL FUMARATE DIHYDRATE 2 PUFF(S): 160; 4.5 AEROSOL RESPIRATORY (INHALATION) at 05:40

## 2020-01-01 RX ADMIN — Medication 100 GRAM(S): at 09:06

## 2020-01-01 RX ADMIN — Medication 20 MILLIGRAM(S): at 21:28

## 2020-01-01 RX ADMIN — ALBUTEROL 1 PUFF(S): 90 AEROSOL, METERED ORAL at 17:38

## 2020-01-01 RX ADMIN — Medication 100 GRAM(S): at 13:15

## 2020-01-01 RX ADMIN — Medication 20 MILLIEQUIVALENT(S): at 08:46

## 2020-01-01 RX ADMIN — Medication 2: at 07:26

## 2020-01-01 RX ADMIN — ENOXAPARIN SODIUM 40 MILLIGRAM(S): 100 INJECTION SUBCUTANEOUS at 12:12

## 2020-01-01 RX ADMIN — POLYETHYLENE GLYCOL 3350 17 GRAM(S): 17 POWDER, FOR SOLUTION ORAL at 12:56

## 2020-01-01 RX ADMIN — Medication 13 UNIT(S): at 08:26

## 2020-01-01 RX ADMIN — ALBUTEROL 1 PUFF(S): 90 AEROSOL, METERED ORAL at 05:07

## 2020-01-01 RX ADMIN — ATORVASTATIN CALCIUM 80 MILLIGRAM(S): 80 TABLET, FILM COATED ORAL at 21:36

## 2020-01-01 RX ADMIN — SENNA PLUS 2 TABLET(S): 8.6 TABLET ORAL at 21:19

## 2020-01-31 NOTE — COUNSELING
[Full Code] : Code Status: Full Code [Limited] : Treatment Guidelines: Limited [Trial of Intubation] : Intubation: Trial of Intubation [[facility] Ver[facility] Date: _____] [facility]last verification [facility]: [unfilled] [_____] : HCP: [unfilled]

## 2020-01-31 NOTE — REASON FOR VISIT
[Acute] : an acute visit [Family Member] : family member [Pre-Visit Preparation] : pre-visit preparation was done [Intercurrent Specialty/Sub-specialty Visits] : the patient has intercurrent specialty/sub-specialty visits [FreeTextEntry1] : hospital follow up for pancreatitisb [FreeTextEntry3] : cardiology

## 2020-01-31 NOTE — HISTORY OF PRESENT ILLNESS
[Patient] : patient [Family Member] : family member [FreeTextEntry2] : Ms. Klein is a 79 year old femal with COPD, chronic diastolic heart failure, prediabetes, h/o CVA with residual right-sided hemiparesis. \par \par Patient being seen for follow up hospital and rehab discharge. Was admitted 12/20/2020 - 12/26/2020 for abdominal pain, found to have acute pancreatitis which responded to NPO and IV NS bolus. CT abd/pelvis were negative for mesenteric ischemia and other pancreatitis workup negative (LFTs, triglycerides). No etiology found. Patient discharged to rehab. In rehab, metformin was stopped and she was changed to nateglinide for her diabetes. She is not checking glucoses. \par \par Currently feels at her baseline. Compliant with Turdoza. No recent exacerbations. Plans to follow up with pulmonary. \par \par Still having left knee pain. Using Bengay and tylenol with some effect, but still affecting her gait. \par \par Reports she was started on metolazone daily by her other PCP which was started several months ago. However, I never saw it in her medications previously.\par \par She denies abnormal shortness of breath, chest pain, abdominal pain, nausea, vomiting, diarrhea, constipation, anorexia, changes in mood, urinary symptoms.

## 2020-03-05 PROBLEM — Z99.81 OXYGEN DEPENDENT: Status: ACTIVE | Noted: 2020-01-01

## 2020-03-06 NOTE — ED ADULT NURSE NOTE - NS ED NURSE RECORD ANOTHER HT AND WT
"    3/6/2020         RE: Leigh Garner  [address]  [facility] [address]        Dear Colleague,    Thank you for referring your patient, Leigh Garner, to the [facility]. Please see a copy of my visit note below.    Oncology Rooming Note    March 6, 2020 10:26 AM   Leigh Garner is a 66 year old female who presents for:    Chief Complaint   Patient presents with     Oncology Clinic Visit     2 week post op, breast.      Initial Vitals: Resp 18   Ht 1.702 m (5' 7\")   Wt 62 kg (136 lb 11.2 oz)   Breastfeeding No   BMI 21.41 kg/m   Estimated body mass index is 21.41 kg/m  as calculated from the following:    Height as of this encounter: 1.702 m (5' 7\").    Weight as of this encounter: 62 kg (136 lb 11.2 oz). Body surface area is 1.71 meters squared.  No Pain (0) Comment: Data Unavailable   No LMP recorded. Patient is postmenopausal.  Allergies reviewed: Yes  Medications reviewed: Yes    Medications: Medication refills not needed today.  Pharmacy name entered into BISON: [facility] [place], [place] - [address]    Clinical concerns: 2 week post op, breast.       Stephanie Cruz CMA              Information given to patient about Arimidex today and reviewed with patient. Patient will start this after Radiation and see Dr. Hemphill back in follow up 1 month after starting. Patient verbalized understanding and agreement to plan. Marimar Guzman RN on 3/6/2020 at 11:11 AM      DATE OF VISIT: Mar 6, 2020    Leihg Garner is a 66 year old female is seen today for   Chief Complaint   Patient presents with     Oncology Clinic Visit     2 week post op, breast.    .       (C50.412,  Z17.0) Malignant neoplasm of upper-outer quadrant of left breast in female, estrogen receptor positive (H)  (primary encounter diagnosis)  I reviewed with the patient today the surgical pathology.  She had left lumpectomy and sentinel lymph node biopsy on February 19, 2020.  The surgical pathology " revealed invasive mammary carcinoma of no special type grade 1 with a tumor size of 6 mm.  No lymphovascular invasion identified.  Surgical margins were clear of carcinoma.  Invasive carcinoma is 1.5 mm from the nearest inferior margin, 3 mm from the anterior margin more than 5 mm from posterior, superior, medial and lateral margins.  Tumor is estrogen receptor positive and progesterone receptor negative HER-2/tyrell is negative.  Tumor stage is T1b N0 M0.  Patient will be seeing radiation oncology to consider breast radiation therapy.  I reviewed with the patient today adjuvant systemic therapy.  We talked about adjuvant chemotherapy.  We also talked about adjuvant endocrine therapy.  Based on the tumor size and grade there is no indication for chemotherapy.  I will recommend to proceed with endocrine therapy with anastrozole.The rationale behind using Anastrozole was discussed with the patient in detail. We also discussed the major side effects of Anastrozole including, but not limited to more immediate side effects of vasomotor symptoms, mood disturbance, fatigue and weakness, headache, joint pain, nausea/vomiting, cough, sore throat, and vaginal infection/discharge as well as a small risk of long-term side effects such as DVT/PE, cardiovascular disease, high cholesterol level, high blood pressure, cataracts, and potential loss of bone mineral density and need to obtain DEXA scan. The patient was instructed to begin Calcium plus Vitamin D supplementation. The patient was given written information about Anastrozole, agrees to proceed with treatment, and denies any further questions at this time.    I reviewed with the patient today also follow-up of breast cancer. I told her that evidence from randomized trials indicates that periodic follow-up with bone scans, liver sonography, chest x-rays, and blood tests of liver function does not improve survival or quality of life when compared with routine physical  examinations. Even when these tests permit earlier detection of recurrent disease, patient survival is unaffected. On the basis of these data, acceptable follow-up can be limited to physical examination and annual mammography for asymptomatic patients who complete treatment. According to NCCN guidelines, follow-up of breast cancer should include history and physical examination with emphasis on breast examination every 4-6 months for 5 years then annually thereafter. Emphasis is also on continuing with annual mammography. Evidence also suggests that active lifestyle and achieving and maintaining ideal body weight with a BMI of 20-25 may lead to optimal breast cancer outcomes.  The patient is ready to learn, no apparent learning barriers were identified.  Diagnosis and treatment plans were explained to the patient. The patient expressed understanding of the content. The patient asked appropriate questions. The patient questions were answered to her satisfaction.  Time spent 25 minutes more than 50% of the time in counseling and coordination of care including discussion of management of breast cancer, potential side effects of endocrine therapy, follow-up and prognosis.  Chart documentation with Dragon Voice recognition Software. Although reviewed after completion, some words and grammatical errors may remain.      Again, thank you for allowing me to participate in the care of your patient.        Sincerely,        Andres Hemphill MD     Yes

## 2020-06-26 PROBLEM — K85.00 IDIOPATHIC ACUTE PANCREATITIS WITHOUT INFECTION OR NECROSIS: Status: RESOLVED | Noted: 2020-01-01 | Resolved: 2020-01-01

## 2020-06-26 PROBLEM — N39.0 ACUTE UTI: Status: RESOLVED | Noted: 2019-01-01 | Resolved: 2020-01-01

## 2020-06-26 PROBLEM — J44.9 COPD, MODERATE: Status: ACTIVE | Noted: 2019-01-01

## 2020-06-26 PROBLEM — K21.9 GERD (GASTROESOPHAGEAL REFLUX DISEASE): Status: ACTIVE | Noted: 2019-01-01

## 2020-06-26 PROBLEM — I69.351 HEMIPARESIS AFFECTING RIGHT SIDE AS LATE EFFECT OF CEREBROVASCULAR ACCIDENT (CVA): Status: ACTIVE | Noted: 2019-01-01

## 2020-06-26 PROBLEM — Z09 HOSPITAL DISCHARGE FOLLOW-UP: Status: RESOLVED | Noted: 2020-01-01 | Resolved: 2020-01-01

## 2020-06-26 PROBLEM — I70.0 AORTIC ATHEROSCLEROSIS: Status: ACTIVE | Noted: 2019-01-01

## 2020-06-26 NOTE — CHRONIC CARE ASSESSMENT
[PPS Score: ____] : Palliative Performance Scale (PPS) Score: [unfilled] [Can not Exercise (Disability)] : Exercise: The patient can not exercise due to disability [None] : The patient does not exercise [Diabetic Diet] : diabetic [Low Salt Diet] : low salt [General Adherence] : and is generally adherent

## 2020-06-26 NOTE — COUNSELING
[Full Code] : Code Status: Full Code [Limited] : Treatment Guidelines: Limited [Trial of Intubation] : Intubation: Trial of Intubation [_____] : HCP: [unfilled] [[facility] Ver[facility] Date: _____] [facility]last verification date: [unfilled]

## 2020-06-26 NOTE — REASON FOR VISIT
[Initial Annual Medicare Wellness Visit] : an initial annual Medicare wellness visit [Pre-Visit Preparation] : pre-visit preparation was done [Family Member] : family member [Intercurrent Specialty/Sub-specialty Visits] : the patient has intercurrent specialty/sub-specialty visits [FreeTextEntry1] : COPD, chronic diastolic heart failure, prediabetes, h/o CVA with residual right-sided hemiparesis [FreeTextEntry3] : cardiology

## 2020-06-26 NOTE — HEALTH RISK ASSESSMENT
[HRA Reviewed] : Health risk assessment reviewed [No falls in past year] : Patient reported no falls in the past year [TimeGetUpGo] : 16

## 2020-06-26 NOTE — PHYSICAL EXAM
[No Acute Distress] : no acute distress [Well Nourished] : well nourished [Well Developed] : well developed [Normal Sclera/Conjunctiva] : normal sclera/conjunctiva [EOMI] : extra ocular movement intact [Normal Outer Ear/Nose] : the ears and nose were normal in appearance [No Respiratory Distress] : no respiratory distress [Normal Oropharynx] : the oropharynx was normal [No Accessory Muscle Use] : no accessory muscle use [Normal Rate] : heart rate was normal  [No Edema] : there was no peripheral edema [Not Distended] : not distended [No Spinal Tenderness] : no spinal tenderness [No CVA Tenderness] : no ~M costovertebral angle tenderness [Normal Gait] : normal gait [Normal Strength/Tone] : muscle strength and tone were normal [Oriented x3] : oriented to person, place, and time [No Rash] : no rash [Normal Affect] : the affect was normal [Normal Voice/Communication] : normal voice communication

## 2020-06-26 NOTE — HISTORY OF PRESENT ILLNESS
[Patient] : patient [Family Member] : family member [FreeTextEntry2] : RONALD VINCENT is being seen for a visit provided via telehealth via [employer]. This visit was first attempted using [employer] teleChilltime real-time audio visual technology, but was unable to be completed. \par RONALD VINCENT was located at their home, [address] [place] [address]\[place] [address], at the time of the visit. \par The [facility] clinician, EFRA DIAZ, was located remotely at their home in [place] at the time of the visit. \par The patient, RONALD VINCENT, and the [facility] clinician, EFRA DIAZ, participated in the telehealth encounter. \par Other participants included: Murphy Vincent (daughter) \par \par RONALD VINCENT (Jul 11 1940) or her representative consents to the use of telehealth. All questions related to telehealth answered.\par \par Ms. Vincent is a 79 year old female with COPD, chronic diastolic heart failure, prediabetes, h/o CVA with residual right-sided hemiparesis. Patient is being seen for follow up of chronic medical conditions.\par \par Reports she went to ED several days ago as she was feeling weak and sluggish. She CXR, labs, urine and was told she had a urine infection. Has been treated with 2 different antibiotics. She was also told Potassium was high and was instructed to stop potassium supplements. Currently she feels somehwat better, but continues to report fatigue and occasional wheezing. She is compliant with Turdorza but is on no other pulmonary medications. She has not followed up with pulmonary in over a year. \par \par Not checking glucoses because she has a hard time getting the blood out of her finger. Otherwise she reports eating well, having regular bowel movements. Mood is good, sleeping okay. Denies cough, fevers, chills, leg swelling. Arthritic pain is mostly controlled with Extra Strength Tylenol and topicals. She has not had any recent falls and gait is unchanged. \par \par She denies chest pain, palpitations, headaches, urinary frequency/urgency, foul-smelling urine, leg swelling, skin break down or rashes, depression, insomnia.

## 2020-06-30 PROBLEM — R42 VERTIGO: Status: ACTIVE | Noted: 2019-01-01

## 2020-06-30 PROBLEM — R35.0 URINARY FREQUENCY: Status: ACTIVE | Noted: 2019-01-01

## 2020-07-02 PROBLEM — R42 DIZZINESS: Status: ACTIVE | Noted: 2020-01-01

## 2020-07-06 PROBLEM — R25.1 SHAKING: Status: ACTIVE | Noted: 2020-01-01

## 2020-07-15 NOTE — H&P ADULT - ASSESSMENT
80f with history of emphysema comes in with short of breath to be seen by dr mills and treated for emphysema     IMPROVE VTE Individual Risk Assessment          RISK                                                          Points  [  ] Previous VTE                                                3  [  ] Thrombophilia                                             2  [  ] Lower limb paralysis                                   2        (unable to hold up >15 seconds)    [  ] Current Cancer                                             2         (within 6 months)  [  ] Immobilization > 24 hrs                              1  [  ] ICU/CCU stay > 24 hours                             1  [  ] Age > 60                                                         1    IMPROVE VTE Score: 2

## 2020-07-15 NOTE — CONSULT NOTE ADULT - ASSESSMENT
CARLTON JENKINS  [id number]  1940 DOA 7/15/2020 DR RAYSHAWN LANDIN    ALLERGY  nka      CONTACT  sel               Initial evaluation/Pulmonary Critical Care consultation requested on 7/15/2020    by Dr Landin   from Dr Loco   Patient examined chart reviewed    HOSPITAL ADMISSION   PATIENT CAME  FROM (if information available)      CARLTON JENKINS  [id number]  1940 DOA 7/15/2020 DR RAYSHAWN LANDIN            REVIEW OF SYMPTOMS      Able to give ROS  Yes     RELIABLE No   CONSTITUTIONAL Weakness Yes  Chills No Vision changes No  ENDOCRINE No unexplained hair loss No heat or cold intolerance    ALLERGY No hives  Sore throat No   RESP Coughing blood no  Shortness of breath YES   NEURO No Headache  Confusion Pain neck No   CARDIAC No Chest pain No Palpitations   GI No Pain abdomen NO   Vomiting NO     PHYSICAL EXAM    HEENT Unremarkable PERRLA atraumatic   RESP Fair air entry EXP prolonged    Harsh breath sound Resp distres mild   CARDIAC S1 S2 No S3     NO JVD    ABDOMEN SOFT BS PRESENT NOT DISTENDED No hepatosplenomegaly PEDAL EDEMA present No calf tenderness  NO rash   GENERAL Not TOXIC looking    HPI/PMH.      80 f PMH hytn dm chf copd 4 point walker depndent admitted with sob and shakiness since she started new med (albuterol)  also wwas started on doxycycline recently         OXYGENATION      7/15/2020 ra 98%     VITALS/LABS       7/15/2020 89f 114/94 98%  7/15/2020 w 6.2 Hb 11.7 Plt 178 inr 1.18 Na 141 K 3.4 CO2 31 Cr 1       ABG 7/15/2020 ra 745/43/72   D-dimr 7/15/2020 d-dimr 1040 .   Cta ch 7/15/2020 cta ch No pe   basal ssa   low lung vol  mn thyroid   Tr 7/15/2020 Tr 1 Neg-ivex2   BNP 7/15/2020 bnp 46           PATIENT DATA/ASSESSMENT/RECOMMENDATIONS       RESP GAS EXCANGE   7/15/2020 ra 745/43/72   Gas exchange acceptable   Target po 90-95%            COPD ex  BD steroids  Prednisone 40 equivalent for 5 d   spiriva symbicort        RO VTE   Pt had elevated d-dimr poa   However cta ch was Neg-annamarie   D-dimr 7/15/2020 d-dimr 1040 .   Will check V duplex     ATELECTASIS   Incentive zeke         RO INFECTION  Shaking likely tremors sec to albuterol   No fever or leukocytosis poa but pt was on doxy  Check proc blod urine c                          RO CHF   BNP 46 poa rules out chf      PLAN  Manage as copd ex with bd steroids O2 Target po 90-95%      TIME SPENT Over 55 minutes aggregate care time spent on encounter; activities included combinations of  direct pt care, counseling and/or coordinating care reviewing notes, lab data/ imaging, discussion with multidisciplinary team/ pt /family. Risks, benefits, alternatives of planned interventions when applicable were discussed in detail and questions answered as best as possible    CARLTON JENKINS  [phone]  1940 DOA 7/15/2020 DR RAYSHAWN LANDIN

## 2020-07-15 NOTE — H&P ADULT - NSHPPHYSICALEXAM_GEN_ALL_CORE
ICU Vital Signs Last 24 Hrs  T(C): 37.8 (15 Jul 2020 13:34), Max: 37.8 (15 Jul 2020 13:34)  T(F): 100.1 (15 Jul 2020 13:34), Max: 100.1 (15 Jul 2020 13:34)  HR: 112 (15 Jul 2020 13:34) (112 - 114)  BP: 138/71 (15 Jul 2020 13:34) (114/94 - 138/71)  BP(mean): --  ABP: --  ABP(mean): --  RR: 18 (15 Jul 2020 13:34) (18 - 18)  SpO2: 98% (15 Jul 2020 13:34) (98% - 98%) ICU Vital Signs Last 24 Hrs  T(C): 37.8 (15 Jul 2020 13:34), Max: 37.8 (15 Jul 2020 13:34)  T(F): 100.1 (15 Jul 2020 13:34), Max: 100.1 (15 Jul 2020 13:34)  HR: 112 (15 Jul 2020 13:34) (112 - 114)  BP: 138/71 (15 Jul 2020 13:34) (114/94 - 138/71)  BP(mean): --  ABP: --  ABP(mean): --  RR: 18 (15 Jul 2020 13:34) (18 - 18)  SpO2: 98% (15 Jul 2020 13:34) (98% - 98%)      GENERAL: NAD well-developed  HEAD:  Atraumatic, Normocephalic  EYES: EOMI, PERRLA, conjunctiva and sclera clear  ENMT: No tonsillar erythema, exudates, or enlargement; Moist mucous membranes, Good dentition, No lesions  NECK: Supple, No JVD, Normal thyroid  NERVOUS SYSTEM:  Alert & Oriented X3, Good concentration; Motor Strength 5/5 B/L upper and lower extremities; DTRs 2+ intact and symmetric  CHEST/LUNG: decreased breath sounds   HEART: Regular rate and rhythm; No murmurs, rubs, or gallops  ABDOMEN: Soft, Nontender, Nondistended; Bowel sounds present  EXTREMITIES:s/p bilateral knee replaceemnt   LYMPH: No lymphadenopathy   SKIN: No rashes or lesions

## 2020-07-15 NOTE — ED ADULT NURSE NOTE - OBJECTIVE STATEMENT
pt received c/o SOB and shaking onset early this morning. pt presents still shaking states it was worst in the middle of the night. pt has hx of asthma.

## 2020-07-15 NOTE — H&P ADULT - NSHPPOAPRESSUREULCER_GEN_ALL_CORE
----- Message from Allyssa Meléndezmarcela sent at 8/12/2019  2:07 PM CDT -----  Contact: pt at [phone]  Needs Advice    Reason for call:  Pts wife is asking for a sooner appt. I put him on the  Wait list today.  Has moles.   has appt in September.        Communication Preference:call    Additional Information:       no

## 2020-07-15 NOTE — ED ADULT TRIAGE NOTE - CHIEF COMPLAINT QUOTE
pt presents to the ED with c/o shortness of breath and shakiness, started a new medication that she attributed to her shakiness started doxycycline recently. pt presents to the ED with c/o shortness of breath and shakiness, started a new medication that she attributed to her shakiness started doxycycline recently.

## 2020-07-15 NOTE — CONSULT NOTE ADULT - SUBJECTIVE AND OBJECTIVE BOX
RONALD VINCENT    [facility] 02    Patient is a 80y old  Female who presents with a chief complaint of sob (15 Jul 2020 15:57)       Allergies    No Known Allergies    Intolerances        HPI:  80f hx htn, dm, chf, copd, 4 point walker dependent pw sob and shaking. patient notes she was woke this morning and was trembling more than ever, though in general it has been going on for a couple of months. she notes she was shaking so badly the whole bed was shaking. he notes she felt like she couldn't breathe. she has no cp, nausea, vomiting, fever, chills, rash, bleeding, numbness, dysuria, rhinorrhea, ha, rhinorrhea. she has not taken anything for symptoms (15 Jul 2020 15:57)      PAST MEDICAL & SURGICAL HISTORY:  COPD, moderate      FAMILY HISTORY:        MEDICATIONS   acetaminophen   Tablet .. 650 milliGRAM(s) Oral every 6 hours PRN  ALBUTerol    0.083%.. 2.5 milliGRAM(s) Nebulizer every 20 minutes      Vital Signs Last 24 Hrs  T(C): 36.9 (15 Jul 2020 16:52), Max: 37.8 (15 Jul 2020 13:34)  T(F): 98.4 (15 Jul 2020 16:52), Max: 100.1 (15 Jul 2020 13:34)  HR: 93 (15 Jul 2020 16:52) (93 - 114)  BP: 130/62 (15 Jul 2020 16:52) (114/94 - 138/71)  BP(mean): --  RR: 25 (15 Jul 2020 16:52) (18 - 25)  SpO2: 100% (15 Jul 2020 16:52) (98% - 100%)            LABS:                        11.7   6.20  )-----------( 178      ( 15 Jul 2020 13:48 )             35.3     07-15    141  |  104  |  24<H>  ----------------------------<  103<H>  3.4<L>   |  31  |  1.07    Ca    9.2      15 Jul 2020 13:48  Mg     1.1     07-15    TPro  7.5  /  Alb  3.2<L>  /  TBili  0.2  /  DBili  x   /  AST  24  /  ALT  29  /  AlkPhos  108  07-15    PT/INR - ( 15 Jul 2020 13:48 )   PT: 13.1 sec;   INR: 1.18 ratio         PTT - ( 15 Jul 2020 13:48 )  PTT:24.3 sec      ABG - ( 15 Jul 2020 14:14 )  pH, Arterial: x     pH, Blood: 7.45  /  pCO2: 43    /  pO2: 72    / HCO3: 29    / Base Excess: 5.2   /  SaO2: 94                  WBC:  WBC Count: 6.20 K/uL (07-15 @ 13:48)      MICROBIOLOGY:  RECENT CULTURES:        CARDIAC MARKERS ( 15 Jul 2020 15:37 )  <.015 ng/mL / x     / x     / x     / x      CARDIAC MARKERS ( 15 Jul 2020 13:48 )  <.015 ng/mL / x     / x     / x     / 3.1 ng/mL        PT/INR - ( 15 Jul 2020 13:48 )   PT: 13.1 sec;   INR: 1.18 ratio         PTT - ( 15 Jul 2020 13:48 )  PTT:24.3 sec    Sodium:  Sodium, Serum: 141 mmol/L (07-15 @ 13:48)      1.07 mg/dL 07-15 @ 13:48      Hemoglobin:  Hemoglobin: 11.7 g/dL (07-15 @ 13:48)      Platelets: Platelet Count - Automated: 178 K/uL (07-15 @ 13:48)      LIVER FUNCTIONS - ( 15 Jul 2020 13:48 )  Alb: 3.2 g/dL / Pro: 7.5 gm/dL / ALK PHOS: 108 U/L / ALT: 29 U/L / AST: 24 U/L / GGT: x                 RADIOLOGY & ADDITIONAL STUDIES:

## 2020-07-15 NOTE — ED ADULT NURSE NOTE - CHIEF COMPLAINT QUOTE
pt presents to the ED with c/o shortness of breath and shakiness, started a new medication that she attributed to her shakiness started doxycycline recently.

## 2020-07-15 NOTE — ED PROVIDER NOTE - CLINICAL SUMMARY MEDICAL DECISION MAKING FREE TEXT BOX
patient pw sob and tremulousness. the tremulousness seems like the result of too much albuterol. will rule out chf and copd. consider pna. patient pw sob and tremulousness. the tremulousness seems like the result of too much albuterol. will rule out chf and copd. consider pna.  I read ekg as nsr rate 94, no st elevation or depression, qtc 417, narrow qrs, normal axis. patient pw sob and tremulousness. the tremulousness seems like the result of too much albuterol. will rule out chf and copd. consider pna.  I read ekg as nsr rate 94, no st elevation or depression, qtc 417, narrow qrs, normal axis.  cta reassuring. pt not wheezing but still markedly tachypneic. will admit for sob and have pulmonary see her, dr mills.

## 2020-07-15 NOTE — ED PROVIDER NOTE - OBJECTIVE STATEMENT
80f hx htn, dm, chf, copd, 4 point walker dependent pw sob and shaking. patient notes she was woke this morning and was trembling more than ever, though in general it has been going on for a couple of months. she notes she was shaking so badly the whole bed was shaking. he notes she felt like she couldn't breathe. she has no cp, nausea, vomiting, fever, chills, rash, bleeding, numbness, dysuria, rhinorrhea, ha, rhinorrhea. she has not taken anything for symptoms

## 2020-07-16 NOTE — PHYSICAL THERAPY INITIAL EVALUATION ADULT - CRITERIA FOR SKILLED THERAPEUTIC INTERVENTIONS
functional limitations in following categories/rehab potential/predicted duration of therapy intervention/impairments found/therapy frequency/anticipated discharge recommendation/risk reduction/prevention

## 2020-07-16 NOTE — PHYSICAL THERAPY INITIAL EVALUATION ADULT - IMPAIRMENTS CONTRIBUTING TO GAIT DEVIATIONS, PT EVAL
pain/decreased strength/impaired balance/impaired postural control/decreased flexibility/narrow base of support

## 2020-07-16 NOTE — PHYSICAL THERAPY INITIAL EVALUATION ADULT - IMPAIRMENTS FOUND, PT EVAL
gait, locomotion, and balance/ergonomics and body mechanics/ROM/muscle strength/posture/aerobic capacity/endurance

## 2020-07-16 NOTE — PHYSICAL THERAPY INITIAL EVALUATION ADULT - ADDITIONAL COMMENTS
There is ramp at the entry of the house and no need for stairs negotiation at home. Pt has a Rolling Walker and ambulated for short distance indoors.

## 2020-07-16 NOTE — PHYSICAL THERAPY INITIAL EVALUATION ADULT - IMPAIRED TRANSFERS: SIT/STAND, REHAB EVAL
impaired postural control/impaired balance/decreased flexibility/pain/impaired sensory feedback/decreased ROM

## 2020-07-16 NOTE — PHYSICAL THERAPY INITIAL EVALUATION ADULT - GENERAL OBSERVATIONS, REHAB EVAL
Pt was seen in supine c O2 at 2.5 l/min  through nasal cannula and Premafit donned, alert and Ox4. Pt was medicated for R knee pain by RNDunia during PT session.

## 2020-07-16 NOTE — PHYSICAL THERAPY INITIAL EVALUATION ADULT - GAIT DEVIATIONS NOTED, PT EVAL
excessive flexion posture/decreased step length/decreased velocity of limb motion/decreased sonia/increased time in double stance/decreased weight-shifting ability/decreased stride length

## 2020-07-16 NOTE — PHYSICAL THERAPY INITIAL EVALUATION ADULT - BALANCE TRAINING, PT EVAL
Pt will increase static/dynamic sitting balance to good and static/dynamic standing balance to good to perform all functional mobility, c a Rolling Walker, without LOB, by 2weeks.

## 2020-07-16 NOTE — PHYSICAL THERAPY INITIAL EVALUATION ADULT - TRANSFER TRAINING, PT EVAL
Pt will independently perform sit to stand to sit transfers without LOB using rolling walker by 2 weeks.

## 2020-07-16 NOTE — PROGRESS NOTE ADULT - ASSESSMENT
Patient with PMH of COPD, anemia, DM type 2 and CHF was presented with sob, shakiness and dizziness. Patient was diagnosed with Acute COPD exacerbation and was started on iv solumedrol and bronchodilators. CT angio was done and showed no PE. PUlm was consulted and added zithromax.     1.	Acute COPD exacerbation. cont iv solumedrol and zithromax with bronchodilators. Appreciated pulm recs. Supplemental oxygen to keep sat > 92%  2.	Shakiness. ? from albuterol. better.   3.	Normocytic anemia. no active bleeding.  4.	DM type 2. uncontrolled with hyperglycemia. Likely from steroids. Start lantus. cont correctional insulin. follow finger sticks.   5.	Dizziness. no focal new motor weakness. Follow CT head.   6.	HTN. controlled. cont current management. Monitor      Lovenox  Full Code.

## 2020-07-16 NOTE — PROGRESS NOTE ADULT - ASSESSMENT
[facility]  [id number]  1940 DOA 7/15/2020 DR RAYSHAWN LANDIN            REVIEW OF SYMPTOMS      Able to give ROS  Yes     RELIABLE No   CONSTITUTIONAL Weakness Yes  Chills No Vision changes No  ENDOCRINE No unexplained hair loss No heat or cold intolerance    ALLERGY No hives  Sore throat No   RESP Coughing blood no  Shortness of breath YES   NEURO No Headache  Confusion Pain neck No   CARDIAC No Chest pain No Palpitations   GI No Pain abdomen NO   Vomiting NO     PHYSICAL EXAM    HEENT Unremarkable PERRLA atraumatic   RESP Fair air entry EXP prolonged    Harsh breath sound Resp distres mild   CARDIAC S1 S2 No S3     NO JVD    ABDOMEN SOFT BS PRESENT NOT DISTENDED No hepatosplenomegaly PEDAL EDEMA present No calf tenderness  NO rash   GENERAL Not TOXIC looking    HPI/PMH.      80 f nonsmoker PMH hytn dm chf copd 4 point walker depndent admitted with sob and shakiness since she started new med (albuterol)  also wwas started on doxycycline recently           OXYGENATION      7/15/2020 ra 98%     VITALS/LABS     2020 afeb 93 130/90   2020 W 5.4 Hb 11 Plt 170       EVENTS.     2020 pc .11 (7/15) Azithro added 2020 Duoneb added 2020    PROBLEMS.  COPD e      PT DATA/BEST PRACTICE  ALLERGY    nka                 WT    7/15/2020 54                                 BMI                            CrCl                                  ADVANCED DIRECTIVE     LINES TUBES POA.               HEAD OF BED ELEVATION Yes      INFECTION PPLX        DVT PROPHYLAXIS         Lvnx 40 (7/15)   CERDA PROPHYLAXIS          DYSPHAGIA EVAL       PATIENT DATA/ASSESSMENT/RECOMMENDATIONS       RESP GAS EXCANGE   7/15/2020 ra 745/43/72   Gas exchange acceptable   Target po 90-95%            COPD ex  BD steroids  Prednisone 40 equivalent for 5 d   spiriva symbicort        RO VTE   Pt had elevated d-dimr poa   However cta ch was Neg-annamarie   D-dimr 7/15/2020 d-dimr 1040 .   Will check V duplex     ATELECTASIS   Incentive zeke         RO INFECTION  Shaking likely tremors sec to albuterol   No fever or leukocytosis poa but pt was on doxy  Procalcitonin borderline 7/15 0.11   added azithro 2020   Check  blod urine c                          RO CHF   BNP 46 poa rules out chf      PLAN  Pt is COVID naive   VTE CHF and infection seem unlikely  Manage as copd ex with bd steroids O2 Target po 90-95%  Pt denies ho smoking and will need further workup after discharge   Please refer her to Pulm  [place] office         TIME SPENT Over 25 minutes aggregate care time spent on encounter; activities included combinations of  direct pt care, counseling and/or coordinating care reviewing notes, lab data/ imaging, discussion with multidisciplinary team/ pt /family. Risks, benefits, alternatives of planned interventions when applicable were discussed in detail and questions answered as best as possible    [facility]  [id number]  1940 DOA 7/15/2020 DR RAYSHAWN LANDIN

## 2020-07-17 NOTE — PROGRESS NOTE ADULT - ASSESSMENT
Patient with PMH of COPD, anemia, DM type 2 and CHF was presented with sob, shakiness and dizziness. Patient was diagnosed with Acute COPD exacerbation and was started on iv solumedrol and bronchodilators. CT angio was done and showed no PE. PUlm was consulted and added zithromax.     1.	Acute COPD/asthma exacerbation. Lost iv access. Change iv solumedrol and iv zithromax to prednisone and oral zithromax. cont bronchodilators. PUlm consult recs reviewed. cont supplemental oxygen.   2.	Shakiness. intermittent.   3.	Normocytic anemia. no active bleeding.  4.	DM type 2. uncontrolled with hyperglycemia. Likely from steroids. Increase lantus and add premeal insulin. cont correctional insulin. follow finger sticks.   5.	Dizziness. no focal new motor weakness. Unremarkable CT head.   6.	HTN. controlled. cont current management. Monitor  7.	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 Patient encouraged to participate.      Plan discussed with the daughter Murphy  after taking patient's consent.       Lovenox  Full Code.

## 2020-07-17 NOTE — PROGRESS NOTE ADULT - ASSESSMENT
cont rx [facility]  [id number]  1940 DOA 7/15/2020 DR RAYSHAWN LANDIN            REVIEW OF SYMPTOMS      Able to give ROS  Yes     RELIABLE No   CONSTITUTIONAL Weakness Yes  Chills No Vision changes No  ENDOCRINE No unexplained hair loss No heat or cold intolerance    ALLERGY No hives  Sore throat No   RESP Coughing blood no  Shortness of breath YES   NEURO No Headache  Confusion Pain neck No   CARDIAC No Chest pain No Palpitations   GI No Pain abdomen NO   Vomiting NO     PHYSICAL EXAM    HEENT Unremarkable PERRLA atraumatic   RESP Fair air entry EXP prolonged    Harsh breath sound Resp distres mild   CARDIAC S1 S2 No S3     NO JVD    ABDOMEN SOFT BS PRESENT NOT DISTENDED No hepatosplenomegaly PEDAL EDEMA present No calf tenderness  NO rash   GENERAL Not TOXIC looking    HPI/PMH.      80 f nonsmoker PMH hytn dm chf copd 4 point walker depndent admitted with sob and shakiness since she started new med (albuterol)  also wwas started on doxycycline recently         OXYGENATION      7/15/2020 ra 98%     VITALS/LABS    2020 afeb 77 130/60      EVENTS.     2020 pc .11 (7/15) Azithro added 2020 Duoneb added 2020  Mucomyst added  viscus secns   echo ordered     PT DATA/BEST PRACTICE  ALLERGY    nka                 WT    7/15/2020 54                                 BMI                            CrCl                                  ADVANCED DIRECTIVE     LINES TUBES POA.               HEAD OF BED ELEVATION Yes      INFECTION PPLX        DVT PROPHYLAXIS         Lvnx 40 (7/15)   CERDA PROPHYLAXIS          DYSPHAGIA EVAL     DIET. Reg                                                                            PATIENT DATA/ASSESSMENT/RECOMMENDATIONS       RESP GAS EXCANGE   7/15/2020 ra 745/43/72   Gas exchange acceptable   Target po 90-95%            COPD ex  BD steroids  Prednisone 40 equivalent for 5 d   spiriva symbicort    Mucomyst added  as she has viscus secretions       RO VTE   Pt had elevated d-dimr poa   However cta ch was Neg-annamarie   D-dimr 7/15/2020 d-dimr 1040 .   Will check V duplex     ATELECTASIS   Incentive zeke         RO INFECTION  Shaking likely tremors sec to albuterol   No fever or leukocytosis poa but pt was on doxy  Procalcitonin borderline 7/15 0.11   added azithro 2020   Check  blod urine c                          RO CHF   BNP 46 poa rules out chf      PLAN  Pt is COVID naive   VTE CHF and infection seem unlikely  Manage as copd ex with bd steroids O2 Target po 90-95%  Pt denies ho smoking and will need further workup after discharge   Please refer her to Pulm  [place] office       TIME SPENT Over 25 minutes aggregate care time spent on encounter; activities included combinations of  direct pt care, counseling and/or coordinating care reviewing notes, lab data/ imaging, discussion with multidisciplinary team/ pt /family. Risks, benefits, alternatives of planned interventions when applicable were discussed in detail and questions answered as best as possible    [facility]  [id number]  1940 DOA 7/15/2020 DR RAYSHAWN LANDIN

## 2020-07-18 NOTE — PROGRESS NOTE ADULT - ASSESSMENT
[facility]  [id number]  1940 DOA 7/15/2020 DR RAYSHAWN LANDIN            REVIEW OF SYMPTOMS      Able to give ROS  Yes     RELIABLE No   CONSTITUTIONAL Weakness Yes  Chills No Vision changes No  ENDOCRINE No unexplained hair loss No heat or cold intolerance    ALLERGY No hives  Sore throat No   RESP Coughing blood no  Shortness of breath YES   NEURO No Headache  Confusion Pain neck No   CARDIAC No Chest pain No Palpitations   GI No Pain abdomen NO   Vomiting NO     PHYSICAL EXAM    HEENT Unremarkable PERRLA atraumatic   RESP Fair air entry EXP prolonged    Harsh breath sound Resp distres mild   CARDIAC S1 S2 No S3     NO JVD    ABDOMEN SOFT BS PRESENT NOT DISTENDED No hepatosplenomegaly PEDAL EDEMA present No calf tenderness  NO rash   GENERAL Not TOXIC looking    HPI/PMH.      80 f nonsmoker PMH hytn dm chf copd 4 point walker depndent admitted with sob and shakiness since she started new med (albuterol)  also wwas started on doxycycline recently           OXYGENATION      7/15/2020 ra 98%     VITALS/LABS    2020 99f 98 120/80     EVENTS.     2020 lasix 40 started by hospitalist 2020 echo showed pasp 54 but good ef dd1     PT DATA/BEST PRACTICE  ALLERGY    nka                 WT    7/15/2020 54                                 BMI                            CrCl                                  ADVANCED DIRECTIVE     LINES TUBES POA.               HEAD OF BED ELEVATION Yes      INFECTION PPLX        DVT PROPHYLAXIS         Lvnx 40 (7/15)   CERDA PROPHYLAXIS          DYSPHAGIA EVAL     DIET. Reg                                                                            PATIENT DATA/ASSESSMENT/RECOMMENDATIONS       RESP GAS EXCANGE   7/15/2020 ra 745/43/72   Gas exchange acceptable   Target po 90-95%            COPD ex  BD steroids  Prednisone 40 equivalent for 5 d   spiriva symbicort    Mucomyst added  as she has viscus secretions       RO VTE   Pt had elevated d-dimr poa   However cta ch was Neg-annamarie   D-dimr 7/15/2020 d-dimr 1040 .   7/15 V duplex Neg   VTE unlikely    ATELECTASIS   Incentive zeke        PULMONARY HYPERTENSION  Likely sec to copd   Will need further workup after discharge Refer to Pulm  Dr Loco     RO INFECTION  Shaking likely tremors sec to albuterol   No fever or leukocytosis poa but pt was on doxy  Procalcitonin borderline 7/15 0.11   added azithro 2020   Check  blod urine c                          HFPEF   BNP 46 poa rules out chf  ECHO  ef 55% pasp 53 [place] 2 dd1       PLAN  INFECTION Pt is COVID naive DC azithro after 5 d rx   DYSPNEA Likely sec copd ex and PH while VTE CHF and infection seem unlikely  COPD ex Recommend pred 50.5d (starting ) At dc spiriva symbicort and prn albuterol  O2 NEED  Target po 90-95% IF rest r exertional PO belo 88 at discharge send with home oxygen   FOLLOWUP Pt will need further workup after discharge   Please refer her to Pulm  [place] office       TIME SPENT Over 25 minutes aggregate care time spent on encounter; activities included combinations of  direct pt care, counseling and/or coordinating care reviewing notes, lab data/ imaging, discussion with multidisciplinary team/ pt /family. Risks, benefits, alternatives of planned interventions when applicable were discussed in detail and questions answered as best as possible    [facility] J[facility]KINS  [phone]  1940 DOA 7/15/2020 DR RAYSHAWN LANDIN

## 2020-07-18 NOTE — PROGRESS NOTE ADULT - ASSESSMENT
Patient with PMH of COPD, anemia, DM type 2 and CHF was presented with sob, shakiness and dizziness. Patient was diagnosed with Acute COPD exacerbation and was started on iv solumedrol and bronchodilators. CT angio was done and showed no PE. PUlm was consulted and added zithromax.     1.	Acute COPD/asthma exacerbation. better lung exam. DC prednisone? psychosis can occur with it.  cont zithromax x 5 days total. pulm following.  2.	Agitation and now somnolence. ? sundowning ? prednisone. limit use of ativan.   3.	Shakiness. Better.   4.	Normocytic anemia. no active bleeding.  5.	DM type 2. uncontrolled with hyperglycemia. Likely from steroids. off prednisone now. cont lantus and premeal insulin. cont correctional insulin. follow finger sticks.   6.	Dizziness. no focal new motor weakness. Unremarkable CT head.   7.	HTN. controlled. cont current management. Monitor  8.	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       Plan discussed with the daughter Murphy  again today. she is coming to see her mom today.       Lovenox  Full Code.

## 2020-07-19 NOTE — PROGRESS NOTE ADULT - ASSESSMENT
[facility]  [phone]  1940 DOA 7/15/2020 DR RAYSHAWN LANDIN            REVIEW OF SYMPTOMS      Able to give ROS  Yes     RELIABLE No   CONSTITUTIONAL Weakness Yes  Chills No Vision changes No  ENDOCRINE No unexplained hair loss No heat or cold intolerance    ALLERGY No hives  Sore throat No   RESP Coughing blood no  Shortness of breath YES   NEURO No Headache  Confusion Pain neck No   CARDIAC No Chest pain No Palpitations   GI No Pain abdomen NO   Vomiting NO     PHYSICAL EXAM    HEENT Unremarkable PERRLA atraumatic   RESP Fair air entry EXP prolonged    Harsh breath sound Resp distres mild   CARDIAC S1 S2 No S3     NO JVD    ABDOMEN SOFT BS PRESENT NOT DISTENDED No hepatosplenomegaly PEDAL EDEMA present No calf tenderness  NO rash   GENERAL Not TOXIC looking    HPI/PMH.      80 f nonsmoker PMH hytn dm chf copd 4 point walker depndent admitted with sob and shakiness since she started new med (albuterol)  also wwas started on doxycycline recently           OXYGENATION      7/15/2020 ra 98%     VITALS/LABS    2020 afeb 80 130/70     PT DATA/BEST PRACTICE  ALLERGY    nka                 WT    7/15/2020 56                                 BMI                            CrCl                                  ADVANCED DIRECTIVE     LINES TUBES POA.               HEAD OF BED ELEVATION Yes      INFECTION PPLX        DVT PROPHYLAXIS         Lvnx 40 (7/15)   CERDA PROPHYLAXIS          DYSPHAGIA EVAL     DIET. Reg                                                                          IV F       PATIENT DATA/ASSESSMENT/RECOMMENDATIONS       RESP GAS EXCANGE   7/15/2020 ra 745/43/72   Gas exchange acceptable   Target po 90-95%            COPD ex  BD steroids  Prednisone 40 equivalent for 5 d   spiriva symbicort    Mucomyst added  as she has viscus secretions       RO VTE   Pt had elevated d-dimr poa   However cta ch was Neg-annamarie   D-dimr 7/15/2020 d-dimr 1040 .   7/15 V duplex Neg   VTE unlikely    ATELECTASIS   Incentive zeke        PULMONARY HYPERTENSION  Likely sec to copd   Will need further workup after discharge Refer to Pulm  Dr Loco     RO INFECTION  Shaking likely tremors sec to albuterol   No fever or leukocytosis poa but pt was on doxy  Procalcitonin borderline 7/15 0.11   added azithro 2020   Check  blod urine c                          HFPEF   BNP 46 poa rules out chf  ECHO  ef 55% pasp 53 [place] 2 dd1       PLAN  INFECTION Pt is COVID naive DC azithro after 5 d rx   DYSPNEA Likely sec copd ex and PH while VTE CHF and infection seem unlikely  COPD ex Recommend pred 50.5d (starting ) At dc spiriva symbicort and prn albuterol  O2 NEED  Target po 90-95% IF rest r exertional PO belo 88 at discharge send with home oxygen   FOLLOWUP Pt will need further workup after discharge   Please refer her to Pulm  [place] office         TIME SPENT Over 25 minutes aggregate care time spent on encounter; activities included combinations of  direct pt care, counseling and/or coordinating care reviewing notes, lab data/ imaging, discussion with multidisciplinary team/ pt /family. Risks, benefits, alternatives of planned interventions when applicable were discussed in detail and questions answered as best as possible    [facility]  [phone]  1940 DOA 7/15/2020 DR RAYSHAWN LANDIN

## 2020-07-19 NOTE — PROGRESS NOTE ADULT - ASSESSMENT
Patient with PMH of COPD, anemia, DM type 2 and CHF was presented with sob, shakiness and dizziness. Patient was diagnosed with Acute COPD exacerbation and was started on iv solumedrol and bronchodilators. CT angio was done and showed no PE. PUlm was consulted and added zithromax.     1.	Acute COPD/asthma exacerbation. off prednisone. no agitation. finish zithromax. cont bronchodilators.   2.	Agitation and now somnolence. improved. no more prednisone or ativan.   3.	Shakiness. Better.   4.	Normocytic anemia. no active bleeding.  5.	DM type 2. uncontrolled with hyperglycemia. Likely from steroids. expect to improve with no steroids. cont correctional insulin and follow finger sticks.   6.	Dizziness. no focal new motor weakness. Unremarkable CT head.   7.	HTN. controlled. cont current management. Monitor  8.	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       HCP: Daughter Murphy       Lovenox  Full Code.

## 2020-07-20 NOTE — PROGRESS NOTE ADULT - ASSESSMENT
[facility]  [id number] [phone]  1940 DOA 7/15/2020 DR RAYSHAWN LANDIN            REVIEW OF SYMPTOMS      Able to give ROS  Yes     RELIABLE No   CONSTITUTIONAL Weakness Yes  Chills No Vision changes No  ENDOCRINE No unexplained hair loss No heat or cold intolerance    ALLERGY No hives  Sore throat No   RESP Coughing blood no  Shortness of breath YES   NEURO No Headache  Confusion Pain neck No   CARDIAC No Chest pain No Palpitations   GI No Pain abdomen NO   Vomiting NO     PHYSICAL EXAM    HEENT Unremarkable PERRLA atraumatic   RESP Fair air entry EXP prolonged    Harsh breath sound Resp distres mild   CARDIAC S1 S2 No S3     NO JVD    ABDOMEN SOFT BS PRESENT NOT DISTENDED No hepatosplenomegaly PEDAL EDEMA present No calf tenderness  NO rash   GENERAL Not TOXIC looking    HPI/PMH.      80 f nonsmoker PMH hytn dm chf copd 4 point walker depndent admitted with sob and shakiness since she started new med (albuterol)  also wwas started on doxycycline recently           OXYGENATION      7/15/2020 ra 98%     VITALS/LABS   2020 afeb 72 120/70      PT DATA/BEST PRACTICE  ALLERGY    nka                 WT    7/15/2020 56                                 BMI                            CrCl                                  ADVANCED DIRECTIVE     LINES TUBES POA.               HEAD OF BED ELEVATION Yes      INFECTION PPLX        DVT PROPHYLAXIS         Lvnx 40 (7/15)   CERDA PROPHYLAXIS          DYSPHAGIA EVAL     DIET. Reg                                                                          IV F                PLAN  INFECTION Pt is COVID naive DC azithro after 5 d rx   DYSPNEA Likely sec copd ex and PH while VTE CHF and infection seem unlikely  COPD ex Recommend pred 50.5d (starting ) At dc spiriva symbicort and prn albuterol  O2 NEED  Target po 90-95% IF rest r exertional PO belo 88 at discharge send with home oxygen   HFPEF   BNP 46 poa rules out significant component chf  ECHO  ef 55% pasp 53 [place] 2 dd1   LASIX LASIX 40 ()   Monitor lytes   FOLLOWUP Pt will need further workup after discharge   Please refer her to Pulm  [place] office     TIME SPENT Over 25 minutes aggregate care time spent on encounter; activities included combinations of  direct pt care, counseling and/or coordinating care reviewing notes, lab data/ imaging, discussion with multidisciplinary team/ pt /family. Risks, benefits, alternatives of planned interventions when applicable were discussed in detail and questions answered as best as possible    [facility]INCENT RONALD J[facility]  [id number] [phone]  1940 DOA 7/15/2020 DR RAYSHAWN LANDIN

## 2020-07-21 NOTE — DISCHARGE NOTE PROVIDER - HOSPITAL COURSE
Patient with PMH of COPD, anemia, DM type 2 and CHF was presented with sob, shakiness and dizziness. Patient was diagnosed with Acute COPD exacerbation and was started on iv solumedrol and bronchodilators. CT angio was done and showed no PE. PUlm was consulted and added zithromax.         Acute COPD/asthma exacerbation. off prednisone. no agitation. finished zithromax. cont bronchodilators.     Agitation and now somnolence. improved. no more prednisone or ativan.     Shakiness. Better, likely 2' to albuterol use.     Normocytic anemia. no active bleeding.    DM type 2. uncontrolled with hyperglycemia. Likely from steroids. improved after discontinuation of steroids.  cont correctional insulin and follow finger sticks. --> Lantus 15 units SC and lispro 4 units TID premeal.    Dizziness. no focal new motor weakness. Unremarkable CT head.     HTN. controlled. cont current management. Monitor    PT>>>[facility].             HCP: Daughter Murphy         Full code.        Patient is stable for discharge to [facility].

## 2020-07-21 NOTE — PROGRESS NOTE ADULT - SUBJECTIVE AND OBJECTIVE BOX
CHIEF COMPLAINT: was agitated this morning and received 0.5mg im ativan. now drowsy.   no report of any fall, head trauma, fever, vomiting or diarrhea     PHYSICAL EXAM:    GENERAL: Elderly pleasant female and on NC oxygen  CHEST/LUNG: Decreased air entry bibasally. no wheezing   HEART: Regular rate and rhythm; No murmurs, rubs  ABDOMEN: Soft, Nontender, Nondistended; Bowel sounds present  EXTREMITIES:  Limited exam. No clubbing, cyanosis, or edema  NERVOUS SYSTEM:  Limited due to somnolence.   Psychiatry: Drowsy but arousable.       OBJECTIVE DATA:       Vital Signs Last 24 Hrs  T(C): 37.3 (2020 10:33), Max: 37.3 (2020 10:33)  T(F): 99.2 (2020 10:33), Max: 99.2 (2020 10:33)  HR: 76 (2020 10:33) (71 - 98)  BP: 122/66 (2020 10:33) (120/79 - 137/72)  BP(mean): --  RR: 18 (2020 10:33) (18 - 18)  SpO2: 99% (2020 10:33) (95% - 100%)           Daily     Daily Weight in k.6 (2020 05:18)  LABS:                        12.4   7.93  )-----------( 203      ( 2020 09:48 )             36.8                 136  |  96  |  24<H>  ----------------------------<  275<H>  3.6   |  34<H>  |  1.09    Ca    9.5      2020 09:48                         CAPILLARY BLOOD GLUCOSE      POCT Blood Glucose.: 303 mg/dL (2020 11:08)      Culture - Urine (collected )  Source: .Urine Catheterized  Preliminary Report ():    10,000 - 49,000 CFU/mL Gram Negative Rods    Culture - Blood (collected 07-15)  Source: .Blood Blood  Preliminary Report ():    No growth to date.    < from: TTE Echo Complete w/o Contrast w/ Doppler (20 @ 17:01) >   1. Left ventricular ejection fraction, by visual estimation, is 55 to 60%.   2. Technically difficult study.   3. Normal global left ventricular systolic function.   4. Mildly increased LV wall thickness.   5. Normal left ventricular internal cavity size.   6. Spectral Doppler shows impaired relaxation pattern of left ventricular myocardial filling (Grade I diastolic dysfunction).   7. Normal right ventricular size and function.   8. Trivial pericardial effusion.   9. Mild mitral annular calcification.  10. Mild mitral valve regurgitation.  11. Mild thickening and calcification of the anterior and posterior mitral valve leaflets.  12. Degenerative tricuspid valve.  13. Moderate tricuspid regurgitation.  14. Estimated pulmonary artery systolic pressure is 53.4mmHg assuming a right atrial pressure of 5 mmHg, which is consistent with moderate pulmonary hypertension.  15. Peak transaortic gradient equals 19.6 mmHg, mean transaortic gradient equals 8.2 mmHg, the calculated aortic valve area equals 2.04 cm² bythe continuity equation consistent with mild aortic stenosis.  16. There is mild aortic root calcification.    	    MEDICATIONS  (STANDING):  ALBUTerol    90 MICROgram(s) HFA Inhaler 1 Puff(s) Inhalation every 4 hours  albuterol/ipratropium for Nebulization 3 milliLiter(s) Nebulizer three times a day  azithromycin   Tablet 500 milliGRAM(s) Oral daily  budesonide 160 MICROgram(s)/formoterol 4.5 MICROgram(s) Inhaler 2 Puff(s) Inhalation two times a day  dextrose 5%. 1000 milliLiter(s) (50 mL/Hr) IV Continuous <Continuous>  dextrose 50% Injectable 12.5 Gram(s) IV Push once  dextrose 50% Injectable 25 Gram(s) IV Push once  dextrose 50% Injectable 25 Gram(s) IV Push once  enoxaparin Injectable 40 milliGRAM(s) SubCutaneous daily  famotidine    Tablet 20 milliGRAM(s) Oral daily  furosemide    Tablet 40 milliGRAM(s) Oral daily  insulin glargine Injectable (LANTUS) 15 Unit(s) SubCutaneous every morning  insulin lispro (HumaLOG) corrective regimen sliding scale   SubCutaneous three times a day before meals  insulin lispro (HumaLOG) corrective regimen sliding scale   SubCutaneous at bedtime  insulin lispro Injectable (HumaLOG) 4 Unit(s) SubCutaneous three times a day before meals  predniSONE   Tablet 20 milliGRAM(s) Oral daily  tiotropium 18 MICROgram(s) Capsule 1 Capsule(s) Inhalation daily    MEDICATIONS  (PRN):  acetaminophen   Tablet .. 650 milliGRAM(s) Oral every 6 hours PRN Mild Pain (1 - 3)  albuterol/ipratropium for Nebulization 3 milliLiter(s) Nebulizer every 4 hours PRN Shortness of Breath and/or Wheezing  dextrose 40% Gel 15 Gram(s) Oral once PRN Blood Glucose LESS THAN 70 milliGRAM(s)/deciliter  glucagon  Injectable 1 milliGRAM(s) IntraMuscular once PRN Glucose LESS THAN 70 milligrams/deciliter
CHIEF COMPLAINT: + SOB and worse on exertion.  + cough  no chest pain  + dizziness but no focal weakness  no active gross bleeding   Per nurse, patient has episode of sudden sob and tachypnea but no chest pain     PHYSICAL EXAM:    GENERAL: Elderly pleasant female and on NC oxygen  CHEST/LUNG: Clear to ausculation bilaterally, no wheezing, no crackles   HEART: Regular rate and rhythm; No murmurs, rubs  ABDOMEN: Soft, Nontender, Nondistended; Bowel sounds present  EXTREMITIES:  Moving all four extremities spontaneously, No clubbing, cyanosis, or edema  NERVOUS SYSTEM:  Grossly non focal.  Psychiatry: AAO x 3, mood is appropriate       OBJECTIVE DATA:   Vital Signs Last 24 Hrs  T(C): 36.4 (2020 09:15), Max: 37.8 (15 Jul 2020 13:34)  T(F): 97.6 (2020 09:15), Max: 100.1 (15 Jul 2020 13:34)  HR: 87 (2020 09:32) (74 - 114)  BP: 150/99 (2020 09:15) (114/94 - 150/99)  BP(mean): --  RR: 30 (2020 09:15) (18 - 35)  SpO2: 99% (2020 09:15) (95% - 100%)           Daily Height in cm: 154.94 (2020 01:07)    Daily Weight in k.3 (2020 04:35)  LABS:                        11.1   5.49  )-----------( 170      ( 2020 06:12 )             33.6             07-16    136  |  100  |  28<H>  ----------------------------<  325<H>  4.4   |  29  |  1.23    Ca    8.9      2020 06:12  Phos  3.7     07-16  Mg     1.1     07-15    TPro  7.4  /  Alb  2.9<L>  /  TBili  0.3  /  DBili  x   /  AST  27  /  ALT  33  /  AlkPhos  114  07-16              PT/INR - ( 2020 06:12 )   PT: 12.9 sec;   INR: 1.16 ratio         PTT - ( 15 Jul 2020 13:48 )  PTT:24.3 sec  Urinalysis Basic - ( 2020 08:58 )    Color: Yellow / Appearance: Clear / S.020 / pH: x  Gluc: x / Ketone: Negative  / Bili: Negative / Urobili: Negative mg/dL   Blood: x / Protein: 100 mg/dL / Nitrite: Negative   Leuk Esterase: Small / RBC: 0-2 /HPF / WBC 11-25   Sq Epi: x / Non Sq Epi: Few / Bacteria: Few       ABG - ( 15 Jul 2020 14:14 )  pH, Arterial: x     pH, Blood: 7.45  /  pCO2: 43    /  pO2: 72    / HCO3: 29    / Base Excess: 5.2   /  SaO2: 94               CARDIAC MARKERS ( 15 Jul 2020 15:37 )  <.015 ng/mL / x     / x     / x     / x      CARDIAC MARKERS ( 15 Jul 2020 13:48 )  <.015 ng/mL / x     / x     / x     / 3.1 ng/mL      CAPILLARY BLOOD GLUCOSE      POCT Blood Glucose.: 294 mg/dL (2020 07:34)         Interval Radiology studies: reviewed by me    < from: CT Angio Chest w/ IV Cont (07.15.20 @ 14:57) >  IMPRESSION:   Negative for pulmonary emboli.  Low lung volumes with bibasilar subsegmental atelectasis.  Additional findings as discussed         MEDICATIONS  (STANDING):  ALBUTerol    0.083%.. 2.5 milliGRAM(s) Nebulizer every 20 minutes  albuterol/ipratropium for Nebulization 3 milliLiter(s) Nebulizer every 6 hours  albuterol/ipratropium for Nebulization. 3 milliLiter(s) Nebulizer once  azithromycin  IVPB      budesonide 160 MICROgram(s)/formoterol 4.5 MICROgram(s) Inhaler 2 Puff(s) Inhalation two times a day  dextrose 5%. 1000 milliLiter(s) (50 mL/Hr) IV Continuous <Continuous>  dextrose 50% Injectable 12.5 Gram(s) IV Push once  dextrose 50% Injectable 25 Gram(s) IV Push once  dextrose 50% Injectable 25 Gram(s) IV Push once  enoxaparin Injectable 40 milliGRAM(s) SubCutaneous daily  insulin glargine Injectable (LANTUS) 8 Unit(s) SubCutaneous every morning  insulin lispro (HumaLOG) corrective regimen sliding scale   SubCutaneous three times a day before meals  insulin lispro (HumaLOG) corrective regimen sliding scale   SubCutaneous at bedtime  methylPREDNISolone sodium succinate Injectable 40 milliGRAM(s) IV Push daily  tiotropium 18 MICROgram(s) Capsule 1 Capsule(s) Inhalation daily    MEDICATIONS  (PRN):  acetaminophen   Tablet .. 650 milliGRAM(s) Oral every 6 hours PRN Mild Pain (1 - 3)  dextrose 40% Gel 15 Gram(s) Oral once PRN Blood Glucose LESS THAN 70 milliGRAM(s)/deciliter  glucagon  Injectable 1 milliGRAM(s) IntraMuscular once PRN Glucose LESS THAN 70 milligrams/deciliter
CHIEF COMPLAINT: + Still feeling short of breath and concerned about hyperglycemia  + cough  no chest pain  + dizziness but no focal weakness  no active gross bleeding   Feeling weak and feel hesitant to participate in PT   + stomach upset     PHYSICAL EXAM:    GENERAL: Elderly pleasant female and on NC oxygen  CHEST/LUNG: Decreased air entry bibasally. no wheezing   HEART: Regular rate and rhythm; No murmurs, rubs  ABDOMEN: Soft, Nontender, Nondistended; Bowel sounds present  EXTREMITIES:  Moving all four extremities spontaneously, No clubbing, cyanosis, or edema  NERVOUS SYSTEM:  Grossly non focal.  Psychiatry: AAO x 3, mood is appropriate       OBJECTIVE DATA:       Vital Signs Last 24 Hrs  T(C): 37.3 (2020 11:30), Max: 37.3 (2020 11:30)  T(F): 99.2 (2020 11:30), Max: 99.2 (2020 11:30)  HR: 79 (2020 11:30) (75 - 86)  BP: 144/71 (2020 11:30) (123/69 - 144/71)  BP(mean): --  RR: 20 (2020 11:30) (18 - 20)  SpO2: 99% (2020 11:30) (98% - 100%)           Daily     Daily Weight in k.3 (2020 05:13)  LABS:                        11.1   5.49  )-----------( 170      ( 2020 06:12 )             33.6             07-16    136  |  100  |  28<H>  ----------------------------<  325<H>  4.4   |  29  |  1.23    Ca    8.9      2020 06:12  Phos  3.7     07-16  Mg     1.1     07-15    TPro  7.4  /  Alb  2.9<L>  /  TBili  0.3  /  DBili  x   /  AST  27  /  ALT  33  /  AlkPhos  114  07-16              PT/INR - ( 2020 06:12 )   PT: 12.9 sec;   INR: 1.16 ratio         PTT - ( 15 Jul 2020 13:48 )  PTT:24.3 sec  Urinalysis Basic - ( 2020 08:58 )    Color: Yellow / Appearance: Clear / S.020 / pH: x  Gluc: x / Ketone: Negative  / Bili: Negative / Urobili: Negative mg/dL   Blood: x / Protein: 100 mg/dL / Nitrite: Negative   Leuk Esterase: Small / RBC: 0-2 /HPF / WBC 11-25   Sq Epi: x / Non Sq Epi: Few / Bacteria: Few      ABG - ( 15 Jul 2020 14:14 )  pH, Arterial: x     pH, Blood: 7.45  /  pCO2: 43    /  pO2: 72    / HCO3: 29    / Base Excess: 5.2   /  SaO2: 94               CARDIAC MARKERS ( 15 Jul 2020 15:37 )  <.015 ng/mL / x     / x     / x     / x      CARDIAC MARKERS ( 15 Jul 2020 13:48 )  <.015 ng/mL / x     / x     / x     / 3.1 ng/mL      CAPILLARY BLOOD GLUCOSE      POCT Blood Glucose.: 233 mg/dL (2020 12:17)      Culture - Blood (collected 07-15)  Source: .Blood Blood  Preliminary Report ():    No growth to date.          Interval Radiology studies: reviewed     < from: CT Head No Cont (20 @ 12:21) >    IMPRESSION:     No acute intracranial findings.    The examination was reviewed at 2020 12:33 PM by Gaurav Guadalupe.    	    MEDICATIONS  (STANDING):  acetylcysteine 10%  Inhalation 2.5 milliLiter(s) Inhalation three times a day  ALBUTerol    90 MICROgram(s) HFA Inhaler 1 Puff(s) Inhalation every 4 hours  albuterol/ipratropium for Nebulization 3 milliLiter(s) Nebulizer three times a day  azithromycin   Tablet 500 milliGRAM(s) Oral daily  budesonide 160 MICROgram(s)/formoterol 4.5 MICROgram(s) Inhaler 2 Puff(s) Inhalation two times a day  dextrose 5%. 1000 milliLiter(s) (50 mL/Hr) IV Continuous <Continuous>  dextrose 50% Injectable 12.5 Gram(s) IV Push once  dextrose 50% Injectable 25 Gram(s) IV Push once  dextrose 50% Injectable 25 Gram(s) IV Push once  enoxaparin Injectable 40 milliGRAM(s) SubCutaneous daily  famotidine    Tablet 20 milliGRAM(s) Oral daily  insulin glargine Injectable (LANTUS) 8 Unit(s) SubCutaneous every morning  insulin lispro (HumaLOG) corrective regimen sliding scale   SubCutaneous three times a day before meals  insulin lispro (HumaLOG) corrective regimen sliding scale   SubCutaneous at bedtime  predniSONE   Tablet 50 milliGRAM(s) Oral daily  tiotropium 18 MICROgram(s) Capsule 1 Capsule(s) Inhalation daily    MEDICATIONS  (PRN):  acetaminophen   Tablet .. 650 milliGRAM(s) Oral every 6 hours PRN Mild Pain (1 - 3)  albuterol/ipratropium for Nebulization 3 milliLiter(s) Nebulizer every 4 hours PRN Shortness of Breath and/or Wheezing  dextrose 40% Gel 15 Gram(s) Oral once PRN Blood Glucose LESS THAN 70 milliGRAM(s)/deciliter  glucagon  Injectable 1 milliGRAM(s) IntraMuscular once PRN Glucose LESS THAN 70 milligrams/deciliter
CHIEF COMPLAINT: fully alert and awake today. no agitation.   no report of any fall, head trauma, fever, vomiting or diarrhea     PHYSICAL EXAM:    GENERAL: Elderly pleasant female and on NC oxygen  CHEST/LUNG: Decreased air entry bibasally. no wheezing   HEART: Regular rate and rhythm; No murmurs, rubs  ABDOMEN: Soft, Nontender, Nondistended; Bowel sounds present  EXTREMITIES:  Limited exam. No clubbing, cyanosis, or edema  NERVOUS SYSTEM:  Limited due to somnolence.   Psychiatry: Drowsy but arousable.       OBJECTIVE DATA:     Vital Signs Last 24 Hrs  T(C): 36.3 (2020 05:03), Max: 37.3 (2020 10:33)  T(F): 97.4 (2020 05:03), Max: 99.2 (2020 10:33)  HR: 75 (2020 05:55) (70 - 89)  BP: 120/60 (2020 05:03) (120/60 - 137/61)  BP(mean): --  RR: 18 (2020 05:03) (18 - 18)  SpO2: 99% (2020 05:55) (98% - 100%)           Daily     Daily Weight in k.7 (2020 05:03)  LABS:                        11.2   8.16  )-----------( 181      ( 2020 06:35 )             33.9             07-    141  |  96  |  29<H>  ----------------------------<  199<H>  4.1   |  42<H>  |  1.20    Ca    9.4      2020 06:35                         CAPILLARY BLOOD GLUCOSE      POCT Blood Glucose.: 223 mg/dL (2020 07:12)      Culture - Urine (collected )  Source: .Urine Catheterized  Preliminary Report ():    10,000 - 49,000 CFU/mL Gram Negative Rods    Culture - Blood (collected 07-15)  Source: .Blood Blood  Preliminary Report ():    No growth to date.      MEDICATIONS  (STANDING):  ALBUTerol    90 MICROgram(s) HFA Inhaler 1 Puff(s) Inhalation every 4 hours  albuterol/ipratropium for Nebulization 3 milliLiter(s) Nebulizer three times a day  azithromycin   Tablet 500 milliGRAM(s) Oral daily  budesonide 160 MICROgram(s)/formoterol 4.5 MICROgram(s) Inhaler 2 Puff(s) Inhalation two times a day  dextrose 5%. 1000 milliLiter	(s) (50 mL/Hr) IV Continuous <Continuous>  dextrose 50% Injectable 12.5 Gram(s) IV Push once  dextrose 50% Injectable 25 Gram(s) IV Push once  dextrose 50% Injectable 25 Gram(s) IV Push once  enoxaparin Injectable 40 milliGRAM(s) SubCutaneous daily  famotidine    Tablet 20 milliGRAM(s) Oral daily  furosemide    Tablet 40 milliGRAM(s) Oral daily  insulin glargine Injectable (LANTUS) 15 Unit(s) SubCutaneous every morning  insulin lispro (HumaLOG) corrective regimen sliding scale   SubCutaneous three times a day before meals  insulin lispro (HumaLOG) corrective regimen sliding scale   SubCutaneous at bedtime  insulin lispro Injectable (HumaLOG) 4 Unit(s) SubCutaneous three times a day before meals  senna 2 Tablet(s) Oral at bedtime  tiotropium 18 MICROgram(s) Capsule 1 Capsule(s) Inhalation daily    MEDICATIONS  (PRN):  acetaminophen   Tablet .. 650 milliGRAM(s) Oral every 6 hours PRN Mild Pain (1 - 3)  albuterol/ipratropium for Nebulization 3 milliLiter(s) Nebulizer every 4 hours PRN Shortness of Breath and/or Wheezing  dextrose 40% Gel 15 Gram(s) Oral once PRN Blood Glucose LESS THAN 70 milliGRAM(s)/deciliter  glucagon  Injectable 1 milliGRAM(s) IntraMuscular once PRN Glucose LESS THAN 70 milligrams/deciliter  polyethylene glycol 3350 17 Gram(s) Oral daily PRN Constipation
Patient with PMH of COPD, anemia, DM type 2 and CHF was presented with sob, shakiness and dizziness. Patient was diagnosed with Acute COPD exacerbation and was started on iv solumedrol and bronchodilators. CT angio was done and showed no PE. PUlm was consulted and added zithromax.    : Seen at bedside; offers no new complaints.  Does not want to go to [facility]; will discuss with her and family.      PAST MEDICAL & SURGICAL HISTORY:  COPD, moderate  COPD (chronic obstructive pulmonary disease)  CHF (congestive heart failure)  HLD (hyperlipidemia)  HTN (hypertension)  Vitamin D deficiency  Dyslipidemia  Constipation  Kidney stones  Type 2 diabetes mellitus  GERD (gastroesophageal reflux disease)  COPD (chronic obstructive pulmonary disease)  HTN (hypertension)  CHF (congestive heart failure): ~ diastolic, Stage I  No significant past surgical history  H/O breast biopsy: ~ 3 times on right, 2 times on left  H/O right nephrectomy: ~ [facility]  History of left knee replacement: ~ x 3 (, , )      REVIEW OF SYSTEMS:  As above      MEDICATIONS  (STANDING):  ALBUTerol    90 MICROgram(s) HFA Inhaler 1 Puff(s) Inhalation every 4 hours  albuterol/ipratropium for Nebulization 3 milliLiter(s) Nebulizer three times a day  azithromycin   Tablet 500 milliGRAM(s) Oral daily  budesonide 160 MICROgram(s)/formoterol 4.5 MICROgram(s) Inhaler 2 Puff(s) Inhalation two times a day  dextrose 5%. 1000 milliLiter(s) (50 mL/Hr) IV Continuous <Continuous>  dextrose 50% Injectable 12.5 Gram(s) IV Push once  dextrose 50% Injectable 25 Gram(s) IV Push once  dextrose 50% Injectable 25 Gram(s) IV Push once  enoxaparin Injectable 40 milliGRAM(s) SubCutaneous daily  famotidine    Tablet 20 milliGRAM(s) Oral daily  furosemide    Tablet 40 milliGRAM(s) Oral daily  insulin glargine Injectable (LANTUS) 15 Unit(s) SubCutaneous every morning  insulin lispro (HumaLOG) corrective regimen sliding scale   SubCutaneous three times a day before meals  insulin lispro (HumaLOG) corrective regimen sliding scale   SubCutaneous at bedtime  insulin lispro Injectable (HumaLOG) 4 Unit(s) SubCutaneous three times a day before meals  senna 2 Tablet(s) Oral at bedtime  tiotropium 18 MICROgram(s) Capsule 1 Capsule(s) Inhalation daily    MEDICATIONS  (PRN):  acetaminophen   Tablet .. 650 milliGRAM(s) Oral every 6 hours PRN Mild Pain (1 - 3)  albuterol/ipratropium for Nebulization 3 milliLiter(s) Nebulizer every 4 hours PRN Shortness of Breath and/or Wheezing  dextrose 40% Gel 15 Gram(s) Oral once PRN Blood Glucose LESS THAN 70 milliGRAM(s)/deciliter  glucagon  Injectable 1 milliGRAM(s) IntraMuscular once PRN Glucose LESS THAN 70 milligrams/deciliter  polyethylene glycol 3350 17 Gram(s) Oral daily PRN Constipation      Allergies  No Known Allergies      FAMILY HISTORY:  FH: COPD (chronic obstructive pulmonary disease) (Child): ~ daughter (non-smoker)  FH: diabetes mellitus (Grandparent): ~ mother, grandmother  FH: CHF (congestive heart failure): ~ mother  FH: breast cancer: ~ mother      Vital Signs Last 24 Hrs  T(C): 36.8 (2020 11:45), Max: 36.9 (2020 00:09)  T(F): 98.3 (2020 11:45), Max: 98.5 (2020 00:09)  HR: 102 (2020 13:20) (72 - 105)  BP: 127/78 (2020 11:45) (124/76 - 139/79)  RR: 18 (2020 11:45) (17 - 18)  SpO2: 98% (2020 13:20) (98% - 100%)    PHYSICAL EXAM:    GENERAL: Elderly pleasant female and on NC oxygen  CHEST/LUNG: Decreased air entry bibasally. no wheezing   HEART: Regular rate and rhythm; No murmurs, rubs  ABDOMEN: Soft, Nontender, Nondistended; Bowel sounds present  EXTREMITIES:  Limited exam. No clubbing, cyanosis, or edema  NERVOUS SYSTEM:  Limited due to somnolence.   Psychiatry: normal affect and behavior      LABS:                        11.2   8.16  )-----------( 181      ( 2020 06:35 )             33.9     07-19    141  |  96  |  29<H>  ----------------------------<  199<H>  4.1   |  42<H>  |  1.20    Ca    9.4      2020 06:35        Urinalysis Basic - ( 2020 17:33 )    Color: Yellow / Appearance: Clear / S.025 / pH: x  Gluc: x / Ketone: Negative  / Bili: Negative / Urobili: Negative mg/dL   Blood: x / Protein: 30 mg/dL / Nitrite: Negative   Leuk Esterase: Moderate / RBC: x / WBC 26-50   Sq Epi: x / Non Sq Epi: x / Bacteria: Many
RONALD VINCENT    LVS 2D [address]    Patient is a 80y old  Female who presents with a chief complaint of sob (17 Jul 2020 13:13)       Allergies    No Known Allergies    Intolerances        HPI:  80f hx htn, dm, chf, copd, 4 point walker dependent pw sob and shaking. patient notes she was woke this morning and was trembling more than ever, though in general it has been going on for a couple of months. she notes she was shaking so badly the whole bed was shaking. he notes she felt like she couldn't breathe. she has no cp, nausea, vomiting, fever, chills, rash, bleeding, numbness, dysuria, rhinorrhea, ha, rhinorrhea. she has not taken anything for symptoms (15 Jul 2020 15:57)      PAST MEDICAL & SURGICAL HISTORY:  COPD, moderate  COPD (chronic obstructive pulmonary disease)  CHF (congestive heart failure)  HLD (hyperlipidemia)  HTN (hypertension)  Vitamin D deficiency  Dyslipidemia  Constipation  Kidney stones  Type 2 diabetes mellitus  GERD (gastroesophageal reflux disease)  COPD (chronic obstructive pulmonary disease)  HTN (hypertension)  CHF (congestive heart failure): ~ diastolic, Stage I  No significant past surgical history  H/O breast biopsy: ~ 3 times on right, 2 times on left  H/O right nephrectomy: ~ [facility]  History of left knee replacement: ~ x 3 (2010, 2011, 2016)      FAMILY HISTORY:  FH: COPD (chronic obstructive pulmonary disease) (Child): ~ daughter (non-smoker)  FH: diabetes mellitus (Grandparent): ~ mother, grandmother  FH: CHF (congestive heart failure): ~ mother  FH: breast cancer: ~ mother        MEDICATIONS   acetaminophen   Tablet .. 650 milliGRAM(s) Oral every 6 hours PRN  ALBUTerol    90 MICROgram(s) HFA Inhaler 1 Puff(s) Inhalation every 4 hours  albuterol/ipratropium for Nebulization 3 milliLiter(s) Nebulizer three times a day  albuterol/ipratropium for Nebulization 3 milliLiter(s) Nebulizer every 4 hours PRN  azithromycin   Tablet 500 milliGRAM(s) Oral daily  budesonide 160 MICROgram(s)/formoterol 4.5 MICROgram(s) Inhaler 2 Puff(s) Inhalation two times a day  dextrose 40% Gel 15 Gram(s) Oral once PRN  dextrose 5%. 1000 milliLiter(s) IV Continuous <Continuous>  dextrose 50% Injectable 12.5 Gram(s) IV Push once  dextrose 50% Injectable 25 Gram(s) IV Push once  dextrose 50% Injectable 25 Gram(s) IV Push once  enoxaparin Injectable 40 milliGRAM(s) SubCutaneous daily  famotidine    Tablet 20 milliGRAM(s) Oral daily  furosemide    Tablet 40 milliGRAM(s) Oral daily  glucagon  Injectable 1 milliGRAM(s) IntraMuscular once PRN  insulin glargine Injectable (LANTUS) 15 Unit(s) SubCutaneous every morning  insulin lispro (HumaLOG) corrective regimen sliding scale   SubCutaneous three times a day before meals  insulin lispro (HumaLOG) corrective regimen sliding scale   SubCutaneous at bedtime  insulin lispro Injectable (HumaLOG) 4 Unit(s) SubCutaneous three times a day before meals  predniSONE   Tablet 50 milliGRAM(s) Oral daily  tiotropium 18 MICROgram(s) Capsule 1 Capsule(s) Inhalation daily      Vital Signs Last 24 Hrs  T(C): 37.3 (18 Jul 2020 10:33), Max: 37.3 (17 Jul 2020 11:30)  T(F): 99.2 (18 Jul 2020 10:33), Max: 99.2 (17 Jul 2020 11:30)  HR: 76 (18 Jul 2020 10:33) (71 - 98)  BP: 122/66 (18 Jul 2020 10:33) (120/79 - 144/71)  BP(mean): --  RR: 18 (18 Jul 2020 10:33) (18 - 20)  SpO2: 99% (18 Jul 2020 10:33) (95% - 100%)      07-17-20 @ 07:01  -  07-18-20 @ 07:00  --------------------------------------------------------  IN: 600 mL / OUT: 750 mL / NET: -150 mL            LABS:                        12.4   7.93  )-----------( 203      ( 18 Jul 2020 09:48 )             36.8     07-18    136  |  96  |  24<H>  ----------------------------<  275<H>  3.6   |  34<H>  |  1.09    Ca    9.5      18 Jul 2020 09:48                WBC:  WBC Count: 7.93 K/uL (07-18 @ 09:48)  WBC Count: 5.49 K/uL (07-16 @ 06:12)  WBC Count: 6.20 K/uL (07-15 @ 13:48)      MICROBIOLOGY:  RECENT CULTURES:  07-16 .Urine Catheterized XXXX XXXX   10,000 - 49,000 CFU/mL Gram Negative Rods    07-15 .Blood Blood XXXX XXXX   No growth to date.                    Sodium:  Sodium, Serum: 136 mmol/L (07-18 @ 09:48)  Sodium, Serum: 136 mmol/L (07-16 @ 06:12)  Sodium, Serum: 141 mmol/L (07-15 @ 13:48)      1.09 mg/dL 07-18 @ 09:48  1.23 mg/dL 07-16 @ 06:12  1.07 mg/dL 07-15 @ 13:48      Hemoglobin:  Hemoglobin: 12.4 g/dL (07-18 @ 09:48)  Hemoglobin: 11.1 g/dL (07-16 @ 06:12)  Hemoglobin: 11.7 g/dL (07-15 @ 13:48)      Platelets: Platelet Count - Automated: 203 K/uL (07-18 @ 09:48)  Platelet Count - Automated: 170 K/uL (07-16 @ 06:12)  Platelet Count - Automated: 178 K/uL (07-15 @ 13:48)              RADIOLOGY & ADDITIONAL STUDIES:
RONALD VINCENT    LVS 2D [address]    Patient is a 80y old  Female who presents with a chief complaint of sob (19 Jul 2020 10:26)       Allergies    No Known Allergies    Intolerances        HPI:  80f hx htn, dm, chf, copd, 4 point walker dependent pw sob and shaking. patient notes she was woke this morning and was trembling more than ever, though in general it has been going on for a couple of months. she notes she was shaking so badly the whole bed was shaking. he notes she felt like she couldn't breathe. she has no cp, nausea, vomiting, fever, chills, rash, bleeding, numbness, dysuria, rhinorrhea, ha, rhinorrhea. she has not taken anything for symptoms (15 Jul 2020 15:57)      PAST MEDICAL & SURGICAL HISTORY:  COPD, moderate  COPD (chronic obstructive pulmonary disease)  CHF (congestive heart failure)  HLD (hyperlipidemia)  HTN (hypertension)  Vitamin D deficiency  Dyslipidemia  Constipation  Kidney stones  Type 2 diabetes mellitus  GERD (gastroesophageal reflux disease)  COPD (chronic obstructive pulmonary disease)  HTN (hypertension)  CHF (congestive heart failure): ~ diastolic, Stage I  No significant past surgical history  H/O breast biopsy: ~ 3 times on right, 2 times on left  H/O right nephrectomy: ~ [facility]  History of left knee replacement: ~ x 3 (2010, 2011, 2016)      FAMILY HISTORY:  FH: COPD (chronic obstructive pulmonary disease) (Child): ~ daughter (non-smoker)  FH: diabetes mellitus (Grandparent): ~ mother, grandmother  FH: CHF (congestive heart failure): ~ mother  FH: breast cancer: ~ mother        MEDICATIONS   acetaminophen   Tablet .. 650 milliGRAM(s) Oral every 6 hours PRN  ALBUTerol    90 MICROgram(s) HFA Inhaler 1 Puff(s) Inhalation every 4 hours  albuterol/ipratropium for Nebulization 3 milliLiter(s) Nebulizer three times a day  albuterol/ipratropium for Nebulization 3 milliLiter(s) Nebulizer every 4 hours PRN  azithromycin   Tablet 500 milliGRAM(s) Oral daily  budesonide 160 MICROgram(s)/formoterol 4.5 MICROgram(s) Inhaler 2 Puff(s) Inhalation two times a day  dextrose 40% Gel 15 Gram(s) Oral once PRN  dextrose 5%. 1000 milliLiter(s) IV Continuous <Continuous>  dextrose 50% Injectable 12.5 Gram(s) IV Push once  dextrose 50% Injectable 25 Gram(s) IV Push once  dextrose 50% Injectable 25 Gram(s) IV Push once  enoxaparin Injectable 40 milliGRAM(s) SubCutaneous daily  famotidine    Tablet 20 milliGRAM(s) Oral daily  furosemide    Tablet 40 milliGRAM(s) Oral daily  glucagon  Injectable 1 milliGRAM(s) IntraMuscular once PRN  insulin glargine Injectable (LANTUS) 15 Unit(s) SubCutaneous every morning  insulin lispro (HumaLOG) corrective regimen sliding scale   SubCutaneous three times a day before meals  insulin lispro (HumaLOG) corrective regimen sliding scale   SubCutaneous at bedtime  insulin lispro Injectable (HumaLOG) 4 Unit(s) SubCutaneous three times a day before meals  polyethylene glycol 3350 17 Gram(s) Oral daily PRN  senna 2 Tablet(s) Oral at bedtime  tiotropium 18 MICROgram(s) Capsule 1 Capsule(s) Inhalation daily      Vital Signs Last 24 Hrs  T(C): 36.6 (19 Jul 2020 11:21), Max: 37.1 (18 Jul 2020 17:24)  T(F): 97.8 (19 Jul 2020 11:21), Max: 98.8 (18 Jul 2020 17:24)  HR: 83 (19 Jul 2020 11:21) (70 - 89)  BP: 139/76 (19 Jul 2020 11:21) (120/60 - 139/76)  BP(mean): --  RR: 18 (19 Jul 2020 11:21) (18 - 18)  SpO2: 100% (19 Jul 2020 11:21) (99% - 100%)      07-18-20 @ 07:01  -  07-19-20 @ 07:00  --------------------------------------------------------  IN: 960 mL / OUT: 1000 mL / NET: -40 mL            LABS:                        11.2   8.16  )-----------( 181      ( 19 Jul 2020 06:35 )             33.9     07-19    141  |  96  |  29<H>  ----------------------------<  199<H>  4.1   |  42<H>  |  1.20    Ca    9.4      19 Jul 2020 06:35                WBC:  WBC Count: 8.16 K/uL (07-19 @ 06:35)  WBC Count: 7.93 K/uL (07-18 @ 09:48)  WBC Count: 5.49 K/uL (07-16 @ 06:12)      MICROBIOLOGY:  RECENT CULTURES:  07-16 .Urine Catheterized XXXX XXXX   >=3 organisms. Probable collection contamination.    07-15 .Blood Blood XXXX XXXX   No growth to date.                    Sodium:  Sodium, Serum: 141 mmol/L (07-19 @ 06:35)  Sodium, Serum: 136 mmol/L (07-18 @ 09:48)  Sodium, Serum: 136 mmol/L (07-16 @ 06:12)      1.20 mg/dL 07-19 @ 06:35  1.09 mg/dL 07-18 @ 09:48  1.23 mg/dL 07-16 @ 06:12      Hemoglobin:  Hemoglobin: 11.2 g/dL (07-19 @ 06:35)  Hemoglobin: 12.4 g/dL (07-18 @ 09:48)  Hemoglobin: 11.1 g/dL (07-16 @ 06:12)      Platelets: Platelet Count - Automated: 181 K/uL (07-19 @ 06:35)  Platelet Count - Automated: 203 K/uL (07-18 @ 09:48)  Platelet Count - Automated: 170 K/uL (07-16 @ 06:12)              RADIOLOGY & ADDITIONAL STUDIES:
RONALD VINCENT    LVS 2D [address]    Patient is a 80y old  Female who presents with a chief complaint of sob (2020 10:56)       Allergies    No Known Allergies    Intolerances        HPI:  80f hx htn, dm, chf, copd, 4 point walker dependent pw sob and shaking. patient notes she was woke this morning and was trembling more than ever, though in general it has been going on for a couple of months. she notes she was shaking so badly the whole bed was shaking. he notes she felt like she couldn't breathe. she has no cp, nausea, vomiting, fever, chills, rash, bleeding, numbness, dysuria, rhinorrhea, ha, rhinorrhea. she has not taken anything for symptoms (15 Jul 2020 15:57)      PAST MEDICAL & SURGICAL HISTORY:  COPD, moderate  COPD (chronic obstructive pulmonary disease)  CHF (congestive heart failure)  HLD (hyperlipidemia)  HTN (hypertension)  Vitamin D deficiency  Dyslipidemia  Constipation  Kidney stones  Type 2 diabetes mellitus  GERD (gastroesophageal reflux disease)  COPD (chronic obstructive pulmonary disease)  HTN (hypertension)  CHF (congestive heart failure): ~ diastolic, Stage I  No significant past surgical history  H/O breast biopsy: ~ 3 times on right, 2 times on left  H/O right nephrectomy: ~ [facility]  History of left knee replacement: ~ x 3 (, , )      FAMILY HISTORY:  FH: COPD (chronic obstructive pulmonary disease) (Child): ~ daughter (non-smoker)  FH: diabetes mellitus (Grandparent): ~ mother, grandmother  FH: CHF (congestive heart failure): ~ mother  FH: breast cancer: ~ mother        MEDICATIONS   acetaminophen   Tablet .. 650 milliGRAM(s) Oral every 6 hours PRN  acetylcysteine 10%  Inhalation 2.5 milliLiter(s) Inhalation three times a day  ALBUTerol    90 MICROgram(s) HFA Inhaler 1 Puff(s) Inhalation every 4 hours  albuterol/ipratropium for Nebulization 3 milliLiter(s) Nebulizer three times a day  albuterol/ipratropium for Nebulization 3 milliLiter(s) Nebulizer every 4 hours PRN  azithromycin  IVPB      azithromycin  IVPB 500 milliGRAM(s) IV Intermittent every 24 hours  budesonide 160 MICROgram(s)/formoterol 4.5 MICROgram(s) Inhaler 2 Puff(s) Inhalation two times a day  dextrose 40% Gel 15 Gram(s) Oral once PRN  dextrose 5%. 1000 milliLiter(s) IV Continuous <Continuous>  dextrose 50% Injectable 12.5 Gram(s) IV Push once  dextrose 50% Injectable 25 Gram(s) IV Push once  dextrose 50% Injectable 25 Gram(s) IV Push once  enoxaparin Injectable 40 milliGRAM(s) SubCutaneous daily  glucagon  Injectable 1 milliGRAM(s) IntraMuscular once PRN  insulin glargine Injectable (LANTUS) 8 Unit(s) SubCutaneous every morning  insulin lispro (HumaLOG) corrective regimen sliding scale   SubCutaneous three times a day before meals  insulin lispro (HumaLOG) corrective regimen sliding scale   SubCutaneous at bedtime  methylPREDNISolone sodium succinate Injectable 40 milliGRAM(s) IV Push daily  tiotropium 18 MICROgram(s) Capsule 1 Capsule(s) Inhalation daily      Vital Signs Last 24 Hrs  T(C): 36.9 (2020 05:13), Max: 36.9 (2020 16:16)  T(F): 98.4 (2020 05:13), Max: 98.5 (2020 16:16)  HR: 77 (2020 05:13) (74 - 97)  BP: 133/61 (2020 05:13) (123/69 - 150/99)  BP(mean): --  RR: 18 (2020 05:13) (18 - 30)  SpO2: 100% (2020 05:13) (95% - 100%)      20 @ 07:01  -  - @ 06:33  --------------------------------------------------------  IN: 480 mL / OUT: 651 mL / NET: -171 mL            LABS:                        11.1   5.49  )-----------( 170      ( 2020 06:12 )             33.6     07-16    136  |  100  |  28<H>  ----------------------------<  325<H>  4.4   |  29  |  1.23    Ca    8.9      2020 06:12  Phos  3.7     07-16  Mg     1.1     07-15    TPro  7.4  /  Alb  2.9<L>  /  TBili  0.3  /  DBili  x   /  AST  27  /  ALT  33  /  AlkPhos  114      PT/INR - ( 2020 06:12 )   PT: 12.9 sec;   INR: 1.16 ratio         PTT - ( 15 Jul 2020 13:48 )  PTT:24.3 sec  Urinalysis Basic - ( 2020 08:58 )    Color: Yellow / Appearance: Clear / S.020 / pH: x  Gluc: x / Ketone: Negative  / Bili: Negative / Urobili: Negative mg/dL   Blood: x / Protein: 100 mg/dL / Nitrite: Negative   Leuk Esterase: Small / RBC: 0-2 /HPF / WBC 11-25   Sq Epi: x / Non Sq Epi: Few / Bacteria: Few        ABG - ( 15 Jul 2020 14:14 )  pH, Arterial: x     pH, Blood: 7.45  /  pCO2: 43    /  pO2: 72    / HCO3: 29    / Base Excess: 5.2   /  SaO2: 94                  WBC:  WBC Count: 5.49 K/uL ( @ 06:12)  WBC Count: 6.20 K/uL (07-15 @ 13:48)      MICROBIOLOGY:  RECENT CULTURES:  07-15 .Blood Blood XXXX XXXX   No growth to date.          CARDIAC MARKERS ( 15 Jul 2020 15:37 )  <.015 ng/mL / x     / x     / x     / x      CARDIAC MARKERS ( 15 Jul 2020 13:48 )  <.015 ng/mL / x     / x     / x     / 3.1 ng/mL        PT/INR - ( 2020 06:12 )   PT: 12.9 sec;   INR: 1.16 ratio         PTT - ( 15 Jul 2020 13:48 )  PTT:24.3 sec    Sodium:  Sodium, Serum: 136 mmol/L ( @ 06:12)  Sodium, Serum: 141 mmol/L (07-15 @ 13:48)      1.23 mg/dL  @ 06:12  1.07 mg/dL 07-15 @ 13:48      Hemoglobin:  Hemoglobin: 11.1 g/dL ( @ 06:12)  Hemoglobin: 11.7 g/dL (07-15 @ 13:48)      Platelets: Platelet Count - Automated: 170 K/uL ( @ 06:12)  Platelet Count - Automated: 178 K/uL (07-15 @ 13:48)      LIVER FUNCTIONS - ( 2020 06:12 )  Alb: 2.9 g/dL / Pro: 7.4 gm/dL / ALK PHOS: 114 U/L / ALT: 33 U/L / AST: 27 U/L / GGT: x             Urinalysis Basic - ( 2020 08:58 )    Color: Yellow / Appearance: Clear / S.020 / pH: x  Gluc: x / Ketone: Negative  / Bili: Negative / Urobili: Negative mg/dL   Blood: x / Protein: 100 mg/dL / Nitrite: Negative   Leuk Esterase: Small / RBC: 0-2 /HPF / WBC 11-25   Sq Epi: x / Non Sq Epi: Few / Bacteria: Few        RADIOLOGY & ADDITIONAL STUDIES:
RONALD VINCENT    LVS 2E [address]    Patient is a 80y old  Female who presents with a chief complaint of sob (2020 16:06)       Allergies    No Known Allergies    Intolerances        HPI:  80f hx htn, dm, chf, copd, 4 point walker dependent pw sob and shaking. patient notes she was woke this morning and was trembling more than ever, though in general it has been going on for a couple of months. she notes she was shaking so badly the whole bed was shaking. he notes she felt like she couldn't breathe. she has no cp, nausea, vomiting, fever, chills, rash, bleeding, numbness, dysuria, rhinorrhea, ha, rhinorrhea. she has not taken anything for symptoms (15 Jul 2020 15:57)      PAST MEDICAL & SURGICAL HISTORY:  COPD, moderate  COPD (chronic obstructive pulmonary disease)  CHF (congestive heart failure)  HLD (hyperlipidemia)  HTN (hypertension)  Vitamin D deficiency  Dyslipidemia  Constipation  Kidney stones  Type 2 diabetes mellitus  GERD (gastroesophageal reflux disease)  COPD (chronic obstructive pulmonary disease)  HTN (hypertension)  CHF (congestive heart failure): ~ diastolic, Stage I  No significant past surgical history  H/O breast biopsy: ~ 3 times on right, 2 times on left  H/O right nephrectomy: ~ [facility]  History of left knee replacement: ~ x 3 (, , )      FAMILY HISTORY:  FH: COPD (chronic obstructive pulmonary disease) (Child): ~ daughter (non-smoker)  FH: diabetes mellitus (Grandparent): ~ mother, grandmother  FH: CHF (congestive heart failure): ~ mother  FH: breast cancer: ~ mother        MEDICATIONS   acetaminophen   Tablet .. 650 milliGRAM(s) Oral every 6 hours PRN  ALBUTerol    90 MICROgram(s) HFA Inhaler 1 Puff(s) Inhalation every 4 hours  albuterol/ipratropium for Nebulization 3 milliLiter(s) Nebulizer three times a day  albuterol/ipratropium for Nebulization 3 milliLiter(s) Nebulizer every 4 hours PRN  azithromycin   Tablet 500 milliGRAM(s) Oral daily  budesonide 160 MICROgram(s)/formoterol 4.5 MICROgram(s) Inhaler 2 Puff(s) Inhalation two times a day  dextrose 40% Gel 15 Gram(s) Oral once PRN  dextrose 5%. 1000 milliLiter(s) IV Continuous <Continuous>  dextrose 50% Injectable 12.5 Gram(s) IV Push once  dextrose 50% Injectable 25 Gram(s) IV Push once  dextrose 50% Injectable 25 Gram(s) IV Push once  enoxaparin Injectable 40 milliGRAM(s) SubCutaneous daily  famotidine    Tablet 20 milliGRAM(s) Oral daily  furosemide    Tablet 40 milliGRAM(s) Oral daily  glucagon  Injectable 1 milliGRAM(s) IntraMuscular once PRN  insulin glargine Injectable (LANTUS) 15 Unit(s) SubCutaneous every morning  insulin lispro (HumaLOG) corrective regimen sliding scale   SubCutaneous three times a day before meals  insulin lispro (HumaLOG) corrective regimen sliding scale   SubCutaneous at bedtime  insulin lispro Injectable (HumaLOG) 4 Unit(s) SubCutaneous three times a day before meals  polyethylene glycol 3350 17 Gram(s) Oral daily PRN  senna 2 Tablet(s) Oral at bedtime  tiotropium 18 MICROgram(s) Capsule 1 Capsule(s) Inhalation daily      Vital Signs Last 24 Hrs  T(C): 36.6 (2020 05:14), Max: 36.9 (2020 00:09)  T(F): 97.8 (2020 05:14), Max: 98.5 (2020 00:09)  HR: 72 (2020 05:39) (72 - 85)  BP: 124/76 (2020 05:14) (124/76 - 139/79)  BP(mean): --  RR: 17 (2020 05:14) (17 - 18)  SpO2: 98% (2020 05:39) (97% - 100%)      20 @ 07:01  -  20 @ 07:00  --------------------------------------------------------  IN: 280 mL / OUT: 600 mL / NET: -320 mL            LABS:                        11.2   8.16  )-----------( 181      ( 2020 06:35 )             33.9     07-19    141  |  96  |  29<H>  ----------------------------<  199<H>  4.1   |  42<H>  |  1.20    Ca    9.4      2020 06:35        Urinalysis Basic - ( 2020 17:33 )    Color: Yellow / Appearance: Clear / S.025 / pH: x  Gluc: x / Ketone: Negative  / Bili: Negative / Urobili: Negative mg/dL   Blood: x / Protein: 30 mg/dL / Nitrite: Negative   Leuk Esterase: Moderate / RBC: x / WBC 26-50   Sq Epi: x / Non Sq Epi: x / Bacteria: Many            WBC:  WBC Count: 8.16 K/uL ( @ 06:35)  WBC Count: 7.93 K/uL ( @ 09:48)      MICROBIOLOGY:  RECENT CULTURES:   .Urine Catheterized XXXX XXXX   >=3 organisms. Probable collection contamination.    07-15 .Blood Blood XXXX XXXX   No growth to date.                    Sodium:  Sodium, Serum: 141 mmol/L ( @ 06:35)  Sodium, Serum: 136 mmol/L ( @ 09:48)      1.20 mg/dL  @ 06:35  1.09 mg/dL  @ 09:48      Hemoglobin:  Hemoglobin: 11.2 g/dL ( @ 06:35)  Hemoglobin: 12.4 g/dL ( @ 09:48)      Platelets: Platelet Count - Automated: 181 K/uL ( @ 06:35)  Platelet Count - Automated: 203 K/uL ( @ 09:48)          Urinalysis Basic - ( 2020 17:33 )    Color: Yellow / Appearance: Clear / S.025 / pH: x  Gluc: x / Ketone: Negative  / Bili: Negative / Urobili: Negative mg/dL   Blood: x / Protein: 30 mg/dL / Nitrite: Negative   Leuk Esterase: Moderate / RBC: x / WBC 26-50   Sq Epi: x / Non Sq Epi: x / Bacteria: Many        RADIOLOGY & ADDITIONAL STUDIES:
RONALD VINCENT    LVS 2E 292 D    Patient is a 80y old  Female who presents with a chief complaint of sob (2020 17:04)       Allergies    No Known Allergies    Intolerances        HPI:  80f hx htn, dm, chf, copd, 4 point walker dependent pw sob and shaking. patient notes she was woke this morning and was trembling more than ever, though in general it has been going on for a couple of months. she notes she was shaking so badly the whole bed was shaking. he notes she felt like she couldn't breathe. she has no cp, nausea, vomiting, fever, chills, rash, bleeding, numbness, dysuria, rhinorrhea, ha, rhinorrhea. she has not taken anything for symptoms (15 Jul 2020 15:57)      PAST MEDICAL & SURGICAL HISTORY:  COPD, moderate  COPD (chronic obstructive pulmonary disease)  CHF (congestive heart failure)  HLD (hyperlipidemia)  HTN (hypertension)  Vitamin D deficiency  Dyslipidemia  Constipation  Kidney stones  Type 2 diabetes mellitus  GERD (gastroesophageal reflux disease)  COPD (chronic obstructive pulmonary disease)  HTN (hypertension)  CHF (congestive heart failure): ~ diastolic, Stage I  No significant past surgical history  H/O breast biopsy: ~ 3 times on right, 2 times on left  H/O right nephrectomy: ~ [facility]  History of left knee replacement: ~ x 3 (, , )      FAMILY HISTORY:  FH: COPD (chronic obstructive pulmonary disease) (Child): ~ daughter (non-smoker)  FH: diabetes mellitus (Grandparent): ~ mother, grandmother  FH: CHF (congestive heart failure): ~ mother  FH: breast cancer: ~ mother        MEDICATIONS   acetaminophen   Tablet .. 650 milliGRAM(s) Oral every 6 hours PRN  ALBUTerol    90 MICROgram(s) HFA Inhaler 1 Puff(s) Inhalation every 4 hours  albuterol/ipratropium for Nebulization 3 milliLiter(s) Nebulizer three times a day  albuterol/ipratropium for Nebulization 3 milliLiter(s) Nebulizer every 4 hours PRN  azithromycin   Tablet 500 milliGRAM(s) Oral daily  budesonide 160 MICROgram(s)/formoterol 4.5 MICROgram(s) Inhaler 2 Puff(s) Inhalation two times a day  dextrose 40% Gel 15 Gram(s) Oral once PRN  dextrose 5%. 1000 milliLiter(s) IV Continuous <Continuous>  dextrose 50% Injectable 12.5 Gram(s) IV Push once  dextrose 50% Injectable 25 Gram(s) IV Push once  dextrose 50% Injectable 25 Gram(s) IV Push once  enoxaparin Injectable 40 milliGRAM(s) SubCutaneous daily  famotidine    Tablet 20 milliGRAM(s) Oral daily  furosemide    Tablet 40 milliGRAM(s) Oral daily  glucagon  Injectable 1 milliGRAM(s) IntraMuscular once PRN  insulin glargine Injectable (LANTUS) 15 Unit(s) SubCutaneous every morning  insulin lispro (HumaLOG) corrective regimen sliding scale   SubCutaneous three times a day before meals  insulin lispro (HumaLOG) corrective regimen sliding scale   SubCutaneous at bedtime  insulin lispro Injectable (HumaLOG) 4 Unit(s) SubCutaneous three times a day before meals  polyethylene glycol 3350 17 Gram(s) Oral daily PRN  senna 2 Tablet(s) Oral at bedtime  tiotropium 18 MICROgram(s) Capsule 1 Capsule(s) Inhalation daily      Vital Signs Last 24 Hrs  T(C): 37.1 (2020 05:44), Max: 37.1 (2020 05:44)  T(F): 98.8 (2020 05:44), Max: 98.8 (2020 05:44)  HR: 80 (2020 05:44) (78 - 105)  BP: 138/85 (2020 05:44) (124/65 - 138/85)  BP(mean): --  RR: 18 (2020 05:44) (16 - 18)  SpO2: 100% (2020 05:44) (98% - 100%)      20 @ 07:01  -  20 @ 07:00  --------------------------------------------------------  IN: 280 mL / OUT: 2100 mL / NET: -1820 mL            LABS:                        11.8   6.07  )-----------( 190      ( 2020 06:23 )             35.8     07-21    138  |  93<L>  |  46<H>  ----------------------------<  170<H>  3.6   |  44<H>  |  1.10    Ca    9.4      2020 06:23    TPro  6.9  /  Alb  2.5<L>  /  TBili  0.3  /  DBili  x   /  AST  24  /  ALT  38  /  AlkPhos  79        Urinalysis Basic - ( 2020 17:33 )    Color: Yellow / Appearance: Clear / S.025 / pH: x  Gluc: x / Ketone: Negative  / Bili: Negative / Urobili: Negative mg/dL   Blood: x / Protein: 30 mg/dL / Nitrite: Negative   Leuk Esterase: Moderate / RBC: x / WBC 26-50   Sq Epi: x / Non Sq Epi: x / Bacteria: Many            WBC:  WBC Count: 6.07 K/uL ( @ 06:23)  WBC Count: 8.16 K/uL ( @ 06:35)  WBC Count: 7.93 K/uL ( @ 09:48)      MICROBIOLOGY:  RECENT CULTURES:   .Urine Clean Catch (Midstream) XXXX XXXX   10,000 - 49,000 CFU/mL Escherichia coli     .Urine Catheterized XXXX XXXX   >=3 organisms. Probable collection contamination.    07-15 .Blood Blood XXXX XXXX   No Growth Final                    Sodium:  Sodium, Serum: 138 mmol/L ( @ 06:23)  Sodium, Serum: 141 mmol/L ( 06:35)  Sodium, Serum: 136 mmol/L ( 09:48)      1.10 mg/dL :23  1.20 mg/dL  06:35  1.09 mg/dL  09:48      Hemoglobin:  Hemoglobin: 11.8 g/dL ( @ 06:23)  Hemoglobin: 11.2 g/dL ( @ 06:35)  Hemoglobin: 12.4 g/dL ( 09:48)      Platelets: Platelet Count - Automated: 190 K/uL ( @ 06:23)  Platelet Count - Automated: 181 K/uL ( @ 06:35)  Platelet Count - Automated: 203 K/uL ( @ 09:48)      LIVER FUNCTIONS - ( 2020 06:23 )  Alb: 2.5 g/dL / Pro: 6.9 gm/dL / ALK PHOS: 79 U/L / ALT: 38 U/L / AST: 24 U/L / GGT: x             Urinalysis Basic - ( 2020 17:33 )    Color: Yellow / Appearance: Clear / S.025 / pH: x  Gluc: x / Ketone: Negative  / Bili: Negative / Urobili: Negative mg/dL   Blood: x / Protein: 30 mg/dL / Nitrite: Negative   Leuk Esterase: Moderate / RBC: x / WBC 26-50   Sq Epi: x / Non Sq Epi: x / Bacteria: Many        RADIOLOGY & ADDITIONAL STUDIES:
RONALD VINCENT    S 2D 261 [address]    Patient is a 80y old  Female who presents with a chief complaint of sob (15 Jul 2020 17:19)       Allergies    No Known Allergies    Intolerances        HPI:  80f hx htn, dm, chf, copd, 4 point walker dependent pw sob and shaking. patient notes she was woke this morning and was trembling more than ever, though in general it has been going on for a couple of months. she notes she was shaking so badly the whole bed was shaking. he notes she felt like she couldn't breathe. she has no cp, nausea, vomiting, fever, chills, rash, bleeding, numbness, dysuria, rhinorrhea, ha, rhinorrhea. she has not taken anything for symptoms (15 Jul 2020 15:57)      PAST MEDICAL & SURGICAL HISTORY:  COPD, moderate      FAMILY HISTORY:        MEDICATIONS   acetaminophen   Tablet .. 650 milliGRAM(s) Oral every 6 hours PRN  ALBUTerol    0.083%.. 2.5 milliGRAM(s) Nebulizer every 20 minutes  albuterol/ipratropium for Nebulization 3 milliLiter(s) Nebulizer every 6 hours  azithromycin  IVPB      azithromycin  IVPB 500 milliGRAM(s) IV Intermittent once  budesonide 160 MICROgram(s)/formoterol 4.5 MICROgram(s) Inhaler 2 Puff(s) Inhalation two times a day  dextrose 40% Gel 15 Gram(s) Oral once PRN  dextrose 5%. 1000 milliLiter(s) IV Continuous <Continuous>  dextrose 50% Injectable 12.5 Gram(s) IV Push once  dextrose 50% Injectable 25 Gram(s) IV Push once  dextrose 50% Injectable 25 Gram(s) IV Push once  enoxaparin Injectable 40 milliGRAM(s) SubCutaneous daily  glucagon  Injectable 1 milliGRAM(s) IntraMuscular once PRN  insulin lispro (HumaLOG) corrective regimen sliding scale   SubCutaneous three times a day before meals  insulin lispro (HumaLOG) corrective regimen sliding scale   SubCutaneous at bedtime  methylPREDNISolone sodium succinate Injectable 40 milliGRAM(s) IV Push daily  tiotropium 18 MICROgram(s) Capsule 1 Capsule(s) Inhalation daily      Vital Signs Last 24 Hrs  T(C): 36.8 (16 Jul 2020 04:35), Max: 37.8 (15 Jul 2020 13:34)  T(F): 98.2 (16 Jul 2020 04:35), Max: 100.1 (15 Jul 2020 13:34)  HR: 93 (16 Jul 2020 04:35) (90 - 114)  BP: 137/96 (16 Jul 2020 04:35) (114/94 - 150/78)  BP(mean): --  RR: 19 (16 Jul 2020 04:35) (18 - 35)  SpO2: 99% (16 Jul 2020 04:35) (98% - 100%)            LABS:                        11.1   5.49  )-----------( 170      ( 16 Jul 2020 06:12 )             33.6     07-16    136  |  100  |  28<H>  ----------------------------<  325<H>  4.4   |  29  |  1.23    Ca    8.9      16 Jul 2020 06:12  Phos  3.7     07-16  Mg     1.1     07-15    TPro  7.4  /  Alb  2.9<L>  /  TBili  0.3  /  DBili  x   /  AST  27  /  ALT  33  /  AlkPhos  114  07-16    PT/INR - ( 16 Jul 2020 06:12 )   PT: 12.9 sec;   INR: 1.16 ratio         PTT - ( 15 Jul 2020 13:48 )  PTT:24.3 sec      ABG - ( 15 Jul 2020 14:14 )  pH, Arterial: x     pH, Blood: 7.45  /  pCO2: 43    /  pO2: 72    / HCO3: 29    / Base Excess: 5.2   /  SaO2: 94                  WBC:  WBC Count: 5.49 K/uL (07-16 @ 06:12)  WBC Count: 6.20 K/uL (07-15 @ 13:48)      MICROBIOLOGY:  RECENT CULTURES:        CARDIAC MARKERS ( 15 Jul 2020 15:37 )  <.015 ng/mL / x     / x     / x     / x      CARDIAC MARKERS ( 15 Jul 2020 13:48 )  <.015 ng/mL / x     / x     / x     / 3.1 ng/mL        PT/INR - ( 16 Jul 2020 06:12 )   PT: 12.9 sec;   INR: 1.16 ratio         PTT - ( 15 Jul 2020 13:48 )  PTT:24.3 sec    Sodium:  Sodium, Serum: 136 mmol/L (07-16 @ 06:12)  Sodium, Serum: 141 mmol/L (07-15 @ 13:48)      1.23 mg/dL 07-16 @ 06:12  1.07 mg/dL 07-15 @ 13:48      Hemoglobin:  Hemoglobin: 11.1 g/dL (07-16 @ 06:12)  Hemoglobin: 11.7 g/dL (07-15 @ 13:48)      Platelets: Platelet Count - Automated: 170 K/uL (07-16 @ 06:12)  Platelet Count - Automated: 178 K/uL (07-15 @ 13:48)      LIVER FUNCTIONS - ( 16 Jul 2020 06:12 )  Alb: 2.9 g/dL / Pro: 7.4 gm/dL / ALK PHOS: 114 U/L / ALT: 33 U/L / AST: 27 U/L / GGT: x                 RADIOLOGY & ADDITIONAL STUDIES:

## 2020-07-21 NOTE — PROGRESS NOTE ADULT - PROVIDER SPECIALTY LIST ADULT
Hospitalist
Pulmonology
Hospitalist

## 2020-07-21 NOTE — PROGRESS NOTE ADULT - ASSESSMENT
CARLTON JENKINS  [phone]  1940 DOA 7/15/2020 DR RAYSHAWN LANDIN            REVIEW OF SYMPTOMS      Able to give ROS  Yes     RELIABLE No   CONSTITUTIONAL Weakness Yes  Chills No Vision changes No  ENDOCRINE No unexplained hair loss No heat or cold intolerance    ALLERGY No hives  Sore throat No   RESP Coughing blood no  Shortness of breath YES   NEURO No Headache  Confusion Pain neck No   CARDIAC No Chest pain No Palpitations   GI No Pain abdomen NO   Vomiting NO     PHYSICAL EXAM    HEENT Unremarkable PERRLA atraumatic   RESP Fair air entry EXP prolonged    Harsh breath sound Resp distres mild   CARDIAC S1 S2 No S3     NO JVD    ABDOMEN SOFT BS PRESENT NOT DISTENDED No hepatosplenomegaly PEDAL EDEMA present No calf tenderness  NO rash   GENERAL Not TOXIC looking    HPI/PMH.      80 f nonsmoker PMH hytn dm chf copd 4 point walker depndent admitted with sob and shakiness since she started new med (albuterol)  also wwas started on doxycycline recently         OXYGENATION      7/15/2020 ra 98%     VITALS/LABS   2020 afeb 80 130/80     PT DATA/BEST PRACTICE  ALLERGY    nka                 WT    7/15/2020 54                                 BMI                            CrCl                                  ADVANCED DIRECTIVE     LINES TUBES POA.               HEAD OF BED ELEVATION Yes      INFECTION PPLX        DVT PROPHYLAXIS         Lvnx 40 (7/15)   CERDA PROPHYLAXIS          DYSPHAGIA EVAL     DIET. Reg                                                                              PLAN  INFECTION Pt is COVID naive DC azithro after 5 d rx   DYSPNEA Likely sec copd ex and PH while VTE CHF and infection seem unlikely  COPD ex Recommend pred 50.5d (starting ) At dc spiriva symbicort and prn albuterol  O2 NEED  Target po 90-95% IF rest r exertional PO belo 88 at discharge send with home oxygen   HFPEF   BNP 46 poa rules out significant component chf  ECHO  ef 55% pasp 53 [place] 2 dd1   LASIX LASIX 40 ()   Monitor lytes   FOLLOWUP Pt will need further workup after discharge   Please refer her to Pulm  [place] office       TIME SPENT Over 25 minutes aggregate care time spent on encounter; activities included combinations of  direct pt care, counseling and/or coordinating care reviewing notes, lab data/ imaging, discussion with multidisciplinary team/ pt /family. Risks, benefits, alternatives of planned interventions when applicable were discussed in detail and questions answered as best as possible    CARLTON JENKINS  [phone]  1940 DOA 7/15/2020 DR RAYSHAWN LANDIN

## 2020-07-21 NOTE — DISCHARGE NOTE PROVIDER - NSDCMRMEDTOKEN_GEN_ALL_CORE_FT
acetaminophen 325 mg oral tablet: 2 tab(s) orally every 6 hours, As needed, Mild Pain (1 - 3)  albuterol 90 mcg/inh inhalation aerosol: 1 puff(s) inhaled every 4 hours  aspirin 81 mg oral delayed release tablet: 1 tab(s) orally once a day  atorvastatin 80 mg oral tablet: 1 tab(s) orally once a day (at bedtime)  budesonide-formoterol 160 mcg-4.5 mcg/inh inhalation aerosol:  inhaled   famotidine 20 mg oral tablet: 1 tab(s) orally once a day  furosemide 40 mg oral tablet: 1 tab(s) orally once a day  gabapentin 100 mg oral capsule: 1 cap(s) orally 3 times a day  insulin glargine: 15 unit(s) subcutaneous once a day (at bedtime)  ipratropium-albuterol 0.5 mg-2.5 mg/3 mLinhalation solution: 3 milliliter(s) inhaled 3 times a day  ipratropium-albuterol 0.5 mg-2.5 mg/3 mLinhalation solution: 3 milliliter(s) inhaled every 4 hours, As needed, Shortness of Breath and/or Wheezing  meclizine 12.5 mg oral tablet: 1 tab(s) orally once a day  metFORMIN 500 mg oral tablet: 1 tab(s) orally once a day with lunch  Multiple Vitamins oral tablet: 1 tab(s) orally once a day  pantoprazole 40 mg oral delayed release tablet: 1 tab(s) orally once a day  polyethylene glycol 3350 oral powder for reconstitution: 17 gram(s) orally once a day, As needed, Constipation  senna oral tablet: 2 tab(s) orally once a day (at bedtime)  tiotropium 18 mcg inhalation capsule: 1 cap(s) inhaled once a day  Tudorza Pressair 400 mcg/inh inhalation powder: 1 puff(s) inhaled 2 times a day  Vitamin D3 1000 intl units oral tablet: 1 tab(s) orally once a day

## 2020-07-21 NOTE — DISCHARGE NOTE PROVIDER - NSDCCPCAREPLAN_GEN_ALL_CORE_FT
PRINCIPAL DISCHARGE DIAGNOSIS  Diagnosis: Dyspnea, unspecified type  Assessment and Plan of Treatment: COPD; follow with your PMD

## 2020-07-21 NOTE — DISCHARGE NOTE NURSING/CASE MANAGEMENT/SOCIAL WORK - PATIENT PORTAL LINK FT
You can access the FollowMyHealth Patient Portal offered by [facility] by registering at the following website: [email or web address]. By joining [facility]’s FollowMyHealth portal, you will also be able to view your health information using other applications (apps) compatible with our system.

## 2020-08-19 NOTE — FAMILY HISTORY
She has an appt with Dr Kailyn Ortiz on Sept 23 rd  Her head hurts in front and behind her ears  The back of her head and neck area  Hurts behind her eyes, eyes are sensitive to light  She was in tears, all she is doing is laying in the dark in her bedroom        He made an appt today with Earnestine Arevalo
[Family History Reviewed and Updated] : family history reviewed and updated

## 2020-09-01 PROBLEM — J44.9 CHRONIC OBSTRUCTIVE PULMONARY DISEASE, UNSPECIFIED: Chronic | Status: ACTIVE | Noted: 2020-01-01

## 2020-09-21 NOTE — ED PROVIDER NOTE - OBJECTIVE STATEMENT
80 year old F with pmhx of  DMT2 (on Metformin and insulin), COPD, CHF, HLD, HTN, kidney stones and pshx of R nephrectomy, L knee surgery presents to ED bibems with elevated blood sugar, SOB, and lethargy. Per EMS, pt with 485 blood sugar, BP of 144/75, and O2 sat 92% on room air.  Daughter notes period of incoherent speech over the weekend. Pt endorses excess urination and thirst over the last few days. She also reports mild SOB x few days that is worse when lying flat. Pt also reports constant CP that has been present for "quite a while", unable to specify how much time. Denies fever, chills, worsening leg swelling.

## 2020-09-21 NOTE — ED PROVIDER NOTE - ATTENDING CONTRIBUTION TO CARE
Dr. De La Cruz (Attending Physician)  COPD, CHF, DM pw hyperglycemia, polyuria, polydipsia, sob and chest pain. Will check cardiac enzymes, cxr, dka labs and likely admit.    I performed a history and physical exam of the patient and discussed their management with the resident. I reviewed the resident's note and agree with the documented findings and plan of care. My medical decision making and observations are found above.

## 2020-09-21 NOTE — ED ADULT NURSE REASSESSMENT NOTE - NS ED NURSE REASSESS COMMENT FT1
Pt assisted to bedpan to provide urine sample. UA/UC sent as ordered by MD. Pt linens changed, new diaper applied. Pt safety maintained.

## 2020-09-21 NOTE — ED PROVIDER NOTE - NEUROLOGICAL, MLM
Alert and oriented x3. Moving all extremities to command. No facial droop. CN2-12 intact. Strength 5/5 in all extremities. Sensation to light tough intact throughout. No pronator drift.

## 2020-09-21 NOTE — ED PROVIDER NOTE - CARE PLAN
Principal Discharge DX:	Hyperglycemia  Secondary Diagnosis:	Hypokalemia  Secondary Diagnosis:	UTI (urinary tract infection)

## 2020-09-21 NOTE — ED ADULT NURSE REASSESSMENT NOTE - NS ED NURSE REASSESS COMMENT FT1
Patient SpO2 down to 88% while sleeping, per MD Gonzales, patient has had similar episode earlier. Patient placed on 2L O2 via NC and improved to 95%.

## 2020-09-21 NOTE — ED ADULT NURSE NOTE - PSH
H/O breast biopsy  ~ 3 times on right, 2 times on left  H/O right nephrectomy  ~ [facility]  History of left knee replacement  ~ x 3 (2010, 2011, 2016)  No significant past surgical history

## 2020-09-21 NOTE — ED ADULT NURSE NOTE - OBJECTIVE STATEMENT
Pt is a 81 y/o F, PMH COPD, DMT2, CHF, HLD, HTN, BIBEMS from home c/o SOB and high FS as per EMS. As per EMS from pt daughter, pt was having polydipsia and polyuria with incoherent speech over the weekend. Pt arrives to [facility] A&Ox4, moves all extremities, SpO2 of 95% on RA. Pt WOB is increased with shallow respirations and slight retractions noted. Swelling to left knee noted, which pt states is her baseline for years after a knee replacement. Pt states she has been feeling increasingly sleepy and cannot lay flat without becoming SOB. Pt states that she walks independently at home with a walker. Pt denies headache, dizziness, chest pain, palpitations, cough, abdominal pain, n/v/d, fevers, chills at this time. Pt is resting in bed comfortably with call bell at bedside, placed on CCM, safety maintained. Pt is a 81 y/o F, PMH COPD, DMT2, CHF, HLD, HTN, BIBEMS from home c/o SOB and high FS as per EMS. As per EMS from pt daughter, pt was having polydipsia and polyuria with incoherent speech over the weekend. Pt arrives to [facility] A&Ox4, moves all extremities, SpO2 of 95% on RA. Pt WOB is increased with shallow respirations and slight retractions noted. Lungs sounds clear on auscultation. Swelling to left knee noted, which pt states is her baseline for years after a knee replacement. Pt states she has been feeling increasingly sleepy and cannot lay flat without becoming SOB. Pt states that she walks independently at home with a walker. Pt denies headache, dizziness, chest pain, palpitations, cough, abdominal pain, n/v/d, fevers, chills at this time. Pt is resting in bed comfortably with call bell at bedside, placed on CCM, safety maintained.

## 2020-09-21 NOTE — ED ADULT NURSE REASSESSMENT NOTE - NS ED NURSE REASSESS COMMENT FT1
Pt states that she is hungry. [place] [phone]. MOE team to review patients chart. No food to be given to patient at this time. Endorses in report to floor JACEY Sanchez.

## 2020-09-21 NOTE — H&P ADULT - NSICDXPASTSURGICALHX_GEN_ALL_CORE_FT
PAST SURGICAL HISTORY:  H/O breast biopsy ~ 3 times on right, 2 times on left    H/O right nephrectomy ~ [facility]    History of left knee replacement ~ x 3 (2010, 2011, 2016)    No significant past surgical history

## 2020-09-21 NOTE — ED PROVIDER NOTE - CLINICAL SUMMARY MEDICAL DECISION MAKING FREE TEXT BOX
Aslhey Fraga MD PGY-3 79 yo F PMH COPD, CHF, DM on insulin and metformin, recently started on a new oral hypoglycemia agent presents with hyperglycemia, polyuria, polydipsia, sob and chest pain. DDx includes but not limited to CHF or COPD exacerbation, HHS, less likely DKA. Labs, ekg, trop/pro bnp, UA, chest xray, CT head given reported slurred speech. to be admitted

## 2020-09-21 NOTE — ED ADULT NURSE NOTE - NSIMPLEMENTINTERV_GEN_ALL_ED
Implemented All Fall Risk Interventions:  Pablo to call system. Call bell, personal items and telephone within reach. Instruct patient to call for assistance. Room bathroom lighting operational. Non-slip footwear when patient is off stretcher. Physically safe environment: no spills, clutter or unnecessary equipment. Stretcher in lowest position, wheels locked, appropriate side rails in place. Provide visual cue, wrist band, yellow gown, etc. Monitor gait and stability. Monitor for mental status changes and reorient to person, place, and time. Review medications for side effects contributing to fall risk. Reinforce activity limits and safety measures with patient and family.

## 2020-09-21 NOTE — ED ADULT NURSE NOTE - PMH
CHF (congestive heart failure)    CHF (congestive heart failure)  ~ diastolic, Stage I  Constipation    COPD (chronic obstructive pulmonary disease)    COPD (chronic obstructive pulmonary disease)    COPD, moderate    Dyslipidemia    GERD (gastroesophageal reflux disease)    HLD (hyperlipidemia)    HTN (hypertension)    HTN (hypertension)    Kidney stones    Type 2 diabetes mellitus    Vitamin D deficiency

## 2020-09-21 NOTE — H&P ADULT - NSHPPHYSICALEXAM_GEN_ALL_CORE
General: WN/WD NAD  PERRLA  Neurology: A&Ox3, nonfocal, ARTEAGA x 4  Respiratory: CTA B/L  CV: RRR, S1S2, no murmurs, rubs or gallops  Abdominal: Soft, NT, ND +BS, Last BM  Extremities: No edema, + peripheral pulses  Skin Normal

## 2020-09-21 NOTE — H&P ADULT - NSICDXPASTMEDICALHX_GEN_ALL_CORE_FT
PAST MEDICAL HISTORY:  CHF (congestive heart failure) ~ diastolic, Stage I    CHF (congestive heart failure)     Constipation     COPD (chronic obstructive pulmonary disease)     COPD (chronic obstructive pulmonary disease)     COPD, moderate     Dyslipidemia     GERD (gastroesophageal reflux disease)     HLD (hyperlipidemia)     HTN (hypertension)     HTN (hypertension)     Kidney stones     Type 2 diabetes mellitus     Vitamin D deficiency

## 2020-09-21 NOTE — H&P ADULT - NSHPLABSRESULTS_GEN_ALL_CORE
Lab Results:  CBC  CBC Full  -  ( 21 Sep 2020 13:35 )  WBC Count : 10.45 K/uL  RBC Count : 4.52 M/uL  Hemoglobin : 14.4 g/dL  Hematocrit : 44.7 %  Platelet Count - Automated : 190 K/uL  Mean Cell Volume : 98.9 fl  Mean Cell Hemoglobin : 31.9 pg  Mean Cell Hemoglobin Concentration : 32.2 gm/dL  Auto Neutrophil # : 9.31 K/uL  Auto Lymphocyte # : 0.51 K/uL  Auto Monocyte # : 0.57 K/uL  Auto Eosinophil # : 0.00 K/uL  Auto Basophil # : 0.01 K/uL  Auto Neutrophil % : 89.0 %  Auto Lymphocyte % : 4.9 %  Auto Monocyte % : 5.5 %  Auto Eosinophil % : 0.0 %  Auto Basophil % : 0.1 %    .		Differential:	[] Automated		[] Manual  Chemistry                        14.4   10.45 )-----------( 190      ( 21 Sep 2020 13:35 )             44.7     09-    145  |  88<L>  |  25<H>  ----------------------------<  297<H>  2.7<LL>   |  43<H>  |  1.09    Ca    9.6      21 Sep 2020 18:58  Phos  2.6       Mg     2.0         TPro  7.2  /  Alb  3.6  /  TBili  0.2  /  DBili  x   /  AST  42<H>  /  ALT  38  /  AlkPhos  134<H>      LIVER FUNCTIONS - ( 21 Sep 2020 13:35 )  Alb: 3.6 g/dL / Pro: 7.2 g/dL / ALK PHOS: 134 U/L / ALT: 38 U/L / AST: 42 U/L / GGT: x             Urinalysis Basic - ( 21 Sep 2020 14:00 )    Color: Light Yellow / Appearance: Clear / S.021 / pH: x  Gluc: x / Ketone: Negative  / Bili: Negative / Urobili: Negative   Blood: x / Protein: 300 mg/dL / Nitrite: Negative   Leuk Esterase: Moderate / RBC: 9 /hpf / WBC 59 /HPF   Sq Epi: x / Non Sq Epi: 1 /hpf / Bacteria: Negative            MICROBIOLOGY/CULTURES:      RADIOLOGY RESULTS: reviewed

## 2020-09-21 NOTE — ED ADULT NURSE REASSESSMENT NOTE - NS ED NURSE REASSESS COMMENT FT1
Pt remains AAOx4, NAD, resp nonlabored, resting comfortably in bed with call bell at bedside. Pt denies headache, dizziness, chest pain, palpitations, SOB, abd pain, n/v/d, fevers, chills at this time. Pt is aware of possibility of admission to hospital and understands plan of care. Pt awaiting admission. Safety maintained.

## 2020-09-21 NOTE — ED ADULT NURSE REASSESSMENT NOTE - NS ED NURSE REASSESS COMMENT FT1
Pt cleaned, turned & repositioned in bed for safety and comfort. Pt made aware that we are waiting for a diet order. Red fall socks placed on patient.

## 2020-09-21 NOTE — ED PROVIDER NOTE - PSH
H/O breast biopsy  ~ 3 times on right, 2 times on left  H/O right nephrectomy  ~ [facility]  History of left knee replacement  ~ x 3 (2010, 2011, 2016)  No significant past surgical history

## 2020-09-22 NOTE — PROGRESS NOTE ADULT - ASSESSMENT
80 year old F with pmhx of  DMT2 (on Metformin and insulin), COPD, CHF, HLD, HTN, kidney stones and pshx of R nephrectomy, L knee surgery presents to ED bibems with elevated blood sugar, SOB, and lethargy. Per EMS, pt with 485 blood sugar, BP of 144/75, and O2 sat 92% on room air.  Daughter notes period of incoherent speech over the weekend. Pt endorses excess urination and thirst over the last few days. She also reports mild SOB x few days that is worse when lying flat. Pt also reports constant CP that has been present for "quite a while", unable to specify how much time. Denies fever, chills, worsening leg swelling.    Problem/Plan - 1:  ·  Problem: UTI (urinary tract infection).  Plan: cw ertepenam   fu cultures  will monitor.     Problem/Plan - 2:  ·  Problem: Hyperglycemia.  Plan: monitor FS  ISS.     Problem/Plan - 3:  ·  Problem: Hypokalemia.  Plan: sp repletion  monitor bmp.     Problem/Plan - 4:  ·  Problem: COPD (chronic obstructive pulmonary disease).  Plan: cw home dose of lasix.     Problem/Plan - 5:  ·  Problem: CHF (congestive heart failure).

## 2020-09-22 NOTE — CONSULT NOTE ADULT - ASSESSMENT
80 year old F with pmhx of  DMT2 (on Metformin and insulin), COPD, CHF, HLD, HTN, kidney stones and pshx of R nephrectomy, L knee surgery presents to ED bibems with elevated blood sugar, SOB, and lethargy. Per EMS, pt with 485 blood sugar, BP of 144/75, and O2 sat 92% on room air.  Daughter notes period of incoherent speech over the weekend. Pt endorses excess urination and thirst over the last few days. She also reports mild SOB x few days that is worse when lying flat. Pt also reports constant CP that has been present for "quite a while", unable to specify how much time. Denies fever, chills, worsening leg swelling.    COPD:  Uncontrolled DM:  UTI  Confusion  CHF    she is admitted with high blood glucose and found to have uti:  She has copd:  She was recently admitted to [facility] where she was treated with copd exacerbation:  At that time her CTA was negative and no indication of malignancy on ct chest :  Currently she is not SOB : her outpt  meds noted:  She is already on Symbicort as well as inhalers:  currently she is also being treated for chf   her venous ABG towards met alk side and seems to be a chronic retainer too   I dont think she needs bipap at this time:  She seems pretty confused: ct head is negative for bleed:  would defer to primary team :  DVT candido

## 2020-09-22 NOTE — CHART NOTE - NSCHARTNOTEFT_GEN_A_CORE
RONALD VINCENT    Notified by RN patient with critical lab result (+) blood culture in aerobic bottle only--- gram positive cocci in cluster.    Pt was seen by ID earlier. Presently on Invanz.    Continue present treatment regimen.  Follow up result of second blood culture.         Charmaine Solis ANP-BC  Spectralink #[id number]

## 2020-09-22 NOTE — CONSULT NOTE ADULT - SUBJECTIVE AND OBJECTIVE BOX
Patient is a 80y old  Female who presents with a chief complaint of sob, hyperglycemia (22 Sep 2020 10:26)      HPI:   80 year old F with pmhx of  DMT2 (on Metformin and insulin), COPD, CHF, HLD, HTN, kidney stones and pshx of R nephrectomy, L knee surgery presents to ED bibems with elevated blood sugar, SOB, and lethargy. Per EMS, pt with 485 blood sugar, BP of 144/75, and O2 sat 92% on room air.  Daughter notes period of incoherent speech over the weekend. Pt endorses excess urination and thirst over the last few days. She also reports mild SOB x few days that is worse when lying flat. Pt also reports constant CP that has been present for "quite a while", unable to specify how much time. Denies fever, chills, worsening leg swelling. (21 Sep 2020 20:15)    old chart from [place] reviewed: she was treated for copd there :  Now coming here with hyperglycemia:  SHE IS NOT ABLE TO TELL ME MUCH AND HENCE CALLED THE DAUGHTER:  BUT NO ANSWER          ?FOLLOWING PRESENT  [ X] Hx of PE/DVT, [ Y] Hx COPD, [X ] Hx of Asthma, [Y ] Hx of Hospitalization, [ X]  Hx of BiPAP/CPAP use, [ X] Hx of LINDA    Allergies    No Known Allergies    Intolerances        PAST MEDICAL & SURGICAL HISTORY:  COPD, moderate    COPD (chronic obstructive pulmonary disease)    CHF (congestive heart failure)    HLD (hyperlipidemia)    HTN (hypertension)    Vitamin D deficiency    Dyslipidemia    Constipation    Kidney stones    Type 2 diabetes mellitus    GERD (gastroesophageal reflux disease)    COPD (chronic obstructive pulmonary disease)    HTN (hypertension)    CHF (congestive heart failure)  ~ diastolic, Stage I    No significant past surgical history    H/O breast biopsy  ~ 3 times on right, 2 times on left    H/O right nephrectomy  ~ [facility]    History of left knee replacement  ~ x 3 (, , )        FAMILY HISTORY:  FH: COPD (chronic obstructive pulmonary disease) (Child)  ~ daughter (non-smoker)    FH: diabetes mellitus (Grandparent)  ~ mother, grandmother    FH: CHF (congestive heart failure)  ~ mother    FH: breast cancer  ~ mother        Social History: [ UNK ] TOBACCO                  [  UNK] ETOH                                 [  UNK] IVDA/DRUGS    REVIEW OF SYSTEMS    PT UNABLE TO TELL ME   General:	    Skin/Breast:  	  Ophthalmologic:  	  ENMT:	    Respiratory and Thorax:  	  Cardiovascular:	    Gastrointestinal:	    Genitourinary:	    Musculoskeletal:	    Neurological:	    Psychiatric:	    Hematology/Lymphatics:	    Endocrine:	    Allergic/Immunologic:	    MEDICATIONS  (STANDING):  ALBUTerol    90 MICROgram(s) HFA Inhaler 1 Puff(s) Inhalation every 4 hours  aspirin enteric coated 81 milliGRAM(s) Oral daily  atorvastatin 80 milliGRAM(s) Oral at bedtime  budesonide 160 MICROgram(s)/formoterol 4.5 MICROgram(s) Inhaler 2 Puff(s) Inhalation two times a day  cefTRIAXone   IVPB 1000 milliGRAM(s) IV Intermittent every 24 hours  dextrose 5%. 1000 milliLiter(s) (50 mL/Hr) IV Continuous <Continuous>  dextrose 50% Injectable 12.5 Gram(s) IV Push once  dextrose 50% Injectable 25 Gram(s) IV Push once  dextrose 50% Injectable 25 Gram(s) IV Push once  enoxaparin Injectable 40 milliGRAM(s) SubCutaneous daily  furosemide    Tablet 40 milliGRAM(s) Oral daily  gabapentin 100 milliGRAM(s) Oral three times a day  influenza   Vaccine 0.5 milliLiter(s) IntraMuscular once  insulin glargine Injectable (LANTUS) 12 Unit(s) SubCutaneous at bedtime  insulin lispro (HumaLOG) corrective regimen sliding scale   SubCutaneous three times a day before meals  insulin lispro Injectable (HumaLOG) 4 Unit(s) SubCutaneous three times a day before meals  potassium chloride    Tablet ER 40 milliEquivalent(s) Oral every 2 hours  potassium chloride  10 mEq/100 mL IVPB 10 milliEquivalent(s) IV Intermittent every 1 hour    MEDICATIONS  (PRN):  dextrose 40% Gel 15 Gram(s) Oral once PRN Blood Glucose LESS THAN 70 milliGRAM(s)/deciliter  glucagon  Injectable 1 milliGRAM(s) IntraMuscular once PRN Glucose LESS THAN 70 milligrams/deciliter       Vital Signs Last 24 Hrs  T(C): 36.4 (22 Sep 2020 05:05), Max: 36.9 (21 Sep 2020 21:20)  T(F): 97.6 (22 Sep 2020 05:05), Max: 98.4 (21 Sep 2020 21:20)  HR: 68 (22 Sep 2020 05:05) (60 - 77)  BP: 139/76 (22 Sep 2020 05:05) (120/99 - 150/80)  BP(mean): 107 (21 Sep 2020 19:34) (107 - 107)  RR: 18 (22 Sep 2020 05:05) (16 - 22)  SpO2: 96% (22 Sep 2020 05:05) (86% - 100%)        I&O's Summary    22 Sep 2020 07:01  -  22 Sep 2020 13:14  --------------------------------------------------------  IN: 120 mL / OUT: 0 mL / NET: 120 mL        Physical Exam:   GENERAL: NAD, well-groomed, well-developed  HEENT: DON/   CONJUNCTIVAE INJECTED  ENMT: No tonsillar erythema, exudates, or enlargement; Moist mucous membranes, Good dentition, No lesions  NECK: Supple, No JVD, Normal thyroid  CHEST/LUNG:NOT MUCH WGHEEZING  CVS: Regular rate and rhythm; No murmurs, rubs, or gallops  GI: : Soft, Nontender, Nondistended; Bowel sounds present  NERVOUS SYSTEM:  Alert & Oriented X1  EXTREMITIES:  2+ Peripheral Pulses, No clubbing, cyanosis, or edema  LYMPH: No lymphadenopathy noted  SKIN: No rashes or lesions  ENDOCRINOLOGY: No Thyromegaly  PSYCH: confused    Labs:  18.9<68<4>>54<<7.455>>18.9<<3><<4><<5<<549>>, 18.9<63<4>>41<<7.485>>18.9<<3><<4><<5<<419>>, 17.6<61<4>>52<<7.485>>17.6<<3><<4><<5<<529>>                            13.1   9.80  )-----------( 175      ( 22 Sep 2020 06:58 )             40.5                         14.4   10.45 )-----------( 190      ( 21 Sep 2020 13:35 )             44.7     09-22    147<H>  |  91<L>  |  22  ----------------------------<  117<H>  2.4<LL>   |  44<H>  |  0.98      145  |  88<L>  |  25<H>  ----------------------------<  297<H>  2.7<LL>   |  43<H>  |  1.09      141  |  87<L>  |  29<H>  ----------------------------<  480<HH>  3.2<L>   |  36<H>  |  1.12    Ca    9.3      22 Sep 2020 06:55  Ca    9.6      21 Sep 2020 18:58  Ca    9.7      21 Sep 2020 13:35  Phos  2.6       Mg     2.0         TPro  6.0  /  Alb  3.1<L>  /  TBili  0.3  /  DBili  x   /  AST  26  /  ALT  30  /  AlkPhos  89    TPro  7.2  /  Alb  3.6  /  TBili  0.2  /  DBili  x   /  AST  42<H>  /  ALT  38  /  AlkPhos  134<H>      CAPILLARY BLOOD GLUCOSE      POCT Blood Glucose.: 133 mg/dL (22 Sep 2020 08:35)  POCT Blood Glucose.: 128 mg/dL (22 Sep 2020 03:36)  POCT Blood Glucose.: 104 mg/dL (21 Sep 2020 21:44)  POCT Blood Glucose.: 214 mg/dL (21 Sep 2020 19:31)    LIVER FUNCTIONS - ( 22 Sep 2020 06:55 )  Alb: 3.1 g/dL / Pro: 6.0 g/dL / ALK PHOS: 89 U/L / ALT: 30 U/L / AST: 26 U/L / GGT: x             Urinalysis Basic - ( 21 Sep 2020 14:00 )    Color: Light Yellow / Appearance: Clear / S.021 / pH: x  Gluc: x / Ketone: Negative  / Bili: Negative / Urobili: Negative   Blood: x / Protein: 300 mg/dL / Nitrite: Negative   Leuk Esterase: Moderate / RBC: 9 /hpf / WBC 59 /HPF   Sq Epi: x / Non Sq Epi: 1 /hpf / Bacteria: Negative      D DImer  Serum Pro-Brain Natriuretic Peptide: 544 pg/mL (09-21 @ 13:35)      Studies  Chest X-RAY  CT SCAN Chest   CT Abdomen  Venous Dopplers: LE:   Others  r< from: CT Head No Cont (20 @ 15:23) >    EXAM:  CT BRAIN                            PROCEDURE DATE:  2020            INTERPRETATION:  Clinical indication: Slurred speech.    Multiple axial sections were performed from base of skull to vertex without contrast enhancement. Coronal andsagittal reconstructions were performed as well    This exam is compared with prior noncontrast head CT performed on 2020.    Periventricular matter lucency is again seen which is likely related to chronic microvascular ischemic changes    There is no evidence acute hemorrhage mass or mass effect seen.    Evaluation of the osseous structures with the appears normal    The visualized paranasal sinuses mastoid and middle ear inspected clear.    IMPRESSION: No acute hemorrhage, mass or masseffect.    If symptoms continue MRI can be done for further evaluation if there are no contraindications.                  JORGE FUENTES M.D., ATTENDING RADIOLOGIST  This document has been electronically signed. Sep 21 2020  3:26PM    < end of copied text >  < from: CT Head No Cont (20 @ 15:23) >    EXAM:  CT BRAIN                            PROCEDURE DATE:  2020            INTERPRETATION:  Clinical indication: Slurred speech.    Multiple axial sections were performed from base of skull to vertex without contrast enhancement. Coronal andsagittal reconstructions were performed as well    This exam is compared with prior noncontrast head CT performed on 2020.    Periventricular matter lucency is again seen which is likely related to chronic microvascular ischemic changes    There is no evidence acute hemorrhage mass or mass effect seen.    Evaluation of the osseous structures with the appears normal    The visualized paranasal sinuses mastoid and middle ear inspected clear.    IMPRESSION: No acute hemorrhage, mass or masseffect.    If symptoms continue MRI can be done for further evaluation if there are no contraindications.                  JORGE FUENTES M.D., ATTENDING RADIOLOGIST  This document has been electronically signed. Sep 21 2020  3:26PM    < end of copied text >  < from: Xray Chest 1 View AP/PA (20 @ 14:26) >  :  XR CHEST AP OR PA 1V                            PROCEDURE DATE:  2020            INTERPRETATION:  CLINICAL INFORMATION: Shortness of breath.    A frontal view of the chest was obtained.    Comparison: 7/15/2020.    IMPRESSION:    The mediastinal cardiac silhouette is unremarkable.    Bibasilar atelectasis.    Severe thoracolumbar scoliosis.                      JONAS MILLER M.D., ATTENDING RADIOLOGIST  This document has been electronically signed. Sep 21 2020  2:29PM    < end of copied text >  < from: CT Angio Chest w/ IV Cont (07.15.20 @ 14:57) >    EXAM:  CT ANGIO CHEST (W)AW IC                            PROCEDURE DATE:  07/15/2020          INTERPRETATION:  CLINICAL INFORMATION: Short of breath    COMPARISON: None.    PROCEDURE:   CT Angiography of the Chest.  90 ml of Omnipaque 350 was injected intravenously. 10 ml were discarded.  Sagittal and coronal reformats were performed as well as 3D (MIP) reconstructions.    FINDINGS:    LUNGS AND AIRWAYS: Patent central airways.  Very low lung volumes with bibasilar subsegmental atelectasis. Nodefinite pneumonia.  PLEURA: No pleural effusion.  MEDIASTINUM AND RYAN: No lymphadenopathy.  VESSELS: Satisfactory contrast bolus. No pulmonary emboli. Normal caliber thoracic aorta  HEART: Slightly enlarged. No pericardial effusion.  CHEST WALL ANDLOWER NECK: Slightly enlarged multinodular thyroid..  VISUALIZED UPPER ABDOMEN: Bilateral nonobstructive renal calculi. Additional dystrophic cortical calcifications within a markedly atrophic right kidney. Right renal cyst.  BONES: Advanced spinal degenerative changes and profound compound thoracolumbar scoliosis.    IMPRESSION:   Negative for pulmonary emboli.  Low lung volumes with bibasilar subsegmental atelectasis.  Additional findings as discussed                  LUMA ARANDA M.D., ATTENDING RADIOLOGIST  This document has been electronically signed. Jul 15 2020  3:15PM        < end of copied text >

## 2020-09-22 NOTE — CONSULT NOTE ADULT - ASSESSMENT
Assessment  DMT2: 80y Female with DM T2 with hyperglycemia, A1C 8.1%, was on oral meds and insulin at home, admitted with hyperglycemia (), now on basal bolus insulin, blood sugars improving, no hypoglycemic episodes. Patient seen resting in chair, appears comfortable, eating meals per discussion with RN.  UTI: on IV ABx, stable, monitored.          Alexander Shaver MD  Cell: [phone]  Office: [phone]               Assessment  DMT2: 80y Female with DM T2 with hyperglycemia, A1C 8.1%,  was on oral meds and insulin at home, admitted with hyperglycemia (), now on basal bolus insulin, blood sugars improving, no hypoglycemic episodes. Patient seen resting in chair, appears comfortable, eating meals per discussion with RN.  UTI: on IV ABx, stable, monitored.          Alexander Shaver MD  Cell: [phone]  Office: [phone]

## 2020-09-22 NOTE — PROGRESS NOTE ADULT - SUBJECTIVE AND OBJECTIVE BOX
Patient is a 80y old  Female who presents with a chief complaint of sob, hyperglycemia (22 Sep 2020 15:34)      INTERVAL HPI/OVERNIGHT EVENTS:  T(C): 36.4 (20 @ 05:05), Max: 36.9 (20 @ 21:20)  HR: 68 (20 @ 05:05) (60 - 76)  BP: 139/76 (20 @ 05:05) (120/99 - 142/87)  RR: 18 (20 @ 05:05) (16 - 22)  SpO2: 96% (20 @ 05:05) (93% - 100%)  Wt(kg): --  I&O's Summary    22 Sep 2020 07:01  -  22 Sep 2020 16:51  --------------------------------------------------------  IN: 120 mL / OUT: 0 mL / NET: 120 mL        LABS:                        13.1   9.80  )-----------( 175      ( 22 Sep 2020 06:58 )             40.5         147<H>  |  91<L>  |  22  ----------------------------<  117<H>  2.4<LL>   |  44<H>  |  0.98    Ca    9.3      22 Sep 2020 06:55  Phos  2.6       Mg     2.0         TPro  6.0  /  Alb  3.1<L>  /  TBili  0.3  /  DBili  x   /  AST  26  /  ALT  30  /  AlkPhos  89        Urinalysis Basic - ( 21 Sep 2020 14:00 )    Color: Light Yellow / Appearance: Clear / S.021 / pH: x  Gluc: x / Ketone: Negative  / Bili: Negative / Urobili: Negative   Blood: x / Protein: 300 mg/dL / Nitrite: Negative   Leuk Esterase: Moderate / RBC: 9 /hpf / WBC 59 /HPF   Sq Epi: x / Non Sq Epi: 1 /hpf / Bacteria: Negative      CAPILLARY BLOOD GLUCOSE      POCT Blood Glucose.: 133 mg/dL (22 Sep 2020 08:35)  POCT Blood Glucose.: 128 mg/dL (22 Sep 2020 03:36)  POCT Blood Glucose.: 104 mg/dL (21 Sep 2020 21:44)  POCT Blood Glucose.: 214 mg/dL (21 Sep 2020 19:31)        Urinalysis Basic - ( 21 Sep 2020 14:00 )    Color: Light Yellow / Appearance: Clear / S.021 / pH: x  Gluc: x / Ketone: Negative  / Bili: Negative / Urobili: Negative   Blood: x / Protein: 300 mg/dL / Nitrite: Negative   Leuk Esterase: Moderate / RBC: 9 /hpf / WBC 59 /HPF   Sq Epi: x / Non Sq Epi: 1 /hpf / Bacteria: Negative        MEDICATIONS  (STANDING):  ALBUTerol    90 MICROgram(s) HFA Inhaler 1 Puff(s) Inhalation every 4 hours  aspirin enteric coated 81 milliGRAM(s) Oral daily  atorvastatin 80 milliGRAM(s) Oral at bedtime  budesonide 160 MICROgram(s)/formoterol 4.5 MICROgram(s) Inhaler 2 Puff(s) Inhalation two times a day  dextrose 5%. 1000 milliLiter(s) (50 mL/Hr) IV Continuous <Continuous>  dextrose 50% Injectable 12.5 Gram(s) IV Push once  dextrose 50% Injectable 25 Gram(s) IV Push once  dextrose 50% Injectable 25 Gram(s) IV Push once  enoxaparin Injectable 40 milliGRAM(s) SubCutaneous daily  ertapenem  IVPB 1000 milliGRAM(s) IV Intermittent every 24 hours  furosemide    Tablet 40 milliGRAM(s) Oral daily  gabapentin 100 milliGRAM(s) Oral three times a day  influenza   Vaccine 0.5 milliLiter(s) IntraMuscular once  insulin glargine Injectable (LANTUS) 12 Unit(s) SubCutaneous at bedtime  insulin lispro (HumaLOG) corrective regimen sliding scale   SubCutaneous three times a day before meals  insulin lispro Injectable (HumaLOG) 4 Unit(s) SubCutaneous three times a day before meals  potassium chloride    Tablet ER 40 milliEquivalent(s) Oral every 2 hours  potassium chloride   Solution 40 milliEquivalent(s) Oral every 2 hours  potassium chloride  10 mEq/100 mL IVPB 10 milliEquivalent(s) IV Intermittent every 1 hour    MEDICATIONS  (PRN):  dextrose 40% Gel 15 Gram(s) Oral once PRN Blood Glucose LESS THAN 70 milliGRAM(s)/deciliter  glucagon  Injectable 1 milliGRAM(s) IntraMuscular once PRN Glucose LESS THAN 70 milligrams/deciliter          PHYSICAL EXAM:  GENERAL: NAD, well-groomed, well-developed  HEAD:  Atraumatic, Normocephalic  CHEST/LUNG: Clear to percussion bilaterally; No rales, rhonchi, wheezing, or rubs  HEART: Regular rate and rhythm; No murmurs, rubs, or gallops  ABDOMEN: Soft, Nontender, Nondistended; Bowel sounds present  EXTREMITIES:  2+ Peripheral Pulses, No clubbing, cyanosis, or edema  LYMPH: No lymphadenopathy noted  SKIN: No rashes or lesions    Care Discussed with Consultants/Other Providers [ x] YES  [ ] NO

## 2020-09-22 NOTE — CHART NOTE - NSCHARTNOTEFT_GEN_A_CORE
Patient is a 80y old  Female who presents with a chief complaint of sob, hyperglycemia (22 Sep 2020 14:14)  Pt brought out into the hallway to sit secondary to her confusion and her safety.  Pt has been screaming and hollering in the hallway.   Pt is alert to her name and occasionally is aware she is in the hospital.  Pt's serum potassium: 2.4 meq/l.  Pt refusing to take supplements PO or IV this am.      HPI:    Vital Signs Last 24 Hrs  T(C): 36.4 (22 Sep 2020 05:05), Max: 36.9 (21 Sep 2020 21:20)  T(F): 97.6 (22 Sep 2020 05:05), Max: 98.4 (21 Sep 2020 21:20)  HR: 68 (22 Sep 2020 05:05) (60 - 76)  BP: 139/76 (22 Sep 2020 05:05) (120/99 - 142/87)  BP(mean): 107 (21 Sep 2020 19:34) (107 - 107)  RR: 18 (22 Sep 2020 05:05) (16 - 22)  SpO2: 96% (22 Sep 2020 05:05) (93% - 100%)    Gen: 79 y/o obese female wd/wn very confused.   Skin: Acyanotic, cool and dry.   Resp: CTA without adventitious sounds.  Pt spo2 decreasing to 82% at times. Oxygen 2 L/NC applied which improved her oxygen to 92%.   CV: S1S2  Chest: Symmetrical, equal lung expansion.   Extremities: (+) PPP      Laboratory:                            13.1   9.80  )-----------( 175      ( 22 Sep 2020 06:58 )             40.5       BNP: Serum Pro-Brain Natriuretic Peptide: 544 pg/mL (09-21 @ 13:35)    Impression:   UTI  Hyperglycemia  Hypokalemia  COPD  CHF    Plan:  > Pt seen by Attending this morning during her confused and yelling episodes.    > Was able to calm pt earlier by talking with her.  However, 1 mg of Haldol was given for severe confusion.   >      Charmaine Solis ANP-BC  Spectralink #[id number] Patient is a 80y old  Female who presents with a chief complaint of sob, hyperglycemia (22 Sep 2020 14:14)  Pt brought out into the hallway to sit secondary to her confusion and her safety.  Pt has been screaming and hollering in the hallway.   Pt is alert to her name and occasionally is aware she is in the hospital.  Pt's serum potassium: 2.4 meq/l.  Pt refusing to take supplements PO or IV this am.      HPI:    Vital Signs Last 24 Hrs  T(C): 36.4 (22 Sep 2020 05:05), Max: 36.9 (21 Sep 2020 21:20)  T(F): 97.6 (22 Sep 2020 05:05), Max: 98.4 (21 Sep 2020 21:20)  HR: 68 (22 Sep 2020 05:05) (60 - 76)  BP: 139/76 (22 Sep 2020 05:05) (120/99 - 142/87)  BP(mean): 107 (21 Sep 2020 19:34) (107 - 107)  RR: 18 (22 Sep 2020 05:05) (16 - 22)  SpO2: 96% (22 Sep 2020 05:05) (93% - 100%)    Gen: 81 y/o obese female wd/wn very confused.   Skin: Acyanotic, cool and dry.   Resp: CTA without adventitious sounds.  Pt spo2 decreasing to 82% at times. Oxygen 2 L/NC applied which improved her oxygen to 92%.   CV: S1S2  Chest: Symmetrical, equal lung expansion.   Extremities: (+) PPP      Laboratory:                            13.1   9.80  )-----------( 175      ( 22 Sep 2020 06:58 )             40.5       BNP: Serum Pro-Brain Natriuretic Peptide: 544 pg/mL (09-21 @ 13:35)    CT Chest: (9/21):  Negative for infarct, hemorrhage, or mass effect.   CXR: (9/21):  Bibasilar atelectasis.   Fingersticks (9/22): 133 mg/dl<---128mg/dl.     Potassium: 2.4 meq/dl.  Sodium: 147 mg/dl.  WBC: 9.80      Impression:   UTI  Hyperglycemia  Hypokalemia  COPD  CHF    Plan:  > Pt seen by Attending this morning during her confused and yelling episodes.    > Was able to calm pt earlier by talking with her.  However, 1 mg of Haldol was given for severe confusion @ 1250 pm.   > Will continue to monitor her behavior.    >Blood glucose controlled for now.   >If continue with confusion, will call sofia to evaluate for dementia.        Charmaine Solis DNP,ANP-BC  Spectralink #[id number]

## 2020-09-22 NOTE — CONSULT NOTE ADULT - ASSESSMENT
80 year old F with pmhx of  DMT2 (on Metformin and insulin), COPD, CHF, HLD, HTN, kidney stones and pshx of R nephrectomy, L knee surgery presents to ED bibems with elevated blood sugar, SOB, and lethargy.    Pt unable to provide symptoms.   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   Abnormal u/a   lactic acidosis, hypoxia on admission.       Plan:   started Ertapenem 1 gm iv q24h   follow up urine cx   follow up blood cx  glycemic control

## 2020-09-22 NOTE — CONSULT NOTE ADULT - SUBJECTIVE AND OBJECTIVE BOX
HPI:   80 year old F with pmhx of  DMT2 (on Metformin and insulin), COPD, CHF, HLD, HTN, kidney stones and pshx of R nephrectomy, L knee surgery presents to ED bibems with elevated blood sugar, SOB, and lethargy. Per EMS, pt with 485 blood sugar, BP of 144/75, and O2 sat 92% on room air.  Daughter notes period of incoherent speech over the weekend. Pt endorses excess urination and thirst over the last few days. She also reports mild SOB x few days that is worse when lying flat. Pt also reports constant CP that has been present for "quite a while", unable to specify how much time. Denies fever, chills, worsening leg swelling. (21 Sep 2020 20:15)  Patient has history of diabetes, A1C 8.1%, on oral meds and insulin at home (Lantus 15u at bedtime, Metformin 500mg daily), follows up with PCP for diabetes management.  Endo was consulted for glycemic control.    PAST MEDICAL & SURGICAL HISTORY:  COPD, moderate    COPD (chronic obstructive pulmonary disease)    CHF (congestive heart failure)    HLD (hyperlipidemia)    HTN (hypertension)    Vitamin D deficiency    Dyslipidemia    Constipation    Kidney stones    Type 2 diabetes mellitus    GERD (gastroesophageal reflux disease)    COPD (chronic obstructive pulmonary disease)    HTN (hypertension)    CHF (congestive heart failure)  ~ diastolic, Stage I    No significant past surgical history    H/O breast biopsy  ~ 3 times on right, 2 times on left    H/O right nephrectomy  ~ [facility]    History of left knee replacement  ~ x 3 (2010, 2011, 2016)        FAMILY HISTORY:  FH: COPD (chronic obstructive pulmonary disease) (Child)  ~ daughter (non-smoker)    FH: diabetes mellitus (Grandparent)  ~ mother, grandmother    FH: CHF (congestive heart failure)  ~ mother    FH: breast cancer  ~ mother        Social History:    Outpatient Medications:    MEDICATIONS  (STANDING):  ALBUTerol    90 MICROgram(s) HFA Inhaler 1 Puff(s) Inhalation every 4 hours  aspirin enteric coated 81 milliGRAM(s) Oral daily  atorvastatin 80 milliGRAM(s) Oral at bedtime  budesonide 160 MICROgram(s)/formoterol 4.5 MICROgram(s) Inhaler 2 Puff(s) Inhalation two times a day  cefTRIAXone   IVPB 1000 milliGRAM(s) IV Intermittent every 24 hours  dextrose 5%. 1000 milliLiter(s) (50 mL/Hr) IV Continuous <Continuous>  dextrose 50% Injectable 12.5 Gram(s) IV Push once  dextrose 50% Injectable 25 Gram(s) IV Push once  dextrose 50% Injectable 25 Gram(s) IV Push once  enoxaparin Injectable 40 milliGRAM(s) SubCutaneous daily  furosemide    Tablet 40 milliGRAM(s) Oral daily  gabapentin 100 milliGRAM(s) Oral three times a day  influenza   Vaccine 0.5 milliLiter(s) IntraMuscular once  insulin glargine Injectable (LANTUS) 12 Unit(s) SubCutaneous at bedtime  insulin lispro (HumaLOG) corrective regimen sliding scale   SubCutaneous three times a day before meals  insulin lispro Injectable (HumaLOG) 4 Unit(s) SubCutaneous three times a day before meals  potassium chloride    Tablet ER 40 milliEquivalent(s) Oral every 2 hours  potassium chloride  10 mEq/100 mL IVPB 10 milliEquivalent(s) IV Intermittent every 1 hour    MEDICATIONS  (PRN):  dextrose 40% Gel 15 Gram(s) Oral once PRN Blood Glucose LESS THAN 70 milliGRAM(s)/deciliter  glucagon  Injectable 1 milliGRAM(s) IntraMuscular once PRN Glucose LESS THAN 70 milligrams/deciliter      Allergies    No Known Allergies    Intolerances      Review of Systems:  Constitutional: No fever, no chills  Eyes: No blurry vision  Neuro: No tremors  HEENT: No pain, no neck swelling  Cardiovascular: No chest pain, no palpitations  Respiratory: Has SOB, no cough  GI: No nausea, vomiting, abdominal pain  : No dysuria  Skin: no rash  MSK: Has leg swelling.  Psych: no depression  Endocrine: no polyuria, polydipsia    ALL OTHER SYSTEMS REVIEWED AND NEGATIVE    UNABLE TO OBTAIN    PHYSICAL EXAM:  VITALS: T(C): 36.4 (09-22-20 @ 05:05)  T(F): 97.6 (09-22-20 @ 05:05), Max: 98.4 (09-21-20 @ 21:20)  HR: 68 (09-22-20 @ 05:05) (60 - 77)  BP: 139/76 (09-22-20 @ 05:05) (120/99 - 150/80)  RR:  (16 - 22)  SpO2:  (86% - 100%)  Wt(kg): --  GENERAL: NAD, well-groomed, well-developed  EYES: No proptosis, no lid lag  HEENT:  Atraumatic, Normocephalic  THYROID: Normal size, no palpable nodules  RESPIRATORY: Clear to auscultation bilaterally; No rales, rhonchi, wheezing  CARDIOVASCULAR: Si S2, No murmurs;  GI: Soft, non distended, normal bowel sounds  SKIN: Dry, intact, No rashes or lesions  MUSCULOSKELETAL: Has BL lower extremity edema.  NEURO:  no tremor, sensation decreased in feet BL,    POCT Blood Glucose.: 133 mg/dL (09-22-20 @ 08:35)  POCT Blood Glucose.: 128 mg/dL (09-22-20 @ 03:36)  POCT Blood Glucose.: 104 mg/dL (09-21-20 @ 21:44)  POCT Blood Glucose.: 214 mg/dL (09-21-20 @ 19:31)  POCT Blood Glucose.: 419 mg/dL (09-21-20 @ 13:12)                            13.1   9.80  )-----------( 175      ( 22 Sep 2020 06:58 )             40.5       09-22    147<H>  |  91<L>  |  22  ----------------------------<  117<H>  2.4<LL>   |  44<H>  |  0.98    EGFR if : 64  EGFR if non : 55<L>    Ca    9.3      09-22  Mg     2.0     09-21  Phos  2.6     09-21    TPro  6.0  /  Alb  3.1<L>  /  TBili  0.3  /  DBili  x   /  AST  26  /  ALT  30  /  AlkPhos  89  09-22      Thyroid Function Tests:              Radiology:                HPI:   80 year old F with pmhx of  DMT2 (on Metformin and insulin), COPD, CHF, HLD, HTN, kidney stones and pshx of R nephrectomy, L knee surgery presents to ED bibems with elevated blood sugar, SOB, and lethargy. Per EMS, pt with 485 blood sugar, BP of 144/75, and O2 sat 92% on room air.  Daughter notes period of incoherent speech over the weekend. Pt endorses excess urination and thirst over the last few days. She also reports mild SOB x few days that is worse when lying flat. Pt also reports constant CP that has been present for "quite a while", unable to specify how much time. Denies fever, chills, worsening leg swelling. (21 Sep 2020 20:15)  Patient has history of diabetes, A1C 8.1%, on oral meds and insulin at home (Lantus 15u at bedtime, Metformin 500mg daily), follows up with PCP for diabetes management.  Endo was consulted for glycemic control.    PAST MEDICAL & SURGICAL HISTORY:  COPD, moderate    COPD (chronic obstructive pulmonary disease)    CHF (congestive heart failure)    HLD (hyperlipidemia)    HTN (hypertension)    Vitamin D deficiency    Dyslipidemia    Constipation    Kidney stones    Type 2 diabetes mellitus    GERD (gastroesophageal reflux disease)    COPD (chronic obstructive pulmonary disease)    HTN (hypertension)    CHF (congestive heart failure)  ~ diastolic, Stage I    No significant past surgical history    H/O breast biopsy  ~ 3 times on right, 2 times on left    H/O right nephrectomy  ~ [facility]    History of left knee replacement  ~ x 3 (2010, 2011, 2016)        FAMILY HISTORY:  FH: COPD (chronic obstructive pulmonary disease) (Child)  ~ daughter (non-smoker)    FH: diabetes mellitus (Grandparent)  ~ mother, grandmother    FH: CHF (congestive heart failure)  ~ mother    FH: breast cancer  ~ mother        Social History:    Outpatient Medications:    MEDICATIONS  (STANDING):  ALBUTerol    90 MICROgram(s) HFA Inhaler 1 Puff(s) Inhalation every 4 hours  aspirin enteric coated 81 milliGRAM(s) Oral daily  atorvastatin 80 milliGRAM(s) Oral at bedtime  budesonide 160 MICROgram(s)/formoterol 4.5 MICROgram(s) Inhaler 2 Puff(s) Inhalation two times a day  cefTRIAXone   IVPB 1000 milliGRAM(s) IV Intermittent every 24 hours  dextrose 5%. 1000 milliLiter(s) (50 mL/Hr) IV Continuous <Continuous>  dextrose 50% Injectable 12.5 Gram(s) IV Push once  dextrose 50% Injectable 25 Gram(s) IV Push once  dextrose 50% Injectable 25 Gram(s) IV Push once  enoxaparin Injectable 40 milliGRAM(s) SubCutaneous daily  furosemide    Tablet 40 milliGRAM(s) Oral daily  gabapentin 100 milliGRAM(s) Oral three times a day  influenza   Vaccine 0.5 milliLiter(s) IntraMuscular once  insulin glargine Injectable (LANTUS) 12 Unit(s) SubCutaneous at bedtime  insulin lispro (HumaLOG) corrective regimen sliding scale   SubCutaneous three times a day before meals  insulin lispro Injectable (HumaLOG) 4 Unit(s) SubCutaneous three times a day before meals  potassium chloride    Tablet ER 40 milliEquivalent(s) Oral every 2 hours  potassium chloride  10 mEq/100 mL IVPB 10 milliEquivalent(s) IV Intermittent every 1 hour    MEDICATIONS  (PRN):  dextrose 40% Gel 15 Gram(s) Oral once PRN Blood Glucose LESS THAN 70 milliGRAM(s)/deciliter  glucagon  Injectable 1 milliGRAM(s) IntraMuscular once PRN Glucose LESS THAN 70 milligrams/deciliter      Allergies    No Known Allergies    Intolerances      Review of Systems:  Constitutional: No fever, no chills  Eyes: No blurry vision  Neuro: No tremors  HEENT: No pain, no neck swelling  Cardiovascular: No chest pain, no palpitations  Respiratory: Has SOB, no cough  GI: No nausea, vomiting, abdominal pain  : No dysuria  Skin: no rash  MSK: Has leg swelling.  Psych: no depression  Endocrine: no polyuria, polydipsia    ALL OTHER SYSTEMS REVIEWED AND NEGATIVE    UNABLE TO OBTAIN    PHYSICAL EXAM:  VITALS: T(C): 36.4 (09-22-20 @ 05:05)  T(F): 97.6 (09-22-20 @ 05:05), Max: 98.4 (09-21-20 @ 21:20)  HR: 68 (09-22-20 @ 05:05) (60 - 77)  BP: 139/76 (09-22-20 @ 05:05) (120/99 - 150/80)  RR:  (16 - 22)  SpO2:  (86% - 100%)  Wt(kg): --  GENERAL: NAD, well-groomed, well-developed  EYES: No proptosis, no lid lag  HEENT:  Atraumatic, Normocephalic  THYROID: Normal size, no palpable nodules  RESPIRATORY: Clear to auscultation bilaterally; No rales, rhonchi, wheezing  CARDIOVASCULAR: Si S2, No murmurs;  GI: Soft, non distended, normal bowel sounds  SKIN: Dry, intact, No rashes or lesions  MUSCULOSKELETAL: Has BL lower extremity edema.  NEURO:  no tremor, sensation decreased in feet BL,    POCT Blood Glucose.: 133 mg/dL (09-22-20 @ 08:35)  POCT Blood Glucose.: 128 mg/dL (09-22-20 @ 03:36)  POCT Blood Glucose.: 104 mg/dL (09-21-20 @ 21:44)  POCT Blood Glucose.: 214 mg/dL (09-21-20 @ 19:31)  POCT Blood Glucose.: 419 mg/dL (09-21-20 @ 13:12)                            13.1   9.80  )-----------( 175      ( 22 Sep 2020 06:58 )             40.5       09-22    147<H>  |  91<L>  |  22  ----------------------------<  117<H>  2.4<LL>   |  44<H>  |  0.98    EGFR if : 64  EGFR if non : 55<L>    Ca    9.3      09-22  Mg     2.0     09-21  Phos  2.6     09-21    TPro  6.0  /  Alb  3.1<L>  /  TBili  0.3  /  DBili  x   /  AST  26  /  ALT  30  /  AlkPhos  89  09-22      Thyroid Function Tests:              Radiology:

## 2020-09-22 NOTE — CONSULT NOTE ADULT - SUBJECTIVE AND OBJECTIVE BOX
Patient is a 80y old  Female who presents with a chief complaint of sob, hyperglycemia (22 Sep 2020 14:14)      HPI:   80 year old F with pmhx of  DMT2 (on Metformin and insulin), COPD, CHF, HLD, HTN, kidney stones and pshx of R nephrectomy, L knee surgery presents to ED bibems with elevated blood sugar, SOB, and lethargy. Per EMS, pt with 485 blood sugar, BP of 144/75, and O2 sat 92% on room air.  Daughter notes period of incoherent speech over the weekend. Pt endorses excess urination and thirst over the last few days. She also reports mild SOB x few days that is worse when lying flat. Pt also reports constant CP that has been present for "quite a while", unable to specify how much time. Denies fever, chills, worsening leg swelling. (21 Sep 2020 20:15)  Above reviewed;   Pt agitated earlier during the day, refusing insulin and other interventions, now in a recliner sleepy, received haldol for agitation.   Now dozing, unable to provide ROS. Per RN no diarrhea, no cough, no skin breakdown.       PAST MEDICAL & SURGICAL HISTORY:  COPD, moderate    COPD (chronic obstructive pulmonary disease)    CHF (congestive heart failure)    HLD (hyperlipidemia)    HTN (hypertension)    Vitamin D deficiency    Dyslipidemia    Constipation    Kidney stones    Type 2 diabetes mellitus    GERD (gastroesophageal reflux disease)    COPD (chronic obstructive pulmonary disease)    HTN (hypertension)    CHF (congestive heart failure)  ~ diastolic, Stage I    No significant past surgical history    H/O breast biopsy  ~ 3 times on right, 2 times on left    H/O right nephrectomy  ~ [facility]    History of left knee replacement  ~ x 3 (2010, 2011, 2016)        REVIEW OF SYSTEMS  Unable to obtain due to pt's underlying mental status.       Social history:  lives at home, rest Unable to obtain due to pt's underlying mental status.       FAMILY HISTORY: (per chart)   FH: COPD (chronic obstructive pulmonary disease) (Child)  ~ daughter (non-smoker)    FH: diabetes mellitus (Grandparent)  ~ mother, grandmother    FH: CHF (congestive heart failure)  ~ mother    FH: breast cancer  ~ mother        Allergies  No Known Allergies      Antimicrobials:        Vital Signs Last 24 Hrs  T(C): 36.4 (22 Sep 2020 05:05), Max: 36.9 (21 Sep 2020 21:20)  T(F): 97.6 (22 Sep 2020 05:05), Max: 98.4 (21 Sep 2020 21:20)  HR: 68 (22 Sep 2020 05:05) (60 - 76)  BP: 139/76 (22 Sep 2020 05:05) (120/99 - 142/87)  BP(mean): 107 (21 Sep 2020 19:34) (107 - 107)  RR: 18 (22 Sep 2020 05:05) (16 - 22)  SpO2: 96% (22 Sep 2020 05:05) (93% - 100%)      PHYSICAL EXAM: Limited as pt dozing, unable to follow commands.    Constitutional: Comfortable. Dozing on a recliner     ENT: No abnormalities visible externally     Neck: Supple,    Respiratory: + air entry bilaterally anteriorly     Cardiovascular: S1 S2 wnl,     Gastrointestinal: Soft BS(+) non distended.    Genitourinary: No leonard     Extremities: + edema.    Vascular: peripheral pulses felt    Skin: No rash     Musculoskeletal: rt knee TKR scar, lt knee large scar with some effusion but no erythema or warmth.                               13.1   9.80  )-----------( 175      ( 22 Sep 2020 06:58 )             40.5       09-22    147<H>  |  91<L>  |  22  ----------------------------<  117<H>  2.4<LL>   |  44<H>  |  0.98    Ca    9.3      22 Sep 2020 06:55  Phos  2.6     09-21  Mg     2.0     09-21    TPro  6.0  /  Alb  3.1<L>  /  TBili  0.3  /  DBili  x   /  AST  26  /  ALT  30  /  AlkPhos  89  09-22      Urinalysis + Microscopic Examination (09.21.20 @ 14:00)   Color: Light Yellow   Glucose Qualitative, Urine: 1000 mg/dL   Blood, Urine: Small   Urine Appearance: Clear   Urobilinogen: Negative   Specific Gravity: 1.021   Protein, Urine: 300 mg/dL   pH Urine: 6.5   Leukocyte Esterase Concentration: Moderate   Nitrite: Negative   Ketone - Urine: Negative   Bilirubin: Negative   Red Blood Cell - Urine: 9 /hpf   White Blood Cell - Urine: 59 /HPF   Epithelial Cells: 1 /hpf   Hyaline Casts: 3 /lpf   Bacteria: Negative   Comment - Urine: few yeast like organisms     COVID-19 PCR . (09.21.20 @ 14:58)   COVID-19 PCR: NotDetec      Radiology: Imaging reviewed and visualized personally [ x]    < from: CT Head No Cont (09.21.20 @ 15:23) >  IMPRESSION: No acute hemorrhage, mass or masseffect.    If symptoms continue MRI can be done for further evaluation if there are no contraindications.        < from: Xray Chest 1 View AP/PA (09.21.20 @ 14:26) >  IMPRESSION:    The mediastinal cardiac silhouette is unremarkable.    Bibasilar atelectasis.    Severe thoracolumbar scoliosis.

## 2020-09-22 NOTE — CONSULT NOTE ADULT - ASSESSMENT
80 year old F with pmhx of  DMT2 (on Metformin and insulin), COPD, CHF, HLD, HTN, kidney stones and pshx of R nephrectomy, L knee surgery presents to ED bibems with elevated blood sugar, SOB, and lethargy found to have UTI.     1. SOB, acute/chronic diastolic chf exacerbation  -cv stable, resting comfortably on nasal canula  -PBNP mildly elevated, mild LE edema  -symptoms also 2/2 to DKA, COPD hx   -give extra lasix 20mg IVP x1 today, potassium noted - supplementation ordered,  - will re-eval daily for further IV lasix  -check echo to re-eval LVEF, valve function  -consider pulm eval     2. Hyperglycemia  -management per primary team     3. UTI  -iv abx per primary team     4. Hypokalemia  -supplementation per primary team, continue to trend.     5. COPD , hx  -c/w home meds     dvt ppx

## 2020-09-22 NOTE — CONSULT NOTE ADULT - PROBLEM SELECTOR RECOMMENDATION 9
Will continue current insulin regimen for now.  Will continue monitoring FS, log, and FU.  Will make recommendations for outpatient management prior to DC.

## 2020-09-22 NOTE — CONSULT NOTE ADULT - SUBJECTIVE AND OBJECTIVE BOX
CARDIOLOGY CONSULT - Dr. Mukherjee     CHIEF COMPLAINT: sob     HPI:   80 year old F with pmhx of  DMT2 (on Metformin and insulin), COPD, CHF, HLD, HTN, kidney stones and pshx of R nephrectomy, L knee surgery presents to ED bibems with elevated blood sugar, SOB, and lethargy. Per EMS, pt with 485 blood sugar, BP of 144/75, and O2 sat 92% on room air.  Daughter notes period of incoherent speech over the weekend. Pt endorses excess urination and thirst over the last few days. She also reports mild SOB x few days that is worse when lying flat. Pt also reports constant CP that has been present for "quite a while", unable to specify how much time. Denies fever, chills, worsening leg swelling.        PAST MEDICAL & SURGICAL HISTORY:  COPD, moderate    COPD (chronic obstructive pulmonary disease)    CHF (congestive heart failure)    HLD (hyperlipidemia)    HTN (hypertension)    Vitamin D deficiency    Dyslipidemia    Constipation    Kidney stones    Type 2 diabetes mellitus    GERD (gastroesophageal reflux disease)    COPD (chronic obstructive pulmonary disease)    HTN (hypertension)    CHF (congestive heart failure)  ~ diastolic, Stage I    No significant past surgical history    H/O breast biopsy  ~ 3 times on right, 2 times on left    H/O right nephrectomy  ~ [facility]    History of left knee replacement  ~ x 3 (2010, 2011, 2016)            PREVIOUS DIAGNOSTIC TESTING:    [ ] Echocardiogram:   < from: TTE Echo Complete w/o Contrast w/ Doppler (07.16.20 @ 17:01) >    Summary:   1. Left ventricular ejection fraction, by visual estimation, is 55 to 60%.   2. Technically difficult study.   3. Normal global left ventricular systolic function.   4. Mildly increased LV wall thickness.   5. Normal left ventricular internal cavity size.   6. Spectral Doppler shows impaired relaxation pattern of left ventricular myocardial filling (Grade I diastolic dysfunction).   7. Normal right ventricular size and function.   8. Trivial pericardial effusion.   9. Mild mitral annular calcification.  10. Mild mitral valve regurgitation.  11. Mild thickening and calcification of the anterior and posterior mitral valve leaflets.  12. Degenerative tricuspid valve.  13. Moderate tricuspid regurgitation.  14. Estimated pulmonary artery systolic pressure is 53.4mmHg assuming a right atrial pressure of 5 mmHg, which is consistent with moderate pulmonary hypertension.  15. Peak transaortic gradient equals 19.6 mmHg, mean transaortic gradient equals 8.2 mmHg, the calculated aortic valve area equals 2.04 cm² bythe continuity equation consistent with mild aortic stenosis.  16. There is mild aortic root calcification.    < end of copied text >  [ ]  Catheterization:   [ ] Stress Test:  	  < from: Nuclear Stress Test-Pharmacologic (Nuclear Stress Test-Pharmacologic .) (05.25.17 @ 09:28) >  IMPRESSIONS:Abnormal Study  * Myocardial Perfusion SPECT results are mildly abnormal.  * There is a small, mild defect in basal inferior wall  that is fixed, suggestive of infarct. However, the  observed defect may be due to attenuation from the  diaphragm. The patient could not lay prone for attenuation  corrected imaging.  * No clear evidence of ischemia.  * Post-stress gated wall motion analysis was performed  (LVEF > 70%;LVEDV = 43 ml.), revealing normal LV function.  The RV appears midly enlarged and diffusely  hypocontractile.    < end of copied text >    MEDICATIONS:  MEDICATIONS  (STANDING):  ALBUTerol    90 MICROgram(s) HFA Inhaler 1 Puff(s) Inhalation every 4 hours  aspirin enteric coated 81 milliGRAM(s) Oral daily  atorvastatin 80 milliGRAM(s) Oral at bedtime  budesonide 160 MICROgram(s)/formoterol 4.5 MICROgram(s) Inhaler 2 Puff(s) Inhalation two times a day  cefTRIAXone   IVPB 1000 milliGRAM(s) IV Intermittent every 24 hours  dextrose 5%. 1000 milliLiter(s) (50 mL/Hr) IV Continuous <Continuous>  dextrose 50% Injectable 12.5 Gram(s) IV Push once  dextrose 50% Injectable 25 Gram(s) IV Push once  dextrose 50% Injectable 25 Gram(s) IV Push once  enoxaparin Injectable 40 milliGRAM(s) SubCutaneous daily  furosemide    Tablet 40 milliGRAM(s) Oral daily  gabapentin 100 milliGRAM(s) Oral three times a day  haloperidol    Injectable 1 milliGRAM(s) IntraMuscular once  influenza   Vaccine 0.5 milliLiter(s) IntraMuscular once  insulin glargine Injectable (LANTUS) 12 Unit(s) SubCutaneous at bedtime  insulin lispro (HumaLOG) corrective regimen sliding scale   SubCutaneous three times a day before meals  insulin lispro Injectable (HumaLOG) 4 Unit(s) SubCutaneous three times a day before meals  potassium chloride    Tablet ER 40 milliEquivalent(s) Oral every 2 hours      FAMILY HISTORY:  FH: COPD (chronic obstructive pulmonary disease) (Child)  ~ daughter (non-smoker)    FH: diabetes mellitus (Grandparent)  ~ mother, grandmother    FH: CHF (congestive heart failure)  ~ mother    FH: breast cancer  ~ mother        SOCIAL HISTORY:    [ ] Non-smoker  [ ] Smoker  [ ] Alcohol    Allergies    No Known Allergies    Intolerances    	    REVIEW OF SYSTEMS:  CONSTITUTIONAL: No fever, weight loss, or fatigue  EYES: No eye pain, visual disturbances, or discharge  ENMT:  No difficulty hearing, tinnitus, vertigo; No sinus or throat pain  NECK: No pain or stiffness  RESPIRATORY: No cough, wheezing, chills or hemoptysis; No Shortness of Breath  CARDIOVASCULAR: No chest pain, palpitations, passing out, dizziness, or leg swelling  GASTROINTESTINAL: No abdominal or epigastric pain. No nausea, vomiting, or hematemesis; No diarrhea or constipation. No melena or hematochezia.  GENITOURINARY: No dysuria, frequency, hematuria, or incontinence  NEUROLOGICAL: No headaches, memory loss, loss of strength, numbness, or tremors  SKIN: No itching, burning, rashes, or lesions   	    [ ] All others negative	  [ ] Unable to obtain    PHYSICAL EXAM:  T(C): 36.4 (09-22-20 @ 05:05), Max: 36.9 (09-21-20 @ 13:02)  HR: 68 (09-22-20 @ 05:05) (60 - 83)  BP: 139/76 (09-22-20 @ 05:05) (120/99 - 171/92)  RR: 18 (09-22-20 @ 05:05) (16 - 23)  SpO2: 96% (09-22-20 @ 05:05) (86% - 100%)  Wt(kg): --  I&O's Summary      Appearance: Normal	  Psychiatry: A & O x 3, Mood & affect appropriate  HEENT:   Normal oral mucosa, PERRL, EOMI	  Lymphatic: No lymphadenopathy  Cardiovascular: Normal S1 S2,RRR, No JVD, No murmurs  Respiratory: Lungs clear to auscultation	  Gastrointestinal:  Soft, Non-tender, + BS	  Skin: No rashes, No ecchymoses, No cyanosis	  Neurologic: Non-focal  Extremities: Normal range of motion, No clubbing, cyanosis or edema  Vascular: Peripheral pulses palpable 2+ bilaterally    TELEMETRY: 	    ECG:  	  RADIOLOGY:  OTHER: 	  	  LABS:	 	    CARDIAC MARKERS:                                  13.1   9.80  )-----------( 175      ( 22 Sep 2020 06:58 )             40.5     09-22    147<H>  |  91<L>  |  22  ----------------------------<  117<H>  2.4<LL>   |  44<H>  |  0.98    Ca    9.3      22 Sep 2020 06:55  Phos  2.6     09-21  Mg     2.0     09-21    TPro  6.0  /  Alb  3.1<L>  /  TBili  0.3  /  DBili  x   /  AST  26  /  ALT  30  /  AlkPhos  89  09-22      proBNP: Serum Pro-Brain Natriuretic Peptide: 544 pg/mL (09-21 @ 13:35)    Lipid Profile:   HgA1c:   TSH:     Cora Edwards FN-BC   CARDIOLOGY CONSULT - Dr. Mukherjee     CHIEF COMPLAINT: sob     HPI:   80 year old F with pmhx of  DMT2 (on Metformin and insulin), COPD, CHF, HLD, HTN, kidney stones and pshx of R nephrectomy, L knee surgery presents to ED bibems with elevated blood sugar, SOB, and lethargy. Per EMS, pt with 485 blood sugar, BP of 144/75, and O2 sat 92% on room air.  Daughter notes period of incoherent speech over the weekend. Pt endorses excess urination and thirst over the last few days. She also reports mild SOB x few days that is worse when lying flat. As per chart, Pt also reports constant CP that has been present for "quite a while", unable to specify how much time. Denies fever, chills, worsening leg swelling.      Pt. seen and examined, currently sleeping, as per RN, patient was up all night, confused , sleeping, currently calm and sleeping comfortably, unable to answer questions appropriately. Appears to be resting comfortably on nasal canula, no resp. distress noted.       PAST MEDICAL & SURGICAL HISTORY:  COPD, moderate    COPD (chronic obstructive pulmonary disease)    CHF (congestive heart failure)    HLD (hyperlipidemia)    HTN (hypertension)    Vitamin D deficiency    Dyslipidemia    Constipation    Kidney stones    Type 2 diabetes mellitus    GERD (gastroesophageal reflux disease)    COPD (chronic obstructive pulmonary disease)    HTN (hypertension)    CHF (congestive heart failure)  ~ diastolic, Stage I    No significant past surgical history    H/O breast biopsy  ~ 3 times on right, 2 times on left    H/O right nephrectomy  ~ [facility]    History of left knee replacement  ~ x 3 (2010, 2011, 2016)            PREVIOUS DIAGNOSTIC TESTING:    [ ] Echocardiogram:   < from: TTE Echo Complete w/o Contrast w/ Doppler (07.16.20 @ 17:01) >    Summary:   1. Left ventricular ejection fraction, by visual estimation, is 55 to 60%.   2. Technically difficult study.   3. Normal global left ventricular systolic function.   4. Mildly increased LV wall thickness.   5. Normal left ventricular internal cavity size.   6. Spectral Doppler shows impaired relaxation pattern of left ventricular myocardial filling (Grade I diastolic dysfunction).   7. Normal right ventricular size and function.   8. Trivial pericardial effusion.   9. Mild mitral annular calcification.  10. Mild mitral valve regurgitation.  11. Mild thickening and calcification of the anterior and posterior mitral valve leaflets.  12. Degenerative tricuspid valve.  13. Moderate tricuspid regurgitation.  14. Estimated pulmonary artery systolic pressure is 53.4mmHg assuming a right atrial pressure of 5 mmHg, which is consistent with moderate pulmonary hypertension.  15. Peak transaortic gradient equals 19.6 mmHg, mean transaortic gradient equals 8.2 mmHg, the calculated aortic valve area equals 2.04 cm² bythe continuity equation consistent with mild aortic stenosis.  16. There is mild aortic root calcification.    < end of copied text >  [ ]  Catheterization:   [ ] Stress Test:  	  < from: Nuclear Stress Test-Pharmacologic (Nuclear Stress Test-Pharmacologic .) (05.25.17 @ 09:28) >  IMPRESSIONS:Abnormal Study  * Myocardial Perfusion SPECT results are mildly abnormal.  * There is a small, mild defect in basal inferior wall  that is fixed, suggestive of infarct. However, the  observed defect may be due to attenuation from the  diaphragm. The patient could not lay prone for attenuation  corrected imaging.  * No clear evidence of ischemia.  * Post-stress gated wall motion analysis was performed  (LVEF > 70%;LVEDV = 43 ml.), revealing normal LV function.  The RV appears midly enlarged and diffusely  hypocontractile.    < end of copied text >    MEDICATIONS:  MEDICATIONS  (STANDING):  ALBUTerol    90 MICROgram(s) HFA Inhaler 1 Puff(s) Inhalation every 4 hours  aspirin enteric coated 81 milliGRAM(s) Oral daily  atorvastatin 80 milliGRAM(s) Oral at bedtime  budesonide 160 MICROgram(s)/formoterol 4.5 MICROgram(s) Inhaler 2 Puff(s) Inhalation two times a day  cefTRIAXone   IVPB 1000 milliGRAM(s) IV Intermittent every 24 hours  dextrose 5%. 1000 milliLiter(s) (50 mL/Hr) IV Continuous <Continuous>  dextrose 50% Injectable 12.5 Gram(s) IV Push once  dextrose 50% Injectable 25 Gram(s) IV Push once  dextrose 50% Injectable 25 Gram(s) IV Push once  enoxaparin Injectable 40 milliGRAM(s) SubCutaneous daily  furosemide    Tablet 40 milliGRAM(s) Oral daily  gabapentin 100 milliGRAM(s) Oral three times a day  haloperidol    Injectable 1 milliGRAM(s) IntraMuscular once  influenza   Vaccine 0.5 milliLiter(s) IntraMuscular once  insulin glargine Injectable (LANTUS) 12 Unit(s) SubCutaneous at bedtime  insulin lispro (HumaLOG) corrective regimen sliding scale   SubCutaneous three times a day before meals  insulin lispro Injectable (HumaLOG) 4 Unit(s) SubCutaneous three times a day before meals  potassium chloride    Tablet ER 40 milliEquivalent(s) Oral every 2 hours      FAMILY HISTORY:  FH: COPD (chronic obstructive pulmonary disease) (Child)  ~ daughter (non-smoker)    FH: diabetes mellitus (Grandparent)  ~ mother, grandmother    FH: CHF (congestive heart failure)  ~ mother    FH: breast cancer  ~ mother        SOCIAL HISTORY:    [x ] Non-smoker  [ ] Smoker  [ ] Alcohol    Allergies    No Known Allergies    Intolerances    	    REVIEW OF SYSTEMS:  CONSTITUTIONAL: No fever, weight loss, or fatigue  EYES: No eye pain, visual disturbances, or discharge  ENMT:  No difficulty hearing, tinnitus, vertigo; No sinus or throat pain  NECK: No pain or stiffness  RESPIRATORY: No cough, wheezing, chills or hemoptysis; + Shortness of Breath  CARDIOVASCULAR: No chest pain, palpitations, passing out, dizziness, or leg swelling  GASTROINTESTINAL: No abdominal or epigastric pain. No nausea, vomiting, or hematemesis; No diarrhea or constipation. No melena or hematochezia.  GENITOURINARY: No dysuria, frequency, hematuria, or incontinence  NEUROLOGICAL: No headaches, memory loss, loss of strength, numbness, or tremors  SKIN: No itching, burning, rashes, or lesions   	    [x ] All others negative	  [ ] Unable to obtain    PHYSICAL EXAM:  T(C): 36.4 (09-22-20 @ 05:05), Max: 36.9 (09-21-20 @ 13:02)  HR: 68 (09-22-20 @ 05:05) (60 - 83)  BP: 139/76 (09-22-20 @ 05:05) (120/99 - 171/92)  RR: 18 (09-22-20 @ 05:05) (16 - 23)  SpO2: 96% (09-22-20 @ 05:05) (86% - 100%)  Wt(kg): --  I&O's Summary      Appearance: Normal	  Psychiatry: A & O x 3, Mood & affect appropriate  HEENT:   Normal oral mucosa, PERRL, EOMI	  Lymphatic: No lymphadenopathy  Cardiovascular: Normal S1 S2,RRR, No JVD, No murmurs  Respiratory: Lungs clear to auscultation	  Gastrointestinal:  Soft, Non-tender, + BS	  Skin: No rashes, No ecchymoses, No cyanosis	  Neurologic: Non-focal  Extremities: Normal range of motion, No clubbing, b/l +1-2 le edema   Vascular: Peripheral pulses palpable 2+ bilaterally    TELEMETRY: nsr 	    ECG:  	EKG not in chart, as per ED- NSR, 77bpm, t wave flattening , poor baseline   RADIOLOGY:   < from: Xray Chest 1 View AP/PA (09.21.20 @ 14:26) >  IMPRESSION:    The mediastinal cardiac silhouette is unremarkable.    Bibasilar atelectasis.    Severe thoracolumbar scoliosis.    < end of copied text >  OTHER: 	  	  LABS:	 	    CARDIAC MARKERS:                                  13.1   9.80  )-----------( 175      ( 22 Sep 2020 06:58 )             40.5     09-22    147<H>  |  91<L>  |  22  ----------------------------<  117<H>  2.4<LL>   |  44<H>  |  0.98    Ca    9.3      22 Sep 2020 06:55  Phos  2.6     09-21  Mg     2.0     09-21    TPro  6.0  /  Alb  3.1<L>  /  TBili  0.3  /  DBili  x   /  AST  26  /  ALT  30  /  AlkPhos  89  09-22      proBNP: Serum Pro-Brain Natriuretic Peptide: 544 pg/mL (09-21 @ 13:35)    Lipid Profile:   HgA1c:   TSH:

## 2020-09-23 NOTE — PROGRESS NOTE ADULT - SUBJECTIVE AND OBJECTIVE BOX
CARDIOLOGY FOLLOW UP - Dr. Mukherjee    CC confused       PHYSICAL EXAM:  T(C): 36.5 (09-23-20 @ 04:45), Max: 36.5 (09-23-20 @ 04:45)  HR: 78 (09-23-20 @ 04:45) (76 - 78)  BP: 154/83 (09-23-20 @ 04:45) (120/73 - 154/83)  RR: 18 (09-23-20 @ 04:45) (18 - 18)  SpO2: 100% (09-23-20 @ 04:45) (100% - 100%)  Wt(kg): --  I&O's Summary    22 Sep 2020 07:01  -  23 Sep 2020 07:00  --------------------------------------------------------  IN: 480 mL / OUT: 450 mL / NET: 30 mL        Appearance: Nad   Cardiovascular: Normal S1 S2,RRR, No JVD, No murmurs  Respiratory: Lungs clear to auscultation	  Gastrointestinal:  Soft, Non-tender, + BS	  Extremities: Normal range of motion, No clubbing, cyanosis or edema        MEDICATIONS  (STANDING):  ALBUTerol    90 MICROgram(s) HFA Inhaler 1 Puff(s) Inhalation every 4 hours  aspirin enteric coated 81 milliGRAM(s) Oral daily  atorvastatin 80 milliGRAM(s) Oral at bedtime  budesonide 160 MICROgram(s)/formoterol 4.5 MICROgram(s) Inhaler 2 Puff(s) Inhalation two times a day  dextrose 5%. 1000 milliLiter(s) (50 mL/Hr) IV Continuous <Continuous>  dextrose 50% Injectable 12.5 Gram(s) IV Push once  dextrose 50% Injectable 25 Gram(s) IV Push once  dextrose 50% Injectable 25 Gram(s) IV Push once  enoxaparin Injectable 40 milliGRAM(s) SubCutaneous daily  ertapenem  IVPB 1000 milliGRAM(s) IV Intermittent every 24 hours  furosemide    Tablet 40 milliGRAM(s) Oral daily  gabapentin 100 milliGRAM(s) Oral three times a day  influenza   Vaccine 0.5 milliLiter(s) IntraMuscular once  insulin glargine Injectable (LANTUS) 16 Unit(s) SubCutaneous at bedtime  insulin lispro (HumaLOG) corrective regimen sliding scale   SubCutaneous three times a day before meals  insulin lispro (HumaLOG) corrective regimen sliding scale   SubCutaneous at bedtime  insulin lispro Injectable (HumaLOG) 7 Unit(s) SubCutaneous three times a day before meals  potassium chloride   Solution 40 milliEquivalent(s) Oral every 2 hours      TELEMETRY: nsr  	    ECG:  	  RADIOLOGY:   DIAGNOSTIC TESTING:  [ ] Echocardiogram:  [ ]  Catheterization:  [ ] Stress Test:    OTHER: 	    LABS:	 	                                13.0   8.76  )-----------( 169      ( 23 Sep 2020 06:30 )             41.1     09-23    147<H>  |  89<L>  |  30<H>  ----------------------------<  300<H>  3.8   |  45<HH>  |  1.08    Ca    9.4      23 Sep 2020 06:30  Phos  3.7     09-23  Mg     1.9     09-23    TPro  6.0  /  Alb  3.1<L>  /  TBili  0.3  /  DBili  x   /  AST  26  /  ALT  30  /  AlkPhos  89  09-22

## 2020-09-23 NOTE — PROGRESS NOTE ADULT - PROBLEM SELECTOR PLAN 1
Will increase Lantus to 16u at bedtime.  Will increase Humalog to 7u before each meal and continue Humalog correction scale coverage. Will continue monitoring FS and FU.  Patient counseled for compliance with consistent low carb diet and physical activity as tolerated outpatient.

## 2020-09-23 NOTE — PROGRESS NOTE ADULT - ASSESSMENT
80 year old F with pmhx of  DMT2 (on Metformin and insulin), COPD, CHF, HLD, HTN, kidney stones and pshx of R nephrectomy, L knee surgery presents to ED bibems with elevated blood sugar, SOB, and lethargy. Per EMS, pt with 485 blood sugar, BP of 144/75, and O2 sat 92% on room air.  Daughter notes period of incoherent speech over the weekend. Pt endorses excess urination and thirst over the last few days. She also reports mild SOB x few days that is worse when lying flat. Pt also reports constant CP that has been present for "quite a while", unable to specify how much time. Denies fever, chills, worsening leg swelling.    Problem/Plan - 1:  ·  Problem: sepsis sec to UTI (urinary tract infection) with bactremia with metabolic encephalopathy POA .  Plan: cw ertepenam   fu REPEAT  cultures  will monitor.     Problem/Plan - 2:  ·  Problem: Hyperglycemia.  Plan: monitor FS  ISS.     Problem/Plan - 3:  ·  Problem: Hypokalemia.  Plan: sp repletion  monitor bmp.     Problem/Plan - 4:  ·  Problem: COPD (chronic obstructive pulmonary disease).  Plan: cw home meds    Problem/Plan - 5:  ·  Problem: CHF (congestive heart failure). - cards fu

## 2020-09-23 NOTE — PROGRESS NOTE ADULT - SUBJECTIVE AND OBJECTIVE BOX
Patient is a 80y old  Female who presents with a chief complaint of sob, hyperglycemia (23 Sep 2020 11:41)      Any change in ROS: Ptis still irritable:  not SOB :   but emotions a relabile: she cries spontaneously       MEDICATIONS  (STANDING):  ALBUTerol    90 MICROgram(s) HFA Inhaler 1 Puff(s) Inhalation every 4 hours  aspirin enteric coated 81 milliGRAM(s) Oral daily  atorvastatin 80 milliGRAM(s) Oral at bedtime  budesonide 160 MICROgram(s)/formoterol 4.5 MICROgram(s) Inhaler 2 Puff(s) Inhalation two times a day  dextrose 5%. 1000 milliLiter(s) (50 mL/Hr) IV Continuous <Continuous>  dextrose 50% Injectable 12.5 Gram(s) IV Push once  dextrose 50% Injectable 25 Gram(s) IV Push once  dextrose 50% Injectable 25 Gram(s) IV Push once  enoxaparin Injectable 40 milliGRAM(s) SubCutaneous daily  ertapenem  IVPB 1000 milliGRAM(s) IV Intermittent every 24 hours  furosemide    Tablet 40 milliGRAM(s) Oral daily  gabapentin 100 milliGRAM(s) Oral three times a day  influenza   Vaccine 0.5 milliLiter(s) IntraMuscular once  insulin glargine Injectable (LANTUS) 16 Unit(s) SubCutaneous at bedtime  insulin lispro (HumaLOG) corrective regimen sliding scale   SubCutaneous three times a day before meals  insulin lispro (HumaLOG) corrective regimen sliding scale   SubCutaneous at bedtime  insulin lispro Injectable (HumaLOG) 7 Unit(s) SubCutaneous three times a day before meals  potassium chloride   Solution 40 milliEquivalent(s) Oral every 2 hours    MEDICATIONS  (PRN):  acetaminophen   Tablet .. 650 milliGRAM(s) Oral every 6 hours PRN Moderate Pain (4 - 6)  dextrose 40% Gel 15 Gram(s) Oral once PRN Blood Glucose LESS THAN 70 milliGRAM(s)/deciliter  glucagon  Injectable 1 milliGRAM(s) IntraMuscular once PRN Glucose LESS THAN 70 milligrams/deciliter    Vital Signs Last 24 Hrs  T(C): 36.5 (23 Sep 2020 04:45), Max: 36.5 (23 Sep 2020 04:45)  T(F): 97.7 (23 Sep 2020 04:45), Max: 97.7 (23 Sep 2020 04:45)  HR: 78 (23 Sep 2020 04:45) (76 - 78)  BP: 154/83 (23 Sep 2020 04:45) (120/73 - 154/83)  BP(mean): --  RR: 18 (23 Sep 2020 04:45) (18 - 18)  SpO2: 100% (23 Sep 2020 04:45) (100% - 100%)    I&O's Summary    22 Sep 2020 07:01  -  23 Sep 2020 07:00  --------------------------------------------------------  IN: 480 mL / OUT: 450 mL / NET: 30 mL          Physical Exam:   GENERAL: NAD, well-groomed, well-developed  HEENT: DON/   Atraumatic, Normocephalic  ENMT: No tonsillar erythema, exudates, or enlargement; Moist mucous membranes, Good dentition, No lesions  NECK: Supple, No JVD, Normal thyroid  CHEST/LUNG: Clear to auscultaion  GI: : Soft, Nontender, Nondistended; Bowel sounds present  NERVOUS SYSTEM:  Alert & Oriented X2  EXTREMITIES: Mild edema  LYMPH: No lymphadenopathy noted  SKIN: No rashes or lesions  ENDOCRINOLOGY: No Thyromegaly  PSYCH: seems  confised    Labs:  47, 47, 45                            13.0   8.76  )-----------( 169      ( 23 Sep 2020 06:30 )             41.1                         13.1   9.80  )-----------( 175      ( 22 Sep 2020 06:58 )             40.5                         14.4   10.45 )-----------( 190      ( 21 Sep 2020 13:35 )             44.7     09-23    147<H>  |  89<L>  |  30<H>  ----------------------------<  300<H>  3.8   |  45<HH>  |  1.08  09-    147<H>  |  91<L>  |  22  ----------------------------<  117<H>  2.4<LL>   |  44<H>  |  0.98      145  |  88<L>  |  25<H>  ----------------------------<  297<H>  2.7<LL>   |  43<H>  |  1.09      141  |  87<L>  |  29<H>  ----------------------------<  480<HH>  3.2<L>   |  36<H>  |  1.12    Ca    9.4      23 Sep 2020 06:30  Ca    9.3      22 Sep 2020 06:55  Ca    9.6      21 Sep 2020 18:58  Phos  3.7       Mg     1.9         TPro  6.0  /  Alb  3.1<L>  /  TBili  0.3  /  DBili  x   /  AST  26  /  ALT  30  /  AlkPhos  89    TPro  7.2  /  Alb  3.6  /  TBili  0.2  /  DBili  x   /  AST  42<H>  /  ALT  38  /  AlkPhos  134<H>      CAPILLARY BLOOD GLUCOSE      POCT Blood Glucose.: 332 mg/dL (23 Sep 2020 12:27)  POCT Blood Glucose.: 242 mg/dL (23 Sep 2020 08:41)  POCT Blood Glucose.: 364 mg/dL (22 Sep 2020 21:53)  POCT Blood Glucose.: 282 mg/dL (22 Sep 2020 16:57)      LIVER FUNCTIONS - ( 22 Sep 2020 06:55 )  Alb: 3.1 g/dL / Pro: 6.0 g/dL / ALK PHOS: 89 U/L / ALT: 30 U/L / AST: 26 U/L / GGT: x             Urinalysis Basic - ( 21 Sep 2020 14:00 )    Color: Light Yellow / Appearance: Clear / S.021 / pH: x  Gluc: x / Ketone: Negative  / Bili: Negative / Urobili: Negative   Blood: x / Protein: 300 mg/dL / Nitrite: Negative   Leuk Esterase: Moderate / RBC: 9 /hpf / WBC 59 /HPF   Sq Epi: x / Non Sq Epi: 1 /hpf / Bacteria: Negative      Serum Pro-Brain Natriuretic Peptide: 544 pg/mL ( @ 13:35)        RECENT CULTURES:   @ 00:42 .Blood Blood         < from: Xray Chest 1 View AP/PA (20 @ 14:26) >    EXAM:  XR CHEST AP OR PA 1V                            PROCEDURE DATE:  2020            INTERPRETATION:  CLINICAL INFORMATION: Shortness of breath.    A frontal view of the chest was obtained.    Comparison: 7/15/2020.    IMPRESSION:    The mediastinal cardiac silhouette is unremarkable.    Bibasilar atelectasis.    Severe thoracolumbar scoliosis.      < end of copied text >  < from: CT Head No Cont (20 @ 15:23) >        INTERPRETATION:  Clinical indication: Slurred speech.    Multiple axial sections were performed from base of skull to vertex without contrast enhancement. Coronal andsagittal reconstructions were performed as well    This exam is compared with prior noncontrast head CT performed on 2020.    Periventricular matter lucency is again seen which is likely related to chronic microvascular ischemic changes    There is no evidence acute hemorrhage mass or mass effect seen.    Evaluation of the osseous structures with the appears normal    The visualized paranasal sinuses mastoid and middle ear inspected clear.    IMPRESSION: No acute hemorrhage, mass or masseffect.    If symptoms continue MRI can be done for further evaluation if there are no contraindications.                  JORGE FUENTES M.D., ATTENDING RADIOLOGIST  This document has been electronically signed. Sep 21 2020  3:26PM    < end of copied text >  < from: CT Angio Chest w/ IV Cont (07.15.20 @ 14:57) >   DATE:  07/15/2020          INTERPRETATION:  CLINICAL INFORMATION: Short of breath    COMPARISON: None.    PROCEDURE:   CT Angiography of the Chest.  90 ml of Omnipaque 350 was injected intravenously. 10 ml were discarded.  Sagittal and coronal reformats were performed as well as 3D (MIP) reconstructions.    FINDINGS:    LUNGS AND AIRWAYS: Patent central airways.  Very low lung volumes with bibasilar subsegmental atelectasis. Nodefinite pneumonia.  PLEURA: No pleural effusion.  MEDIASTINUM AND RYAN: No lymphadenopathy.  VESSELS: Satisfactory contrast bolus. No pulmonary emboli. Normal caliber thoracic aorta  HEART: Slightly enlarged. No pericardial effusion.  CHEST WALL ANDLOWER NECK: Slightly enlarged multinodular thyroid..  VISUALIZED UPPER ABDOMEN: Bilateral nonobstructive renal calculi. Additional dystrophic cortical calcifications within a markedly atrophic right kidney. Right renal cyst.  BONES: Advanced spinal degenerative changes and profound compound thoracolumbar scoliosis.    IMPRESSION:   Negative for pulmonary emboli.  Low lung volumes with bibasilar subsegmental atelectasis.  Additional findings as discussed                  LUMA ARANDA M.D., ATTENDING RADIOLOGIST  This document has been electronically signed. Jul 15 2020  3:15PM              < end of copied text >         No growth to date.     @ 20:34 .Blood Blood   PCR    Growth in aerobic bottle: Gram Positive Cocci in Clusters  Growth in anaerobic bottle: Gram Positive Cocci in Clusters and Gram  Positive Cocci in Pairs and Chains    Blood Culture PCR  Blood Culture PCR  Blood Culture PCR     Growth in aerobic bottle: Gram Positive Cocci in Clusters  Growth in anaerobic bottle: Gram Positive Cocci in Clusters and Gram  Positive Cocci in Pairs and Chains  "Due to technical problems, Proteus sp. will Not be reported as part of  the BCID panel until further notice"  ***Blood Panel PCR results on this specimen are available  approximately 3 hours after the Gram stain result.***  Gram stain, PCR, and/or culture results may not always  correspond due to difference in methodologies.  ************************************************************  This PCR assay was performed using [employer]  The following targets are tested for: Enterococcus,  vancomycin resistant enterococci, Listeria monocytogenes,  coagulase negative staphylococci, S. aureus,  methicillin resistant S. aureus, Streptococcus agalactiae  (Group B), S. pneumoniae, S. pyogenes (Group A),  Acinetobacter baumannii, Enterobacter cloacae, E. coli,  Klebsiella oxytoca, K. pneumoniae, Proteus sp.,  Serratia marcescens, Haemophilus influenzae,  Neisseria meningitidis, Pseudomonas aeruginosa, Candida  albicans, C. glabrata, C krusei, C parapsilosis,  C. tropicalis and the KPC resistance gene.     @ 17:24 .Urine Clean Catch (Midstream)                >=3 organisms. Probable collection contamination.          RESPIRATORY CULTURES:          Studies  Chest X-RAY  CT SCAN Chest   Venous Dopplers: LE:   CT Abdomen  Others

## 2020-09-23 NOTE — PROGRESS NOTE ADULT - SUBJECTIVE AND OBJECTIVE BOX
80y old  Female who presents with a chief complaint of sob, hyperglycemia (23 Sep 2020 15:29)      Interval history:  Afebrile, denies any pain, wondering why she is here. No N/V.       Allergies:   No Known Allergies      Antimicrobials:  ertapenem  IVPB 1000 milliGRAM(s) IV Intermittent every 24 hours      REVIEW OF SYSTEMS:  No chest pain  No abdominal pain  Denies dysuria   No rash.       Vital Signs Last 24 Hrs  T(C): 36.6 (09-23-20 @ 12:43), Max: 36.6 (09-23-20 @ 12:43)  T(F): 97.9 (09-23-20 @ 12:43), Max: 97.9 (09-23-20 @ 12:43)  HR: 96 (09-23-20 @ 12:43) (76 - 96)  BP: 124/637 (09-23-20 @ 12:43) (120/73 - 154/83)  BP(mean): --  RR: 18 (09-23-20 @ 12:43) (18 - 18)  SpO2: 100% (09-23-20 @ 12:43) (100% - 100%)      PHYSICAL EXAM:  Patient in no acute distress. Alert, awake, communicative, oriented.   Cardiovascular: S1S2 normal, tachy,   Lungs: Good air entry B/L lung fields.  Gastrointestinal: soft, nontender, nondistended.  Extremities: no edema. lt knee disfigured   IV sites not inflamed.                             13.0   8.76  )-----------( 169      ( 23 Sep 2020 06:30 )             41.1   09-23    147<H>  |  89<L>  |  30<H>  ----------------------------<  300<H>  3.8   |  45<HH>  |  1.08    Ca    9.4      23 Sep 2020 06:30  Phos  3.7     09-23  Mg     1.9     09-23    TPro  6.0  /  Alb  3.1<L>  /  TBili  0.3  /  DBili  x   /  AST  26  /  ALT  30  /  AlkPhos  89  09-22      LIVER FUNCTIONS - ( 22 Sep 2020 06:55 )  Alb: 3.1 g/dL / Pro: 6.0 g/dL / ALK PHOS: 89 U/L / ALT: 30 U/L / AST: 26 U/L / GGT: x               Culture - Blood (collected 22 Sep 2020 00:42)  Source: .Blood Blood  Preliminary Report (23 Sep 2020 01:02):    No growth to date.    Culture - Blood (collected 21 Sep 2020 20:34)  Source: .Blood Blood  Gram Stain (22 Sep 2020 20:11):    Growth in aerobic bottle: Gram Positive Cocci in Clusters    Growth in anaerobic bottle: Gram Positive Cocci in Clusters and Gram    Positive Cocci in Pairs and Chains  Preliminary Report (22 Sep 2020 20:12):    Growth in aerobic bottle: Gram Positive Cocci in Clusters    Growth in anaerobic bottle: Gram Positive Cocci in Clusters and Gram    Positive Cocci in Pairs and Chains    "Due to technical problems, Proteus sp. will Not be reported as part of    the BCID panel until further notice"    ***Blood Panel PCR results on this specimen are available    approximately 3 hours after the Gram stain result.***    Gram stain, PCR, and/or culture results may not always    correspond due to difference in methodologies.    ************************************************************    This PCR assay was performed using [employer].    The following targets are tested for: Enterococcus,    vancomycin resistant enterococci, Listeria monocytogenes,    coagulase negative staphylococci, S. aureus,    methicillin resistant S. aureus, Streptococcus agalactiae    (Group B), S. pneumoniae, S. pyogenes (Group A),    Acinetobacter baumannii, Enterobacter cloacae, E. coli,    Klebsiella oxytoca, K. pneumoniae, Proteus sp.,    Serratia marcescens, Haemophilus influenzae,    Neisseria meningitidis, Pseudomonas aeruginosa, Candida    albicans, C. glabrata, C krusei, C parapsilosis,    C. tropicalis and the KPC resistance gene.  Organism: Blood Culture PCR  Blood Culture PCR (22 Sep 2020 21:41)  Organism: Blood Culture PCR (22 Sep 2020 21:41)  Organism: Blood Culture PCR (22 Sep 2020 21:38)    Culture - Urine (collected 21 Sep 2020 17:24)  Source: .Urine Clean Catch (Midstream)  Final Report (22 Sep 2020 17:31):    >=3 organisms. Probable collection contamination.        Radiology:  < from: CT Head No Cont (09.21.20 @ 15:23) >  IMPRESSION: No acute hemorrhage, mass or masseffect.    If symptoms continue MRI can be done for further evaluation if there are no contraindications.

## 2020-09-23 NOTE — PROVIDER CONTACT NOTE (CRITICAL VALUE NOTIFICATION) - SITUATION
specimen from 09/21/2020 preliminary results  growth in aerobic bottle: gram positive cocci in clusters, growth in anaerobic bottle: gram positive cocci in clusters and  gram positive cocci in pairs and Chains.

## 2020-09-23 NOTE — PROGRESS NOTE ADULT - SUBJECTIVE AND OBJECTIVE BOX
Patient is a 80y old  Female who presents with a chief complaint of sob, hyperglycemia (23 Sep 2020 13:39)      INTERVAL HPI/OVERNIGHT EVENTS:  T(C): 36.6 (09-23-20 @ 12:43), Max: 36.6 (09-23-20 @ 12:43)  HR: 96 (09-23-20 @ 12:43) (76 - 96)  BP: 124/637 (09-23-20 @ 12:43) (120/73 - 154/83)  RR: 18 (09-23-20 @ 12:43) (18 - 18)  SpO2: 100% (09-23-20 @ 12:43) (100% - 100%)  Wt(kg): --  I&O's Summary    22 Sep 2020 07:01  -  23 Sep 2020 07:00  --------------------------------------------------------  IN: 480 mL / OUT: 450 mL / NET: 30 mL    23 Sep 2020 07:01  -  23 Sep 2020 15:29  --------------------------------------------------------  IN: 360 mL / OUT: 0 mL / NET: 360 mL        LABS:                        13.0   8.76  )-----------( 169      ( 23 Sep 2020 06:30 )             41.1     09-23    147<H>  |  89<L>  |  30<H>  ----------------------------<  300<H>  3.8   |  45<HH>  |  1.08    Ca    9.4      23 Sep 2020 06:30  Phos  3.7     09-23  Mg     1.9     09-23    TPro  6.0  /  Alb  3.1<L>  /  TBili  0.3  /  DBili  x   /  AST  26  /  ALT  30  /  AlkPhos  89  09-22        CAPILLARY BLOOD GLUCOSE      POCT Blood Glucose.: 332 mg/dL (23 Sep 2020 12:27)  POCT Blood Glucose.: 242 mg/dL (23 Sep 2020 08:41)  POCT Blood Glucose.: 364 mg/dL (22 Sep 2020 21:53)  POCT Blood Glucose.: 282 mg/dL (22 Sep 2020 16:57)            MEDICATIONS  (STANDING):  ALBUTerol    90 MICROgram(s) HFA Inhaler 1 Puff(s) Inhalation every 4 hours  aspirin enteric coated 81 milliGRAM(s) Oral daily  atorvastatin 80 milliGRAM(s) Oral at bedtime  budesonide 160 MICROgram(s)/formoterol 4.5 MICROgram(s) Inhaler 2 Puff(s) Inhalation two times a day  dextrose 5%. 1000 milliLiter(s) (50 mL/Hr) IV Continuous <Continuous>  dextrose 50% Injectable 12.5 Gram(s) IV Push once  dextrose 50% Injectable 25 Gram(s) IV Push once  dextrose 50% Injectable 25 Gram(s) IV Push once  enoxaparin Injectable 40 milliGRAM(s) SubCutaneous daily  ertapenem  IVPB 1000 milliGRAM(s) IV Intermittent every 24 hours  furosemide    Tablet 40 milliGRAM(s) Oral daily  gabapentin 100 milliGRAM(s) Oral three times a day  influenza   Vaccine 0.5 milliLiter(s) IntraMuscular once  insulin glargine Injectable (LANTUS) 16 Unit(s) SubCutaneous at bedtime  insulin lispro (HumaLOG) corrective regimen sliding scale   SubCutaneous three times a day before meals  insulin lispro (HumaLOG) corrective regimen sliding scale   SubCutaneous at bedtime  insulin lispro Injectable (HumaLOG) 7 Unit(s) SubCutaneous three times a day before meals  potassium chloride   Solution 40 milliEquivalent(s) Oral every 2 hours    MEDICATIONS  (PRN):  acetaminophen   Tablet .. 650 milliGRAM(s) Oral every 6 hours PRN Moderate Pain (4 - 6)  dextrose 40% Gel 15 Gram(s) Oral once PRN Blood Glucose LESS THAN 70 milliGRAM(s)/deciliter  glucagon  Injectable 1 milliGRAM(s) IntraMuscular once PRN Glucose LESS THAN 70 milligrams/deciliter          PHYSICAL EXAM:  GENERAL: frail, confused   CHEST/LUNG: Clear to percussion bilaterally; No rales, rhonchi, wheezing, or rubs  HEART: Regular rate and rhythm; No murmurs, rubs, or gallops  ABDOMEN: Soft, Nontender, Nondistended; Bowel sounds present  EXTREMITIES: edema +    Care Discussed with Consultants/Other Providers [ x] YES  [ ] NO

## 2020-09-23 NOTE — PROGRESS NOTE ADULT - ASSESSMENT
80 year old F with pmhx of  DMT2 (on Metformin and insulin), COPD, CHF, HLD, HTN, kidney stones and pshx of R nephrectomy, L knee surgery presents to ED bibems with elevated blood sugar, SOB, and lethargy found to have UTI.     1. SOB, acute/chronic diastolic chf exacerbation  -cv stable, resting comfortably on nasal canula  -volume status acceptable   -symptoms also 2/2 to DKA, COPD hx   -c/w lasix as ordered   -check echo to re-eval LVEF, valve function  -pulm f/u     2. Hyperglycemia  -management per primary team     3. UTI  -+BCX   -iv abx per primary team     4. Hypokalemia  -supplementation per primary team, continue to trend.     5. COPD , hx  -c/w home meds , pulm f/u     dvt ppx

## 2020-09-23 NOTE — PROGRESS NOTE ADULT - ASSESSMENT
80 year old F with pmhx of  DMT2 (on Metformin and insulin), COPD, CHF, HLD, HTN, kidney stones and pshx of R nephrectomy, L knee surgery presents to ED bibems with elevated blood sugar, SOB, and lethargy.    Pt unable to provide symptoms.   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   Abnormal u/a   lactic acidosis, hypoxia on admission.   urine cx contaminated   new polymicrobial bacteremia. ? significance       Plan:   urine cx contaminated, stopped ertapenem   repeat blood cx ordered, pt refusing blood draw.    follow up prelim blood cx  glycemic control

## 2020-09-23 NOTE — PROGRESS NOTE ADULT - ASSESSMENT
Assessment  DMT2: 80y Female with DM T2 with hyperglycemia, A1C 8.1%, was on oral meds and insulin at home, admitted with hyperglycemia (), now on basal bolus insulin, blood sugars running high and not at target, no hypoglycemic episodes, eating meals, confused.  UTI: on IV ABx, stable, monitored.          Alexander Shaver MD  Cell: [phone]  Office: [phone]               Assessment  DMT2: 80y Female with DM T2 with hyperglycemia, A1C 8.1%, was on oral meds and insulin at home, admitted with hyperglycemia (), now on basal bolus insulin, blood sugars running high and not at target,  no hypoglycemic episodes, eating meals, confused.  UTI: on IV ABx, stable, monitored.          Alexander Shaver MD  Cell: [phone]  Office: [phone]

## 2020-09-23 NOTE — PROGRESS NOTE ADULT - SUBJECTIVE AND OBJECTIVE BOX
Chief complaint  Patient is a 80y old  Female who presents with a chief complaint of sob, hyperglycemia (23 Sep 2020 11:16)   Review of systems  Patient awake in bed, confused, no hypoglycemic episodes.    Labs and Fingersticks  CAPILLARY BLOOD GLUCOSE      POCT Blood Glucose.: 242 mg/dL (23 Sep 2020 08:41)  POCT Blood Glucose.: 364 mg/dL (22 Sep 2020 21:53)  POCT Blood Glucose.: 282 mg/dL (22 Sep 2020 16:57)    Medications  MEDICATIONS  (STANDING):  ALBUTerol    90 MICROgram(s) HFA Inhaler 1 Puff(s) Inhalation every 4 hours  aspirin enteric coated 81 milliGRAM(s) Oral daily  atorvastatin 80 milliGRAM(s) Oral at bedtime  budesonide 160 MICROgram(s)/formoterol 4.5 MICROgram(s) Inhaler 2 Puff(s) Inhalation two times a day  dextrose 5%. 1000 milliLiter(s) (50 mL/Hr) IV Continuous <Continuous>  dextrose 50% Injectable 12.5 Gram(s) IV Push once  dextrose 50% Injectable 25 Gram(s) IV Push once  dextrose 50% Injectable 25 Gram(s) IV Push once  enoxaparin Injectable 40 milliGRAM(s) SubCutaneous daily  ertapenem  IVPB 1000 milliGRAM(s) IV Intermittent every 24 hours  furosemide    Tablet 40 milliGRAM(s) Oral daily  gabapentin 100 milliGRAM(s) Oral three times a day  influenza   Vaccine 0.5 milliLiter(s) IntraMuscular once  insulin glargine Injectable (LANTUS) 16 Unit(s) SubCutaneous at bedtime  insulin lispro (HumaLOG) corrective regimen sliding scale   SubCutaneous three times a day before meals  insulin lispro (HumaLOG) corrective regimen sliding scale   SubCutaneous at bedtime  insulin lispro Injectable (HumaLOG) 7 Unit(s) SubCutaneous three times a day before meals  potassium chloride   Solution 40 milliEquivalent(s) Oral every 2 hours      Physical Exam  General: Patient comfortable in bed  Vital Signs Last 12 Hrs  T(F): 97.7 (09-23-20 @ 04:45), Max: 97.7 (09-23-20 @ 04:45)  HR: 78 (09-23-20 @ 04:45) (78 - 78)  BP: 154/83 (09-23-20 @ 04:45) (154/83 - 154/83)  BP(mean): --  RR: 18 (09-23-20 @ 04:45) (18 - 18)  SpO2: 100% (09-23-20 @ 04:45) (100% - 100%)   Chief complaint  Patient is a 80y old  Female who presents with a chief complaint of sob, hyperglycemia (23 Sep 2020 11:16)   Review of systems  Patient awake in bed, confused, no hypoglycemic episodes.    Labs and Fingersticks  CAPILLARY BLOOD GLUCOSE      POCT Blood Glucose.: 242 mg/dL (23 Sep 2020 08:41)  POCT Blood Glucose.: 364 mg/dL (22 Sep 2020 21:53)  POCT Blood Glucose.: 282 mg/dL (22 Sep 2020 16:57)    Medications  MEDICATIONS  (STANDING):  ALBUTerol    90 MICROgram(s) HFA Inhaler 1 Puff(s) Inhalation every 4 hours  aspirin enteric coated 81 milliGRAM(s) Oral daily  atorvastatin 80 milliGRAM(s) Oral at bedtime  budesonide 160 MICROgram(s)/formoterol 4.5 MICROgram(s) Inhaler 2 Puff(s) Inhalation two times a day  dextrose 5%. 1000 milliLiter(s) (50 mL/Hr) IV Continuous <Continuous>  dextrose 50% Injectable 12.5 Gram(s) IV Push once  dextrose 50% Injectable 25 Gram(s) IV Push once  dextrose 50% Injectable 25 Gram(s) IV Push once  enoxaparin Injectable 40 milliGRAM(s) SubCutaneous daily  ertapenem  IVPB 1000 milliGRAM(s) IV Intermittent every 24 hours  furosemide    Tablet 40 milliGRAM(s) Oral daily  gabapentin 100 milliGRAM(s) Oral three times a day  influenza   Vaccine 0.5 milliLiter(s) IntraMuscular once  insulin glargine Injectable (LANTUS) 16 Unit(s) SubCutaneous at bedtime  insulin lispro (HumaLOG) corrective regimen sliding scale   SubCutaneous three times a day before meals  insulin lispro (HumaLOG) corrective regimen sliding scale   SubCutaneous at bedtime  insulin lispro Injectable (HumaLOG) 7 Unit(s) SubCutaneous three times a day before meals  potassium chloride   Solution 40 milliEquivalent(s) Oral every 2 hours      Physical Exam  General: Patient comfortable in bed  Vital Signs Last 12 Hrs  T(F): 97.7 (09-23-20 @ 04:45), Max: 97.7 (09-23-20 @ 04:45)  HR: 78 (09-23-20 @ 04:45) (78 - 78)  BP: 154/83 (09-23-20 @ 04:45) (154/83 - 154/83)  BP(mean): --  RR: 18 (09-23-20 @ 04:45) (18 - 18)  SpO2: 100% (09-23-20 @ 04:45) (100% - 100%)

## 2020-09-23 NOTE — PROGRESS NOTE ADULT - ASSESSMENT
80 year old F with pmhx of  DMT2 (on Metformin and insulin), COPD, CHF, HLD, HTN, kidney stones and pshx of R nephrectomy, L knee surgery presents to ED bibems with elevated blood sugar, SOB, and lethargy. Per EMS, pt with 485 blood sugar, BP of 144/75, and O2 sat 92% on room air.  Daughter notes period of incoherent speech over the weekend. Pt endorses excess urination and thirst over the last few days. She also reports mild SOB x few days that is worse when lying flat. Pt also reports constant CP that has been present for "quite a while", unable to specify how much time. Denies fever, chills, worsening leg swelling.    COPD:  Thoraco lumbar scoliosis  Uncontrolled DM:  UTI  Confusion  CHF    she is admitted with high blood glucose and found to have uti:  She has copd:  She was recently admitted to [facility] where she was treated with copd exacerbation:  At that time her CTA was negative and no indication of malignancy on ct chest :  Currently she is not SOB : her outpt  meds noted:  She is already on Symbicort as well as inhalers:  currently she is also being treated for chf   her venous ABG towards met alk side and seems to be a chronic retainer too   I don't think she needs bipap at this time:  She seems pretty confused: ct head is negative for bleed:  would defer to primary team :  DVT demarcoyxlis    9/23:  pt is not SOB at this time: but emotionally labile  not wheezing:   she is not wheezing  She has chronically elevated co2:  cont Symbicort  her venous abg reviewed:   CT head neg for bleed:   CICI NP

## 2020-09-24 NOTE — BEHAVIORAL HEALTH ASSESSMENT NOTE - NSBHCONSULTRECOMMENDOTHER_PSY_A_CORE FT
-delirium intervention: regulate sleep/wake cycle, minimize nighttime awakenings, frequent reorientation, family at bedside when possible, calm environment

## 2020-09-24 NOTE — BEHAVIORAL HEALTH ASSESSMENT NOTE - DESCRIPTION
DMT2 (on Metformin and insulin), COPD, CHF, HLD, HTN, kidney stones and pshx of R nephrectomy, L knee surgery

## 2020-09-24 NOTE — BEHAVIORAL HEALTH ASSESSMENT NOTE - OTHER
paranoid statements, says staff are terrorists trying to poison her, perseverative about perceived disrespect from a neighbor at home? none missing most teeth, appears agitated fluctuated between hostile and reaching for writer's hand/crying not assessed lisp due to missing teeth, mumbling, stumbling over words

## 2020-09-24 NOTE — PROGRESS NOTE ADULT - SUBJECTIVE AND OBJECTIVE BOX
Chief complaint  Patient is a 80y old  Female who presents with a chief complaint of sob, hyperglycemia (24 Sep 2020 13:17)   Review of systems  Patient in bed, looks comfortable, no hypoglycemic episodes.    Labs and Fingersticks  CAPILLARY BLOOD GLUCOSE      POCT Blood Glucose.: 231 mg/dL (24 Sep 2020 13:15)  POCT Blood Glucose.: 196 mg/dL (24 Sep 2020 08:48)  POCT Blood Glucose.: 217 mg/dL (23 Sep 2020 21:33)  POCT Blood Glucose.: 182 mg/dL (23 Sep 2020 17:17)      Anion Gap, Serum: 15 (09-24 @ 11:55)  Anion Gap, Serum: 13 (09-23 @ 06:30)      Calcium, Total Serum: 9.7 (09-24 @ 11:55)  Calcium, Total Serum: 9.4 (09-23 @ 06:30)          09-24    146<H>  |  84<L>  |  37<H>  ----------------------------<  317<H>  3.3<L>   |  >45<HH>  |  1.12    Ca    9.7      24 Sep 2020 11:55  Phos  3.7     09-23  Mg     1.7     09-24                          13.8   8.31  )-----------( 170      ( 24 Sep 2020 11:55 )             43.9     Medications  MEDICATIONS  (STANDING):  ALBUTerol    90 MICROgram(s) HFA Inhaler 1 Puff(s) Inhalation every 4 hours  aspirin enteric coated 81 milliGRAM(s) Oral daily  atorvastatin 80 milliGRAM(s) Oral at bedtime  budesonide 160 MICROgram(s)/formoterol 4.5 MICROgram(s) Inhaler 2 Puff(s) Inhalation two times a day  dextrose 5%. 1000 milliLiter(s) (50 mL/Hr) IV Continuous <Continuous>  dextrose 50% Injectable 12.5 Gram(s) IV Push once  dextrose 50% Injectable 25 Gram(s) IV Push once  dextrose 50% Injectable 25 Gram(s) IV Push once  enoxaparin Injectable 40 milliGRAM(s) SubCutaneous daily  gabapentin 100 milliGRAM(s) Oral three times a day  influenza   Vaccine 0.5 milliLiter(s) IntraMuscular once  insulin glargine Injectable (LANTUS) 22 Unit(s) SubCutaneous at bedtime  insulin lispro (HumaLOG) corrective regimen sliding scale   SubCutaneous three times a day before meals  insulin lispro (HumaLOG) corrective regimen sliding scale   SubCutaneous at bedtime  insulin lispro Injectable (HumaLOG) 8 Unit(s) SubCutaneous three times a day before meals  magnesium sulfate  IVPB 1 Gram(s) IV Intermittent daily  potassium chloride    Tablet ER 40 milliEquivalent(s) Oral every 4 hours  potassium chloride   Solution 40 milliEquivalent(s) Oral every 2 hours  sodium chloride 0.9%. 1000 milliLiter(s) (50 mL/Hr) IV Continuous <Continuous>      Physical Exam  General: Patient comfortable in bed  Vital Signs Last 12 Hrs  T(F): 98.3 (09-24-20 @ 13:52), Max: 98.3 (09-24-20 @ 13:52)  HR: 74 (09-24-20 @ 13:52) (74 - 82)  BP: 109/73 (09-24-20 @ 13:52) (109/73 - 134/71)  BP(mean): --  RR: 18 (09-24-20 @ 13:52) (18 - 18)  SpO2: 97% (09-24-20 @ 13:52) (97% - 99%)  Neck: No palpable thyroid nodules.  CVS: S1S2, No murmurs  Respiratory: No wheezing, no crepitations  GI: Abdomen soft, bowel sounds positive  Musculoskeletal:  edema lower extremities.   Skin: No skin rashes, no ecchymosis    Diagnostics    Free Thyroxine, Serum: AM Sched. Collection: 23-Sep-2020 06:00 (09-22 @ 11:16)  Thyroid Stimulating Hormone, Serum: AM Sched. Collection: 23-Sep-2020 06:00 (09-22 @ 11:16)           Chief complaint  Patient is a 80y old  Female who presents with a chief complaint of sob, hyperglycemia (24 Sep 2020 13:17)   Review of systems  Patient in bed, looks comfortable, no hypoglycemic episodes.  Labs and Fingersticks  CAPILLARY BLOOD GLUCOSE      POCT Blood Glucose.: 231 mg/dL (24 Sep 2020 13:15)  POCT Blood Glucose.: 196 mg/dL (24 Sep 2020 08:48)  POCT Blood Glucose.: 217 mg/dL (23 Sep 2020 21:33)  POCT Blood Glucose.: 182 mg/dL (23 Sep 2020 17:17)      Anion Gap, Serum: 15 (09-24 @ 11:55)  Anion Gap, Serum: 13 (09-23 @ 06:30)      Calcium, Total Serum: 9.7 (09-24 @ 11:55)  Calcium, Total Serum: 9.4 (09-23 @ 06:30)          09-24    146<H>  |  84<L>  |  37<H>  ----------------------------<  317<H>  3.3<L>   |  >45<HH>  |  1.12    Ca    9.7      24 Sep 2020 11:55  Phos  3.7     09-23  Mg     1.7     09-24                          13.8   8.31  )-----------( 170      ( 24 Sep 2020 11:55 )             43.9     Medications  MEDICATIONS  (STANDING):  ALBUTerol    90 MICROgram(s) HFA Inhaler 1 Puff(s) Inhalation every 4 hours  aspirin enteric coated 81 milliGRAM(s) Oral daily  atorvastatin 80 milliGRAM(s) Oral at bedtime  budesonide 160 MICROgram(s)/formoterol 4.5 MICROgram(s) Inhaler 2 Puff(s) Inhalation two times a day  dextrose 5%. 1000 milliLiter(s) (50 mL/Hr) IV Continuous <Continuous>  dextrose 50% Injectable 12.5 Gram(s) IV Push once  dextrose 50% Injectable 25 Gram(s) IV Push once  dextrose 50% Injectable 25 Gram(s) IV Push once  enoxaparin Injectable 40 milliGRAM(s) SubCutaneous daily  gabapentin 100 milliGRAM(s) Oral three times a day  influenza   Vaccine 0.5 milliLiter(s) IntraMuscular once  insulin glargine Injectable (LANTUS) 22 Unit(s) SubCutaneous at bedtime  insulin lispro (HumaLOG) corrective regimen sliding scale   SubCutaneous three times a day before meals  insulin lispro (HumaLOG) corrective regimen sliding scale   SubCutaneous at bedtime  insulin lispro Injectable (HumaLOG) 8 Unit(s) SubCutaneous three times a day before meals  magnesium sulfate  IVPB 1 Gram(s) IV Intermittent daily  potassium chloride    Tablet ER 40 milliEquivalent(s) Oral every 4 hours  potassium chloride   Solution 40 milliEquivalent(s) Oral every 2 hours  sodium chloride 0.9%. 1000 milliLiter(s) (50 mL/Hr) IV Continuous <Continuous>      Physical Exam  General: Patient comfortable in bed  Vital Signs Last 12 Hrs  T(F): 98.3 (09-24-20 @ 13:52), Max: 98.3 (09-24-20 @ 13:52)  HR: 74 (09-24-20 @ 13:52) (74 - 82)  BP: 109/73 (09-24-20 @ 13:52) (109/73 - 134/71)  BP(mean): --  RR: 18 (09-24-20 @ 13:52) (18 - 18)  SpO2: 97% (09-24-20 @ 13:52) (97% - 99%)  Neck: No palpable thyroid nodules.  CVS: S1S2, No murmurs  Respiratory: No wheezing, no crepitations  GI: Abdomen soft, bowel sounds positive  Musculoskeletal:  edema lower extremities.   Skin: No skin rashes, no ecchymosis    Diagnostics    Free Thyroxine, Serum: AM Sched. Collection: 23-Sep-2020 06:00 (09-22 @ 11:16)  Thyroid Stimulating Hormone, Serum: AM Sched. Collection: 23-Sep-2020 06:00 (09-22 @ 11:16)

## 2020-09-24 NOTE — BEHAVIORAL HEALTH ASSESSMENT NOTE - NSBHCHARTREVIEWINVESTIGATE_PSY_A_CORE FT
Ventricular Rate 77 BPM  Atrial Rate 77 BPM  P-R Interval 120 ms  QRS Duration 74 ms  Q-T Interval 426 ms  QTC Calculation(Bazett) 482 ms  P Axis -12 degrees  R Axis 76 degrees  T Axis 9 degrees    Diagnosis Line *** POOR DATA QUALITY, INTERPRETATION MAY BE ADVERSELY AFFECTED  NORMAL SINUS RHYTHM  ST & T WAVE ABNORMALITY, CONSIDER ANTEROLATERAL ISCHEMIA  ABNORMAL ECG  NO PREVIOUS ECGS AVAILABLE

## 2020-09-24 NOTE — PHYSICAL THERAPY INITIAL EVALUATION ADULT - IMPAIRMENTS FOUND, PT EVAL
aerobic capacity/endurance/posture/muscle strength/gait, locomotion, and balance/ROM/ergonomics and body mechanics

## 2020-09-24 NOTE — BEHAVIORAL HEALTH ASSESSMENT NOTE - NSBHCHARTREVIEWVS_PSY_A_CORE FT
Vital Signs Last 24 Hrs  T(C): 36.7 (24 Sep 2020 05:04), Max: 36.7 (24 Sep 2020 05:04)  T(F): 98 (24 Sep 2020 05:04), Max: 98 (24 Sep 2020 05:04)  HR: 82 (24 Sep 2020 05:04) (82 - 101)  BP: 134/71 (24 Sep 2020 05:04) (124/637 - 167/76)  BP(mean): --  RR: 18 (24 Sep 2020 05:04) (18 - 18)  SpO2: 99% (24 Sep 2020 05:04) (94% - 100%)

## 2020-09-24 NOTE — BEHAVIORAL HEALTH ASSESSMENT NOTE - RISK ASSESSMENT
Low Acute Suicide Risk Risk factors for harm to self or others include current confusion/agitation, poor impulse control, and refusal of meds/testing likely 2/2 delirium. Protective factors include no psychiatric history, no hx of dementia, no current mood symptoms, no AVH, +support from family, strong Restorationism beliefs.

## 2020-09-24 NOTE — BEHAVIORAL HEALTH ASSESSMENT NOTE - CASE SUMMARY
81yo F, domiciled with daughter and grandson, no psychiatric history, no history of SI/SA, no history of violence or aggression, no substance use, PMHx of DMT2 (on Metformin and insulin), COPD, CHF, HLD, HTN, & kidney stones with PSHx of R nephrectomy & L knee surgery, BIBEMS for SOB and lethary. Admitted to medicine for hyperglycemia, hypokalemia, and UTI w/positive blood cultures, psychiatry consulted for confusion and agitation. pt disorganized, paranoid at times, currently calm. rec haldol prn for agitation

## 2020-09-24 NOTE — PROGRESS NOTE ADULT - SUBJECTIVE AND OBJECTIVE BOX
Patient is a 80y old  Female who presents with a chief complaint of sob, hyperglycemia (24 Sep 2020 16:06)      INTERVAL HPI/OVERNIGHT EVENTS:  T(C): 37.1 (09-24-20 @ 13:52), Max: 37.1 (09-24-20 @ 13:52)  HR: 72 (09-24-20 @ 16:59) (72 - 101)  BP: 140/82 (09-24-20 @ 16:59) (134/71 - 167/76)  RR: 18 (09-24-20 @ 16:59) (18 - 18)  SpO2: 100% (09-24-20 @ 16:59) (94% - 100%)  Wt(kg): --  I&O's Summary    23 Sep 2020 07:01  -  24 Sep 2020 07:00  --------------------------------------------------------  IN: 660 mL / OUT: 0 mL / NET: 660 mL    24 Sep 2020 07:01  -  24 Sep 2020 18:28  --------------------------------------------------------  IN: 665 mL / OUT: 0 mL / NET: 665 mL        LABS:                        13.8   8.31  )-----------( 170      ( 24 Sep 2020 11:55 )             43.9     09-24    146<H>  |  84<L>  |  37<H>  ----------------------------<  317<H>  3.3<L>   |  >45<HH>  |  1.12    Ca    9.7      24 Sep 2020 11:55  Phos  3.7     09-23  Mg     1.7     09-24          CAPILLARY BLOOD GLUCOSE      POCT Blood Glucose.: 132 mg/dL (24 Sep 2020 17:19)  POCT Blood Glucose.: 231 mg/dL (24 Sep 2020 13:15)  POCT Blood Glucose.: 196 mg/dL (24 Sep 2020 08:48)  POCT Blood Glucose.: 217 mg/dL (23 Sep 2020 21:33)    ABG - ( 24 Sep 2020 13:20 )  pH, Arterial: 7.49  pH, Blood: x     /  pCO2: 72    /  pO2: 139   / HCO3: 54    / Base Excess: 25.3  /  SaO2: 100                     MEDICATIONS  (STANDING):  ALBUTerol    90 MICROgram(s) HFA Inhaler 1 Puff(s) Inhalation every 4 hours  aspirin enteric coated 81 milliGRAM(s) Oral daily  atorvastatin 80 milliGRAM(s) Oral at bedtime  budesonide 160 MICROgram(s)/formoterol 4.5 MICROgram(s) Inhaler 2 Puff(s) Inhalation two times a day  dextrose 5%. 1000 milliLiter(s) (50 mL/Hr) IV Continuous <Continuous>  dextrose 50% Injectable 12.5 Gram(s) IV Push once  dextrose 50% Injectable 25 Gram(s) IV Push once  dextrose 50% Injectable 25 Gram(s) IV Push once  enoxaparin Injectable 40 milliGRAM(s) SubCutaneous daily  gabapentin 100 milliGRAM(s) Oral three times a day  influenza   Vaccine 0.5 milliLiter(s) IntraMuscular once  insulin glargine Injectable (LANTUS) 22 Unit(s) SubCutaneous at bedtime  insulin lispro (HumaLOG) corrective regimen sliding scale   SubCutaneous at bedtime  insulin lispro (HumaLOG) corrective regimen sliding scale   SubCutaneous three times a day before meals  insulin lispro Injectable (HumaLOG) 8 Unit(s) SubCutaneous three times a day before meals  magnesium sulfate  IVPB 1 Gram(s) IV Intermittent daily  potassium chloride   Solution 40 milliEquivalent(s) Oral every 2 hours  sodium chloride 0.9% with potassium chloride 20 mEq/L 1000 milliLiter(s) (70 mL/Hr) IV Continuous <Continuous>    MEDICATIONS  (PRN):  acetaminophen   Tablet .. 650 milliGRAM(s) Oral every 6 hours PRN Moderate Pain (4 - 6)  dextrose 40% Gel 15 Gram(s) Oral once PRN Blood Glucose LESS THAN 70 milliGRAM(s)/deciliter  glucagon  Injectable 1 milliGRAM(s) IntraMuscular once PRN Glucose LESS THAN 70 milligrams/deciliter          PHYSICAL EXAM:  GENERAL: frail  CHEST/LUNG: Clear to percussion bilaterally; No rales, rhonchi, wheezing, or rubs  HEART: Regular rate and rhythm; No murmurs, rubs, or gallops  ABDOMEN: Soft, Nontender, Nondistended; Bowel sounds present  EXTREMITIES:  no edema     Care Discussed with Consultants/Other Providers [x ] YES  [ ] NO

## 2020-09-24 NOTE — CHART NOTE - NSCHARTNOTEFT_GEN_A_CORE
Patient noted with elevated CO2 on chemistry.  ABG sent and noted with pH of 7.49, pCO2 of 72, HCO3 of 54.            Interventions taken :  Patient appears comfortable and in no distress on evaluation.  Lasix held, IVF started.  Renal consult called  No Bipap at this time.                Spring PETTY (Medicine NP )

## 2020-09-24 NOTE — PROGRESS NOTE ADULT - PROBLEM SELECTOR PLAN 1
Will increase Lantus to 22u at bedtime.  Will increase Humalog to 8u before each meal and continue Humalog correction scale coverage. Will continue monitoring FS and FU.  Patient counseled for compliance with consistent low carb diet and physical activity as tolerated outpatient.

## 2020-09-24 NOTE — PROGRESS NOTE ADULT - SUBJECTIVE AND OBJECTIVE BOX
Patient is a 80y old  Female who presents with a chief complaint of sob, hyperglycemia (23 Sep 2020 17:23)      Any change in ROS: She is alert and awake: looks better today :  no resp distress       MEDICATIONS  (STANDING):  ALBUTerol    90 MICROgram(s) HFA Inhaler 1 Puff(s) Inhalation every 4 hours  aspirin enteric coated 81 milliGRAM(s) Oral daily  atorvastatin 80 milliGRAM(s) Oral at bedtime  budesonide 160 MICROgram(s)/formoterol 4.5 MICROgram(s) Inhaler 2 Puff(s) Inhalation two times a day  dextrose 5%. 1000 milliLiter(s) (50 mL/Hr) IV Continuous <Continuous>  dextrose 50% Injectable 12.5 Gram(s) IV Push once  dextrose 50% Injectable 25 Gram(s) IV Push once  dextrose 50% Injectable 25 Gram(s) IV Push once  enoxaparin Injectable 40 milliGRAM(s) SubCutaneous daily  furosemide    Tablet 40 milliGRAM(s) Oral daily  gabapentin 100 milliGRAM(s) Oral three times a day  influenza   Vaccine 0.5 milliLiter(s) IntraMuscular once  insulin glargine Injectable (LANTUS) 16 Unit(s) SubCutaneous at bedtime  insulin lispro (HumaLOG) corrective regimen sliding scale   SubCutaneous three times a day before meals  insulin lispro (HumaLOG) corrective regimen sliding scale   SubCutaneous at bedtime  insulin lispro Injectable (HumaLOG) 7 Unit(s) SubCutaneous three times a day before meals  potassium chloride   Solution 40 milliEquivalent(s) Oral every 2 hours    MEDICATIONS  (PRN):  acetaminophen   Tablet .. 650 milliGRAM(s) Oral every 6 hours PRN Moderate Pain (4 - 6)  dextrose 40% Gel 15 Gram(s) Oral once PRN Blood Glucose LESS THAN 70 milliGRAM(s)/deciliter  glucagon  Injectable 1 milliGRAM(s) IntraMuscular once PRN Glucose LESS THAN 70 milligrams/deciliter    Vital Signs Last 24 Hrs  T(C): 36.7 (24 Sep 2020 05:04), Max: 36.7 (24 Sep 2020 05:04)  T(F): 98 (24 Sep 2020 05:04), Max: 98 (24 Sep 2020 05:04)  HR: 82 (24 Sep 2020 05:04) (82 - 101)  BP: 134/71 (24 Sep 2020 05:04) (124/637 - 167/76)  BP(mean): --  RR: 18 (24 Sep 2020 05:04) (18 - 18)  SpO2: 99% (24 Sep 2020 05:04) (94% - 100%)    I&O's Summary    23 Sep 2020 07:01  -  24 Sep 2020 07:00  --------------------------------------------------------  IN: 660 mL / OUT: 0 mL / NET: 660 mL          Physical Exam:   GENERAL: NAD, well-groomed, well-developed  HEENT: DON/   Atraumatic, Normocephalic  ENMT: No tonsillar erythema, exudates, or enlargement; Moist mucous membranes, Good dentition, No lesions  NECK: Supple, No JVD, Normal thyroid  CHEST/LUNG: Clear to auscultaion, ; No rales, rhonchi, wheezing, or rubs  CVS: Regular rate and rhythm; No murmurs, rubs, or gallops  GI: : Soft, Nontender, Nondistended; Bowel sounds present  NERVOUS SYSTEM:  Alert & Oriented X3  EXTREMITIES:+edema  LYMPH: No lymphadenopathy noted  SKIN: No rashes or lesions  ENDOCRINOLOGY: No Thyromegaly  PSYCH: Appropriate    Labs:  47, 47, 45                            13.0   8.76  )-----------( 169      ( 23 Sep 2020 06:30 )             41.1                         13.1   9.80  )-----------( 175      ( 22 Sep 2020 06:58 )             40.5                         14.4   10.45 )-----------( 190      ( 21 Sep 2020 13:35 )             44.7     09-23    147<H>  |  89<L>  |  30<H>  ----------------------------<  300<H>  3.8   |  45<HH>  |  1.08  09-22    147<H>  |  91<L>  |  22  ----------------------------<  117<H>  2.4<LL>   |  44<H>  |  0.98  09-21    145  |  88<L>  |  25<H>  ----------------------------<  297<H>  2.7<LL>   |  43<H>  |  1.09  09-21    141  |  87<L>  |  29<H>  ----------------------------<  480<HH>  3.2<L>   |  36<H>  |  1.12    Ca    9.4      23 Sep 2020 06:30  Phos  3.7     09-23  Mg     1.9     09-23    TPro  6.0  /  Alb  3.1<L>  /  TBili  0.3  /  DBili  x   /  AST  26  /  ALT  30  /  AlkPhos  89  09-22  TPro  7.2  /  Alb  3.6  /  TBili  0.2  /  DBili  x   /  AST  42<H>  /  ALT  38  /  AlkPhos  134<H>  09-21    CAPILLARY BLOOD GLUCOSE      POCT Blood Glucose.: 196 mg/dL (24 Sep 2020 08:48)  POCT Blood Glucose.: 217 mg/dL (23 Sep 2020 21:33)  POCT Blood Glucose.: 182 mg/dL (23 Sep 2020 17:17)  POCT Blood Glucose.: 332 mg/dL (23 Sep 2020 12:27)            Serum Pro-Brain Natriuretic Peptide: 544 pg/mL (09-21 @ 13:35)        RECENT CULTURES:  09-22 @ 00:42 .Blood Blood       < from: Xray Chest 1 View AP/PA (09.21.20 @ 14:26) >    EXAM:  XR CHEST AP OR PA 1V                            PROCEDURE DATE:  09/21/2020            INTERPRETATION:  CLINICAL INFORMATION: Shortness of breath.    A frontal view of the chest was obtained.    Comparison: 7/15/2020.    IMPRESSION:    The mediastinal cardiac silhouette is unremarkable.    Bibasilar atelectasis.    Severe thoracolumbar scoliosis.                      JONAS MILLER M.D., ATTENDING RADIOLOGIST  This document has been electronically signed. Sep 21 2020  2:29PM    < end of copied text >  < from: CT Angio Chest w/ IV Cont (07.15.20 @ 14:57) >    PROCEDURE:   CT Angiography of the Chest.  90 ml of Omnipaque 350 was injected intravenously. 10 ml were discarded.  Sagittal and coronal reformats were performed as well as 3D (MIP) reconstructions.    FINDINGS:    LUNGS AND AIRWAYS: Patent central airways.  Very low lung volumes with bibasilar subsegmental atelectasis. Nodefinite pneumonia.  PLEURA: No pleural effusion.  MEDIASTINUM AND RYAN: No lymphadenopathy.  VESSELS: Satisfactory contrast bolus. No pulmonary emboli. Normal caliber thoracic aorta  HEART: Slightly enlarged. No pericardial effusion.  CHEST WALL ANDLOWER NECK: Slightly enlarged multinodular thyroid..  VISUALIZED UPPER ABDOMEN: Bilateral nonobstructive renal calculi. Additional dystrophic cortical calcifications within a markedly atrophic right kidney. Right renal cyst.  BONES: Advanced spinal degenerative changes and profound compound thoracolumbar scoliosis.    IMPRESSION:   Negative for pulmonary emboli.  Low lung volumes with bibasilar subsegmental atelectasis.  Additional findings as discussed                  LUMA ARANDA M.D., ATTENDING RADIOLOGIST  This document has been electronically signed. Jul 15 2020  3:15PM              < end of copied text >           No growth to date.    09-21 @ 20:34 .Blood Blood   PCR    Growth in aerobic bottle: Gram Positive Cocci in Clusters  Growth in anaerobic bottle: Gram Positive Cocci in Clusters and Gram  Positive Cocci in Pairs and Chains    Blood Culture PCR  Blood Culture PCR  Blood Culture PCR     Growth in aerobic and anaerobic bottles: Staphylococcus cohnii Coag  Negative Staphylococcus  Single set isolate, possible contaminant. Contact  Microbiology if susceptibility testing clinically  indicated.  Growth in aerobic and anaerobic bottles:Streptococcus mitis/oralis group  Alpha hemolytic strep  (not Strep. pneumoniae or Enterococcus)  Single set isolate, possible contaminant. Contact  Microbiology if susceptibility testing clinically  indicated.  "Due to technical problems, Proteus sp. will Not be reported as part of  the BCID panel until further notice"  ***Blood Panel PCR results on this specimen are available  approximately 3 hours after the Gram stain result.***  Gram stain, PCR, and/or culture results may not always  correspond due to difference in methodologies.  ************************************************************  This PCR assay was performed using [employer].  The following targets are tested for: Enterococcus,  vancomycin resistant enterococci, Listeria monocytogenes,  coagulase negative staphylococci, S. aureus,  methicillin resistant S. aureus, Streptococcus agalactiae  (Group B), S. pneumoniae, S. pyogenes (Group A),  Acinetobacter baumannii, Enterobacter cloacae, E. coli,  Klebsiella oxytoca, K. pneumoniae, Proteus sp.,  Serratia marcescens, Haemophilus influenzae,  Neisseria meningitidis, Pseudomonas aeruginosa, Candida  albicans, C. glabrata, C krusei, C parapsilosis,  C. tropicalis and the KPC resistance gene.    09-21 @ 17:24 .Urine Clean Catch (Midstream)                >=3 organisms. Probable collection contamination.          RESPIRATORY CULTURES:          Studies  Chest X-RAY  CT SCAN Chest   Venous Dopplers: LE:   CT Abdomen  Others

## 2020-09-24 NOTE — BEHAVIORAL HEALTH ASSESSMENT NOTE - SUMMARY
81yo F, domiciled with daughter and grandson, no psychiatric history, no history of SI/SA, no history of violence or aggression, no substance use, PMHx of DMT2 (on Metformin and insulin), COPD, CHF, HLD, HTN, & kidney stones with PSHx of R nephrectomy & L knee surgery, BIBEMS for SOB and lethary. Admitted to medicine for hyperglycemia, hypokalemia, and UTI w/positive blood cultures, psychiatry consulted for confusion and agitation. Patient is refusing medications, blood draws, further medical workup. On interview, patient is A&Ox3-4, able to answer direct questions, denies mood symptoms, but quickly becomes tangential, disorganized and paranoid. Given patient's infections, metabolic abnormalities, and acute change in mental status, presentation is consistent with delirium due to medical condition, less likely dementia process though further collateral needed to rule out.

## 2020-09-24 NOTE — BEHAVIORAL HEALTH ASSESSMENT NOTE - HPI (INCLUDE ILLNESS QUALITY, SEVERITY, DURATION, TIMING, CONTEXT, MODIFYING FACTORS, ASSOCIATED SIGNS AND SYMPTOMS)
81yo F, domiciled with daughter and grandson, no psychiatric history, no history of SI/SA, no history of violence or aggression, no substance use, PMHx of DMT2 (on Metformin and insulin), COPD, CHF, HLD, HTN, & kidney stones with PSHx of R nephrectomy & L knee surgery, BIBEMS for SOB and lethary. Admitted to medicine for hyperglycemia, hypokalemia, and UTI w/positive blood cultures, psychiatry consulted for confusion and agitation.    On interview, patient is labile and disorganized, and able to provide only limited history. She reports her mood as "fine." She is oriented to person, place as hospital, month and year, though repeatedly says that writer is the president when asked. Patient denies any psychiatric history, no prior inpatient or outpatient treatment, no prior psych meds. She denies current suicidality (cites Moravian beliefs as protective factor), homicidality, and AVH. Patient makes multiple paranoid statements that are difficult to understand due to her disorganization. She feels upset by a woman in her neighborhood who pt feels is disrespectful to her because she is black. She also states that some of the hospital staff are terrorists who will try to poison her. She denies physical complaints and is unaware of her medical need for hospitalization.     Brief collateral obtained from patient's daughter Murphy. Daughter states that patient has no psychiatric history, no hx of depressive episodes, no SI/SA, no aggression. She says that at home patient is calm and polite.

## 2020-09-24 NOTE — PHYSICAL THERAPY INITIAL EVALUATION ADULT - IMPAIRMENTS CONTRIBUTING TO GAIT DEVIATIONS, PT EVAL
decreased strength/impaired balance/narrow base of support/pain/impaired postural control/decreased flexibility

## 2020-09-24 NOTE — PHYSICAL THERAPY INITIAL EVALUATION ADULT - IMPAIRED TRANSFERS: SIT/STAND, REHAB EVAL
decreased flexibility/impaired sensory feedback/pain/decreased ROM/impaired postural control/impaired balance

## 2020-09-24 NOTE — PHYSICAL THERAPY INITIAL EVALUATION ADULT - CRITERIA FOR SKILLED THERAPEUTIC INTERVENTIONS
impairments found/anticipated discharge recommendation/functional limitations in following categories/therapy frequency/predicted duration of therapy intervention/risk reduction/prevention/rehab potential

## 2020-09-24 NOTE — PROGRESS NOTE ADULT - SUBJECTIVE AND OBJECTIVE BOX
CARDIOLOGY FOLLOW UP - Dr. Mukherjee    CC no cp or sob        PHYSICAL EXAM:  T(C): 36.7 (09-24-20 @ 05:04), Max: 36.7 (09-24-20 @ 05:04)  HR: 82 (09-24-20 @ 05:04) (82 - 101)  BP: 134/71 (09-24-20 @ 05:04) (124/637 - 167/76)  RR: 18 (09-24-20 @ 05:04) (18 - 18)  SpO2: 99% (09-24-20 @ 05:04) (94% - 100%)  Wt(kg): --  I&O's Summary    23 Sep 2020 07:01  -  24 Sep 2020 07:00  --------------------------------------------------------  IN: 660 mL / OUT: 0 mL / NET: 660 mL        Appearance: Normal	  Cardiovascular: Normal S1 S2,RRR,  Respiratory: Lungs clear to auscultation	  Gastrointestinal:  Soft, Non-tender, + BS	  Extremities: Normal range of motion, No clubbing, cyanosis or edema        MEDICATIONS  (STANDING):  ALBUTerol    90 MICROgram(s) HFA Inhaler 1 Puff(s) Inhalation every 4 hours  aspirin enteric coated 81 milliGRAM(s) Oral daily  atorvastatin 80 milliGRAM(s) Oral at bedtime  budesonide 160 MICROgram(s)/formoterol 4.5 MICROgram(s) Inhaler 2 Puff(s) Inhalation two times a day  dextrose 5%. 1000 milliLiter(s) (50 mL/Hr) IV Continuous <Continuous>  dextrose 50% Injectable 12.5 Gram(s) IV Push once  dextrose 50% Injectable 25 Gram(s) IV Push once  dextrose 50% Injectable 25 Gram(s) IV Push once  enoxaparin Injectable 40 milliGRAM(s) SubCutaneous daily  furosemide    Tablet 40 milliGRAM(s) Oral daily  gabapentin 100 milliGRAM(s) Oral three times a day  influenza   Vaccine 0.5 milliLiter(s) IntraMuscular once  insulin glargine Injectable (LANTUS) 16 Unit(s) SubCutaneous at bedtime  insulin lispro (HumaLOG) corrective regimen sliding scale   SubCutaneous three times a day before meals  insulin lispro (HumaLOG) corrective regimen sliding scale   SubCutaneous at bedtime  insulin lispro Injectable (HumaLOG) 7 Unit(s) SubCutaneous three times a day before meals  potassium chloride   Solution 40 milliEquivalent(s) Oral every 2 hours      TELEMETRY:  nsr/sinus tachycardia hr up 130s	    ECG:  	  RADIOLOGY:   DIAGNOSTIC TESTING:  [ ] Echocardiogram:  [ ]  Catheterization:  [ ] Stress Test:    OTHER: 	    LABS:	 	    Troponin T, High Sensitivity Result: 54 ng/L [0 - 51] (09-21 @ 13:35)                          13.8   8.31  )-----------( 170      ( 24 Sep 2020 11:55 )             43.9     09-23    147<H>  |  89<L>  |  30<H>  ----------------------------<  300<H>  3.8   |  45<HH>  |  1.08    Ca    9.4      23 Sep 2020 06:30  Phos  3.7     09-23  Mg     1.9     09-23

## 2020-09-24 NOTE — PROGRESS NOTE ADULT - ASSESSMENT
80 year old F with pmhx of  DMT2 (on Metformin and insulin), COPD, CHF, HLD, HTN, kidney stones and pshx of R nephrectomy, L knee surgery presents to ED bibems with elevated blood sugar, SOB, and lethargy.    Pt unable to provide symptoms.   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   Abnormal u/a   lactic acidosis, hypoxia on admission.   urine cx contaminated   new polymicrobial bacteremia. ? significance   No fever or leucocytosis       Plan:   urine cx contaminated, stopped ertapenem  repeat urine cx via straight cath if feasible    repeat blood cx ordered, pt agreed for blood draw today   follow up prelim blood cx  glycemic control   observe off abx for now

## 2020-09-24 NOTE — CONSULT NOTE ADULT - SUBJECTIVE AND OBJECTIVE BOX
NEPHROLOGY - NSN    Patient seen and examined.    HPI:   80 year old F with pmhx of  DMT2 (on Metformin and insulin), COPD, CHF, HLD, HTN, kidney stones and pshx of R nephrectomy, L knee surgery presents to ED bibems with elevated blood sugar, SOB, and lethargy. Per EMS, pt with 485 blood sugar, BP of 144/75, and O2 sat 92% on room air.  Daughter notes period of incoherent speech over the weekend. Pt endorses excess urination and thirst over the last few days. She also reports mild SOB x few days that is worse when lying flat. Pt also reports constant CP that has been present for "quite a while", unable to specify how much time. Denies fever, chills, worsening leg swelling. (21 Sep 2020 20:15)      PAST MEDICAL & SURGICAL HISTORY:  COPD, moderate    COPD (chronic obstructive pulmonary disease)    CHF (congestive heart failure)    HLD (hyperlipidemia)    HTN (hypertension)    Vitamin D deficiency    Dyslipidemia    Constipation    Kidney stones    Type 2 diabetes mellitus    GERD (gastroesophageal reflux disease)    COPD (chronic obstructive pulmonary disease)    HTN (hypertension)    CHF (congestive heart failure)  ~ diastolic, Stage I    No significant past surgical history    H/O breast biopsy  ~ 3 times on right, 2 times on left    H/O right nephrectomy  ~ [facility]    History of left knee replacement  ~ x 3 (2010, 2011, 2016)        MEDICATIONS  (STANDING):  ALBUTerol    90 MICROgram(s) HFA Inhaler 1 Puff(s) Inhalation every 4 hours  aspirin enteric coated 81 milliGRAM(s) Oral daily  atorvastatin 80 milliGRAM(s) Oral at bedtime  budesonide 160 MICROgram(s)/formoterol 4.5 MICROgram(s) Inhaler 2 Puff(s) Inhalation two times a day  dextrose 5%. 1000 milliLiter(s) (50 mL/Hr) IV Continuous <Continuous>  dextrose 50% Injectable 12.5 Gram(s) IV Push once  dextrose 50% Injectable 25 Gram(s) IV Push once  dextrose 50% Injectable 25 Gram(s) IV Push once  enoxaparin Injectable 40 milliGRAM(s) SubCutaneous daily  gabapentin 100 milliGRAM(s) Oral three times a day  influenza   Vaccine 0.5 milliLiter(s) IntraMuscular once  insulin glargine Injectable (LANTUS) 16 Unit(s) SubCutaneous at bedtime  insulin lispro (HumaLOG) corrective regimen sliding scale   SubCutaneous three times a day before meals  insulin lispro (HumaLOG) corrective regimen sliding scale   SubCutaneous at bedtime  insulin lispro Injectable (HumaLOG) 7 Unit(s) SubCutaneous three times a day before meals  magnesium sulfate  IVPB 1 Gram(s) IV Intermittent daily  potassium chloride    Tablet ER 40 milliEquivalent(s) Oral every 4 hours  potassium chloride   Solution 40 milliEquivalent(s) Oral every 2 hours      Allergies    No Known Allergies    Intolerances        SOCIAL HISTORY:  Denies alcohol abuse, drug abuse or tobacco usage.     FAMILY HISTORY:  FH: COPD (chronic obstructive pulmonary disease) (Child)  ~ daughter (non-smoker)    FH: diabetes mellitus (Grandparent)  ~ mother, grandmother    FH: CHF (congestive heart failure)  ~ mother    FH: breast cancer  ~ mother        VITALS:  T(C): 36.7 (09-24-20 @ 05:04), Max: 36.7 (09-24-20 @ 05:04)  HR: 82 (09-24-20 @ 05:04) (82 - 101)  BP: 134/71 (09-24-20 @ 05:04) (134/71 - 167/76)  RR: 18 (09-24-20 @ 05:04) (18 - 18)  SpO2: 99% (09-24-20 @ 05:04) (94% - 99%)    REVIEW OF SYSTEMS:  Denies any nausea, vomiting, diarrhea, fever or chills.   Good oral intake and denies fatigue or weakness. All other pertinent systems are reviewed and are negative.    PHYSICAL EXAM:  Constitutional: NAD  HEENT: EOMI  Neck:  No JVD, supple   Respiratory: CTA B/L  Cardiovascular: S1 and S2, RRR  Gastrointestinal: + BS, soft, NT, ND  Extremities: No peripheral edema, + peripheral pulses  Neurological: A/O x 3, CN2-12 intact  Psychiatric: Normal mood, normal affect  : No Pearce  Skin: No rashes, C/D/I  Access: Not applicable    I and O's:    09-22 @ 07:01  -  09-23 @ 07:00  --------------------------------------------------------  IN: 480 mL / OUT: 450 mL / NET: 30 mL    09-23 @ 07:01  -  09-24 @ 07:00  --------------------------------------------------------  IN: 660 mL / OUT: 0 mL / NET: 660 mL    09-24 @ 07:01  -  09-24 @ 13:18  --------------------------------------------------------  IN: 180 mL / OUT: 0 mL / NET: 180 mL          LABS:                        13.8   8.31  )-----------( 170      ( 24 Sep 2020 11:55 )             43.9     09-24    146<H>  |  84<L>  |  37<H>  ----------------------------<  317<H>  3.3<L>   |  >45<HH>  |  1.12    Ca    9.7      24 Sep 2020 11:55  Phos  3.7     09-23  Mg     1.7     09-24         RADIOLOGY & ADDITIONAL STUDIES:   < from: Xray Chest 1 View AP/PA (09.21.20 @ 14:26) >    EXAM:  XR CHEST AP OR PA 1V                            PROCEDURE DATE:  09/21/2020            INTERPRETATION:  CLINICAL INFORMATION: Shortness of breath.    A frontal view of the chest was obtained.    Comparison: 7/15/2020.    IMPRESSION:    The mediastinal cardiac silhouette is unremarkable.    Bibasilar atelectasis.    Severe thoracolumbar scoliosis.                      JONAS MILLER M.D., ATTENDING RADIOLOGIST  This document has been electronically signed. Sep 21 2020  2:29PM    < end of copied text >   NEPHROLOGY - NSN    Patient seen and examined.    HPI:   80 year old F with pmhx of  DMT2 (on Metformin and insulin), COPD, CHF, HLD, HTN, kidney stones and pshx of R nephrectomy, L knee surgery presents to ED bibems with elevated blood sugar, SOB, and lethargy. Per EMS, pt with 485 blood sugar, BP of 144/75, and O2 sat 92% on room air.  Daughter notes period of incoherent speech over the weekend. Pt endorses excess urination and thirst over the last few days. She also reports mild SOB x few days that is worse when lying flat. Pt also reports constant CP that has been present for "quite a while", unable to specify how much time. Denies fever, chills, worsening leg swelling. (21 Sep 2020 20:15)  She did have an outpt nephrologist but has not seen recently.  She had a recent admission to OSH and had COPD flare.  Blood sugars are still elevated.  She has had DM x 20 yrs and no retinopathy  There is no hematuria or bubbles in the urine.  No history of NSAIDS or nephrolithisis.  The patient urinates once or twice in the night and there is no incontinence.  No family hx or renal disease or back pain.    No recent abx use.  No alleviating or aggravating factors with respect to the kidneys.     PAST MEDICAL & SURGICAL HISTORY:  COPD, moderate    COPD (chronic obstructive pulmonary disease)    CHF (congestive heart failure)    HLD (hyperlipidemia)    HTN (hypertension)    Vitamin D deficiency    Dyslipidemia    Constipation    Kidney stones    Type 2 diabetes mellitus    GERD (gastroesophageal reflux disease)    COPD (chronic obstructive pulmonary disease)    HTN (hypertension)    CHF (congestive heart failure)  ~ diastolic, Stage I    No significant past surgical history    H/O breast biopsy  ~ 3 times on right, 2 times on left    H/O right nephrectomy  ~ [facility]    History of left knee replacement  ~ x 3 (2010, 2011, 2016)        MEDICATIONS  (STANDING):  ALBUTerol    90 MICROgram(s) HFA Inhaler 1 Puff(s) Inhalation every 4 hours  aspirin enteric coated 81 milliGRAM(s) Oral daily  atorvastatin 80 milliGRAM(s) Oral at bedtime  budesonide 160 MICROgram(s)/formoterol 4.5 MICROgram(s) Inhaler 2 Puff(s) Inhalation two times a day  dextrose 5%. 1000 milliLiter(s) (50 mL/Hr) IV Continuous <Continuous>  dextrose 50% Injectable 12.5 Gram(s) IV Push once  dextrose 50% Injectable 25 Gram(s) IV Push once  dextrose 50% Injectable 25 Gram(s) IV Push once  enoxaparin Injectable 40 milliGRAM(s) SubCutaneous daily  gabapentin 100 milliGRAM(s) Oral three times a day  influenza   Vaccine 0.5 milliLiter(s) IntraMuscular once  insulin glargine Injectable (LANTUS) 16 Unit(s) SubCutaneous at bedtime  insulin lispro (HumaLOG) corrective regimen sliding scale   SubCutaneous three times a day before meals  insulin lispro (HumaLOG) corrective regimen sliding scale   SubCutaneous at bedtime  insulin lispro Injectable (HumaLOG) 7 Unit(s) SubCutaneous three times a day before meals  magnesium sulfate  IVPB 1 Gram(s) IV Intermittent daily  potassium chloride    Tablet ER 40 milliEquivalent(s) Oral every 4 hours  potassium chloride   Solution 40 milliEquivalent(s) Oral every 2 hours      Allergies    No Known Allergies    Intolerances        SOCIAL HISTORY:  Denies alcohol abuse, drug abuse or tobacco usage.     FAMILY HISTORY:  FH: COPD (chronic obstructive pulmonary disease) (Child)  ~ daughter (non-smoker)    FH: diabetes mellitus (Grandparent)  ~ mother, grandmother    FH: CHF (congestive heart failure)  ~ mother    FH: breast cancer  ~ mother        VITALS:  T(C): 36.7 (09-24-20 @ 05:04), Max: 36.7 (09-24-20 @ 05:04)  HR: 82 (09-24-20 @ 05:04) (82 - 101)  BP: 134/71 (09-24-20 @ 05:04) (134/71 - 167/76)  RR: 18 (09-24-20 @ 05:04) (18 - 18)  SpO2: 99% (09-24-20 @ 05:04) (94% - 99%)    REVIEW OF SYSTEMS:  Denies any nausea, vomiting, diarrhea, fever or chills.   Good oral intake and denies fatigue or weakness. All other pertinent systems are reviewed and are negative.    PHYSICAL EXAM:  Constitutional: NAD  HEENT: EOMI  Neck:  No JVD, supple   Respiratory: CTA B/L  Cardiovascular: S1 and S2, RRR  Gastrointestinal: + BS, soft, NT, ND  Extremities: No peripheral edema, + peripheral pulses  Neurological: A/O x 3, CN2-12 intact  Psychiatric: Normal mood, normal affect  : No Pearce  Skin: No rashes, C/D/I  Access: Not applicable    I and O's:    09-22 @ 07:01  -  09-23 @ 07:00  --------------------------------------------------------  IN: 480 mL / OUT: 450 mL / NET: 30 mL    09-23 @ 07:01  -  09-24 @ 07:00  --------------------------------------------------------  IN: 660 mL / OUT: 0 mL / NET: 660 mL    09-24 @ 07:01  -  09-24 @ 13:18  --------------------------------------------------------  IN: 180 mL / OUT: 0 mL / NET: 180 mL          LABS:                        13.8   8.31  )-----------( 170      ( 24 Sep 2020 11:55 )             43.9     09-24    146<H>  |  84<L>  |  37<H>  ----------------------------<  317<H>  3.3<L>   |  >45<HH>  |  1.12    Ca    9.7      24 Sep 2020 11:55  Phos  3.7     09-23  Mg     1.7     09-24         RADIOLOGY & ADDITIONAL STUDIES:   < from: Xray Chest 1 View AP/PA (09.21.20 @ 14:26) >    EXAM:  XR CHEST AP OR PA 1V                            PROCEDURE DATE:  09/21/2020            INTERPRETATION:  CLINICAL INFORMATION: Shortness of breath.    A frontal view of the chest was obtained.    Comparison: 7/15/2020.    IMPRESSION:    The mediastinal cardiac silhouette is unremarkable.    Bibasilar atelectasis.    Severe thoracolumbar scoliosis.                      JONAS MILLER M.D., ATTENDING RADIOLOGIST  This document has been electronically signed. Sep 21 2020  2:29PM    < end of copied text >

## 2020-09-24 NOTE — PROGRESS NOTE ADULT - ASSESSMENT
Problem/Plan - 1:  ·  Problem: sepsis sec to UTI (urinary tract infection) with bactremia with metabolic encephalopathy POA .  Plan: dc ertepanum  id fu appreciated     Problem/Plan - 2:  ·  Problem: Hyperglycemia ion alkalosis with contract  Plan: monitor FS  ISS.   renal fu  IVF     Problem/Plan - 3:  ·  Problem: AMS   Plan: metabolic encephalopathy  psych consult    Problem/Plan - 4:  ·  Problem: COPD (chronic obstructive pulmonary disease).  Plan: cw home meds

## 2020-09-24 NOTE — BEHAVIORAL HEALTH ASSESSMENT NOTE - COMMENTS ON VIOLENCE RISK/PROTECTIVE FACTORS:
Patient intermittently agitated with vague paranoia but denies violent or homicidal intention, no hx of aggression

## 2020-09-24 NOTE — BEHAVIORAL HEALTH ASSESSMENT NOTE - NSBHCHARTREVIEWLAB_PSY_A_CORE FT
13.0   8.76  )-----------( 169      ( 23 Sep 2020 06:30 )             41.1   09-23    147<H>  |  89<L>  |  30<H>  ----------------------------<  300<H>  3.8   |  45<HH>  |  1.08    Ca    9.4      23 Sep 2020 06:30  Phos  3.7     09-23  Mg     1.9     09-23    Thyroid Stimulating Hormone, Serum: 0.92 uIU/mL

## 2020-09-24 NOTE — PROGRESS NOTE ADULT - ASSESSMENT
Assessment  DMT2: 80y Female with DM T2 with hyperglycemia, A1C 8.1%, was on oral meds and insulin at home, admitted with hyperglycemia, now on basal bolus insulin, increased dose yesterday, blood sugars improving though still not at target, no hypoglycemic episodes. Psych on board for confusion/agitation.  UTI: on IV ABx, stable, monitored.          Alexander Shaver MD  Cell: [phone]  Office: [phone]               Assessment  DMT2: 80y Female with DM T2 with hyperglycemia, A1C 8.1%, was on oral meds and insulin at home,  admitted with hyperglycemia, now on basal bolus insulin, increased dose yesterday, blood sugars improving though still not at target, no hypoglycemic episodes. Psych on board for confusion/agitation.  UTI: on IV ABx, stable, monitored.          Alexander Shaver MD  Cell: [phone]  Office: [phone]

## 2020-09-24 NOTE — CONSULT NOTE ADULT - ASSESSMENT
80 year old F with pmhx of  DMT2 (on Metformin and insulin), COPD, CHF, HLD, HTN, kidney stones and pshx of R nephrectomy, L knee surgery presents to ED bibems with elevated blood sugar, SOB, and lethargy. 80 year old F with pmhx of  DMT2 (on Metformin and insulin), COPD, CHF, HLD, HTN, kidney stones and pshx of R nephrectomy, L knee surgery presents to ED bibems with elevated blood sugar, SOB, and lethargy.  Elevated HCO3 is likely contraction alkalosis with hypokalemia from osmotic diuresis from elevated blood sugars   Hypernatremia   CKD stage 3 at minimum    1 CVS-NS with 20meq of kcl at 70 cc/hr.  At present she is not in heart failure  2 Renal-CKD on the basis of reduced nephron mass and likely DM.  quantify proteinuria.  Renal sono would be a good idea to rule masses but can be as outpt   3 Endo-Needs tighter blood sugar manangement     [facility]   [phone]

## 2020-09-24 NOTE — PROGRESS NOTE ADULT - ASSESSMENT
80 year old F with pmhx of  DMT2 (on Metformin and insulin), COPD, CHF, HLD, HTN, kidney stones and pshx of R nephrectomy, L knee surgery presents to ED bibems with elevated blood sugar, SOB, and lethargy. Per EMS, pt with 485 blood sugar, BP of 144/75, and O2 sat 92% on room air.  Daughter notes period of incoherent speech over the weekend. Pt endorses excess urination and thirst over the last few days. She also reports mild SOB x few days that is worse when lying flat. Pt also reports constant CP that has been present for "quite a while", unable to specify how much time. Denies fever, chills, worsening leg swelling.    COPD:  Thoraco lumbar scoliosis  Uncontrolled DM:  UTI  Confusion  CHF    she is admitted with high blood glucose and found to have uti:  She has copd:  She was recently admitted to [facility] where she was treated with copd exacerbation:  At that time her CTA was negative and no indication of malignancy on ct chest :  Currently she is not SOB : her outpt  meds noted:  She is already on Symbicort as well as inhalers:  currently she is also being treated for chf   her venous ABG towards met alk side and seems to be a chronic retainer too   I don't think she needs bipap at this time:  She seems pretty confused: ct head is negative for bleed:  would defer to primary team :  DVT candido    9/23:  pt is not SOB at this time: but emotionally labile  not wheezing:   she is not wheezing  She has chronically elevated co2:  cont Symbicort  her venous abg reviewed:   CT head neg for bleed:   CICI NP    9/24:  she looks better today : she has coag neg staph in blood cultures: likely contaminant:  She is not wheezing:  On 2 L of oxygen : No resp distress:   Has chr hypercarbia:  Cont inhalers not wheezing: Blood glucose better controlled:   mentally she is better today :  CICI NP :  dvt demarcoyxmoes

## 2020-09-24 NOTE — PHYSICAL THERAPY INITIAL EVALUATION ADULT - PERTINENT HX OF CURRENT PROBLEM, REHAB EVAL
80 year old F with pmhx of  DMT2 (on Metformin and insulin), COPD, CHF, HLD, HTN, kidney stones and pshx of R nephrectomy, L knee surgery presents to ED bibems with elevated blood sugar, SOB, and lethargy. Per EMS, pt with 485 blood sugar, Daughter notes period of incoherent speech over the weekend. Pt endorses excess urination and thirst over the last few days. She also reports mild SOB x few days that is worse when lying flat. Pt also reports constant CP. CT head negative.

## 2020-09-24 NOTE — PROGRESS NOTE ADULT - ASSESSMENT
80 year old F with pmhx of  DMT2 (on Metformin and insulin), COPD, CHF, HLD, HTN, kidney stones and pshx of R nephrectomy, L knee surgery presents to ED bibems with elevated blood sugar, SOB, and lethargy found to have UTI.     1. SOB, acute/chronic diastolic chf exacerbation  -cv stable, volume status acceptable   -symptoms also 2/2 to DKA, COPD hx   -c/w lasix as ordered   -echo in july with nl LV sys fx, ef 55-60%, mild MR, mod pulm htn    -pulm f/u     2. Hyperglycemia  -management per primary team     3. UTI  -+BCX likely contaminant: repeat neg  -s/p abx per primary team     4. Hypokalemia, resolved   -supplementation per primary team, continue to trend.     5. COPD , hx  -c/w home meds , pulm f/u     dvt ppx

## 2020-09-24 NOTE — BEHAVIORAL HEALTH ASSESSMENT NOTE - SUICIDE PROTECTIVE FACTORS
[employer] beliefs/Cultural, spiritual and/or moral attitudes against suicide/Identifies reasons for living

## 2020-09-24 NOTE — PROGRESS NOTE ADULT - SUBJECTIVE AND OBJECTIVE BOX
80y old  Female who presents with a chief complaint of sob, hyperglycemia (24 Sep 2020 13:55)      Interval history:  Afebrile, denies abdominal pain, intermittent dysuria, labile mood.     Allergies:   No Known Allergies      Antimicrobials:      REVIEW OF SYSTEMS:  No chest pain  No N/V  No rash.     Vital Signs Last 24 Hrs  T(C): 37.1 (09-24-20 @ 13:52), Max: 37.1 (09-24-20 @ 13:52)  T(F): 98.8 (09-24-20 @ 13:52), Max: 98.8 (09-24-20 @ 13:52)  HR: 72 (09-24-20 @ 16:59) (72 - 101)  BP: 140/82 (09-24-20 @ 16:59) (134/71 - 167/76)  BP(mean): --  RR: 18 (09-24-20 @ 16:59) (18 - 18)  SpO2: 100% (09-24-20 @ 16:59) (94% - 100%)      PHYSICAL EXAM:  Patient in no acute distress. Alert, awake, communicative, oriented.   Cardiovascular: S1S2 normal,   Lungs: + air entry B/L lung fields.  Gastrointestinal: soft, nontender, nondistended.  Extremities: no edema. lt knee disfigured   IV sites not inflamed.                             13.8   8.31  )-----------( 170      ( 24 Sep 2020 11:55 )             43.9   09-24    146<H>  |  84<L>  |  37<H>  ----------------------------<  317<H>  3.3<L>   |  >45<HH>  |  1.12    Ca    9.7      24 Sep 2020 11:55  Phos  3.7     09-23  Mg     1.7     09-24          Culture - Blood (collected 22 Sep 2020 00:42)  Source: .Blood Blood  Preliminary Report (23 Sep 2020 01:02):    No growth to date.    Culture - Blood (collected 21 Sep 2020 20:34)  Source: .Blood Blood  Gram Stain (22 Sep 2020 20:11):    Growth in aerobic bottle: Gram Positive Cocci in Clusters    Growth in anaerobic bottle: Gram Positive Cocci in Clusters and Gram    Positive Cocci in Pairs and Chains  Final Report (23 Sep 2020 19:23):    Growth in aerobic and anaerobic bottles: Staphylococcus cohnii Coag    Negative Staphylococcus    Single set isolate, possible contaminant. Contact    Microbiology if susceptibility testing clinically    indicated.    Growth in aerobic and anaerobic bottles:Streptococcus mitis/oralis group    Alpha hemolytic strep

## 2020-09-24 NOTE — PHYSICAL THERAPY INITIAL EVALUATION ADULT - GAIT DEVIATIONS NOTED, PT EVAL
increased time in double stance/decreased stride length/decreased sonia/excessive flexion posture/decreased velocity of limb motion/decreased step length/decreased weight-shifting ability

## 2020-09-25 NOTE — PROGRESS NOTE ADULT - ASSESSMENT
80 year old F with pmhx of  DMT2 (on Metformin and insulin), COPD, CHF, HLD, HTN, kidney stones and pshx of R nephrectomy, L knee surgery presents to ED bibems with elevated blood sugar, SOB, and lethargy.  Elevated HCO3 is likely contraction alkalosis with hypokalemia from osmotic diuresis from elevated blood sugars   Hypernatremia   CKD stage 3 at minimum    1 CVS-NS with 20meq of kcl at 70 cc/hr.  At present she is not in heart failure.  I suspect can dc the IVF in am   2 Renal-CKD on the basis of reduced nephron mass and likely DM.  Quantify proteinuria.  Renal sono would be a good idea to rule masses but can be as outpt.  I hope the serum HCo3 is lower on the bloods   3 Endo-Needs tighter blood sugar management     Sayed [facility]   [phone]

## 2020-09-25 NOTE — PROGRESS NOTE ADULT - SUBJECTIVE AND OBJECTIVE BOX
Chief complaint  Patient is a 80y old  Female who presents with a chief complaint of sob, hyperglycemia (25 Sep 2020 13:41)   Review of systems  Patient in bed, looks comfortable, no hypoglycemic episodes.    Labs and Fingersticks  CAPILLARY BLOOD GLUCOSE      POCT Blood Glucose.: 179 mg/dL (25 Sep 2020 13:03)  POCT Blood Glucose.: 222 mg/dL (25 Sep 2020 09:03)  POCT Blood Glucose.: 172 mg/dL (24 Sep 2020 21:41)  POCT Blood Glucose.: 132 mg/dL (24 Sep 2020 17:19)      Anion Gap, Serum: 4 <L> (09-25 @ 10:37)  Anion Gap, Serum: 15 (09-24 @ 11:55)      Calcium, Total Serum: 9.1 (09-25 @ 10:37)  Calcium, Total Serum: 9.7 (09-24 @ 11:55)          09-25    144  |  94<L>  |  32<H>  ----------------------------<  282<H>  4.4   |  >45<HH>  |  0.94    Ca    9.1      25 Sep 2020 10:37  Mg     2.0     09-25                          13.8   8.31  )-----------( 170      ( 24 Sep 2020 11:55 )             43.9     Medications  MEDICATIONS  (STANDING):  acetaZOLAMIDE  IVPB 250 milliGRAM(s) IV Intermittent once  ALBUTerol    90 MICROgram(s) HFA Inhaler 1 Puff(s) Inhalation every 4 hours  aspirin enteric coated 81 milliGRAM(s) Oral daily  atorvastatin 80 milliGRAM(s) Oral at bedtime  budesonide 160 MICROgram(s)/formoterol 4.5 MICROgram(s) Inhaler 2 Puff(s) Inhalation two times a day  dextrose 5%. 1000 milliLiter(s) (50 mL/Hr) IV Continuous <Continuous>  dextrose 50% Injectable 12.5 Gram(s) IV Push once  dextrose 50% Injectable 25 Gram(s) IV Push once  dextrose 50% Injectable 25 Gram(s) IV Push once  enoxaparin Injectable 40 milliGRAM(s) SubCutaneous daily  gabapentin 100 milliGRAM(s) Oral three times a day  influenza   Vaccine 0.5 milliLiter(s) IntraMuscular once  insulin glargine Injectable (LANTUS) 22 Unit(s) SubCutaneous at bedtime  insulin lispro (HumaLOG) corrective regimen sliding scale   SubCutaneous three times a day before meals  insulin lispro (HumaLOG) corrective regimen sliding scale   SubCutaneous at bedtime  insulin lispro Injectable (HumaLOG) 8 Unit(s) SubCutaneous three times a day before meals  magnesium sulfate  IVPB 1 Gram(s) IV Intermittent daily  potassium chloride   Solution 40 milliEquivalent(s) Oral every 2 hours  senna 2 Tablet(s) Oral at bedtime      Physical Exam  General: Patient comfortable in bed  Vital Signs Last 12 Hrs  T(F): 97.3 (09-25-20 @ 13:13), Max: 97.4 (09-25-20 @ 05:23)  HR: 79 (09-25-20 @ 13:13) (72 - 79)  BP: 143/83 (09-25-20 @ 13:13) (111/68 - 143/83)  BP(mean): --  RR: 18 (09-25-20 @ 13:13) (18 - 18)  SpO2: 100% (09-25-20 @ 13:13) (100% - 100%)  Neck: No palpable thyroid nodules.  CVS: S1S2, No murmurs  Respiratory: No wheezing, no crepitations  GI: Abdomen soft, bowel sounds positive  Musculoskeletal:  edema lower extremities.   Skin: No skin rashes, no ecchymosis    Diagnostics    Free Thyroxine, Serum: AM Sched. Collection: 23-Sep-2020 06:00 (09-22 @ 11:16)  Thyroid Stimulating Hormone, Serum: AM Sched. Collection: 23-Sep-2020 06:00 (09-22 @ 11:16)           Chief complaint  Patient is a 80y old  Female who presents with a chief complaint of sob, hyperglycemia (25 Sep 2020 13:41)   Review of systems  Patient in bed, looks comfortable, no hypoglycemic episodes.    Labs and Fingersticks  CAPILLARY BLOOD GLUCOSE      POCT Blood Glucose.: 179 mg/dL (25 Sep 2020 13:03)  POCT Blood Glucose.: 222 mg/dL (25 Sep 2020 09:03)  POCT Blood Glucose.: 172 mg/dL (24 Sep 2020 21:41)  POCT Blood Glucose.: 132 mg/dL (24 Sep 2020 17:19)      Anion Gap, Serum: 4 <L> (09-25 @ 10:37)  Anion Gap, Serum: 15 (09-24 @ 11:55)      Calcium, Total Serum: 9.1 (09-25 @ 10:37)  Calcium, Total Serum: 9.7 (09-24 @ 11:55)          09-25    144  |  94<L>  |  32<H>  ----------------------------<  282<H>  4.4   |  >45<HH>  |  0.94    Ca    9.1      25 Sep 2020 10:37  Mg     2.0     09-25                          13.8   8.31  )-----------( 170      ( 24 Sep 2020 11:55 )             43.9     Medications  MEDICATIONS  (STANDING):  acetaZOLAMIDE  IVPB 250 milliGRAM(s) IV Intermittent once  ALBUTerol    90 MICROgram(s) HFA Inhaler 1 Puff(s) Inhalation every 4 hours  aspirin enteric coated 81 milliGRAM(s) Oral daily  atorvastatin 80 milliGRAM(s) Oral at bedtime  budesonide 160 MICROgram(s)/formoterol 4.5 MICROgram(s) Inhaler 2 Puff(s) Inhalation two times a day  dextrose 5%. 1000 milliLiter(s) (50 mL/Hr) IV Continuous <Continuous>  dextrose 50% Injectable 12.5 Gram(s) IV Push once  dextrose 50% Injectable 25 Gram(s) IV Push once  dextrose 50% Injectable 25 Gram(s) IV Push once  enoxaparin Injectable 40 milliGRAM(s) SubCutaneous daily  gabapentin 100 milliGRAM(s) Oral three times a day  influenza   Vaccine 0.5 milliLiter(s) IntraMuscular once  insulin glargine Injectable (LANTUS) 22 Unit(s) SubCutaneous at bedtime  insulin lispro (HumaLOG) corrective regimen sliding scale   SubCutaneous three times a day before meals  insulin lispro (HumaLOG) corrective regimen sliding scale   SubCutaneous at bedtime  insulin lispro Injectable (HumaLOG) 8 Unit(s) SubCutaneous three times a day before meals  magnesium sulfate  IVPB 1 Gram(s) IV Intermittent daily  potassium chloride   Solution 40 milliEquivalent(s) Oral every 2 hours  senna 2 Tablet(s) Oral at bedtime      Physical Exam  General: Patient comfortable in bed  Vital Signs Last 12 Hrs  T(F): 97.3 (09-25-20 @ 13:13), Max: 97.4 (09-25-20 @ 05:23)  HR: 79 (09-25-20 @ 13:13) (72 - 79)  BP: 143/83 (09-25-20 @ 13:13) (111/68 - 143/83)  BP(mean): --  RR: 18 (09-25-20 @ 13:13) (18 - 18)  SpO2: 100% (09-25-20 @ 13:13) (100% - 100%)  Neck: No palpable thyroid nodules.  CVS: S1S2, No murmurs  Respiratory: No wheezing, no crepitations  GI: Abdomen soft, bowel sounds positive  Musculoskeletal:  edema lower extremities.   Skin: No skin rashes, no ecchymosis    Diagnostics    Free Thyroxine, Serum: AM Sched. Collection: 23-Sep-2020 06:00 (09-22 @ 11:16)  Thyroid Stimulating Hormone, Serum: AM Sched. Collection: 23-Sep-2020 06:00 (09-22 @ 11:16)

## 2020-09-25 NOTE — PROGRESS NOTE ADULT - ASSESSMENT
80 year old F with pmhx of  DMT2 (on Metformin and insulin), COPD, CHF, HLD, HTN, kidney stones and pshx of R nephrectomy, L knee surgery presents to ED bibems with elevated blood sugar, SOB, and lethargy. Per EMS, pt with 485 blood sugar, BP of 144/75, and O2 sat 92% on room air.  Daughter notes period of incoherent speech over the weekend. Pt endorses excess urination and thirst over the last few days. She also reports mild SOB x few days that is worse when lying flat. Pt also reports constant CP that has been present for "quite a while", unable to specify how much time. Denies fever, chills, worsening leg swelling.    COPD:  Thoraco lumbar scoliosis  Uncontrolled DM:  UTI  Confusion  CHF    she is admitted with high blood glucose and found to have uti:  She has copd:  She was recently admitted to [facility] where she was treated with copd exacerbation:  At that time her CTA was negative and no indication of malignancy on ct chest :  Currently she is not SOB : her outpt  meds noted:  She is already on Symbicort as well as inhalers:  currently she is also being treated for chf   her venous ABG towards met alk side and seems to be a chronic retainer too   I don't think she needs bipap at this time:  She seems pretty confused: ct head is negative for bleed:  would defer to primary team :  DVT propahyxlis    9/23:  pt is not SOB at this time: but emotionally labile  not wheezing:   she is not wheezing  She has chronically elevated co2:  cont Symbicort  her venous abg reviewed:   CT head neg for bleed:   CICI NP    9/24:  she looks better today : she has coag neg staph in blood cultures: likely contaminant:  She is not wheezing:  On 2 L of oxygen : No resp distress:   Has chr hypercarbia:  Cont inhalers not wheezing: Blood glucose better controlled:   mentally she is better today :  CICI NP :  dvt propahyxlis    9/25:  she has metabolic alkalosis" with  increased co2:  reviewed nephrology   on normal saline   off diuretics n ow: \not wheezing:   despite iv fluids her hCO3 remains elevated:   DIAMOX X 1 TODASY AND REVIEWE HCO3 AGAIN TOMORROW:  [facility] renal:

## 2020-09-25 NOTE — PROGRESS NOTE ADULT - ASSESSMENT
80 year old F with pmhx of  DMT2 (on Metformin and insulin), COPD, CHF, HLD, HTN, kidney stones and pshx of R nephrectomy, L knee surgery presents to ED bibems with elevated blood sugar, SOB, and lethargy.    Pt unable to provide symptoms.   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   Abnormal u/a   lactic acidosis, hypoxia on admission.   urine cx contaminated   new polymicrobial bacteremia. ? significance   No fever or leucocytosis       Plan:   urine cx contaminated, stopped ertapenem  repeat urine cx in lab    repeat blood cx NTD   follow up prelim blood cx, growing only CoNS even though PCR positive for strep too.   glycemic control   observe off abx for now

## 2020-09-25 NOTE — PROGRESS NOTE ADULT - PROBLEM SELECTOR PLAN 1
Will continue current insulin regimen. Will continue monitoring FS and FU.  Patient counseled for compliance with consistent low carb diet and physical activity as tolerated outpatient.

## 2020-09-25 NOTE — PROGRESS NOTE ADULT - SUBJECTIVE AND OBJECTIVE BOX
80y old  Female who presents with a chief complaint of sob, hyperglycemia (25 Sep 2020 16:52)      Interval history:  Afebrile, wants pain meds and complains about not being fed for 2 days, per RN had breakfast, no diarrhea or cough.       Allergies:   No Known Allergies      Antimicrobials:      REVIEW OF SYSTEMS:  No chest pain   No N/V  No dysuria   No rash.       Vital Signs Last 24 Hrs  T(C): 36.3 (09-25-20 @ 13:13), Max: 36.9 (09-24-20 @ 20:01)  T(F): 97.3 (09-25-20 @ 13:13), Max: 98.5 (09-24-20 @ 20:01)  HR: 79 (09-25-20 @ 13:13) (72 - 80)  BP: 143/83 (09-25-20 @ 13:13) (111/68 - 143/83)  BP(mean): --  RR: 18 (09-25-20 @ 13:13) (18 - 18)  SpO2: 100% (09-25-20 @ 13:13) (100% - 100%)      PHYSICAL EXAM:  Patient in no acute distress. Alert, awake, communicative, oriented.   Cardiovascular: S1S2 normal,   Lungs: + air entry B/L lung fields.  Gastrointestinal: soft, nontender, nondistended.  Extremities: no edema. lt knee disfigured   IV sites not inflamed.                          13.8   8.31  )-----------( 170      ( 24 Sep 2020 11:55 )             43.9   09-25    144  |  94<L>  |  32<H>  ----------------------------<  282<H>  4.4   |  >45<HH>  |  0.94    Ca    9.1      25 Sep 2020 10:37  Mg     2.0     09-25        Culture - Blood (collected 24 Sep 2020 16:38)  Source: .Blood Blood-Peripheral  Preliminary Report (25 Sep 2020 17:01):    No growth to date.    Culture - Blood (collected 24 Sep 2020 16:38)  Source: .Blood Blood-Peripheral  Preliminary Report (25 Sep 2020 17:01):    No growth to date.

## 2020-09-25 NOTE — PROGRESS NOTE ADULT - SUBJECTIVE AND OBJECTIVE BOX
Patient is a 80y old  Female who presents with a chief complaint of sob, hyperglycemia (25 Sep 2020 14:05)      INTERVAL HPI/OVERNIGHT EVENTS:  T(C): 36.3 (09-25-20 @ 13:13), Max: 36.9 (09-24-20 @ 20:01)  HR: 79 (09-25-20 @ 13:13) (72 - 80)  BP: 143/83 (09-25-20 @ 13:13) (111/68 - 143/83)  RR: 18 (09-25-20 @ 13:13) (18 - 18)  SpO2: 100% (09-25-20 @ 13:13) (100% - 100%)  Wt(kg): --  I&O's Summary    24 Sep 2020 07:01  -  25 Sep 2020 07:00  --------------------------------------------------------  IN: 1625 mL / OUT: 450 mL / NET: 1175 mL    25 Sep 2020 07:01  -  25 Sep 2020 16:52  --------------------------------------------------------  IN: 300 mL / OUT: 0 mL / NET: 300 mL        LABS:                        13.8   8.31  )-----------( 170      ( 24 Sep 2020 11:55 )             43.9     09-25    144  |  94<L>  |  32<H>  ----------------------------<  282<H>  4.4   |  >45<HH>  |  0.94    Ca    9.1      25 Sep 2020 10:37  Mg     2.0     09-25          CAPILLARY BLOOD GLUCOSE      POCT Blood Glucose.: 179 mg/dL (25 Sep 2020 13:03)  POCT Blood Glucose.: 222 mg/dL (25 Sep 2020 09:03)  POCT Blood Glucose.: 172 mg/dL (24 Sep 2020 21:41)  POCT Blood Glucose.: 132 mg/dL (24 Sep 2020 17:19)    ABG - ( 24 Sep 2020 13:20 )  pH, Arterial: 7.49  pH, Blood: x     /  pCO2: 72    /  pO2: 139   / HCO3: 54    / Base Excess: 25.3  /  SaO2: 100                     MEDICATIONS  (STANDING):  acetaZOLAMIDE Injectable 250 milliGRAM(s) IV Push once  ALBUTerol    90 MICROgram(s) HFA Inhaler 1 Puff(s) Inhalation every 4 hours  aspirin enteric coated 81 milliGRAM(s) Oral daily  atorvastatin 80 milliGRAM(s) Oral at bedtime  budesonide 160 MICROgram(s)/formoterol 4.5 MICROgram(s) Inhaler 2 Puff(s) Inhalation two times a day  dextrose 5%. 1000 milliLiter(s) (50 mL/Hr) IV Continuous <Continuous>  dextrose 50% Injectable 12.5 Gram(s) IV Push once  dextrose 50% Injectable 25 Gram(s) IV Push once  dextrose 50% Injectable 25 Gram(s) IV Push once  enoxaparin Injectable 40 milliGRAM(s) SubCutaneous daily  gabapentin 100 milliGRAM(s) Oral three times a day  influenza   Vaccine 0.5 milliLiter(s) IntraMuscular once  insulin glargine Injectable (LANTUS) 22 Unit(s) SubCutaneous at bedtime  insulin lispro (HumaLOG) corrective regimen sliding scale   SubCutaneous three times a day before meals  insulin lispro (HumaLOG) corrective regimen sliding scale   SubCutaneous at bedtime  insulin lispro Injectable (HumaLOG) 8 Unit(s) SubCutaneous three times a day before meals  magnesium sulfate  IVPB 1 Gram(s) IV Intermittent daily  potassium chloride   Solution 40 milliEquivalent(s) Oral every 2 hours  senna 2 Tablet(s) Oral at bedtime    MEDICATIONS  (PRN):  acetaminophen   Tablet .. 650 milliGRAM(s) Oral every 6 hours PRN Moderate Pain (4 - 6)  dextrose 40% Gel 15 Gram(s) Oral once PRN Blood Glucose LESS THAN 70 milliGRAM(s)/deciliter  glucagon  Injectable 1 milliGRAM(s) IntraMuscular once PRN Glucose LESS THAN 70 milligrams/deciliter          PHYSICAL EXAM:  GENERAL: frail  CHEST/LUNG: Clear to percussion bilaterally; No rales, rhonchi, wheezing, or rubs  HEART: Regular rate and rhythm; No murmurs, rubs, or gallops  ABDOMEN: Soft, Nontender, Nondistended; Bowel sounds present  EXTREMITIES:  no edema     Care Discussed with Consultants/Other Providers [x ] YES  [ ] NO

## 2020-09-25 NOTE — PROGRESS NOTE ADULT - SUBJECTIVE AND OBJECTIVE BOX
NEPHROLOGY[facility] [phone]        Patient seen and examined in bed.  She was not SOB         MEDICATIONS  (STANDING):  ALBUTerol    90 MICROgram(s) HFA Inhaler 1 Puff(s) Inhalation every 4 hours  aspirin enteric coated 81 milliGRAM(s) Oral daily  atorvastatin 80 milliGRAM(s) Oral at bedtime  budesonide 160 MICROgram(s)/formoterol 4.5 MICROgram(s) Inhaler 2 Puff(s) Inhalation two times a day  dextrose 5%. 1000 milliLiter(s) (50 mL/Hr) IV Continuous <Continuous>  dextrose 50% Injectable 12.5 Gram(s) IV Push once  dextrose 50% Injectable 25 Gram(s) IV Push once  dextrose 50% Injectable 25 Gram(s) IV Push once  enoxaparin Injectable 40 milliGRAM(s) SubCutaneous daily  gabapentin 100 milliGRAM(s) Oral three times a day  influenza   Vaccine 0.5 milliLiter(s) IntraMuscular once  insulin glargine Injectable (LANTUS) 22 Unit(s) SubCutaneous at bedtime  insulin lispro (HumaLOG) corrective regimen sliding scale   SubCutaneous three times a day before meals  insulin lispro (HumaLOG) corrective regimen sliding scale   SubCutaneous at bedtime  insulin lispro Injectable (HumaLOG) 8 Unit(s) SubCutaneous three times a day before meals  magnesium sulfate  IVPB 1 Gram(s) IV Intermittent daily  potassium chloride   Solution 40 milliEquivalent(s) Oral every 2 hours  sodium chloride 0.9% with potassium chloride 20 mEq/L 1000 milliLiter(s) (70 mL/Hr) IV Continuous <Continuous>      VITAL:  T(C): , Max: 37.1 (09-24-20 @ 13:52)  T(F): , Max: 98.8 (09-24-20 @ 13:52)  HR: 72 (09-25-20 @ 05:23)  BP: 111/68 (09-25-20 @ 05:23)  BP(mean): --  RR: 18 (09-25-20 @ 05:23)  SpO2: 100% (09-25-20 @ 05:23)  Wt(kg): --    I and O's:    09-24 @ 07:01  -  09-25 @ 07:00  --------------------------------------------------------  IN: 1625 mL / OUT: 450 mL / NET: 1175 mL          PHYSICAL EXAM:    Constitutional: NAD  Neck:  No JVD  Respiratory: CTAB/L  Cardiovascular: S1 and S2  Gastrointestinal: BS+, soft, NT/ND  Extremities: No peripheral edema  Neurological: A/O x 3, no focal deficits  Psychiatric: Normal mood, normal affect  : No Pearce  Skin: No rashes  Access: Not applicable    LABS:                        13.8   8.31  )-----------( 170      ( 24 Sep 2020 11:55 )             43.9     09-24    146<H>  |  84<L>  |  37<H>  ----------------------------<  317<H>  3.3<L>   |  >45<HH>  |  1.12    Ca    9.7      24 Sep 2020 11:55  Mg     1.7     09-24            Urine Studies:          RADIOLOGY & ADDITIONAL STUDIES:

## 2020-09-25 NOTE — PROGRESS NOTE ADULT - ASSESSMENT
Assessment  DMT2: 80y Female with DM T2 with hyperglycemia, A1C 8.1%, was on oral meds and insulin at home, now on basal bolus insulin, increased dose yesterday, blood sugars improving, no hypoglycemic episodes, eating meals, still somewhat confused.  UTI: on IV ABx, stable, monitored.          Alexander Shaver MD  Cell: [phone]  Office: [phone]               Assessment  DMT2: 80y Female with DM T2 with hyperglycemia, A1C 8.1%, was on oral meds and insulin at home, now on basal bolus insulin,  increased dose yesterday, blood sugars improving, no hypoglycemic episodes, eating meals, still somewhat confused.  UTI: on IV ABx, stable, monitored.          Alexander Shaver MD  Cell: [phone]  Office: [phone]

## 2020-09-25 NOTE — PROGRESS NOTE ADULT - SUBJECTIVE AND OBJECTIVE BOX
Patient is a 80y old  Female who presents with a chief complaint of sob, hyperglycemia (25 Sep 2020 09:35)      Any change in ROS: doing better today : still seems mildly confused:  but not in any resp distress to me:       MEDICATIONS  (STANDING):  ALBUTerol    90 MICROgram(s) HFA Inhaler 1 Puff(s) Inhalation every 4 hours  aspirin enteric coated 81 milliGRAM(s) Oral daily  atorvastatin 80 milliGRAM(s) Oral at bedtime  budesonide 160 MICROgram(s)/formoterol 4.5 MICROgram(s) Inhaler 2 Puff(s) Inhalation two times a day  dextrose 5%. 1000 milliLiter(s) (50 mL/Hr) IV Continuous <Continuous>  dextrose 50% Injectable 12.5 Gram(s) IV Push once  dextrose 50% Injectable 25 Gram(s) IV Push once  dextrose 50% Injectable 25 Gram(s) IV Push once  enoxaparin Injectable 40 milliGRAM(s) SubCutaneous daily  gabapentin 100 milliGRAM(s) Oral three times a day  influenza   Vaccine 0.5 milliLiter(s) IntraMuscular once  insulin glargine Injectable (LANTUS) 22 Unit(s) SubCutaneous at bedtime  insulin lispro (HumaLOG) corrective regimen sliding scale   SubCutaneous three times a day before meals  insulin lispro (HumaLOG) corrective regimen sliding scale   SubCutaneous at bedtime  insulin lispro Injectable (HumaLOG) 8 Unit(s) SubCutaneous three times a day before meals  magnesium sulfate  IVPB 1 Gram(s) IV Intermittent daily  potassium chloride   Solution 40 milliEquivalent(s) Oral every 2 hours  senna 2 Tablet(s) Oral at bedtime  sodium chloride 0.9% with potassium chloride 20 mEq/L 1000 milliLiter(s) (70 mL/Hr) IV Continuous <Continuous>    MEDICATIONS  (PRN):  acetaminophen   Tablet .. 650 milliGRAM(s) Oral every 6 hours PRN Moderate Pain (4 - 6)  dextrose 40% Gel 15 Gram(s) Oral once PRN Blood Glucose LESS THAN 70 milliGRAM(s)/deciliter  glucagon  Injectable 1 milliGRAM(s) IntraMuscular once PRN Glucose LESS THAN 70 milligrams/deciliter    Vital Signs Last 24 Hrs  T(C): 36.3 (25 Sep 2020 13:13), Max: 37.1 (24 Sep 2020 13:52)  T(F): 97.3 (25 Sep 2020 13:13), Max: 98.8 (24 Sep 2020 13:52)  HR: 79 (25 Sep 2020 13:13) (72 - 80)  BP: 143/83 (25 Sep 2020 13:13) (111/68 - 143/89)  BP(mean): --  RR: 18 (25 Sep 2020 13:13) (18 - 18)  SpO2: 100% (25 Sep 2020 13:13) (100% - 100%)    I&O's Summary    24 Sep 2020 07:01  -  25 Sep 2020 07:00  --------------------------------------------------------  IN: 1625 mL / OUT: 450 mL / NET: 1175 mL    25 Sep 2020 07:01  -  25 Sep 2020 13:41  --------------------------------------------------------  IN: 120 mL / OUT: 0 mL / NET: 120 mL          Physical Exam:   GENERAL: NAD, well-groomed, well-developed  HEENT: DON/   Atraumatic, Normocephalic  ENMT: No tonsillar erythema, exudates, or enlargement; Moist mucous membranes, Good dentition, No lesions  NECK: Supple, No JVD, Normal thyroid  CHEST/LUNG: Clear to auscultaion  CVS: Regular rate and rhythm; No murmurs, rubs, or gallops  GI: : Soft, Nontender, Nondistended; Bowel sounds present  NERVOUS SYSTEM:  Alert & awake  EXTREMITIES:  Mild edema  LYMPH: No lymphadenopathy noted  SKIN: No rashes or lesions  ENDOCRINOLOGY: No Thyromegaly  PSYCH: Calm+    Labs:  ABG - ( 24 Sep 2020 13:20 )  pH, Arterial: 7.49  pH, Blood: x     /  pCO2: 72    /  pO2: 139   / HCO3: 54    / Base Excess: 25.3  /  SaO2: 100             47, 47, 45                            13.8   8.31  )-----------( 170      ( 24 Sep 2020 11:55 )             43.9                         13.0   8.76  )-----------( 169      ( 23 Sep 2020 06:30 )             41.1                         13.1   9.80  )-----------( 175      ( 22 Sep 2020 06:58 )             40.5     09-25    144  |  94<L>  |  32<H>  ----------------------------<  282<H>  4.4   |  >45<HH>  |  0.94  09-24    146<H>  |  84<L>  |  37<H>  ----------------------------<  317<H>  3.3<L>   |  >45<HH>  |  1.12  09-23    147<H>  |  89<L>  |  30<H>  ----------------------------<  300<H>  3.8   |  45<HH>  |  1.08  09-22    147<H>  |  91<L>  |  22  ----------------------------<  117<H>  2.4<LL>   |  44<H>  |  0.98  09-21    145  |  88<L>  |  25<H>  ----------------------------<  297<H>  2.7<LL>   |  43<H>  |  1.09    Ca    9.1      25 Sep 2020 10:37  Ca    9.7      24 Sep 2020 11:55  Mg     2.0     09-25  Mg     1.7     09-24    TPro  6.0  /  Alb  3.1<L>  /  TBili  0.3  /  DBili  x   /  AST  26  /  ALT  30  /  AlkPhos  89  09-22    CAPILLARY BLOOD GLUCOSE      POCT Blood Glucose.: 179 mg/dL (25 Sep 2020 13:03)  POCT Blood Glucose.: 222 mg/dL (25 Sep 2020 09:03)  POCT Blood Glucose.: 172 mg/dL (24 Sep 2020 21:41)  POCT Blood Glucose.: 132 mg/dL (24 Sep 2020 17:19)    < from: Xray Chest 1 View AP/PA (09.21.20 @ 14:26) >  EXAM:  XR CHEST AP OR PA 1V                            PROCEDURE DATE:  09/21/2020            INTERPRETATION:  CLINICAL INFORMATION: Shortness of breath.    A frontal view of the chest was obtained.    Comparison: 7/15/2020.    IMPRESSION:    The mediastinal cardiac silhouette is unremarkable.    Bibasilar atelectasis.    Severe thoracolumbar scoliosis.    < end of copied text >                RECENT CULTURES:  09-22 @ 00:42 .Blood Blood                No growth to date.    09-21 @ 20:34 .Blood Blood   PCR    Growth in aerobic bottle: Gram Positive Cocci in Clusters  Growth in anaerobic bottle: Gram Positive Cocci in Clusters and Gram  Positive Cocci in Pairs and Chains    Blood Culture PCR  Blood Culture PCR  Blood Culture PCR     Growth in aerobic and anaerobic bottles: Staphylococcus cohnii Coag  Negative Staphylococcus  Single set isolate, possible contaminant. Contact  Microbiology if susceptibility testing clinically  indicated.  Growth in aerobic and anaerobic bottles:Streptococcus mitis/oralis group  Alpha hemolytic strep  (not Strep. pneumoniae or Enterococcus)  Single set isolate, possible contaminant. Contact  Microbiology if susceptibility testing clinically  indicated.  "Due to technical problems, Proteus sp. will Not be reported as part of  the BCID panel until further notice"  ***Blood Panel PCR results on this specimen are available  approximately 3 hours after the Gram stain result.***  Gram stain, PCR, and/or culture results may not always  correspond due to difference in methodologies.  ************************************************************  This PCR assay was performed using [employer].  The following targets are tested for: Enterococcus,  vancomycin resistant enterococci, Listeria monocytogenes,  coagulase negative staphylococci, S. aureus,  methicillin resistant S. aureus, Streptococcus agalactiae  (Group B), S. pneumoniae, S. pyogenes (Group A),  Acinetobacter baumannii, Enterobacter cloacae, E. coli,  Klebsiella oxytoca, K. pneumoniae, Proteus sp.,  Serratia marcescens, Haemophilus influenzae,  Neisseria meningitidis, Pseudomonas aeruginosa, Candida  albicans, C. glabrata, C krusei, C parapsilosis,  C. tropicalis and the KPC resistance gene.    09-21 @ 17:24 .Urine Clean Catch (Midstream)                >=3 organisms. Probable collection contamination.          RESPIRATORY CULTURES:          Studies  Chest X-RAY  CT SCAN Chest   Venous Dopplers: LE:   CT Abdomen  Others

## 2020-09-25 NOTE — PROGRESS NOTE ADULT - SUBJECTIVE AND OBJECTIVE BOX
CARDIOLOGY FOLLOW UP - Dr. Mukherjee    CC confused  no acute complaints   on supplemental O2       PHYSICAL EXAM:  T(C): 36.3 (09-25-20 @ 13:13), Max: 36.9 (09-24-20 @ 20:01)  HR: 79 (09-25-20 @ 13:13) (72 - 80)  BP: 143/83 (09-25-20 @ 13:13) (111/68 - 143/83)  RR: 18 (09-25-20 @ 13:13) (18 - 18)  SpO2: 100% (09-25-20 @ 13:13) (100% - 100%)  Wt(kg): --  I&O's Summary    24 Sep 2020 07:01  -  25 Sep 2020 07:00  --------------------------------------------------------  IN: 1625 mL / OUT: 450 mL / NET: 1175 mL    25 Sep 2020 07:01  -  25 Sep 2020 14:04  --------------------------------------------------------  IN: 120 mL / OUT: 0 mL / NET: 120 mL        Appearance: Normal	  Cardiovascular: Normal S1 S2,RRR  Respiratory: Lungs clear to auscultation	  Gastrointestinal:  Soft, Non-tender, + BS	  Extremities: Normal range of motion, No clubbing, cyanosis or edema        MEDICATIONS  (STANDING):  acetaZOLAMIDE  IVPB 250 milliGRAM(s) IV Intermittent once  ALBUTerol    90 MICROgram(s) HFA Inhaler 1 Puff(s) Inhalation every 4 hours  aspirin enteric coated 81 milliGRAM(s) Oral daily  atorvastatin 80 milliGRAM(s) Oral at bedtime  budesonide 160 MICROgram(s)/formoterol 4.5 MICROgram(s) Inhaler 2 Puff(s) Inhalation two times a day  dextrose 5%. 1000 milliLiter(s) (50 mL/Hr) IV Continuous <Continuous>  dextrose 50% Injectable 12.5 Gram(s) IV Push once  dextrose 50% Injectable 25 Gram(s) IV Push once  dextrose 50% Injectable 25 Gram(s) IV Push once  enoxaparin Injectable 40 milliGRAM(s) SubCutaneous daily  gabapentin 100 milliGRAM(s) Oral three times a day  influenza   Vaccine 0.5 milliLiter(s) IntraMuscular once  insulin glargine Injectable (LANTUS) 22 Unit(s) SubCutaneous at bedtime  insulin lispro (HumaLOG) corrective regimen sliding scale   SubCutaneous three times a day before meals  insulin lispro (HumaLOG) corrective regimen sliding scale   SubCutaneous at bedtime  insulin lispro Injectable (HumaLOG) 8 Unit(s) SubCutaneous three times a day before meals  magnesium sulfate  IVPB 1 Gram(s) IV Intermittent daily  potassium chloride   Solution 40 milliEquivalent(s) Oral every 2 hours  senna 2 Tablet(s) Oral at bedtime      TELEMETRY: OFF TELE     ECG:  	  RADIOLOGY:   DIAGNOSTIC TESTING:  [ ] Echocardiogram:   [ ]  Catheterization:  [ ] Stress Test:    OTHER: 	    LABS:	 	                                13.8   8.31  )-----------( 170      ( 24 Sep 2020 11:55 )             43.9     09-25    144  |  94<L>  |  32<H>  ----------------------------<  282<H>  4.4   |  >45<HH>  |  0.94    Ca    9.1      25 Sep 2020 10:37  Mg     2.0     09-25

## 2020-09-26 NOTE — CONSULT NOTE ADULT - REASON FOR ADMISSION
sob, hyperglycemia

## 2020-09-26 NOTE — PROGRESS NOTE ADULT - ASSESSMENT
Assessment  DMT2: 80y Female with DM T2 with hyperglycemia, A1C 8.1%, was on oral meds and insulin at home, now on basal bolus insulin, blood sugars trending down no hypoglycemic episodes, eating meals.  UTI: on IV ABx, stable, monitored.          Alexander Shaver MD  Cell: [phone]  Office: [phone]

## 2020-09-26 NOTE — CONSULT NOTE ADULT - CONSULT REQUESTED DATE/TIME
22-Sep-2020 10:26
22-Sep-2020 13:14
22-Sep-2020 15:34
24-Sep-2020
26-Sep-2020 20:55
22-Sep-2020 14:14

## 2020-09-26 NOTE — CONSULT NOTE ADULT - ASSESSMENT
80 year old F with pmhx of stroke w/ residual R hemiparesis on ASA 81, DMT2 (on Metformin and insulin), COPD, CHF, HLD, HTN, kidney stones and pshx of R nephrectomy, L knee surgery presented to ED with elevated blood sugar, SOB, chest pain and lethargy, found to have a UTI. 80 year old F with pmhx of stroke w/ residual R hemiparesis on ASA 81, DMT2 (on Metformin and insulin), COPD, CHF, HLD, HTN, kidney stones and pshx of R nephrectomy, L knee surgery presented to ED with elevated blood sugar, SOB, chest pain and lethargy, found to have a UTI and treated with ABX. Neurology consulted for persistent confusion. Pt with hypercarbia and reports SOB.    Impression: Likely toxic metabolic encephalopathy with an unclear mental baseline. Pt with persistent hypercarbia (nml values in 7/2020). Elevated PCO2 to 72 on ABG. Slurred speech likely to being edentulous.     [] suggest trial of correction of hypercarbia but would defer final reccs to pulm.   [] check ammonia, B12, TSH  [] please clarify and document pt's mental, functional and neurological baseline with family if not done already.    - would verify hx of stroke with family, but pt and chart reflect she has had stroke with residual R hemiparesis. Would reflect that in [place] PMHx and our documentation.  [] can consider MRI Brain w/o if confusion does not improve with correction of hypercarbia, although suspect would be low yield  [] c/w ASA 81 for secondary stroke prevention  [] PT/OT f/u  [] c/w supportive and medical care    f/u neuro attending attestation in 9/27 AM

## 2020-09-26 NOTE — CONSULT NOTE ADULT - SUBJECTIVE AND OBJECTIVE BOX
*************************************  NEUROLOGY CONSULT  SERVICE  **************************************    RONALD VINCENT  Female  MRN-[id number]    HPI:  80 year old F with pmhx of stroke w/ residual R hemiparesis on ASA 81, DMT2 (on Metformin and insulin), COPD, CHF, HLD, HTN, kidney stones and pshx of R nephrectomy, L knee surgery presented to ED with elevated blood sugar, SOB, chest pain and lethargy, found to have a UTI. Per EMS, pt with 485 blood sugar, BP of 144/75, and O2 sat 92% on room air.  Daughter notes period of incoherent speech over the weekend. Pt treated with IV ABx for UTI, Neurology consulted for persistent confusion despite completion of ABx. NIHSS = 7, MRS =?3       PAST MEDICAL & SURGICAL HISTORY:  COPD, moderate  COPD (chronic obstructive pulmonary disease)  CHF (congestive heart failure)  HLD (hyperlipidemia)  HTN (hypertension)  Vitamin D deficiency  Dyslipidemia  Constipation  Kidney stones  Type 2 diabetes mellitus  GERD (gastroesophageal reflux disease)  COPD (chronic obstructive pulmonary disease)  HTN (hypertension)  CHF (congestive heart failure)  ~ diastolic, Stage I  No significant past surgical history  H/O breast biopsy  ~ 3 times on right, 2 times on left  H/O right nephrectomy  ~ [facility]  History of left knee replacement  ~ x 3 (2010, 2011, 2016)      MEDICATIONS  (STANDING):  ALBUTerol    90 MICROgram(s) HFA Inhaler 1 Puff(s) Inhalation every 4 hours  aspirin enteric coated 81 milliGRAM(s) Oral daily  atorvastatin 80 milliGRAM(s) Oral at bedtime  budesonide 160 MICROgram(s)/formoterol 4.5 MICROgram(s) Inhaler 2 Puff(s) Inhalation two times a day  dextrose 5%. 1000 milliLiter(s) (50 mL/Hr) IV Continuous <Continuous>  dextrose 50% Injectable 12.5 Gram(s) IV Push once  dextrose 50% Injectable 25 Gram(s) IV Push once  dextrose 50% Injectable 25 Gram(s) IV Push once  enoxaparin Injectable 40 milliGRAM(s) SubCutaneous daily  gabapentin 100 milliGRAM(s) Oral three times a day  influenza   Vaccine 0.5 milliLiter(s) IntraMuscular once  insulin glargine Injectable (LANTUS) 22 Unit(s) SubCutaneous at bedtime  insulin lispro (HumaLOG) corrective regimen sliding scale   SubCutaneous at bedtime  insulin lispro (HumaLOG) corrective regimen sliding scale   SubCutaneous three times a day before meals  insulin lispro Injectable (HumaLOG) 8 Unit(s) SubCutaneous three times a day before meals  magnesium sulfate  IVPB 1 Gram(s) IV Intermittent daily  polyethylene glycol 3350 17 Gram(s) Oral daily  potassium chloride   Solution 40 milliEquivalent(s) Oral every 2 hours  senna 2 Tablet(s) Oral at bedtime    MEDICATIONS  (PRN):  acetaminophen   Tablet .. 650 milliGRAM(s) Oral every 6 hours PRN Moderate Pain (4 - 6)  dextrose 40% Gel 15 Gram(s) Oral once PRN Blood Glucose LESS THAN 70 milliGRAM(s)/deciliter  glucagon  Injectable 1 milliGRAM(s) IntraMuscular once PRN Glucose LESS THAN 70 milligrams/deciliter  haloperidol     Tablet 1 milliGRAM(s) Oral every 6 hours PRN agitation    Allergies  No Known Allergies  Intolerances    VITAL SIGNS:  Vital Signs Last 24 Hrs  T(C): 36.6 (26 Sep 2020 21:02), Max: 36.9 (26 Sep 2020 12:25)  T(F): 97.9 (26 Sep 2020 21:02), Max: 98.4 (26 Sep 2020 12:25)  HR: 70 (26 Sep 2020 21:02) (66 - 70)  BP: 134/81 (26 Sep 2020 21:02) (121/78 - 134/81)  BP(mean): --  RR: 18 (26 Sep 2020 21:02) (18 - 19)  SpO2: 100% (26 Sep 2020 21:02) (95% - 100%)    PHYSICAL EXAMINATION:  General: elderly, frail, irritable, not very cooperative with exam; edentulous  awake, alert, attentive to examiner, oriented to person and said hospital, able to follow 1-2 commands with some prompting, lifting limbs, lifted whole hand when asked to  show 2 fingers; when showed a pen, said "thing you write with," impaired repetition, somewhat names with choices only. Speech with moderate lingual dysarthria. Kept saying I am very sick, why are you bothering me.  BTT present, EOMI, pupils symmetric reactive sluggish, face symmetric  Antigravity in LUE and both LEs. Able to lift RLE up for 5 seconds (did not comply with manual testing or with coordination).  Unable to complete rest of exam as refused to cooperate.    LABS:      09-26    141  |  93<L>  |  28<H>  ----------------------------<  186<H>  3.8   |  39<H>  |  0.78    Ca    9.3      26 Sep 2020 06:18  Mg     2.1     09-26      RADIOLOGY & ADDITIONAL STUDIES:    < from: CT Head No Cont (09.21.20 @ 15:23) >  reconstructions were performed as well    This exam is compared with prior noncontrast head CT performed on July 16, 2020.    Periventricular matter lucency is again seen which is likely related to chronic microvascular ischemic changes    There is no evidence acute hemorrhage mass or mass effect seen.    Evaluation of the osseous structures with the appears normal    The visualized paranasal sinuses mastoid and middle ear inspected clear.    IMPRESSION: No acute hemorrhage, mass or masseffect.    < end of copied text >           *************************************  NEUROLOGY CONSULT  SERVICE  **************************************    RONALD VINCENT  Female  MRN-[id number]    HPI:  80 year old F with pmhx of stroke w/ residual R hemiparesis on ASA 81, DMT2 (on Metformin and insulin), COPD, CHF, HLD, HTN, kidney stones and pshx of R nephrectomy, L knee surgery presented to ED with elevated blood sugar, SOB, chest pain and lethargy, found to have a UTI. Per EMS, pt with 485 blood sugar, BP of 144/75, and O2 sat 92% on room air.  Daughter notes period of incoherent speech over the weekend. Pt treated with IV ABx for UTI, Neurology consulted for persistent confusion despite completion of ABx. PT with persistent hypercarbia. NIHSS = 7, MRS =?3       PAST MEDICAL & SURGICAL HISTORY:  COPD, moderate  COPD (chronic obstructive pulmonary disease)  CHF (congestive heart failure)  HLD (hyperlipidemia)  HTN (hypertension)  Vitamin D deficiency  Dyslipidemia  Constipation  Kidney stones  Type 2 diabetes mellitus  GERD (gastroesophageal reflux disease)  COPD (chronic obstructive pulmonary disease)  HTN (hypertension)  CHF (congestive heart failure)  ~ diastolic, Stage I  No significant past surgical history  H/O breast biopsy  ~ 3 times on right, 2 times on left  H/O right nephrectomy  ~ [facility]  History of left knee replacement  ~ x 3 (2010, 2011, 2016)      MEDICATIONS  (STANDING):  ALBUTerol    90 MICROgram(s) HFA Inhaler 1 Puff(s) Inhalation every 4 hours  aspirin enteric coated 81 milliGRAM(s) Oral daily  atorvastatin 80 milliGRAM(s) Oral at bedtime  budesonide 160 MICROgram(s)/formoterol 4.5 MICROgram(s) Inhaler 2 Puff(s) Inhalation two times a day  dextrose 5%. 1000 milliLiter(s) (50 mL/Hr) IV Continuous <Continuous>  dextrose 50% Injectable 12.5 Gram(s) IV Push once  dextrose 50% Injectable 25 Gram(s) IV Push once  dextrose 50% Injectable 25 Gram(s) IV Push once  enoxaparin Injectable 40 milliGRAM(s) SubCutaneous daily  gabapentin 100 milliGRAM(s) Oral three times a day  influenza   Vaccine 0.5 milliLiter(s) IntraMuscular once  insulin glargine Injectable (LANTUS) 22 Unit(s) SubCutaneous at bedtime  insulin lispro (HumaLOG) corrective regimen sliding scale   SubCutaneous at bedtime  insulin lispro (HumaLOG) corrective regimen sliding scale   SubCutaneous three times a day before meals  insulin lispro Injectable (HumaLOG) 8 Unit(s) SubCutaneous three times a day before meals  magnesium sulfate  IVPB 1 Gram(s) IV Intermittent daily  polyethylene glycol 3350 17 Gram(s) Oral daily  potassium chloride   Solution 40 milliEquivalent(s) Oral every 2 hours  senna 2 Tablet(s) Oral at bedtime    MEDICATIONS  (PRN):  acetaminophen   Tablet .. 650 milliGRAM(s) Oral every 6 hours PRN Moderate Pain (4 - 6)  dextrose 40% Gel 15 Gram(s) Oral once PRN Blood Glucose LESS THAN 70 milliGRAM(s)/deciliter  glucagon  Injectable 1 milliGRAM(s) IntraMuscular once PRN Glucose LESS THAN 70 milligrams/deciliter  haloperidol     Tablet 1 milliGRAM(s) Oral every 6 hours PRN agitation    Allergies  No Known Allergies  Intolerances    VITAL SIGNS:  Vital Signs Last 24 Hrs  T(C): 36.6 (26 Sep 2020 21:02), Max: 36.9 (26 Sep 2020 12:25)  T(F): 97.9 (26 Sep 2020 21:02), Max: 98.4 (26 Sep 2020 12:25)  HR: 70 (26 Sep 2020 21:02) (66 - 70)  BP: 134/81 (26 Sep 2020 21:02) (121/78 - 134/81)  BP(mean): --  RR: 18 (26 Sep 2020 21:02) (18 - 19)  SpO2: 100% (26 Sep 2020 21:02) (95% - 100%)    PHYSICAL EXAMINATION:  General: elderly, frail, irritable, not very cooperative with exam; edentulous  awake, alert, attentive to examiner, oriented to person and said hospital, able to follow 1-2 commands with some prompting, lifting limbs, lifted whole hand when asked to  show 2 fingers; when showed a pen, said "thing you write with," impaired repetition, somewhat names with choices only. Speech with moderate lingual dysarthria. Kept saying I am very sick, why are you bothering me.  BTT present, EOMI, pupils symmetric reactive sluggish, face symmetric  Antigravity in LUE and both LEs. Able to lift RLE up for 5 seconds (did not comply with manual testing or with coordination).  Unable to complete rest of exam as refused to cooperate.    LABS:      09-26    141  |  93<L>  |  28<H>  ----------------------------<  186<H>  3.8   |  39<H>  |  0.78    Ca    9.3      26 Sep 2020 06:18  Mg     2.1     09-26      RADIOLOGY & ADDITIONAL STUDIES:    < from: CT Head No Cont (09.21.20 @ 15:23) >  reconstructions were performed as well    This exam is compared with prior noncontrast head CT performed on July 16, 2020.    Periventricular matter lucency is again seen which is likely related to chronic microvascular ischemic changes    There is no evidence acute hemorrhage mass or mass effect seen.    Evaluation of the osseous structures with the appears normal    The visualized paranasal sinuses mastoid and middle ear inspected clear.    IMPRESSION: No acute hemorrhage, mass or masseffect.    < end of copied text >           *************************************  NEUROLOGY CONSULT  SERVICE  **************************************    RONALD VINCENT  Female  MRN-[id number]    HPI:  80 year old F with pmhx of stroke w/ residual R hemiparesis on ASA 81, DMT2 (on Metformin and insulin), COPD, CHF, HLD, HTN, kidney stones and pshx of R nephrectomy, L knee surgery presented to ED with elevated blood sugar, SOB, chest pain and lethargy, found to have a UTI. Per EMS, pt with 485 blood sugar, BP of 144/75, and O2 sat 92% on room air.  Daughter notes period of incoherent speech over the weekend. Pt treated with IV ABx for UTI, Neurology consulted for persistent confusion despite completion of ABx. PT with persistent hypercarbia. NIHSS = 7, MRS =?3       PAST MEDICAL & SURGICAL HISTORY:  COPD, moderate  COPD (chronic obstructive pulmonary disease)  CHF (congestive heart failure)  HLD (hyperlipidemia)  HTN (hypertension)  Vitamin D deficiency  Dyslipidemia  Constipation  Kidney stones  Type 2 diabetes mellitus  GERD (gastroesophageal reflux disease)  COPD (chronic obstructive pulmonary disease)  HTN (hypertension)  CHF (congestive heart failure)  ~ diastolic, Stage I  No significant past surgical history  H/O breast biopsy  ~ 3 times on right, 2 times on left  H/O right nephrectomy  ~ [facility]  History of left knee replacement  ~ x 3 (2010, 2011, 2016)      MEDICATIONS  (STANDING):  ALBUTerol    90 MICROgram(s) HFA Inhaler 1 Puff(s) Inhalation every 4 hours  aspirin enteric coated 81 milliGRAM(s) Oral daily  atorvastatin 80 milliGRAM(s) Oral at bedtime  budesonide 160 MICROgram(s)/formoterol 4.5 MICROgram(s) Inhaler 2 Puff(s) Inhalation two times a day  dextrose 5%. 1000 milliLiter(s) (50 mL/Hr) IV Continuous <Continuous>  dextrose 50% Injectable 12.5 Gram(s) IV Push once  dextrose 50% Injectable 25 Gram(s) IV Push once  dextrose 50% Injectable 25 Gram(s) IV Push once  enoxaparin Injectable 40 milliGRAM(s) SubCutaneous daily  gabapentin 100 milliGRAM(s) Oral three times a day  influenza   Vaccine 0.5 milliLiter(s) IntraMuscular once  insulin glargine Injectable (LANTUS) 22 Unit(s) SubCutaneous at bedtime  insulin lispro (HumaLOG) corrective regimen sliding scale   SubCutaneous at bedtime  insulin lispro (HumaLOG) corrective regimen sliding scale   SubCutaneous three times a day before meals  insulin lispro Injectable (HumaLOG) 8 Unit(s) SubCutaneous three times a day before meals  magnesium sulfate  IVPB 1 Gram(s) IV Intermittent daily  polyethylene glycol 3350 17 Gram(s) Oral daily  potassium chloride   Solution 40 milliEquivalent(s) Oral every 2 hours  senna 2 Tablet(s) Oral at bedtime    MEDICATIONS  (PRN):  acetaminophen   Tablet .. 650 milliGRAM(s) Oral every 6 hours PRN Moderate Pain (4 - 6)  dextrose 40% Gel 15 Gram(s) Oral once PRN Blood Glucose LESS THAN 70 milliGRAM(s)/deciliter  glucagon  Injectable 1 milliGRAM(s) IntraMuscular once PRN Glucose LESS THAN 70 milligrams/deciliter  haloperidol     Tablet 1 milliGRAM(s) Oral every 6 hours PRN agitation    Allergies  No Known Allergies  Intolerances    VITAL SIGNS:  Vital Signs Last 24 Hrs  T(C): 36.6 (26 Sep 2020 21:02), Max: 36.9 (26 Sep 2020 12:25)  T(F): 97.9 (26 Sep 2020 21:02), Max: 98.4 (26 Sep 2020 12:25)  HR: 70 (26 Sep 2020 21:02) (66 - 70)  BP: 134/81 (26 Sep 2020 21:02) (121/78 - 134/81)  BP(mean): --  RR: 18 (26 Sep 2020 21:02) (18 - 19)  SpO2: 100% (26 Sep 2020 21:02) (95% - 100%)    PHYSICAL EXAMINATION:  General: elderly, frail, irritable, not very cooperative with exam; edentulous  awake, alert, attentive to examiner, oriented to person and said hospital, able to follow 1-2 commands with some prompting, lifting limbs, lifted whole hand when asked to  show 2 fingers; when showed a pen, said "thing you write with," impaired repetition, somewhat names with choices only. Speech with moderate lingual dysarthria. Kept saying I am very sick, why are you bothering me.  BTT present, EOMI, pupils symmetric reactive sluggish, face symmetric  Antigravity in LUE and both LEs. Able to lift RLE up for 5 seconds (did not comply with manual testing or with coordination).  Unable to complete rest of exam as refused to cooperate.    LABS:      09-26    141  |  93<L>  |  28<H>  ----------------------------<  186<H>  3.8   |  39<H>  |  0.78    Ca    9.3      26 Sep 2020 06:18  Mg     2.1     09-26    bBlood Gas Profile - Arterial (09.24.20 @ 13:20)   pH, Arterial: 7.49   pCO2, Arterial: 72 mmHg   pO2, Arterial: 139 mmHg   HCO3, Arterial: 54 mmol/L   Base Excess, Arterial: 25.3 mmol/L   Oxygen Saturation, Arterial: 100 %   Total CO2, Arterial: 57 mmoL/L       RADIOLOGY & ADDITIONAL STUDIES:    < from: CT Head No Cont (09.21.20 @ 15:23) >  reconstructions were performed as well    This exam is compared with prior noncontrast head CT performed on July 16, 2020.    Periventricular matter lucency is again seen which is likely related to chronic microvascular ischemic changes    There is no evidence acute hemorrhage mass or mass effect seen.    Evaluation of the osseous structures with the appears normal    The visualized paranasal sinuses mastoid and middle ear inspected clear.    IMPRESSION: No acute hemorrhage, mass or masseffect.    < end of copied text >

## 2020-09-26 NOTE — PROGRESS NOTE ADULT - SUBJECTIVE AND OBJECTIVE BOX
NEPHROLOGY     MEDICATIONS  (STANDING):  ALBUTerol    90 MICROgram(s) HFA Inhaler 1 Puff(s) Inhalation every 4 hours  aspirin enteric coated 81 milliGRAM(s) Oral daily  atorvastatin 80 milliGRAM(s) Oral at bedtime  budesonide 160 MICROgram(s)/formoterol 4.5 MICROgram(s) Inhaler 2 Puff(s) Inhalation two times a day  dextrose 5%. 1000 milliLiter(s) (50 mL/Hr) IV Continuous <Continuous>  dextrose 50% Injectable 12.5 Gram(s) IV Push once  dextrose 50% Injectable 25 Gram(s) IV Push once  dextrose 50% Injectable 25 Gram(s) IV Push once  enoxaparin Injectable 40 milliGRAM(s) SubCutaneous daily  gabapentin 100 milliGRAM(s) Oral three times a day  influenza   Vaccine 0.5 milliLiter(s) IntraMuscular once  insulin glargine Injectable (LANTUS) 22 Unit(s) SubCutaneous at bedtime  insulin lispro (HumaLOG) corrective regimen sliding scale   SubCutaneous three times a day before meals  insulin lispro (HumaLOG) corrective regimen sliding scale   SubCutaneous at bedtime  insulin lispro Injectable (HumaLOG) 8 Unit(s) SubCutaneous three times a day before meals  magnesium sulfate  IVPB 1 Gram(s) IV Intermittent daily  polyethylene glycol 3350 17 Gram(s) Oral daily  potassium chloride    Tablet ER 20 milliEquivalent(s) Oral once  potassium chloride   Solution 40 milliEquivalent(s) Oral every 2 hours  senna 2 Tablet(s) Oral at bedtime    VITALS:  T(C): , Max: 36.5 (09-25-20 @ 20:05)  T(F): , Max: 97.7 (09-25-20 @ 20:05)  HR: 66 (09-26-20 @ 04:04)  BP: 121/78 (09-26-20 @ 04:04)  RR: 18 (09-26-20 @ 04:04)  SpO2: 95% (09-26-20 @ 04:04)    I and O's:    09-25 @ 07:01  -  09-26 @ 07:00  --------------------------------------------------------  IN: 480 mL / OUT: 950 mL / NET: -470 mL    PHYSICAL EXAM:  Constitutional: NAD  Neck:  No JVD  Respiratory: CTAB/L  Cardiovascular: S1 and S2  Gastrointestinal: BS+, soft, NT/ND  Extremities: No peripheral edema  Neurological: A/O x 3, no focal deficits  Psychiatric: Normal mood, normal affect  : No Pearce  Skin: No rashes  Access: Not applicable      LABS:                        13.8   8.31  )-----------( 170      ( 24 Sep 2020 11:55 )             43.9     09-26    141  |  93<L>  |  28<H>  ----------------------------<  186<H>  3.8   |  39<H>  |  0.78    Ca    9.3      26 Sep 2020 06:18  Mg     2.1     09-26   NEPHROLOGY     Pt seen and examined in bed having breakfast. Pt without complaints of cp or sob, on 2L NC, in no acute distress. No overnight events noted.     MEDICATIONS  (STANDING):  ALBUTerol    90 MICROgram(s) HFA Inhaler 1 Puff(s) Inhalation every 4 hours  aspirin enteric coated 81 milliGRAM(s) Oral daily  atorvastatin 80 milliGRAM(s) Oral at bedtime  budesonide 160 MICROgram(s)/formoterol 4.5 MICROgram(s) Inhaler 2 Puff(s) Inhalation two times a day  dextrose 5%. 1000 milliLiter(s) (50 mL/Hr) IV Continuous <Continuous>  dextrose 50% Injectable 12.5 Gram(s) IV Push once  dextrose 50% Injectable 25 Gram(s) IV Push once  dextrose 50% Injectable 25 Gram(s) IV Push once  enoxaparin Injectable 40 milliGRAM(s) SubCutaneous daily  gabapentin 100 milliGRAM(s) Oral three times a day  influenza   Vaccine 0.5 milliLiter(s) IntraMuscular once  insulin glargine Injectable (LANTUS) 22 Unit(s) SubCutaneous at bedtime  insulin lispro (HumaLOG) corrective regimen sliding scale   SubCutaneous three times a day before meals  insulin lispro (HumaLOG) corrective regimen sliding scale   SubCutaneous at bedtime  insulin lispro Injectable (HumaLOG) 8 Unit(s) SubCutaneous three times a day before meals  magnesium sulfate  IVPB 1 Gram(s) IV Intermittent daily  polyethylene glycol 3350 17 Gram(s) Oral daily  potassium chloride    Tablet ER 20 milliEquivalent(s) Oral once  potassium chloride   Solution 40 milliEquivalent(s) Oral every 2 hours  senna 2 Tablet(s) Oral at bedtime    VITALS:  T(C): , Max: 36.5 (09-25-20 @ 20:05)  T(F): , Max: 97.7 (09-25-20 @ 20:05)  HR: 66 (09-26-20 @ 04:04)  BP: 121/78 (09-26-20 @ 04:04)  RR: 18 (09-26-20 @ 04:04)  SpO2: 95% (09-26-20 @ 04:04)    I and O's:    09-25 @ 07:01  -  09-26 @ 07:00  --------------------------------------------------------  IN: 480 mL / OUT: 950 mL / NET: -470 mL    PHYSICAL EXAM:  Constitutional: NAD, confused   Neck:  No JVD  Respiratory: CTAB/L  Cardiovascular: S1 and S2  Gastrointestinal: BS+, soft, NT/ND  Extremities: No peripheral edema  Neurological: A/O x 3, no focal deficits  Psychiatric: Normal mood, normal affect  : No Pearce  Skin: No rashes  Access: Not applicable    LABS:                        13.8   8.31  )-----------( 170      ( 24 Sep 2020 11:55 )             43.9     09-26    141  |  93<L>  |  28<H>  ----------------------------<  186<H>  3.8   |  39<H>  |  0.78    Ca    9.3      26 Sep 2020 06:18  Mg     2.1     09-26

## 2020-09-26 NOTE — PROGRESS NOTE ADULT - ASSESSMENT
80 year old F with pmhx of  DMT2 (on Metformin and insulin), COPD, CHF, HLD, HTN, kidney stones and pshx of R nephrectomy, L knee surgery presents to ED bibems with elevated blood sugar, SOB, and lethargy found to have UTI.     1. SOB, acute/chronic diastolic chf exacerbation  -cv stable, volume status acceptable   -symptoms also 2/2 to DKA, COPD hx   -s/p lasix, s/p IVF. Co2 improving    -echo in july with nl LV sys fx, ef 55-60%, mild MR, mod pulm htn    -pulm f/u     2. Hyperglycemia  -management per primary team     3. UTI  -+BCX likely contaminant: repeat neg  -s/p abx per primary team     4. Hypokalemia, resolved   -supplementation per primary team, continue to trend.     5. COPD , hx  -c/w home meds , pulm f/u     dvt ppx

## 2020-09-26 NOTE — PROGRESS NOTE ADULT - ASSESSMENT
Problem/Plan - 1:  ·  Problem: sepsis sec to UTI (urinary tract infection) with bactremia with metabolic encephalopathy POA .  Plan: dc ertepanum  id fu appreciated     Problem/Plan - 2:  ·  Problem: Hyperglycemia ion alkalosis with contract  Plan: monitor FS  ISS.   renal fuIVF     Problem/Plan - 3:  ·  Problem: AMS   Plan: metabolic encephalopathy  psych consult  neuro consult     Problem/Plan - 4:  ·  Problem: COPD (chronic obstructive pulmonary disease).  Plan: cw home meds

## 2020-09-26 NOTE — PROGRESS NOTE ADULT - SUBJECTIVE AND OBJECTIVE BOX
Patient is a 80y old  Female who presents with a chief complaint of sob, hyperglycemia (26 Sep 2020 10:07)      Any change in ROS: She is basically same:  no SOB     MEDICATIONS  (STANDING):  ALBUTerol    90 MICROgram(s) HFA Inhaler 1 Puff(s) Inhalation every 4 hours  aspirin enteric coated 81 milliGRAM(s) Oral daily  atorvastatin 80 milliGRAM(s) Oral at bedtime  budesonide 160 MICROgram(s)/formoterol 4.5 MICROgram(s) Inhaler 2 Puff(s) Inhalation two times a day  dextrose 5%. 1000 milliLiter(s) (50 mL/Hr) IV Continuous <Continuous>  dextrose 50% Injectable 12.5 Gram(s) IV Push once  dextrose 50% Injectable 25 Gram(s) IV Push once  dextrose 50% Injectable 25 Gram(s) IV Push once  enoxaparin Injectable 40 milliGRAM(s) SubCutaneous daily  gabapentin 100 milliGRAM(s) Oral three times a day  influenza   Vaccine 0.5 milliLiter(s) IntraMuscular once  insulin glargine Injectable (LANTUS) 22 Unit(s) SubCutaneous at bedtime  insulin lispro (HumaLOG) corrective regimen sliding scale   SubCutaneous three times a day before meals  insulin lispro (HumaLOG) corrective regimen sliding scale   SubCutaneous at bedtime  insulin lispro Injectable (HumaLOG) 8 Unit(s) SubCutaneous three times a day before meals  magnesium sulfate  IVPB 1 Gram(s) IV Intermittent daily  polyethylene glycol 3350 17 Gram(s) Oral daily  potassium chloride   Solution 40 milliEquivalent(s) Oral every 2 hours  senna 2 Tablet(s) Oral at bedtime    MEDICATIONS  (PRN):  acetaminophen   Tablet .. 650 milliGRAM(s) Oral every 6 hours PRN Moderate Pain (4 - 6)  dextrose 40% Gel 15 Gram(s) Oral once PRN Blood Glucose LESS THAN 70 milliGRAM(s)/deciliter  glucagon  Injectable 1 milliGRAM(s) IntraMuscular once PRN Glucose LESS THAN 70 milligrams/deciliter    Vital Signs Last 24 Hrs  T(C): 36.9 (26 Sep 2020 12:25), Max: 36.9 (26 Sep 2020 12:25)  T(F): 98.4 (26 Sep 2020 12:25), Max: 98.4 (26 Sep 2020 12:25)  HR: 67 (26 Sep 2020 12:25) (66 - 81)  BP: 128/66 (26 Sep 2020 12:25) (118/69 - 128/66)  BP(mean): --  RR: 19 (26 Sep 2020 12:25) (18 - 19)  SpO2: 100% (26 Sep 2020 12:25) (95% - 100%)    I&O's Summary    25 Sep 2020 07:01  -  26 Sep 2020 07:00  --------------------------------------------------------  IN: 480 mL / OUT: 950 mL / NET: -470 mL    26 Sep 2020 07:01  -  26 Sep 2020 13:23  --------------------------------------------------------  IN: 240 mL / OUT: 0 mL / NET: 240 mL          Physical Exam:   GENERAL: NAD, well-groomed, well-developed  HEENT: DON/   Atraumatic, Normocephalic  ENMT: No tonsillar erythema, exudates, or enlargement; Moist mucous membranes, Good dentition, No lesions  NECK: Supple, No JVD, Normal thyroid  CHEST/LUNG: Clear to auscultaion  CVS: Regular rate and rhythm; No murmurs, rubs, or gallops  GI: : Soft, Nontender, Nondistended; Bowel sounds present  NERVOUS SYSTEM:  Alert & Oriented X2  EXTREMITIES: - edema  LYMPH: No lymphadenopathy noted  SKIN: No rashes or lesions  ENDOCRINOLOGY: No Thyromegaly  PSYCH: Appropriate    Labs:  47, 47, 45                            13.8   8.31  )-----------( 170      ( 24 Sep 2020 11:55 )             43.9                         13.0   8.76  )-----------( 169      ( 23 Sep 2020 06:30 )             41.1     09-26    141  |  93<L>  |  28<H>  ----------------------------<  186<H>  3.8   |  39<H>  |  0.78  09-25    144  |  94<L>  |  32<H>  ----------------------------<  282<H>  4.4   |  >45<HH>  |  0.94  09-24    146<H>  |  84<L>  |  37<H>  ----------------------------<  317<H>  3.3<L>   |  >45<HH>  |  1.12  09-23    147<H>  |  89<L>  |  30<H>  ----------------------------<  300<H>  3.8   |  45<HH>  |  1.08    Ca    9.3      26 Sep 2020 06:18  Ca    9.1      25 Sep 2020 10:37  Mg     2.1     09-26  Mg     2.0     09-25      CAPILLARY BLOOD GLUCOSE      POCT Blood Glucose.: 327 mg/dL (26 Sep 2020 12:39)  POCT Blood Glucose.: 198 mg/dL (26 Sep 2020 08:42)  POCT Blood Glucose.: 120 mg/dL (25 Sep 2020 22:57)  POCT Blood Glucose.: 73 mg/dL (25 Sep 2020 21:49)  POCT Blood Glucose.: 158 mg/dL (25 Sep 2020 17:30)      r< from: Xray Chest 1 View AP/PA (09.21.20 @ 14:26) >    EXAM:  XR CHEST AP OR PA 1V                            PROCEDURE DATE:  09/21/2020            INTERPRETATION:  CLINICAL INFORMATION: Shortness of breath.    A frontal view of the chest was obtained.    Comparison: 7/15/2020.    IMPRESSION:    The mediastinal cardiac silhouette is unremarkable.    Bibasilar atelectasis.    Severe thoracolumbar scoliosis.                      JONAS MILLER M.D., ATTENDING RADIOLOGIST  This document has been electronically signed. Sep 21 2020  2:29PM    < end of copied text >              RECENT CULTURES:  09-24 @ 16:38 .Blood Blood-Peripheral                No growth to date.    09-22 @ 00:42 .Blood Blood                No growth to date.    09-21 @ 20:34 .Blood Blood   PCR    Growth in aerobic bottle: Gram Positive Cocci in Clusters  Growth in anaerobic bottle: Gram Positive Cocci in Clusters and Gram  Positive Cocci in Pairs and Chains    Blood Culture PCR  Blood Culture PCR  Blood Culture PCR     Growth in aerobic and anaerobic bottles: Staphylococcus cohnii Coag  Negative Staphylococcus  Single set isolate, possible contaminant. Contact  Microbiology if susceptibility testing clinically  indicated.  Growth in aerobic and anaerobic bottles:Streptococcus mitis/oralis group  Alpha hemolytic strep  (not Strep. pneumoniae or Enterococcus)  Single set isolate, possible contaminant. Contact  Microbiology if susceptibility testing clinically  indicated.  "Due to technical problems, Proteus sp. will Not be reported as part of  the BCID panel until further notice"  ***Blood Panel PCR results on this specimen are available  approximately 3 hours after the Gram stain result.***  Gram stain, PCR, and/or culture results may not always  correspond due to difference in methodologies.  ************************************************************  This PCR assay was performed using [employer].  The following targets are tested for: Enterococcus,  vancomycin resistant enterococci, Listeria monocytogenes,  coagulase negative staphylococci, S. aureus,  methicillin resistant S. aureus, Streptococcus agalactiae  (Group B), S. pneumoniae, S. pyogenes (Group A),  Acinetobacter baumannii, Enterobacter cloacae, E. coli,  Klebsiella oxytoca, K. pneumoniae, Proteus sp.,  Serratia marcescens, Haemophilus influenzae,  Neisseria meningitidis, Pseudomonas aeruginosa, Candida  albicans, C. glabrata, C krusei, C parapsilosis,  C. tropicalis and the KPC resistance gene.    09-21 @ 17:24 .Urine Clean Catch (Midstream)                >=3 organisms. Probable collection contamination.          RESPIRATORY CULTURES:          Studies  Chest X-RAY  CT SCAN Chest   Venous Dopplers: LE:   CT Abdomen  Others

## 2020-09-26 NOTE — PROGRESS NOTE ADULT - SUBJECTIVE AND OBJECTIVE BOX
CARDIOLOGY FOLLOW UP - Dr. Mukherjee    CC no cp or sob       PHYSICAL EXAM:  T(C): 36.4 (09-26-20 @ 04:04), Max: 36.5 (09-25-20 @ 20:05)  HR: 66 (09-26-20 @ 04:04) (66 - 81)  BP: 121/78 (09-26-20 @ 04:04) (118/69 - 143/83)  RR: 18 (09-26-20 @ 04:04) (18 - 18)  SpO2: 95% (09-26-20 @ 04:04) (95% - 100%)  Wt(kg): --  I&O's Summary    25 Sep 2020 07:01  -  26 Sep 2020 07:00  --------------------------------------------------------  IN: 480 mL / OUT: 950 mL / NET: -470 mL        Appearance: Normal	  Cardiovascular: Normal S1 S2,RRR, No JVD, No murmurs  Respiratory: Lungs clear to auscultation	  Gastrointestinal:  Soft, Non-tender, + BS	  Extremities: Normal range of motion, No clubbing, cyanosis or edema        MEDICATIONS  (STANDING):  ALBUTerol    90 MICROgram(s) HFA Inhaler 1 Puff(s) Inhalation every 4 hours  aspirin enteric coated 81 milliGRAM(s) Oral daily  atorvastatin 80 milliGRAM(s) Oral at bedtime  budesonide 160 MICROgram(s)/formoterol 4.5 MICROgram(s) Inhaler 2 Puff(s) Inhalation two times a day  dextrose 5%. 1000 milliLiter(s) (50 mL/Hr) IV Continuous <Continuous>  dextrose 50% Injectable 12.5 Gram(s) IV Push once  dextrose 50% Injectable 25 Gram(s) IV Push once  dextrose 50% Injectable 25 Gram(s) IV Push once  enoxaparin Injectable 40 milliGRAM(s) SubCutaneous daily  gabapentin 100 milliGRAM(s) Oral three times a day  influenza   Vaccine 0.5 milliLiter(s) IntraMuscular once  insulin glargine Injectable (LANTUS) 22 Unit(s) SubCutaneous at bedtime  insulin lispro (HumaLOG) corrective regimen sliding scale   SubCutaneous three times a day before meals  insulin lispro (HumaLOG) corrective regimen sliding scale   SubCutaneous at bedtime  insulin lispro Injectable (HumaLOG) 8 Unit(s) SubCutaneous three times a day before meals  magnesium sulfate  IVPB 1 Gram(s) IV Intermittent daily  polyethylene glycol 3350 17 Gram(s) Oral daily  potassium chloride   Solution 40 milliEquivalent(s) Oral every 2 hours  senna 2 Tablet(s) Oral at bedtime      TELEMETRY: off tele 	    ECG:  	  RADIOLOGY:   DIAGNOSTIC TESTING:  [ ] Echocardiogram:  [ ]  Catheterization:  [ ] Stress Test:    OTHER: 	    LABS:	 	    Troponin T, High Sensitivity Result: 54 ng/L [0 - 51] (09-21 @ 13:35)                          13.8   8.31  )-----------( 170      ( 24 Sep 2020 11:55 )             43.9     09-26    141  |  93<L>  |  28<H>  ----------------------------<  186<H>  3.8   |  39<H>  |  0.78    Ca    9.3      26 Sep 2020 06:18  Mg     2.1     09-26

## 2020-09-26 NOTE — PROGRESS NOTE ADULT - SUBJECTIVE AND OBJECTIVE BOX
Chief complaint  Patient is a 80y old  Female who presents with a chief complaint of sob, hyperglycemia (26 Sep 2020 08:32)   Review of systems  Patient in bed, appears comfortable.    Labs and Fingersticks  CAPILLARY BLOOD GLUCOSE      POCT Blood Glucose.: 198 mg/dL (26 Sep 2020 08:42)  POCT Blood Glucose.: 120 mg/dL (25 Sep 2020 22:57)  POCT Blood Glucose.: 73 mg/dL (25 Sep 2020 21:49)  POCT Blood Glucose.: 158 mg/dL (25 Sep 2020 17:30)  POCT Blood Glucose.: 179 mg/dL (25 Sep 2020 13:03)      Anion Gap, Serum: 9 (09-26 @ 06:18)  Anion Gap, Serum: 4 <L> (09-25 @ 10:37)  Anion Gap, Serum: 15 (09-24 @ 11:55)      Calcium, Total Serum: 9.3 (09-26 @ 06:18)  Calcium, Total Serum: 9.1 (09-25 @ 10:37)  Calcium, Total Serum: 9.7 (09-24 @ 11:55)          09-26    141  |  93<L>  |  28<H>  ----------------------------<  186<H>  3.8   |  39<H>  |  0.78    Ca    9.3      26 Sep 2020 06:18  Mg     2.1     09-26                          13.8   8.31  )-----------( 170      ( 24 Sep 2020 11:55 )             43.9     Medications  MEDICATIONS  (STANDING):  ALBUTerol    90 MICROgram(s) HFA Inhaler 1 Puff(s) Inhalation every 4 hours  aspirin enteric coated 81 milliGRAM(s) Oral daily  atorvastatin 80 milliGRAM(s) Oral at bedtime  budesonide 160 MICROgram(s)/formoterol 4.5 MICROgram(s) Inhaler 2 Puff(s) Inhalation two times a day  dextrose 5%. 1000 milliLiter(s) (50 mL/Hr) IV Continuous <Continuous>  dextrose 50% Injectable 12.5 Gram(s) IV Push once  dextrose 50% Injectable 25 Gram(s) IV Push once  dextrose 50% Injectable 25 Gram(s) IV Push once  enoxaparin Injectable 40 milliGRAM(s) SubCutaneous daily  gabapentin 100 milliGRAM(s) Oral three times a day  influenza   Vaccine 0.5 milliLiter(s) IntraMuscular once  insulin glargine Injectable (LANTUS) 22 Unit(s) SubCutaneous at bedtime  insulin lispro (HumaLOG) corrective regimen sliding scale   SubCutaneous three times a day before meals  insulin lispro (HumaLOG) corrective regimen sliding scale   SubCutaneous at bedtime  insulin lispro Injectable (HumaLOG) 8 Unit(s) SubCutaneous three times a day before meals  magnesium sulfate  IVPB 1 Gram(s) IV Intermittent daily  polyethylene glycol 3350 17 Gram(s) Oral daily  potassium chloride   Solution 40 milliEquivalent(s) Oral every 2 hours  senna 2 Tablet(s) Oral at bedtime      Physical Exam  General: Patient comfortable in bed  Vital Signs Last 12 Hrs  T(F): 97.5 (09-26-20 @ 04:04), Max: 97.5 (09-26-20 @ 04:04)  HR: 66 (09-26-20 @ 04:04) (66 - 66)  BP: 121/78 (09-26-20 @ 04:04) (121/78 - 121/78)  BP(mean): --  RR: 18 (09-26-20 @ 04:04) (18 - 18)  SpO2: 95% (09-26-20 @ 04:04) (95% - 95%)  Neck: No palpable thyroid nodules.  CVS: S1S2, No murmurs  Respiratory: No wheezing, no crepitations  GI: Abdomen soft, bowel sounds positive  Musculoskeletal:  edema lower extremities.     Diagnostics

## 2020-09-26 NOTE — CONSULT NOTE ADULT - ATTENDING COMMENTS
Gabriel Bruner  Pager: [phone]. If no response or past 5 pm call [phone].
agree with the above assessment and plan by CONCHITA Edwards.  80 year old F with pmhx of  DMT2 (on Metformin and insulin), COPD, CHF, HLD, HTN, kidney stones and pshx of R nephrectomy, L knee surgery presents to ED bibems with elevated blood sugar, SOB, and lethargy found to have UTI.   Give IV Lasix for CHF x 1  will reassess tomorrow  echo  mgmt of hyperglycemia and UTI per medicine
80 year old F with pmhx of stroke w/ residual R hemiparesis on ASA 81, DMT2 (on Metformin and insulin), COPD, CHF, HLD, HTN, kidney stones and pshx of R nephrectomy, failed L knee  surgery, s/p treatment of UTI. Pt with persistent AMS, confusion. Pt is agitated, paraniod delusions. Most likely continued hyperactive delirium. Pt known to me from previous admission at [facility]. She likely has underlying dementia.
Patient seen and exam today at bedside. Chart, labs, vitals, radiology reviewed. Above H&P reviewed and edited where appropriate.  Agree with history and physical exam. Agree with assessment and plan.

## 2020-09-26 NOTE — PROGRESS NOTE ADULT - ASSESSMENT
80 year old F with pmhx of  DMT2 (on Metformin and insulin), COPD, CHF, HLD, HTN, kidney stones and pshx of R nephrectomy, L knee surgery presents to ED bibems with elevated blood sugar, SOB, and lethargy. Per EMS, pt with 485 blood sugar, BP of 144/75, and O2 sat 92% on room air.  Daughter notes period of incoherent speech over the weekend. Pt endorses excess urination and thirst over the last few days. She also reports mild SOB x few days that is worse when lying flat. Pt also reports constant CP that has been present for "quite a while", unable to specify how much time. Denies fever, chills, worsening leg swelling.    COPD:  Thoraco lumbar scoliosis  Uncontrolled DM:  UTI  Confusion  CHF    she is admitted with high blood glucose and found to have uti:  She has copd:  She was recently admitted to [facility] where she was treated with copd exacerbation:  At that time her CTA was negative and no indication of malignancy on ct chest :  Currently she is not SOB : her outpt  meds noted:  She is already on Symbicort as well as inhalers:  currently she is also being treated for chf   her venous ABG towards met alk side and seems to be a chronic retainer too   I don't think she needs bipap at this time:  She seems pretty confused: ct head is negative for bleed:  would defer to primary team :  DVT propahyxlis    9/23:  pt is not SOB at this time: but emotionally labile  not wheezing:   she is not wheezing  She has chronically elevated co2:  cont Symbicort  her venous abg reviewed:   CT head neg for bleed:   CICI NP    9/24:  she looks better today : she has coag neg staph in blood cultures: likely contaminant:  She is not wheezing:  On 2 L of oxygen : No resp distress:   Has chr hypercarbia:  Cont inhalers not wheezing: Blood glucose better controlled:   mentally she is better today :  CICI NP :  dvt propahyxlis    9/25:  she has metabolic alkalosis" with  increased co2:  reviewed nephrology   on normal saline   off diuretics n ow: \not wheezing:   despite iv fluids her hCO3 remains elevated:   DIAMOX X 1 TODASY AND REVIEWED HCO3 AGAIN TOMORROW:  [facility] renal:    9/26:  she is doing ok : still little bit confused:  her oxygenation is reasonable:   Her hco3 came down today : to monitor closely:  still confused:  blood glcuse now controlled:  ? why sheis confused:   ? neuro consult

## 2020-09-26 NOTE — PROGRESS NOTE ADULT - SUBJECTIVE AND OBJECTIVE BOX
Patient is a 80y old  Female who presents with a chief complaint of sob, hyperglycemia (26 Sep 2020 13:23)      INTERVAL HPI/OVERNIGHT EVENTS:  T(C): 36.9 (09-26-20 @ 12:25), Max: 36.9 (09-26-20 @ 12:25)  HR: 67 (09-26-20 @ 12:25) (66 - 81)  BP: 128/66 (09-26-20 @ 12:25) (118/69 - 128/66)  RR: 19 (09-26-20 @ 12:25) (18 - 19)  SpO2: 100% (09-26-20 @ 12:25) (95% - 100%)  Wt(kg): --  I&O's Summary    25 Sep 2020 07:01  -  26 Sep 2020 07:00  --------------------------------------------------------  IN: 480 mL / OUT: 950 mL / NET: -470 mL    26 Sep 2020 07:01  -  26 Sep 2020 19:28  --------------------------------------------------------  IN: 480 mL / OUT: 200 mL / NET: 280 mL        LABS:    09-26    141  |  93<L>  |  28<H>  ----------------------------<  186<H>  3.8   |  39<H>  |  0.78    Ca    9.3      26 Sep 2020 06:18  Mg     2.1     09-26          CAPILLARY BLOOD GLUCOSE      POCT Blood Glucose.: 218 mg/dL (26 Sep 2020 17:34)  POCT Blood Glucose.: 327 mg/dL (26 Sep 2020 12:39)  POCT Blood Glucose.: 198 mg/dL (26 Sep 2020 08:42)  POCT Blood Glucose.: 120 mg/dL (25 Sep 2020 22:57)  POCT Blood Glucose.: 73 mg/dL (25 Sep 2020 21:49)            MEDICATIONS  (STANDING):  ALBUTerol    90 MICROgram(s) HFA Inhaler 1 Puff(s) Inhalation every 4 hours  aspirin enteric coated 81 milliGRAM(s) Oral daily  atorvastatin 80 milliGRAM(s) Oral at bedtime  budesonide 160 MICROgram(s)/formoterol 4.5 MICROgram(s) Inhaler 2 Puff(s) Inhalation two times a day  dextrose 5%. 1000 milliLiter(s) (50 mL/Hr) IV Continuous <Continuous>  dextrose 50% Injectable 12.5 Gram(s) IV Push once  dextrose 50% Injectable 25 Gram(s) IV Push once  dextrose 50% Injectable 25 Gram(s) IV Push once  enoxaparin Injectable 40 milliGRAM(s) SubCutaneous daily  gabapentin 100 milliGRAM(s) Oral three times a day  influenza   Vaccine 0.5 milliLiter(s) IntraMuscular once  insulin glargine Injectable (LANTUS) 22 Unit(s) SubCutaneous at bedtime  insulin lispro (HumaLOG) corrective regimen sliding scale   SubCutaneous three times a day before meals  insulin lispro (HumaLOG) corrective regimen sliding scale   SubCutaneous at bedtime  insulin lispro Injectable (HumaLOG) 8 Unit(s) SubCutaneous three times a day before meals  magnesium sulfate  IVPB 1 Gram(s) IV Intermittent daily  polyethylene glycol 3350 17 Gram(s) Oral daily  potassium chloride   Solution 40 milliEquivalent(s) Oral every 2 hours  senna 2 Tablet(s) Oral at bedtime    MEDICATIONS  (PRN):  acetaminophen   Tablet .. 650 milliGRAM(s) Oral every 6 hours PRN Moderate Pain (4 - 6)  dextrose 40% Gel 15 Gram(s) Oral once PRN Blood Glucose LESS THAN 70 milliGRAM(s)/deciliter  glucagon  Injectable 1 milliGRAM(s) IntraMuscular once PRN Glucose LESS THAN 70 milligrams/deciliter  haloperidol     Tablet 1 milliGRAM(s) Oral every 6 hours PRN agitation          PHYSICAL EXAM:  GENERAL: frail  CHEST/LUNG: Clear to percussion bilaterally; No rales, rhonchi, wheezing, or rubs  HEART: Regular rate and rhythm; No murmurs, rubs, or gallops  ABDOMEN: Soft, Nontender, Nondistended; Bowel sounds present  EXTREMITIES:  no edema   Care Discussed with Consultants/Other Providers [x ] YES  [ ] NO

## 2020-09-26 NOTE — PROGRESS NOTE ADULT - ASSESSMENT
ASSESSMENT/PLAN:  80 year old F with pmhx of  DMT2 (on Metformin and insulin), COPD, CHF, HLD, HTN, kidney stones and pshx of R nephrectomy, L knee surgery presents to ED bibems with elevated blood sugar, SOB, and lethargy.  Elevated HCO3 is likely contraction alkalosis with hypokalemia from osmotic diuresis from elevated blood sugars   Hypernatremia   CKD stage 3 at minimum   ASSESSMENT/PLAN:  80 year old F with pmhx of  DMT2 (on Metformin and insulin), COPD, CHF, HLD, HTN, kidney stones and pshx of R nephrectomy, L knee surgery presents to ED with elevated blood sugar, SOB, and lethargy.  Elevated HCO3 is likely contraction alkalosis with hypokalemia from osmotic diuresis from elevated blood sugars   Hypernatremia - improved   CKD stage 3 at minimum    1 CVS- Off IVF.  At present she is not in heart failure.   2 Renal-CKD on the basis of reduced nephron mass and likely DM.  Quantify proteinuria.  Renal sono would be a good idea to rule masses but can be as outpt.  HCO3 improved s/p DIamox 250mg IV x 1 yesterday, continue to trend   3 Endo-Needs tighter blood sugar management     Smitha Thompson NP-C  [facility]  [phone]

## 2020-09-27 NOTE — PROGRESS NOTE ADULT - SUBJECTIVE AND OBJECTIVE BOX
CARDIOLOGY FOLLOW UP - Dr. Mukherjee    CC no cp or sob       PHYSICAL EXAM:  T(C): 36.4 (09-27-20 @ 04:19), Max: 36.9 (09-26-20 @ 12:25)  HR: 72 (09-27-20 @ 04:19) (67 - 72)  BP: 130/79 (09-27-20 @ 04:19) (128/66 - 134/81)  RR: 18 (09-27-20 @ 04:19) (18 - 19)  SpO2: 99% (09-27-20 @ 04:19) (99% - 100%)  Wt(kg): --  I&O's Summary    26 Sep 2020 07:01  -  27 Sep 2020 07:00  --------------------------------------------------------  IN: 630 mL / OUT: 650 mL / NET: -20 mL        Appearance: Normal	  Cardiovascular: Normal S1 S2,RRR, No JVD, No murmurs  Respiratory: Lungs clear to auscultation	  Gastrointestinal:  Soft, Non-tender, + BS	  Extremities: Normal range of motion, No clubbing, cyanosis or edema        MEDICATIONS  (STANDING):  ALBUTerol    90 MICROgram(s) HFA Inhaler 1 Puff(s) Inhalation every 4 hours  aspirin enteric coated 81 milliGRAM(s) Oral daily  atorvastatin 80 milliGRAM(s) Oral at bedtime  budesonide 160 MICROgram(s)/formoterol 4.5 MICROgram(s) Inhaler 2 Puff(s) Inhalation two times a day  dextrose 5%. 1000 milliLiter(s) (50 mL/Hr) IV Continuous <Continuous>  dextrose 50% Injectable 12.5 Gram(s) IV Push once  dextrose 50% Injectable 25 Gram(s) IV Push once  dextrose 50% Injectable 25 Gram(s) IV Push once  enoxaparin Injectable 40 milliGRAM(s) SubCutaneous daily  gabapentin 100 milliGRAM(s) Oral three times a day  influenza   Vaccine 0.5 milliLiter(s) IntraMuscular once  insulin glargine Injectable (LANTUS) 22 Unit(s) SubCutaneous at bedtime  insulin lispro (HumaLOG) corrective regimen sliding scale   SubCutaneous at bedtime  insulin lispro (HumaLOG) corrective regimen sliding scale   SubCutaneous three times a day before meals  insulin lispro Injectable (HumaLOG) 8 Unit(s) SubCutaneous three times a day before meals  magnesium sulfate  IVPB 1 Gram(s) IV Intermittent daily  polyethylene glycol 3350 17 Gram(s) Oral daily  potassium chloride   Solution 40 milliEquivalent(s) Oral every 2 hours  senna 2 Tablet(s) Oral at bedtime      TELEMETRY: off tele  	    ECG:  	  RADIOLOGY:   DIAGNOSTIC TESTING:  [ ] Echocardiogram:  [ ]  Catheterization:  [ ] Stress Test:    OTHER: 	    LABS:	 	    Troponin T, High Sensitivity Result: 54 ng/L [0 - 51] (09-21 @ 13:35)      09-27    140  |  94<L>  |  28<H>  ----------------------------<  232<H>  3.7   |  39<H>  |  0.80    Ca    9.5      27 Sep 2020 05:33  Mg     2.1     09-26

## 2020-09-27 NOTE — PROGRESS NOTE ADULT - PROBLEM SELECTOR PLAN 1
Will increase Lantus to 26 units at bed time.  Will increase  Humalog to 10 units before each meal in addition to Humalog correction scale coverage.  Patient counseled for compliance with consistent low carb diet.

## 2020-09-27 NOTE — PROGRESS NOTE ADULT - SUBJECTIVE AND OBJECTIVE BOX
Patient is a 80y old  Female who presents with a chief complaint of sob, hyperglycemia (27 Sep 2020 17:15)      INTERVAL HPI/OVERNIGHT EVENTS:  T(C): 36.6 (09-27-20 @ 15:52), Max: 37 (09-27-20 @ 12:26)  HR: 86 (09-27-20 @ 15:52) (69 - 86)  BP: 120/71 (09-27-20 @ 15:52) (120/71 - 134/81)  RR: 20 (09-27-20 @ 15:52) (18 - 20)  SpO2: 99% (09-27-20 @ 15:52) (99% - 100%)  Wt(kg): --  I&O's Summary    26 Sep 2020 07:01  -  27 Sep 2020 07:00  --------------------------------------------------------  IN: 630 mL / OUT: 650 mL / NET: -20 mL    27 Sep 2020 07:01  -  27 Sep 2020 18:19  --------------------------------------------------------  IN: 480 mL / OUT: 500 mL / NET: -20 mL        LABS:    09-27    140  |  94<L>  |  28<H>  ----------------------------<  232<H>  3.7   |  39<H>  |  0.80    Ca    9.5      27 Sep 2020 05:33  Mg     2.1     09-26          CAPILLARY BLOOD GLUCOSE      POCT Blood Glucose.: 336 mg/dL (27 Sep 2020 12:53)  POCT Blood Glucose.: 237 mg/dL (27 Sep 2020 08:45)  POCT Blood Glucose.: 185 mg/dL (27 Sep 2020 04:03)  POCT Blood Glucose.: 244 mg/dL (26 Sep 2020 21:48)            MEDICATIONS  (STANDING):  ALBUTerol    90 MICROgram(s) HFA Inhaler 1 Puff(s) Inhalation every 4 hours  aspirin enteric coated 81 milliGRAM(s) Oral daily  atorvastatin 80 milliGRAM(s) Oral at bedtime  budesonide 160 MICROgram(s)/formoterol 4.5 MICROgram(s) Inhaler 2 Puff(s) Inhalation two times a day  dextrose 5%. 1000 milliLiter(s) (50 mL/Hr) IV Continuous <Continuous>  dextrose 50% Injectable 12.5 Gram(s) IV Push once  dextrose 50% Injectable 25 Gram(s) IV Push once  dextrose 50% Injectable 25 Gram(s) IV Push once  enoxaparin Injectable 40 milliGRAM(s) SubCutaneous daily  gabapentin 100 milliGRAM(s) Oral three times a day  influenza   Vaccine 0.5 milliLiter(s) IntraMuscular once  insulin glargine Injectable (LANTUS) 26 Unit(s) SubCutaneous at bedtime  insulin lispro (HumaLOG) corrective regimen sliding scale   SubCutaneous three times a day before meals  insulin lispro (HumaLOG) corrective regimen sliding scale   SubCutaneous at bedtime  insulin lispro Injectable (HumaLOG) 10 Unit(s) SubCutaneous three times a day before meals  magnesium sulfate  IVPB 1 Gram(s) IV Intermittent daily  polyethylene glycol 3350 17 Gram(s) Oral daily  potassium chloride   Solution 40 milliEquivalent(s) Oral every 2 hours  senna 2 Tablet(s) Oral at bedtime    MEDICATIONS  (PRN):  acetaminophen   Tablet .. 650 milliGRAM(s) Oral every 6 hours PRN Moderate Pain (4 - 6)  dextrose 40% Gel 15 Gram(s) Oral once PRN Blood Glucose LESS THAN 70 milliGRAM(s)/deciliter  glucagon  Injectable 1 milliGRAM(s) IntraMuscular once PRN Glucose LESS THAN 70 milligrams/deciliter  haloperidol     Tablet 1 milliGRAM(s) Oral every 6 hours PRN agitation          PHYSICAL EXAM:  GENERAL: NAD, well-groomed, well-developed  HEAD:  Atraumatic, Normocephalic  CHEST/LUNG: Clear to percussion bilaterally; No rales, rhonchi, wheezing, or rubs  HEART: Regular rate and rhythm; No murmurs, rubs, or gallops  ABDOMEN: Soft, Nontender, Nondistended; Bowel sounds present  EXTREMITIES:  2+ Peripheral Pulses, No clubbing, cyanosis, or edema  LYMPH: No lymphadenopathy noted  SKIN: No rashes or lesions    Care Discussed with Consultants/Other Providers [ ] YES  [ ] NO

## 2020-09-27 NOTE — PROGRESS NOTE ADULT - ASSESSMENT
80 year old F with pmhx of  DMT2 (on Metformin and insulin), COPD, CHF, HLD, HTN, kidney stones and pshx of R nephrectomy, L knee surgery presents to ED bibems with elevated blood sugar, SOB, and lethargy. Per EMS, pt with 485 blood sugar, BP of 144/75, and O2 sat 92% on room air.  Daughter notes period of incoherent speech over the weekend. Pt endorses excess urination and thirst over the last few days. She also reports mild SOB x few days that is worse when lying flat. Pt also reports constant CP that has been present for "quite a while", unable to specify how much time. Denies fever, chills, worsening leg swelling.    COPD:  Thoraco lumbar scoliosis  Uncontrolled DM:  UTI  Confusion  CHF    she is admitted with high blood glucose and found to have uti:  She has copd:  She was recently admitted to [facility] where she was treated with copd exacerbation:  At that time her CTA was negative and no indication of malignancy on ct chest :  Currently she is not SOB : her outpt  meds noted:  She is already on Symbicort as well as inhalers:  currently she is also being treated for chf   her venous ABG towards met alk side and seems to be a chronic retainer too   I don't think she needs bipap at this time:  She seems pretty confused: ct head is negative for bleed:  would defer to primary team :  DVT propahyxlis    9/23:  pt is not SOB at this time: but emotionally labile  not wheezing:   she is not wheezing  She has chronically elevated co2:  cont Symbicort  her venous abg reviewed:   CT head neg for bleed:   CICI NP    9/24:  she looks better today : she has coag neg staph in blood cultures: likely contaminant:  She is not wheezing:  On 2 L of oxygen : No resp distress:   Has chr hypercarbia:  Cont inhalers not wheezing: Blood glucose better controlled:   mentally she is better today :  CICI NP :  dvt propahyxlis    9/25:  she has metabolic alkalosis" with  increased co2:  reviewed nephrology   on normal saline   off diuretics n ow: \not wheezing:   despite iv fluids her hCO3 remains elevated:   DIAMOX X 1 TODASY AND REVIEWED HCO3 AGAIN TOMORROW:  cici renal:    9/26:  she is doing ok : still little bit confused:  her oxygenation is reasonable:   Her hco3 came down today : to monitor closely:  still confused:  blood glucose now controlled:  ? why she is confused:   ? neuro consult    9/27:  she is alert and awake: mildly confused:  no SOB :  but she is a chronic retainer:  Her o2 sao2 is OK on oxygen :  blood cultures contaminant from before:   hco3 same: at 39: monitor  MAy need repeat diamox if it increases further;   DVT proaphxylis  DW NP

## 2020-09-27 NOTE — PROGRESS NOTE ADULT - ASSESSMENT
Assessment  DMT2: 80y Female with DM T2 with hyperglycemia, A1C 8.1%, was on oral meds and insulin at home, now on basal bolus insulin, blood sugars running high,  no hypoglycemic episodes, eating meals.  UTI: on IV ABx, stable, monitored.          Alexander Shaver MD  Cell: [phone]  Office: [phone]

## 2020-09-27 NOTE — PROGRESS NOTE ADULT - SUBJECTIVE AND OBJECTIVE BOX
Chief complaint  Patient is a 80y old  Female who presents with a chief complaint of sob, hyperglycemia (27 Sep 2020 11:02)   Review of systems  Patient in bed, appears comfortable.    Labs and Fingersticks  CAPILLARY BLOOD GLUCOSE      POCT Blood Glucose.: 336 mg/dL (27 Sep 2020 12:53)  POCT Blood Glucose.: 237 mg/dL (27 Sep 2020 08:45)  POCT Blood Glucose.: 185 mg/dL (27 Sep 2020 04:03)  POCT Blood Glucose.: 244 mg/dL (26 Sep 2020 21:48)  POCT Blood Glucose.: 218 mg/dL (26 Sep 2020 17:34)      Anion Gap, Serum: 7 (09-27 @ 05:33)  Anion Gap, Serum: 9 (09-26 @ 06:18)      Calcium, Total Serum: 9.5 (09-27 @ 05:33)  Calcium, Total Serum: 9.3 (09-26 @ 06:18)          09-27    140  |  94<L>  |  28<H>  ----------------------------<  232<H>  3.7   |  39<H>  |  0.80    Ca    9.5      27 Sep 2020 05:33  Mg     2.1     09-26      Medications  MEDICATIONS  (STANDING):  ALBUTerol    90 MICROgram(s) HFA Inhaler 1 Puff(s) Inhalation every 4 hours  aspirin enteric coated 81 milliGRAM(s) Oral daily  atorvastatin 80 milliGRAM(s) Oral at bedtime  budesonide 160 MICROgram(s)/formoterol 4.5 MICROgram(s) Inhaler 2 Puff(s) Inhalation two times a day  dextrose 5%. 1000 milliLiter(s) (50 mL/Hr) IV Continuous <Continuous>  dextrose 50% Injectable 12.5 Gram(s) IV Push once  dextrose 50% Injectable 25 Gram(s) IV Push once  dextrose 50% Injectable 25 Gram(s) IV Push once  enoxaparin Injectable 40 milliGRAM(s) SubCutaneous daily  gabapentin 100 milliGRAM(s) Oral three times a day  influenza   Vaccine 0.5 milliLiter(s) IntraMuscular once  insulin glargine Injectable (LANTUS) 26 Unit(s) SubCutaneous at bedtime  insulin lispro (HumaLOG) corrective regimen sliding scale   SubCutaneous at bedtime  insulin lispro (HumaLOG) corrective regimen sliding scale   SubCutaneous three times a day before meals  insulin lispro Injectable (HumaLOG) 10 Unit(s) SubCutaneous three times a day before meals  magnesium sulfate  IVPB 1 Gram(s) IV Intermittent daily  polyethylene glycol 3350 17 Gram(s) Oral daily  potassium chloride   Solution 40 milliEquivalent(s) Oral every 2 hours  senna 2 Tablet(s) Oral at bedtime      Physical Exam  General: Patient comfortable in bed  Vital Signs Last 12 Hrs  T(F): 97.9 (09-27-20 @ 15:52), Max: 98.6 (09-27-20 @ 12:26)  HR: 86 (09-27-20 @ 15:52) (69 - 86)  BP: 120/71 (09-27-20 @ 15:52) (120/71 - 125/75)  BP(mean): --  RR: 20 (09-27-20 @ 15:52) (19 - 20)  SpO2: 99% (09-27-20 @ 15:52) (99% - 100%)  Neck: No palpable thyroid nodules.  CVS: S1S2, No murmurs  Respiratory: No wheezing, no crepitations  GI: Abdomen soft, bowel sounds positive  Musculoskeletal:  edema lower extremities.     Diagnostics

## 2020-09-27 NOTE — PROGRESS NOTE ADULT - ASSESSMENT
Problem/Plan - 1:  ·  Problem: sepsis sec to UTI (urinary tract infection) with bactremia with metabolic encephalopathy POA .  Plan: dc ertepanum  id fu appreciated     Problem/Plan - 2:  ·  Problem: Hyperglycemia ion alkalosis with contract  Plan: monitor FS  ISS.   renal fuIVF     Problem/Plan - 3:  ·  Problem: AMS   Plan: metabolic encephalopathy  psych consult  neuro consult appreciated    Problem/Plan - 4:  ·  Problem: COPD (chronic obstructive pulmonary disease).  Plan: cw home meds

## 2020-09-27 NOTE — PROGRESS NOTE ADULT - ASSESSMENT
ASSESSMENT/PLAN:  80 year old F with pmhx of  DMT2 (on Metformin and insulin), COPD, CHF, HLD, HTN, kidney stones and pshx of R nephrectomy, L knee surgery presents to ED with elevated blood sugar, SOB, and lethargy.  Elevated HCO3 is likely contraction alkalosis with hypokalemia from osmotic diuresis from elevated blood sugars   Hypernatremia - improved   CKD stage 3 at minimum    1 CVS- Off IVF. At present she is not in heart failure.   2 Renal-CKD on the basis of reduced nephron mass and likely DM.  Quantify proteinuria.  Renal sono would be a good idea to rule masses but can be done as outpt.  HCO3 improved, stable, s/p DIamox 250mg IV x 1 on Friday, continue to trend   3 Endo-Needs tighter blood sugar management     Smitha Thompson NP-C  [facility]  [phone]

## 2020-09-27 NOTE — PROGRESS NOTE ADULT - ASSESSMENT
80 year old F with pmhx of  DMT2 (on Metformin and insulin), COPD, CHF, HLD, HTN, kidney stones and pshx of R nephrectomy, L knee surgery presents to ED bibems with elevated blood sugar, SOB, and lethargy found to have UTI.     1. SOB, acute/chronic diastolic chf exacerbation  -cv stable, volume status acceptable   -symptoms also 2/2 to DKA, COPD hx   -s/p lasix, s/p IVF. Co2 improving    -echo in july with nl LV sys fx, ef 55-60%, mild MR, mod pulm htn    -pulm f/u     2. Hyperglycemia  -management per primary team     3. UTI  -+BCX likely contaminant: repeat neg  -s/p abx per primary team     4. Hypokalemia  -supplementation per primary team, continue to trend.     5. COPD , hx  -c/w home meds , pulm f/u     dvt ppx

## 2020-09-27 NOTE — PROGRESS NOTE ADULT - SUBJECTIVE AND OBJECTIVE BOX
NEPHROLOGY     MEDICATIONS  (STANDING):  ALBUTerol    90 MICROgram(s) HFA Inhaler 1 Puff(s) Inhalation every 4 hours  aspirin enteric coated 81 milliGRAM(s) Oral daily  atorvastatin 80 milliGRAM(s) Oral at bedtime  budesonide 160 MICROgram(s)/formoterol 4.5 MICROgram(s) Inhaler 2 Puff(s) Inhalation two times a day  dextrose 5%. 1000 milliLiter(s) (50 mL/Hr) IV Continuous <Continuous>  dextrose 50% Injectable 12.5 Gram(s) IV Push once  dextrose 50% Injectable 25 Gram(s) IV Push once  dextrose 50% Injectable 25 Gram(s) IV Push once  enoxaparin Injectable 40 milliGRAM(s) SubCutaneous daily  gabapentin 100 milliGRAM(s) Oral three times a day  influenza   Vaccine 0.5 milliLiter(s) IntraMuscular once  insulin glargine Injectable (LANTUS) 22 Unit(s) SubCutaneous at bedtime  insulin lispro (HumaLOG) corrective regimen sliding scale   SubCutaneous three times a day before meals  insulin lispro (HumaLOG) corrective regimen sliding scale   SubCutaneous at bedtime  insulin lispro Injectable (HumaLOG) 8 Unit(s) SubCutaneous three times a day before meals  magnesium sulfate  IVPB 1 Gram(s) IV Intermittent daily  polyethylene glycol 3350 17 Gram(s) Oral daily  potassium chloride   Solution 40 milliEquivalent(s) Oral every 2 hours  senna 2 Tablet(s) Oral at bedtime    VITALS:  T(C): , Max: 36.9 (09-26-20 @ 12:25)  T(F): , Max: 98.4 (09-26-20 @ 12:25)  HR: 72 (09-27-20 @ 04:19)  BP: 130/79 (09-27-20 @ 04:19)  RR: 18 (09-27-20 @ 04:19)  SpO2: 99% (09-27-20 @ 04:19)    I and O's:    09-26 @ 07:01  -  09-27 @ 07:00  --------------------------------------------------------  IN: 630 mL / OUT: 650 mL / NET: -20 mL    PHYSICAL EXAM:  Constitutional: NAD, confused   Neck:  No JVD  Respiratory: CTAB/L  Cardiovascular: S1 and S2  Gastrointestinal: BS+, soft, NT/ND  Extremities: No peripheral edema  Neurological: A/O x 3, no focal deficits  Psychiatric: Normal mood, normal affect  : No Pearce  Skin: No rashes    LABS:    09-27    140  |  94<L>  |  28<H>  ----------------------------<  232<H>  3.7   |  39<H>  |  0.80    Ca    9.5      27 Sep 2020 05:33  Mg     2.1     09-26     NEPHROLOGY     Pt seen and examined. Pt more tired this morning, no complaints of cp or sob, on 2L NC, in no acute distress. No overnight events noted.     MEDICATIONS  (STANDING):  ALBUTerol    90 MICROgram(s) HFA Inhaler 1 Puff(s) Inhalation every 4 hours  aspirin enteric coated 81 milliGRAM(s) Oral daily  atorvastatin 80 milliGRAM(s) Oral at bedtime  budesonide 160 MICROgram(s)/formoterol 4.5 MICROgram(s) Inhaler 2 Puff(s) Inhalation two times a day  dextrose 5%. 1000 milliLiter(s) (50 mL/Hr) IV Continuous <Continuous>  dextrose 50% Injectable 12.5 Gram(s) IV Push once  dextrose 50% Injectable 25 Gram(s) IV Push once  dextrose 50% Injectable 25 Gram(s) IV Push once  enoxaparin Injectable 40 milliGRAM(s) SubCutaneous daily  gabapentin 100 milliGRAM(s) Oral three times a day  influenza   Vaccine 0.5 milliLiter(s) IntraMuscular once  insulin glargine Injectable (LANTUS) 22 Unit(s) SubCutaneous at bedtime  insulin lispro (HumaLOG) corrective regimen sliding scale   SubCutaneous three times a day before meals  insulin lispro (HumaLOG) corrective regimen sliding scale   SubCutaneous at bedtime  insulin lispro Injectable (HumaLOG) 8 Unit(s) SubCutaneous three times a day before meals  magnesium sulfate  IVPB 1 Gram(s) IV Intermittent daily  polyethylene glycol 3350 17 Gram(s) Oral daily  potassium chloride   Solution 40 milliEquivalent(s) Oral every 2 hours  senna 2 Tablet(s) Oral at bedtime    VITALS:  T(C): , Max: 36.9 (09-26-20 @ 12:25)  T(F): , Max: 98.4 (09-26-20 @ 12:25)  HR: 72 (09-27-20 @ 04:19)  BP: 130/79 (09-27-20 @ 04:19)  RR: 18 (09-27-20 @ 04:19)  SpO2: 99% (09-27-20 @ 04:19)    I and O's:    09-26 @ 07:01  -  09-27 @ 07:00  --------------------------------------------------------  IN: 630 mL / OUT: 650 mL / NET: -20 mL    PHYSICAL EXAM:  Constitutional: NAD, confused   Neck:  No JVD  Respiratory: CTAB/L  Cardiovascular: S1 and S2  Gastrointestinal: BS+, soft, NT/ND  Extremities: No peripheral edema  Neurological: A/O x 3, no focal deficits  Psychiatric: Normal mood, normal affect  : No Pearce  Skin: No rashes    LABS:    09-27    140  |  94<L>  |  28<H>  ----------------------------<  232<H>  3.7   |  39<H>  |  0.80    Ca    9.5      27 Sep 2020 05:33  Mg     2.1     09-26

## 2020-09-28 NOTE — PROGRESS NOTE ADULT - ASSESSMENT
Assessment  DMT2: 80y Female with DM T2 with hyperglycemia, A1C 8.1%, was on oral meds and insulin at home, now on basal bolus insulin, dose was adjusted but blood sugars still running high,  no hypoglycemic episodes, eating meals.  UTI: on IV ABx, stable, monitored.          Alexander Shaver MD  Cell: [phone]  Office: [phone]

## 2020-09-28 NOTE — PROGRESS NOTE ADULT - SUBJECTIVE AND OBJECTIVE BOX
Patient is a 80y old  Female who presents with a chief complaint of sob, hyperglycemia (28 Sep 2020 15:21)      INTERVAL HPI/OVERNIGHT EVENTS:  T(C): 36.7 (09-28-20 @ 16:50), Max: 36.9 (09-28-20 @ 13:18)  HR: 88 (09-28-20 @ 16:50) (68 - 88)  BP: 149/76 (09-28-20 @ 16:50) (118/72 - 149/76)  RR: 18 (09-28-20 @ 16:50) (18 - 18)  SpO2: 99% (09-28-20 @ 16:50) (99% - 100%)  Wt(kg): --  I&O's Summary    27 Sep 2020 07:01  -  28 Sep 2020 07:00  --------------------------------------------------------  IN: 580 mL / OUT: 900 mL / NET: -320 mL    28 Sep 2020 07:01  -  28 Sep 2020 18:04  --------------------------------------------------------  IN: 980 mL / OUT: 451 mL / NET: 529 mL        LABS:                        12.1   7.60  )-----------( 131      ( 28 Sep 2020 07:11 )             39.6     09-28    142  |  96  |  31<H>  ----------------------------<  245<H>  4.5   |  38<H>  |  0.83    Ca    9.7      28 Sep 2020 07:11          CAPILLARY BLOOD GLUCOSE      POCT Blood Glucose.: 327 mg/dL (28 Sep 2020 12:21)  POCT Blood Glucose.: 271 mg/dL (28 Sep 2020 07:48)  POCT Blood Glucose.: 202 mg/dL (27 Sep 2020 21:59)  POCT Blood Glucose.: 213 mg/dL (27 Sep 2020 19:20)    ABG - ( 28 Sep 2020 11:56 )  pH, Arterial: 7.38  pH, Blood: x     /  pCO2: 68    /  pO2: 149   / HCO3: 39    / Base Excess: 11.5  /  SaO2: 99                      MEDICATIONS  (STANDING):  ALBUTerol    90 MICROgram(s) HFA Inhaler 1 Puff(s) Inhalation every 4 hours  aspirin enteric coated 81 milliGRAM(s) Oral daily  atorvastatin 80 milliGRAM(s) Oral at bedtime  budesonide 160 MICROgram(s)/formoterol 4.5 MICROgram(s) Inhaler 2 Puff(s) Inhalation two times a day  dextrose 5%. 1000 milliLiter(s) (50 mL/Hr) IV Continuous <Continuous>  dextrose 50% Injectable 12.5 Gram(s) IV Push once  dextrose 50% Injectable 25 Gram(s) IV Push once  dextrose 50% Injectable 25 Gram(s) IV Push once  enoxaparin Injectable 40 milliGRAM(s) SubCutaneous daily  gabapentin 100 milliGRAM(s) Oral three times a day  influenza   Vaccine 0.5 milliLiter(s) IntraMuscular once  insulin glargine Injectable (LANTUS) 32 Unit(s) SubCutaneous at bedtime  insulin lispro (HumaLOG) corrective regimen sliding scale   SubCutaneous at bedtime  insulin lispro (HumaLOG) corrective regimen sliding scale   SubCutaneous three times a day before meals  insulin lispro Injectable (HumaLOG) 10 Unit(s) SubCutaneous three times a day before meals  magnesium sulfate  IVPB 1 Gram(s) IV Intermittent daily  polyethylene glycol 3350 17 Gram(s) Oral daily  potassium chloride   Solution 40 milliEquivalent(s) Oral every 2 hours  senna 2 Tablet(s) Oral at bedtime    MEDICATIONS  (PRN):  acetaminophen   Tablet .. 650 milliGRAM(s) Oral every 6 hours PRN Moderate Pain (4 - 6)  dextrose 40% Gel 15 Gram(s) Oral once PRN Blood Glucose LESS THAN 70 milliGRAM(s)/deciliter  glucagon  Injectable 1 milliGRAM(s) IntraMuscular once PRN Glucose LESS THAN 70 milligrams/deciliter  haloperidol     Tablet 1 milliGRAM(s) Oral every 6 hours PRN agitation          PHYSICAL EXAM:  GENERAL: NAD, well-groomed, well-developed  HEAD:  Atraumatic, Normocephalic  CHEST/LUNG: Clear to percussion bilaterally; No rales, rhonchi, wheezing, or rubs  HEART: Regular rate and rhythm; No murmurs, rubs, or gallops  ABDOMEN: Soft, Nontender, Nondistended; Bowel sounds present  EXTREMITIES:  2+ Peripheral Pulses, No clubbing, cyanosis, or edema  LYMPH: No lymphadenopathy noted  SKIN: No rashes or lesions    Care Discussed with Consultants/Other Providers [x ] YES  [ ] NO

## 2020-09-28 NOTE — PROGRESS NOTE ADULT - SUBJECTIVE AND OBJECTIVE BOX
CARDIOLOGY FOLLOW UP - Dr. Mukherjee    CC no cp/sob       PHYSICAL EXAM:  T(C): 36.9 (09-28-20 @ 13:18), Max: 36.9 (09-28-20 @ 13:18)  HR: 88 (09-28-20 @ 13:18) (68 - 88)  BP: 132/84 (09-28-20 @ 13:18) (118/72 - 137/67)  RR: 18 (09-28-20 @ 13:18) (18 - 20)  SpO2: 100% (09-28-20 @ 13:18) (99% - 100%)  Wt(kg): --  I&O's Summary    27 Sep 2020 07:01  -  28 Sep 2020 07:00  --------------------------------------------------------  IN: 580 mL / OUT: 900 mL / NET: -320 mL    28 Sep 2020 07:01  -  28 Sep 2020 15:01  --------------------------------------------------------  IN: 780 mL / OUT: 351 mL / NET: 429 mL        Appearance: Normal	  Cardiovascular: Normal S1 S2,RRR, No JVD, No murmurs  Respiratory: Lungs clear to auscultation	  Gastrointestinal:  Soft, Non-tender, + BS	  Extremities: Normal range of motion, No clubbing, cyanosis or edema        MEDICATIONS  (STANDING):  ALBUTerol    90 MICROgram(s) HFA Inhaler 1 Puff(s) Inhalation every 4 hours  aspirin enteric coated 81 milliGRAM(s) Oral daily  atorvastatin 80 milliGRAM(s) Oral at bedtime  budesonide 160 MICROgram(s)/formoterol 4.5 MICROgram(s) Inhaler 2 Puff(s) Inhalation two times a day  dextrose 5%. 1000 milliLiter(s) (50 mL/Hr) IV Continuous <Continuous>  dextrose 50% Injectable 12.5 Gram(s) IV Push once  dextrose 50% Injectable 25 Gram(s) IV Push once  dextrose 50% Injectable 25 Gram(s) IV Push once  enoxaparin Injectable 40 milliGRAM(s) SubCutaneous daily  gabapentin 100 milliGRAM(s) Oral three times a day  influenza   Vaccine 0.5 milliLiter(s) IntraMuscular once  insulin glargine Injectable (LANTUS) 32 Unit(s) SubCutaneous at bedtime  insulin lispro (HumaLOG) corrective regimen sliding scale   SubCutaneous three times a day before meals  insulin lispro (HumaLOG) corrective regimen sliding scale   SubCutaneous at bedtime  insulin lispro Injectable (HumaLOG) 10 Unit(s) SubCutaneous three times a day before meals  magnesium sulfate  IVPB 1 Gram(s) IV Intermittent daily  polyethylene glycol 3350 17 Gram(s) Oral daily  potassium chloride   Solution 40 milliEquivalent(s) Oral every 2 hours  senna 2 Tablet(s) Oral at bedtime      TELEMETRY: 	    ECG:  	  RADIOLOGY:   DIAGNOSTIC TESTING:  [ ] Echocardiogram:  [ ]  Catheterization:  [ ] Stress Test:    OTHER: 	    LABS:	 	                                12.1   7.60  )-----------( 131      ( 28 Sep 2020 07:11 )             39.6     09-28    142  |  96  |  31<H>  ----------------------------<  245<H>  4.5   |  38<H>  |  0.83    Ca    9.7      28 Sep 2020 07:11

## 2020-09-28 NOTE — DIETITIAN INITIAL EVALUATION ADULT. - PERTINENT MEDS FT
insulin glargine Injectable (LANTUS) 32 Unit(s) SubCutaneous at bedtime  insulin lispro (HumaLOG) corrective regimen sliding scale   SubCutaneous three times a day before meals  insulin lispro (HumaLOG) corrective regimen sliding scale   SubCutaneous at bedtime  insulin lispro Injectable (HumaLOG) 10 Unit(s) SubCutaneous three times a day before meals  magnesium sulfate  IVPB, polyethylene glycol, potassium chloride, senna, Lipitor

## 2020-09-28 NOTE — OCCUPATIONAL THERAPY INITIAL EVALUATION ADULT - PERTINENT HX OF CURRENT PROBLEM, REHAB EVAL
80 year old F with pmhx of  DMT2 (on Metformin and insulin), COPD, CHF, HLD, HTN, stroke with R hemiparesis, kidney stones and pshx of R nephrectomy, L knee surgery presents to ED bibems with elevated blood sugar, SOB, and lethargy. Per EMS, pt with 485 blood sugar, BP of 144/75, and O2 sat 92% on room air.  Daughter notes recent periods of incoherent speech prior to admission.

## 2020-09-28 NOTE — PROGRESS NOTE ADULT - ASSESSMENT
80 year old F with pmhx of  DMT2 (on Metformin and insulin), COPD, CHF, HLD, HTN, kidney stones and pshx of R nephrectomy, L knee surgery presents to ED bibems with elevated blood sugar, SOB, and lethargy. Per EMS, pt with 485 blood sugar, BP of 144/75, and O2 sat 92% on room air.  Daughter notes period of incoherent speech over the weekend. Pt endorses excess urination and thirst over the last few days. She also reports mild SOB x few days that is worse when lying flat. Pt also reports constant CP that has been present for "quite a while", unable to specify how much time. Denies fever, chills, worsening leg swelling.    COPD:  Thoraco lumbar scoliosis  Uncontrolled DM:  UTI  Confusion  CHF    she is admitted with high blood glucose and found to have uti:  She has copd:  She was recently admitted to [facility] where she was treated with copd exacerbation:  At that time her CTA was negative and no indication of malignancy on ct chest :  Currently she is not SOB : her outpt  meds noted:  She is already on Symbicort as well as inhalers:  currently she is also being treated for chf   her venous ABG towards met alk side and seems to be a chronic retainer too   I don't think she needs bipap at this time:  She seems pretty confused: ct head is negative for bleed:  would defer to primary team :  DVT propahyxlis    9/23:  pt is not SOB at this time: but emotionally labile  not wheezing:   she is not wheezing  She has chronically elevated co2:  cont Symbicort  her venous abg reviewed:   CT head neg for bleed:   CICI NP    9/24:  she looks better today : she has coag neg staph in blood cultures: likely contaminant:  She is not wheezing:  On 2 L of oxygen : No resp distress:   Has chr hypercarbia:  Cont inhalers not wheezing: Blood glucose better controlled:   mentally she is better today :  CICI NP :  dvt propahyxlis    9/25:  she has metabolic alkalosis" with  increased co2:  reviewed nephrology   on normal saline   off diuretics n ow: \not wheezing:   despite iv fluids her hCO3 remains elevated:   DIAMOX X 1 TODASY AND REVIEWED HCO3 AGAIN TOMORROW:  cici renal:    9/26:  she is doing ok : still little bit confused:  her oxygenation is reasonable:   Her hco3 came down today : to monitor closely:  still confused:  blood glucose now controlled:  ? why she is confused:   ? neuro consult    9/27:  she is alert and awake: mildly confused:  no SOB :  but she is a chronic retainer:  Her o2 sao2 is OK on oxygen :  blood cultures contaminant from before:   hco3 same: at 39: monitor  MAy need repeat diamox if it increases further;   DVT proaphxylis  DW NP      9/28:  pt is alert and awake: mildly confused:  not in any resp distress:   reced diamox yesterday : hco3 today at 38 :   Her o2 sao2 is pretty good:  Blood glucose is better controlled   Cont Symbicort and albuterol:  Last ABG was with chr retainer: PH was ok:  REPEAT abg   COVD IS NEGATIVE  DVT propahyxlis

## 2020-09-28 NOTE — DIETITIAN INITIAL EVALUATION ADULT. - PHYSICAL APPEARANCE
well nourished/other (specify) Ht: 61 inches  Wt: 149.9 pounds  BMI: 28.3 kG/m2  IBW: 105 pounds +/-10%  IBW%: 143%  Skin per nursing documentation: No pressure injuries noted.   Edema per nursing documentation: 3+ R knee 4+ L knee

## 2020-09-28 NOTE — OCCUPATIONAL THERAPY INITIAL EVALUATION ADULT - RANGE OF MOTION EXAMINATION, UPPER EXTREMITY
Left UE Active ROM was WFL (within functional limits)/Right UE Active Assistive ROM was WFL  (within functional limits)/R shoulder PROM 0-110 (r sided weakness from previous stroke), r elbow flexion/extension AAROM WFL, R wrist, digit flex/ext AROM WFL

## 2020-09-28 NOTE — PROGRESS NOTE ADULT - SUBJECTIVE AND OBJECTIVE BOX
NEPHROLOGY-[facility] [phone]        Patient seen and examined in bed.  She was about the same         MEDICATIONS  (STANDING):  ALBUTerol    90 MICROgram(s) HFA Inhaler 1 Puff(s) Inhalation every 4 hours  aspirin enteric coated 81 milliGRAM(s) Oral daily  atorvastatin 80 milliGRAM(s) Oral at bedtime  budesonide 160 MICROgram(s)/formoterol 4.5 MICROgram(s) Inhaler 2 Puff(s) Inhalation two times a day  dextrose 5%. 1000 milliLiter(s) (50 mL/Hr) IV Continuous <Continuous>  dextrose 50% Injectable 12.5 Gram(s) IV Push once  dextrose 50% Injectable 25 Gram(s) IV Push once  dextrose 50% Injectable 25 Gram(s) IV Push once  enoxaparin Injectable 40 milliGRAM(s) SubCutaneous daily  gabapentin 100 milliGRAM(s) Oral three times a day  influenza   Vaccine 0.5 milliLiter(s) IntraMuscular once  insulin glargine Injectable (LANTUS) 32 Unit(s) SubCutaneous at bedtime  insulin lispro (HumaLOG) corrective regimen sliding scale   SubCutaneous three times a day before meals  insulin lispro (HumaLOG) corrective regimen sliding scale   SubCutaneous at bedtime  insulin lispro Injectable (HumaLOG) 10 Unit(s) SubCutaneous three times a day before meals  magnesium sulfate  IVPB 1 Gram(s) IV Intermittent daily  polyethylene glycol 3350 17 Gram(s) Oral daily  potassium chloride   Solution 40 milliEquivalent(s) Oral every 2 hours  senna 2 Tablet(s) Oral at bedtime      VITAL:  T(C): , Max: 37 (09-27-20 @ 12:26)  T(F): , Max: 98.6 (09-27-20 @ 12:26)  HR: 70 (09-28-20 @ 06:11)  BP: 119/68 (09-28-20 @ 06:11)  BP(mean): --  RR: 18 (09-28-20 @ 06:11)  SpO2: 99% (09-28-20 @ 06:11)  Wt(kg): --    I and O's:    09-27 @ 07:01  -  09-28 @ 07:00  --------------------------------------------------------  IN: 580 mL / OUT: 900 mL / NET: -320 mL    09-28 @ 07:01  -  09-28 @ 08:44  --------------------------------------------------------  IN: 0 mL / OUT: 200 mL / NET: -200 mL          PHYSICAL EXAM:    Constitutional: NAD  Neck:  No JVD  Respiratory: CTAB/L  Cardiovascular: S1 and S2  Gastrointestinal: BS+, soft, NT/ND  Extremities: No peripheral edema  Neurological: A/O x 3, no focal deficits  Psychiatric: Normal mood, normal affect  : No Pearce  Skin: No rashes  Access: Not applicable    LABS:                        12.1   7.60  )-----------( 131      ( 28 Sep 2020 07:11 )             39.6     09-28    142  |  96  |  31<H>  ----------------------------<  245<H>  4.5   |  38<H>  |  0.83    Ca    9.7      28 Sep 2020 07:11            Urine Studies:          RADIOLOGY & ADDITIONAL STUDIES:

## 2020-09-28 NOTE — OCCUPATIONAL THERAPY INITIAL EVALUATION ADULT - BALANCE TRAINING, PT EVAL
GOAL: Pt will increase static/dynamic balance grades by 1/2 grade in order to increase safety with ADLs and transfers in 4 weeks

## 2020-09-28 NOTE — PROGRESS NOTE ADULT - SUBJECTIVE AND OBJECTIVE BOX
Patient is a 80y old  Female who presents with a chief complaint of sob, hyperglycemia (28 Sep 2020 08:44)      Any change in ROS: Ptis alert and awake:  on 2 L of oxygen : no SOB : denies any chest pain    MEDICATIONS  (STANDING):  ALBUTerol    90 MICROgram(s) HFA Inhaler 1 Puff(s) Inhalation every 4 hours  aspirin enteric coated 81 milliGRAM(s) Oral daily  atorvastatin 80 milliGRAM(s) Oral at bedtime  budesonide 160 MICROgram(s)/formoterol 4.5 MICROgram(s) Inhaler 2 Puff(s) Inhalation two times a day  dextrose 5%. 1000 milliLiter(s) (50 mL/Hr) IV Continuous <Continuous>  dextrose 50% Injectable 12.5 Gram(s) IV Push once  dextrose 50% Injectable 25 Gram(s) IV Push once  dextrose 50% Injectable 25 Gram(s) IV Push once  enoxaparin Injectable 40 milliGRAM(s) SubCutaneous daily  gabapentin 100 milliGRAM(s) Oral three times a day  influenza   Vaccine 0.5 milliLiter(s) IntraMuscular once  insulin glargine Injectable (LANTUS) 32 Unit(s) SubCutaneous at bedtime  insulin lispro (HumaLOG) corrective regimen sliding scale   SubCutaneous three times a day before meals  insulin lispro (HumaLOG) corrective regimen sliding scale   SubCutaneous at bedtime  insulin lispro Injectable (HumaLOG) 10 Unit(s) SubCutaneous three times a day before meals  magnesium sulfate  IVPB 1 Gram(s) IV Intermittent daily  polyethylene glycol 3350 17 Gram(s) Oral daily  potassium chloride   Solution 40 milliEquivalent(s) Oral every 2 hours  senna 2 Tablet(s) Oral at bedtime    MEDICATIONS  (PRN):  acetaminophen   Tablet .. 650 milliGRAM(s) Oral every 6 hours PRN Moderate Pain (4 - 6)  dextrose 40% Gel 15 Gram(s) Oral once PRN Blood Glucose LESS THAN 70 milliGRAM(s)/deciliter  glucagon  Injectable 1 milliGRAM(s) IntraMuscular once PRN Glucose LESS THAN 70 milligrams/deciliter  haloperidol     Tablet 1 milliGRAM(s) Oral every 6 hours PRN agitation    Vital Signs Last 24 Hrs  T(C): 36.8 (28 Sep 2020 09:58), Max: 37 (27 Sep 2020 12:26)  T(F): 98.3 (28 Sep 2020 09:58), Max: 98.6 (27 Sep 2020 12:26)  HR: 77 (28 Sep 2020 09:58) (68 - 88)  BP: 126/70 (28 Sep 2020 09:58) (118/72 - 137/67)  BP(mean): --  RR: 18 (28 Sep 2020 09:58) (18 - 20)  SpO2: 100% (28 Sep 2020 09:58) (99% - 100%)    I&O's Summary    27 Sep 2020 07:01  -  28 Sep 2020 07:00  --------------------------------------------------------  IN: 580 mL / OUT: 900 mL / NET: -320 mL    28 Sep 2020 07:01  -  28 Sep 2020 10:37  --------------------------------------------------------  IN: 0 mL / OUT: 200 mL / NET: -200 mL          Physical Exam:   GENERAL: NAD, well-groomed, well-developed  HEENT: DON/   Atraumatic, Normocephalic  ENMT: No tonsillar erythema, exudates, or enlargement; Moist mucous membranes, Good dentition, No lesions  NECK: Supple, No JVD, Normal thyroid  CHEST/LUNG: Clear to auscultaion  CVS: Regular rate and rhythm  GI: : Soft, Nontender, Nondistended; Bowel sounds present  NERVOUS SYSTEM:  Alert & Oriented X2  EXTREMITIES:  2+ Peripheral Pulses, No clubbing, cyanosis, or edema  LYMPH: No lymphadenopathy noted  SKIN: No rashes or lesions  ENDOCRINOLOGY: No Thyromegaly  PSYCH: Mildly confused    Labs:  47, 47, 45                            12.1   7.60  )-----------( 131      ( 28 Sep 2020 07:11 )             39.6                         13.8   8.31  )-----------( 170      ( 24 Sep 2020 11:55 )             43.9     09-28    142  |  96  |  31<H>  ----------------------------<  245<H>  4.5   |  38<H>  |  0.83  09-27    140  |  94<L>  |  28<H>  ----------------------------<  232<H>  3.7   |  39<H>  |  0.80  09-26    141  |  93<L>  |  28<H>  ----------------------------<  186<H>  3.8   |  39<H>  |  0.78  09-25    144  |  94<L>  |  32<H>  ----------------------------<  282<H>  4.4   |  >45<HH>  |  0.94  09-24    146<H>  |  84<L>  |  37<H>  ----------------------------<  317<H>  3.3<L>   |  >45<HH>  |  1.12    Ca    9.7      28 Sep 2020 07:11  Ca    9.5      27 Sep 2020 05:33      CAPILLARY BLOOD GLUCOSE      POCT Blood Glucose.: 271 mg/dL (28 Sep 2020 07:48)  POCT Blood Glucose.: 202 mg/dL (27 Sep 2020 21:59)  POCT Blood Glucose.: 213 mg/dL (27 Sep 2020 19:20)  POCT Blood Glucose.: 336 mg/dL (27 Sep 2020 12:53)          r< from: CT Head No Cont (09.21.20 @ 15:23) >      INTERPRETATION:  Clinical indication: Slurred speech.    Multiple axial sections were performed from base of skull to vertex without contrast enhancement. Coronal andsagittal reconstructions were performed as well    This exam is compared with prior noncontrast head CT performed on July 16, 2020.    Periventricular matter lucency is again seen which is likely related to chronic microvascular ischemic changes    There is no evidence acute hemorrhage mass or mass effect seen.    Evaluation of the osseous structures with the appears normal    The visualized paranasal sinuses mastoid and middle ear inspected clear.    IMPRESSION: No acute hemorrhage, mass or masseffect.    If symptoms continue MRI can be done for further evaluation if there are no contraindications.                  JORGE FUENTES M.D., ATTENDING RADIOLOGIST  This document has been electronically signed. Sep 21 2020  3:26PM    < end of copied text >  < from: Xray Chest 1 View AP/PA (09.21.20 @ 14:26) >      PROCEDURE DATE:  09/21/2020            INTERPRETATION:  CLINICAL INFORMATION: Shortness of breath.    A frontal view of the chest was obtained.    Comparison: 7/15/2020.    IMPRESSION:    The mediastinal cardiac silhouette is unremarkable.    Bibasilar atelectasis.    Severe thoracolumbar scoliosis.                      JONAS MILLER M.D., ATTENDING RADIOLOGIST  This document has been electronically signed. Sep 21 2020  2:29PM    < end of copied text >  < from: US Duplex Venous Lower Ext Complete, Bilateral (07.15.20 @ 19:29) >    EXAM:  US DPLX LWR EXT VEINS COMPL BI                            PROCEDURE DATE:  07/15/2020          INTERPRETATION:  Ultrasound of the lower extremity deep venous system         CLINICAL INFORMATION:  Elevated d-dimer, evaluate for deep venous thrombosis.    TECHNIQUE:   Duplex ultrasonography with color and spectral doppler of the bilateral lower extremity deep venous system was performed.    FINDINGS:    No previous examinations are  available for review.    The bilateral lower extremity deep venous system demonstrated no abnormality.  The veins were patent with intact Doppler flow.  The flow varied with respiration and augmented with distal calf compression.  The veins were directly compressible by the ultrasound transducer.       The veins evaluated included the common femoral vein, the inflow of the greater saphenous vein, the inflow of the deep femoral vein, the superficial femoral vein, the popliteal vein and posterior tibial veins.        IMPRESSION:   Unremarkable ultrasound of the bilateral lower extremity deep venous system.                      ARLENE BOLIVAR M.D., ATTENDING RADIOLOGIST  This document has been electronically signed. Jul 15 2020  7:50PM        < end of copied text >  < from: CT Angio Chest w/ IV Cont (07.15.20 @ 14:57) >  LUNGS AND AIRWAYS: Patent central airways.  Very low lung volumes with bibasilar subsegmental atelectasis. Nodefinite pneumonia.  PLEURA: No pleural effusion.  MEDIASTINUM AND RYAN: No lymphadenopathy.  VESSELS: Satisfactory contrast bolus. No pulmonary emboli. Normal caliber thoracic aorta  HEART: Slightly enlarged. No pericardial effusion.  CHEST WALL ANDLOWER NECK: Slightly enlarged multinodular thyroid..  VISUALIZED UPPER ABDOMEN: Bilateral nonobstructive renal calculi. Additional dystrophic cortical calcifications within a markedly atrophic right kidney. Right renal cyst.  BONES: Advanced spinal degenerative changes and profound compound thoracolumbar scoliosis.    IMPRESSION:   Negative for pulmonary emboli.  Low lung volumes with bibasilar subsegmental atelectasis.  Additional findings as discussed                  LUMA ARANDA M.D., ATTENDING RADIOLOGIST  This document has been electronically signed. Jul 15 2020  3:15PM        < end of copied text >          RECENT CULTURES:  09-24 @ 22:03 .Urine Clean Catch (Midstream)                10,000 - 49,000 CFU/mL Yeast-like cells, presumptively not Candida  albicans "Susceptibilities not performed"  10,000 - 49,000 CFU/mL Coag Negative Staphylococcus "Susceptibilities not  performed"    09-24 @ 16:38 .Blood Blood-Peripheral                No growth to date.    09-22 @ 00:42 .Blood Blood                No Growth Final    09-21 @ 20:34 .Blood Blood   PCR    Growth in aerobic bottle: Gram Positive Cocci in Clusters  Growth in anaerobic bottle: Gram Positive Cocci in Clusters and Gram  Positive Cocci in Pairs and Chains    Blood Culture PCR  Blood Culture PCR  Blood Culture PCR     Growth in aerobic and anaerobic bottles: Staphylococcus cohnii Coag  Negative Staphylococcus  Single set isolate, possible contaminant. Contact  Microbiology if susceptibility testing clinically  indicated.  Growth in aerobic and anaerobic bottles:Streptococcus mitis/oralis group  Alpha hemolytic strep  (not Strep. pneumoniae or Enterococcus)  Single set isolate, possible contaminant. Contact  Microbiology if susceptibility testing clinically  indicated.  "Due to technical problems, Proteus sp. will Not be reported as part of  the BCID panel until further notice"  ***Blood Panel PCR results on this specimen are available  approximately 3 hours after the Gram stain result.***  Gram stain, PCR, and/or culture results may not always  correspond due to difference in methodologies.  ************************************************************  This PCR assay was performed using [employer].  The following targets are tested for: Enterococcus,  vancomycin resistant enterococci, Listeria monocytogenes,  coagulase negative staphylococci, S. aureus,  methicillin resistant S. aureus, Streptococcus agalactiae  (Group B), S. pneumoniae, S. pyogenes (Group A),  Acinetobacter baumannii, Enterobacter cloacae, E. coli,  Klebsiella oxytoca, K. pneumoniae, Proteus sp.,  Serratia marcescens, Haemophilus influenzae,  Neisseria meningitidis, Pseudomonas aeruginosa, Candida  albicans, C. glabrata, C krusei, C parapsilosis,  C. tropicalis and the KPC resistance gene.    09-21 @ 17:24 .Urine Clean Catch (Midstream)                >=3 organisms. Probable collection contamination.          RESPIRATORY CULTURES:          Studies  Chest X-RAY  CT SCAN Chest   Venous Dopplers: LE:   CT Abdomen  Others

## 2020-09-28 NOTE — PROGRESS NOTE ADULT - ASSESSMENT
ASSESSMENT/PLAN:  80 year old F with pmhx of  DMT2 (on Metformin and insulin), COPD, CHF, HLD, HTN, kidney stones and pshx of R nephrectomy, L knee surgery presents to ED with elevated blood sugar, SOB, and lethargy.  Elevated HCO3 is likely contraction alkalosis   Hypernatremia - improved   CKD stage 3 at minimum    1 CVS- Off IVF. At present she is not in heart failure.   2 Renal-CKD on the basis of reduced nephron mass and likely DM.  Quantify proteinuria.  Renal sono would be a good idea to rule masses but can be done as outpt.  HCO3 improved and i do not believe that it is the cause of mental status changes.  In any event will give another DIamox 250mg IV x 1 and continue to trend.  Neuro eval noted   3 Endo-Needs tighter blood sugar management as outpt     Sayed jeannette   [facility]  [phone]

## 2020-09-28 NOTE — DIETITIAN INITIAL EVALUATION ADULT. - ADD RECOMMEND
1) Continue current consistent carbohydrate, no caffeine diet. 2) Continue to provide probiotic yogurt. 3) Reinforce Type 2 Diabetes Nutrition Therapy education as able. 4) Continue to trend labs, weight, skin integrity, and intake.

## 2020-09-28 NOTE — PROGRESS NOTE ADULT - SUBJECTIVE AND OBJECTIVE BOX
80y old  Female who presents with a chief complaint of sob, hyperglycemia (28 Sep 2020 15:01)      Interval history:  Afebrile, no abdominal pain. No diarrhea per RN.       Allergies:   No Known Allergies      Antimicrobials:      REVIEW OF SYSTEMS:  No chest pain  No N/V  No rash.       Vital Signs Last 24 Hrs  T(C): 36.9 (09-28-20 @ 13:18), Max: 36.9 (09-28-20 @ 13:18)  T(F): 98.4 (09-28-20 @ 13:18), Max: 98.4 (09-28-20 @ 13:18)  HR: 88 (09-28-20 @ 13:18) (68 - 88)  BP: 132/84 (09-28-20 @ 13:18) (118/72 - 137/67)  BP(mean): --  RR: 18 (09-28-20 @ 13:18) (18 - 20)  SpO2: 100% (09-28-20 @ 13:18) (99% - 100%)      PHYSICAL EXAM:  Patient in no acute distress. Alert, awake, communicative.  Cardiovascular: S1S2 normal,   Lungs: + air entry B/L lung fields.  Gastrointestinal: soft, nontender, nondistended.  Extremities: no edema. lt knee disfigured   IV sites not inflamed.                            12.1   7.60  )-----------( 131      ( 28 Sep 2020 07:11 )             39.6   09-28    142  |  96  |  31<H>  ----------------------------<  245<H>  4.5   |  38<H>  |  0.83    Ca    9.7      28 Sep 2020 07:11      Culture - Urine (09.24.20 @ 22:03)   Specimen Source: .Urine Clean Catch (Midstream)   Culture Results:   10,000 - 49,000 CFU/mL Yeast-like cells, presumptively not Candida

## 2020-09-28 NOTE — DIETITIAN INITIAL EVALUATION ADULT. - REASON INDICATOR FOR ASSESSMENT
Patient seen for length of stay.  Source: Medical record and pt's daughter at bedside (pt noted as confused)  Pt is an 79 y/o F with Hx DMT2, COPD, CHF, HLD, HTN, kidney stones and R nephrectomy, L knee surgery. Presents with elevated blood sugar, SOB, and lethargy. Pt with sepsis sec to UTI, Hypernatremia (improved), CKD stage 3 at minimum. Hospital course c/b metabolic alkalosis, improving.

## 2020-09-28 NOTE — PROGRESS NOTE ADULT - SUBJECTIVE AND OBJECTIVE BOX
Chief complaint  Patient is a 80y old  Female who presents with a chief complaint of sob, hyperglycemia (28 Sep 2020 18:04)   Review of systems  Patient in bed, appears comfortable.    Labs and Fingersticks  CAPILLARY BLOOD GLUCOSE      POCT Blood Glucose.: 325 mg/dL (28 Sep 2020 18:00)  POCT Blood Glucose.: 327 mg/dL (28 Sep 2020 12:21)  POCT Blood Glucose.: 271 mg/dL (28 Sep 2020 07:48)  POCT Blood Glucose.: 202 mg/dL (27 Sep 2020 21:59)  POCT Blood Glucose.: 213 mg/dL (27 Sep 2020 19:20)      Anion Gap, Serum: 8 (09-28 @ 07:11)  Anion Gap, Serum: 7 (09-27 @ 05:33)      Calcium, Total Serum: 9.7 (09-28 @ 07:11)  Calcium, Total Serum: 9.5 (09-27 @ 05:33)          09-28    142  |  96  |  31<H>  ----------------------------<  245<H>  4.5   |  38<H>  |  0.83    Ca    9.7      28 Sep 2020 07:11                          12.1   7.60  )-----------( 131      ( 28 Sep 2020 07:11 )             39.6     Medications  MEDICATIONS  (STANDING):  ALBUTerol    90 MICROgram(s) HFA Inhaler 1 Puff(s) Inhalation every 4 hours  aspirin enteric coated 81 milliGRAM(s) Oral daily  atorvastatin 80 milliGRAM(s) Oral at bedtime  budesonide 160 MICROgram(s)/formoterol 4.5 MICROgram(s) Inhaler 2 Puff(s) Inhalation two times a day  dextrose 5%. 1000 milliLiter(s) (50 mL/Hr) IV Continuous <Continuous>  dextrose 50% Injectable 12.5 Gram(s) IV Push once  dextrose 50% Injectable 25 Gram(s) IV Push once  dextrose 50% Injectable 25 Gram(s) IV Push once  enoxaparin Injectable 40 milliGRAM(s) SubCutaneous daily  gabapentin 100 milliGRAM(s) Oral three times a day  influenza   Vaccine 0.5 milliLiter(s) IntraMuscular once  insulin glargine Injectable (LANTUS) 32 Unit(s) SubCutaneous at bedtime  insulin lispro (HumaLOG) corrective regimen sliding scale   SubCutaneous three times a day before meals  insulin lispro (HumaLOG) corrective regimen sliding scale   SubCutaneous at bedtime  insulin lispro Injectable (HumaLOG) 10 Unit(s) SubCutaneous three times a day before meals  magnesium sulfate  IVPB 1 Gram(s) IV Intermittent daily  polyethylene glycol 3350 17 Gram(s) Oral daily  potassium chloride   Solution 40 milliEquivalent(s) Oral every 2 hours  senna 2 Tablet(s) Oral at bedtime      Physical Exam  General: Patient comfortable in bed  Vital Signs Last 12 Hrs  T(F): 98 (09-28-20 @ 16:50), Max: 98.4 (09-28-20 @ 13:18)  HR: 88 (09-28-20 @ 16:50) (77 - 88)  BP: 149/76 (09-28-20 @ 16:50) (126/70 - 149/76)  BP(mean): --  RR: 18 (09-28-20 @ 16:50) (18 - 18)  SpO2: 99% (09-28-20 @ 16:50) (99% - 100%)  Neck: No palpable thyroid nodules.  CVS: S1S2, No murmurs  Respiratory: No wheezing, no crepitations  GI: Abdomen soft, bowel sounds positive  Musculoskeletal:  edema lower extremities.     Diagnostics

## 2020-09-28 NOTE — OCCUPATIONAL THERAPY INITIAL EVALUATION ADULT - LIVES WITH, PROFILE
As per ulises, pt lives with daughter in single story house. Pt was mostly Independent with ADLs however required some partial assistance throughout the day for ADLs like UB dressing and bathing. Pt uses RW for ambulation and shower chair, commode and has installed grab bars./children

## 2020-09-28 NOTE — OCCUPATIONAL THERAPY INITIAL EVALUATION ADULT - ADDITIONAL COMMENTS
Pt endorses excess urination and thirst over the last few days. She also reports mild SOB x few days that is worse when lying flat. Pt also reports constant CP that has been present for "quite a while", unable to specify how much time. Denies fever, chills, worsening leg swelling. Found to have UTI, being treated however still with AMS. CTH (-) XRAY Chest: The mediastinal cardiac silhouette is unremarkable. Bibasilar atelectasis. Severe thoracolumbar scoliosis.

## 2020-09-28 NOTE — DIETITIAN INITIAL EVALUATION ADULT. - PERTINENT LABORATORY DATA
09-28 Na 142 mmol/L Glu 245 mg/dL<H> K+ 4.5 mmol/L Cr  0.83 mg/dL BUN 31 mg/dL<H>  Hgb 12.1 g/dL Hct 39.6 %  09-22 A1C 8.1%  CAPILLARY BLOOD GLUCOSE  POCT Blood Glucose.: 327 mg/dL (28 Sep 2020 12:21)  POCT Blood Glucose.: 271 mg/dL (28 Sep 2020 07:48)  POCT Blood Glucose.: 202 mg/dL (27 Sep 2020 21:59)  POCT Blood Glucose.: 213 mg/dL (27 Sep 2020 19:20)

## 2020-09-28 NOTE — OCCUPATIONAL THERAPY INITIAL EVALUATION ADULT - RANGE OF MOTION EXAMINATION, LOWER EXTREMITY
Left LE Active ROM was WFL (within functional limits)/Right LE Active ROM was WFL   (within functional limits)/L Knee joint removed (2016): flexion 0-45

## 2020-09-28 NOTE — DIETITIAN INITIAL EVALUATION ADULT. - OTHER INFO
Diet PTA: Pt was eating well with no changes in appetite. Daughter had pt on a low-sodium, low refined carbohydrate diet, however pt would find sweets hidden in the house causing elevated blood glucose; eats well-balanced meals (daughter does the cooking, used to receive Meals on Wheels). Confirms no known food allergies. Denies Hx of chewing or swallowing issues. Denies micronutrient or supplement use.     Pt with Hx of T2DM; manages with metformin and glargine. Checks blood sugars 1x daily after meals with normal range in the 200s mg/dL. Recent A1C 8.1% (9/22) indicating poor glycemic control.     Dosing wt: 149.9 lbs. Daily wt in lbs: 159.6 (9/27), 159.6 (9/23). Reports UBW of 160 lb, denies any recent changes in wt.    Pt is eating fair with no changes in appetite. Pt observed with lunch tray ~30% complete, pt states she wants to eat however is having trouble breathing currently so not able to finish food. Intake typically good throughout admission. Denies recent N/V, diarrhea, or constipation. Last BM 9/27. Provided verbal and written education on Nutrition Therapy for Type 2 Diabetes. Emphasis on pairing carbohydrates with fat + protein for glycemic control; portion sizes; choosing whole grains vs refined carbohydrates; limiting refined sugars. Provided handout Type 2 Diabetes Nutrition Therapy. Good comprehension noted. Pt made aware RD to remain available for any questions. Pt not amenable to nutrition supplements at this time.

## 2020-09-28 NOTE — PROGRESS NOTE ADULT - ASSESSMENT
80 year old F with pmhx of  DMT2 (on Metformin and insulin), COPD, CHF, HLD, HTN, kidney stones and pshx of R nephrectomy, L knee surgery presents to ED bibems with elevated blood sugar, SOB, and lethargy.      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   Abnormal u/a   lactic acidosis, hypoxia on admission.   urine cx contaminated   new polymicrobial bacteremia. ? significance   No fever or leucocytosis       Plan:   urine cx contaminated,   repeat urine cx with yeast and CoNS  repeat blood cx NTD   follow up prelim blood cx, growing strep mitis and CoNS   glycemic control   observe off abx for now   consider CT abd/pelvis to ensure no intraabdominal source.   ESR/CRP in am

## 2020-09-28 NOTE — PROGRESS NOTE ADULT - PROBLEM SELECTOR PLAN 1
Will increase Lantus to 32 units at bed time.  Will  continue Humalog 10 units before each meal in addition to Humalog correction scale coverage.  Patient counseled for compliance with consistent low carb diet.

## 2020-09-28 NOTE — CHART NOTE - NSCHARTNOTEFT_GEN_A_CORE
Case reviewed with attending and neurology team. At this time, would not recommend further inpatient neurologic workup, can follow up outpatient for further management and care with Stephanie Prasad([phone]). Will sign off.

## 2020-09-28 NOTE — PROGRESS NOTE ADULT - ASSESSMENT
80 year old F with pmhx of  DMT2 (on Metformin and insulin), COPD, CHF, HLD, HTN, kidney stones and pshx of R nephrectomy, L knee surgery presents to ED bibems with elevated blood sugar, SOB, and lethargy found to have UTI.     1. SOB, acute/chronic diastolic chf exacerbation  -cv stable, volume status acceptable   -symptoms also 2/2 to DKA, COPD hx   -s/p lasix, s/p IVF. Co2 improving    -echo in july with nl LV sys fx, ef 55-60%, mild MR, mod pulm htn    -pulm f/u , received diamox yest.     2. Hyperglycemia  -management per primary team     3. UTI  -+BCX likely contaminant: repeat neg  -s/p abx per primary team     4. Hypokalemia  -supplementation per primary team, continue to trend.     5. COPD , hx  -c/w home meds, pulm f/u     dvt ppx

## 2020-09-29 NOTE — PROGRESS NOTE ADULT - SUBJECTIVE AND OBJECTIVE BOX
[facility] [phone]        Patient seen and examined in bed.   She was about the same         MEDICATIONS  (STANDING):  ALBUTerol    90 MICROgram(s) HFA Inhaler 1 Puff(s) Inhalation every 4 hours  aspirin enteric coated 81 milliGRAM(s) Oral daily  atorvastatin 80 milliGRAM(s) Oral at bedtime  budesonide 160 MICROgram(s)/formoterol 4.5 MICROgram(s) Inhaler 2 Puff(s) Inhalation two times a day  dextrose 5%. 1000 milliLiter(s) (50 mL/Hr) IV Continuous <Continuous>  dextrose 50% Injectable 12.5 Gram(s) IV Push once  dextrose 50% Injectable 25 Gram(s) IV Push once  dextrose 50% Injectable 25 Gram(s) IV Push once  enoxaparin Injectable 40 milliGRAM(s) SubCutaneous daily  gabapentin 100 milliGRAM(s) Oral three times a day  influenza   Vaccine 0.5 milliLiter(s) IntraMuscular once  insulin glargine Injectable (LANTUS) 32 Unit(s) SubCutaneous at bedtime  insulin lispro (HumaLOG) corrective regimen sliding scale   SubCutaneous three times a day before meals  insulin lispro (HumaLOG) corrective regimen sliding scale   SubCutaneous at bedtime  insulin lispro Injectable (HumaLOG) 10 Unit(s) SubCutaneous three times a day before meals  magnesium sulfate  IVPB 1 Gram(s) IV Intermittent daily  polyethylene glycol 3350 17 Gram(s) Oral daily  potassium chloride   Solution 40 milliEquivalent(s) Oral every 2 hours  senna 2 Tablet(s) Oral at bedtime      VITAL:  T(C): , Max: 36.9 (09-28-20 @ 13:18)  T(F): , Max: 98.4 (09-28-20 @ 13:18)  HR: 67 (09-29-20 @ 06:12)  BP: 115/74 (09-29-20 @ 06:12)  BP(mean): --  RR: 18 (09-29-20 @ 06:12)  SpO2: 99% (09-29-20 @ 06:12)  Wt(kg): --    I and O's:    09-28 @ 07:01  -  09-29 @ 07:00  --------------------------------------------------------  IN: 1220 mL / OUT: 851 mL / NET: 369 mL          PHYSICAL EXAM:    Constitutional: NAD  Neck:  No JVD  Respiratory: CTAB/L  Cardiovascular: S1 and S2  Gastrointestinal: BS+, soft, NT/ND  Extremities: No peripheral edema  Neurological: , no focal deficits  Psychiatric: unable   : No Pearce  Skin: No rashes  Access: Not applicable    LABS:                        11.2   6.26  )-----------( 126      ( 29 Sep 2020 07:20 )             36.1     09-29    142  |  97  |  30<H>  ----------------------------<  224<H>  3.9   |  37<H>  |  0.76    Ca    9.3      29 Sep 2020 07:20            Urine Studies:          RADIOLOGY & ADDITIONAL STUDIES:

## 2020-09-29 NOTE — PROGRESS NOTE ADULT - SUBJECTIVE AND OBJECTIVE BOX
80y old  Female who presents with a chief complaint of sob, hyperglycemia (29 Sep 2020 17:02)      Interval history:  Afebrile, denies any pain, no dysuria.     Allergies:   No Known Allergies      Antimicrobials:      REVIEW OF SYSTEMS:  No chest pain   No N/V  No rash.       Vital Signs Last 24 Hrs  T(C): 36.7 (09-29-20 @ 16:41), Max: 36.9 (09-29-20 @ 08:57)  T(F): 98.1 (09-29-20 @ 16:41), Max: 98.4 (09-29-20 @ 08:57)  HR: 79 (09-29-20 @ 16:41) (67 - 81)  BP: 128/69 (09-29-20 @ 16:41) (115/74 - 138/82)  BP(mean): --  RR: 18 (09-29-20 @ 16:41) (18 - 18)  SpO2: 99% (09-29-20 @ 16:41) (96% - 100%)      PHYSICAL EXAM:  Patient in no acute distress. Alert, awake, communicative.  Cardiovascular: S1S2 normal,   Lungs: + air entry B/L lung fields.  Gastrointestinal: soft, nontender, nondistended.  Extremities: no edema. lt knee disfigured   IV sites not inflamed.                            11.2   6.26  )-----------( 126      ( 29 Sep 2020 07:20 )             36.1   09-29    142  |  97  |  30<H>  ----------------------------<  224<H>  3.9   |  37<H>  |  0.76    Ca    9.3      29 Sep 2020 07:20

## 2020-09-29 NOTE — PROGRESS NOTE ADULT - SUBJECTIVE AND OBJECTIVE BOX
prProblem/Plan - 1:  ·  Problem: sepsis sec to UTI (urinary tract infection) with bactremia with metabolic encephalopathy POA .  Plan: dc ertepanum  id fu appreciated     Problem/Plan - 2:  ·  Problem: Hyperglycemia ion alkalosis with contract  Plan: monitor FS  ISS.   renal fuIVF     Problem/Plan - 3:  ·  Problem: AMS   Plan: metabolic encephalopathy  psych consult  neuro consult appreciated    Problem/Plan - 4:  ·  Problem: COPD (chronic obstructive pulmonary disease).  Plan: cw home meds

## 2020-09-29 NOTE — PROGRESS NOTE ADULT - ASSESSMENT
ASSESSMENT/PLAN:  80 year old F with pmhx of  DMT2 (on Metformin and insulin), COPD, CHF, HLD, HTN, kidney stones and pshx of R nephrectomy, L knee surgery presents to ED with elevated blood sugar, SOB, and lethargy.  Elevated HCO3 is from CO2 retention now.  Last pH was 7.38  Hypernatremia - improved   CKD stage 3 at minimum from reduced nephron mass     1 CVS- Off IVF. At present she is not in heart failure.   2 Renal-CKD on the basis of reduced nephron mass and likely DM.  Quantify proteinuria.  Renal sono would be a good idea to rule masses but can be done as outpt.   No need for additional diamox   3 Endo-Needs tighter blood sugar management as outpt     [facility]  [phone]

## 2020-09-29 NOTE — PROGRESS NOTE ADULT - ASSESSMENT
Assessment  DMT2: 80y Female with DM T2 with hyperglycemia, A1C 8.1%, was on oral meds and insulin at home, now on basal bolus insulin, increased dose, blood sugars still running high and not at target, no hypoglycemic episodes. Patient is eating meals, appears comfortable, no acute events.  UTI: on meds, stable, monitored.          Alexander Shaver MD  Cell: [phone]  Office: [phone]               Assessment  DMT2: 80y Female with DM T2 with hyperglycemia, A1C 8.1%, was on oral meds and insulin at home,  now on basal bolus insulin, increased dose, blood sugars still running high and not at target, no hypoglycemic episodes. Patient is eating meals, appears comfortable, no acute events.  UTI: on meds, stable, monitored.          Alexander Shaevr MD  Cell: [phone]  Office: [phone]

## 2020-09-29 NOTE — PROGRESS NOTE ADULT - SUBJECTIVE AND OBJECTIVE BOX
Chief complaint  Patient is a 80y old  Female who presents with a chief complaint of sob, hyperglycemia (29 Sep 2020 08:58)   Review of systems  Patient in bed, looks comfortable, no hypoglycemic episodes.    Labs and Fingersticks  CAPILLARY BLOOD GLUCOSE      POCT Blood Glucose.: 295 mg/dL (29 Sep 2020 12:14)  POCT Blood Glucose.: 299 mg/dL (29 Sep 2020 08:03)  POCT Blood Glucose.: 291 mg/dL (28 Sep 2020 22:03)  POCT Blood Glucose.: 325 mg/dL (28 Sep 2020 18:00)    Medications  MEDICATIONS  (STANDING):  ALBUTerol    90 MICROgram(s) HFA Inhaler 1 Puff(s) Inhalation every 4 hours  aspirin enteric coated 81 milliGRAM(s) Oral daily  atorvastatin 80 milliGRAM(s) Oral at bedtime  budesonide 160 MICROgram(s)/formoterol 4.5 MICROgram(s) Inhaler 2 Puff(s) Inhalation two times a day  dextrose 5%. 1000 milliLiter(s) (50 mL/Hr) IV Continuous <Continuous>  dextrose 50% Injectable 12.5 Gram(s) IV Push once  dextrose 50% Injectable 25 Gram(s) IV Push once  dextrose 50% Injectable 25 Gram(s) IV Push once  enoxaparin Injectable 40 milliGRAM(s) SubCutaneous daily  gabapentin 100 milliGRAM(s) Oral three times a day  influenza   Vaccine 0.5 milliLiter(s) IntraMuscular once  insulin glargine Injectable (LANTUS) 36 Unit(s) SubCutaneous at bedtime  insulin lispro (HumaLOG) corrective regimen sliding scale   SubCutaneous at bedtime  insulin lispro (HumaLOG) corrective regimen sliding scale   SubCutaneous three times a day before meals  insulin lispro Injectable (HumaLOG) 12 Unit(s) SubCutaneous three times a day before meals  magnesium sulfate  IVPB 1 Gram(s) IV Intermittent daily  polyethylene glycol 3350 17 Gram(s) Oral daily  potassium chloride   Solution 40 milliEquivalent(s) Oral every 2 hours  senna 2 Tablet(s) Oral at bedtime      Physical Exam  General: Patient comfortable in bed  Vital Signs Last 12 Hrs  T(F): 98.3 (09-29-20 @ 12:39), Max: 98.4 (09-29-20 @ 08:57)  HR: 78 (09-29-20 @ 12:39) (67 - 78)  BP: 128/67 (09-29-20 @ 12:39) (115/74 - 138/82)  BP(mean): --  RR: 18 (09-29-20 @ 12:39) (18 - 18)  SpO2: 100% (09-29-20 @ 12:39) (96% - 100%)  Neck: No palpable thyroid nodules.   Chief complaint  Patient is a 80y old  Female who presents with a chief complaint of sob, hyperglycemia (29 Sep 2020 08:58)   Review of systems  Patient in bed, looks comfortable, no hypoglycemic episodes.    Labs and Fingersticks  CAPILLARY BLOOD GLUCOSE      POCT Blood Glucose.: 295 mg/dL (29 Sep 2020 12:14)  POCT Blood Glucose.: 299 mg/dL (29 Sep 2020 08:03)  POCT Blood Glucose.: 291 mg/dL (28 Sep 2020 22:03)  POCT Blood Glucose.: 325 mg/dL (28 Sep 2020 18:00)    Medications  MEDICATIONS  (STANDING):  ALBUTerol    90 MICROgram(s) HFA Inhaler 1 Puff(s) Inhalation every 4 hours  aspirin enteric coated 81 milliGRAM(s) Oral daily  atorvastatin 80 milliGRAM(s) Oral at bedtime  budesonide 160 MICROgram(s)/formoterol 4.5 MICROgram(s) Inhaler 2 Puff(s) Inhalation two times a day  dextrose 5%. 1000 milliLiter(s) (50 mL/Hr) IV Continuous <Continuous>  dextrose 50% Injectable 12.5 Gram(s) IV Push once  dextrose 50% Injectable 25 Gram(s) IV Push once  dextrose 50% Injectable 25 Gram(s) IV Push once  enoxaparin Injectable 40 milliGRAM(s) SubCutaneous daily  gabapentin 100 milliGRAM(s) Oral three times a day  influenza   Vaccine 0.5 milliLiter(s) IntraMuscular once  insulin glargine Injectable (LANTUS) 36 Unit(s) SubCutaneous at bedtime  insulin lispro (HumaLOG) corrective regimen sliding scale   SubCutaneous at bedtime  insulin lispro (HumaLOG) corrective regimen sliding scale   SubCutaneous three times a day before meals  insulin lispro Injectable (HumaLOG) 12 Unit(s) SubCutaneous three times a day before meals  magnesium sulfate  IVPB 1 Gram(s) IV Intermittent daily  polyethylene glycol 3350 17 Gram(s) Oral daily  potassium chloride   Solution 40 milliEquivalent(s) Oral every 2 hours  senna 2 Tablet(s) Oral at bedtime      Physical Exam  General: Patient comfortable in bed  Vital Signs Last 12 Hrs  T(F): 98.3 (09-29-20 @ 12:39), Max: 98.4 (09-29-20 @ 08:57)  HR: 78 (09-29-20 @ 12:39) (67 - 78)  BP: 128/67 (09-29-20 @ 12:39) (115/74 - 138/82)  BP(mean): --  RR: 18 (09-29-20 @ 12:39) (18 - 18)  SpO2: 100% (09-29-20 @ 12:39) (96% - 100%)  Neck: No palpable thyroid nodules.

## 2020-09-29 NOTE — PROVIDER CONTACT NOTE (OTHER) - SITUATION
Pt placed OOB to chair with walker with 2 PT's. Pt has +SOB, dyspnea 02 NC Sat %;  increased to 4LNC until pt regained control of breathing  Patients family refusing CT.

## 2020-09-29 NOTE — PROGRESS NOTE ADULT - ASSESSMENT
80 year old F with pmhx of  DMT2 (on Metformin and insulin), COPD, CHF, HLD, HTN, kidney stones and pshx of R nephrectomy, L knee surgery presents to ED bibems with elevated blood sugar, SOB, and lethargy. Per EMS, pt with 485 blood sugar, BP of 144/75, and O2 sat 92% on room air.  Daughter notes period of incoherent speech over the weekend. Pt endorses excess urination and thirst over the last few days. She also reports mild SOB x few days that is worse when lying flat. Pt also reports constant CP that has been present for "quite a while", unable to specify how much time. Denies fever, chills, worsening leg swelling.    COPD:  Thoraco lumbar scoliosis  Uncontrolled DM:  UTI  Confusion  CHF    she is admitted with high blood glucose and found to have uti:  She has copd:  She was recently admitted to [facility] where she was treated with copd exacerbation:  At that time her CTA was negative and no indication of malignancy on ct chest :  Currently she is not SOB : her outpt  meds noted:  She is already on Symbicort as well as inhalers:  currently she is also being treated for chf   her venous ABG towards met alk side and seems to be a chronic retainer too   I don't think she needs bipap at this time:  She seems pretty confused: ct head is negative for bleed:  would defer to primary team :  DVT propahyxlis    9/23:  pt is not SOB at this time: but emotionally labile  not wheezing:   she is not wheezing  She has chronically elevated co2:  cont Symbicort  her venous abg reviewed:   CT head neg for bleed:   MAURICE NP    9/24:  she looks better today : she has coag neg staph in blood cultures: likely contaminant:  She is not wheezing:  On 2 L of oxygen : No resp distress:   Has chr hypercarbia:  Cont inhalers not wheezing: Blood glucose better controlled:   mentally she is better today :  MAURICE NP :  dvt propahyxlis    9/25:  she has metabolic alkalosis" with  increased co2:  reviewed nephrology   on normal saline   off diuretics n ow: \not wheezing:   despite iv fluids her hCO3 remains elevated:   DIAMOX X 1 TODASY AND REVIEWED HCO3 AGAIN TOMORROW:  maurice renal:    9/26:  she is doing ok : still little bit confused:  her oxygenation is reasonable:   Her hco3 came down today : to monitor closely:  still confused:  blood glucose now controlled:  ? why she is confused:   ? neuro consult    9/27:  she is alert and awake: mildly confused:  no SOB :  but she is a chronic retainer:  Her o2 sao2 is OK on oxygen :  blood cultures contaminant from before:   hco3 same: at 39: monitor  MAy need repeat diamox if it increases further;   DVT yoandy BOLANOS NP      9/28:  pt is alert and awake: mildly confused:  not in any resp distress:   reced diamox yesterday : hco3 today at 38 :   Her o2 sao2 is pretty good:  Blood glucose is better controlled   Cont Symbicort and albuterol:  Last ABG was with chr retainer: PH was ok:  REPEAT abg   COVD IS NEGATIVE  DVT demarcoyxflex    9/29:  she is doing ok: still confused:  Not due to co2: SHE HAS CHR HYPERCARBIA   ph is reasonable   for ct abdomen now:  neuro follow u p:  Cont BD: not wheezing today :  DVT shmuel BOLANOS NP

## 2020-09-29 NOTE — PROGRESS NOTE ADULT - SUBJECTIVE AND OBJECTIVE BOX
Patient is a 80y old  Female who presents with a chief complaint of sob, hyperglycemia (29 Sep 2020 12:55)      Any change in ROS: She is alert and awake: but confused:     MEDICATIONS  (STANDING):  ALBUTerol    90 MICROgram(s) HFA Inhaler 1 Puff(s) Inhalation every 4 hours  aspirin enteric coated 81 milliGRAM(s) Oral daily  atorvastatin 80 milliGRAM(s) Oral at bedtime  budesonide 160 MICROgram(s)/formoterol 4.5 MICROgram(s) Inhaler 2 Puff(s) Inhalation two times a day  dextrose 5%. 1000 milliLiter(s) (50 mL/Hr) IV Continuous <Continuous>  dextrose 50% Injectable 12.5 Gram(s) IV Push once  dextrose 50% Injectable 25 Gram(s) IV Push once  dextrose 50% Injectable 25 Gram(s) IV Push once  enoxaparin Injectable 40 milliGRAM(s) SubCutaneous daily  gabapentin 100 milliGRAM(s) Oral three times a day  influenza   Vaccine 0.5 milliLiter(s) IntraMuscular once  insulin glargine Injectable (LANTUS) 36 Unit(s) SubCutaneous at bedtime  insulin lispro (HumaLOG) corrective regimen sliding scale   SubCutaneous at bedtime  insulin lispro (HumaLOG) corrective regimen sliding scale   SubCutaneous three times a day before meals  insulin lispro Injectable (HumaLOG) 12 Unit(s) SubCutaneous three times a day before meals  magnesium sulfate  IVPB 1 Gram(s) IV Intermittent daily  polyethylene glycol 3350 17 Gram(s) Oral daily  potassium chloride   Solution 40 milliEquivalent(s) Oral every 2 hours  senna 2 Tablet(s) Oral at bedtime    MEDICATIONS  (PRN):  acetaminophen   Tablet .. 650 milliGRAM(s) Oral every 6 hours PRN Moderate Pain (4 - 6)  dextrose 40% Gel 15 Gram(s) Oral once PRN Blood Glucose LESS THAN 70 milliGRAM(s)/deciliter  glucagon  Injectable 1 milliGRAM(s) IntraMuscular once PRN Glucose LESS THAN 70 milligrams/deciliter  haloperidol     Tablet 1 milliGRAM(s) Oral every 6 hours PRN agitation    Vital Signs Last 24 Hrs  T(C): 36.8 (29 Sep 2020 12:39), Max: 36.9 (29 Sep 2020 08:57)  T(F): 98.3 (29 Sep 2020 12:39), Max: 98.4 (29 Sep 2020 08:57)  HR: 78 (29 Sep 2020 12:39) (67 - 88)  BP: 128/67 (29 Sep 2020 12:39) (115/74 - 149/76)  BP(mean): --  RR: 18 (29 Sep 2020 12:39) (18 - 18)  SpO2: 100% (29 Sep 2020 12:39) (96% - 100%)    I&O's Summary    28 Sep 2020 07:01  -  29 Sep 2020 07:00  --------------------------------------------------------  IN: 1220 mL / OUT: 851 mL / NET: 369 mL    29 Sep 2020 07:01  -  29 Sep 2020 14:26  --------------------------------------------------------  IN: 600 mL / OUT: 300 mL / NET: 300 mL          Physical Exam:   GENERAL: NAD, well-groomed, well-developed  HEENT: DON/   Atraumatic, Normocephalic  ENMT: No tonsillar erythema, exudates, or enlargement; Moist mucous membranes, Good dentition, No lesions  NECK: Supple, No JVD, Normal thyroid  CHEST/LUNG: Clear to auscultaion  CVS: Regular rate and rhythm; No murmurs, rubs, or gallops  GI: : Soft, Nontender, Nondistended; Bowel sounds present  NERVOUS SYSTEM:  Alert & Oriented X1  EXTREMITIES:  2+ Peripheral Pulses, No clubbing, cyanosis, or edema  LYMPH: No lymphadenopathy noted  SKIN: No rashes or lesions  ENDOCRINOLOGY: No Thyromegaly  PSYCH:Confised    Labs:  ABG - ( 28 Sep 2020 11:56 )  pH, Arterial: 7.38  pH, Blood: x     /  pCO2: 68    /  pO2: 149   / HCO3: 39    / Base Excess: 11.5  /  SaO2: 99                                          11.2   6.26  )-----------( 126      ( 29 Sep 2020 07:20 )             36.1                         12.1   7.60  )-----------( 131      ( 28 Sep 2020 07:11 )             39.6     09-29    142  |  97  |  30<H>  ----------------------------<  224<H>  3.9   |  37<H>  |  0.76  09-28    142  |  96  |  31<H>  ----------------------------<  245<H>  4.5   |  38<H>  |  0.83  09-27    140  |  94<L>  |  28<H>  ----------------------------<  232<H>  3.7   |  39<H>  |  0.80  09-26    141  |  93<L>  |  28<H>  ----------------------------<  186<H>  3.8   |  39<H>  |  0.78    Ca    9.3      29 Sep 2020 07:20  Ca    9.7      28 Sep 2020 07:11      CAPILLARY BLOOD GLUCOSE      POCT Blood Glucose.: 295 mg/dL (29 Sep 2020 12:14)  POCT Blood Glucose.: 299 mg/dL (29 Sep 2020 08:03)  POCT Blood Glucose.: 291 mg/dL (28 Sep 2020 22:03)  POCT Blood Glucose.: 325 mg/dL (28 Sep 2020 18:00)      rad< from: CT Head No Cont (09.21.20 @ 15:23) >    PROCEDURE DATE:  09/21/2020            INTERPRETATION:  Clinical indication: Slurred speech.    Multiple axial sections were performed from base of skull to vertex without contrast enhancement. Coronal andsagittal reconstructions were performed as well    This exam is compared with prior noncontrast head CT performed on July 16, 2020.    Periventricular matter lucency is again seen which is likely related to chronic microvascular ischemic changes    There is no evidence acute hemorrhage mass or mass effect seen.    Evaluation of the osseous structures with the appears normal    The visualized paranasal sinuses mastoid and middle ear inspected clear.    IMPRESSION: No acute hemorrhage, mass or masseffect.    If symptoms continue MRI can be done for further evaluation if there are no contraindications.                  JORGE FUENTES M.D., ATTENDING RADIOLOGIST  This document has been electronically signed. Sep 21 2020  3:26PM    < end of copied text >  < from: Xray Chest 1 View AP/PA (09.21.20 @ 14:26) >    EXAM:  XR CHEST AP OR PA 1V                            PROCEDURE DATE:  09/21/2020            INTERPRETATION:  CLINICAL INFORMATION: Shortness of breath.    A frontal view of the chest was obtained.    Comparison: 7/15/2020.    IMPRESSION:    The mediastinal cardiac silhouette is unremarkable.    Bibasilar atelectasis.    Severe thoracolumbar scoliosis.                      JONAS MILLER M.D., ATTENDING RADIOLOGIST  This document has been electronically signed. Sep 21 2020  2:29PM    < end of copied text >              RECENT CULTURES:  09-24 @ 22:03 .Urine Clean Catch (Midstream)                10,000 - 49,000 CFU/mL Yeast-like cells, presumptively not Candida  albicans "Susceptibilities not performed"  10,000 - 49,000 CFU/mL Coag Negative Staphylococcus "Susceptibilities not  performed"    09-24 @ 16:38 .Blood Blood-Peripheral                No growth to date.          RESPIRATORY CULTURES:          Studies  Chest X-RAY  CT SCAN Chest   Venous Dopplers: LE:   CT Abdomen  Others

## 2020-09-29 NOTE — PROGRESS NOTE ADULT - ASSESSMENT
80 year old F with pmhx of  DMT2 (on Metformin and insulin), COPD, CHF, HLD, HTN, kidney stones and pshx of R nephrectomy, L knee surgery presents to ED bibems with elevated blood sugar, SOB, and lethargy found to have UTI.     1. SOB, acute/chronic diastolic chf exacerbation  -cv stable, volume status acceptable   -symptoms also 2/2 to DKA, COPD hx   -s/p lasix, s/p IVF. Co2 improving    -echo in july with nl LV sys fx, ef 55-60%, mild MR, mod pulm htn    -pulm f/u     2. Hyperglycemia  -management per primary team     3. UTI  -+BCX likely contaminant: repeat neg  -s/p abx per primary team     4. Hypokalemia  -supplementation per primary team, continue to trend.     5. COPD , hx  -c/w home meds, pulm f/u     dvt ppx

## 2020-09-29 NOTE — PROGRESS NOTE ADULT - SUBJECTIVE AND OBJECTIVE BOX
CARDIOLOGY FOLLOW UP - Dr. Mukherjee    CC no cp/sob       PHYSICAL EXAM:  T(C): 36.8 (09-29-20 @ 12:39), Max: 36.9 (09-29-20 @ 08:57)  HR: 78 (09-29-20 @ 12:39) (67 - 88)  BP: 128/67 (09-29-20 @ 12:39) (115/74 - 149/76)  RR: 18 (09-29-20 @ 12:39) (18 - 18)  SpO2: 100% (09-29-20 @ 12:39) (96% - 100%)  Wt(kg): --  I&O's Summary    28 Sep 2020 07:01  -  29 Sep 2020 07:00  --------------------------------------------------------  IN: 1220 mL / OUT: 851 mL / NET: 369 mL    29 Sep 2020 07:01  -  29 Sep 2020 14:30  --------------------------------------------------------  IN: 600 mL / OUT: 300 mL / NET: 300 mL        Appearance: Normal	  Cardiovascular: Normal S1 S2,RRR, No JVD, No murmurs  Respiratory: Lungs clear to auscultation	  Gastrointestinal:  Soft, Non-tender, + BS	  Extremities: Normal range of motion, No clubbing, cyanosis or edema        MEDICATIONS  (STANDING):  ALBUTerol    90 MICROgram(s) HFA Inhaler 1 Puff(s) Inhalation every 4 hours  aspirin enteric coated 81 milliGRAM(s) Oral daily  atorvastatin 80 milliGRAM(s) Oral at bedtime  budesonide 160 MICROgram(s)/formoterol 4.5 MICROgram(s) Inhaler 2 Puff(s) Inhalation two times a day  dextrose 5%. 1000 milliLiter(s) (50 mL/Hr) IV Continuous <Continuous>  dextrose 50% Injectable 12.5 Gram(s) IV Push once  dextrose 50% Injectable 25 Gram(s) IV Push once  dextrose 50% Injectable 25 Gram(s) IV Push once  enoxaparin Injectable 40 milliGRAM(s) SubCutaneous daily  gabapentin 100 milliGRAM(s) Oral three times a day  influenza   Vaccine 0.5 milliLiter(s) IntraMuscular once  insulin glargine Injectable (LANTUS) 36 Unit(s) SubCutaneous at bedtime  insulin lispro (HumaLOG) corrective regimen sliding scale   SubCutaneous at bedtime  insulin lispro (HumaLOG) corrective regimen sliding scale   SubCutaneous three times a day before meals  insulin lispro Injectable (HumaLOG) 12 Unit(s) SubCutaneous three times a day before meals  magnesium sulfate  IVPB 1 Gram(s) IV Intermittent daily  polyethylene glycol 3350 17 Gram(s) Oral daily  potassium chloride   Solution 40 milliEquivalent(s) Oral every 2 hours  senna 2 Tablet(s) Oral at bedtime      TELEMETRY: 	    ECG:  	  RADIOLOGY:   DIAGNOSTIC TESTING:  [ ] Echocardiogram:  [ ]  Catheterization:  [ ] Stress Test:    OTHER: 	    LABS:	 	                                11.2   6.26  )-----------( 126      ( 29 Sep 2020 07:20 )             36.1     09-29    142  |  97  |  30<H>  ----------------------------<  224<H>  3.9   |  37<H>  |  0.76    Ca    9.3      29 Sep 2020 07:20

## 2020-09-29 NOTE — PROGRESS NOTE ADULT - ASSESSMENT
80 year old F with pmhx of  DMT2 (on Metformin and insulin), COPD, CHF, HLD, HTN, kidney stones and pshx of R nephrectomy, L knee surgery presents to ED bibems with elevated blood sugar, SOB, and lethargy.      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   Abnormal u/a   lactic acidosis, hypoxia on admission.   urine cx contaminated   new polymicrobial bacteremia. ? significance, repeat blood cx NTD   No fever or leucocytosis       Plan:   urine cx contaminated,   repeat urine cx with yeast and CoNS  repeat blood cx NTD   follow up prelim blood cx, growing strep mitis and CoNS   glycemic control   observe off abx for now   consider CT abd/pelvis to ensure no intraabdominal source.   ESR minimally elevated, CRP normal which goes against bacterial infection.

## 2020-09-29 NOTE — PROGRESS NOTE ADULT - PROBLEM SELECTOR PLAN 1
Will increase Lantus to 36 units at bedtime.  Will increase Humalog to 12 units before each meal and continue Humalog correction scale coverage ac/hs. Will continue monitoring FS and FU.  Suggest DC on current basal bolus insulin regimen with endo FU 2 weeks.  Patient counseled for compliance with consistent low carb diet and physical activity as tolerated outpatient.

## 2020-09-30 NOTE — CHART NOTE - NSCHARTNOTEFT_GEN_A_CORE
ESR minimally elevated, CRP normal which goes against presence of bacterial infection.   Does not need CT abdomen at this time.   Discussed with NP

## 2020-09-30 NOTE — PROGRESS NOTE ADULT - ASSESSMENT
Assessment  DMT2: 80y Female with DM T2 with hyperglycemia, A1C 8.1%, was on oral meds and insulin at home, now on basal bolus insulin, increased dose yesterday, blood sugars improving though still not at target, no hypoglycemic episodes. Patient is eating meals, appears more alert today, denies acute complaints.  UTI: on meds, stable, monitored.      Alexander Shaver MD  Cell: [phone]  Office: [phone]       Assessment  DMT2: 80y Female with DM T2 with hyperglycemia, A1C 8.1%, was on oral meds and insulin at home,  now on basal bolus insulin, increased dose yesterday, blood sugars improving though still not at target, no hypoglycemic episodes. Patient is eating meals, appears more alert today, denies acute complaints.  UTI: on meds, stable, monitored.      Alexander Shaver MD  Cell: [phone]  Office: [phone]

## 2020-09-30 NOTE — PROGRESS NOTE ADULT - SUBJECTIVE AND OBJECTIVE BOX
CARDIOLOGY FOLLOW UP - Dr. Mukherjee    CC no cp or sob   oob to chair today        PHYSICAL EXAM:  T(C): 36.3 (09-30-20 @ 13:05), Max: 36.7 (09-29-20 @ 16:41)  HR: 80 (09-30-20 @ 13:05) (74 - 81)  BP: 135/77 (09-30-20 @ 13:05) (115/75 - 135/77)  RR: 20 (09-30-20 @ 13:05) (18 - 20)  SpO2: 99% (09-30-20 @ 13:05) (97% - 100%)  Wt(kg): --  I&O's Summary    29 Sep 2020 07:01  -  30 Sep 2020 07:00  --------------------------------------------------------  IN: 1240 mL / OUT: 850 mL / NET: 390 mL        Appearance: Normal	  Cardiovascular: Normal S1 S2,RRR, No JVD, No murmurs  Respiratory: Lungs clear to auscultation	  Gastrointestinal:  Soft, Non-tender, + BS	  Extremities: Normal range of motion, No clubbing, cyanosis or edema        MEDICATIONS  (STANDING):  ALBUTerol    90 MICROgram(s) HFA Inhaler 1 Puff(s) Inhalation every 4 hours  aspirin enteric coated 81 milliGRAM(s) Oral daily  atorvastatin 80 milliGRAM(s) Oral at bedtime  budesonide 160 MICROgram(s)/formoterol 4.5 MICROgram(s) Inhaler 2 Puff(s) Inhalation two times a day  dextrose 5%. 1000 milliLiter(s) (50 mL/Hr) IV Continuous <Continuous>  dextrose 50% Injectable 12.5 Gram(s) IV Push once  dextrose 50% Injectable 25 Gram(s) IV Push once  dextrose 50% Injectable 25 Gram(s) IV Push once  enoxaparin Injectable 40 milliGRAM(s) SubCutaneous daily  gabapentin 100 milliGRAM(s) Oral three times a day  influenza   Vaccine 0.5 milliLiter(s) IntraMuscular once  insulin glargine Injectable (LANTUS) 40 Unit(s) SubCutaneous at bedtime  insulin lispro (HumaLOG) corrective regimen sliding scale   SubCutaneous at bedtime  insulin lispro (HumaLOG) corrective regimen sliding scale   SubCutaneous three times a day before meals  insulin lispro Injectable (HumaLOG) 14 Unit(s) SubCutaneous three times a day before meals  magnesium sulfate  IVPB 1 Gram(s) IV Intermittent daily  polyethylene glycol 3350 17 Gram(s) Oral daily  potassium chloride   Solution 40 milliEquivalent(s) Oral every 2 hours  senna 2 Tablet(s) Oral at bedtime      TELEMETRY: 	    ECG:  	  RADIOLOGY:   DIAGNOSTIC TESTING:  [ ] Echocardiogram:  [ ]  Catheterization:  [ ] Stress Test:    OTHER: 	    LABS:	 	                            10.8   6.30  )-----------( 130      ( 30 Sep 2020 06:48 )             34.5     09-30    140  |  99  |  34<H>  ----------------------------<  252<H>  4.3   |  36<H>  |  0.81    Ca    9.3      30 Sep 2020 06:47

## 2020-09-30 NOTE — PROGRESS NOTE ADULT - SUBJECTIVE AND OBJECTIVE BOX
Patient is a 80y old  Female who presents with a chief complaint of sob, hyperglycemia (30 Sep 2020 08:46)      Any change in ROS: She is alert and awake:  she still seems confused:     MEDICATIONS  (STANDING):  ALBUTerol    90 MICROgram(s) HFA Inhaler 1 Puff(s) Inhalation every 4 hours  aspirin enteric coated 81 milliGRAM(s) Oral daily  atorvastatin 80 milliGRAM(s) Oral at bedtime  budesonide 160 MICROgram(s)/formoterol 4.5 MICROgram(s) Inhaler 2 Puff(s) Inhalation two times a day  dextrose 5%. 1000 milliLiter(s) (50 mL/Hr) IV Continuous <Continuous>  dextrose 50% Injectable 12.5 Gram(s) IV Push once  dextrose 50% Injectable 25 Gram(s) IV Push once  dextrose 50% Injectable 25 Gram(s) IV Push once  enoxaparin Injectable 40 milliGRAM(s) SubCutaneous daily  gabapentin 100 milliGRAM(s) Oral three times a day  influenza   Vaccine 0.5 milliLiter(s) IntraMuscular once  insulin glargine Injectable (LANTUS) 36 Unit(s) SubCutaneous at bedtime  insulin lispro (HumaLOG) corrective regimen sliding scale   SubCutaneous three times a day before meals  insulin lispro (HumaLOG) corrective regimen sliding scale   SubCutaneous at bedtime  insulin lispro Injectable (HumaLOG) 12 Unit(s) SubCutaneous three times a day before meals  magnesium sulfate  IVPB 1 Gram(s) IV Intermittent daily  polyethylene glycol 3350 17 Gram(s) Oral daily  potassium chloride   Solution 40 milliEquivalent(s) Oral every 2 hours  senna 2 Tablet(s) Oral at bedtime    MEDICATIONS  (PRN):  acetaminophen   Tablet .. 650 milliGRAM(s) Oral every 6 hours PRN Moderate Pain (4 - 6)  dextrose 40% Gel 15 Gram(s) Oral once PRN Blood Glucose LESS THAN 70 milliGRAM(s)/deciliter  glucagon  Injectable 1 milliGRAM(s) IntraMuscular once PRN Glucose LESS THAN 70 milligrams/deciliter  haloperidol     Tablet 1 milliGRAM(s) Oral every 6 hours PRN agitation    Vital Signs Last 24 Hrs  T(C): 36.5 (30 Sep 2020 09:00), Max: 36.8 (29 Sep 2020 12:39)  T(F): 97.7 (30 Sep 2020 09:00), Max: 98.3 (29 Sep 2020 12:39)  HR: 77 (30 Sep 2020 09:00) (74 - 81)  BP: 132/63 (30 Sep 2020 09:00) (115/75 - 133/67)  BP(mean): --  RR: 18 (30 Sep 2020 09:00) (18 - 20)  SpO2: 100% (30 Sep 2020 09:00) (97% - 100%)    I&O's Summary    29 Sep 2020 07:01  -  30 Sep 2020 07:00  --------------------------------------------------------  IN: 1240 mL / OUT: 850 mL / NET: 390 mL          Physical Exam:   GENERAL: NAD, well-groomed, well-developed  HEENT: DON/   Atraumatic, Normocephalic  ENMT: No tonsillar erythema, exudates, or enlargement; Moist mucous membranes, Good dentition, No lesions  NECK: Supple, No JVD, Normal thyroid  CHEST/LUNG: Clear to auscultaion: no wheezing  CVS: Regular rate and rhythm; No murmurs, rubs, or gallops  GI: : Soft, Nontender, Nondistended; Bowel sounds present  NERVOUS SYSTEM:  Alert & Oriented X1  EXTREMITIES: -mld edema  LYMPH: No lymphadenopathy noted  SKIN: No rashes or lesions  ENDOCRINOLOGY: No Thyromegaly  PSYCH: Appropriate    Labs:  ABG - ( 28 Sep 2020 11:56 )  pH, Arterial: 7.38  pH, Blood: x     /  pCO2: 68    /  pO2: 149   / HCO3: 39    / Base Excess: 11.5  /  SaO2: 99                                          10.8   6.30  )-----------( 130      ( 30 Sep 2020 06:48 )             34.5                         11.2   6.26  )-----------( 126      ( 29 Sep 2020 07:20 )             36.1                         12.1   7.60  )-----------( 131      ( 28 Sep 2020 07:11 )             39.6     09-30    140  |  99  |  34<H>  ----------------------------<  252<H>  4.3   |  36<H>  |  0.81  09-29    142  |  97  |  30<H>  ----------------------------<  224<H>  3.9   |  37<H>  |  0.76  09-28    142  |  96  |  31<H>  ----------------------------<  245<H>  4.5   |  38<H>  |  0.83  09-27    140  |  94<L>  |  28<H>  ----------------------------<  232<H>  3.7   |  39<H>  |  0.80    Ca    9.3      30 Sep 2020 06:47  Ca    9.3      29 Sep 2020 07:20      CAPILLARY BLOOD GLUCOSE      POCT Blood Glucose.: 200 mg/dL (30 Sep 2020 08:41)  POCT Blood Glucose.: 199 mg/dL (29 Sep 2020 21:55)  POCT Blood Glucose.: 217 mg/dL (29 Sep 2020 18:00)  POCT Blood Glucose.: 295 mg/dL (29 Sep 2020 12:14)        rad< from: Xray Chest 1 View AP/PA (09.21.20 @ 14:26) >    EXAM:  XR CHEST AP OR PA 1V                            PROCEDURE DATE:  09/21/2020            INTERPRETATION:  CLINICAL INFORMATION: Shortness of breath.    A frontal view of the chest was obtained.    Comparison: 7/15/2020.    IMPRESSION:    The mediastinal cardiac silhouette is unremarkable.    Bibasilar atelectasis.    Severe thoracolumbar scoliosis.                      JONAS MILLER M.D., ATTENDING RADIOLOGIST  This document has been electronically signed. Sep 21 2020  2:29PM    < end of copied text >  < from: CT Head No Cont (09.21.20 @ 15:23) >    PROCEDURE DATE:  09/21/2020            INTERPRETATION:  Clinical indication: Slurred speech.    Multiple axial sections were performed from base of skull to vertex without contrast enhancement. Coronal andsagittal reconstructions were performed as well    This exam is compared with prior noncontrast head CT performed on July 16, 2020.    Periventricular matter lucency is again seen which is likely related to chronic microvascular ischemic changes    There is no evidence acute hemorrhage mass or mass effect seen.    Evaluation of the osseous structures with the appears normal    The visualized paranasal sinuses mastoid and middle ear inspected clear.    IMPRESSION: No acute hemorrhage, mass or masseffect.    If symptoms continue MRI can be done for further evaluation if there are no contraindications.                  JORGE FUENTES M.D., ATTENDING RADIOLOGIST  This document has been electronically signed. Sep 21 2020  3:26PM    < end of copied text >  < from: CT Angio Chest w/ IV Cont (07.15.20 @ 14:57) >    PROCEDURE DATE:  07/15/2020          INTERPRETATION:  CLINICAL INFORMATION: Short of breath    COMPARISON: None.    PROCEDURE:   CT Angiography of the Chest.  90 ml of Omnipaque 350 was injected intravenously. 10 ml were discarded.  Sagittal and coronal reformats were performed as well as 3D (MIP) reconstructions.    FINDINGS:    LUNGS AND AIRWAYS: Patent central airways.  Very low lung volumes with bibasilar subsegmental atelectasis. Nodefinite pneumonia.  PLEURA: No pleural effusion.  MEDIASTINUM AND RYAN: No lymphadenopathy.  VESSELS: Satisfactory contrast bolus. No pulmonary emboli. Normal caliber thoracic aorta  HEART: Slightly enlarged. No pericardial effusion.  CHEST WALL ANDLOWER NECK: Slightly enlarged multinodular thyroid..  VISUALIZED UPPER ABDOMEN: Bilateral nonobstructive renal calculi. Additional dystrophic cortical calcifications within a markedly atrophic right kidney. Right renal cyst.  BONES: Advanced spinal degenerative changes and profound compound thoracolumbar scoliosis.    IMPRESSION:   Negative for pulmonary emboli.  Low lung volumes with bibasilar subsegmental atelectasis.  Additional findings as discussed                  LUMA ARANDA M.D., ATTENDING RADIOLOGIST  This document has been electronically signed. Jul 15 2020  3:15PM        < end of copied text >            RECENT CULTURES:  09-24 @ 22:03 .Urine Clean Catch (Midstream)                10,000 - 49,000 CFU/mL Yeast-like cells, presumptively not Candida  albicans "Susceptibilities not performed"  10,000 - 49,000 CFU/mL Coag Negative Staphylococcus "Susceptibilities not  performed"    09-24 @ 16:38 .Blood Blood-Peripheral                No Growth Final          RESPIRATORY CULTURES:          Studies  Chest X-RAY  CT SCAN Chest   Venous Dopplers: LE:   CT Abdomen  Others

## 2020-09-30 NOTE — PROGRESS NOTE ADULT - SUBJECTIVE AND OBJECTIVE BOX
Patient is a 80y old  Female who presents with a chief complaint of sob, hyperglycemia (30 Sep 2020 14:34)      INTERVAL HPI/OVERNIGHT EVENTS:  T(C): 36.3 (09-30-20 @ 13:05), Max: 36.6 (09-29-20 @ 21:03)  HR: 80 (09-30-20 @ 13:05) (74 - 80)  BP: 135/77 (09-30-20 @ 13:05) (115/75 - 135/77)  RR: 20 (09-30-20 @ 13:05) (18 - 20)  SpO2: 99% (09-30-20 @ 13:05) (97% - 100%)  Wt(kg): --  I&O's Summary    29 Sep 2020 07:01  -  30 Sep 2020 07:00  --------------------------------------------------------  IN: 1240 mL / OUT: 850 mL / NET: 390 mL    30 Sep 2020 07:01  -  30 Sep 2020 16:56  --------------------------------------------------------  IN: 100 mL / OUT: 350 mL / NET: -250 mL        LABS:                        10.8   6.30  )-----------( 130      ( 30 Sep 2020 06:48 )             34.5     09-30    140  |  99  |  34<H>  ----------------------------<  252<H>  4.3   |  36<H>  |  0.81    Ca    9.3      30 Sep 2020 06:47          CAPILLARY BLOOD GLUCOSE      POCT Blood Glucose.: 270 mg/dL (30 Sep 2020 12:12)  POCT Blood Glucose.: 200 mg/dL (30 Sep 2020 08:41)  POCT Blood Glucose.: 199 mg/dL (29 Sep 2020 21:55)  POCT Blood Glucose.: 217 mg/dL (29 Sep 2020 18:00)            MEDICATIONS  (STANDING):  ALBUTerol    90 MICROgram(s) HFA Inhaler 1 Puff(s) Inhalation every 4 hours  aspirin enteric coated 81 milliGRAM(s) Oral daily  atorvastatin 80 milliGRAM(s) Oral at bedtime  budesonide 160 MICROgram(s)/formoterol 4.5 MICROgram(s) Inhaler 2 Puff(s) Inhalation two times a day  dextrose 5%. 1000 milliLiter(s) (50 mL/Hr) IV Continuous <Continuous>  dextrose 50% Injectable 12.5 Gram(s) IV Push once  dextrose 50% Injectable 25 Gram(s) IV Push once  dextrose 50% Injectable 25 Gram(s) IV Push once  enoxaparin Injectable 40 milliGRAM(s) SubCutaneous daily  gabapentin 100 milliGRAM(s) Oral three times a day  influenza   Vaccine 0.5 milliLiter(s) IntraMuscular once  insulin glargine Injectable (LANTUS) 40 Unit(s) SubCutaneous at bedtime  insulin lispro (HumaLOG) corrective regimen sliding scale   SubCutaneous at bedtime  insulin lispro (HumaLOG) corrective regimen sliding scale   SubCutaneous three times a day before meals  insulin lispro Injectable (HumaLOG) 14 Unit(s) SubCutaneous three times a day before meals  magnesium sulfate  IVPB 1 Gram(s) IV Intermittent daily  polyethylene glycol 3350 17 Gram(s) Oral daily  potassium chloride   Solution 40 milliEquivalent(s) Oral every 2 hours  senna 2 Tablet(s) Oral at bedtime    MEDICATIONS  (PRN):  acetaminophen   Tablet .. 650 milliGRAM(s) Oral every 6 hours PRN Moderate Pain (4 - 6)  dextrose 40% Gel 15 Gram(s) Oral once PRN Blood Glucose LESS THAN 70 milliGRAM(s)/deciliter  glucagon  Injectable 1 milliGRAM(s) IntraMuscular once PRN Glucose LESS THAN 70 milligrams/deciliter  haloperidol     Tablet 1 milliGRAM(s) Oral every 6 hours PRN agitation          PHYSICAL EXAM:  GENERAL: frail  CHEST/LUNG: Coarse breath sounds bilaterally   HEART: Regular rate and rhythm; No murmurs, rubs, or gallops  ABDOMEN: Soft, Nontender, Nondistended; Bowel sounds present  EXTREMITIES:  no edema     Care Discussed with Consultants/Other Providers [ x] YES  [ ] NO

## 2020-09-30 NOTE — PROGRESS NOTE ADULT - SUBJECTIVE AND OBJECTIVE BOX
Chief complaint  Patient is a 80y old  Female who presents with a chief complaint of sob, hyperglycemia (30 Sep 2020 10:59)   Review of systems  Patient in chair eating lunch, appears more alert, no hypoglycemic episodes.    Labs and Fingersticks  CAPILLARY BLOOD GLUCOSE      POCT Blood Glucose.: 270 mg/dL (30 Sep 2020 12:12)  POCT Blood Glucose.: 200 mg/dL (30 Sep 2020 08:41)  POCT Blood Glucose.: 199 mg/dL (29 Sep 2020 21:55)  POCT Blood Glucose.: 217 mg/dL (29 Sep 2020 18:00)    Medications  MEDICATIONS  (STANDING):  ALBUTerol    90 MICROgram(s) HFA Inhaler 1 Puff(s) Inhalation every 4 hours  aspirin enteric coated 81 milliGRAM(s) Oral daily  atorvastatin 80 milliGRAM(s) Oral at bedtime  budesonide 160 MICROgram(s)/formoterol 4.5 MICROgram(s) Inhaler 2 Puff(s) Inhalation two times a day  dextrose 5%. 1000 milliLiter(s) (50 mL/Hr) IV Continuous <Continuous>  dextrose 50% Injectable 12.5 Gram(s) IV Push once  dextrose 50% Injectable 25 Gram(s) IV Push once  dextrose 50% Injectable 25 Gram(s) IV Push once  enoxaparin Injectable 40 milliGRAM(s) SubCutaneous daily  gabapentin 100 milliGRAM(s) Oral three times a day  influenza   Vaccine 0.5 milliLiter(s) IntraMuscular once  insulin glargine Injectable (LANTUS) 40 Unit(s) SubCutaneous at bedtime  insulin lispro (HumaLOG) corrective regimen sliding scale   SubCutaneous at bedtime  insulin lispro (HumaLOG) corrective regimen sliding scale   SubCutaneous three times a day before meals  insulin lispro Injectable (HumaLOG) 14 Unit(s) SubCutaneous three times a day before meals  magnesium sulfate  IVPB 1 Gram(s) IV Intermittent daily  polyethylene glycol 3350 17 Gram(s) Oral daily  potassium chloride   Solution 40 milliEquivalent(s) Oral every 2 hours  senna 2 Tablet(s) Oral at bedtime      Physical Exam  General: Patient comfortable in bed  Vital Signs Last 12 Hrs  T(F): 97.4 (09-30-20 @ 13:05), Max: 97.7 (09-30-20 @ 09:00)  HR: 80 (09-30-20 @ 13:05) (76 - 80)  BP: 135/77 (09-30-20 @ 13:05) (115/75 - 135/77)  BP(mean): --  RR: 20 (09-30-20 @ 13:05) (18 - 20)  SpO2: 99% (09-30-20 @ 13:05) (97% - 100%)  Neck: No palpable thyroid nodules.   Chief complaint  Patient is a 80y old  Female who presents with a chief complaint of sob, hyperglycemia (30 Sep 2020 10:59)   Review of systems  Patient in chair eating lunch, appears more alert, no hypoglycemic episodes.    Labs and Fingersticks  CAPILLARY BLOOD GLUCOSE      POCT Blood Glucose.: 270 mg/dL (30 Sep 2020 12:12)  POCT Blood Glucose.: 200 mg/dL (30 Sep 2020 08:41)  POCT Blood Glucose.: 199 mg/dL (29 Sep 2020 21:55)  POCT Blood Glucose.: 217 mg/dL (29 Sep 2020 18:00)    Medications  MEDICATIONS  (STANDING):  ALBUTerol    90 MICROgram(s) HFA Inhaler 1 Puff(s) Inhalation every 4 hours  aspirin enteric coated 81 milliGRAM(s) Oral daily  atorvastatin 80 milliGRAM(s) Oral at bedtime  budesonide 160 MICROgram(s)/formoterol 4.5 MICROgram(s) Inhaler 2 Puff(s) Inhalation two times a day  dextrose 5%. 1000 milliLiter(s) (50 mL/Hr) IV Continuous <Continuous>  dextrose 50% Injectable 12.5 Gram(s) IV Push once  dextrose 50% Injectable 25 Gram(s) IV Push once  dextrose 50% Injectable 25 Gram(s) IV Push once  enoxaparin Injectable 40 milliGRAM(s) SubCutaneous daily  gabapentin 100 milliGRAM(s) Oral three times a day  influenza   Vaccine 0.5 milliLiter(s) IntraMuscular once  insulin glargine Injectable (LANTUS) 40 Unit(s) SubCutaneous at bedtime  insulin lispro (HumaLOG) corrective regimen sliding scale   SubCutaneous at bedtime  insulin lispro (HumaLOG) corrective regimen sliding scale   SubCutaneous three times a day before meals  insulin lispro Injectable (HumaLOG) 14 Unit(s) SubCutaneous three times a day before meals  magnesium sulfate  IVPB 1 Gram(s) IV Intermittent daily  polyethylene glycol 3350 17 Gram(s) Oral daily  potassium chloride   Solution 40 milliEquivalent(s) Oral every 2 hours  senna 2 Tablet(s) Oral at bedtime      Physical Exam  General: Patient comfortable in bed  Vital Signs Last 12 Hrs  T(F): 97.4 (09-30-20 @ 13:05), Max: 97.7 (09-30-20 @ 09:00)  HR: 80 (09-30-20 @ 13:05) (76 - 80)  BP: 135/77 (09-30-20 @ 13:05) (115/75 - 135/77)  BP(mean): --  RR: 20 (09-30-20 @ 13:05) (18 - 20)  SpO2: 99% (09-30-20 @ 13:05) (97% - 100%)  Neck: No palpable thyroid nodules.

## 2020-09-30 NOTE — PROGRESS NOTE ADULT - ASSESSMENT
ASSESSMENT/PLAN:  80 year old F with pmhx of  DMT2 (on Metformin and insulin), COPD, CHF, HLD, HTN, kidney stones and pshx of R nephrectomy, L knee surgery presents to ED with elevated blood sugar, SOB, and lethargy.  Elevated HCO3 is from CO2 retention now.  Last pH was 7.38  Hypernatremia - improved   CKD stage 3 at minimum from reduced nephron mass     1 CVS- Off IVF. At present she is not in heart failure.   2 Renal-CKD on the basis of reduced nephron mass and likely DM.  Quantify proteinuria.  Renal sono would be a good idea to rule masses but can be done as outpt.   No need for additional diamox as the HCO3 keeps improving   3 Endo-Needs tighter blood sugar management as outpt     Sayed jeannette   [facility]  [phone]

## 2020-09-30 NOTE — PROGRESS NOTE ADULT - ASSESSMENT
Problem/Plan - 1:  ·  Problem: sepsis sec to UTI (urinary tract infection) with bactremia with metabolic encephalopathy POA .  Plan: dc ertepanum  id fu appreciated     Problem/Plan - 2:  ·  Problem: Hyperglycemia ion alkalosis with contract  Plan: monitor FS  ISS.   renal fuIVF     Problem/Plan - 3:  ·  Problem: AMS   Plan: metabolic encephalopathy  psych consult   neuro consult appreciated  pulmonary following for hypercarbia     Problem/Plan - 4:  ·  Problem: COPD (chronic obstructive pulmonary disease).  Plan: cw home meds  pulmonary fu     PT and dc planning

## 2020-09-30 NOTE — PROGRESS NOTE ADULT - SUBJECTIVE AND OBJECTIVE BOX
NEPHROLOGY-[facility] [phone]        Patient seen and examined in bed.  She was about the [facility] e        MEDICATIONS  (STANDING):  ALBUTerol    90 MICROgram(s) HFA Inhaler 1 Puff(s) Inhalation every 4 hours  aspirin enteric coated 81 milliGRAM(s) Oral daily  atorvastatin 80 milliGRAM(s) Oral at bedtime  budesonide 160 MICROgram(s)/formoterol 4.5 MICROgram(s) Inhaler 2 Puff(s) Inhalation two times a day  dextrose 5%. 1000 milliLiter(s) (50 mL/Hr) IV Continuous <Continuous>  dextrose 50% Injectable 12.5 Gram(s) IV Push once  dextrose 50% Injectable 25 Gram(s) IV Push once  dextrose 50% Injectable 25 Gram(s) IV Push once  enoxaparin Injectable 40 milliGRAM(s) SubCutaneous daily  gabapentin 100 milliGRAM(s) Oral three times a day  influenza   Vaccine 0.5 milliLiter(s) IntraMuscular once  insulin glargine Injectable (LANTUS) 36 Unit(s) SubCutaneous at bedtime  insulin lispro (HumaLOG) corrective regimen sliding scale   SubCutaneous three times a day before meals  insulin lispro (HumaLOG) corrective regimen sliding scale   SubCutaneous at bedtime  insulin lispro Injectable (HumaLOG) 12 Unit(s) SubCutaneous three times a day before meals  magnesium sulfate  IVPB 1 Gram(s) IV Intermittent daily  polyethylene glycol 3350 17 Gram(s) Oral daily  potassium chloride   Solution 40 milliEquivalent(s) Oral every 2 hours  senna 2 Tablet(s) Oral at bedtime      VITAL:  T(C): , Max: 36.9 (09-29-20 @ 08:57)  T(F): , Max: 98.4 (09-29-20 @ 08:57)  HR: 80 (09-30-20 @ 05:15)  BP: 115/75 (09-30-20 @ 05:15)  BP(mean): --  RR: 18 (09-30-20 @ 05:15)  SpO2: 97% (09-30-20 @ 05:15)  Wt(kg): --    I and O's:    09-29 @ 07:01  -  09-30 @ 07:00  --------------------------------------------------------  IN: 1240 mL / OUT: 850 mL / NET: 390 mL          PHYSICAL EXAM:    Constitutional: NAD  Neck:  No JVD  Respiratory: CTAB/L  Cardiovascular: S1 and S2  Gastrointestinal: BS+, soft, NT/ND  Extremities: No peripheral edema  Neurological:   no focal deficits  Psychiatric: Normal mood, normal affect  : No Pearce  Skin: No rashes  Access: Not applicable    LABS:                        10.8   6.30  )-----------( 130      ( 30 Sep 2020 06:48 )             34.5     09-30    140  |  99  |  34<H>  ----------------------------<  252<H>  4.3   |  36<H>  |  0.81    Ca    9.3      30 Sep 2020 06:47            Urine Studies:          RADIOLOGY & ADDITIONAL STUDIES:

## 2020-09-30 NOTE — PROGRESS NOTE ADULT - ASSESSMENT
80 year old F with pmhx of  DMT2 (on Metformin and insulin), COPD, CHF, HLD, HTN, kidney stones and pshx of R nephrectomy, L knee surgery presents to ED bibems with elevated blood sugar, SOB, and lethargy. Per EMS, pt with 485 blood sugar, BP of 144/75, and O2 sat 92% on room air.  Daughter notes period of incoherent speech over the weekend. Pt endorses excess urination and thirst over the last few days. She also reports mild SOB x few days that is worse when lying flat. Pt also reports constant CP that has been present for "quite a while", unable to specify how much time. Denies fever, chills, worsening leg swelling.    COPD:  Thoraco lumbar scoliosis  Uncontrolled DM:  UTI  Confusion  CHF    she is admitted with high blood glucose and found to have uti:  She has copd:  She was recently admitted to [facility] where she was treated with copd exacerbation:  At that time her CTA was negative and no indication of malignancy on ct chest :  Currently she is not SOB : her outpt  meds noted:  She is already on Symbicort as well as inhalers:  currently she is also being treated for chf   her venous ABG towards met alk side and seems to be a chronic retainer too   I don't think she needs bipap at this time:  She seems pretty confused: ct head is negative for bleed:  would defer to primary team :  DVT propahyxlis    9/23:  pt is not SOB at this time: but emotionally labile  not wheezing:   she is not wheezing  She has chronically elevated co2:  cont Symbicort  her venous abg reviewed:   CT head neg for bleed:   MAURICE NP    9/24:  she looks better today : she has coag neg staph in blood cultures: likely contaminant:  She is not wheezing:  On 2 L of oxygen : No resp distress:   Has chr hypercarbia:  Cont inhalers not wheezing: Blood glucose better controlled:   mentally she is better today :  MAURICE NP :  dvt propahyxlis    9/25:  she has metabolic alkalosis" with  increased co2:  reviewed nephrology   on normal saline   off diuretics n ow: \not wheezing:   despite iv fluids her hCO3 remains elevated:   DIAMOX X 1 TODASY AND REVIEWED HCO3 AGAIN TOMORROW:  maurice renal:    9/26:  she is doing ok : still little bit confused:  her oxygenation is reasonable:   Her hco3 came down today : to monitor closely:  still confused:  blood glucose now controlled:  ? why she is confused:   ? neuro consult    9/27:  she is alert and awake: mildly confused:  no SOB :  but she is a chronic retainer:  Her o2 sao2 is OK on oxygen :  blood cultures contaminant from before:   hco3 same: at 39: monitor  MAy need repeat diamox if it increases further;   DVT yoandy BOLANOS NP      9/28:  pt is alert and awake: mildly confused:  not in any resp distress:   reced diamox yesterday : hco3 today at 38 :   Her o2 sao2 is pretty good:  Blood glucose is better controlled   Cont Symbicort and albuterol:  Last ABG was with chr retainer: PH was ok:  REPEAT abg   COVD IS NEGATIVE  DVT candido    9/29:  she is doing ok: still confused:  Not due to co2: SHE HAS CHR HYPERCARBIA   ph is reasonable   for ct abdomen now:  neuro follow u p:  Cont BD: not wheezing today :  DVT shmuel BOLANOS NP    9/30:    she is doing same:  on 2 L of oxygen:  wean her off oxygen to-day ;  chest xray is reviewed   Sheis afebrile:  DVT propahxylis  DW NP

## 2020-09-30 NOTE — PROGRESS NOTE ADULT - PROBLEM SELECTOR PLAN 1
Will increase Lantus to 40 units at bedtime.  Will increase Humalog to 14 units before each meal and continue Humalog correction scale coverage ac/hs. Will continue monitoring FS and FU.  Suggest DC on current basal bolus insulin regimen with endo FU 2 weeks.  Patient counseled for compliance with consistent low carb diet and physical activity as tolerated outpatient.

## 2020-10-01 NOTE — PROGRESS NOTE ADULT - ASSESSMENT
Assessment  DMT2: 80y Female with DM T2 with hyperglycemia, A1C 8.1%, was on oral meds and insulin at home, now on basal bolus insulin, increased dose, blood sugars fluctuating, FS improving though still not at target, no hypoglycemic episodes, eating meals.  UTI: on meds, stable, monitored.      Alexander Shaver MD  Cell: [phone]  Office: [phone]       Assessment  DMT2: 80y Female with DM T2 with hyperglycemia, A1C 8.1%, was on oral meds and insulin at home, now on basal bolus insulin, increased dose, blood sugars fluctuating, FS improving though still not at target,  no hypoglycemic episodes, eating meals.  UTI: on meds, stable, monitored.      Alexander Shaver MD  Cell: [phone]  Office: [phone]

## 2020-10-01 NOTE — PROGRESS NOTE ADULT - PROBLEM SELECTOR PLAN 1
Will increase Lantus to 45 units at bedtime.  Will continue Humalog 14 units before each meal as well as Humalog correction scale coverage ac/hs. Will continue monitoring FS and FU.  Suggest DC on current basal bolus insulin regimen with endo FU 2 weeks.  Patient counseled for compliance with consistent low carb diet and physical activity as tolerated outpatient.

## 2020-10-01 NOTE — PROGRESS NOTE ADULT - ASSESSMENT
Problem/Plan - 1:  ·  Problem: sepsis sec to UTI (urinary tract infection) with bactremia with metabolic encephalopathy POA .  Plan: dc ertepanum  id fu appreciated     Problem/Plan - 2:  ·  Problem: Hyperglycemia ion alkalosis with contract  Plan: monitor FS  ISS.   renal fuIVF     Problem/Plan - 3:  ·  Problem: AMS   Plan: metabolic encephalopathy  psych consult   neuro consult appreciated  pulmonary following for hypercarbia     Problem/Plan - 4:·  Problem: COPD (chronic obstructive pulmonary disease).  Plan: cw home meds  pulmonary fu   check CT PE

## 2020-10-01 NOTE — PROGRESS NOTE ADULT - ASSESSMENT
80 year old F with pmhx of  DMT2 (on Metformin and insulin), COPD, CHF, HLD, HTN, kidney stones and pshx of R nephrectomy, L knee surgery presents to ED bibems with elevated blood sugar, SOB, and lethargy. Per EMS, pt with 485 blood sugar, BP of 144/75, and O2 sat 92% on room air.  Daughter notes period of incoherent speech over the weekend. Pt endorses excess urination and thirst over the last few days. She also reports mild SOB x few days that is worse when lying flat. Pt also reports constant CP that has been present for "quite a while", unable to specify how much time. Denies fever, chills, worsening leg swelling.    COPD:  Thoraco lumbar scoliosis  Uncontrolled DM:  UTI  Confusion  CHF    she is admitted with high blood glucose and found to have uti:  She has copd:  She was recently admitted to [facility] where she was treated with copd exacerbation:  At that time her CTA was negative and no indication of malignancy on ct chest :  Currently she is not SOB : her outpt  meds noted:  She is already on Symbicort as well as inhalers:  currently she is also being treated for chf   her venous ABG towards met alk side and seems to be a chronic retainer too   I don't think she needs bipap at this time:  She seems pretty confused: ct head is negative for bleed:  would defer to primary team :  DVT propahyxlis    9/23:  pt is not SOB at this time: but emotionally labile  not wheezing:   she is not wheezing  She has chronically elevated co2:  cont Symbicort  her venous abg reviewed:   CT head neg for bleed:   MAURICE NP    9/24:  she looks better today : she has coag neg staph in blood cultures: likely contaminant:  She is not wheezing:  On 2 L of oxygen : No resp distress:   Has chr hypercarbia:  Cont inhalers not wheezing: Blood glucose better controlled:   mentally she is better today :  MAURICE NP :  dvt propahyxlis    9/25:  she has metabolic alkalosis" with  increased co2:  reviewed nephrology   on normal saline   off diuretics n ow: \not wheezing:   despite iv fluids her hCO3 remains elevated:   DIAMOX X 1 TODASY AND REVIEWED HCO3 AGAIN TOMORROW:  maurice renal:    9/26:  she is doing ok : still little bit confused:  her oxygenation is reasonable:   Her hco3 came down today : to monitor closely:  still confused:  blood glucose now controlled:  ? why she is confused:   ? neuro consult    9/27:  she is alert and awake: mildly confused:  no SOB :  but she is a chronic retainer:  Her o2 sao2 is OK on oxygen :  blood cultures contaminant from before:   hco3 same: at 39: monitor  MAy need repeat diamox if it increases further;   DVT proaphxyfelx BOLANOS NP      9/28:  pt is alert and awake: mildly confused:  not in any resp distress:   reced diamox yesterday : hco3 today at 38 :   Her o2 sao2 is pretty good:  Blood glucose is better controlled   Cont Symbicort and albuterol:  Last ABG was with chr retainer: PH was ok:  REPEAT abg   COVD IS NEGATIVE  DVT demarcoyxmoes    9/29:  she is doing ok: still confused:  Not due to co2: SHE HAS CHR HYPERCARBIA   ph is reasonable   for ct abdomen now:  neuro follow u p:  Cont BD: not wheezing today :  DVT shmuel BOLANOS NP    9/30:    she is doing same:  on 2 L of oxygen:  wean her off oxygen to-day ;  chest xray is reviewed   Sheis afebrile:  DVT demarcoxyflex BOLANOS NP      10/1:  she b ecame very SOB after cleaning:  she says hse is SOB: needs CTA:  family has refused so far:  she did ahve cta in july that wasnegative for pe  She isnot wheezingm uch   Cont BD andoxygen:  dopplers lf LE too:  MAURICE NPlois

## 2020-10-01 NOTE — PROGRESS NOTE ADULT - SUBJECTIVE AND OBJECTIVE BOX
Patient is a 80y old  Female who presents with a chief complaint of sob, hyperglycemia (01 Oct 2020 13:12)      INTERVAL HPI/OVERNIGHT EVENTS:  T(C): 36.6 (10-01-20 @ 13:23), Max: 36.7 (09-30-20 @ 17:23)  HR: 88 (10-01-20 @ 13:23) (66 - 88)  BP: 138/83 (10-01-20 @ 13:23) (114/71 - 139/84)  RR: 20 (10-01-20 @ 13:23) (20 - 20)  SpO2: 100% (10-01-20 @ 13:23) (100% - 100%)  Wt(kg): --  I&O's Summary    30 Sep 2020 07:01  -  01 Oct 2020 07:00  --------------------------------------------------------  IN: 640 mL / OUT: 800 mL / NET: -160 mL    01 Oct 2020 07:01  -  01 Oct 2020 16:50  --------------------------------------------------------  IN: 900 mL / OUT: 300 mL / NET: 600 mL        LABS:                        11.1   5.57  )-----------( 113      ( 01 Oct 2020 07:15 )             35.5     10-01    139  |  97  |  31<H>  ----------------------------<  246<H>  4.2   |  35<H>  |  0.71    Ca    9.3      01 Oct 2020 07:15          CAPILLARY BLOOD GLUCOSE      POCT Blood Glucose.: 182 mg/dL (01 Oct 2020 12:37)  POCT Blood Glucose.: 197 mg/dL (01 Oct 2020 08:57)  POCT Blood Glucose.: 213 mg/dL (30 Sep 2020 21:27)  POCT Blood Glucose.: 123 mg/dL (30 Sep 2020 17:21)            MEDICATIONS  (STANDING):  ALBUTerol    90 MICROgram(s) HFA Inhaler 1 Puff(s) Inhalation every 4 hours  aspirin enteric coated 81 milliGRAM(s) Oral daily  atorvastatin 80 milliGRAM(s) Oral at bedtime  budesonide 160 MICROgram(s)/formoterol 4.5 MICROgram(s) Inhaler 2 Puff(s) Inhalation two times a day  dextrose 5%. 1000 milliLiter(s) (50 mL/Hr) IV Continuous <Continuous>  dextrose 50% Injectable 12.5 Gram(s) IV Push once  dextrose 50% Injectable 25 Gram(s) IV Push once  dextrose 50% Injectable 25 Gram(s) IV Push once  enoxaparin Injectable 40 milliGRAM(s) SubCutaneous daily  gabapentin 100 milliGRAM(s) Oral three times a day  influenza   Vaccine 0.5 milliLiter(s) IntraMuscular once  insulin glargine Injectable (LANTUS) 45 Unit(s) SubCutaneous at bedtime  insulin lispro (HumaLOG) corrective regimen sliding scale   SubCutaneous at bedtime  insulin lispro (HumaLOG) corrective regimen sliding scale   SubCutaneous three times a day before meals  insulin lispro Injectable (HumaLOG) 14 Unit(s) SubCutaneous three times a day before meals  magnesium sulfate  IVPB 1 Gram(s) IV Intermittent daily  polyethylene glycol 3350 17 Gram(s) Oral daily  potassium chloride   Solution 40 milliEquivalent(s) Oral every 2 hours  senna 2 Tablet(s) Oral at bedtime    MEDICATIONS  (PRN):  acetaminophen   Tablet .. 650 milliGRAM(s) Oral every 6 hours PRN Moderate Pain (4 - 6)  dextrose 40% Gel 15 Gram(s) Oral once PRN Blood Glucose LESS THAN 70 milliGRAM(s)/deciliter  glucagon  Injectable 1 milliGRAM(s) IntraMuscular once PRN Glucose LESS THAN 70 milligrams/deciliter  haloperidol     Tablet 1 milliGRAM(s) Oral every 6 hours PRN agitation          PHYSICAL EXAM:  GENERAL: frail  CHEST/LUNG: Clear to percussion bilaterally; No rales, rhonchi, wheezing, or rubs  HEART: Regular rate and rhythm; No murmurs, rubs, or gallops  ABDOMEN: Soft, Nontender, Nondistended; Bowel sounds present  EXTREMITIES:  edema +    Care Discussed with Consultants/Other Providers [ ] YES  [ ] NO

## 2020-10-01 NOTE — PROGRESS NOTE ADULT - SUBJECTIVE AND OBJECTIVE BOX
Chief complaint  Patient is a 80y old  Female who presents with a chief complaint of sob, hyperglycemia (01 Oct 2020 12:20)   Review of systems  Patient in bed, looks comfortable, no hypoglycemic episodes.    Labs and Fingersticks  CAPILLARY BLOOD GLUCOSE      POCT Blood Glucose.: 182 mg/dL (01 Oct 2020 12:37)  POCT Blood Glucose.: 197 mg/dL (01 Oct 2020 08:57)  POCT Blood Glucose.: 213 mg/dL (30 Sep 2020 21:27)  POCT Blood Glucose.: 123 mg/dL (30 Sep 2020 17:21)    Medications  MEDICATIONS  (STANDING):  ALBUTerol    90 MICROgram(s) HFA Inhaler 1 Puff(s) Inhalation every 4 hours  aspirin enteric coated 81 milliGRAM(s) Oral daily  atorvastatin 80 milliGRAM(s) Oral at bedtime  budesonide 160 MICROgram(s)/formoterol 4.5 MICROgram(s) Inhaler 2 Puff(s) Inhalation two times a day  dextrose 5%. 1000 milliLiter(s) (50 mL/Hr) IV Continuous <Continuous>  dextrose 50% Injectable 12.5 Gram(s) IV Push once  dextrose 50% Injectable 25 Gram(s) IV Push once  dextrose 50% Injectable 25 Gram(s) IV Push once  enoxaparin Injectable 40 milliGRAM(s) SubCutaneous daily  gabapentin 100 milliGRAM(s) Oral three times a day  influenza   Vaccine 0.5 milliLiter(s) IntraMuscular once  insulin glargine Injectable (LANTUS) 45 Unit(s) SubCutaneous at bedtime  insulin lispro (HumaLOG) corrective regimen sliding scale   SubCutaneous at bedtime  insulin lispro (HumaLOG) corrective regimen sliding scale   SubCutaneous three times a day before meals  insulin lispro Injectable (HumaLOG) 14 Unit(s) SubCutaneous three times a day before meals  magnesium sulfate  IVPB 1 Gram(s) IV Intermittent daily  polyethylene glycol 3350 17 Gram(s) Oral daily  potassium chloride   Solution 40 milliEquivalent(s) Oral every 2 hours  senna 2 Tablet(s) Oral at bedtime      Physical Exam  General: Patient comfortable in bed  Vital Signs Last 12 Hrs  T(F): 97.5 (10-01-20 @ 09:44), Max: 97.5 (10-01-20 @ 09:44)  HR: 80 (10-01-20 @ 09:44) (66 - 80)  BP: 139/84 (10-01-20 @ 09:44) (122/76 - 139/84)  BP(mean): --  RR: 20 (10-01-20 @ 09:44) (20 - 20)  SpO2: 100% (10-01-20 @ 09:44) (100% - 100%)  Neck: No palpable thyroid nodules.     Chief complaint  Patient is a 80y old  Female who presents with a chief complaint of sob, hyperglycemia (01 Oct 2020 12:20)   Review of systems  Patient in bed, looks comfortable, no hypoglycemic episodes.  Labs and Fingersticks  CAPILLARY BLOOD GLUCOSE      POCT Blood Glucose.: 182 mg/dL (01 Oct 2020 12:37)  POCT Blood Glucose.: 197 mg/dL (01 Oct 2020 08:57)  POCT Blood Glucose.: 213 mg/dL (30 Sep 2020 21:27)  POCT Blood Glucose.: 123 mg/dL (30 Sep 2020 17:21)    Medications  MEDICATIONS  (STANDING):  ALBUTerol    90 MICROgram(s) HFA Inhaler 1 Puff(s) Inhalation every 4 hours  aspirin enteric coated 81 milliGRAM(s) Oral daily  atorvastatin 80 milliGRAM(s) Oral at bedtime  budesonide 160 MICROgram(s)/formoterol 4.5 MICROgram(s) Inhaler 2 Puff(s) Inhalation two times a day  dextrose 5%. 1000 milliLiter(s) (50 mL/Hr) IV Continuous <Continuous>  dextrose 50% Injectable 12.5 Gram(s) IV Push once  dextrose 50% Injectable 25 Gram(s) IV Push once  dextrose 50% Injectable 25 Gram(s) IV Push once  enoxaparin Injectable 40 milliGRAM(s) SubCutaneous daily  gabapentin 100 milliGRAM(s) Oral three times a day  influenza   Vaccine 0.5 milliLiter(s) IntraMuscular once  insulin glargine Injectable (LANTUS) 45 Unit(s) SubCutaneous at bedtime  insulin lispro (HumaLOG) corrective regimen sliding scale   SubCutaneous at bedtime  insulin lispro (HumaLOG) corrective regimen sliding scale   SubCutaneous three times a day before meals  insulin lispro Injectable (HumaLOG) 14 Unit(s) SubCutaneous three times a day before meals  magnesium sulfate  IVPB 1 Gram(s) IV Intermittent daily  polyethylene glycol 3350 17 Gram(s) Oral daily  potassium chloride   Solution 40 milliEquivalent(s) Oral every 2 hours  senna 2 Tablet(s) Oral at bedtime      Physical Exam  General: Patient comfortable in bed  Vital Signs Last 12 Hrs  T(F): 97.5 (10-01-20 @ 09:44), Max: 97.5 (10-01-20 @ 09:44)  HR: 80 (10-01-20 @ 09:44) (66 - 80)  BP: 139/84 (10-01-20 @ 09:44) (122/76 - 139/84)  BP(mean): --  RR: 20 (10-01-20 @ 09:44) (20 - 20)  SpO2: 100% (10-01-20 @ 09:44) (100% - 100%)  Neck: No palpable thyroid nodules.

## 2020-10-01 NOTE — PROGRESS NOTE ADULT - ASSESSMENT
ASSESSMENT/PLAN:  80 year old F with pmhx of  DMT2 (on Metformin and insulin), COPD, CHF, HLD, HTN, kidney stones and pshx of R nephrectomy, L knee surgery presents to ED with elevated blood sugar, SOB, and lethargy.  Elevated HCO3 is from CO2 retention now.  Last pH was 7.38  Hypernatremia - improved   CKD stage 3 at minimum from reduced nephron mass     1 CVS- Off IVF. At present she is not in heart failure.   2 Renal-CKD on the basis of reduced nephron mass and likely DM.    Renal sono would be a good idea to rule masses but can be done as outpt.   No need for additional diamox as the HCO3 keeps improving   3 Endo-Needs tighter blood sugar management as outpt     Physical therapy    [facility]  [phone]

## 2020-10-01 NOTE — PROGRESS NOTE ADULT - SUBJECTIVE AND OBJECTIVE BOX
Patient is a 80y old  Female who presents with a chief complaint of sob, hyperglycemia (01 Oct 2020 08:40)      Any change in ROS: she was just cleaned : she became very SOB     MEDICATIONS  (STANDING):  ALBUTerol    90 MICROgram(s) HFA Inhaler 1 Puff(s) Inhalation every 4 hours  aspirin enteric coated 81 milliGRAM(s) Oral daily  atorvastatin 80 milliGRAM(s) Oral at bedtime  budesonide 160 MICROgram(s)/formoterol 4.5 MICROgram(s) Inhaler 2 Puff(s) Inhalation two times a day  dextrose 5%. 1000 milliLiter(s) (50 mL/Hr) IV Continuous <Continuous>  dextrose 50% Injectable 12.5 Gram(s) IV Push once  dextrose 50% Injectable 25 Gram(s) IV Push once  dextrose 50% Injectable 25 Gram(s) IV Push once  enoxaparin Injectable 40 milliGRAM(s) SubCutaneous daily  gabapentin 100 milliGRAM(s) Oral three times a day  influenza   Vaccine 0.5 milliLiter(s) IntraMuscular once  insulin glargine Injectable (LANTUS) 40 Unit(s) SubCutaneous at bedtime  insulin lispro (HumaLOG) corrective regimen sliding scale   SubCutaneous at bedtime  insulin lispro (HumaLOG) corrective regimen sliding scale   SubCutaneous three times a day before meals  insulin lispro Injectable (HumaLOG) 14 Unit(s) SubCutaneous three times a day before meals  magnesium sulfate  IVPB 1 Gram(s) IV Intermittent daily  polyethylene glycol 3350 17 Gram(s) Oral daily  potassium chloride   Solution 40 milliEquivalent(s) Oral every 2 hours  senna 2 Tablet(s) Oral at bedtime    MEDICATIONS  (PRN):  acetaminophen   Tablet .. 650 milliGRAM(s) Oral every 6 hours PRN Moderate Pain (4 - 6)  dextrose 40% Gel 15 Gram(s) Oral once PRN Blood Glucose LESS THAN 70 milliGRAM(s)/deciliter  glucagon  Injectable 1 milliGRAM(s) IntraMuscular once PRN Glucose LESS THAN 70 milligrams/deciliter  haloperidol     Tablet 1 milliGRAM(s) Oral every 6 hours PRN agitation    Vital Signs Last 24 Hrs  T(C): 36.4 (01 Oct 2020 09:44), Max: 36.7 (30 Sep 2020 17:23)  T(F): 97.5 (01 Oct 2020 09:44), Max: 98 (30 Sep 2020 17:23)  HR: 80 (01 Oct 2020 09:44) (66 - 80)  BP: 139/84 (01 Oct 2020 09:44) (114/71 - 139/84)  BP(mean): --  RR: 20 (01 Oct 2020 09:44) (20 - 20)  SpO2: 100% (01 Oct 2020 09:44) (99% - 100%)    I&O's Summary    30 Sep 2020 07:01  -  01 Oct 2020 07:00  --------------------------------------------------------  IN: 640 mL / OUT: 800 mL / NET: -160 mL    01 Oct 2020 07:01  -  01 Oct 2020 12:20  --------------------------------------------------------  IN: 440 mL / OUT: 100 mL / NET: 340 mL          Physical Exam:   GENERAL: NAD, well-groomed, well-developed  HEENT: DON/   Atraumatic, Normocephalic  ENMT: No tonsillar erythema, exudates, or enlargement; Moist mucous membranes, Good dentition, No lesions  NECK: Supple, No JVD, Normal thyroid  CHEST/LUNG: Clear to auscultaion  CVS: Regular rate and rhythm; No murmurs, rubs, or gallops  GI: : Soft, Nontender, Nondistended; Bowel sounds present  NERVOUS SYSTEM:  Alert & Oriented X2  EXTREMITIES:  Mild edema  LYMPH: No lymphadenopathy noted  SKIN: No rashes or lesions  ENDOCRINOLOGY: No Thyromegaly  PSYCH: Appropriate    Labs:                              11.1   5.57  )-----------( 113      ( 01 Oct 2020 07:15 )             35.5                         10.8   6.30  )-----------( 130      ( 30 Sep 2020 06:48 )             34.5                         11.2   6.26  )-----------( 126      ( 29 Sep 2020 07:20 )             36.1                         12.1   7.60  )-----------( 131      ( 28 Sep 2020 07:11 )             39.6     10-01    139  |  97  |  31<H>  ----------------------------<  246<H>  4.2   |  35<H>  |  0.71  09-30    140  |  99  |  34<H>  ----------------------------<  252<H>  4.3   |  36<H>  |  0.81  09-29    142  |  97  |  30<H>  ----------------------------<  224<H>  3.9   |  37<H>  |  0.76  09-28    142  |  96  |  31<H>  ----------------------------<  245<H>  4.5   |  38<H>  |  0.83    Ca    9.3      01 Oct 2020 07:15  Ca    9.3      30 Sep 2020 06:47      CAPILLARY BLOOD GLUCOSE      POCT Blood Glucose.: 197 mg/dL (01 Oct 2020 08:57)  POCT Blood Glucose.: 213 mg/dL (30 Sep 2020 21:27)  POCT Blood Glucose.: 123 mg/dL (30 Sep 2020 17:21)        r< from: Xray Chest 1 View AP/PA (09.21.20 @ 14:26) >    EXAM:  XR CHEST AP OR PA 1V                            PROCEDURE DATE:  09/21/2020            INTERPRETATION:  CLINICAL INFORMATION: Shortness of breath.    A frontal view of the chest was obtained.    Comparison: 7/15/2020.    IMPRESSION:    The mediastinal cardiac silhouette is unremarkable.    Bibasilar atelectasis.    Severe thoracolumbar scoliosis.        < end of copied text >  < from: CT Angio Chest w/ IV Cont (07.15.20 @ 14:57) >    INTERPRETATION:  CLINICAL INFORMATION: Short of breath    COMPARISON: None.    PROCEDURE:   CT Angiography of the Chest.  90 ml of Omnipaque 350 was injected intravenously. 10 ml were discarded.  Sagittal and coronal reformats were performed as well as 3D (MIP) reconstructions.    FINDINGS:    LUNGS AND AIRWAYS: Patent central airways.  Very low lung volumes with bibasilar subsegmental atelectasis. Nodefinite pneumonia.  PLEURA: No pleural effusion.  MEDIASTINUM AND RYAN: No lymphadenopathy.  VESSELS: Satisfactory contrast bolus. No pulmonary emboli. Normal caliber thoracic aorta  HEART: Slightly enlarged. No pericardial effusion.  CHEST WALL ANDLOWER NECK: Slightly enlarged multinodular thyroid..  VISUALIZED UPPER ABDOMEN: Bilateral nonobstructive renal calculi. Additional dystrophic cortical calcifications within a markedly atrophic right kidney. Right renal cyst.  BONES: Advanced spinal degenerative changes and profound compound thoracolumbar scoliosis.    IMPRESSION:   Negative for pulmonary emboli.  Low lung volumes with bibasilar subsegmental atelectasis.  Additional findings as discussed                  LUMA ARANDA M.D., ATTENDING RADIOLOGIST  This document has been electronically signed. Jul 15 2020  3:15PM    < end of copied text >  < from: CT Head No Cont (09.21.20 @ 15:23) >    EXAM:  CT BRAIN                            PROCEDURE DATE:  09/21/2020            INTERPRETATION:  Clinical indication: Slurred speech.    Multiple axial sections were performed from base of skull to vertex without contrast enhancement. Coronal andsagittal reconstructions were performed as well    This exam is compared with prior noncontrast head CT performed on July 16, 2020.    Periventricular matter lucency is again seen which is likely related to chronic microvascular ischemic changes    There is no evidence acute hemorrhage mass or mass effect seen.    Evaluation of the osseous structures with the appears normal    The visualized paranasal sinuses mastoid and middle ear inspected clear.    IMPRESSION: No acute hemorrhage, mass or masseffect.    If symptoms continue MRI can be done for further evaluation if there are no contraindications.                  JORGE FUENTES M.D., ATTENDING RADIOLOGIST  This document has been electronically signed. Sep 21 2020  3:26PM    < end of copied text >            RECENT CULTURES:  09-24 @ 22:03 .Urine Clean Catch (Midstream)                10,000 - 49,000 CFU/mL Yeast-like cells, presumptively not Candida  albicans "Susceptibilities not performed"  10,000 - 49,000 CFU/mL Coag Negative Staphylococcus "Susceptibilities not  performed"    09-24 @ 16:38 .Blood Blood-Peripheral                No Growth Final          RESPIRATORY CULTURES:          Studies  Chest X-RAY  CT SCAN Chest   Venous Dopplers: LE:   CT Abdomen  Others

## 2020-10-01 NOTE — PROGRESS NOTE ADULT - SUBJECTIVE AND OBJECTIVE BOX
[facility] [phone]        Patient seen and examined in bed. She was the same         MEDICATIONS  (STANDING):  ALBUTerol    90 MICROgram(s) HFA Inhaler 1 Puff(s) Inhalation every 4 hours  aspirin enteric coated 81 milliGRAM(s) Oral daily  atorvastatin 80 milliGRAM(s) Oral at bedtime  budesonide 160 MICROgram(s)/formoterol 4.5 MICROgram(s) Inhaler 2 Puff(s) Inhalation two times a day  dextrose 5%. 1000 milliLiter(s) (50 mL/Hr) IV Continuous <Continuous>  dextrose 50% Injectable 12.5 Gram(s) IV Push once  dextrose 50% Injectable 25 Gram(s) IV Push once  dextrose 50% Injectable 25 Gram(s) IV Push once  enoxaparin Injectable 40 milliGRAM(s) SubCutaneous daily  gabapentin 100 milliGRAM(s) Oral three times a day  influenza   Vaccine 0.5 milliLiter(s) IntraMuscular once  insulin glargine Injectable (LANTUS) 40 Unit(s) SubCutaneous at bedtime  insulin lispro (HumaLOG) corrective regimen sliding scale   SubCutaneous at bedtime  insulin lispro (HumaLOG) corrective regimen sliding scale   SubCutaneous three times a day before meals  insulin lispro Injectable (HumaLOG) 14 Unit(s) SubCutaneous three times a day before meals  magnesium sulfate  IVPB 1 Gram(s) IV Intermittent daily  polyethylene glycol 3350 17 Gram(s) Oral daily  potassium chloride   Solution 40 milliEquivalent(s) Oral every 2 hours  senna 2 Tablet(s) Oral at bedtime      VITAL:  T(C): , Max: 36.7 (09-30-20 @ 17:23)  T(F): , Max: 98 (09-30-20 @ 17:23)  HR: 66 (10-01-20 @ 05:09)  BP: 122/76 (10-01-20 @ 05:09)  BP(mean): --  RR: 20 (10-01-20 @ 05:09)  SpO2: 100% (10-01-20 @ 05:09)  Wt(kg): --    I and O's:    09-30 @ 07:01  -  10-01 @ 07:00  --------------------------------------------------------  IN: 640 mL / OUT: 800 mL / NET: -160 mL          PHYSICAL EXAM:    Constitutional: NAD; confused   Neck:  No JVD  Respiratory: CTAB/L  Cardiovascular: S1 and S2  Gastrointestinal: BS+, soft, NT/ND  Extremities: No peripheral edema  Neurological:  no focal deficits  Psychiatric: Normal mood, normal affect  : No Pearce  Skin: No rashes  Access: Not applicable    LABS:                        11.1   5.57  )-----------( 113      ( 01 Oct 2020 07:15 )             35.5     10-01    139  |  97  |  31<H>  ----------------------------<  246<H>  4.2   |  35<H>  |  0.71    Ca    9.3      01 Oct 2020 07:15            Urine Studies:          RADIOLOGY & ADDITIONAL STUDIES:

## 2020-10-02 NOTE — PROVIDER CONTACT NOTE (OTHER) - ASSESSMENT
+SOB, Dsypnea with activity OOB. 2LNC in place.
PT is sitting in hallway refusing all treatment. pt is confused and becomes combative when attempts are made to give meds.
Pt AxOx2, disoriented to place and time. Pt states, "I take pills for my diabetes"
Very agitated and uncooperative with Staff. Attempted to do blood culture couple of times.

## 2020-10-02 NOTE — PROGRESS NOTE ADULT - ASSESSMENT
Problem/Plan - 1:  ·  Problem: sepsis sec to UTI (urinary tract infection) with bactremia with metabolic encephalopathy POA .  Plan: dc ertepanum  id fu appreciated     Problem/Plan - 2:  ·  Problem: Hyperglycemia ion alkalosis with contract  Plan: monitor FS  ISS.   renal fuIVF     Problem/Plan - 3:  ·  Problem: AMS   Plan: metabolic encephalopathy  psych consult   neuro consult appreciated  pulmonary following for hypercarbia     Problem/Plan - 4:·  Problem: COPD (chronic obstructive pulmonary disease).  Plan: cw home meds  pulmonary fu   sp lasix  cw bipap  may need icu   cards fu  ABG reviewed     prognosis guarded

## 2020-10-02 NOTE — PROGRESS NOTE ADULT - SUBJECTIVE AND OBJECTIVE BOX
Patient is a 80y old  Female who presents with a chief complaint of sob, hyperglycemia (02 Oct 2020 16:06)      INTERVAL HPI/OVERNIGHT EVENTS:  T(C): 36.7 (10-02-20 @ 14:37), Max: 36.7 (10-02-20 @ 14:37)  HR: 98 (10-02-20 @ 15:04) (70 - 98)  BP: 172/75 (10-02-20 @ 14:37) (104/64 - 172/75)  RR: 25 (10-02-20 @ 14:37) (19 - 25)  SpO2: 99% (10-02-20 @ 15:04) (98% - 100%)  Wt(kg): --  I&O's Summary    01 Oct 2020 07:01  -  02 Oct 2020 07:00  --------------------------------------------------------  IN: 1260 mL / OUT: 1150 mL / NET: 110 mL    02 Oct 2020 07:01  -  02 Oct 2020 17:06  --------------------------------------------------------  IN: 120 mL / OUT: 500 mL / NET: -380 mL        LABS:                        10.8   5.22  )-----------( 124      ( 02 Oct 2020 08:59 )             33.5     10-02    144  |  102  |  25<H>  ----------------------------<  57<L>  4.4   |  37<H>  |  0.68    Ca    9.0      02 Oct 2020 08:59          CAPILLARY BLOOD GLUCOSE      POCT Blood Glucose.: 113 mg/dL (02 Oct 2020 14:52)  POCT Blood Glucose.: 86 mg/dL (02 Oct 2020 14:40)  POCT Blood Glucose.: 67 mg/dL (02 Oct 2020 14:29)  POCT Blood Glucose.: 39 mg/dL (02 Oct 2020 14:11)  POCT Blood Glucose.: 41 mg/dL (02 Oct 2020 14:09)  POCT Blood Glucose.: 268 mg/dL (02 Oct 2020 10:13)  POCT Blood Glucose.: 52 mg/dL (02 Oct 2020 09:56)  POCT Blood Glucose.: 64 mg/dL (02 Oct 2020 09:47)  POCT Blood Glucose.: 42 mg/dL (02 Oct 2020 09:44)  POCT Blood Glucose.: 94 mg/dL (01 Oct 2020 22:03)  POCT Blood Glucose.: 86 mg/dL (01 Oct 2020 17:41)    ABG - ( 02 Oct 2020 12:31 )  pH, Arterial: 7.39  pH, Blood: x     /  pCO2: 66    /  pO2: 99    / HCO3: 39    / Base Excess: 12.1  /  SaO2: 98                      MEDICATIONS  (STANDING):  ALBUTerol    90 MICROgram(s) HFA Inhaler 1 Puff(s) Inhalation every 4 hours  aspirin enteric coated 81 milliGRAM(s) Oral daily  atorvastatin 80 milliGRAM(s) Oral at bedtime  budesonide 160 MICROgram(s)/formoterol 4.5 MICROgram(s) Inhaler 2 Puff(s) Inhalation two times a day  dextrose 5%. 1000 milliLiter(s) (50 mL/Hr) IV Continuous <Continuous>  dextrose 50% Injectable 12.5 Gram(s) IV Push once  dextrose 50% Injectable 25 Gram(s) IV Push once  dextrose 50% Injectable 25 Gram(s) IV Push once  enoxaparin Injectable 40 milliGRAM(s) SubCutaneous daily  gabapentin 100 milliGRAM(s) Oral three times a day  influenza   Vaccine 0.5 milliLiter(s) IntraMuscular once  insulin glargine Injectable (LANTUS) 35 Unit(s) SubCutaneous at bedtime  insulin lispro (HumaLOG) corrective regimen sliding scale   SubCutaneous three times a day before meals  insulin lispro (HumaLOG) corrective regimen sliding scale   SubCutaneous at bedtime  insulin lispro Injectable (HumaLOG) 11 Unit(s) SubCutaneous three times a day before meals  magnesium sulfate  IVPB 1 Gram(s) IV Intermittent daily  methylPREDNISolone sodium succinate Injectable 40 milliGRAM(s) IV Push every 12 hours  polyethylene glycol 3350 17 Gram(s) Oral daily  senna 2 Tablet(s) Oral at bedtime    MEDICATIONS  (PRN):  acetaminophen   Tablet .. 650 milliGRAM(s) Oral every 6 hours PRN Moderate Pain (4 - 6)  dextrose 40% Gel 15 Gram(s) Oral once PRN Blood Glucose LESS THAN 70 milliGRAM(s)/deciliter  glucagon  Injectable 1 milliGRAM(s) IntraMuscular once PRN Glucose LESS THAN 70 milligrams/deciliter  haloperidol     Tablet 1 milliGRAM(s) Oral every 6 hours PRN agitation          PHYSICAL EXAM:  GENERAL: frail  CHEST/LUNG: bibasilar crackles   HEART: Regular rate and rhythm; No murmurs, rubs, or gallops  ABDOMEN: Soft, Nontender, Nondistended; Bowel sounds present  EXTREMITIES: edema +    Care Discussed with Consultants/Other Providers [x ] YES  [ ] NO

## 2020-10-02 NOTE — CHART NOTE - NSCHARTNOTEFT_GEN_A_CORE
Patient is a 80y old  Female who presents with a chief complaint of sob, hyperglycemia (02 Oct 2020 08:47)      SUBJECTIVE / OVERNIGHT EVENTS:    MEDICATIONS  (STANDING):  ALBUTerol    90 MICROgram(s) HFA Inhaler 1 Puff(s) Inhalation every 4 hours  aspirin enteric coated 81 milliGRAM(s) Oral daily  atorvastatin 80 milliGRAM(s) Oral at bedtime  budesonide 160 MICROgram(s)/formoterol 4.5 MICROgram(s) Inhaler 2 Puff(s) Inhalation two times a day  dextrose 5%. 1000 milliLiter(s) (50 mL/Hr) IV Continuous <Continuous>  dextrose 50% Injectable 12.5 Gram(s) IV Push once  dextrose 50% Injectable 25 Gram(s) IV Push once  dextrose 50% Injectable 25 Gram(s) IV Push once  enoxaparin Injectable 40 milliGRAM(s) SubCutaneous daily  gabapentin 100 milliGRAM(s) Oral three times a day  influenza   Vaccine 0.5 milliLiter(s) IntraMuscular once  insulin glargine Injectable (LANTUS) 42 Unit(s) SubCutaneous at bedtime  insulin lispro (HumaLOG) corrective regimen sliding scale   SubCutaneous at bedtime  insulin lispro (HumaLOG) corrective regimen sliding scale   SubCutaneous three times a day before meals  insulin lispro Injectable (HumaLOG) 14 Unit(s) SubCutaneous three times a day before meals  magnesium sulfate  IVPB 1 Gram(s) IV Intermittent daily  polyethylene glycol 3350 17 Gram(s) Oral daily  potassium chloride   Solution 40 milliEquivalent(s) Oral every 2 hours  senna 2 Tablet(s) Oral at bedtime    MEDICATIONS  (PRN):  acetaminophen   Tablet .. 650 milliGRAM(s) Oral every 6 hours PRN Moderate Pain (4 - 6)  dextrose 40% Gel 15 Gram(s) Oral once PRN Blood Glucose LESS THAN 70 milliGRAM(s)/deciliter  glucagon  Injectable 1 milliGRAM(s) IntraMuscular once PRN Glucose LESS THAN 70 milligrams/deciliter  haloperidol     Tablet 1 milliGRAM(s) Oral every 6 hours PRN agitation        CAPILLARY BLOOD GLUCOSE      POCT Blood Glucose.: 64 mg/dL (02 Oct 2020 09:47)  POCT Blood Glucose.: 42 mg/dL (02 Oct 2020 09:44)  POCT Blood Glucose.: 94 mg/dL (01 Oct 2020 22:03)  POCT Blood Glucose.: 86 mg/dL (01 Oct 2020 17:41)  POCT Blood Glucose.: 182 mg/dL (01 Oct 2020 12:37)    I&O's Summary    01 Oct 2020 07:01  -  02 Oct 2020 07:00  --------------------------------------------------------  IN: 1260 mL / OUT: 1150 mL / NET: 110 mL        PHYSICAL EXAM:  GENERAL: NAD, well-developed  HEAD:  Atraumatic, Normocephalic  EYES: EOMI, PERRLA, conjunctiva and sclera clear  NECK: Supple, No JVD  CHEST/LUNG: Clear to auscultation bilaterally; No wheeze  HEART: Regular rate and rhythm; No murmurs, rubs, or gallops  ABDOMEN: Soft, Nontender, Nondistended; Bowel sounds present  EXTREMITIES:  2+ Peripheral Pulses, No clubbing, cyanosis, or edema  PSYCH: AAOx3  NEUROLOGY: non-focal  SKIN: No rashes or lesions    PMH:  COPD, moderate    COPD (chronic obstructive pulmonary disease)    CHF (congestive heart failure)    HLD (hyperlipidemia)    HTN (hypertension)    Vitamin D deficiency    Dyslipidemia    Constipation    Kidney stones    Type 2 diabetes mellitus    GERD (gastroesophageal reflux disease)    COPD (chronic obstructive pulmonary disease)    HTN (hypertension)    CHF (congestive heart failure)    No pertinent past medical history          LABS:                        10.8   5.22  )-----------( 124      ( 02 Oct 2020 08:59 )             33.5     10-02    144  |  102  |  25<H>  ----------------------------<  57<L>  4.4   |  37<H>  |  0.68    Ca    9.0      02 Oct 2020 08:59      Chief complaint: Hypoglycemia   Called by RN to report patient with Hypoglycemia 44 on glucose testing   - pt seen and examined, denies all complaints , no dizziness, no sob, no abd pain   - Pt with known history of Diabetes , endocrine consulted for hyperglycemia on admission   - Will make adjustments to Lantus , currently at 45 will decrease   - Patient educated on maintain ADA diet   - Nurse instructed to follow [facility] hypoglycemia protocol   - Will cont to monitor

## 2020-10-02 NOTE — PROGRESS NOTE ADULT - SUBJECTIVE AND OBJECTIVE BOX
NEPHROLOGY-[facility] [phone]        Patient seen and examined in bed. She was confused and on enhanced care         MEDICATIONS  (STANDING):  ALBUTerol    90 MICROgram(s) HFA Inhaler 1 Puff(s) Inhalation every 4 hours  aspirin enteric coated 81 milliGRAM(s) Oral daily  atorvastatin 80 milliGRAM(s) Oral at bedtime  budesonide 160 MICROgram(s)/formoterol 4.5 MICROgram(s) Inhaler 2 Puff(s) Inhalation two times a day  dextrose 5%. 1000 milliLiter(s) (50 mL/Hr) IV Continuous <Continuous>  dextrose 50% Injectable 12.5 Gram(s) IV Push once  dextrose 50% Injectable 25 Gram(s) IV Push once  dextrose 50% Injectable 25 Gram(s) IV Push once  enoxaparin Injectable 40 milliGRAM(s) SubCutaneous daily  gabapentin 100 milliGRAM(s) Oral three times a day  influenza   Vaccine 0.5 milliLiter(s) IntraMuscular once  insulin glargine Injectable (LANTUS) 45 Unit(s) SubCutaneous at bedtime  insulin lispro (HumaLOG) corrective regimen sliding scale   SubCutaneous at bedtime  insulin lispro (HumaLOG) corrective regimen sliding scale   SubCutaneous three times a day before meals  insulin lispro Injectable (HumaLOG) 14 Unit(s) SubCutaneous three times a day before meals  magnesium sulfate  IVPB 1 Gram(s) IV Intermittent daily  polyethylene glycol 3350 17 Gram(s) Oral daily  potassium chloride   Solution 40 milliEquivalent(s) Oral every 2 hours  senna 2 Tablet(s) Oral at bedtime      VITAL:  T(C): , Max: 36.6 (10-01-20 @ 13:23)  T(F): , Max: 97.9 (10-01-20 @ 13:23)  HR: 75 (10-02-20 @ 05:53)  BP: 111/75 (10-02-20 @ 05:53)  BP(mean): --  RR: 19 (10-02-20 @ 05:53)  SpO2: 100% (10-02-20 @ 05:53)  Wt(kg): --    I and O's:    10-01 @ 07:01  -  10-02 @ 07:00  --------------------------------------------------------  IN: 1260 mL / OUT: 1150 mL / NET: 110 mL          PHYSICAL EXAM:    Constitutional: NAD  Neck:  No JVD  Respiratory: CTAB/L  Cardiovascular: S1 and S2  Gastrointestinal: BS+, soft, NT/ND  Extremities: No peripheral edema  Neurological: A/O x 3, no focal deficits  Psychiatric: Normal mood, normal affect  : No Pearce  Skin: No rashes  Access: Not applicable    LABS:                        11.1   5.57  )-----------( 113      ( 01 Oct 2020 07:15 )             35.5     10-01    139  |  97  |  31<H>  ----------------------------<  246<H>  4.2   |  35<H>  |  0.71    Ca    9.3      01 Oct 2020 07:15            Urine Studies:          RADIOLOGY & ADDITIONAL STUDIES:          < from: CT Angio Chest w/ IV Cont (10.01.20 @ 19:39) >    EXAM:  CT ANGIO CHEST (W)AW IC                            PROCEDURE DATE:  10/01/2020            INTERPRETATION:  EXAMINATION: CT ANGIO CHEST WITHOUT AND OR WITH IV CONTRAST    CLINICAL INDICATION: Dyspnea.    TECHNIQUE: CTA of the chest was obtained after the administration of 63 ml of Omnipaque 350, 37 ml was discarded.  MIP images were reconstructed.    COMPARISON: Multiple CT, most recent 7/15/2020.    FINDINGS:    AIRWAYS AND LUNGS: The central tracheobronchial tree is patent.  Small bilateral pleural effusions with near complete collapse of both lower lobes and partial atelectasis of the left upper lobe.    MEDIASTINUM AND PLEURA: There are no enlarged mediastinal, hilar or axillary lymph nodes. The visualized portion of the thyroid gland is unremarkable. There is no pneumothorax.    HEART AND VESSELS: The heart is normal in size.  There are atherosclerotic calcifications of the aorta and coronary arteries.  There is no pericardial effusion.  No pulmonary embolism. Bilateral SVC'swith unchanged dilatation of the left-sided SVC. Aortic valve calcification.    UPPER ABDOMEN: Images of the upper abdomen demonstrate dystrophic calcifications within a atrophic right kidney with a fluid density right renal cyst.    BONES AND SOFT TISSUES: The bones are unremarkable.  Question left breast skin thickening laterally.    TUBES/LINES: None.    IMPRESSION:  Question left breast skin thickening laterally, correlate with physical exam and consider ultrasound as needed.    No pulmonary embolism.    Small bilateral pleural effusions with near complete collapse of both lower lobes and partial atelectasis of the left upper lobe.                  SANDRA VERA M.D., ATTENDING RADIOLOGIST  This document has been electronically signed. Oct 1 2020  9:16PM    < end of copied text >

## 2020-10-02 NOTE — PROGRESS NOTE ADULT - SUBJECTIVE AND OBJECTIVE BOX
Patient is a 80y old  Female who presents with a chief complaint of sob, hyperglycemia (02 Oct 2020 12:53)      Any change in ROS: She is pretty sob    MEDICATIONS  (STANDING):  ALBUTerol    90 MICROgram(s) HFA Inhaler 1 Puff(s) Inhalation every 4 hours  ALPRAZolam 0.25 milliGRAM(s) Oral once  aspirin enteric coated 81 milliGRAM(s) Oral daily  atorvastatin 80 milliGRAM(s) Oral at bedtime  budesonide 160 MICROgram(s)/formoterol 4.5 MICROgram(s) Inhaler 2 Puff(s) Inhalation two times a day  dextrose 5%. 1000 milliLiter(s) (50 mL/Hr) IV Continuous <Continuous>  dextrose 50% Injectable 12.5 Gram(s) IV Push once  dextrose 50% Injectable 25 Gram(s) IV Push once  dextrose 50% Injectable 25 Gram(s) IV Push once  enoxaparin Injectable 40 milliGRAM(s) SubCutaneous daily  gabapentin 100 milliGRAM(s) Oral three times a day  influenza   Vaccine 0.5 milliLiter(s) IntraMuscular once  insulin glargine Injectable (LANTUS) 35 Unit(s) SubCutaneous at bedtime  insulin lispro (HumaLOG) corrective regimen sliding scale   SubCutaneous three times a day before meals  insulin lispro (HumaLOG) corrective regimen sliding scale   SubCutaneous at bedtime  insulin lispro Injectable (HumaLOG) 11 Unit(s) SubCutaneous three times a day before meals  magnesium sulfate  IVPB 1 Gram(s) IV Intermittent daily  methylPREDNISolone sodium succinate Injectable 20 milliGRAM(s) IV Push every 12 hours  polyethylene glycol 3350 17 Gram(s) Oral daily  senna 2 Tablet(s) Oral at bedtime    MEDICATIONS  (PRN):  acetaminophen   Tablet .. 650 milliGRAM(s) Oral every 6 hours PRN Moderate Pain (4 - 6)  dextrose 40% Gel 15 Gram(s) Oral once PRN Blood Glucose LESS THAN 70 milliGRAM(s)/deciliter  glucagon  Injectable 1 milliGRAM(s) IntraMuscular once PRN Glucose LESS THAN 70 milligrams/deciliter  haloperidol     Tablet 1 milliGRAM(s) Oral every 6 hours PRN agitation    Vital Signs Last 24 Hrs  T(C): 36.6 (02 Oct 2020 10:17), Max: 36.6 (01 Oct 2020 17:31)  T(F): 97.8 (02 Oct 2020 10:17), Max: 97.9 (02 Oct 2020 05:53)  HR: 77 (02 Oct 2020 10:17) (70 - 82)  BP: 139/93 (02 Oct 2020 10:17) (104/64 - 139/93)  BP(mean): --  RR: 23 (02 Oct 2020 10:17) (19 - 23)  SpO2: 100% (02 Oct 2020 10:17) (100% - 100%)    I&O's Summary    01 Oct 2020 07:01  -  02 Oct 2020 07:00  --------------------------------------------------------  IN: 1260 mL / OUT: 1150 mL / NET: 110 mL    02 Oct 2020 07:01  -  02 Oct 2020 14:22  --------------------------------------------------------  IN: 120 mL / OUT: 300 mL / NET: -180 mL          Physical Exam:   GENERAL: NAD, well-groomed, well-developed  HEENT: DON/   Atraumatic, Normocephalic  ENMT: No tonsillar erythema, exudates, or enlargement; Moist mucous membranes, Good dentition, No lesions  NECK: Supple, No JVD, Normal thyroid  CHEST/LUNG: Clear to auscultaion: not wheezing  CVS: Regular rate and rhythm; No murmurs, rubs, or gallops  GI: : Soft, Nontender, Nondistended; Bowel sounds present  NERVOUS SYSTEM:  Alert & Oriented X3  EXTREMITIES:  2+ Peripheral Pulses, No clubbing, cyanosis, or edema  LYMPH: No lymphadenopathy noted  SKIN: No rashes or lesions  ENDOCRINOLOGY: No Thyromegaly  PSYCH: Appropriate    Labs:  ABG - ( 02 Oct 2020 12:31 )  pH, Arterial: 7.39  pH, Blood: x     /  pCO2: 66    /  pO2: 99    / HCO3: 39    / Base Excess: 12.1  /  SaO2: 98                                          10.8   5.22  )-----------( 124      ( 02 Oct 2020 08:59 )             33.5                         11.1   5.57  )-----------( 113      ( 01 Oct 2020 07:15 )             35.5                         10.8   6.30  )-----------( 130      ( 30 Sep 2020 06:48 )             34.5                         11.2   6.26  )-----------( 126      ( 29 Sep 2020 07:20 )             36.1     10-02    144  |  102  |  25<H>  ----------------------------<  57<L>  4.4   |  37<H>  |  0.68  10-01    139  |  97  |  31<H>  ----------------------------<  246<H>  4.2   |  35<H>  |  0.71  09-30    140  |  99  |  34<H>  ----------------------------<  252<H>  4.3   |  36<H>  |  0.81  09-29    142  |  97  |  30<H>  ----------------------------<  224<H>  3.9   |  37<H>  |  0.76    Ca    9.0      02 Oct 2020 08:59  Ca    9.3      01 Oct 2020 07:15      CAPILLARY BLOOD GLUCOSE      POCT Blood Glucose.: 39 mg/dL (02 Oct 2020 14:11)  POCT Blood Glucose.: 41 mg/dL (02 Oct 2020 14:09)  POCT Blood Glucose.: 268 mg/dL (02 Oct 2020 10:13)  POCT Blood Glucose.: 52 mg/dL (02 Oct 2020 09:56)  POCT Blood Glucose.: 64 mg/dL (02 Oct 2020 09:47)  POCT Blood Glucose.: 42 mg/dL (02 Oct 2020 09:44)  POCT Blood Glucose.: 94 mg/dL (01 Oct 2020 22:03)  POCT Blood Glucose.: 86 mg/dL (01 Oct 2020 17:41)        c< from: CT Angio Chest w/ IV Cont (10.01.20 @ 19:39) >  CLINICAL INDICATION: Dyspnea.    TECHNIQUE: CTA of the chest was obtained after the administration of 63 ml of Omnipaque 350, 37 ml was discarded.  MIP images were reconstructed.    COMPARISON: Multiple CT, most recent 7/15/2020.    FINDINGS:    AIRWAYS AND LUNGS: The central tracheobronchial tree is patent.  Small bilateral pleural effusions with near complete collapse of both lower lobes and partial atelectasis of the left upper lobe.    MEDIASTINUM AND PLEURA: There are no enlarged mediastinal, hilar or axillary lymph nodes. The visualized portion of the thyroid gland is unremarkable. There is no pneumothorax.    HEART AND VESSELS: The heart is normal in size.  There are atherosclerotic calcifications of the aorta and coronary arteries.  There is no pericardial effusion.  No pulmonary embolism. Bilateral SVC'swith unchanged dilatation of the left-sided SVC. Aortic valve calcification.    UPPER ABDOMEN: Images of the upper abdomen demonstrate dystrophic calcifications within a atrophic right kidney with a fluid density right renal cyst.    BONES AND SOFT TISSUES: The bones are unremarkable.  Question left breast skin thickening laterally.    TUBES/LINES: None.    IMPRESSION:  Question left breast skin thickening laterally, correlate with physical exam and consider ultrasound as needed.    No pulmonary embolism.    Small bilateral pleural effusions with near complete collapse of both lower lobes and partial atelectasis of the left upper lobe.                  SANDRA VERA M.D., ATTENDING RADIOLOGIST  This document has been electronically signed. Oct 1 2020  9:16PM    < end of copied text >            RECENT CULTURES:        RESPIRATORY CULTURES:          Studies  Chest X-RAY  CT SCAN Chest   Venous Dopplers: LE:   CT Abdomen  Others

## 2020-10-02 NOTE — PROGRESS NOTE ADULT - ASSESSMENT
ASSESSMENT/PLAN:  80 year old F with pmhx of  DMT2 (on Metformin and insulin), COPD, CHF, HLD, HTN, kidney stones and pshx of R nephrectomy, L knee surgery presents to ED with elevated blood sugar, SOB, and lethargy.  Elevated HCO3 is from CO2 retention now.   Hypernatremia - improved   CKD stage 3 at minimum from reduced nephron mass     1 CVS- Off IVF. At present she is not in heart failure.   2 Renal-CKD on the basis of reduced nephron mass and likely DM.    Renal sono would be a good idea to rule masses but can be done as outpt.   No need for additional diamox as the HCO3 keeps improving   3 Endo-Needs tighter blood sugar management as outpt   4 Pulm-No evidence of contrast associated nephropathy;  Pulm eval noted     Physical therapy    Sayed jeannette   [facility]  [phone]

## 2020-10-02 NOTE — PROGRESS NOTE ADULT - ASSESSMENT
80 year old F with pmhx of  DMT2 (on Metformin and insulin), COPD, CHF, HLD, HTN, kidney stones and pshx of R nephrectomy, L knee surgery presents to ED bibems with elevated blood sugar, SOB, and lethargy found to have UTI.     1. SOB, acute/chronic diastolic chf exacerbation  -multifactorial 2/2 to DKA, COPD hx   -cv stable  -c/o SOB today place on bipap, s/p lasix 40 x 1   -ct chest 10/1 with no pe, bl small pleural effusion   -echo in july with nl LV sys fx, ef 55-60%, mild MR, mod pulm htn    -pulm f/u   -reassess BMP tomorrow, consider restarting  lasix     2. Hyperglycemia  -management per primary team     3. UTI  -+BCX likely contaminant: repeat neg  -s/p abx per primary team     4. Hypokalemia  -supplementation per primary team, continue to trend.     5. COPD , hx  -c/w home meds, pulm f/u     dvt ppx      80 year old F with pmhx of  DMT2 (on Metformin and insulin), COPD, CHF, HLD, HTN, kidney stones and pshx of R nephrectomy, L knee surgery presents to ED bibems with elevated blood sugar, SOB, and lethargy found to have UTI.     1. SOB, acute/chronic diastolic chf exacerbation  -multifactorial 2/2 to DKA, COPD hx   -cv stable  -c/o SOB today place on bipap, s/p lasix 40 x 1   -ct chest 10/1 with no pe, bl small pleural effusion   -echo in july with nl LV sys fx, ef 55-60%, mild MR, mod pulm htn    -pulm f/u   -reassess BMP tomorrow, consider restarting lasix     2. Hyperglycemia  -management per primary team     3. UTI  -+BCX likely contaminant: repeat neg  -s/p abx per primary team     4. Hypokalemia  -supplementation per primary team, continue to trend.     5. COPD , hx  -c/w home meds, pulm f/u     dvt ppx

## 2020-10-02 NOTE — PROGRESS NOTE ADULT - SUBJECTIVE AND OBJECTIVE BOX
Chief complaint  Patient is a 80y old  Female who presents with a chief complaint of sob, hyperglycemia (02 Oct 2020 08:47)   Review of systems  Patient in bed, looks comfortable, had hypoglycemic episode this AM.    Labs and Fingersticks  CAPILLARY BLOOD GLUCOSE      POCT Blood Glucose.: 268 mg/dL (02 Oct 2020 10:13)  POCT Blood Glucose.: 52 mg/dL (02 Oct 2020 09:56)  POCT Blood Glucose.: 64 mg/dL (02 Oct 2020 09:47)  POCT Blood Glucose.: 42 mg/dL (02 Oct 2020 09:44)  POCT Blood Glucose.: 94 mg/dL (01 Oct 2020 22:03)  POCT Blood Glucose.: 86 mg/dL (01 Oct 2020 17:41)      Anion Gap, Serum: 5 (10-02 @ 08:59)  Anion Gap, Serum: 7 (10-01 @ 07:15)      Calcium, Total Serum: 9.0 (10-02 @ 08:59)  Calcium, Total Serum: 9.3 (10-01 @ 07:15)          10-02    144  |  102  |  25<H>  ----------------------------<  57<L>  4.4   |  37<H>  |  0.68    Ca    9.0      02 Oct 2020 08:59                          10.8   5.22  )-----------( 124      ( 02 Oct 2020 08:59 )             33.5     Medications  MEDICATIONS  (STANDING):  ALBUTerol    90 MICROgram(s) HFA Inhaler 1 Puff(s) Inhalation every 4 hours  aspirin enteric coated 81 milliGRAM(s) Oral daily  atorvastatin 80 milliGRAM(s) Oral at bedtime  budesonide 160 MICROgram(s)/formoterol 4.5 MICROgram(s) Inhaler 2 Puff(s) Inhalation two times a day  dextrose 5%. 1000 milliLiter(s) (50 mL/Hr) IV Continuous <Continuous>  dextrose 50% Injectable 12.5 Gram(s) IV Push once  dextrose 50% Injectable 25 Gram(s) IV Push once  dextrose 50% Injectable 25 Gram(s) IV Push once  enoxaparin Injectable 40 milliGRAM(s) SubCutaneous daily  gabapentin 100 milliGRAM(s) Oral three times a day  influenza   Vaccine 0.5 milliLiter(s) IntraMuscular once  insulin glargine Injectable (LANTUS) 35 Unit(s) SubCutaneous at bedtime  insulin lispro (HumaLOG) corrective regimen sliding scale   SubCutaneous three times a day before meals  insulin lispro (HumaLOG) corrective regimen sliding scale   SubCutaneous at bedtime  insulin lispro Injectable (HumaLOG) 11 Unit(s) SubCutaneous three times a day before meals  magnesium sulfate  IVPB 1 Gram(s) IV Intermittent daily  methylPREDNISolone sodium succinate Injectable 20 milliGRAM(s) IV Push every 12 hours  polyethylene glycol 3350 17 Gram(s) Oral daily  senna 2 Tablet(s) Oral at bedtime      Physical Exam  General: Patient comfortable in bed  Vital Signs Last 12 Hrs  T(F): 97.8 (10-02-20 @ 10:17), Max: 97.9 (10-02-20 @ 05:53)  HR: 77 (10-02-20 @ 10:17) (75 - 77)  BP: 139/93 (10-02-20 @ 10:17) (111/75 - 139/93)  BP(mean): --  RR: 23 (10-02-20 @ 10:17) (19 - 23)  SpO2: 100% (10-02-20 @ 10:17) (100% - 100%)  Neck: No palpable thyroid nodules.  CVS: S1S2, No murmurs  Respiratory: No wheezing, no crepitations  GI: Abdomen soft, bowel sounds positive  Musculoskeletal:  edema lower extremities.   Skin: No skin rashes, no ecchymosis    Diagnostics    Free Thyroxine, Serum: AM Sched. Collection: 23-Sep-2020 06:00 (09-22 @ 11:16)  Thyroid Stimulating Hormone, Serum: AM Sched. Collection: 23-Sep-2020 06:00 (09-22 @ 11:16)           Chief complaint  Patient is a 80y old  Female who presents with a chief complaint of sob, hyperglycemia (02 Oct 2020 08:47)   Review of systems  Patient in bed, looks comfortable, had hypoglycemic episode this AM.    Labs and Fingersticks  CAPILLARY BLOOD GLUCOSE      POCT Blood Glucose.: 268 mg/dL (02 Oct 2020 10:13)  POCT Blood Glucose.: 52 mg/dL (02 Oct 2020 09:56)  POCT Blood Glucose.: 64 mg/dL (02 Oct 2020 09:47)  POCT Blood Glucose.: 42 mg/dL (02 Oct 2020 09:44)  POCT Blood Glucose.: 94 mg/dL (01 Oct 2020 22:03)  POCT Blood Glucose.: 86 mg/dL (01 Oct 2020 17:41)      Anion Gap, Serum: 5 (10-02 @ 08:59)  Anion Gap, Serum: 7 (10-01 @ 07:15)      Calcium, Total Serum: 9.0 (10-02 @ 08:59)  Calcium, Total Serum: 9.3 (10-01 @ 07:15)          10-02    144  |  102  |  25<H>  ----------------------------<  57<L>  4.4   |  37<H>  |  0.68    Ca    9.0      02 Oct 2020 08:59                          10.8   5.22  )-----------( 124      ( 02 Oct 2020 08:59 )             33.5     Medications  MEDICATIONS  (STANDING):  ALBUTerol    90 MICROgram(s) HFA Inhaler 1 Puff(s) Inhalation every 4 hours  aspirin enteric coated 81 milliGRAM(s) Oral daily  atorvastatin 80 milliGRAM(s) Oral at bedtime  budesonide 160 MICROgram(s)/formoterol 4.5 MICROgram(s) Inhaler 2 Puff(s) Inhalation two times a day  dextrose 5%. 1000 milliLiter(s) (50 mL/Hr) IV Continuous <Continuous>  dextrose 50% Injectable 12.5 Gram(s) IV Push once  dextrose 50% Injectable 25 Gram(s) IV Push once  dextrose 50% Injectable 25 Gram(s) IV Push once  enoxaparin Injectable 40 milliGRAM(s) SubCutaneous daily  gabapentin 100 milliGRAM(s) Oral three times a day  influenza   Vaccine 0.5 milliLiter(s) IntraMuscular once  insulin glargine Injectable (LANTUS) 35 Unit(s) SubCutaneous at bedtime  insulin lispro (HumaLOG) corrective regimen sliding scale   SubCutaneous three times a day before meals  insulin lispro (HumaLOG) corrective regimen sliding scale   SubCutaneous at bedtime  insulin lispro Injectable (HumaLOG) 11 Unit(s) SubCutaneous three times a day before meals  magnesium sulfate  IVPB 1 Gram(s) IV Intermittent daily  methylPREDNISolone sodium succinate Injectable 20 milliGRAM(s) IV Push every 12 hours  polyethylene glycol 3350 17 Gram(s) Oral daily  senna 2 Tablet(s) Oral at bedtime      Physical Exam  General: Patient comfortable in bed  Vital Signs Last 12 Hrs  T(F): 97.8 (10-02-20 @ 10:17), Max: 97.9 (10-02-20 @ 05:53)  HR: 77 (10-02-20 @ 10:17) (75 - 77)  BP: 139/93 (10-02-20 @ 10:17) (111/75 - 139/93)  BP(mean): --  RR: 23 (10-02-20 @ 10:17) (19 - 23)  SpO2: 100% (10-02-20 @ 10:17) (100% - 100%)  Neck: No palpable thyroid nodules.  CVS: S1S2, No murmurs  Respiratory: No wheezing, no crepitations  GI: Abdomen soft, bowel sounds positive  Musculoskeletal:  edema lower extremities.   Skin: No skin rashes, no ecchymosis    Diagnostics    Free Thyroxine, Serum: AM Sched. Collection: 23-Sep-2020 06:00 (09-22 @ 11:16)  Thyroid Stimulating Hormone, Serum: AM Sched. Collection: 23-Sep-2020 06:00 (09-22 @ 11:16)

## 2020-10-02 NOTE — PROGRESS NOTE ADULT - ASSESSMENT
80 year old F with pmhx of  DMT2 (on Metformin and insulin), COPD, CHF, HLD, HTN, kidney stones and pshx of R nephrectomy, L knee surgery presents to ED bibems with elevated blood sugar, SOB, and lethargy. Per EMS, pt with 485 blood sugar, BP of 144/75, and O2 sat 92% on room air.  Daughter notes period of incoherent speech over the weekend. Pt endorses excess urination and thirst over the last few days. She also reports mild SOB x few days that is worse when lying flat. Pt also reports constant CP that has been present for "quite a while", unable to specify how much time. Denies fever, chills, worsening leg swelling.    COPD:  Thoraco lumbar scoliosis  Uncontrolled DM:  UTI  Confusion  CHF    she is admitted with high blood glucose and found to have uti:  She has copd:  She was recently admitted to [facility] where she was treated with copd exacerbation:  At that time her CTA was negative and no indication of malignancy on ct chest :  Currently she is not SOB : her outpt  meds noted:  She is already on Symbicort as well as inhalers:  currently she is also being treated for chf   her venous ABG towards met alk side and seems to be a chronic retainer too   I don't think she needs bipap at this time:  She seems pretty confused: ct head is negative for bleed:  would defer to primary team :  DVT propahyxlis    9/23:  pt is not SOB at this time: but emotionally labile  not wheezing:   she is not wheezing  She has chronically elevated co2:  cont Symbicort  her venous abg reviewed:   CT head neg for bleed:   MAURICE NP    9/24:  she looks better today : she has coag neg staph in blood cultures: likely contaminant:  She is not wheezing:  On 2 L of oxygen : No resp distress:   Has chr hypercarbia:  Cont inhalers not wheezing: Blood glucose better controlled:   mentally she is better today :  MAURICE NP :  dvt propahyxlis    9/25:  she has metabolic alkalosis" with  increased co2:  reviewed nephrology   on normal saline   off diuretics n ow: \not wheezing:   despite iv fluids her hCO3 remains elevated:   DIAMOX X 1 TODASY AND REVIEWED HCO3 AGAIN TOMORROW:  maurice renal:    9/26:  she is doing ok : still little bit confused:  her oxygenation is reasonable:   Her hco3 came down today : to monitor closely:  still confused:  blood glucose now controlled:  ? why she is confused:   ? neuro consult    9/27:  she is alert and awake: mildly confused:  no SOB :  but she is a chronic retainer:  Her o2 sao2 is OK on oxygen :  blood cultures contaminant from before:   hco3 same: at 39: monitor  MAy need repeat diamox if it increases further;   DVT proaphxymoes  MAURICE NP      9/28:  pt is alert and awake: mildly confused:  not in any resp distress:   reced diamox yesterday : hco3 today at 38 :   Her o2 sao2 is pretty good:  Blood glucose is better controlled   Cont Symbicort and albuterol:  Last ABG was with chr retainer: PH was ok:  REPEAT abg   COVD IS NEGATIVE  DVT demarcoyxmoes    9/29:  she is doing ok: still confused:  Not due to co2: SHE HAS CHR HYPERCARBIA   ph is reasonable   for ct abdomen now:  neuro follow u p:  Cont BD: not wheezing today :  DVT shmuel BOLANOS NP    9/30:    she is doing same:  on 2 L of oxygen:  wean her off oxygen to-day ;  chest xray is reviewed   Sheis afebrile:  DVT shmuel BOLANOS NP        10/1:  she b ecame very SOB after cleaning:  she says hse is SOB: needs CTA:  family has refused so far:  she did ahve cta in july that wasnegative for pe  She is not wheezing much   Cont BD andoxygen:  dopplers lf LE too:  MAURICE NPlois    10/2:    she looks pretty soB:  ABG has chr hypercapnic resp failure  Increase SM to 40 mg bid  She is not wheezing though :  she has bilateral pleural effusions: would give lasix 40 mg x 12 now:  Start bipap for work of breathing   ABG is good:  MAURICE NP: may need MICU :  dw daughter at bedside too

## 2020-10-02 NOTE — PROGRESS NOTE ADULT - PROBLEM SELECTOR PLAN 1
Will decrease Lantus to 35u at bedtime.  Will decrease Humalog to 11u before each meal and continue Humalog correction scale coverage. Will continue monitoring FS and FU.  Suggest DC on current basal bolus insulin regimen with endo FU 2 weeks.  Patient counseled for compliance with consistent low carb diet and physical activity as tolerated outpatient.

## 2020-10-02 NOTE — PROVIDER CONTACT NOTE (OTHER) - ACTION/TREATMENT ORDERED:
No new ordered, will notify attending.
NP made aware. Apple juice given w/new result of 268. 14u premeal + 3U humalog given and patient ate 50% of meal. Will continue to monitor.
Will continue to monitor.

## 2020-10-02 NOTE — PROGRESS NOTE ADULT - SUBJECTIVE AND OBJECTIVE BOX
CARDIOLOGY FOLLOW UP - Dr. Mukherjee    CC no cp . on bipap on exam . for SOB ,s/p lasix 40 mg IVP x 1       PHYSICAL EXAM:  T(C): 36.7 (10-02-20 @ 14:37), Max: 36.7 (10-02-20 @ 14:37)  HR: 98 (10-02-20 @ 15:04) (70 - 98)  BP: 172/75 (10-02-20 @ 14:37) (104/64 - 172/75)  RR: 25 (10-02-20 @ 14:37) (19 - 25)  SpO2: 99% (10-02-20 @ 15:04) (98% - 100%)  Wt(kg): --  I&O's Summary    01 Oct 2020 07:01  -  02 Oct 2020 07:00  --------------------------------------------------------  IN: 1260 mL / OUT: 1150 mL / NET: 110 mL    02 Oct 2020 07:01  -  02 Oct 2020 16:07  --------------------------------------------------------  IN: 120 mL / OUT: 500 mL / NET: -380 mL        Appearance: Normal	  Cardiovascular: Normal S1 S2,RRR, No JVD, No murmurs  Respiratory: diminished    Gastrointestinal:  Soft, Non-tender, + BS	  Extremities: Normal range of motion, No clubbing, cyanosis or edema        MEDICATIONS  (STANDING):  ALBUTerol    90 MICROgram(s) HFA Inhaler 1 Puff(s) Inhalation every 4 hours  ALPRAZolam 0.25 milliGRAM(s) Oral once  aspirin enteric coated 81 milliGRAM(s) Oral daily  atorvastatin 80 milliGRAM(s) Oral at bedtime  budesonide 160 MICROgram(s)/formoterol 4.5 MICROgram(s) Inhaler 2 Puff(s) Inhalation two times a day  dextrose 5%. 1000 milliLiter(s) (50 mL/Hr) IV Continuous <Continuous>  dextrose 50% Injectable 12.5 Gram(s) IV Push once  dextrose 50% Injectable 25 Gram(s) IV Push once  dextrose 50% Injectable 25 Gram(s) IV Push once  enoxaparin Injectable 40 milliGRAM(s) SubCutaneous daily  gabapentin 100 milliGRAM(s) Oral three times a day  influenza   Vaccine 0.5 milliLiter(s) IntraMuscular once  insulin glargine Injectable (LANTUS) 35 Unit(s) SubCutaneous at bedtime  insulin lispro (HumaLOG) corrective regimen sliding scale   SubCutaneous three times a day before meals  insulin lispro (HumaLOG) corrective regimen sliding scale   SubCutaneous at bedtime  insulin lispro Injectable (HumaLOG) 11 Unit(s) SubCutaneous three times a day before meals  magnesium sulfate  IVPB 1 Gram(s) IV Intermittent daily  methylPREDNISolone sodium succinate Injectable 40 milliGRAM(s) IV Push every 12 hours  polyethylene glycol 3350 17 Gram(s) Oral daily  senna 2 Tablet(s) Oral at bedtime      TELEMETRY: 	    ECG:  	  RADIOLOGY:   DIAGNOSTIC TESTING:  [ ] Echocardiogram:  [ ]  Catheterization:  [ ] Stress Test:    OTHER: 	    < from: CT Angio Chest w/ IV Cont (10.01.20 @ 19:39) >    IMPRESSION:  Question left breast skin thickening laterally, correlate with physical exam and consider ultrasound as needed.    No pulmonary embolism.    Small bilateral pleural effusions with near complete collapse of both lower lobes and partial atelectasis of the left upper lobe.        < end of copied text >  LABS:	 	                            10.8   5.22  )-----------( 124      ( 02 Oct 2020 08:59 )             33.5     10-02    144  |  102  |  25<H>  ----------------------------<  57<L>  4.4   |  37<H>  |  0.68    Ca    9.0      02 Oct 2020 08:59

## 2020-10-02 NOTE — CHART NOTE - NSCHARTNOTEFT_GEN_A_CORE
Patient is a 80y old  Female who presents with a chief complaint of sob, hyperglycemia (02 Oct 2020 16:06)      SUBJECTIVE / OVERNIGHT EVENTS:    MEDICATIONS  (STANDING):  ALBUTerol    90 MICROgram(s) HFA Inhaler 1 Puff(s) Inhalation every 4 hours  aspirin enteric coated 81 milliGRAM(s) Oral daily  atorvastatin 80 milliGRAM(s) Oral at bedtime  budesonide 160 MICROgram(s)/formoterol 4.5 MICROgram(s) Inhaler 2 Puff(s) Inhalation two times a day  dextrose 5%. 1000 milliLiter(s) (50 mL/Hr) IV Continuous <Continuous>  dextrose 50% Injectable 12.5 Gram(s) IV Push once  dextrose 50% Injectable 25 Gram(s) IV Push once  dextrose 50% Injectable 25 Gram(s) IV Push once  enoxaparin Injectable 40 milliGRAM(s) SubCutaneous daily  gabapentin 100 milliGRAM(s) Oral three times a day  influenza   Vaccine 0.5 milliLiter(s) IntraMuscular once  insulin glargine Injectable (LANTUS) 35 Unit(s) SubCutaneous at bedtime  insulin lispro (HumaLOG) corrective regimen sliding scale   SubCutaneous three times a day before meals  insulin lispro (HumaLOG) corrective regimen sliding scale   SubCutaneous at bedtime  insulin lispro Injectable (HumaLOG) 11 Unit(s) SubCutaneous three times a day before meals  magnesium sulfate  IVPB 1 Gram(s) IV Intermittent daily  methylPREDNISolone sodium succinate Injectable 40 milliGRAM(s) IV Push every 12 hours  polyethylene glycol 3350 17 Gram(s) Oral daily  senna 2 Tablet(s) Oral at bedtime    MEDICATIONS  (PRN):  acetaminophen   Tablet .. 650 milliGRAM(s) Oral every 6 hours PRN Moderate Pain (4 - 6)  dextrose 40% Gel 15 Gram(s) Oral once PRN Blood Glucose LESS THAN 70 milliGRAM(s)/deciliter  glucagon  Injectable 1 milliGRAM(s) IntraMuscular once PRN Glucose LESS THAN 70 milligrams/deciliter  haloperidol     Tablet 1 milliGRAM(s) Oral every 6 hours PRN agitation        CAPILLARY BLOOD GLUCOSE      POCT Blood Glucose.: 113 mg/dL (02 Oct 2020 14:52)  POCT Blood Glucose.: 86 mg/dL (02 Oct 2020 14:40)  POCT Blood Glucose.: 67 mg/dL (02 Oct 2020 14:29)  POCT Blood Glucose.: 39 mg/dL (02 Oct 2020 14:11)  POCT Blood Glucose.: 41 mg/dL (02 Oct 2020 14:09)  POCT Blood Glucose.: 268 mg/dL (02 Oct 2020 10:13)  POCT Blood Glucose.: 52 mg/dL (02 Oct 2020 09:56)  POCT Blood Glucose.: 64 mg/dL (02 Oct 2020 09:47)  POCT Blood Glucose.: 42 mg/dL (02 Oct 2020 09:44)  POCT Blood Glucose.: 94 mg/dL (01 Oct 2020 22:03)  POCT Blood Glucose.: 86 mg/dL (01 Oct 2020 17:41)    I&O's Summary    01 Oct 2020 07:01  -  02 Oct 2020 07:00  --------------------------------------------------------  IN: 1260 mL / OUT: 1150 mL / NET: 110 mL    02 Oct 2020 07:01  -  02 Oct 2020 16:17  --------------------------------------------------------  IN: 120 mL / OUT: 500 mL / NET: -380 mL        PHYSICAL EXAM:  GENERAL: NAD, well-developed  HEAD:  Atraumatic, Normocephalic  EYES: EOMI, PERRLA, conjunctiva and sclera clear  NECK: Supple, No JVD  CHEST/LUNG: Clear to auscultation bilaterally; No wheeze  HEART: Regular rate and rhythm; No murmurs, rubs, or gallops  ABDOMEN: Soft, Nontender, Nondistended; Bowel sounds present  EXTREMITIES:  2+ Peripheral Pulses, No clubbing, cyanosis, or edema  PSYCH: AAOx3  NEUROLOGY: non-focal  SKIN: No rashes or lesions    PMH:  COPD, moderate    COPD (chronic obstructive pulmonary disease)    CHF (congestive heart failure)    HLD (hyperlipidemia)    HTN (hypertension)    Vitamin D deficiency    Dyslipidemia    Constipation    Kidney stones    Type 2 diabetes mellitus    GERD (gastroesophageal reflux disease)    COPD (chronic obstructive pulmonary disease)    HTN (hypertension)    CHF (congestive heart failure)    No pertinent past medical history      LABS:                        10.8   5.22  )-----------( 124      ( 02 Oct 2020 08:59 )             33.5     10-02    144  |  102  |  25<H>  ----------------------------<  57<L>  4.4   |  37<H>  |  0.68    Ca    9.0      02 Oct 2020 08:59      Chief complaint: Shortness of breath likely related to PE vs COPD Exac   - Patient noted with Dyspnea , sat 100% on 2lnc  - Patient seen and examined, reports sob, denies chest pain, abd pain  - Patient with known history of copd/ chf   - Abg ordered , CT Angio completed and neg for pe, + atelectasis + pleural effusions   - Lasix 40 iv x1, Solumedrol started, bipap ordered (post ABG results)   - Discussed w Dr. Araujo and DR. dia ( plan agreed )

## 2020-10-02 NOTE — PROGRESS NOTE ADULT - ASSESSMENT
Assessment  DMT2: 80y Female with DM T2 with hyperglycemia, A1C 8.1%, was on oral meds and insulin at home, now on basal bolus insulin, patient had hypoglycemic episode this AM, she ate full breakfast per RN, appears comfortable, no acute complaints.  UTI: on meds, stable, monitored.      Alexander Shaver MD  Cell: [phone]  Office: [phone]

## 2020-10-03 NOTE — PROGRESS NOTE ADULT - ASSESSMENT
No pain, no shortness of breath    Review of systems: All 10 points ROS was obtained except as above.     acetaminophen   Tablet .. 650 milliGRAM(s) Oral every 6 hours PRN  albuterol/ipratropium for Nebulization 3 milliLiter(s) Nebulizer every 6 hours  aspirin enteric coated 81 milliGRAM(s) Oral daily  atorvastatin 80 milliGRAM(s) Oral at bedtime  budesonide 160 MICROgram(s)/formoterol 4.5 MICROgram(s) Inhaler 2 Puff(s) Inhalation two times a day  dextrose 40% Gel 15 Gram(s) Oral once PRN  dextrose 5%. 1000 milliLiter(s) IV Continuous <Continuous>  dextrose 50% Injectable 12.5 Gram(s) IV Push once  dextrose 50% Injectable 25 Gram(s) IV Push once  dextrose 50% Injectable 25 Gram(s) IV Push once  enoxaparin Injectable 40 milliGRAM(s) SubCutaneous daily  furosemide    Tablet 40 milliGRAM(s) Oral daily  gabapentin 100 milliGRAM(s) Oral three times a day  glucagon  Injectable 1 milliGRAM(s) IntraMuscular once PRN  haloperidol     Tablet 1 milliGRAM(s) Oral every 6 hours PRN  influenza   Vaccine 0.5 milliLiter(s) IntraMuscular once  insulin glargine Injectable (LANTUS) 30 Unit(s) SubCutaneous at bedtime  insulin lispro (HumaLOG) corrective regimen sliding scale   SubCutaneous three times a day before meals  insulin lispro (HumaLOG) corrective regimen sliding scale   SubCutaneous at bedtime  insulin lispro Injectable (HumaLOG) 8 Unit(s) SubCutaneous three times a day before meals  magnesium sulfate  IVPB 1 Gram(s) IV Intermittent daily  methylPREDNISolone sodium succinate Injectable 20 milliGRAM(s) IV Push every 8 hours  polyethylene glycol 3350 17 Gram(s) Oral daily  senna 2 Tablet(s) Oral at bedtime      VITAL:  T(C): , Max: 36.9 (10-03-20 @ 13:25)  T(F): , Max: 98.4 (10-03-20 @ 13:25)  HR: 93 (10-03-20 @ 13:25)  BP: 126/74 (10-03-20 @ 13:25)  BP(mean): --  RR: 20 (10-03-20 @ 13:25)  SpO2: 99% (10-03-20 @ 13:25)  Wt(kg): --    10-02-20 @ 07:01  -  10-03-20 @ 07:00  --------------------------------------------------------  IN: 580 mL / OUT: 1550 mL / NET: -970 mL    10-03-20 @ 07:01  -  10-03-20 @ 14:48  --------------------------------------------------------  IN: 920 mL / OUT: 200 mL / NET: 720 mL        PHYSICAL EXAM:  Constitutional: NAD  Neck:  No JVD  Respiratory: CTAB/L  Cardiovascular: S1 and S2  Gastrointestinal: BS+, soft, NT/ND  Extremities: No peripheral edema  Neurological: A/O x 3, no focal deficits  Psychiatric: Normal mood, normal affect  : No Pearce  Skin: No rashes  Access: Not applicable  LABS:                          12.5   9.01  )-----------( 138      ( 03 Oct 2020 09:22 )             39.0     Na(144)/K(4.4)/Cl(102)/HCO3(37)/BUN(25)/Cr(0.68)Glu(57)/Ca(9.0)/Mg(--)/PO4(--)    10-02 @ 08:59  Na(139)/K(4.2)/Cl(97)/HCO3(35)/BUN(31)/Cr(0.71)Glu(246)/Ca(9.3)/Mg(--)/PO4(--)    10-01 @ 07:15            ASSESSMENT/PLAN  ASSESSMENT/PLAN:  80 year old F with pmhx of  DMT2 (on Metformin and insulin), COPD, CHF, HLD, HTN, kidney stones and pshx of R nephrectomy, L knee surgery presents to ED with elevated blood sugar, SOB, and lethargy.  Elevated HCO3 is from CO2 retention now.   Hypernatremia - improved   CKD stage 3 at minimum from reduced nephron mass     1 CVS- Off IVF. At present she is not in heart failure.   2 Renal-CKD on the basis of reduced nephron mass and likely DM.    Renal sono would be a good idea to rule masses but can be done as outpt.   No need for additional diamox as the HCO3 keeps improving   3 Endo-Needs tighter blood sugar management as outpt   4 Pulm-No evidence of contrast associated nephropathy;  Pulm eval noted     Physical therapy    Eb Kaiser NP-BC  [facility]  [phone]   Seen and examined laying in bed. Confused. No BMP labs today. On 3 L nasal Cannula    acetaminophen   Tablet .. 650 milliGRAM(s) Oral every 6 hours PRN  albuterol/ipratropium for Nebulization 3 milliLiter(s) Nebulizer every 6 hours  aspirin enteric coated 81 milliGRAM(s) Oral daily  atorvastatin 80 milliGRAM(s) Oral at bedtime  budesonide 160 MICROgram(s)/formoterol 4.5 MICROgram(s) Inhaler 2 Puff(s) Inhalation two times a day  dextrose 40% Gel 15 Gram(s) Oral once PRN  dextrose 5%. 1000 milliLiter(s) IV Continuous <Continuous>  dextrose 50% Injectable 12.5 Gram(s) IV Push once  dextrose 50% Injectable 25 Gram(s) IV Push once  dextrose 50% Injectable 25 Gram(s) IV Push once  enoxaparin Injectable 40 milliGRAM(s) SubCutaneous daily  furosemide    Tablet 40 milliGRAM(s) Oral daily  gabapentin 100 milliGRAM(s) Oral three times a day  glucagon  Injectable 1 milliGRAM(s) IntraMuscular once PRN  haloperidol     Tablet 1 milliGRAM(s) Oral every 6 hours PRN  influenza   Vaccine 0.5 milliLiter(s) IntraMuscular once  insulin glargine Injectable (LANTUS) 30 Unit(s) SubCutaneous at bedtime  insulin lispro (HumaLOG) corrective regimen sliding scale   SubCutaneous three times a day before meals  insulin lispro (HumaLOG) corrective regimen sliding scale   SubCutaneous at bedtime  insulin lispro Injectable (HumaLOG) 8 Unit(s) SubCutaneous three times a day before meals  magnesium sulfate  IVPB 1 Gram(s) IV Intermittent daily  methylPREDNISolone sodium succinate Injectable 20 milliGRAM(s) IV Push every 8 hours  polyethylene glycol 3350 17 Gram(s) Oral daily  senna 2 Tablet(s) Oral at bedtime      VITAL:  T(C): , Max: 36.9 (10-03-20 @ 13:25)  T(F): , Max: 98.4 (10-03-20 @ 13:25)  HR: 93 (10-03-20 @ 13:25)  BP: 126/74 (10-03-20 @ 13:25)  BP(mean): --  RR: 20 (10-03-20 @ 13:25)  SpO2: 99% (10-03-20 @ 13:25)  Wt(kg): --    10-02-20 @ 07:01  -  10-03-20 @ 07:00  --------------------------------------------------------  IN: 580 mL / OUT: 1550 mL / NET: -970 mL    10-03-20 @ 07:01  -  10-03-20 @ 14:48  --------------------------------------------------------  IN: 920 mL / OUT: 200 mL / NET: 720 mL        PHYSICAL EXAM:  Constitutional: Confused  Neck:  No JVD  Respiratory: CTAB/L  Cardiovascular: S1 and S2  Gastrointestinal: BS+, soft, NT/ND  Extremities: left knee very swollen  Neurological: A/O x 3, no focal deficits  Psychiatric: Normal mood, normal affect  : prima fit  Skin: No rashes  Access: Not applicable  LABS:                          12.5   9.01  )-----------( 138      ( 03 Oct 2020 09:22 )             39.0     Na(144)/K(4.4)/Cl(102)/HCO3(37)/BUN(25)/Cr(0.68)Glu(57)/Ca(9.0)/Mg(--)/PO4(--)    10-02 @ 08:59  Na(139)/K(4.2)/Cl(97)/HCO3(35)/BUN(31)/Cr(0.71)Glu(246)/Ca(9.3)/Mg(--)/PO4(--)    10-01 @ 07:15            ASSESSMENT/PLAN  ASSESSMENT:  80 year old F with pmhx of  DMT2 (on Metformin and insulin), COPD, CHF, HLD, HTN, kidney stones and pshx of R nephrectomy, L knee surgery presents to ED with elevated blood sugar, SOB, and lethargy.  Elevated HCO3 is from CO2 retention now.   Hypernatremia - improved   CKD stage 3 at minimum from reduced nephron mass     1 CVS- Off IVF. At present she is not in heart failure.   2 Renal-CKD on the basis of reduced nephron mass and likely DM.    Renal sono would be a good idea to rule masses but can be done as outpt.   No need for additional diamox as the HCO3 keeps improving (from 10/2 labs)  3 Endo-Needs tighter blood sugar management as outpt   4 Pulm-No evidence of contrast associated nephropathy;  Pulm eval noted     Physical therapy    Eb Kaiser NP-BC  [facility]  [phone]

## 2020-10-03 NOTE — DISCHARGE NOTE NURSING/CASE MANAGEMENT/SOCIAL WORK - PATIENT PORTAL LINK FT
You can access the FollowMyHealth Patient Portal offered by [facility] by registering at the following website: [email or web address]. By joining [facility]’s FollowMyHealth portal, you will also be able to view your health information using other applications (apps) compatible with our system.

## 2020-10-03 NOTE — PROGRESS NOTE ADULT - ASSESSMENT
Problem/Plan - 1:  ·  Problem: sepsis sec to UTI (urinary tract infection) with bactremia with metabolic encephalopathy POA .  Plan: dc ertepanum  id fu appreciated     Problem/Plan - 2:  ·  Problem: Hyperglycemia ion alkalosis with contract  Plan: monitor FS  ISS.   renal fu    Problem/Plan - 3:  ·  Problem: AMS   Plan: metabolic encephalopathy  psych consult   neuro consult appreciated  pulmonary following for hypercarbia     Problem/Plan - 4:·  Problem: COPD (chronic obstructive pulmonary disease).  Plan: cw home meds  pulmonary fu   cw bipap  may need icu   cards fu  start oral lasix-40mg daily .     prognosis guarded

## 2020-10-03 NOTE — PROGRESS NOTE ADULT - ASSESSMENT
Assessment  DMT2: 80y Female with DM T2 with hyperglycemia, A1C 8.1%, was on oral meds and insulin at home, now on basal bolus insulin, patient had hypoglycemic episode this AM, she is eating inconsistent meals, appears comfortable, no acute complaints.  UTI: on meds, stable, monitored.      Alexander Shaver MD  Cell: [phone]  Office: [phone]

## 2020-10-03 NOTE — PROGRESS NOTE ADULT - PROBLEM SELECTOR PLAN 1
Will decrease Lantus to 30u at bedtime.  Will decrease Humalog to 8u before each meal and continue Humalog correction scale coverage. Will continue monitoring FS and FU.  Suggest DC on current basal bolus insulin regimen with endo FU 2 weeks.  Patient counseled for compliance with consistent low carb diet and physical activity as tolerated outpatient.

## 2020-10-03 NOTE — PROGRESS NOTE ADULT - SUBJECTIVE AND OBJECTIVE BOX
Chief complaint  Patient is a 80y old  Female who presents with a chief complaint of sob, hyperglycemia (03 Oct 2020 14:48)   Review of systems  Patient in bed, appears comfortable.    Labs and Fingersticks  CAPILLARY BLOOD GLUCOSE      POCT Blood Glucose.: 234 mg/dL (03 Oct 2020 17:47)  POCT Blood Glucose.: 305 mg/dL (03 Oct 2020 12:52)  POCT Blood Glucose.: 155 mg/dL (03 Oct 2020 08:58)  POCT Blood Glucose.: 155 mg/dL (02 Oct 2020 21:05)      Anion Gap, Serum: 5 (10-02 @ 08:59)      Calcium, Total Serum: 9.0 (10-02 @ 08:59)          10-02    144  |  102  |  25<H>  ----------------------------<  57<L>  4.4   |  37<H>  |  0.68    Ca    9.0      02 Oct 2020 08:59                          12.5   9.01  )-----------( 138      ( 03 Oct 2020 09:22 )             39.0     Medications  MEDICATIONS  (STANDING):  albuterol/ipratropium for Nebulization 3 milliLiter(s) Nebulizer every 6 hours  aspirin enteric coated 81 milliGRAM(s) Oral daily  atorvastatin 80 milliGRAM(s) Oral at bedtime  budesonide 160 MICROgram(s)/formoterol 4.5 MICROgram(s) Inhaler 2 Puff(s) Inhalation two times a day  dextrose 5%. 1000 milliLiter(s) (50 mL/Hr) IV Continuous <Continuous>  dextrose 50% Injectable 12.5 Gram(s) IV Push once  dextrose 50% Injectable 25 Gram(s) IV Push once  dextrose 50% Injectable 25 Gram(s) IV Push once  enoxaparin Injectable 40 milliGRAM(s) SubCutaneous daily  furosemide    Tablet 40 milliGRAM(s) Oral daily  gabapentin 100 milliGRAM(s) Oral three times a day  influenza   Vaccine 0.5 milliLiter(s) IntraMuscular once  insulin glargine Injectable (LANTUS) 30 Unit(s) SubCutaneous at bedtime  insulin lispro (HumaLOG) corrective regimen sliding scale   SubCutaneous at bedtime  insulin lispro (HumaLOG) corrective regimen sliding scale   SubCutaneous three times a day before meals  insulin lispro Injectable (HumaLOG) 8 Unit(s) SubCutaneous three times a day before meals  magnesium sulfate  IVPB 1 Gram(s) IV Intermittent daily  methylPREDNISolone sodium succinate Injectable 20 milliGRAM(s) IV Push every 8 hours  polyethylene glycol 3350 17 Gram(s) Oral daily  senna 2 Tablet(s) Oral at bedtime      Physical Exam  General: Patient comfortable in bed  Vital Signs Last 12 Hrs  T(F): 97.8 (10-03-20 @ 17:14), Max: 98.4 (10-03-20 @ 13:25)  HR: 78 (10-03-20 @ 17:14) (73 - 95)  BP: 135/80 (10-03-20 @ 17:14) (126/74 - 135/80)  BP(mean): --  RR: 20 (10-03-20 @ 17:14) (20 - 20)  SpO2: 100% (10-03-20 @ 17:14) (97% - 100%)  Neck: No palpable thyroid nodules.  CVS: S1S2, No murmurs  Respiratory: No wheezing, no crepitations  GI: Abdomen soft, bowel sounds positive  Musculoskeletal:  edema lower extremities.     Diagnostics

## 2020-10-03 NOTE — PROGRESS NOTE ADULT - PROBLEM SELECTOR PLAN 3
CTA chest with small b/l pl effusions with compressive atelectasis  -Keep O>I as tolerated  -Incentive spirometer

## 2020-10-03 NOTE — PROGRESS NOTE ADULT - PROBLEM SELECTOR PLAN 1
-C/w Symbicort BID, Duoneb q6h  -C/w bipap 10/5/30% qHS and PRN  -Will check ABG off bipap  -  -Keep sats >88% with supplemental O2 (currently 2-3LNC) -C/w Symbicort BID, Duoneb q6h  -C/w bipap 10/5/30% qHS and PRN  -Pt refused ABG  -Glucose in 300s. Change solumedrol to 20mg IVP q8h.  -Keep sats >88% with supplemental O2 (currently 2-3LNC)

## 2020-10-03 NOTE — PROGRESS NOTE ADULT - ASSESSMENT
80 year old F with pmhx of  DMT2 (on Metformin and insulin), COPD, CHF, HLD, HTN, kidney stones and pshx of R nephrectomy, L knee surgery presents to ED bibems with elevated blood sugar, SOB, and lethargy found to have UTI.     1. SOB, acute/chronic diastolic chf exacerbation  -multifactorial 2/2 to DKA, COPD hx   -cv stable  -c/o SOB today place on bipap, s/p lasix 40 x 1   -ct chest 10/1 with no pe, bl small pleural effusion   -echo in july with nl LV sys fx, ef 55-60%, mild MR, mod pulm htn    -pulm f/u   -f/u am labs, if BMP stable, resume lasix 40mg PO daily     2. Hyperglycemia  -management per primary team     3. UTI  -+BCX likely contaminant: repeat neg  -s/p abx per primary team     4. Hypokalemia  -supplementation per primary team, continue to trend.     5. COPD , hx  -c/w home meds, pulm f/u     dvt ppx

## 2020-10-03 NOTE — PROGRESS NOTE ADULT - SUBJECTIVE AND OBJECTIVE BOX
Patient is a 80y old  Female who presents with a chief complaint of sob, hyperglycemia (03 Oct 2020 08:57)      SUBJECTIVE / OVERNIGHT EVENTS:  No chest pain. No shortness of breath. No complaints. No events overnight.     MEDICATIONS  (STANDING):  albuterol/ipratropium for Nebulization 3 milliLiter(s) Nebulizer every 6 hours  aspirin enteric coated 81 milliGRAM(s) Oral daily  atorvastatin 80 milliGRAM(s) Oral at bedtime  budesonide 160 MICROgram(s)/formoterol 4.5 MICROgram(s) Inhaler 2 Puff(s) Inhalation two times a day  dextrose 5%. 1000 milliLiter(s) (50 mL/Hr) IV Continuous <Continuous>  dextrose 50% Injectable 12.5 Gram(s) IV Push once  dextrose 50% Injectable 25 Gram(s) IV Push once  dextrose 50% Injectable 25 Gram(s) IV Push once  enoxaparin Injectable 40 milliGRAM(s) SubCutaneous daily  gabapentin 100 milliGRAM(s) Oral three times a day  influenza   Vaccine 0.5 milliLiter(s) IntraMuscular once  insulin glargine Injectable (LANTUS) 30 Unit(s) SubCutaneous at bedtime  insulin lispro (HumaLOG) corrective regimen sliding scale   SubCutaneous three times a day before meals  insulin lispro (HumaLOG) corrective regimen sliding scale   SubCutaneous at bedtime  insulin lispro Injectable (HumaLOG) 8 Unit(s) SubCutaneous three times a day before meals  magnesium sulfate  IVPB 1 Gram(s) IV Intermittent daily  methylPREDNISolone sodium succinate Injectable 40 milliGRAM(s) IV Push every 12 hours  polyethylene glycol 3350 17 Gram(s) Oral daily  senna 2 Tablet(s) Oral at bedtime    MEDICATIONS  (PRN):  acetaminophen   Tablet .. 650 milliGRAM(s) Oral every 6 hours PRN Moderate Pain (4 - 6)  dextrose 40% Gel 15 Gram(s) Oral once PRN Blood Glucose LESS THAN 70 milliGRAM(s)/deciliter  glucagon  Injectable 1 milliGRAM(s) IntraMuscular once PRN Glucose LESS THAN 70 milligrams/deciliter  haloperidol     Tablet 1 milliGRAM(s) Oral every 6 hours PRN agitation      Vital Signs Last 24 Hrs  T(C): 36.7 (03 Oct 2020 09:57), Max: 36.7 (02 Oct 2020 14:37)  T(F): 98.1 (03 Oct 2020 09:57), Max: 98.1 (02 Oct 2020 14:37)  HR: 95 (03 Oct 2020 09:57) (66 - 98)  BP: 129/69 (03 Oct 2020 09:57) (117/78 - 172/75)  BP(mean): --  RR: 20 (03 Oct 2020 09:57) (20 - 25)  SpO2: 100% (03 Oct 2020 09:57) (94% - 100%)  CAPILLARY BLOOD GLUCOSE      POCT Blood Glucose.: 155 mg/dL (03 Oct 2020 08:58)  POCT Blood Glucose.: 155 mg/dL (02 Oct 2020 21:05)  POCT Blood Glucose.: 212 mg/dL (02 Oct 2020 17:31)  POCT Blood Glucose.: 113 mg/dL (02 Oct 2020 14:52)  POCT Blood Glucose.: 86 mg/dL (02 Oct 2020 14:40)  POCT Blood Glucose.: 67 mg/dL (02 Oct 2020 14:29)  POCT Blood Glucose.: 39 mg/dL (02 Oct 2020 14:11)  POCT Blood Glucose.: 41 mg/dL (02 Oct 2020 14:09)    I&O's Summary    02 Oct 2020 07:01  -  03 Oct 2020 07:00  --------------------------------------------------------  IN: 580 mL / OUT: 1550 mL / NET: -970 mL    03 Oct 2020 07:01  -  03 Oct 2020 12:07  --------------------------------------------------------  IN: 440 mL / OUT: 100 mL / NET: 340 mL        PHYSICAL EXAM:  GENERAL: NAD, well-developed  HEAD:  Atraumatic, Normocephalic  EYES: EOMI, PERRLA, conjunctiva and sclera clear  NECK: Supple, No JVD  CHEST/LUNG: Clear to auscultation bilaterally; No wheeze  HEART: Regular rate and rhythm; No murmurs, rubs, or gallops  ABDOMEN: Soft, Nontender, Nondistended; Bowel sounds present  EXTREMITIES:  2+ Peripheral Pulses, No clubbing, cyanosis, or edema    NEUROLOGY: non-focal  SKIN: No rashes or lesions    LABS:                        12.5   9.01  )-----------( 138      ( 03 Oct 2020 09:22 )             39.0     10-02    144  |  102  |  25<H>  ----------------------------<  57<L>  4.4   |  37<H>  |  0.68    Ca    9.0      02 Oct 2020 08:59                RADIOLOGY & ADDITIONAL TESTS:    Imaging Personally Reviewed:    Consultant(s) Notes Reviewed:      Care Discussed with Consultants/Other Providers:   Patient is a 80y old  Female who presents with a chief complaint of sob, hyperglycemia (03 Oct 2020 08:57)      SUBJECTIVE / OVERNIGHT EVENTS:  No chest pain. shortness of breath.  pt is on bipap    MEDICATIONS  (STANDING):  albuterol/ipratropium for Nebulization 3 milliLiter(s) Nebulizer every 6 hours  aspirin enteric coated 81 milliGRAM(s) Oral daily  atorvastatin 80 milliGRAM(s) Oral at bedtime  budesonide 160 MICROgram(s)/formoterol 4.5 MICROgram(s) Inhaler 2 Puff(s) Inhalation two times a day  dextrose 5%. 1000 milliLiter(s) (50 mL/Hr) IV Continuous <Continuous>  dextrose 50% Injectable 12.5 Gram(s) IV Push once  dextrose 50% Injectable 25 Gram(s) IV Push once  dextrose 50% Injectable 25 Gram(s) IV Push once  enoxaparin Injectable 40 milliGRAM(s) SubCutaneous daily  gabapentin 100 milliGRAM(s) Oral three times a day  influenza   Vaccine 0.5 milliLiter(s) IntraMuscular once  insulin glargine Injectable (LANTUS) 30 Unit(s) SubCutaneous at bedtime  insulin lispro (HumaLOG) corrective regimen sliding scale   SubCutaneous three times a day before meals  insulin lispro (HumaLOG) corrective regimen sliding scale   SubCutaneous at bedtime  insulin lispro Injectable (HumaLOG) 8 Unit(s) SubCutaneous three times a day before meals  magnesium sulfate  IVPB 1 Gram(s) IV Intermittent daily  methylPREDNISolone sodium succinate Injectable 40 milliGRAM(s) IV Push every 12 hours  polyethylene glycol 3350 17 Gram(s) Oral daily  senna 2 Tablet(s) Oral at bedtime    MEDICATIONS  (PRN):  acetaminophen   Tablet .. 650 milliGRAM(s) Oral every 6 hours PRN Moderate Pain (4 - 6)  dextrose 40% Gel 15 Gram(s) Oral once PRN Blood Glucose LESS THAN 70 milliGRAM(s)/deciliter  glucagon  Injectable 1 milliGRAM(s) IntraMuscular once PRN Glucose LESS THAN 70 milligrams/deciliter  haloperidol     Tablet 1 milliGRAM(s) Oral every 6 hours PRN agitation      Vital Signs Last 24 Hrs  T(C): 36.7 (03 Oct 2020 09:57), Max: 36.7 (02 Oct 2020 14:37)  T(F): 98.1 (03 Oct 2020 09:57), Max: 98.1 (02 Oct 2020 14:37)  HR: 95 (03 Oct 2020 09:57) (66 - 98)  BP: 129/69 (03 Oct 2020 09:57) (117/78 - 172/75)  BP(mean): --  RR: 20 (03 Oct 2020 09:57) (20 - 25)  SpO2: 100% (03 Oct 2020 09:57) (94% - 100%)  CAPILLARY BLOOD GLUCOSE      POCT Blood Glucose.: 155 mg/dL (03 Oct 2020 08:58)  POCT Blood Glucose.: 155 mg/dL (02 Oct 2020 21:05)  POCT Blood Glucose.: 212 mg/dL (02 Oct 2020 17:31)  POCT Blood Glucose.: 113 mg/dL (02 Oct 2020 14:52)  POCT Blood Glucose.: 86 mg/dL (02 Oct 2020 14:40)  POCT Blood Glucose.: 67 mg/dL (02 Oct 2020 14:29)  POCT Blood Glucose.: 39 mg/dL (02 Oct 2020 14:11)  POCT Blood Glucose.: 41 mg/dL (02 Oct 2020 14:09)    I&O's Summary    02 Oct 2020 07:01  -  03 Oct 2020 07:00  --------------------------------------------------------  IN: 580 mL / OUT: 1550 mL / NET: -970 mL    03 Oct 2020 07:01  -  03 Oct 2020 12:07  --------------------------------------------------------  IN: 440 mL / OUT: 100 mL / NET: 340 mL        PHYSICAL EXAM:  GENERAL: NAD, well-developed  HEAD:  Atraumatic, Normocephalic  EYES: EOMI, PERRLA, conjunctiva and sclera clear  NECK: Supple, No JVD  CHEST/LUNG: Clear to auscultation bilaterally; No wheeze  HEART: Regular rate and rhythm; No murmurs, rubs, or gallops  ABDOMEN: Soft, Nontender, Nondistended; Bowel sounds present  EXTREMITIES:  2+ Peripheral Pulses, No clubbing, cyanosis, or edema    NEUROLOGY: non-focal  SKIN: No rashes or lesions    LABS:                        12.5   9.01  )-----------( 138      ( 03 Oct 2020 09:22 )             39.0     10-02    144  |  102  |  25<H>  ----------------------------<  57<L>  4.4   |  37<H>  |  0.68    Ca    9.0      02 Oct 2020 08:59                RADIOLOGY & ADDITIONAL TESTS:    Imaging Personally Reviewed:    Consultant(s) Notes Reviewed:      Care Discussed with Consultants/Other Providers:

## 2020-10-03 NOTE — PROGRESS NOTE ADULT - ASSESSMENT
79 y/o F with PMH of  DM, COPD, CHF, HLD, HTN, kidney stones and pshx of R nephrectomy, L knee surgery presents to ED bibems with elevated blood sugar, SOB, and lethargy. Found to be hyperglycemic. She also reports mild SOB x few days that is worse when lying flat. Now with increased WOB.

## 2020-10-03 NOTE — PROGRESS NOTE ADULT - SUBJECTIVE AND OBJECTIVE BOX
Follow-up Pulm Progress Note    Covering Dr. Sharma     Bipap worn overnight and PRN  She still feels SOB but also reports anxiety   Sats 100% 3LNC    Medications:  MEDICATIONS  (STANDING):  albuterol/ipratropium for Nebulization 3 milliLiter(s) Nebulizer every 6 hours  aspirin enteric coated 81 milliGRAM(s) Oral daily  atorvastatin 80 milliGRAM(s) Oral at bedtime  budesonide 160 MICROgram(s)/formoterol 4.5 MICROgram(s) Inhaler 2 Puff(s) Inhalation two times a day  dextrose 5%. 1000 milliLiter(s) (50 mL/Hr) IV Continuous <Continuous>  dextrose 50% Injectable 12.5 Gram(s) IV Push once  dextrose 50% Injectable 25 Gram(s) IV Push once  dextrose 50% Injectable 25 Gram(s) IV Push once  enoxaparin Injectable 40 milliGRAM(s) SubCutaneous daily  furosemide    Tablet 40 milliGRAM(s) Oral daily  gabapentin 100 milliGRAM(s) Oral three times a day  influenza   Vaccine 0.5 milliLiter(s) IntraMuscular once  insulin glargine Injectable (LANTUS) 30 Unit(s) SubCutaneous at bedtime  insulin lispro (HumaLOG) corrective regimen sliding scale   SubCutaneous three times a day before meals  insulin lispro (HumaLOG) corrective regimen sliding scale   SubCutaneous at bedtime  insulin lispro Injectable (HumaLOG) 8 Unit(s) SubCutaneous three times a day before meals  magnesium sulfate  IVPB 1 Gram(s) IV Intermittent daily  methylPREDNISolone sodium succinate Injectable 40 milliGRAM(s) IV Push every 12 hours  polyethylene glycol 3350 17 Gram(s) Oral daily  senna 2 Tablet(s) Oral at bedtime    MEDICATIONS  (PRN):  acetaminophen   Tablet .. 650 milliGRAM(s) Oral every 6 hours PRN Moderate Pain (4 - 6)  dextrose 40% Gel 15 Gram(s) Oral once PRN Blood Glucose LESS THAN 70 milliGRAM(s)/deciliter  glucagon  Injectable 1 milliGRAM(s) IntraMuscular once PRN Glucose LESS THAN 70 milligrams/deciliter  haloperidol     Tablet 1 milliGRAM(s) Oral every 6 hours PRN agitation          Vital Signs Last 24 Hrs  T(C): 36.7 (03 Oct 2020 09:57), Max: 36.7 (02 Oct 2020 14:37)  T(F): 98.1 (03 Oct 2020 09:57), Max: 98.1 (02 Oct 2020 14:37)  HR: 95 (03 Oct 2020 09:57) (66 - 98)  BP: 129/69 (03 Oct 2020 09:57) (117/78 - 172/75)  BP(mean): --  RR: 20 (03 Oct 2020 09:57) (20 - 25)  SpO2: 100% (03 Oct 2020 09:57) (94% - 100%)    ABG - ( 02 Oct 2020 12:31 )  pH, Arterial: 7.39  pH, Blood: x     /  pCO2: 66    /  pO2: 99    / HCO3: 39    / Base Excess: 12.1  /  SaO2: 98                    10-02 @ 07:01  -  10-03 @ 07:00  --------------------------------------------------------  IN: 580 mL / OUT: 1550 mL / NET: -970 mL          LABS:                        12.5   9.01  )-----------( 138      ( 03 Oct 2020 09:22 )             39.0     10-02    144  |  102  |  25<H>  ----------------------------<  57<L>  4.4   |  37<H>  |  0.68    Ca    9.0      02 Oct 2020 08:59            CAPILLARY BLOOD GLUCOSE      POCT Blood Glucose.: 305 mg/dL (03 Oct 2020 12:52)                        CULTURES: (if applicable)    Culture - Blood (collected 09-24-20 @ 16:38)  Source: .Blood Blood-Peripheral  Final Report (09-29-20 @ 17:01):    No Growth Final    Culture - Blood (collected 09-24-20 @ 16:38)  Source: .Blood Blood-Peripheral  Final Report (09-29-20 @ 17:01):    No Growth Final        Culture - Urine (collected 09-24-20 @ 22:03)  Source: .Urine Clean Catch (Midstream)  Final Report (09-27-20 @ 22:48):    10,000 - 49,000 CFU/mL Yeast-like cells, presumptively not Candida    albicans "Susceptibilities not performed"    10,000 - 49,000 CFU/mL Coag Negative Staphylococcus "Susceptibilities not    performed"      Physical Examination:  PULM: diminished BS, no wheezing   CVS: RRR    RADIOLOGY REVIEWED  CT chest: < from: CT Angio Chest w/ IV Cont (10.01.20 @ 19:39) >    FINDINGS:    AIRWAYS AND LUNGS: The central tracheobronchial tree is patent.  Small bilateral pleural effusions with near complete collapse of both lower lobes and partial atelectasis of the left upper lobe.    MEDIASTINUM AND PLEURA: There are no enlarged mediastinal, hilar or axillary lymph nodes. The visualized portion of the thyroid gland is unremarkable. There is no pneumothorax.    HEART AND VESSELS: The heart is normal in size.  There are atherosclerotic calcifications of the aorta and coronary arteries.  There is no pericardial effusion.  No pulmonary embolism. Bilateral SVC'swith unchanged dilatation of the left-sided SVC. Aortic valve calcification.    UPPER ABDOMEN: Images of the upper abdomen demonstrate dystrophic calcifications within a atrophic right kidney with a fluid density right renal cyst.    BONES AND SOFT TISSUES: The bones are unremarkable.  Question left breast skin thickening laterally.    TUBES/LINES: None.    IMPRESSION:  Question left breast skin thickening laterally, correlate with physical exam and consider ultrasound as needed.    No pulmonary embolism.    Small bilateral pleural effusions with near complete collapse of both lower lobes and partial atelectasis of the left upper lobe.      < end of copied text >

## 2020-10-03 NOTE — PROGRESS NOTE ADULT - SUBJECTIVE AND OBJECTIVE BOX
CARDIOLOGY FOLLOW UP - Dr. Mukherjee    CC resting comfortably this am.   no acute complaints on bipap with no SOB       PHYSICAL EXAM:  T(C): 36.6 (10-03-20 @ 05:11), Max: 36.7 (10-02-20 @ 14:37)  HR: 73 (10-03-20 @ 06:29) (66 - 98)  BP: 131/83 (10-03-20 @ 05:11) (117/78 - 172/75)  RR: 20 (10-03-20 @ 05:11) (20 - 25)  SpO2: 97% (10-03-20 @ 06:29) (94% - 100%)  Wt(kg): --  I&O's Summary    02 Oct 2020 07:01  -  03 Oct 2020 07:00  --------------------------------------------------------  IN: 580 mL / OUT: 1550 mL / NET: -970 mL       Appearance: Normal	  Cardiovascular: Normal S1 S2,RRR, No JVD, No murmurs  Respiratory: diminished    Gastrointestinal:  Soft, Non-tender, + BS	  Extremities: Normal range of motion, No clubbing, cyanosis or edema      MEDICATIONS  (STANDING):  albuterol/ipratropium for Nebulization 3 milliLiter(s) Nebulizer every 6 hours  aspirin enteric coated 81 milliGRAM(s) Oral daily  atorvastatin 80 milliGRAM(s) Oral at bedtime  budesonide 160 MICROgram(s)/formoterol 4.5 MICROgram(s) Inhaler 2 Puff(s) Inhalation two times a day  dextrose 5%. 1000 milliLiter(s) (50 mL/Hr) IV Continuous <Continuous>  dextrose 50% Injectable 12.5 Gram(s) IV Push once  dextrose 50% Injectable 25 Gram(s) IV Push once  dextrose 50% Injectable 25 Gram(s) IV Push once  enoxaparin Injectable 40 milliGRAM(s) SubCutaneous daily  gabapentin 100 milliGRAM(s) Oral three times a day  influenza   Vaccine 0.5 milliLiter(s) IntraMuscular once  insulin glargine Injectable (LANTUS) 30 Unit(s) SubCutaneous at bedtime  insulin lispro (HumaLOG) corrective regimen sliding scale   SubCutaneous three times a day before meals  insulin lispro (HumaLOG) corrective regimen sliding scale   SubCutaneous at bedtime  insulin lispro Injectable (HumaLOG) 8 Unit(s) SubCutaneous three times a day before meals  magnesium sulfate  IVPB 1 Gram(s) IV Intermittent daily  methylPREDNISolone sodium succinate Injectable 40 milliGRAM(s) IV Push every 12 hours  polyethylene glycol 3350 17 Gram(s) Oral daily  senna 2 Tablet(s) Oral at bedtime      TELEMETRY: 	    ECG:  	  RADIOLOGY:   DIAGNOSTIC TESTING:  [ ] Echocardiogram:  [ ]  Catheterization:  [ ] Stress Test:    OTHER: 	    LABS:	 	                                10.8   5.22  )-----------( 124      ( 02 Oct 2020 08:59 )             33.5     10-02    144  |  102  |  25<H>  ----------------------------<  57<L>  4.4   |  37<H>  |  0.68    Ca    9.0      02 Oct 2020 08:59

## 2020-10-04 NOTE — PROGRESS NOTE ADULT - PROBLEM SELECTOR PLAN 1
Will increase Lantus to 33u at bedtime.  Will increase Humalog to 10u before each meal and continue Humalog correction scale coverage. Will continue monitoring FS and FU.  Patient counseled for compliance with consistent low carb diet and physical activity as tolerated outpatient.

## 2020-10-04 NOTE — PROGRESS NOTE ADULT - ASSESSMENT
Assessment  DMT2: 80y Female with DM T2 with hyperglycemia, A1C 8.1%, on basal bolus insulin, patient had hypoglycemic episode yesterday insulin dose was decreased, she is eating full meals now, sugars high.  UTI: on meds, stable, monitored.      Alexander Shaver MD  Cell: [phone]  Office: [phone]

## 2020-10-04 NOTE — PROGRESS NOTE ADULT - SUBJECTIVE AND OBJECTIVE BOX
Chief complaint  Patient is a 80y old  Female who presents with a chief complaint of sob, hyperglycemia (04 Oct 2020 12:13)   Review of systems  Patient in bed, appears comfortable.    Labs and Fingersticks  CAPILLARY BLOOD GLUCOSE      POCT Blood Glucose.: 243 mg/dL (04 Oct 2020 12:33)  POCT Blood Glucose.: 250 mg/dL (04 Oct 2020 08:01)  POCT Blood Glucose.: 241 mg/dL (03 Oct 2020 21:30)  POCT Blood Glucose.: 234 mg/dL (03 Oct 2020 17:47)      Anion Gap, Serum: 10 (10-04 @ 09:10)      Calcium, Total Serum: 9.7 (10-04 @ 09:10)          10-04    139  |  94<L>  |  35<H>  ----------------------------<  261<H>  4.7   |  35<H>  |  0.79    Ca    9.7      04 Oct 2020 09:10                          12.5   9.01  )-----------( 138      ( 03 Oct 2020 09:22 )             39.0     Medications  MEDICATIONS  (STANDING):  albuterol/ipratropium for Nebulization 3 milliLiter(s) Nebulizer every 6 hours  aspirin enteric coated 81 milliGRAM(s) Oral daily  atorvastatin 80 milliGRAM(s) Oral at bedtime  budesonide 160 MICROgram(s)/formoterol 4.5 MICROgram(s) Inhaler 2 Puff(s) Inhalation two times a day  dextrose 5%. 1000 milliLiter(s) (50 mL/Hr) IV Continuous <Continuous>  dextrose 50% Injectable 12.5 Gram(s) IV Push once  dextrose 50% Injectable 25 Gram(s) IV Push once  dextrose 50% Injectable 25 Gram(s) IV Push once  enoxaparin Injectable 40 milliGRAM(s) SubCutaneous daily  furosemide    Tablet 40 milliGRAM(s) Oral daily  gabapentin 100 milliGRAM(s) Oral three times a day  influenza   Vaccine 0.5 milliLiter(s) IntraMuscular once  insulin glargine Injectable (LANTUS) 33 Unit(s) SubCutaneous at bedtime  insulin lispro (HumaLOG) corrective regimen sliding scale   SubCutaneous three times a day before meals  insulin lispro (HumaLOG) corrective regimen sliding scale   SubCutaneous at bedtime  insulin lispro Injectable (HumaLOG) 10 Unit(s) SubCutaneous three times a day before meals  magnesium sulfate  IVPB 1 Gram(s) IV Intermittent daily  methylPREDNISolone sodium succinate Injectable 20 milliGRAM(s) IV Push every 8 hours  polyethylene glycol 3350 17 Gram(s) Oral daily  senna 2 Tablet(s) Oral at bedtime      Physical Exam  General: Patient comfortable in bed  Vital Signs Last 12 Hrs  T(F): 99.1 (10-04-20 @ 13:25), Max: 99.1 (10-04-20 @ 13:25)  HR: 88 (10-04-20 @ 13:25) (66 - 88)  BP: 144/81 (10-04-20 @ 13:25) (114/64 - 144/81)  BP(mean): --  RR: 18 (10-04-20 @ 13:25) (18 - 18)  SpO2: 100% (10-04-20 @ 13:25) (96% - 100%)  Neck: No palpable thyroid nodules.  CVS: S1S2, No murmurs  Respiratory: No wheezing, no crepitations  GI: Abdomen soft, bowel sounds positive  Musculoskeletal:  edema lower extremities.     Diagnostics

## 2020-10-04 NOTE — PROGRESS NOTE ADULT - SUBJECTIVE AND OBJECTIVE BOX
Patient is a 80y old  Female who presents with a chief complaint of sob, hyperglycemia (04 Oct 2020 09:26)      SUBJECTIVE / OVERNIGHT EVENTS:  c/o mild sob    MEDICATIONS  (STANDING):  albuterol/ipratropium for Nebulization 3 milliLiter(s) Nebulizer every 6 hours  aspirin enteric coated 81 milliGRAM(s) Oral daily  atorvastatin 80 milliGRAM(s) Oral at bedtime  budesonide 160 MICROgram(s)/formoterol 4.5 MICROgram(s) Inhaler 2 Puff(s) Inhalation two times a day  dextrose 5%. 1000 milliLiter(s) (50 mL/Hr) IV Continuous <Continuous>  dextrose 50% Injectable 12.5 Gram(s) IV Push once  dextrose 50% Injectable 25 Gram(s) IV Push once  dextrose 50% Injectable 25 Gram(s) IV Push once  enoxaparin Injectable 40 milliGRAM(s) SubCutaneous daily  furosemide    Tablet 40 milliGRAM(s) Oral daily  gabapentin 100 milliGRAM(s) Oral three times a day  influenza   Vaccine 0.5 milliLiter(s) IntraMuscular once  insulin glargine Injectable (LANTUS) 30 Unit(s) SubCutaneous at bedtime  insulin lispro (HumaLOG) corrective regimen sliding scale   SubCutaneous three times a day before meals  insulin lispro (HumaLOG) corrective regimen sliding scale   SubCutaneous at bedtime  insulin lispro Injectable (HumaLOG) 8 Unit(s) SubCutaneous three times a day before meals  magnesium sulfate  IVPB 1 Gram(s) IV Intermittent daily  methylPREDNISolone sodium succinate Injectable 20 milliGRAM(s) IV Push every 8 hours  polyethylene glycol 3350 17 Gram(s) Oral daily  senna 2 Tablet(s) Oral at bedtime    MEDICATIONS  (PRN):  acetaminophen   Tablet .. 650 milliGRAM(s) Oral every 6 hours PRN Moderate Pain (4 - 6)  dextrose 40% Gel 15 Gram(s) Oral once PRN Blood Glucose LESS THAN 70 milliGRAM(s)/deciliter  glucagon  Injectable 1 milliGRAM(s) IntraMuscular once PRN Glucose LESS THAN 70 milligrams/deciliter  haloperidol     Tablet 1 milliGRAM(s) Oral every 6 hours PRN agitation      Vital Signs Last 24 Hrs  T(C): 36.8 (04 Oct 2020 09:19), Max: 36.9 (03 Oct 2020 13:25)  T(F): 98.3 (04 Oct 2020 09:19), Max: 98.4 (03 Oct 2020 13:25)  HR: 77 (04 Oct 2020 09:19) (66 - 93)  BP: 114/64 (04 Oct 2020 09:19) (110/70 - 135/80)  BP(mean): --  RR: 18 (04 Oct 2020 09:19) (18 - 20)  SpO2: 100% (04 Oct 2020 09:19) (96% - 100%)  CAPILLARY BLOOD GLUCOSE      POCT Blood Glucose.: 250 mg/dL (04 Oct 2020 08:01)  POCT Blood Glucose.: 241 mg/dL (03 Oct 2020 21:30)  POCT Blood Glucose.: 234 mg/dL (03 Oct 2020 17:47)  POCT Blood Glucose.: 305 mg/dL (03 Oct 2020 12:52)    I&O's Summary    03 Oct 2020 07:01  -  04 Oct 2020 07:00  --------------------------------------------------------  IN: 1140 mL / OUT: 1000 mL / NET: 140 mL    04 Oct 2020 07:01  -  04 Oct 2020 12:13  --------------------------------------------------------  IN: 420 mL / OUT: 100 mL / NET: 320 mL        PHYSICAL EXAM:  GENERAL: NAD, well-developed  HEAD:  Atraumatic, Normocephalic  EYES: EOMI, PERRLA, conjunctiva and sclera clear  NECK: Supple, No JVD  CHEST/LUNG: Clear to auscultation bilaterally; No wheeze  HEART: Regular rate and rhythm; No murmurs, rubs, or gallops  ABDOMEN: Soft, Nontender, Nondistended; Bowel sounds present  EXTREMITIES:  2+ Peripheral Pulses, No clubbing, cyanosis, or edema  PSYCH: AAOx3  NEUROLOGY: non-focal  SKIN: No rashes or lesions    LABS:                        12.5   9.01  )-----------( 138      ( 03 Oct 2020 09:22 )             39.0     10-04    139  |  94<L>  |  35<H>  ----------------------------<  261<H>  4.7   |  35<H>  |  0.79    Ca    9.7      04 Oct 2020 09:10                RADIOLOGY & ADDITIONAL TESTS:    Imaging Personally Reviewed:    Consultant(s) Notes Reviewed:      Care Discussed with Consultants/Other Providers:

## 2020-10-04 NOTE — PROGRESS NOTE ADULT - ASSESSMENT
80 year old F with pmhx of  DMT2 (on Metformin and insulin), COPD, CHF, HLD, HTN, kidney stones and pshx of R nephrectomy, L knee surgery presents to ED bibems with elevated blood sugar, SOB, and lethargy found to have UTI.     1. SOB, acute/chronic diastolic chf exacerbation  -multifactorial 2/2 to DKA, COPD hx   -cv stable  --ct chest 10/1 with no pe, bl small pleural effusion   -echo in july with nl LV sys fx, ef 55-60%, mild MR, mod pulm htn    -pulm f/u   -cont lasix 40mg PO daily     2. Hyperglycemia  -management per primary team     3. UTI  -+BCX likely contaminant: repeat neg  -s/p abx per primary team     4. Hypokalemia  -supplementation per primary team, continue to trend.     5. COPD , hx  -c/w home meds, pulm f/u     dvt ppx

## 2020-10-04 NOTE — PROGRESS NOTE ADULT - SUBJECTIVE AND OBJECTIVE BOX
CC: no events    TELEMETRY:     PHYSICAL EXAM:    T(C): 36.8 (10-04-20 @ 09:19), Max: 36.9 (10-03-20 @ 13:25)  HR: 77 (10-04-20 @ 09:19) (66 - 95)  BP: 114/64 (10-04-20 @ 09:19) (110/70 - 135/80)  RR: 18 (10-04-20 @ 09:19) (18 - 20)  SpO2: 100% (10-04-20 @ 09:19) (97% - 100%)  Wt(kg): --  I&O's Summary    03 Oct 2020 07:01  -  04 Oct 2020 07:00  --------------------------------------------------------  IN: 1140 mL / OUT: 1000 mL / NET: 140 mL        Appearance: Normal	  Cardiovascular: Normal S1 S2,RRR, No JVD, No murmurs  Respiratory: Lungs clear to auscultation	  Gastrointestinal:  Soft, Non-tender, + BS	  Extremities: Normal range of motion, No clubbing, cyanosis or edema  Vascular: Peripheral pulses palpable 2+ bilaterally     LABS:	 	                          12.5   9.01  )-----------( 138      ( 03 Oct 2020 09:22 )             39.0                 CARDIAC MARKERS:        MEDICATIONS  (STANDING):  albuterol/ipratropium for Nebulization 3 milliLiter(s) Nebulizer every 6 hours  aspirin enteric coated 81 milliGRAM(s) Oral daily  atorvastatin 80 milliGRAM(s) Oral at bedtime  budesonide 160 MICROgram(s)/formoterol 4.5 MICROgram(s) Inhaler 2 Puff(s) Inhalation two times a day  dextrose 5%. 1000 milliLiter(s) (50 mL/Hr) IV Continuous <Continuous>  dextrose 50% Injectable 12.5 Gram(s) IV Push once  dextrose 50% Injectable 25 Gram(s) IV Push once  dextrose 50% Injectable 25 Gram(s) IV Push once  enoxaparin Injectable 40 milliGRAM(s) SubCutaneous daily  furosemide    Tablet 40 milliGRAM(s) Oral daily  gabapentin 100 milliGRAM(s) Oral three times a day  influenza   Vaccine 0.5 milliLiter(s) IntraMuscular once  insulin glargine Injectable (LANTUS) 30 Unit(s) SubCutaneous at bedtime  insulin lispro (HumaLOG) corrective regimen sliding scale   SubCutaneous three times a day before meals  insulin lispro (HumaLOG) corrective regimen sliding scale   SubCutaneous at bedtime  insulin lispro Injectable (HumaLOG) 8 Unit(s) SubCutaneous three times a day before meals  magnesium sulfate  IVPB 1 Gram(s) IV Intermittent daily  methylPREDNISolone sodium succinate Injectable 20 milliGRAM(s) IV Push every 8 hours  polyethylene glycol 3350 17 Gram(s) Oral daily  senna 2 Tablet(s) Oral at bedtime

## 2020-10-04 NOTE — PROGRESS NOTE ADULT - ASSESSMENT
Problem/Plan - 1:  ·  Problem: sepsis sec to UTI (urinary tract infection) with bactremia with metabolic encephalopathy POA .  Plan: dc ertepanum  id fu appreciated     Problem/Plan - 2:  ·  Problem: Hyperglycemia ion alkalosis with contract  Plan: monitor FS  ISS.   renal fu    Problem/Plan - 3:  ·  Problem: AMS   Plan: metabolic encephalopathy  psych consult   neuro consult appreciated  pulmonary following for hypercarbia     Problem/Plan - 4:· COPD with respiratory failure, acute. Plan: -C/w Symbicort BID, Duoneb q6h  -C/w bipap 10/5/30% qHS and PRN  -Pt refused ABG  -Glucose in 300s. Change solumedrol to 20mg IVP q8h.  -Keep sats >88% with supplemental O2 (currently 2-3LNC).   prognosis guarded     Problem/Plan - 5:·  acute/chronic diastolic chf exacerbation  -multifactorial 2/2 to DKA, COPD hx   -cv stable  --ct chest 10/1 with no pe, bl small pleural effusion   -echo in july with nl LV sys fx, ef 55-60%, mild MR, mod pulm htn    -pulm f/u   -cont lasix 40mg PO daily

## 2020-10-05 NOTE — PROGRESS NOTE ADULT - SUBJECTIVE AND OBJECTIVE BOX
NEPHROLOGY-[facility] [phone]        Patient seen and examined         MEDICATIONS  (STANDING):  albuterol/ipratropium for Nebulization 3 milliLiter(s) Nebulizer every 6 hours  aspirin enteric coated 81 milliGRAM(s) Oral daily  atorvastatin 80 milliGRAM(s) Oral at bedtime  budesonide 160 MICROgram(s)/formoterol 4.5 MICROgram(s) Inhaler 2 Puff(s) Inhalation two times a day  dextrose 5%. 1000 milliLiter(s) (50 mL/Hr) IV Continuous <Continuous>  dextrose 50% Injectable 12.5 Gram(s) IV Push once  dextrose 50% Injectable 25 Gram(s) IV Push once  dextrose 50% Injectable 25 Gram(s) IV Push once  enoxaparin Injectable 40 milliGRAM(s) SubCutaneous daily  furosemide    Tablet 40 milliGRAM(s) Oral daily  gabapentin 100 milliGRAM(s) Oral three times a day  influenza   Vaccine 0.5 milliLiter(s) IntraMuscular once  insulin glargine Injectable (LANTUS) 33 Unit(s) SubCutaneous at bedtime  insulin lispro (HumaLOG) corrective regimen sliding scale   SubCutaneous three times a day before meals  insulin lispro (HumaLOG) corrective regimen sliding scale   SubCutaneous at bedtime  insulin lispro Injectable (HumaLOG) 10 Unit(s) SubCutaneous three times a day before meals  magnesium sulfate  IVPB 1 Gram(s) IV Intermittent daily  methylPREDNISolone sodium succinate Injectable 20 milliGRAM(s) IV Push every 8 hours  polyethylene glycol 3350 17 Gram(s) Oral daily  senna 2 Tablet(s) Oral at bedtime      VITAL:  T(C): , Max: 37.3 (10-04-20 @ 13:25)  T(F): , Max: 99.1 (10-04-20 @ 13:25)  HR: 66 (10-05-20 @ 04:39)  BP: 137/78 (10-05-20 @ 04:39)  BP(mean): --  RR: 18 (10-05-20 @ 04:39)  SpO2: 100% (10-05-20 @ 04:39)  Wt(kg): --    I and O's:    10-04 @ 07:01  -  10-05 @ 07:00  --------------------------------------------------------  IN: 1000 mL / OUT: 1150 mL / NET: -150 mL          PHYSICAL EXAM:    Constitutional: NAD  Neck:  No JVD  Respiratory: CTAB/L  Cardiovascular: S1 and S2  Gastrointestinal: BS+, soft, NT/ND  Extremities: No peripheral edema  Neurological: A/O x 3, no focal deficits  Psychiatric: Normal mood, normal affect  : No Pearce  Skin: No rashes  Access: Not applicable    LABS:                        12.5   9.01  )-----------( 138      ( 03 Oct 2020 09:22 )             39.0     10-04    139  |  94<L>  |  35<H>  ----------------------------<  261<H>  4.7   |  35<H>  |  0.79    Ca    9.7      04 Oct 2020 09:10            Urine Studies:          RADIOLOGY & ADDITIONAL STUDIES:

## 2020-10-05 NOTE — PROGRESS NOTE ADULT - ASSESSMENT
----- Message from Deshaun Mccann MD sent at 1/14/2020 12:10 PM CST -----  Lesvia - please order CBC for this week. Orders placed   80 year old F with pmhx of  DMT2 (on Metformin and insulin), COPD, CHF, HLD, HTN, kidney stones and pshx of R nephrectomy, L knee surgery presents to ED bibems with elevated blood sugar, SOB, and lethargy. Per EMS, pt with 485 blood sugar, BP of 144/75, and O2 sat 92% on room air.  Daughter notes period of incoherent speech over the weekend. Pt endorses excess urination and thirst over the last few days. She also reports mild SOB x few days that is worse when lying flat. Pt also reports constant CP that has been present for "quite a while", unable to specify how much time. Denies fever, chills, worsening leg swelling.    COPD:  Thoraco lumbar scoliosis  Uncontrolled DM:  UTI  Confusion  CHF    she is admitted with high blood glucose and found to have uti:  She has copd:  She was recently admitted to [facility] where she was treated with copd exacerbation:  At that time her CTA was negative and no indication of malignancy on ct chest :  Currently she is not SOB : her outpt  meds noted:  She is already on Symbicort as well as inhalers:  currently she is also being treated for chf   her venous ABG towards met alk side and seems to be a chronic retainer too   I don't think she needs bipap at this time:  She seems pretty confused: ct head is negative for bleed:  would defer to primary team :  DVT propahyxlis    9/23:  pt is not SOB at this time: but emotionally labile  not wheezing:   she is not wheezing  She has chronically elevated co2:  cont Symbicort  her venous abg reviewed:   CT head neg for bleed:   CICI NP    9/24:  she looks better today : she has coag neg staph in blood cultures: likely contaminant:  She is not wheezing:  On 2 L of oxygen : No resp distress:   Has chr hypercarbia:  Cont inhalers not wheezing: Blood glucose better controlled:   mentally she is better today :  CICI NP :  dvt propahyxlis    9/25:  she has metabolic alkalosis" with  increased co2:  reviewed nephrology   on normal saline   off diuretics n ow: \not wheezing:   despite iv fluids her hCO3 remains elevated:   DIAMOX X 1 TODASY AND REVIEWED HCO3 AGAIN TOMORROW:  cici renal:    9/26:  she is doing ok : still little bit confused:  her oxygenation is reasonable:   Her hco3 came down today : to monitor closely:  still confused:  blood glucose now controlled:  ? why she is confused:   ? neuro consult    9/27:  she is alert and awake: mildly confused:  no SOB :  but she is a chronic retainer:  Her o2 sao2 is OK on oxygen :  blood cultures contaminant from before:   hco3 same: at 39: monitor  MAy need repeat diamox if it increases further;   DVT proaphxylis  CICI NP      9/28:  pt is alert and awake: mildly confused:  not in any resp distress:   reced diamox yesterday : hco3 today at 38 :   Her o2 sao2 is pretty good:  Blood glucose is better controlled   Cont Symbicort and albuterol:  Last ABG was with chr retainer: PH was ok:  REPEAT abg   COVD IS NEGATIVE  DVT propahyxlis    9/29:  she is doing ok: still confused:  Not due to co2: SHE HAS CHR HYPERCARBIA   ph is reasonable   for ct abdomen now:  neuro follow u p:  Cont BD: not wheezing today :  DVT proptaexyflex BOLANOS NP    9/30:    she is doing same:  on 2 L of oxygen:  wean her off oxygen to-day ;  chest xray is reviewed   Sheis afebrile:  DVT propahxylis  CICI NP        10/1:  she b ecame very SOB after cleaning:  she says hse is SOB: needs CTA:  family has refused so far:  she did ahve cta in july that wasnegative for pe  She is not wheezing much   Cont BD andoxygen:  dopplers lf LE too:  CICI NPlois    10/2:    she looks pretty soB:  ABG has chr hypercapnic resp failure  Increase SM to 40 mg bid  She is not wheezing though :  she has bilateral pleural effusions: would give lasix 40 mg x 12 now:  Start bipap for work of breathing   ABG is good:  CICI NP: may need MICU :  dw daughter at bedside too     10/5:  she is doing much better:  change to po predniosne" 20 mg twice ad ay for 5 days:  cont BD:   no need for bipap  Blood glcuse control  DVT propahxylis:  CICI NP

## 2020-10-05 NOTE — PROGRESS NOTE ADULT - SUBJECTIVE AND OBJECTIVE BOX
Patient is a 80y old  Female who presents with a chief complaint of sob, hyperglycemia (05 Oct 2020 08:50)      Any change in ROS: Doing ok   her breathing is better today     MEDICATIONS  (STANDING):  albuterol/ipratropium for Nebulization 3 milliLiter(s) Nebulizer every 6 hours  aspirin enteric coated 81 milliGRAM(s) Oral daily  atorvastatin 80 milliGRAM(s) Oral at bedtime  budesonide 160 MICROgram(s)/formoterol 4.5 MICROgram(s) Inhaler 2 Puff(s) Inhalation two times a day  dextrose 5%. 1000 milliLiter(s) (50 mL/Hr) IV Continuous <Continuous>  dextrose 50% Injectable 12.5 Gram(s) IV Push once  dextrose 50% Injectable 25 Gram(s) IV Push once  dextrose 50% Injectable 25 Gram(s) IV Push once  enoxaparin Injectable 40 milliGRAM(s) SubCutaneous daily  furosemide    Tablet 40 milliGRAM(s) Oral daily  gabapentin 100 milliGRAM(s) Oral three times a day  influenza   Vaccine 0.5 milliLiter(s) IntraMuscular once  insulin glargine Injectable (LANTUS) 33 Unit(s) SubCutaneous at bedtime  insulin lispro (HumaLOG) corrective regimen sliding scale   SubCutaneous three times a day before meals  insulin lispro (HumaLOG) corrective regimen sliding scale   SubCutaneous at bedtime  insulin lispro Injectable (HumaLOG) 10 Unit(s) SubCutaneous three times a day before meals  magnesium sulfate  IVPB 1 Gram(s) IV Intermittent daily  methylPREDNISolone sodium succinate Injectable 20 milliGRAM(s) IV Push every 8 hours  polyethylene glycol 3350 17 Gram(s) Oral daily  senna 2 Tablet(s) Oral at bedtime    MEDICATIONS  (PRN):  acetaminophen   Tablet .. 650 milliGRAM(s) Oral every 6 hours PRN Moderate Pain (4 - 6)  dextrose 40% Gel 15 Gram(s) Oral once PRN Blood Glucose LESS THAN 70 milliGRAM(s)/deciliter  glucagon  Injectable 1 milliGRAM(s) IntraMuscular once PRN Glucose LESS THAN 70 milligrams/deciliter  haloperidol     Tablet 1 milliGRAM(s) Oral every 6 hours PRN agitation    Vital Signs Last 24 Hrs  T(C): 36.9 (05 Oct 2020 09:03), Max: 37.3 (04 Oct 2020 13:25)  T(F): 98.4 (05 Oct 2020 09:03), Max: 99.1 (04 Oct 2020 13:25)  HR: 63 (05 Oct 2020 09:15) (61 - 88)  BP: 139/80 (05 Oct 2020 09:03) (117/76 - 144/81)  BP(mean): --  RR: 18 (05 Oct 2020 09:03) (17 - 18)  SpO2: 100% (05 Oct 2020 09:15) (96% - 100%)    I&O's Summary    04 Oct 2020 07:01  -  05 Oct 2020 07:00  --------------------------------------------------------  IN: 1000 mL / OUT: 1150 mL / NET: -150 mL          Physical Exam:   GENERAL: NAD, well-groomed, well-developed  HEENT: DON/   Atraumatic, Normocephalic  ENMT: No tonsillar erythema, exudates, or enlargement; Moist mucous membranes, Good dentition, No lesions  NECK: Supple, No JVD, Normal thyroid  CHEST/LUNG: Clear to auscultaion  CVS: Regular rate and rhythm; No murmurs, rubs, or gallops  GI: : Soft, Nontender, Nondistended; Bowel sounds present  NERVOUS SYSTEM:  Alert & Oriented X3  EXTREMITIES:  -edema  LYMPH: No lymphadenopathy noted  SKIN: No rashes or lesions  ENDOCRINOLOGY: No Thyromegaly  PSYCH: Appropriate    Labs:                              12.5   9.01  )-----------( 138      ( 03 Oct 2020 09:22 )             39.0                         10.8   5.22  )-----------( 124      ( 02 Oct 2020 08:59 )             33.5     10-04    139  |  94<L>  |  35<H>  ----------------------------<  261<H>  4.7   |  35<H>  |  0.79  10-02    144  |  102  |  25<H>  ----------------------------<  57<L>  4.4   |  37<H>  |  0.68    Ca    9.7      04 Oct 2020 09:10      CAPILLARY BLOOD GLUCOSE      POCT Blood Glucose.: 219 mg/dL (05 Oct 2020 10:01)  POCT Blood Glucose.: 309 mg/dL (04 Oct 2020 22:13)  POCT Blood Glucose.: 262 mg/dL (04 Oct 2020 18:31)  POCT Blood Glucose.: 243 mg/dL (04 Oct 2020 12:33)                < from: CT Angio Chest w/ IV Cont (10.01.20 @ 19:39) >  ARISON: Multiple CT, most recent 7/15/2020.    FINDINGS:    AIRWAYS AND LUNGS: The central tracheobronchial tree is patent.  Small bilateral pleural effusions with near complete collapse of both lower lobes and partial atelectasis of the left upper lobe.    MEDIASTINUM AND PLEURA: There are no enlarged mediastinal, hilar or axillary lymph nodes. The visualized portion of the thyroid gland is unremarkable. There is no pneumothorax.    HEART AND VESSELS: The heart is normal in size.  There are atherosclerotic calcifications of the aorta and coronary arteries.  There is no pericardial effusion.  No pulmonary embolism. Bilateral SVC'swith unchanged dilatation of the left-sided SVC. Aortic valve calcification.    UPPER ABDOMEN: Images of the upper abdomen demonstrate dystrophic calcifications within a atrophic right kidney with a fluid density right renal cyst.    BONES AND SOFT TISSUES: The bones are unremarkable.  Question left breast skin thickening laterally.    TUBES/LINES: None.    IMPRESSION:  Question left breast skin thickening laterally, correlate with physical exam and consider ultrasound as needed.    No pulmonary embolism.    Small bilateral pleural effusions with near complete collapse of both lower lobes and partial atelectasis of the left upper lobe.                  SANDRA VERA M.D., ATTENDING RADIOLOGIST  This document has been electronically signed. Oct 1 2020  9:16PM    < end of copied text >  < from: CT Head No Cont (09.21.20 @ 15:23) >  EXAM:  CT BRAIN                            PROCEDURE DATE:  09/21/2020            INTERPRETATION:  Clinical indication: Slurred speech.    Multiple axial sections were performed from base of skull to vertex without contrast enhancement. Coronal andsagittal reconstructions were performed as well    This exam is compared with prior noncontrast head CT performed on July 16, 2020.    Periventricular matter lucency is again seen which is likely related to chronic microvascular ischemic changes    There is no evidence acute hemorrhage mass or mass effect seen.    Evaluation of the osseous structures with the appears normal    The visualized paranasal sinuses mastoid and middle ear inspected clear.    IMPRESSION: No acute hemorrhage, mass or masseffect.    If symptoms continue MRI can be done for further evaluation if there are no contraindications.                  JORGE FUENTES M.D., ATTENDING RADIOLOGIST  This document has been electronically signed. Sep 21 2020  3:26PM    < end of copied text >    RECENT CULTURES:        RESPIRATORY CULTURES:          Studies  Chest X-RAY  CT SCAN Chest   Venous Dopplers: LE:   CT Abdomen  Others

## 2020-10-05 NOTE — PROGRESS NOTE ADULT - ASSESSMENT
80 year old F with pmhx of  DMT2 (on Metformin and insulin), COPD, CHF, HLD, HTN, kidney stones and pshx of R nephrectomy, L knee surgery presents to ED with elevated blood sugar, SOB, and lethargy.  Elevated HCO3 is from CO2 retention now.   Hypernatremia - improved   CKD stage 3 at minimum from reduced nephron mass     1 CVS- Off IVF. At present she is not in heart failure.   Cont qd Lasix   2 Renal-CKD on the basis of reduced nephron mass and likely DM.    Renal sono would be a good idea to rule masses but can be done as outpt.   No need for additional diamox as the HCO3 is stable   3 Endo-Needs tighter blood sugar management as outpt   4 Pulm-No evidence of contrast associated nephropathy;  Pulm arden noted   IV steriods per pulm     Physical therapy    Sayed Tova   [email or web address]  [phone]

## 2020-10-05 NOTE — PROGRESS NOTE ADULT - ASSESSMENT
Assessment  DMT2: 80y Female with DM T2 with hyperglycemia, A1C 8.1%, on basal bolus insulin, blood sugars running high and not at target, no hypoglycemic episodes, eating meals, switched to po steroids now.  UTI: on meds, stable, monitored.      Alexander Shaver MD  Cell: [phone]  Office: [phone]       Assessment  DMT2: 80y Female with DM T2 with hyperglycemia, A1C 8.1%, on basal bolus insulin, blood sugars running high and not at target, no hypoglycemic episodes,  eating meals, switched to po steroids now.  UTI: on meds, stable, monitored.      Alexander Shaver MD  Cell: [phone]  Office: [phone]

## 2020-10-05 NOTE — PROGRESS NOTE ADULT - SUBJECTIVE AND OBJECTIVE BOX
Patient is a 80y old  Female who presents with a chief complaint of sob, hyperglycemia (05 Oct 2020 10:28)      SUBJECTIVE / OVERNIGHT EVENTS:  looks and feels much better.  No chest pain. No shortness of breath. No complaints. No events overnight.     MEDICATIONS  (STANDING):  albuterol/ipratropium for Nebulization 3 milliLiter(s) Nebulizer every 6 hours  aspirin enteric coated 81 milliGRAM(s) Oral daily  atorvastatin 80 milliGRAM(s) Oral at bedtime  budesonide 160 MICROgram(s)/formoterol 4.5 MICROgram(s) Inhaler 2 Puff(s) Inhalation two times a day  dextrose 5%. 1000 milliLiter(s) (50 mL/Hr) IV Continuous <Continuous>  dextrose 50% Injectable 12.5 Gram(s) IV Push once  dextrose 50% Injectable 25 Gram(s) IV Push once  dextrose 50% Injectable 25 Gram(s) IV Push once  enoxaparin Injectable 40 milliGRAM(s) SubCutaneous daily  furosemide    Tablet 40 milliGRAM(s) Oral daily  gabapentin 100 milliGRAM(s) Oral three times a day  influenza   Vaccine 0.5 milliLiter(s) IntraMuscular once  insulin glargine Injectable (LANTUS) 33 Unit(s) SubCutaneous at bedtime  insulin lispro (HumaLOG) corrective regimen sliding scale   SubCutaneous three times a day before meals  insulin lispro (HumaLOG) corrective regimen sliding scale   SubCutaneous at bedtime  insulin lispro Injectable (HumaLOG) 10 Unit(s) SubCutaneous three times a day before meals  magnesium sulfate  IVPB 1 Gram(s) IV Intermittent daily  polyethylene glycol 3350 17 Gram(s) Oral daily  senna 2 Tablet(s) Oral at bedtime    MEDICATIONS  (PRN):  acetaminophen   Tablet .. 650 milliGRAM(s) Oral every 6 hours PRN Moderate Pain (4 - 6)  dextrose 40% Gel 15 Gram(s) Oral once PRN Blood Glucose LESS THAN 70 milliGRAM(s)/deciliter  glucagon  Injectable 1 milliGRAM(s) IntraMuscular once PRN Glucose LESS THAN 70 milligrams/deciliter  haloperidol     Tablet 1 milliGRAM(s) Oral every 6 hours PRN agitation      Vital Signs Last 24 Hrs  T(C): 36.9 (05 Oct 2020 09:03), Max: 37.3 (04 Oct 2020 13:25)  T(F): 98.4 (05 Oct 2020 09:03), Max: 99.1 (04 Oct 2020 13:25)  HR: 63 (05 Oct 2020 09:15) (61 - 88)  BP: 139/80 (05 Oct 2020 09:03) (117/76 - 144/81)  BP(mean): --  RR: 18 (05 Oct 2020 09:03) (17 - 18)  SpO2: 100% (05 Oct 2020 09:15) (96% - 100%)  CAPILLARY BLOOD GLUCOSE      POCT Blood Glucose.: 219 mg/dL (05 Oct 2020 10:01)  POCT Blood Glucose.: 309 mg/dL (04 Oct 2020 22:13)  POCT Blood Glucose.: 262 mg/dL (04 Oct 2020 18:31)  POCT Blood Glucose.: 243 mg/dL (04 Oct 2020 12:33)    I&O's Summary    04 Oct 2020 07:01  -  05 Oct 2020 07:00  --------------------------------------------------------  IN: 1000 mL / OUT: 1150 mL / NET: -150 mL        PHYSICAL EXAM:  GENERAL: NAD, well-developed  HEAD:  Atraumatic, Normocephalic  EYES: EOMI, PERRLA, conjunctiva and sclera clear  NECK: Supple, No JVD  CHEST/LUNG: Clear to auscultation bilaterally; No wheeze  HEART: Regular rate and rhythm; No murmurs, rubs, or gallops  ABDOMEN: Soft, Nontender, Nondistended; Bowel sounds present  EXTREMITIES:  2+ Peripheral Pulses, No clubbing, cyanosis, or edema  PSYCH: AAOx3  NEUROLOGY: non-focal  SKIN: No rashes or lesions    LABS:    10-04    139  |  94<L>  |  35<H>  ----------------------------<  261<H>  4.7   |  35<H>  |  0.79    Ca    9.7      04 Oct 2020 09:10                RADIOLOGY & ADDITIONAL TESTS:    Imaging Personally Reviewed:    Consultant(s) Notes Reviewed:      Care Discussed with Consultants/Other Providers:

## 2020-10-05 NOTE — PROGRESS NOTE ADULT - SUBJECTIVE AND OBJECTIVE BOX
Chief complaint  Patient is a 80y old  Female who presents with a chief complaint of sob, hyperglycemia (05 Oct 2020 12:57)   Review of systems  Patient in bed, looks comfortable, no hypoglycemic episodes.    Labs and Fingersticks  CAPILLARY BLOOD GLUCOSE      POCT Blood Glucose.: 352 mg/dL (05 Oct 2020 12:34)  POCT Blood Glucose.: 219 mg/dL (05 Oct 2020 10:01)  POCT Blood Glucose.: 309 mg/dL (04 Oct 2020 22:13)  POCT Blood Glucose.: 262 mg/dL (04 Oct 2020 18:31)      Anion Gap, Serum: 10 (10-04 @ 09:10)      Calcium, Total Serum: 9.7 (10-04 @ 09:10)          10-04    139  |  94<L>  |  35<H>  ----------------------------<  261<H>  4.7   |  35<H>  |  0.79    Ca    9.7      04 Oct 2020 09:10      Medications  MEDICATIONS  (STANDING):  albuterol/ipratropium for Nebulization 3 milliLiter(s) Nebulizer every 6 hours  aspirin enteric coated 81 milliGRAM(s) Oral daily  atorvastatin 80 milliGRAM(s) Oral at bedtime  budesonide 160 MICROgram(s)/formoterol 4.5 MICROgram(s) Inhaler 2 Puff(s) Inhalation two times a day  dextrose 5%. 1000 milliLiter(s) (50 mL/Hr) IV Continuous <Continuous>  dextrose 50% Injectable 12.5 Gram(s) IV Push once  dextrose 50% Injectable 25 Gram(s) IV Push once  dextrose 50% Injectable 25 Gram(s) IV Push once  enoxaparin Injectable 40 milliGRAM(s) SubCutaneous daily  furosemide    Tablet 40 milliGRAM(s) Oral daily  gabapentin 100 milliGRAM(s) Oral three times a day  influenza   Vaccine 0.5 milliLiter(s) IntraMuscular once  insulin glargine Injectable (LANTUS) 33 Unit(s) SubCutaneous at bedtime  insulin lispro (HumaLOG) corrective regimen sliding scale   SubCutaneous three times a day before meals  insulin lispro (HumaLOG) corrective regimen sliding scale   SubCutaneous at bedtime  insulin lispro Injectable (HumaLOG) 13 Unit(s) SubCutaneous three times a day before meals  magnesium sulfate  IVPB 1 Gram(s) IV Intermittent daily  polyethylene glycol 3350 17 Gram(s) Oral daily  predniSONE   Tablet 20 milliGRAM(s) Oral every 12 hours  senna 2 Tablet(s) Oral at bedtime      Physical Exam  General: Patient comfortable in bed  Vital Signs Last 12 Hrs  T(F): 98.4 (10-05-20 @ 09:03), Max: 98.7 (10-05-20 @ 04:39)  HR: 63 (10-05-20 @ 09:15) (61 - 78)  BP: 139/80 (10-05-20 @ 09:03) (137/78 - 139/80)  BP(mean): --  RR: 18 (10-05-20 @ 09:03) (18 - 18)  SpO2: 100% (10-05-20 @ 09:15) (96% - 100%)           Chief complaint  Patient is a 80y old  Female who presents with a chief complaint of sob, hyperglycemia (05 Oct 2020 12:57)   Review of systems  Patient in bed, looks comfortable, no hypoglycemic episodes.    Labs and Fingersticks  CAPILLARY BLOOD GLUCOSE      POCT Blood Glucose.: 352 mg/dL (05 Oct 2020 12:34)  POCT Blood Glucose.: 219 mg/dL (05 Oct 2020 10:01)  POCT Blood Glucose.: 309 mg/dL (04 Oct 2020 22:13)  POCT Blood Glucose.: 262 mg/dL (04 Oct 2020 18:31)      Anion Gap, Serum: 10 (10-04 @ 09:10)      Calcium, Total Serum: 9.7 (10-04 @ 09:10)          10-04    139  |  94<L>  |  35<H>  ----------------------------<  261<H>  4.7   |  35<H>  |  0.79    Ca    9.7      04 Oct 2020 09:10      Medications  MEDICATIONS  (STANDING):  albuterol/ipratropium for Nebulization 3 milliLiter(s) Nebulizer every 6 hours  aspirin enteric coated 81 milliGRAM(s) Oral daily  atorvastatin 80 milliGRAM(s) Oral at bedtime  budesonide 160 MICROgram(s)/formoterol 4.5 MICROgram(s) Inhaler 2 Puff(s) Inhalation two times a day  dextrose 5%. 1000 milliLiter(s) (50 mL/Hr) IV Continuous <Continuous>  dextrose 50% Injectable 12.5 Gram(s) IV Push once  dextrose 50% Injectable 25 Gram(s) IV Push once  dextrose 50% Injectable 25 Gram(s) IV Push once  enoxaparin Injectable 40 milliGRAM(s) SubCutaneous daily  furosemide    Tablet 40 milliGRAM(s) Oral daily  gabapentin 100 milliGRAM(s) Oral three times a day  influenza   Vaccine 0.5 milliLiter(s) IntraMuscular once  insulin glargine Injectable (LANTUS) 33 Unit(s) SubCutaneous at bedtime  insulin lispro (HumaLOG) corrective regimen sliding scale   SubCutaneous three times a day before meals  insulin lispro (HumaLOG) corrective regimen sliding scale   SubCutaneous at bedtime  insulin lispro Injectable (HumaLOG) 13 Unit(s) SubCutaneous three times a day before meals  magnesium sulfate  IVPB 1 Gram(s) IV Intermittent daily  polyethylene glycol 3350 17 Gram(s) Oral daily  predniSONE   Tablet 20 milliGRAM(s) Oral every 12 hours  senna 2 Tablet(s) Oral at bedtime      Physical Exam  General: Patient comfortable in bed  Vital Signs Last 12 Hrs  T(F): 98.4 (10-05-20 @ 09:03), Max: 98.7 (10-05-20 @ 04:39)  HR: 63 (10-05-20 @ 09:15) (61 - 78)  BP: 139/80 (10-05-20 @ 09:03) (137/78 - 139/80)  BP(mean): --  RR: 18 (10-05-20 @ 09:03) (18 - 18)  SpO2: 100% (10-05-20 @ 09:15) (96% - 100%)

## 2020-10-05 NOTE — CHART NOTE - NSCHARTNOTEFT_GEN_A_CORE
Nutrition Follow Up Note  Patient seen for: nutrition follow-up    Chart reviewed, events noted. This is a 80 year old F with pmhx of  DMT2 (on Metformin and insulin), COPD, CHF, HLD, HTN, kidney stones and pshx of R nephrectomy, L knee surgery presents to ED bibems with elevated blood sugar, SOB, and lethargy. Currently on prednisone, no need for BiPAP. Endocrine following, BG still elevated.     Source: patient, medical record     Diet : carbohydrate consistent (evening snack) + Danactive BID + no Caffeine     Patient reports good PO intake and appetite, consuming 100% of meals. No acute GI distress noted. Obtain food preferences for patient for breakfast tomorrow. Reviewed carbohydrate consistent diet, discussed carbohydrate counting using in-house menu. Patient with no nutrition-related questions at this time. Made aware RD remains available as needed.     Last BM 10/4     PO intake :  > 75% of meals      Source for PO intake: pt report, observation      Enteral /Parenteral Nutrition: N/A    Weights:   Daily Weight in k.2 (-30), Weight in k.9 (-)      Pertinent Medications: MEDICATIONS  (STANDING):  albuterol/ipratropium for Nebulization 3 milliLiter(s) Nebulizer every 6 hours  aspirin enteric coated 81 milliGRAM(s) Oral daily  atorvastatin 80 milliGRAM(s) Oral at bedtime  budesonide 160 MICROgram(s)/formoterol 4.5 MICROgram(s) Inhaler 2 Puff(s) Inhalation two times a day  dextrose 5%. 1000 milliLiter(s) (50 mL/Hr) IV Continuous <Continuous>  dextrose 50% Injectable 12.5 Gram(s) IV Push once  dextrose 50% Injectable 25 Gram(s) IV Push once  dextrose 50% Injectable 25 Gram(s) IV Push once  enoxaparin Injectable 40 milliGRAM(s) SubCutaneous daily  furosemide    Tablet 40 milliGRAM(s) Oral daily  gabapentin 100 milliGRAM(s) Oral three times a day  influenza   Vaccine 0.5 milliLiter(s) IntraMuscular once  insulin glargine Injectable (LANTUS) 33 Unit(s) SubCutaneous at bedtime  insulin lispro (HumaLOG) corrective regimen sliding scale   SubCutaneous three times a day before meals  insulin lispro (HumaLOG) corrective regimen sliding scale   SubCutaneous at bedtime  insulin lispro Injectable (HumaLOG) 10 Unit(s) SubCutaneous three times a day before meals  magnesium sulfate  IVPB 1 Gram(s) IV Intermittent daily  polyethylene glycol 3350 17 Gram(s) Oral daily  predniSONE   Tablet 20 milliGRAM(s) Oral every 12 hours  senna 2 Tablet(s) Oral at bedtime    MEDICATIONS  (PRN):  acetaminophen   Tablet .. 650 milliGRAM(s) Oral every 6 hours PRN Moderate Pain (4 - 6)  dextrose 40% Gel 15 Gram(s) Oral once PRN Blood Glucose LESS THAN 70 milliGRAM(s)/deciliter  glucagon  Injectable 1 milliGRAM(s) IntraMuscular once PRN Glucose LESS THAN 70 milligrams/deciliter  haloperidol     Tablet 1 milliGRAM(s) Oral every 6 hours PRN agitation    Pertinent Labs:   Finger Sticks:  POCT Blood Glucose.: 219 mg/dL (10-05 @ 10:01)  POCT Blood Glucose.: 309 mg/dL (10-04 @ 22:13)  POCT Blood Glucose.: 262 mg/dL (10-04 @ 18:31)  POCT Blood Glucose.: 243 mg/dL (10-04 @ 12:33)      Skin per nursing documentation: free of pressure injuries per nursing flow sheets   Edema: none     Estimated Needs:   [x ] no change since previous assessment  [ ] recalculated:     Previous Nutrition Diagnosis: Altered Nutrition Related Lab Values.   Nutrition Diagnosis is: on going, being addressed with therapeutic diet, diet education     New Nutrition Diagnosis: N/A     Interventions:   Recommend  1) Continue current diet as tolerated.   2) Diet education provided, reinforce as needed.   3) Obtain/honor food preferences as needed.     Monitoring and Evaluation:     Continue to monitor Nutritional intake, Tolerance to diet prescription, weights, labs, skin integrity    RD remains available upon request and will follow up per protocol  Kerri Freedman RD, CDN, Pager # [phone]

## 2020-10-05 NOTE — PROGRESS NOTE ADULT - SUBJECTIVE AND OBJECTIVE BOX
CARDIOLOGY FOLLOW UP - Dr. Mukherjee    CC oob in chair on nasal canula, resting comfortably, no acute complaints       PHYSICAL EXAM:  T(C): 36.9 (10-05-20 @ 09:03), Max: 37.3 (10-04-20 @ 13:25)  HR: 63 (10-05-20 @ 09:15) (61 - 88)  BP: 139/80 (10-05-20 @ 09:03) (117/76 - 144/81)  RR: 18 (10-05-20 @ 09:03) (17 - 18)  SpO2: 100% (10-05-20 @ 09:15) (96% - 100%)  Wt(kg): --  I&O's Summary    04 Oct 2020 07:01  -  05 Oct 2020 07:00  --------------------------------------------------------  IN: 1000 mL / OUT: 1150 mL / NET: -150 mL        Appearance: Normal	  Cardiovascular: Normal S1 S2,RRR, No JVD, No murmurs  Respiratory: Lungs clear to auscultation	  Gastrointestinal:  Soft, Non-tender, + BS	  Extremities: Normal range of motion, No clubbing, cyanosis or edema        MEDICATIONS  (STANDING):  albuterol/ipratropium for Nebulization 3 milliLiter(s) Nebulizer every 6 hours  aspirin enteric coated 81 milliGRAM(s) Oral daily  atorvastatin 80 milliGRAM(s) Oral at bedtime  budesonide 160 MICROgram(s)/formoterol 4.5 MICROgram(s) Inhaler 2 Puff(s) Inhalation two times a day  dextrose 5%. 1000 milliLiter(s) (50 mL/Hr) IV Continuous <Continuous>  dextrose 50% Injectable 12.5 Gram(s) IV Push once  dextrose 50% Injectable 25 Gram(s) IV Push once  dextrose 50% Injectable 25 Gram(s) IV Push once  enoxaparin Injectable 40 milliGRAM(s) SubCutaneous daily  furosemide    Tablet 40 milliGRAM(s) Oral daily  gabapentin 100 milliGRAM(s) Oral three times a day  influenza   Vaccine 0.5 milliLiter(s) IntraMuscular once  insulin glargine Injectable (LANTUS) 33 Unit(s) SubCutaneous at bedtime  insulin lispro (HumaLOG) corrective regimen sliding scale   SubCutaneous three times a day before meals  insulin lispro (HumaLOG) corrective regimen sliding scale   SubCutaneous at bedtime  insulin lispro Injectable (HumaLOG) 10 Unit(s) SubCutaneous three times a day before meals  magnesium sulfate  IVPB 1 Gram(s) IV Intermittent daily  polyethylene glycol 3350 17 Gram(s) Oral daily  predniSONE   Tablet 20 milliGRAM(s) Oral every 12 hours  senna 2 Tablet(s) Oral at bedtime      TELEMETRY: 	    ECG:  	  RADIOLOGY:   DIAGNOSTIC TESTING:  [ ] Echocardiogram:  [ ]  Catheterization:  [ ] Stress Test:    OTHER: 	    LABS:	 	            10-04    139  |  94<L>  |  35<H>  ----------------------------<  261<H>  4.7   |  35<H>  |  0.79    Ca    9.7      04 Oct 2020 09:10

## 2020-10-05 NOTE — PROGRESS NOTE ADULT - ASSESSMENT
80 year old F with pmhx of  DMT2 (on Metformin and insulin), COPD, CHF, HLD, HTN, kidney stones and pshx of R nephrectomy, L knee surgery presents to ED bibems with elevated blood sugar, SOB, and lethargy found to have UTI.     1. SOB, acute/chronic diastolic chf exacerbation  -multifactorial 2/2 to DKA, COPD hx   -cv stable  -ct chest 10/1 with no pe, bl small pleural effusion   -echo in july with nl LV sys fx, ef 55-60%, mild MR, mod pulm htn    -pulm f/u   -cont lasix 40mg PO daily     2. Hyperglycemia  -management per primary team     3. UTI  -+BCX likely contaminant: repeat neg  -s/p abx per primary team     4. Hypokalemia  -supplementation per primary team, continue to trend.     5. COPD , hx  -c/w home meds, pulm f/u     dvt ppx

## 2020-10-05 NOTE — PROGRESS NOTE ADULT - ASSESSMENT
Problem/Plan - 1:  ·  Problem: sepsis sec to UTI (urinary tract infection) with bactremia with metabolic encephalopathy POA .  Plan: dc ertepanum  id fu appreciated     Problem/Plan - 2:  ·  Problem: Hyperglycemia ion alkalosis   Plan: monitor FS  ISS.   renal fu    Problem/Plan - 3:  ·  Problem: AMS   Plan: metabolic encephalopathy  psych consult   neuro consult appreciated  pulmonary following for hypercarbia     Problem/Plan - 4:· COPD with respiratory failure, acute. Plan: -C/w Symbicort BID, Duoneb q6h  -d/c  bipap 1  -prednisone 20 bid x 5 days  -Keep sats >88% with supplemental O2 (currently 2-3LNC).     Problem/Plan - 5:·  acute/chronic diastolic chf exacerbation  -multifactorial 2/2 to DKA, COPD hx   -cv stable  --ct chest 10/1 with no pe, bl small pleural effusion   -echo in july with nl LV sys fx, ef 55-60%, mild MR, mod pulm htn    -cards  f/u   -cont lasix 40mg PO daily

## 2020-10-05 NOTE — CHART NOTE - NSCHARTNOTESELECT_GEN_ALL_CORE
Event Note/Dyspnea
Critical Note/Event Note
Event Note
Event Note/Blood culture
Event Note/infectious disease
Hypoglycemia/Event Note
Nutrition Services
Progress Note/Event Note
critical value note/Event Note

## 2020-10-06 NOTE — PROGRESS NOTE ADULT - SUBJECTIVE AND OBJECTIVE BOX
Patient is a 80y old  Female who presents with a chief complaint of sob, hyperglycemia (06 Oct 2020 13:26)      Any change in ROS: Doing ok : no sob     MEDICATIONS  (STANDING):  albuterol/ipratropium for Nebulization 3 milliLiter(s) Nebulizer every 6 hours  aspirin enteric coated 81 milliGRAM(s) Oral daily  atorvastatin 80 milliGRAM(s) Oral at bedtime  budesonide 160 MICROgram(s)/formoterol 4.5 MICROgram(s) Inhaler 2 Puff(s) Inhalation two times a day  dextrose 5%. 1000 milliLiter(s) (50 mL/Hr) IV Continuous <Continuous>  dextrose 50% Injectable 12.5 Gram(s) IV Push once  dextrose 50% Injectable 25 Gram(s) IV Push once  dextrose 50% Injectable 25 Gram(s) IV Push once  enoxaparin Injectable 40 milliGRAM(s) SubCutaneous daily  furosemide    Tablet 40 milliGRAM(s) Oral daily  gabapentin 100 milliGRAM(s) Oral three times a day  influenza   Vaccine 0.5 milliLiter(s) IntraMuscular once  insulin glargine Injectable (LANTUS) 36 Unit(s) SubCutaneous at bedtime  insulin lispro (HumaLOG) corrective regimen sliding scale   SubCutaneous at bedtime  insulin lispro (HumaLOG) corrective regimen sliding scale   SubCutaneous three times a day before meals  insulin lispro Injectable (HumaLOG) 13 Unit(s) SubCutaneous three times a day before meals  polyethylene glycol 3350 17 Gram(s) Oral daily  predniSONE   Tablet 20 milliGRAM(s) Oral every 12 hours  senna 2 Tablet(s) Oral at bedtime    MEDICATIONS  (PRN):  acetaminophen   Tablet .. 650 milliGRAM(s) Oral every 6 hours PRN Moderate Pain (4 - 6)  dextrose 40% Gel 15 Gram(s) Oral once PRN Blood Glucose LESS THAN 70 milliGRAM(s)/deciliter  glucagon  Injectable 1 milliGRAM(s) IntraMuscular once PRN Glucose LESS THAN 70 milligrams/deciliter  haloperidol     Tablet 1 milliGRAM(s) Oral every 6 hours PRN agitation    Vital Signs Last 24 Hrs  T(C): 36.8 (06 Oct 2020 13:13), Max: 36.8 (06 Oct 2020 01:44)  T(F): 98.2 (06 Oct 2020 13:13), Max: 98.2 (06 Oct 2020 01:44)  HR: 79 (06 Oct 2020 13:13) (69 - 93)  BP: 134/79 (06 Oct 2020 13:13) (128/81 - 148/85)  BP(mean): --  RR: 20 (06 Oct 2020 13:13) (18 - 26)  SpO2: 100% (06 Oct 2020 13:13) (89% - 100%)    I&O's Summary    05 Oct 2020 07:01  -  06 Oct 2020 07:00  --------------------------------------------------------  IN: 900 mL / OUT: 800 mL / NET: 100 mL    06 Oct 2020 07:01  -  06 Oct 2020 13:39  --------------------------------------------------------  IN: 550 mL / OUT: 700 mL / NET: -150 mL          Physical Exam:   GENERAL: NAD, well-groomed, well-developed  HEENT: DON/   Atraumatic, Normocephalic  ENMT: No tonsillar erythema, exudates, or enlargement; Moist mucous membranes, Good dentition, No lesions  NECK: Supple, No JVD, Normal thyroid  CHEST/LUNG: Clear to auscultaion  CVS: Regular rate and rhythm; No murmurs, rubs, or gallops  GI: : Soft, Nontender, Nondistended; Bowel sounds present  NERVOUS SYSTEM:  Alert & Oriented X3  EXTREMITIES: - edema  LYMPH: No lymphadenopathy noted  SKIN: No rashes or lesions  ENDOCRINOLOGY: No Thyromegaly  PSYCH: Appropriate    Labs:                              12.5   9.01  )-----------( 138      ( 03 Oct 2020 09:22 )             39.0     10-06    138  |  93<L>  |  36<H>  ----------------------------<  375<H>  4.5   |  36<H>  |  0.75  10-04    139  |  94<L>  |  35<H>  ----------------------------<  261<H>  4.7   |  35<H>  |  0.79    Ca    9.1      06 Oct 2020 09:08  Mg     2.0     10-06      CAPILLARY BLOOD GLUCOSE      POCT Blood Glucose.: 303 mg/dL (06 Oct 2020 12:43)  POCT Blood Glucose.: 375 mg/dL (06 Oct 2020 08:45)  POCT Blood Glucose.: 317 mg/dL (05 Oct 2020 21:48)  POCT Blood Glucose.: 183 mg/dL (05 Oct 2020 19:54)  POCT Blood Glucose.: 240 mg/dL (05 Oct 2020 18:04)      < from: Xray Chest 1 View- PORTABLE-Routine (Xray Chest 1 View- PORTABLE-Routine .) (10.05.20 @ 13:42) >    EXAM:  XR CHEST PORTABLE ROUTINE 1V                            PROCEDURE DATE:  10/05/2020            INTERPRETATION:  A single chest x-ray was obtained on October 5, 2020.    Indication: Shortness of breath.    Impression: The heart is enlarged. Bilateral pleural effusion. Bibasilar pneumonia and/or atelectasis. Calcified aortic knob. No pneumothorax. Calcified aortic knob.                  MERVAT CLEMENTE M.D., ATTENDING RADIOLOGIST  This document has been electronically signed. Oct  5 [id number]    < end of copied text >              RECENT CULTURES:        RESPIRATORY CULTURES:          Studies  Chest X-RAY  CT SCAN Chest   Venous Dopplers: LE:   CT Abdomen  Others

## 2020-10-06 NOTE — PROGRESS NOTE ADULT - SUBJECTIVE AND OBJECTIVE BOX
Patient is a 80y old  Female who presents with a chief complaint of sob, hyperglycemia (06 Oct 2020 15:44)      INTERVAL HPI/OVERNIGHT EVENTS:  T(C): 36.7 (10-06-20 @ 17:33), Max: 36.8 (10-06-20 @ 01:44)  HR: 79 (10-06-20 @ 17:33) (69 - 93)  BP: 133/79 (10-06-20 @ 17:33) (128/81 - 148/85)  RR: 20 (10-06-20 @ 17:33) (18 - 26)  SpO2: 99% (10-06-20 @ 17:33) (89% - 100%)  Wt(kg): --  I&O's Summary    05 Oct 2020 07:01  -  06 Oct 2020 07:00  --------------------------------------------------------  IN: 900 mL / OUT: 800 mL / NET: 100 mL    06 Oct 2020 07:01  -  06 Oct 2020 18:21  --------------------------------------------------------  IN: 550 mL / OUT: 700 mL / NET: -150 mL        LABS:    10-06    138  |  93<L>  |  36<H>  ----------------------------<  375<H>  4.5   |  36<H>  |  0.75    Ca    9.1      06 Oct 2020 09:08  Mg     2.0     10-06          CAPILLARY BLOOD GLUCOSE      POCT Blood Glucose.: 303 mg/dL (06 Oct 2020 12:43)  POCT Blood Glucose.: 375 mg/dL (06 Oct 2020 08:45)  POCT Blood Glucose.: 317 mg/dL (05 Oct 2020 21:48)  POCT Blood Glucose.: 183 mg/dL (05 Oct 2020 19:54)            MEDICATIONS  (STANDING):  albuterol/ipratropium for Nebulization 3 milliLiter(s) Nebulizer every 6 hours  aspirin enteric coated 81 milliGRAM(s) Oral daily  atorvastatin 80 milliGRAM(s) Oral at bedtime  budesonide 160 MICROgram(s)/formoterol 4.5 MICROgram(s) Inhaler 2 Puff(s) Inhalation two times a day  dextrose 5%. 1000 milliLiter(s) (50 mL/Hr) IV Continuous <Continuous>  dextrose 50% Injectable 12.5 Gram(s) IV Push once  dextrose 50% Injectable 25 Gram(s) IV Push once  dextrose 50% Injectable 25 Gram(s) IV Push once  enoxaparin Injectable 40 milliGRAM(s) SubCutaneous daily  furosemide    Tablet 40 milliGRAM(s) Oral daily  gabapentin 100 milliGRAM(s) Oral three times a day  influenza   Vaccine 0.5 milliLiter(s) IntraMuscular once  insulin glargine Injectable (LANTUS) 36 Unit(s) SubCutaneous at bedtime  insulin lispro (HumaLOG) corrective regimen sliding scale   SubCutaneous at bedtime  insulin lispro (HumaLOG) corrective regimen sliding scale   SubCutaneous three times a day before meals  insulin lispro Injectable (HumaLOG) 15 Unit(s) SubCutaneous three times a day before meals  polyethylene glycol 3350 17 Gram(s) Oral daily  predniSONE   Tablet 20 milliGRAM(s) Oral every 12 hours  senna 2 Tablet(s) Oral at bedtime    MEDICATIONS  (PRN):  acetaminophen   Tablet .. 650 milliGRAM(s) Oral every 6 hours PRN Moderate Pain (4 - 6)  dextrose 40% Gel 15 Gram(s) Oral once PRN Blood Glucose LESS THAN 70 milliGRAM(s)/deciliter  glucagon  Injectable 1 milliGRAM(s) IntraMuscular once PRN Glucose LESS THAN 70 milligrams/deciliter  haloperidol     Tablet 1 milliGRAM(s) Oral every 6 hours PRN agitation          PHYSICAL EXAM:  GENERAL: frail  CHEST/LUNG: Clear to percussion bilaterally; No rales, rhonchi, wheezing, or rubs  HEART: Regular rate and rhythm; No murmurs, rubs, or gallops  ABDOMEN: Soft, Nontender, Nondistended; Bowel sounds present  EXTREMITIES:  2+ Peripheral Pulses, No clubbing, cyanosis, or edema  LYMPH: No lymphadenopathy noted  SKIN: No rashes or lesions    Care Discussed with Consultants/Other Providers [ x] YES  [ ] NO

## 2020-10-06 NOTE — PROGRESS NOTE ADULT - SUBJECTIVE AND OBJECTIVE BOX
Patient seen and examined  No SOB at rest  on 3L NC    REVIEW OF SYSTEMS:  As per HPI, otherwise 8 full 10 ROS were unremarkable    MEDICATIONS  (STANDING):  albuterol/ipratropium for Nebulization 3 milliLiter(s) Nebulizer every 6 hours  aspirin enteric coated 81 milliGRAM(s) Oral daily  atorvastatin 80 milliGRAM(s) Oral at bedtime  budesonide 160 MICROgram(s)/formoterol 4.5 MICROgram(s) Inhaler 2 Puff(s) Inhalation two times a day  dextrose 5%. 1000 milliLiter(s) (50 mL/Hr) IV Continuous <Continuous>  dextrose 50% Injectable 12.5 Gram(s) IV Push once  dextrose 50% Injectable 25 Gram(s) IV Push once  dextrose 50% Injectable 25 Gram(s) IV Push once  enoxaparin Injectable 40 milliGRAM(s) SubCutaneous daily  furosemide    Tablet 40 milliGRAM(s) Oral daily  gabapentin 100 milliGRAM(s) Oral three times a day  influenza   Vaccine 0.5 milliLiter(s) IntraMuscular once  insulin glargine Injectable (LANTUS) 36 Unit(s) SubCutaneous at bedtime  insulin lispro (HumaLOG) corrective regimen sliding scale   SubCutaneous at bedtime  insulin lispro (HumaLOG) corrective regimen sliding scale   SubCutaneous three times a day before meals  insulin lispro Injectable (HumaLOG) 13 Unit(s) SubCutaneous three times a day before meals  polyethylene glycol 3350 17 Gram(s) Oral daily  predniSONE   Tablet 20 milliGRAM(s) Oral every 12 hours  senna 2 Tablet(s) Oral at bedtime      VITAL:  T(C): , Max: 36.8 (10-06-20 @ 01:44)  T(F): , Max: 98.2 (10-06-20 @ 01:44)  HR: 79 (10-06-20 @ 13:13)  BP: 134/79 (10-06-20 @ 13:13)  BP(mean): --  RR: 20 (10-06-20 @ 13:13)  SpO2: 100% (10-06-20 @ 13:13)  Wt(kg): --    I and O's:    10-05 @ 07:01  -  10-06 @ 07:00  --------------------------------------------------------  IN: 900 mL / OUT: 800 mL / NET: 100 mL    10-06 @ 07:01  -  10-06 @ 13:26  --------------------------------------------------------  IN: 550 mL / OUT: 700 mL / NET: -150 mL          PHYSICAL EXAM:    Constitutional: NAD  HEENT: PERRLA, EOMI,  MMM  Neck: No LAD, No JVD  Respiratory: CTAB  Cardiovascular: S1 and S2  Gastrointestinal: BS+, soft, NT/ND  Extremities: No peripheral edema  Neurological: A/O x 3, no focal deficits  Psychiatric: Normal mood, normal affect  : No Pearce    LABS:    10-06    138  |  93<L>  |  36<H>  ----------------------------<  375<H>  4.5   |  36<H>  |  0.75    Ca    9.1      06 Oct 2020 09:08  Mg     2.0     10-06          Assessment and Plan:   · Assessment	  80 year old F with pmhx of  DMT2 (on Metformin and insulin), COPD, CHF, HLD, HTN, kidney stones and pshx of R nephrectomy, L knee surgery presents to ED with elevated blood sugar, SOB, and lethargy.    Elevated HCO3 2ry to chronic respiratory acidosis  Hypernatremia - improved   CKD stage 3 at minimum from reduced nephron mass       Recommendations  - c/w lasix 40 mg po daily  - defer diamox      Hannad Tyrese LEMUS  [facility]  [phone]

## 2020-10-06 NOTE — PROGRESS NOTE ADULT - PROBLEM SELECTOR PLAN 1
Will increase Lantus to 36u at bedtime.  Will increase Humalog to 15u before each meal and continue Humalog correction scale coverage. Will continue monitoring FS and FU. Will adjust insulin doses as steroids are being tapered/dc.  Patient counseled for compliance with consistent low carb diet and physical activity as tolerated outpatient.

## 2020-10-06 NOTE — PROGRESS NOTE ADULT - ASSESSMENT
80 year old F with pmhx of  DMT2 (on Metformin and insulin), COPD, CHF, HLD, HTN, kidney stones and pshx of R nephrectomy, L knee surgery presents to ED bibems with elevated blood sugar, SOB, and lethargy. Per EMS, pt with 485 blood sugar, BP of 144/75, and O2 sat 92% on room air.  Daughter notes period of incoherent speech over the weekend. Pt endorses excess urination and thirst over the last few days. She also reports mild SOB x few days that is worse when lying flat. Pt also reports constant CP that has been present for "quite a while", unable to specify how much time. Denies fever, chills, worsening leg swelling.    COPD:  Thoraco lumbar scoliosis  Uncontrolled DM:  UTI  Confusion  CHF    she is admitted with high blood glucose and found to have uti:  She has copd:  She was recently admitted to [facility] where she was treated with copd exacerbation:  At that time her CTA was negative and no indication of malignancy on ct chest :  Currently she is not SOB : her outpt  meds noted:  She is already on Symbicort as well as inhalers:  currently she is also being treated for chf   her venous ABG towards met alk side and seems to be a chronic retainer too   I don't think she needs bipap at this time:  She seems pretty confused: ct head is negative for bleed:  would defer to primary team :  DVT propahyxlis    9/23:  pt is not SOB at this time: but emotionally labile  not wheezing:   she is not wheezing  She has chronically elevated co2:  cont Symbicort  her venous abg reviewed:   CT head neg for bleed:   CICI NP    9/24:  she looks better today : she has coag neg staph in blood cultures: likely contaminant:  She is not wheezing:  On 2 L of oxygen : No resp distress:   Has chr hypercarbia:  Cont inhalers not wheezing: Blood glucose better controlled:   mentally she is better today :  CICI NP :  dvt propahyxlis    9/25:  she has metabolic alkalosis" with  increased co2:  reviewed nephrology   on normal saline   off diuretics n ow: \not wheezing:   despite iv fluids her hCO3 remains elevated:   DIAMOX X 1 TODASY AND REVIEWED HCO3 AGAIN TOMORROW:  cici renal:    9/26:  she is doing ok : still little bit confused:  her oxygenation is reasonable:   Her hco3 came down today : to monitor closely:  still confused:  blood glucose now controlled:  ? why she is confused:   ? neuro consult    9/27:  she is alert and awake: mildly confused:  no SOB :  but she is a chronic retainer:  Her o2 sao2 is OK on oxygen :  blood cultures contaminant from before:   hco3 same: at 39: monitor  MAy need repeat diamox if it increases further;   DVT proaphxylis  CICI NP      9/28:  pt is alert and awake: mildly confused:  not in any resp distress:   reced diamox yesterday : hco3 today at 38 :   Her o2 sao2 is pretty good:  Blood glucose is better controlled   Cont Symbicort and albuterol:  Last ABG was with chr retainer: PH was ok:  REPEAT abg   COVD IS NEGATIVE  DVT propahyxlis    9/29:  she is doing ok: still confused:  Not due to co2: SHE HAS CHR HYPERCARBIA   ph is reasonable   for ct abdomen now:  neuro follow u p:  Cont BD: not wheezing today :  DVT proptaexymoes  CICI NP    9/30:    she is doing same:  on 2 L of oxygen:  wean her off oxygen to-day ;  chest xray is reviewed   Sheis afebrile:  DVT propahxylis  CICI NP        10/1:  she b ecame very SOB after cleaning:  she says hse is SOB: needs CTA:  family has refused so far:  she did ahve cta in july that wasnegative for pe  She is not wheezing much   Cont BD andoxygen:  dopplers lf LE too:  CICI NPlois    10/2:    she looks pretty soB:  ABG has chr hypercapnic resp failure  Increase SM to 40 mg bid  She is not wheezing though :  she has bilateral pleural effusions: would give lasix 40 mg x 12 now:  Start bipap for work of breathing   ABG is good:  CICI NP: may need MICU :  dw daughter at bedside too     10/5:  she is doing much better:  change to po predniosne" 20 mg twice ad ay for 5 days:  cont BD:   no need for bipap  Blood glcuse control  DVT propahxylis:  CICI NP    10/6:  seems to be doing ok : no resp distress:  lungs are clear:  on oral steroids already :  Blood glcuse still high:  Cont to controll it: few more days of steroids   Cont BD:  DVT prophylaxis  DW NP

## 2020-10-06 NOTE — PROVIDER CONTACT NOTE (MEDICATION) - ACTION/TREATMENT ORDERED:
NP made aware. Dextrose gel oral given + 2 cups of apple juice. Rechecked with result of 113, humalog held + ABG drawn and resulted, Bipap started w/good relief..
PA aware. Per PA, add on serum Mg and will d/c daily order for 1g Mg sulf IVPB. Will re-evaluate necessary repletions when resulted
PA aware, will endorse to day RN.

## 2020-10-06 NOTE — PROGRESS NOTE ADULT - ASSESSMENT
Problem/Plan - 1:  ·  Problem: sepsis sec to UTI (urinary tract infection) with bactremia with metabolic encephalopathy POA .  Plan: dc ertepanum  id fu appreciated     Problem/Plan - 2:  ·  Problem: Hyperglycemia ion alkalosis   Plan: monitor FS  ISS.   renal fu    Problem/Plan - 3:·  Problem: AMS   Plan: metabolic encephalopathy  psych consult   neuro consult appreciated  pulmonary following for hypercarbia     Problem/Plan - 4:· COPD with respiratory failure, acute. Plan: -C/w Symbicort BID, Duoneb q6h  -d/c  bipap 1  -prednisone 20 bid x 5 days  -Keep sats >88% with supplemental O2 (currently 2-3LNC).     Problem/Plan - 5:·  acute/chronic diastolic chf exacerbation  -multifactorial 2/2 to DKA, COPD hx   -cv stable  --ct chest 10/1 with no pe, bl small pleural effusion   -echo in july with nl LV sys fx, ef 55-60%, mild MR, mod pulm htn    -cards  f/u   -cont lasix 40mg PO daily

## 2020-10-06 NOTE — PROGRESS NOTE ADULT - ASSESSMENT
Problem/Plan - 1:  ·  Problem: sepsis sec to UTI (urinary tract infection) with bactremia with metabolic encephalopathy POA .  Plan: dc ertepanum  id fu appreciated     Problem/Plan - 2:  ·  Problem: Hyperglycemia ion alkalosis   Plan: monitor FS  ISS.   renal fu    Problem/Plan - 3:·  Problem: AMS   Plan: metabolic encephalopathy  psych consult   neuro consult appreciatedpulmonary following for hypercarbia     Problem/Plan - 4:· COPD with respiratory failure, acute. Plan: -C/w Symbicort BID, Duoneb q6h  -d/c  bipap 1  -Keep sats >88% with supplemental O2 (currently 2-3LNC).     Problem/Plan - 5:·  acute/chronic diastolic chf exacerbation  -multifactorial 2/2 to DKA, COPD hx   -cv stable  --ct chest 10/1 with no pe, bl small pleural effusion   -echo in july with nl LV sys fx, ef 55-60%, mild MR, mod pulm htn    -cards  f/u   -cont lasix 40mg PO daily

## 2020-10-06 NOTE — PROGRESS NOTE ADULT - SUBJECTIVE AND OBJECTIVE BOX
Chief complaint  Patient is a 80y old  Female who presents with a chief complaint of sob, hyperglycemia (06 Oct 2020 13:39)   Review of systems  Patient in bed, looks comfortable, no hypoglycemic episodes.    Labs and Fingersticks  CAPILLARY BLOOD GLUCOSE      POCT Blood Glucose.: 303 mg/dL (06 Oct 2020 12:43)  POCT Blood Glucose.: 375 mg/dL (06 Oct 2020 08:45)  POCT Blood Glucose.: 317 mg/dL (05 Oct 2020 21:48)  POCT Blood Glucose.: 183 mg/dL (05 Oct 2020 19:54)  POCT Blood Glucose.: 240 mg/dL (05 Oct 2020 18:04)      Anion Gap, Serum: 9 (10-06 @ 09:08)      Calcium, Total Serum: 9.1 (10-06 @ 09:08)          10-06    138  |  93<L>  |  36<H>  ----------------------------<  375<H>  4.5   |  36<H>  |  0.75    Ca    9.1      06 Oct 2020 09:08  Mg     2.0     10-06      Medications  MEDICATIONS  (STANDING):  albuterol/ipratropium for Nebulization 3 milliLiter(s) Nebulizer every 6 hours  aspirin enteric coated 81 milliGRAM(s) Oral daily  atorvastatin 80 milliGRAM(s) Oral at bedtime  budesonide 160 MICROgram(s)/formoterol 4.5 MICROgram(s) Inhaler 2 Puff(s) Inhalation two times a day  dextrose 5%. 1000 milliLiter(s) (50 mL/Hr) IV Continuous <Continuous>  dextrose 50% Injectable 12.5 Gram(s) IV Push once  dextrose 50% Injectable 25 Gram(s) IV Push once  dextrose 50% Injectable 25 Gram(s) IV Push once  enoxaparin Injectable 40 milliGRAM(s) SubCutaneous daily  furosemide    Tablet 40 milliGRAM(s) Oral daily  gabapentin 100 milliGRAM(s) Oral three times a day  influenza   Vaccine 0.5 milliLiter(s) IntraMuscular once  insulin glargine Injectable (LANTUS) 36 Unit(s) SubCutaneous at bedtime  insulin lispro (HumaLOG) corrective regimen sliding scale   SubCutaneous at bedtime  insulin lispro (HumaLOG) corrective regimen sliding scale   SubCutaneous three times a day before meals  insulin lispro Injectable (HumaLOG) 15 Unit(s) SubCutaneous three times a day before meals  polyethylene glycol 3350 17 Gram(s) Oral daily  predniSONE   Tablet 20 milliGRAM(s) Oral every 12 hours  senna 2 Tablet(s) Oral at bedtime      Physical Exam  General: Patient comfortable in bed  Vital Signs Last 12 Hrs  T(F): 98.2 (10-06-20 @ 13:13), Max: 98.2 (10-06-20 @ 13:13)  HR: 79 (10-06-20 @ 13:13) (69 - 91)  BP: 134/79 (10-06-20 @ 13:13) (134/79 - 144/84)  BP(mean): --  RR: 20 (10-06-20 @ 13:13) (18 - 26)  SpO2: 100% (10-06-20 @ 13:13) (89% - 100%)     Chief complaint  Patient is a 80y old  Female who presents with a chief complaint of sob, hyperglycemia (06 Oct 2020 13:39)   Review of systems  Patient in bed, looks comfortable, no hypoglycemic episodes.    Labs and Fingersticks  CAPILLARY BLOOD GLUCOSE      POCT Blood Glucose.: 303 mg/dL (06 Oct 2020 12:43)  POCT Blood Glucose.: 375 mg/dL (06 Oct 2020 08:45)  POCT Blood Glucose.: 317 mg/dL (05 Oct 2020 21:48)  POCT Blood Glucose.: 183 mg/dL (05 Oct 2020 19:54)  POCT Blood Glucose.: 240 mg/dL (05 Oct 2020 18:04)      Anion Gap, Serum: 9 (10-06 @ 09:08)      Calcium, Total Serum: 9.1 (10-06 @ 09:08)          10-06    138  |  93<L>  |  36<H>  ----------------------------<  375<H>  4.5   |  36<H>  |  0.75    Ca    9.1      06 Oct 2020 09:08  Mg     2.0     10-06      Medications  MEDICATIONS  (STANDING):  albuterol/ipratropium for Nebulization 3 milliLiter(s) Nebulizer every 6 hours  aspirin enteric coated 81 milliGRAM(s) Oral daily  atorvastatin 80 milliGRAM(s) Oral at bedtime  budesonide 160 MICROgram(s)/formoterol 4.5 MICROgram(s) Inhaler 2 Puff(s) Inhalation two times a day  dextrose 5%. 1000 milliLiter(s) (50 mL/Hr) IV Continuous <Continuous>  dextrose 50% Injectable 12.5 Gram(s) IV Push once  dextrose 50% Injectable 25 Gram(s) IV Push once  dextrose 50% Injectable 25 Gram(s) IV Push once  enoxaparin Injectable 40 milliGRAM(s) SubCutaneous daily  furosemide    Tablet 40 milliGRAM(s) Oral daily  gabapentin 100 milliGRAM(s) Oral three times a day  influenza   Vaccine 0.5 milliLiter(s) IntraMuscular once  insulin glargine Injectable (LANTUS) 36 Unit(s) SubCutaneous at bedtime  insulin lispro (HumaLOG) corrective regimen sliding scale   SubCutaneous at bedtime  insulin lispro (HumaLOG) corrective regimen sliding scale   SubCutaneous three times a day before meals  insulin lispro Injectable (HumaLOG) 15 Unit(s) SubCutaneous three times a day before meals  polyethylene glycol 3350 17 Gram(s) Oral daily  predniSONE   Tablet 20 milliGRAM(s) Oral every 12 hours  senna 2 Tablet(s) Oral at bedtime      Physical Exam  General: Patient comfortable in bed  Vital Signs Last 12 Hrs  T(F): 98.2 (10-06-20 @ 13:13), Max: 98.2 (10-06-20 @ 13:13)  HR: 79 (10-06-20 @ 13:13) (69 - 91)  BP: 134/79 (10-06-20 @ 13:13) (134/79 - 144/84)  BP(mean): --  RR: 20 (10-06-20 @ 13:13) (18 - 26)  SpO2: 100% (10-06-20 @ 13:13) (89% - 100%)

## 2020-10-06 NOTE — PROVIDER CONTACT NOTE (MEDICATION) - ASSESSMENT
Pt appears in resp distress, c/o "feeling lightheaded". Other VSS.
Patient ordered for daily 1g Mg sulf IVPB, no serum Mg result x days
Pt a&ox1 with agitation at times. Not allowing nurses to apply tele.

## 2020-10-06 NOTE — PROGRESS NOTE ADULT - SUBJECTIVE AND OBJECTIVE BOX
Patient is a 80y old  Female who presents with a chief complaint of sob, hyperglycemia (06 Oct 2020 14:10)      INTERVAL HPI/OVERNIGHT EVENTS:  T(C): 36.8 (10-06-20 @ 13:13), Max: 36.8 (10-06-20 @ 01:44)  HR: 84 (10-06-20 @ 14:30) (69 - 93)  BP: 140/73 (10-06-20 @ 14:30) (128/81 - 148/85)  RR: 20 (10-06-20 @ 14:30) (18 - 26)  SpO2: 100% (10-06-20 @ 14:30) (89% - 100%)  Wt(kg): --  I&O's Summary    05 Oct 2020 07:01  -  06 Oct 2020 07:00  --------------------------------------------------------  IN: 900 mL / OUT: 800 mL / NET: 100 mL    06 Oct 2020 07:01  -  06 Oct 2020 15:45  --------------------------------------------------------  IN: 550 mL / OUT: 700 mL / NET: -150 mL        LABS:    10-06    138  |  93<L>  |  36<H>  ----------------------------<  375<H>  4.5   |  36<H>  |  0.75    Ca    9.1      06 Oct 2020 09:08  Mg     2.0     10-06          CAPILLARY BLOOD GLUCOSE      POCT Blood Glucose.: 303 mg/dL (06 Oct 2020 12:43)  POCT Blood Glucose.: 375 mg/dL (06 Oct 2020 08:45)  POCT Blood Glucose.: 317 mg/dL (05 Oct 2020 21:48)  POCT Blood Glucose.: 183 mg/dL (05 Oct 2020 19:54)  POCT Blood Glucose.: 240 mg/dL (05 Oct 2020 18:04)            MEDICATIONS  (STANDING):  albuterol/ipratropium for Nebulization 3 milliLiter(s) Nebulizer every 6 hours  aspirin enteric coated 81 milliGRAM(s) Oral daily  atorvastatin 80 milliGRAM(s) Oral at bedtime  budesonide 160 MICROgram(s)/formoterol 4.5 MICROgram(s) Inhaler 2 Puff(s) Inhalation two times a day  dextrose 5%. 1000 milliLiter(s) (50 mL/Hr) IV Continuous <Continuous>  dextrose 50% Injectable 12.5 Gram(s) IV Push once  dextrose 50% Injectable 25 Gram(s) IV Push once  dextrose 50% Injectable 25 Gram(s) IV Push once  enoxaparin Injectable 40 milliGRAM(s) SubCutaneous daily  furosemide    Tablet 40 milliGRAM(s) Oral daily  gabapentin 100 milliGRAM(s) Oral three times a day  influenza   Vaccine 0.5 milliLiter(s) IntraMuscular once  insulin glargine Injectable (LANTUS) 36 Unit(s) SubCutaneous at bedtime  insulin lispro (HumaLOG) corrective regimen sliding scale   SubCutaneous at bedtime  insulin lispro (HumaLOG) corrective regimen sliding scale   SubCutaneous three times a day before meals  insulin lispro Injectable (HumaLOG) 15 Unit(s) SubCutaneous three times a day before meals  polyethylene glycol 3350 17 Gram(s) Oral daily  predniSONE   Tablet 20 milliGRAM(s) Oral every 12 hours  senna 2 Tablet(s) Oral at bedtime    MEDICATIONS  (PRN):  acetaminophen   Tablet .. 650 milliGRAM(s) Oral every 6 hours PRN Moderate Pain (4 - 6)  dextrose 40% Gel 15 Gram(s) Oral once PRN Blood Glucose LESS THAN 70 milliGRAM(s)/deciliter  glucagon  Injectable 1 milliGRAM(s) IntraMuscular once PRN Glucose LESS THAN 70 milligrams/deciliter  haloperidol     Tablet 1 milliGRAM(s) Oral every 6 hours PRN agitation          PHYSICAL EXAM:  GENERAL: NAD, well-groomed, well-developed  HEAD:  Atraumatic, Normocephalic  CHEST/LUNG: Clear to percussion bilaterally; No rales, rhonchi, wheezing, or rubs  HEART: Regular rate and rhythm; No murmurs, rubs, or gallops  ABDOMEN: Soft, Nontender, Nondistended; Bowel sounds present  EXTREMITIES:  2+ Peripheral Pulses, No clubbing, cyanosis, or edema  LYMPH: No lymphadenopathy noted  SKIN: No rashes or lesions    Care Discussed with Consultants/Other Providers [ ] YES  [ ] NO

## 2020-10-06 NOTE — PROVIDER CONTACT NOTE (MEDICATION) - SITUATION
Pt with low FS of 41, rechecked with result of 39. Pt appears in respiratory distress, dyspneic on rest.
Patient ordered for daily 1g Mg sulf IVPB, no serum Mg result x days
Pt non complaint with tele.

## 2020-10-06 NOTE — PROGRESS NOTE ADULT - ASSESSMENT
Assessment  DMT2: 80y Female with DM T2 with hyperglycemia, A1C 8.1%, on basal bolus insulin, increased dose, blood sugars still running high and not at target 2/2 steroids, no hypoglycemic episodes. Patient is eating meals, breathing comfortably, remains on po steroids.  UTI: on meds, stable, monitored.      Alexander Shaver MD  Cell: [phone]  Office: [phone]       Assessment  DMT2: 80y Female with DM T2 with hyperglycemia, A1C 8.1%, on basal bolus insulin, increased dose, blood sugars still running high and not at target 2/2 steroids, no hypoglycemic episodes.  Patient is eating meals, breathing comfortably, remains on po steroids.  UTI: on meds, stable, monitored.      Alexander Shaver MD  Cell: [phone]  Office: [phone]

## 2020-10-06 NOTE — PROGRESS NOTE ADULT - SUBJECTIVE AND OBJECTIVE BOX
CARDIOLOGY FOLLOW UP - Dr. Mukherjee    CC  no cp/sob     PHYSICAL EXAM:  T(C): 36.6 (10-06-20 @ 08:47), Max: 36.8 (10-05-20 @ 13:14)  HR: 91 (10-06-20 @ 09:56) (69 - 93)  BP: 139/72 (10-06-20 @ 08:47) (123/67 - 148/85)  RR: 26 (10-06-20 @ 08:47) (18 - 26)  SpO2: 96% (10-06-20 @ 09:56) (89% - 100%)  Wt(kg): --  I&O's Summary    05 Oct 2020 07:01  -  06 Oct 2020 07:00  --------------------------------------------------------  IN: 900 mL / OUT: 800 mL / NET: 100 mL    06 Oct 2020 07:01  -  06 Oct 2020 12:31  --------------------------------------------------------  IN: 0 mL / OUT: 700 mL / NET: -700 mL        Appearance: Normal	  Cardiovascular: Normal S1 S2,RRR, No JVD, No murmurs  Respiratory: Lungs clear to auscultation	  Gastrointestinal:  Soft, Non-tender, + BS	  Extremities: Normal range of motion, No clubbing, cyanosis or edema        MEDICATIONS  (STANDING):  albuterol/ipratropium for Nebulization 3 milliLiter(s) Nebulizer every 6 hours  aspirin enteric coated 81 milliGRAM(s) Oral daily  atorvastatin 80 milliGRAM(s) Oral at bedtime  budesonide 160 MICROgram(s)/formoterol 4.5 MICROgram(s) Inhaler 2 Puff(s) Inhalation two times a day  dextrose 5%. 1000 milliLiter(s) (50 mL/Hr) IV Continuous <Continuous>  dextrose 50% Injectable 12.5 Gram(s) IV Push once  dextrose 50% Injectable 25 Gram(s) IV Push once  dextrose 50% Injectable 25 Gram(s) IV Push once  enoxaparin Injectable 40 milliGRAM(s) SubCutaneous daily  furosemide    Tablet 40 milliGRAM(s) Oral daily  gabapentin 100 milliGRAM(s) Oral three times a day  influenza   Vaccine 0.5 milliLiter(s) IntraMuscular once  insulin glargine Injectable (LANTUS) 36 Unit(s) SubCutaneous at bedtime  insulin lispro (HumaLOG) corrective regimen sliding scale   SubCutaneous three times a day before meals  insulin lispro (HumaLOG) corrective regimen sliding scale   SubCutaneous at bedtime  insulin lispro Injectable (HumaLOG) 13 Unit(s) SubCutaneous three times a day before meals  polyethylene glycol 3350 17 Gram(s) Oral daily  predniSONE   Tablet 20 milliGRAM(s) Oral every 12 hours  senna 2 Tablet(s) Oral at bedtime      TELEMETRY: 	    ECG:  	  RADIOLOGY:   DIAGNOSTIC TESTING:  [ ] Echocardiogram:  [ ]  Catheterization:  [ ] Stress Test:    OTHER: 	    LABS:	 	            10-06    138  |  93<L>  |  36<H>  ----------------------------<  375<H>  4.5   |  36<H>  |  0.75    Ca    9.1      06 Oct 2020 09:08

## 2020-10-07 NOTE — PROGRESS NOTE ADULT - ASSESSMENT
80 year old F with pmhx of  DMT2 (on Metformin and insulin), COPD, CHF, HLD, HTN, kidney stones and pshx of R nephrectomy, L knee surgery presents to ED with elevated blood sugar, SOB, and lethargy.    Elevated HCO3 2ry to chronic respiratory acidosis  Hypernatremia - improved   CKD stage 3 at minimum from reduced nephron mass       Recommendations  - c/w lasix 40 mg po daily  - defer diamox  -Steroid taper per pulm   -Nutritional support       Sayed [facility] [employer]  [phone]

## 2020-10-07 NOTE — PROGRESS NOTE ADULT - SUBJECTIVE AND OBJECTIVE BOX
Patient is a 80y old  Female who presents with a chief complaint of sob, hyperglycemia (07 Oct 2020 13:24)      INTERVAL HPI/OVERNIGHT EVENTS:  T(C): 36.8 (10-07-20 @ 13:38), Max: 37.2 (10-07-20 @ 05:29)  HR: 80 (10-07-20 @ 13:38) (73 - 104)  BP: 138/82 (10-07-20 @ 13:38) (120/79 - 138/82)  RR: 20 (10-07-20 @ 13:38) (18 - 28)  SpO2: 99% (10-07-20 @ 13:38) (95% - 100%)  Wt(kg): --  I&O's Summary    06 Oct 2020 07:01  -  07 Oct 2020 07:00  --------------------------------------------------------  IN: 550 mL / OUT: 800 mL / NET: -250 mL    07 Oct 2020 07:01  -  07 Oct 2020 16:50  --------------------------------------------------------  IN: 780 mL / OUT: 400 mL / NET: 380 mL        LABS:                        10.0   7.47  )-----------( 138      ( 07 Oct 2020 07:26 )             31.9     10-07    139  |  95<L>  |  35<H>  ----------------------------<  156<H>  4.1   |  34<H>  |  0.72    Ca    9.5      07 Oct 2020 07:26  Mg     2.0     10-06    TPro  5.6<L>  /  Alb  2.9<L>  /  TBili  0.3  /  DBili  x   /  AST  29  /  ALT  47<H>  /  AlkPhos  86  10-07        CAPILLARY BLOOD GLUCOSE      POCT Blood Glucose.: 213 mg/dL (07 Oct 2020 13:04)  POCT Blood Glucose.: 178 mg/dL (07 Oct 2020 07:47)  POCT Blood Glucose.: 154 mg/dL (06 Oct 2020 23:15)  POCT Blood Glucose.: 121 mg/dL (06 Oct 2020 21:13)  POCT Blood Glucose.: 100 mg/dL (06 Oct 2020 18:29)            MEDICATIONS  (STANDING):  albuterol/ipratropium for Nebulization 3 milliLiter(s) Nebulizer every 6 hours  aspirin enteric coated 81 milliGRAM(s) Oral daily  atorvastatin 80 milliGRAM(s) Oral at bedtime  budesonide 160 MICROgram(s)/formoterol 4.5 MICROgram(s) Inhaler 2 Puff(s) Inhalation two times a day  dextrose 5%. 1000 milliLiter(s) (50 mL/Hr) IV Continuous <Continuous>  dextrose 50% Injectable 12.5 Gram(s) IV Push once  dextrose 50% Injectable 25 Gram(s) IV Push once  dextrose 50% Injectable 25 Gram(s) IV Push once  enoxaparin Injectable 40 milliGRAM(s) SubCutaneous daily  furosemide    Tablet 40 milliGRAM(s) Oral daily  gabapentin 100 milliGRAM(s) Oral three times a day  insulin glargine Injectable (LANTUS) 25 Unit(s) SubCutaneous at bedtime  insulin lispro (HumaLOG) corrective regimen sliding scale   SubCutaneous at bedtime  insulin lispro (HumaLOG) corrective regimen sliding scale   SubCutaneous three times a day before meals  insulin lispro Injectable (HumaLOG) 13 Unit(s) SubCutaneous three times a day before meals  polyethylene glycol 3350 17 Gram(s) Oral daily  predniSONE   Tablet 20 milliGRAM(s) Oral every 12 hours  senna 2 Tablet(s) Oral at bedtime    MEDICATIONS  (PRN):  acetaminophen   Tablet .. 650 milliGRAM(s) Oral every 6 hours PRN Moderate Pain (4 - 6)  dextrose 40% Gel 15 Gram(s) Oral once PRN Blood Glucose LESS THAN 70 milliGRAM(s)/deciliter  glucagon  Injectable 1 milliGRAM(s) IntraMuscular once PRN Glucose LESS THAN 70 milligrams/deciliter  haloperidol     Tablet 1 milliGRAM(s) Oral every 6 hours PRN agitation          PHYSICAL EXAM:  GENERAL: NAD, well-groomed, well-developed  HEAD:  Atraumatic, Normocephalic  CHEST/LUNG: Clear to percussion bilaterally; No rales, rhonchi, wheezing, or rubs  HEART: Regular rate and rhythm; No murmurs, rubs, or gallops  ABDOMEN: Soft, Nontender, Nondistended; Bowel sounds present  EXTREMITIES:  2+ Peripheral Pulses, No clubbing, cyanosis, or edema  LYMPH: No lymphadenopathy noted  SKIN: No rashes or lesions    Care Discussed with Consultants/Other Providers [x ] YES  [ ] NO

## 2020-10-07 NOTE — PROGRESS NOTE ADULT - PROBLEM SELECTOR PLAN 1
Will decrease Lantus to 25u at bedtime.  Will decrease Humalog to 13u before each meal and continue Humalog correction scale coverage.  Will continue monitoring FS and FU. Will adjust insulin doses as steroids are being tapered/dc.  Patient counseled for compliance with consistent low carb diet and physical activity as tolerated outpatient.

## 2020-10-07 NOTE — PROGRESS NOTE ADULT - ASSESSMENT
Assessment  DMT2: 80y Female with DM T2 with hyperglycemia, A1C 8.1%, on basal bolus insulin, increased dose, blood sugars improving/trending down, received partial-dose Lantus last night, blood sugars in overall acceptable range today, no hypoglycemic episodes, remains on po steroids.  UTI: on meds, stable, monitored.      Alexander Shaver MD  Cell: [phone]  Office: [phone]       Assessment  DMT2: 80y Female with DM T2 with hyperglycemia, A1C 8.1%, on basal bolus insulin, increased dose, blood sugars improving/trending down, received partial-dose Lantus last night, blood sugars in overall acceptable range today,  no hypoglycemic episodes, remains on po steroids.  UTI: on meds, stable, monitored.      Alexander Shaver MD  Cell: [phone]  Office: [phone]

## 2020-10-07 NOTE — PROGRESS NOTE ADULT - ASSESSMENT
Problem/Plan - 1:  ·  Problem: sepsis sec to UTI (urinary tract infection) with bactremia with metabolic encephalopathy POA .  Plan: dc ertepanum  id fu appreciated     Problem/Plan - 2:  ·  Problem: Hyperglycemia ion alkalosis   Plan: monitor FS  ISS.   renal fu    Problem/Plan - 3:·  Problem: AMS   Plan: metabolic encephalopathy  psych consult   neuro consult appreciatedpulmonary following for hypercarbia     Problem/Plan - 4:· COPD with respiratory failure, acute. Plan: -C/w Symbicort BID, Duoneb q6h-d/c  bipap 1  -Keep sats >88% with supplemental O2 (currently 2-3LNC).     Problem/Plan - 5:·  acute/chronic diastolic chf exacerbation  -multifactorial 2/2 to DKA, COPD hx   -cv stable  --ct chest 10/1 with no pe, bl small pleural effusion   -echo in july with nl LV sys fx, ef 55-60%, mild MR, mod pulm htn    -cards  f/u   - diuresis as per cards

## 2020-10-07 NOTE — PROGRESS NOTE ADULT - SUBJECTIVE AND OBJECTIVE BOX
[facility] [phone]        Patient seen and examined in bed.  She was in good spirits         MEDICATIONS  (STANDING):  albuterol/ipratropium for Nebulization 3 milliLiter(s) Nebulizer every 6 hours  aspirin enteric coated 81 milliGRAM(s) Oral daily  atorvastatin 80 milliGRAM(s) Oral at bedtime  budesonide 160 MICROgram(s)/formoterol 4.5 MICROgram(s) Inhaler 2 Puff(s) Inhalation two times a day  dextrose 5%. 1000 milliLiter(s) (50 mL/Hr) IV Continuous <Continuous>  dextrose 50% Injectable 12.5 Gram(s) IV Push once  dextrose 50% Injectable 25 Gram(s) IV Push once  dextrose 50% Injectable 25 Gram(s) IV Push once  enoxaparin Injectable 40 milliGRAM(s) SubCutaneous daily  furosemide    Tablet 40 milliGRAM(s) Oral daily  gabapentin 100 milliGRAM(s) Oral three times a day  insulin glargine Injectable (LANTUS) 36 Unit(s) SubCutaneous at bedtime  insulin lispro (HumaLOG) corrective regimen sliding scale   SubCutaneous at bedtime  insulin lispro (HumaLOG) corrective regimen sliding scale   SubCutaneous three times a day before meals  insulin lispro Injectable (HumaLOG) 15 Unit(s) SubCutaneous three times a day before meals  polyethylene glycol 3350 17 Gram(s) Oral daily  predniSONE   Tablet 20 milliGRAM(s) Oral every 12 hours  senna 2 Tablet(s) Oral at bedtime      VITAL:  T(C): , Max: 37.2 (10-07-20 @ 05:29)  T(F): , Max: 99 (10-07-20 @ 05:29)  HR: 73 (10-07-20 @ 06:21)  BP: 120/79 (10-07-20 @ 05:29)  BP(mean): --  RR: 28 (10-07-20 @ 08:07)  SpO2: 98% (10-07-20 @ 08:07)  Wt(kg): --    I and O's:    10-06 @ 07:01  -  10-07 @ 07:00  --------------------------------------------------------  IN: 550 mL / OUT: 800 mL / NET: -250 mL          PHYSICAL EXAM:    Constitutional: NAD; pleasantly confused   Neck:  No JVD  Respiratory: poor effort   Cardiovascular: S1 and S2  Gastrointestinal: BS+, soft, NT/ND  Extremities: No peripheral edema  Neurological: A/O x 3, no focal deficits  Psychiatric: unable   : No Pearce  Skin: No rashes  Access: Not applicable    LABS:                        10.0   7.47  )-----------( 138      ( 07 Oct 2020 07:26 )             31.9     10-07    139  |  95<L>  |  35<H>  ----------------------------<  156<H>  4.1   |  34<H>  |  0.72    Ca    9.5      07 Oct 2020 07:26  Mg     2.0     10-06    TPro  5.6<L>  /  Alb  2.9<L>  /  TBili  0.3  /  DBili  x   /  AST  29  /  ALT  47<H>  /  AlkPhos  86  10-07          Urine Studies:          RADIOLOGY & ADDITIONAL STUDIES:

## 2020-10-07 NOTE — PROGRESS NOTE ADULT - SUBJECTIVE AND OBJECTIVE BOX
Chief complaint  Patient is a 80y old  Female who presents with a chief complaint of sob, hyperglycemia (07 Oct 2020 11:52)   Review of systems  Patient in bed, looks comfortable, no hypoglycemic episodes.    Labs and Fingersticks  CAPILLARY BLOOD GLUCOSE      POCT Blood Glucose.: 213 mg/dL (07 Oct 2020 13:04)  POCT Blood Glucose.: 178 mg/dL (07 Oct 2020 07:47)  POCT Blood Glucose.: 154 mg/dL (06 Oct 2020 23:15)  POCT Blood Glucose.: 121 mg/dL (06 Oct 2020 21:13)  POCT Blood Glucose.: 100 mg/dL (06 Oct 2020 18:29)      Anion Gap, Serum: 10 (10-07 @ 07:26)  Anion Gap, Serum: 9 (10-06 @ 09:08)      Calcium, Total Serum: 9.5 (10-07 @ 07:26)  Calcium, Total Serum: 9.1 (10-06 @ 09:08)  Albumin, Serum: 2.9 <L> (10-07 @ 07:26)    Alanine Aminotransferase (ALT/SGPT): 47 <H> (10-07 @ 07:26)  Alkaline Phosphatase, Serum: 86 (10-07 @ 07:26)  Aspartate Aminotransferase (AST/SGOT): 29 (10-07 @ 07:26)        10-07    139  |  95<L>  |  35<H>  ----------------------------<  156<H>  4.1   |  34<H>  |  0.72    Ca    9.5      07 Oct 2020 07:26  Mg     2.0     10-06    TPro  5.6<L>  /  Alb  2.9<L>  /  TBili  0.3  /  DBili  x   /  AST  29  /  ALT  47<H>  /  AlkPhos  86  10-07                        10.0   7.47  )-----------( 138      ( 07 Oct 2020 07:26 )             31.9     Medications  MEDICATIONS  (STANDING):  albuterol/ipratropium for Nebulization 3 milliLiter(s) Nebulizer every 6 hours  aspirin enteric coated 81 milliGRAM(s) Oral daily  atorvastatin 80 milliGRAM(s) Oral at bedtime  budesonide 160 MICROgram(s)/formoterol 4.5 MICROgram(s) Inhaler 2 Puff(s) Inhalation two times a day  dextrose 5%. 1000 milliLiter(s) (50 mL/Hr) IV Continuous <Continuous>  dextrose 50% Injectable 12.5 Gram(s) IV Push once  dextrose 50% Injectable 25 Gram(s) IV Push once  dextrose 50% Injectable 25 Gram(s) IV Push once  enoxaparin Injectable 40 milliGRAM(s) SubCutaneous daily  furosemide    Tablet 40 milliGRAM(s) Oral daily  gabapentin 100 milliGRAM(s) Oral three times a day  insulin glargine Injectable (LANTUS) 36 Unit(s) SubCutaneous at bedtime  insulin lispro (HumaLOG) corrective regimen sliding scale   SubCutaneous at bedtime  insulin lispro (HumaLOG) corrective regimen sliding scale   SubCutaneous three times a day before meals  insulin lispro Injectable (HumaLOG) 15 Unit(s) SubCutaneous three times a day before meals  polyethylene glycol 3350 17 Gram(s) Oral daily  predniSONE   Tablet 20 milliGRAM(s) Oral every 12 hours  senna 2 Tablet(s) Oral at bedtime      Physical Exam  General: Patient comfortable in bed  Vital Signs Last 12 Hrs  T(F): 98.1 (10-07-20 @ 09:25), Max: 99 (10-07-20 @ 05:29)  HR: 91 (10-07-20 @ 09:25) (73 - 91)  BP: 137/78 (10-07-20 @ 09:25) (120/79 - 137/78)  BP(mean): --  RR: 20 (10-07-20 @ 09:25) (18 - 28)  SpO2: 98% (10-07-20 @ 09:25) (95% - 100%)  Neck: No palpable thyroid nodules.  CVS: S1S2, No murmurs  Respiratory: No wheezing, no crepitations  GI: Abdomen soft, bowel sounds positive  Musculoskeletal:  edema lower extremities.   Skin: No skin rashes, no ecchymosis    Diagnostics    Free Thyroxine, Serum: AM Sched. Collection: 23-Sep-2020 06:00 (09-22 @ 11:16)  Thyroid Stimulating Hormone, Serum: AM Sched. Collection: 23-Sep-2020 06:00 (09-22 @ 11:16)           Chief complaint  Patient is a 80y old  Female who presents with a chief complaint of sob, hyperglycemia (07 Oct 2020 11:52)   Review of systems  Patient in bed, looks comfortable, no hypoglycemic episodes.    Labs and Fingersticks  CAPILLARY BLOOD GLUCOSE      POCT Blood Glucose.: 213 mg/dL (07 Oct 2020 13:04)  POCT Blood Glucose.: 178 mg/dL (07 Oct 2020 07:47)  POCT Blood Glucose.: 154 mg/dL (06 Oct 2020 23:15)  POCT Blood Glucose.: 121 mg/dL (06 Oct 2020 21:13)  POCT Blood Glucose.: 100 mg/dL (06 Oct 2020 18:29)      Anion Gap, Serum: 10 (10-07 @ 07:26)  Anion Gap, Serum: 9 (10-06 @ 09:08)      Calcium, Total Serum: 9.5 (10-07 @ 07:26)  Calcium, Total Serum: 9.1 (10-06 @ 09:08)  Albumin, Serum: 2.9 <L> (10-07 @ 07:26)    Alanine Aminotransferase (ALT/SGPT): 47 <H> (10-07 @ 07:26)  Alkaline Phosphatase, Serum: 86 (10-07 @ 07:26)  Aspartate Aminotransferase (AST/SGOT): 29 (10-07 @ 07:26)        10-07    139  |  95<L>  |  35<H>  ----------------------------<  156<H>  4.1   |  34<H>  |  0.72    Ca    9.5      07 Oct 2020 07:26  Mg     2.0     10-06    TPro  5.6<L>  /  Alb  2.9<L>  /  TBili  0.3  /  DBili  x   /  AST  29  /  ALT  47<H>  /  AlkPhos  86  10-07                        10.0   7.47  )-----------( 138      ( 07 Oct 2020 07:26 )             31.9     Medications  MEDICATIONS  (STANDING):  albuterol/ipratropium for Nebulization 3 milliLiter(s) Nebulizer every 6 hours  aspirin enteric coated 81 milliGRAM(s) Oral daily  atorvastatin 80 milliGRAM(s) Oral at bedtime  budesonide 160 MICROgram(s)/formoterol 4.5 MICROgram(s) Inhaler 2 Puff(s) Inhalation two times a day  dextrose 5%. 1000 milliLiter(s) (50 mL/Hr) IV Continuous <Continuous>  dextrose 50% Injectable 12.5 Gram(s) IV Push once  dextrose 50% Injectable 25 Gram(s) IV Push once  dextrose 50% Injectable 25 Gram(s) IV Push once  enoxaparin Injectable 40 milliGRAM(s) SubCutaneous daily  furosemide    Tablet 40 milliGRAM(s) Oral daily  gabapentin 100 milliGRAM(s) Oral three times a day  insulin glargine Injectable (LANTUS) 36 Unit(s) SubCutaneous at bedtime  insulin lispro (HumaLOG) corrective regimen sliding scale   SubCutaneous at bedtime  insulin lispro (HumaLOG) corrective regimen sliding scale   SubCutaneous three times a day before meals  insulin lispro Injectable (HumaLOG) 15 Unit(s) SubCutaneous three times a day before meals  polyethylene glycol 3350 17 Gram(s) Oral daily  predniSONE   Tablet 20 milliGRAM(s) Oral every 12 hours  senna 2 Tablet(s) Oral at bedtime      Physical Exam  General: Patient comfortable in bed  Vital Signs Last 12 Hrs  T(F): 98.1 (10-07-20 @ 09:25), Max: 99 (10-07-20 @ 05:29)  HR: 91 (10-07-20 @ 09:25) (73 - 91)  BP: 137/78 (10-07-20 @ 09:25) (120/79 - 137/78)  BP(mean): --  RR: 20 (10-07-20 @ 09:25) (18 - 28)  SpO2: 98% (10-07-20 @ 09:25) (95% - 100%)  Neck: No palpable thyroid nodules.  CVS: S1S2, No murmurs  Respiratory: No wheezing, no crepitations  GI: Abdomen soft, bowel sounds positive  Musculoskeletal:  edema lower extremities.   Skin: No skin rashes, no ecchymosis    Diagnostics    Free Thyroxine, Serum: AM Sched. Collection: 23-Sep-2020 06:00 (09-22 @ 11:16)  Thyroid Stimulating Hormone, Serum: AM Sched. Collection: 23-Sep-2020 06:00 (09-22 @ 11:16)

## 2020-10-07 NOTE — PROGRESS NOTE ADULT - SUBJECTIVE AND OBJECTIVE BOX
CARDIOLOGY FOLLOW UP - Dr. Mukherjee    CC no cp or sob       PHYSICAL EXAM:  T(C): 36.7 (10-07-20 @ 09:25), Max: 37.2 (10-07-20 @ 05:29)  HR: 91 (10-07-20 @ 09:25) (73 - 104)  BP: 137/78 (10-07-20 @ 09:25) (120/79 - 140/73)  RR: 20 (10-07-20 @ 09:25) (18 - 28)  SpO2: 98% (10-07-20 @ 09:25) (95% - 100%)  Wt(kg): --  I&O's Summary    06 Oct 2020 07:01  -  07 Oct 2020 07:00  --------------------------------------------------------  IN: 550 mL / OUT: 800 mL / NET: -250 mL    07 Oct 2020 07:01  -  07 Oct 2020 11:52  --------------------------------------------------------  IN: 420 mL / OUT: 0 mL / NET: 420 mL        Appearance: Normal	  Cardiovascular: Normal S1 S2,RRR, No JVD, No murmurs  Respiratory: Lungs clear to auscultation	  Gastrointestinal:  Soft, Non-tender, + BS	  Extremities: Normal range of motion, No clubbing, cyanosis or edema        MEDICATIONS  (STANDING):  albuterol/ipratropium for Nebulization 3 milliLiter(s) Nebulizer every 6 hours  aspirin enteric coated 81 milliGRAM(s) Oral daily  atorvastatin 80 milliGRAM(s) Oral at bedtime  budesonide 160 MICROgram(s)/formoterol 4.5 MICROgram(s) Inhaler 2 Puff(s) Inhalation two times a day  dextrose 5%. 1000 milliLiter(s) (50 mL/Hr) IV Continuous <Continuous>  dextrose 50% Injectable 12.5 Gram(s) IV Push once  dextrose 50% Injectable 25 Gram(s) IV Push once  dextrose 50% Injectable 25 Gram(s) IV Push once  enoxaparin Injectable 40 milliGRAM(s) SubCutaneous daily  furosemide    Tablet 40 milliGRAM(s) Oral daily  gabapentin 100 milliGRAM(s) Oral three times a day  insulin glargine Injectable (LANTUS) 36 Unit(s) SubCutaneous at bedtime  insulin lispro (HumaLOG) corrective regimen sliding scale   SubCutaneous at bedtime  insulin lispro (HumaLOG) corrective regimen sliding scale   SubCutaneous three times a day before meals  insulin lispro Injectable (HumaLOG) 15 Unit(s) SubCutaneous three times a day before meals  polyethylene glycol 3350 17 Gram(s) Oral daily  predniSONE   Tablet 20 milliGRAM(s) Oral every 12 hours  senna 2 Tablet(s) Oral at bedtime      TELEMETRY: 	    ECG:  	  RADIOLOGY:   DIAGNOSTIC TESTING:  [ ] Echocardiogram:  [ ]  Catheterization:  [ ] Stress Test:    OTHER: 	    LABS:	 	                            10.0   7.47  )-----------( 138      ( 07 Oct 2020 07:26 )             31.9     10-07    139  |  95<L>  |  35<H>  ----------------------------<  156<H>  4.1   |  34<H>  |  0.72    Ca    9.5      07 Oct 2020 07:26  Mg     2.0     10-06    TPro  5.6<L>  /  Alb  2.9<L>  /  TBili  0.3  /  DBili  x   /  AST  29  /  ALT  47<H>  /  AlkPhos  86  10-07

## 2020-10-07 NOTE — PROGRESS NOTE ADULT - ASSESSMENT
80 year old F with pmhx of  DMT2 (on Metformin and insulin), COPD, CHF, HLD, HTN, kidney stones and pshx of R nephrectomy, L knee surgery presents to ED bibems with elevated blood sugar, SOB, and lethargy. Per EMS, pt with 485 blood sugar, BP of 144/75, and O2 sat 92% on room air.  Daughter notes period of incoherent speech over the weekend. Pt endorses excess urination and thirst over the last few days. She also reports mild SOB x few days that is worse when lying flat. Pt also reports constant CP that has been present for "quite a while", unable to specify how much time. Denies fever, chills, worsening leg swelling.    COPD:  Thoraco lumbar scoliosis  Uncontrolled DM:  UTI  Confusion  CHF    she is admitted with high blood glucose and found to have uti:  She has copd:  She was recently admitted to [facility] where she was treated with copd exacerbation:  At that time her CTA was negative and no indication of malignancy on ct chest :  Currently she is not SOB : her outpt  meds noted:  She is already on Symbicort as well as inhalers:  currently she is also being treated for chf   her venous ABG towards met alk side and seems to be a chronic retainer too   I don't think she needs bipap at this time:  She seems pretty confused: ct head is negative for bleed:  would defer to primary team :  DVT propahyxlis    9/23:  pt is not SOB at this time: but emotionally labile  not wheezing:   she is not wheezing  She has chronically elevated co2:  cont Symbicort  her venous abg reviewed:   CT head neg for bleed:   CICI NP    9/24:  she looks better today : she has coag neg staph in blood cultures: likely contaminant:  She is not wheezing:  On 2 L of oxygen : No resp distress:   Has chr hypercarbia:  Cont inhalers not wheezing: Blood glucose better controlled:   mentally she is better today :  CICI NP :  dvt propahyxlis    9/25:  she has metabolic alkalosis" with  increased co2:  reviewed nephrology   on normal saline   off diuretics n ow: \not wheezing:   despite iv fluids her hCO3 remains elevated:   DIAMOX X 1 TODASY AND REVIEWED HCO3 AGAIN TOMORROW:  cici renal:    9/26:  she is doing ok : still little bit confused:  her oxygenation is reasonable:   Her hco3 came down today : to monitor closely:  still confused:  blood glucose now controlled:  ? why she is confused:   ? neuro consult    9/27:  she is alert and awake: mildly confused:  no SOB :  but she is a chronic retainer:  Her o2 sao2 is OK on oxygen :  blood cultures contaminant from before:   hco3 same: at 39: monitor  MAy need repeat diamox if it increases further;   DVT proaphxylis  CICI NP      9/28:  pt is alert and awake: mildly confused:  not in any resp distress:   reced diamox yesterday : hco3 today at 38 :   Her o2 sao2 is pretty good:  Blood glucose is better controlled   Cont Symbicort and albuterol:  Last ABG was with chr retainer: PH was ok:  REPEAT abg   COVD IS NEGATIVE  DVT proptaeyxlis    9/29:  she is doing ok: still confused:  Not due to co2: SHE HAS CHR HYPERCARBIA   ph is reasonable   for ct abdomen now:  neuro follow u p:  Cont BD: not wheezing today :  DVT proptaexyflex BOLANOS NP    9/30:    she is doing same:  on 2 L of oxygen:  wean her off oxygen to-day ;  chest xray is reviewed   Sheis afebrile:  DVT propahxylis  CICI NP        10/1:  she b ecame very SOB after cleaning:  she says hse is SOB: needs CTA:  family has refused so far:  she did ahve cta in july that wasnegative for pe  She is not wheezing much   Cont BD andoxygen:  dopplers lf LE too:  CICI NPlois    10/2:    she looks pretty soB:  ABG has chr hypercapnic resp failure  Increase SM to 40 mg bid  She is not wheezing though :  she has bilateral pleural effusions: would give lasix 40 mg x 12 now:  Start bipap for work of breathing   ABG is good:  CICI NP: may need MICU :  dw daughter at bedside too     10/5:  she is doing much better:  change to po predniosne" 20 mg twice ad ay for 5 days:  cont BD:   no need for bipap  Blood glcuse control  DVT propahxylis:  CICI NP    10/6:  seems to be doing ok : no resp distress:  lungs are clear:  on oral steroids already :  Blood glcuse still high:  Cont to controll it: few more days of steroids   Cont BD:  DVT prophylaxis  CICI NP    10/7:  she seems to be doing better to me:  no PE : some pleural effusion with comprehensive ateelctasis:  dc bipap and do AM ABG tomorrow:   she is still on steroids and BD:  no PE:   blood glcuse better controlled:  dvt prophylaxis DW NP

## 2020-10-07 NOTE — PROGRESS NOTE ADULT - SUBJECTIVE AND OBJECTIVE BOX
Patient is a 80y old  Female who presents with a chief complaint of sob, hyperglycemia (07 Oct 2020 08:27)      Any change in ROS: at tiems she looks ok at tiems she startsusing accessory resp muscles:   she saysher breathing is OK:   no wheezing:   ob bipap overnight     MEDICATIONS  (STANDING):  albuterol/ipratropium for Nebulization 3 milliLiter(s) Nebulizer every 6 hours  aspirin enteric coated 81 milliGRAM(s) Oral daily  atorvastatin 80 milliGRAM(s) Oral at bedtime  budesonide 160 MICROgram(s)/formoterol 4.5 MICROgram(s) Inhaler 2 Puff(s) Inhalation two times a day  dextrose 5%. 1000 milliLiter(s) (50 mL/Hr) IV Continuous <Continuous>  dextrose 50% Injectable 12.5 Gram(s) IV Push once  dextrose 50% Injectable 25 Gram(s) IV Push once  dextrose 50% Injectable 25 Gram(s) IV Push once  enoxaparin Injectable 40 milliGRAM(s) SubCutaneous daily  furosemide    Tablet 40 milliGRAM(s) Oral daily  gabapentin 100 milliGRAM(s) Oral three times a day  insulin glargine Injectable (LANTUS) 36 Unit(s) SubCutaneous at bedtime  insulin lispro (HumaLOG) corrective regimen sliding scale   SubCutaneous at bedtime  insulin lispro (HumaLOG) corrective regimen sliding scale   SubCutaneous three times a day before meals  insulin lispro Injectable (HumaLOG) 15 Unit(s) SubCutaneous three times a day before meals  polyethylene glycol 3350 17 Gram(s) Oral daily  predniSONE   Tablet 20 milliGRAM(s) Oral every 12 hours  senna 2 Tablet(s) Oral at bedtime    MEDICATIONS  (PRN):  acetaminophen   Tablet .. 650 milliGRAM(s) Oral every 6 hours PRN Moderate Pain (4 - 6)  dextrose 40% Gel 15 Gram(s) Oral once PRN Blood Glucose LESS THAN 70 milliGRAM(s)/deciliter  glucagon  Injectable 1 milliGRAM(s) IntraMuscular once PRN Glucose LESS THAN 70 milligrams/deciliter  haloperidol     Tablet 1 milliGRAM(s) Oral every 6 hours PRN agitation    Vital Signs Last 24 Hrs  T(C): 36.7 (07 Oct 2020 09:25), Max: 37.2 (07 Oct 2020 05:29)  T(F): 98.1 (07 Oct 2020 09:25), Max: 99 (07 Oct 2020 05:29)  HR: 91 (07 Oct 2020 09:25) (73 - 104)  BP: 137/78 (07 Oct 2020 09:25) (120/79 - 140/73)  BP(mean): --  RR: 20 (07 Oct 2020 09:25) (18 - 28)  SpO2: 98% (07 Oct 2020 09:25) (95% - 100%)    I&O's Summary    06 Oct 2020 07:01  -  07 Oct 2020 07:00  --------------------------------------------------------  IN: 550 mL / OUT: 800 mL / NET: -250 mL    07 Oct 2020 07:01  -  07 Oct 2020 09:52  --------------------------------------------------------  IN: 420 mL / OUT: 0 mL / NET: 420 mL          Physical Exam:   GENERAL: NAD, well-groomed, well-developed  HEENT: DON/   Atraumatic, Normocephalic  ENMT: No tonsillar erythema, exudates, or enlargement; Moist mucous membranes, Good dentition, No lesions  NECK: Supple, No JVD, Normal thyroid  CHEST/LUNG: Clear to auscultaion,mild use of reso accessory muscles today   CVS: Regular rate and rhythm; No murmurs, rubs, or gallops  GI: : Soft, Nontender, Nondistended; Bowel sounds present  NERVOUS SYSTEM:  Alert & Oriented X3  EXTREMITIES:  2+ Peripheral Pulses, No clubbing, cyanosis, or edema  LYMPH: No lymphadenopathy noted  SKIN: No rashes or lesions  ENDOCRINOLOGY: No Thyromegaly  PSYCH: Appropriate    Labs:                              10.0   7.47  )-----------( 138      ( 07 Oct 2020 07:26 )             31.9     10-07    139  |  95<L>  |  35<H>  ----------------------------<  156<H>  4.1   |  34<H>  |  0.72  10-06    138  |  93<L>  |  36<H>  ----------------------------<  375<H>  4.5   |  36<H>  |  0.75  10-04    139  |  94<L>  |  35<H>  ----------------------------<  261<H>  4.7   |  35<H>  |  0.79    Ca    9.5      07 Oct 2020 07:26  Ca    9.1      06 Oct 2020 09:08  Mg     2.0     10-06    TPro  5.6<L>  /  Alb  2.9<L>  /  TBili  0.3  /  DBili  x   /  AST  29  /  ALT  47<H>  /  AlkPhos  86  10-07    CAPILLARY BLOOD GLUCOSE      POCT Blood Glucose.: 178 mg/dL (07 Oct 2020 07:47)  POCT Blood Glucose.: 154 mg/dL (06 Oct 2020 23:15)  POCT Blood Glucose.: 121 mg/dL (06 Oct 2020 21:13)  POCT Blood Glucose.: 100 mg/dL (06 Oct 2020 18:29)  POCT Blood Glucose.: 303 mg/dL (06 Oct 2020 12:43)      LIVER FUNCTIONS - ( 07 Oct 2020 07:26 )  Alb: 2.9 g/dL / Pro: 5.6 g/dL / ALK PHOS: 86 U/L / ALT: 47 U/L / AST: 29 U/L / GGT: x               rra< from: Xray Chest 1 View- PORTABLE-Routine (Xray Chest 1 View- PORTABLE-Routine .) (10.05.20 @ 13:42) >    EXAM:  XR CHEST PORTABLE ROUTINE 1V                            PROCEDURE DATE:  10/05/2020            INTERPRETATION:  A single chest x-ray was obtained on October 5, 2020.    Indication: Shortness of breath.    Impression: The heart is enlarged. Bilateral pleural effusion. Bibasilar pneumonia and/or atelectasis. Calcified aortic knob. No pneumothorax. Calcified aortic knob.                  MERVAT CLEMENTE M.D., ATTENDING RADIOLOGIST  This document has been electronically signed. Oct  5 [id number]:[id number]    < end of copied text >  < from: CT Angio Chest w/ IV Cont (10.01.20 @ 19:39) >  PARISON: Multiple CT, most recent 7/15/2020.    FINDINGS:    AIRWAYS AND LUNGS: The central tracheobronchial tree is patent.  Small bilateral pleural effusions with near complete collapse of both lower lobes and partial atelectasis of the left upper lobe.    MEDIASTINUM AND PLEURA: There are no enlarged mediastinal, hilar or axillary lymph nodes. The visualized portion of the thyroid gland is unremarkable. There is no pneumothorax.    HEART AND VESSELS: The heart is normal in size.  There are atherosclerotic calcifications of the aorta and coronary arteries.  There is no pericardial effusion.  No pulmonary embolism. Bilateral SVC'swith unchanged dilatation of the left-sided SVC. Aortic valve calcification.    UPPER ABDOMEN: Images of the upper abdomen demonstrate dystrophic calcifications within a atrophic right kidney with a fluid density right renal cyst.    BONES AND SOFT TISSUES: The bones are unremarkable.  Question left breast skin thickening laterally.    TUBES/LINES: None.    IMPRESSION:  Question left breast skin thickening laterally, correlate with physical exam and consider ultrasound as needed.    No pulmonary embolism.    Small bilateral pleural effusions with near complete collapse of both lower lobes and partial atelectasis of the left upper lobe.                  SANDRA VERA M.D., ATTENDING RADIOLOGIST  This document has been electronically signed. Oct 1 2020  9:16PM    < end of copied text >        RECENT CULTURES:        RESPIRATORY CULTURES:          Studies  Chest X-RAY  CT SCAN Chest   Venous Dopplers: LE:   CT Abdomen  Others

## 2020-10-08 NOTE — PROGRESS NOTE ADULT - SUBJECTIVE AND OBJECTIVE BOX
Patient is a 80y old  Female who presents with a chief complaint of sob, hyperglycemia (08 Oct 2020 09:34)      Any change in ROS: pt gets sob on moving from bed to chair:   not wheezing though : no evetn sovernight:        MEDICATIONS  (STANDING):  albuterol/ipratropium for Nebulization 3 milliLiter(s) Nebulizer every 6 hours  aspirin enteric coated 81 milliGRAM(s) Oral daily  atorvastatin 80 milliGRAM(s) Oral at bedtime  budesonide 160 MICROgram(s)/formoterol 4.5 MICROgram(s) Inhaler 2 Puff(s) Inhalation two times a day  dextrose 5%. 1000 milliLiter(s) (50 mL/Hr) IV Continuous <Continuous>  dextrose 50% Injectable 12.5 Gram(s) IV Push once  dextrose 50% Injectable 25 Gram(s) IV Push once  dextrose 50% Injectable 25 Gram(s) IV Push once  enoxaparin Injectable 40 milliGRAM(s) SubCutaneous daily  furosemide    Tablet 40 milliGRAM(s) Oral daily  gabapentin 100 milliGRAM(s) Oral three times a day  insulin glargine Injectable (LANTUS) 25 Unit(s) SubCutaneous at bedtime  insulin lispro (HumaLOG) corrective regimen sliding scale   SubCutaneous three times a day before meals  insulin lispro (HumaLOG) corrective regimen sliding scale   SubCutaneous at bedtime  insulin lispro Injectable (HumaLOG) 13 Unit(s) SubCutaneous three times a day before meals  polyethylene glycol 3350 17 Gram(s) Oral daily  predniSONE   Tablet 20 milliGRAM(s) Oral every 12 hours  senna 2 Tablet(s) Oral at bedtime    MEDICATIONS  (PRN):  acetaminophen   Tablet .. 650 milliGRAM(s) Oral every 6 hours PRN Moderate Pain (4 - 6)  dextrose 40% Gel 15 Gram(s) Oral once PRN Blood Glucose LESS THAN 70 milliGRAM(s)/deciliter  glucagon  Injectable 1 milliGRAM(s) IntraMuscular once PRN Glucose LESS THAN 70 milligrams/deciliter  haloperidol     Tablet 1 milliGRAM(s) Oral every 6 hours PRN agitation    Vital Signs Last 24 Hrs  T(C): 37 (08 Oct 2020 05:29), Max: 37.2 (08 Oct 2020 01:41)  T(F): 98.6 (08 Oct 2020 05:29), Max: 98.9 (08 Oct 2020 01:41)  HR: 82 (08 Oct 2020 05:29) (80 - 88)  BP: 133/73 (08 Oct 2020 05:29) (130/76 - 138/82)  BP(mean): --  RR: 20 (08 Oct 2020 05:29) (20 - 20)  SpO2: 96% (08 Oct 2020 05:29) (96% - 100%)    I&O's Summary    07 Oct 2020 07:01  -  08 Oct 2020 07:00  --------------------------------------------------------  IN: 780 mL / OUT: 600 mL / NET: 180 mL          Physical Exam:   GENERAL: NAD, well-groomed, well-developed  HEENT: DON/   Atraumatic, Normocephalic  ENMT: No tonsillar erythema, exudates, or enlargement; Moist mucous membranes, Good dentition, No lesions  NECK: Supple, No JVD, Normal thyroid  CHEST/LUNG: Clear to auscultaion  CVS: Regular rate and rhythm; No murmurs, rubs, or gallops  GI: : Soft, Nontender, Nondistended; Bowel sounds present  NERVOUS SYSTEM:  Alert & Oriented X3  EXTREMITIES:  2+ Peripheral Pulses, No clubbing, cyanosis, or edema  LYMPH: No lymphadenopathy noted  SKIN: No rashes or lesions  ENDOCRINOLOGY: No Thyromegaly  PSYCH: Appropriate    Labs:  ABG - ( 08 Oct 2020 06:30 )  pH, Arterial: 7.44  pH, Blood: x     /  pCO2: 61    /  pO2: 110   / HCO3: 40    / Base Excess: 14.2  /  SaO2: 99                                          11.1   8.66  )-----------( 142      ( 08 Oct 2020 07:12 )             33.9                         10.0   7.47  )-----------( 138      ( 07 Oct 2020 07:26 )             31.9     10-08    141  |  96  |  34<H>  ----------------------------<  152<H>  3.9   |  35<H>  |  0.71  10-07    139  |  95<L>  |  35<H>  ----------------------------<  156<H>  4.1   |  34<H>  |  0.72  10-06    138  |  93<L>  |  36<H>  ----------------------------<  375<H>  4.5   |  36<H>  |  0.75    Ca    9.4      08 Oct 2020 07:12  Ca    9.5      07 Oct 2020 07:26    TPro  6.0  /  Alb  3.3  /  TBili  0.3  /  DBili  x   /  AST  26  /  ALT  52<H>  /  AlkPhos  81  10-08  TPro  5.6<L>  /  Alb  2.9<L>  /  TBili  0.3  /  DBili  x   /  AST  29  /  ALT  47<H>  /  AlkPhos  86  10-07    CAPILLARY BLOOD GLUCOSE      POCT Blood Glucose.: 179 mg/dL (08 Oct 2020 08:23)  POCT Blood Glucose.: 192 mg/dL (07 Oct 2020 21:32)  POCT Blood Glucose.: 155 mg/dL (07 Oct 2020 17:31)  POCT Blood Glucose.: 213 mg/dL (07 Oct 2020 13:04)      LIVER FUNCTIONS - ( 08 Oct 2020 07:12 )  Alb: 3.3 g/dL / Pro: 6.0 g/dL / ALK PHOS: 81 U/L / ALT: 52 U/L / AST: 26 U/L / GGT: x                 < from: Xray Chest 1 View- PORTABLE-Routine (Xray Chest 1 View- PORTABLE-Routine .) (10.05.20 @ 13:42) >    EXAM:  XR CHEST PORTABLE ROUTINE 1V                            PROCEDURE DATE:  10/05/2020            INTERPRETATION:  A single chest x-ray was obtained on October 5, 2020.    Indication: Shortness of breath.    Impression: The heart is enlarged. Bilateral pleural effusion. Bibasilar pneumonia and/or atelectasis. Calcified aortic knob. No pneumothorax. Calcified aortic knob.                  MERVAT CLEMENTE M.D., ATTENDING RADIOLOGIST  This document has been electronically signed. Oct  5 [id number]    < end of copied text >  < from: CT Angio Chest w/ IV Cont (10.01.20 @ 19:39) >  thyroid gland is unremarkable. There is no pneumothorax.    HEART AND VESSELS: The heart is normal in size.  There are atherosclerotic calcifications of the aorta and coronary arteries.  There is no pericardial effusion.  No pulmonary embolism. Bilateral SVC'swith unchanged dilatation of the left-sided SVC. Aortic valve calcification.    UPPER ABDOMEN: Images of the upper abdomen demonstrate dystrophic calcifications within a atrophic right kidney with a fluid density right renal cyst.    BONES AND SOFT TISSUES: The bones are unremarkable.  Question left breast skin thickening laterally.    TUBES/LINES: None.    IMPRESSION:  Question left breast skin thickening laterally, correlate with physical exam and consider ultrasound as needed.    No pulmonary embolism.    Small bilateral pleural effusions with near complete collapse of both lower lobes and partial atelectasis of the left upper lobe.                  SANDRA VERA M.D., ATTENDING RADIOLOGIST  This document has been electronically signed. Oct 1 2020  9:16PM    < end of copied text >      RECENT CULTURES:        RESPIRATORY CULTURES:          Studies  Chest X-RAY  CT SCAN Chest   Venous Dopplers: LE:   CT Abdomen  Others      ra< from: CT Angio Chest w/ IV Cont (10.01.20 @ 19:39) >  FINDINGS:    AIRWAYS AND LUNGS: The central tracheobronchial tree is patent.  Small bilateral pleural effusions with near complete collapse of both lower lobes and partial atelectasis of the left upper lobe.    MEDIASTINUM AND PLEURA: There are no enlarged mediastinal, hilar or axillary lymph nodes. The visualized portion of the thyroid gland is unremarkable. There is no pneumothorax.    HEART AND VESSELS: The heart is normal in size.  There are atherosclerotic calcifications of the aorta and coronary arteries.  There is no pericardial effusion.  No pulmonary embolism. Bilateral SVC'swith unchanged dilatation of the left-sided SVC. Aortic valve calcification.    UPPER ABDOMEN: Images of the upper abdomen demonstrate dystrophic calcifications within a atrophic right kidney with a fluid density right renal cyst.    BONES AND SOFT TISSUES: The bones are unremarkable.  Question left breast skin thickening laterally.    TUBES/LINES: None.    IMPRESSION:  Question left breast skin thickening laterally, correlate with physical exam and consider ultrasound as needed.    No pulmonary embolism.    Small bilateral pleural effusions with near complete collapse of both lower lobes and partial atelectasis of the left upper lobe.                  SANDRA VERA M.D., ATTENDING RADIOLOGIST  This document has been electronically signed. Oct 1 2020  9:16PM    < end of copied text >

## 2020-10-08 NOTE — PROGRESS NOTE ADULT - PROBLEM SELECTOR PLAN 1
Will continue current insulin regimen for now.  Will continue monitoring FS and FU. Will adjust insulin doses as steroids are being tapered/dc.  Patient counseled for compliance with consistent low carb diet and physical activity as tolerated outpatient.

## 2020-10-08 NOTE — PROGRESS NOTE ADULT - ASSESSMENT
80 year old F with pmhx of  DMT2 (on Metformin and insulin), COPD, CHF, HLD, HTN, kidney stones and pshx of R nephrectomy, L knee surgery presents to ED bibems with elevated blood sugar, SOB, and lethargy. Per EMS, pt with 485 blood sugar, BP of 144/75, and O2 sat 92% on room air.  Daughter notes period of incoherent speech over the weekend. Pt endorses excess urination and thirst over the last few days. She also reports mild SOB x few days that is worse when lying flat. Pt also reports constant CP that has been present for "quite a while", unable to specify how much time. Denies fever, chills, worsening leg swelling.    COPD:  Thoraco lumbar scoliosis  Uncontrolled DM:  UTI  Confusion  CHF    she is admitted with high blood glucose and found to have uti:  She has copd:  She was recently admitted to [facility] where she was treated with copd exacerbation:  At that time her CTA was negative and no indication of malignancy on ct chest :  Currently she is not SOB : her outpt  meds noted:  She is already on Symbicort as well as inhalers:  currently she is also being treated for chf   her venous ABG towards met alk side and seems to be a chronic retainer too   I don't think she needs bipap at this time:  She seems pretty confused: ct head is negative for bleed:  would defer to primary team :  DVT propahyxlis    9/23:  pt is not SOB at this time: but emotionally labile  not wheezing:   she is not wheezing  She has chronically elevated co2:  cont Symbicort  her venous abg reviewed:   CT head neg for bleed:   MAURICE NP    9/24:  she looks better today : she has coag neg staph in blood cultures: likely contaminant:  She is not wheezing:  On 2 L of oxygen : No resp distress:   Has chr hypercarbia:  Cont inhalers not wheezing: Blood glucose better controlled:   mentally she is better today :  MAURICE NP :  dvt propahyxlis    9/25:  she has metabolic alkalosis" with  increased co2:  reviewed nephrology   on normal saline   off diuretics n ow: \not wheezing:   despite iv fluids her hCO3 remains elevated:   DIAMOX X 1 TODASY AND REVIEWED HCO3 AGAIN TOMORROW:  maurice renal:    9/26:  she is doing ok : still little bit confused:  her oxygenation is reasonable:   Her hco3 came down today : to monitor closely:  still confused:  blood glucose now controlled:  ? why she is confused:   ? neuro consult    9/27:  she is alert and awake: mildly confused:  no SOB :  but she is a chronic retainer:  Her o2 sao2 is OK on oxygen :  blood cultures contaminant from before:   hco3 same: at 39: monitor  MAy need repeat diamox if it increases further;   DVT proaphxylis  MAURICE NP      9/28:  pt is alert and awake: mildly confused:  not in any resp distress:   reced diamox yesterday : hco3 today at 38 :   Her o2 sao2 is pretty good:  Blood glucose is better controlled   Cont Symbicort and albuterol:  Last ABG was with chr retainer: PH was ok:  REPEAT abg   COVD IS NEGATIVE  DVT proptaeyxlis    9/29:  she is doing ok: still confused:  Not due to co2: SHE HAS CHR HYPERCARBIA   ph is reasonable   for ct abdomen now:  neuro follow u p:  Cont BD: not wheezing today :  DVT proptaexyflex BOLANOS NP    9/30:    she is doing same:  on 2 L of oxygen:  wean her off oxygen to-day ;  chest xray is reviewed   Sheis afebrile:  DVT propahxylis  MAURICE NP        10/1:  she b ecame very SOB after cleaning:  she says hse is SOB: needs CTA:  family has refused so far:  she did ahve cta in july that wasnegative for pe  She is not wheezing much   Cont BD andoxygen:  dopplers lf LE too:  MAURICE NPlois    10/2:    she looks pretty soB:  ABG has chr hypercapnic resp failure  Increase SM to 40 mg bid  She is not wheezing though :  she has bilateral pleural effusions: would give lasix 40 mg x 12 now:  Start bipap for work of breathing   ABG is good:  MAURICE NP: may need MICU :  dw daughter at bedside too     10/5:  she is doing much better:  change to po predniosne" 20 mg twice ad ay for 5 days:  cont BD:   no need for bipap  Blood glcuse control  DVT propahxylis:  MAURICE NP    10/6:  seems to be doing ok : no resp distress:  lungs are clear:  on oral steroids already :  Blood glcuse still high:  Cont to controll it: few more days of steroids   Cont BD:  DVT prophylaxis  MAURICE NP    10/7:  she seems to be doing better to me:  no PE : some pleural effusion with comprehensive ateelctasis:  dc bipap and do AM ABG tomorrow:   she is still on steroids and BD:  no PE:   blood glcuse better controlled:  dvt prophylaxis MAURICE NP    10/8  she seems to be very deconditioned:   bipap was dced yesterday : she did not use it last night:  ABG today with chr compensated hypercarbic resp failure  She seems very deconditioned would probably need rehab:   blood glucose controlled:   on 1 L of oxygen :  dvt demarcoyxflex BOLANOS NP

## 2020-10-08 NOTE — PROGRESS NOTE ADULT - SUBJECTIVE AND OBJECTIVE BOX
Chief complaint  Patient is a 80y old  Female who presents with a chief complaint of sob, hyperglycemia (08 Oct 2020 12:54)   Review of systems  Patient seen resting in chair, looks comfortable, no hypoglycemic episodes.    Labs and Fingersticks  CAPILLARY BLOOD GLUCOSE      POCT Blood Glucose.: 211 mg/dL (08 Oct 2020 12:56)  POCT Blood Glucose.: 179 mg/dL (08 Oct 2020 08:23)  POCT Blood Glucose.: 192 mg/dL (07 Oct 2020 21:32)  POCT Blood Glucose.: 155 mg/dL (07 Oct 2020 17:31)      Anion Gap, Serum: 10 (10-08 @ 07:12)  Anion Gap, Serum: 10 (10-07 @ 07:26)      Calcium, Total Serum: 9.4 (10-08 @ 07:12)  Calcium, Total Serum: 9.5 (10-07 @ 07:26)  Albumin, Serum: 3.3 (10-08 @ 07:12)  Albumin, Serum: 2.9 <L> (10-07 @ 07:26)    Alanine Aminotransferase (ALT/SGPT): 52 <H> (10-08 @ 07:12)  Alanine Aminotransferase (ALT/SGPT): 47 <H> (10-07 @ 07:26)  Alkaline Phosphatase, Serum: 81 (10-08 @ 07:12)  Alkaline Phosphatase, Serum: 86 (10-07 @ 07:26)  Aspartate Aminotransferase (AST/SGOT): 26 (10-08 @ 07:12)  Aspartate Aminotransferase (AST/SGOT): 29 (10-07 @ 07:26)        10-08    141  |  96  |  34<H>  ----------------------------<  152<H>  3.9   |  35<H>  |  0.71    Ca    9.4      08 Oct 2020 07:12    TPro  6.0  /  Alb  3.3  /  TBili  0.3  /  DBili  x   /  AST  26  /  ALT  52<H>  /  AlkPhos  81  10-08                        11.1   8.66  )-----------( 142      ( 08 Oct 2020 07:12 )             33.9     Medications  MEDICATIONS  (STANDING):  albuterol/ipratropium for Nebulization 3 milliLiter(s) Nebulizer every 6 hours  aspirin enteric coated 81 milliGRAM(s) Oral daily  atorvastatin 80 milliGRAM(s) Oral at bedtime  budesonide 160 MICROgram(s)/formoterol 4.5 MICROgram(s) Inhaler 2 Puff(s) Inhalation two times a day  dextrose 5%. 1000 milliLiter(s) (50 mL/Hr) IV Continuous <Continuous>  dextrose 50% Injectable 12.5 Gram(s) IV Push once  dextrose 50% Injectable 25 Gram(s) IV Push once  dextrose 50% Injectable 25 Gram(s) IV Push once  enoxaparin Injectable 40 milliGRAM(s) SubCutaneous daily  furosemide    Tablet 40 milliGRAM(s) Oral daily  gabapentin 100 milliGRAM(s) Oral three times a day  insulin glargine Injectable (LANTUS) 25 Unit(s) SubCutaneous at bedtime  insulin lispro (HumaLOG) corrective regimen sliding scale   SubCutaneous three times a day before meals  insulin lispro (HumaLOG) corrective regimen sliding scale   SubCutaneous at bedtime  insulin lispro Injectable (HumaLOG) 13 Unit(s) SubCutaneous three times a day before meals  polyethylene glycol 3350 17 Gram(s) Oral daily  predniSONE   Tablet 20 milliGRAM(s) Oral every 12 hours  senna 2 Tablet(s) Oral at bedtime      Physical Exam  General: Patient comfortable in bed  Vital Signs Last 12 Hrs  T(F): 99.1 (10-08-20 @ 09:10), Max: 99.1 (10-08-20 @ 09:10)  HR: 83 (10-08-20 @ 12:17) (82 - 94)  BP: 139/78 (10-08-20 @ 12:17) (119/76 - 139/78)  BP(mean): --  RR: 20 (10-08-20 @ 12:17) (20 - 20)  SpO2: 98% (10-08-20 @ 12:17) (96% - 98%)  Neck: No palpable thyroid nodules.         Chief complaint  Patient is a 80y old  Female who presents with a chief complaint of sob, hyperglycemia (08 Oct 2020 12:54)   Review of systems  Patient seen resting in chair, looks comfortable, no hypoglycemic episodes.    Labs and Fingersticks  CAPILLARY BLOOD GLUCOSE    POCT Blood Glucose.: 211 mg/dL (08 Oct 2020 12:56)  POCT Blood Glucose.: 179 mg/dL (08 Oct 2020 08:23)  POCT Blood Glucose.: 192 mg/dL (07 Oct 2020 21:32)  POCT Blood Glucose.: 155 mg/dL (07 Oct 2020 17:31)      Anion Gap, Serum: 10 (10-08 @ 07:12)  Anion Gap, Serum: 10 (10-07 @ 07:26)      Calcium, Total Serum: 9.4 (10-08 @ 07:12)  Calcium, Total Serum: 9.5 (10-07 @ 07:26)  Albumin, Serum: 3.3 (10-08 @ 07:12)  Albumin, Serum: 2.9 <L> (10-07 @ 07:26)    Alanine Aminotransferase (ALT/SGPT): 52 <H> (10-08 @ 07:12)  Alanine Aminotransferase (ALT/SGPT): 47 <H> (10-07 @ 07:26)  Alkaline Phosphatase, Serum: 81 (10-08 @ 07:12)  Alkaline Phosphatase, Serum: 86 (10-07 @ 07:26)  Aspartate Aminotransferase (AST/SGOT): 26 (10-08 @ 07:12)  Aspartate Aminotransferase (AST/SGOT): 29 (10-07 @ 07:26)        10-08    141  |  96  |  34<H>  ----------------------------<  152<H>  3.9   |  35<H>  |  0.71    Ca    9.4      08 Oct 2020 07:12    TPro  6.0  /  Alb  3.3  /  TBili  0.3  /  DBili  x   /  AST  26  /  ALT  52<H>  /  AlkPhos  81  10-08                        11.1   8.66  )-----------( 142      ( 08 Oct 2020 07:12 )             33.9     Medications  MEDICATIONS  (STANDING):  albuterol/ipratropium for Nebulization 3 milliLiter(s) Nebulizer every 6 hours  aspirin enteric coated 81 milliGRAM(s) Oral daily  atorvastatin 80 milliGRAM(s) Oral at bedtime  budesonide 160 MICROgram(s)/formoterol 4.5 MICROgram(s) Inhaler 2 Puff(s) Inhalation two times a day  dextrose 5%. 1000 milliLiter(s) (50 mL/Hr) IV Continuous <Continuous>  dextrose 50% Injectable 12.5 Gram(s) IV Push once  dextrose 50% Injectable 25 Gram(s) IV Push once  dextrose 50% Injectable 25 Gram(s) IV Push once  enoxaparin Injectable 40 milliGRAM(s) SubCutaneous daily  furosemide    Tablet 40 milliGRAM(s) Oral daily  gabapentin 100 milliGRAM(s) Oral three times a day  insulin glargine Injectable (LANTUS) 25 Unit(s) SubCutaneous at bedtime  insulin lispro (HumaLOG) corrective regimen sliding scale   SubCutaneous three times a day before meals  insulin lispro (HumaLOG) corrective regimen sliding scale   SubCutaneous at bedtime  insulin lispro Injectable (HumaLOG) 13 Unit(s) SubCutaneous three times a day before meals  polyethylene glycol 3350 17 Gram(s) Oral daily  predniSONE   Tablet 20 milliGRAM(s) Oral every 12 hours  senna 2 Tablet(s) Oral at bedtime      Physical Exam  General: Patient comfortable in bed  Vital Signs Last 12 Hrs  T(F): 99.1 (10-08-20 @ 09:10), Max: 99.1 (10-08-20 @ 09:10)  HR: 83 (10-08-20 @ 12:17) (82 - 94)  BP: 139/78 (10-08-20 @ 12:17) (119/76 - 139/78)  BP(mean): --  RR: 20 (10-08-20 @ 12:17) (20 - 20)  SpO2: 98% (10-08-20 @ 12:17) (96% - 98%)  Neck: No palpable thyroid nodules.

## 2020-10-08 NOTE — PROGRESS NOTE ADULT - ASSESSMENT
Assessment  DMT2: 80y Female with DM T2 with hyperglycemia, A1C 8.1%, on basal bolus insulin, decreased dose yesterday, blood sugars improving and within overall acceptable range, no hypoglycemic episodes. Patient appears comfortable, she remains on po steroids.  UTI: on meds, stable, monitored.      Alexander Shaver MD  Cell: [phone]  Office: [phone]       Assessment  DMT2: 80y Female with DM T2 with hyperglycemia, A1C 8.1%, on basal bolus insulin, decreased dose yesterday, blood sugars improving and within overall acceptable range, no hypoglycemic episodes. Patient appears comfortable,  she remains on po steroids.  UTI: on meds, stable, monitored.      Alexander Shaver MD  Cell: [phone]  Office: [phone]

## 2020-10-08 NOTE — PROGRESS NOTE ADULT - SUBJECTIVE AND OBJECTIVE BOX
CARDIOLOGY FOLLOW UP - Dr. Mukherjee    CC no cp/sob       PHYSICAL EXAM:  T(C): 37.3 (10-08-20 @ 09:10), Max: 37.3 (10-08-20 @ 09:10)  HR: 94 (10-08-20 @ 09:10) (80 - 94)  BP: 119/76 (10-08-20 @ 09:10) (119/76 - 138/82)  RR: 20 (10-08-20 @ 09:10) (20 - 20)  SpO2: 98% (10-08-20 @ 09:10) (96% - 100%)  Wt(kg): --  I&O's Summary    07 Oct 2020 07:01  -  08 Oct 2020 07:00  --------------------------------------------------------  IN: 780 mL / OUT: 600 mL / NET: 180 mL    08 Oct 2020 07:01  -  08 Oct 2020 12:55  --------------------------------------------------------  IN: 360 mL / OUT: 200 mL / NET: 160 mL        Appearance: Normal	  Cardiovascular: Normal S1 S2,RRR, No JVD, No murmurs  Respiratory: Lungs clear to auscultation	  Gastrointestinal:  Soft, Non-tender, + BS	  Extremities: Normal range of motion, No clubbing, cyanosis or edema        MEDICATIONS  (STANDING):  albuterol/ipratropium for Nebulization 3 milliLiter(s) Nebulizer every 6 hours  aspirin enteric coated 81 milliGRAM(s) Oral daily  atorvastatin 80 milliGRAM(s) Oral at bedtime  budesonide 160 MICROgram(s)/formoterol 4.5 MICROgram(s) Inhaler 2 Puff(s) Inhalation two times a day  dextrose 5%. 1000 milliLiter(s) (50 mL/Hr) IV Continuous <Continuous>  dextrose 50% Injectable 12.5 Gram(s) IV Push once  dextrose 50% Injectable 25 Gram(s) IV Push once  dextrose 50% Injectable 25 Gram(s) IV Push once  enoxaparin Injectable 40 milliGRAM(s) SubCutaneous daily  furosemide    Tablet 40 milliGRAM(s) Oral daily  gabapentin 100 milliGRAM(s) Oral three times a day  insulin glargine Injectable (LANTUS) 25 Unit(s) SubCutaneous at bedtime  insulin lispro (HumaLOG) corrective regimen sliding scale   SubCutaneous three times a day before meals  insulin lispro (HumaLOG) corrective regimen sliding scale   SubCutaneous at bedtime  insulin lispro Injectable (HumaLOG) 13 Unit(s) SubCutaneous three times a day before meals  polyethylene glycol 3350 17 Gram(s) Oral daily  predniSONE   Tablet 20 milliGRAM(s) Oral every 12 hours  senna 2 Tablet(s) Oral at bedtime      TELEMETRY: 	    ECG:  	  RADIOLOGY:   DIAGNOSTIC TESTING:  [ ] Echocardiogram:  [ ]  Catheterization:  [ ] Stress Test:    OTHER: 	    LABS:	 	                                11.1   8.66  )-----------( 142      ( 08 Oct 2020 07:12 )             33.9     10-08    141  |  96  |  34<H>  ----------------------------<  152<H>  3.9   |  35<H>  |  0.71    Ca    9.4      08 Oct 2020 07:12    TPro  6.0  /  Alb  3.3  /  TBili  0.3  /  DBili  x   /  AST  26  /  ALT  52<H>  /  AlkPhos  81  10-08

## 2020-10-08 NOTE — PROGRESS NOTE ADULT - ASSESSMENT
Problem/ Plan -1  Problem: sepsis sec to UTI (urinary tract infection) with bactremia with metabolic encephalopathy POA .  Plan: dc ertepanum  id fu appreciated     Problem/Plan - 2:  ·  Problem: Hyperglycemia ion alkalosis   Plan: monitor FS  ISS.   renal fu    Problem/Plan - 3:·  Problem: AMS   Plan: metabolic encephalopathy  psych consult  and neuro consult appreciated  pulmonary following for hypercarbia     Problem/Plan - 4:· COPD with respiratory failure, acute. Plan: -C/w Symbicort BID  - karli       Problem/Plan - 5:·  acute/chronic diastolic chf exacerbation  -multifactorial 2/2 to DKA, COPD hx   -cv stable  -cards  f/u   - diuresis as per cards     dc planning

## 2020-10-08 NOTE — PROGRESS NOTE ADULT - SUBJECTIVE AND OBJECTIVE BOX
Patient is a 80y old  Female who presents with a chief complaint of sob, hyperglycemia (08 Oct 2020 13:10)      INTERVAL HPI/OVERNIGHT EVENTS:  T(C): 36.8 (10-08-20 @ 16:17), Max: 37.3 (10-08-20 @ 09:10)  HR: 85 (10-08-20 @ 16:17) (82 - 94)  BP: 134/83 (10-08-20 @ 16:17) (119/76 - 139/83)  RR: 18 (10-08-20 @ 16:17) (18 - 20)  SpO2: 98% (10-08-20 @ 16:17) (96% - 100%)  Wt(kg): --  I&O's Summary    07 Oct 2020 07:01  -  08 Oct 2020 07:00  --------------------------------------------------------  IN: 780 mL / OUT: 600 mL / NET: 180 mL    08 Oct 2020 07:01  -  08 Oct 2020 17:08  --------------------------------------------------------  IN: 800 mL / OUT: 400 mL / NET: 400 mL        LABS:                        11.1   8.66  )-----------( 142      ( 08 Oct 2020 07:12 )             33.9     10-08    141  |  96  |  34<H>  ----------------------------<  152<H>  3.9   |  35<H>  |  0.71    Ca    9.4      08 Oct 2020 07:12    TPro  6.0  /  Alb  3.3  /  TBili  0.3  /  DBili  x   /  AST  26  /  ALT  52<H>  /  AlkPhos  81  10-08        CAPILLARY BLOOD GLUCOSE      POCT Blood Glucose.: 211 mg/dL (08 Oct 2020 12:56)  POCT Blood Glucose.: 179 mg/dL (08 Oct 2020 08:23)  POCT Blood Glucose.: 192 mg/dL (07 Oct 2020 21:32)  POCT Blood Glucose.: 155 mg/dL (07 Oct 2020 17:31)    ABG - ( 08 Oct 2020 06:30 )  pH, Arterial: 7.44  pH, Blood: x     /  pCO2: 61    /  pO2: 110   / HCO3: 40    / Base Excess: 14.2  /  SaO2: 99                      MEDICATIONS  (STANDING):  albuterol/ipratropium for Nebulization 3 milliLiter(s) Nebulizer every 6 hours  aspirin enteric coated 81 milliGRAM(s) Oral daily  atorvastatin 80 milliGRAM(s) Oral at bedtime  budesonide 160 MICROgram(s)/formoterol 4.5 MICROgram(s) Inhaler 2 Puff(s) Inhalation two times a day  dextrose 5%. 1000 milliLiter(s) (50 mL/Hr) IV Continuous <Continuous>  dextrose 50% Injectable 12.5 Gram(s) IV Push once  dextrose 50% Injectable 25 Gram(s) IV Push once  dextrose 50% Injectable 25 Gram(s) IV Push once  enoxaparin Injectable 40 milliGRAM(s) SubCutaneous daily  furosemide    Tablet 40 milliGRAM(s) Oral daily  gabapentin 100 milliGRAM(s) Oral three times a day  insulin glargine Injectable (LANTUS) 25 Unit(s) SubCutaneous at bedtime  insulin lispro (HumaLOG) corrective regimen sliding scale   SubCutaneous three times a day before meals  insulin lispro (HumaLOG) corrective regimen sliding scale   SubCutaneous at bedtime  insulin lispro Injectable (HumaLOG) 13 Unit(s) SubCutaneous three times a day before meals  polyethylene glycol 3350 17 Gram(s) Oral daily  predniSONE   Tablet 20 milliGRAM(s) Oral every 12 hours  senna 2 Tablet(s) Oral at bedtime    MEDICATIONS  (PRN):  acetaminophen   Tablet .. 650 milliGRAM(s) Oral every 6 hours PRN Moderate Pain (4 - 6)  dextrose 40% Gel 15 Gram(s) Oral once PRN Blood Glucose LESS THAN 70 milliGRAM(s)/deciliter  glucagon  Injectable 1 milliGRAM(s) IntraMuscular once PRN Glucose LESS THAN 70 milligrams/deciliter  haloperidol     Tablet 1 milliGRAM(s) Oral every 6 hours PRN agitation          PHYSICAL EXAM:  GENERAL: NAD, well-groomed, well-developed  HEAD:  Atraumatic, Normocephalic  CHEST/LUNG: Clear to percussion bilaterally; No rales, rhonchi, wheezing, or rubs  HEART: Regular rate and rhythm; No murmurs, rubs, or gallops  ABDOMEN: Soft, Nontender, Nondistended; Bowel sounds present  EXTREMITIES:  2+ Peripheral Pulses, No clubbing, cyanosis, or edema  LYMPH: No lymphadenopathy noted  SKIN: No rashes or lesions    Care Discussed with Consultants/Other Providers [ ] YES  [ ] NO

## 2020-10-08 NOTE — PROGRESS NOTE ADULT - ASSESSMENT
80 year old F with pmhx of  DMT2 (on Metformin and insulin), COPD, CHF, HLD, HTN, kidney stones and pshx of R nephrectomy, L knee surgery presents to ED with elevated blood sugar, SOB, and lethargy.    Elevated HCO3 2ry to chronic respiratory acidosis  Hypernatremia - improved   CKD stage 3 at minimum from reduced nephron mass       Recommendations  - c/w lasix 40 mg po daily  - defer diamox  -Steroid taper per pulm   -Nutritional support       Sayed [facility]  [phone]

## 2020-10-08 NOTE — PROGRESS NOTE ADULT - SUBJECTIVE AND OBJECTIVE BOX
NEPHROLOGY-[facility] [phone]        Patient seen and examined in bed.  She was pleasantly confused         MEDICATIONS  (STANDING):  albuterol/ipratropium for Nebulization 3 milliLiter(s) Nebulizer every 6 hours  aspirin enteric coated 81 milliGRAM(s) Oral daily  atorvastatin 80 milliGRAM(s) Oral at bedtime  budesonide 160 MICROgram(s)/formoterol 4.5 MICROgram(s) Inhaler 2 Puff(s) Inhalation two times a day  dextrose 5%. 1000 milliLiter(s) (50 mL/Hr) IV Continuous <Continuous>  dextrose 50% Injectable 12.5 Gram(s) IV Push once  dextrose 50% Injectable 25 Gram(s) IV Push once  dextrose 50% Injectable 25 Gram(s) IV Push once  enoxaparin Injectable 40 milliGRAM(s) SubCutaneous daily  furosemide    Tablet 40 milliGRAM(s) Oral daily  gabapentin 100 milliGRAM(s) Oral three times a day  insulin glargine Injectable (LANTUS) 25 Unit(s) SubCutaneous at bedtime  insulin lispro (HumaLOG) corrective regimen sliding scale   SubCutaneous three times a day before meals  insulin lispro (HumaLOG) corrective regimen sliding scale   SubCutaneous at bedtime  insulin lispro Injectable (HumaLOG) 13 Unit(s) SubCutaneous three times a day before meals  polyethylene glycol 3350 17 Gram(s) Oral daily  predniSONE   Tablet 20 milliGRAM(s) Oral every 12 hours  senna 2 Tablet(s) Oral at bedtime      VITAL:  T(C): , Max: 37.2 (10-08-20 @ 01:41)  T(F): , Max: 98.9 (10-08-20 @ 01:41)  HR: 82 (10-08-20 @ 05:29)  BP: 133/73 (10-08-20 @ 05:29)  BP(mean): --  RR: 20 (10-08-20 @ 05:29)  SpO2: 96% (10-08-20 @ 05:29)  Wt(kg): --    I and O's:    10-07 @ 07:01  -  10-08 @ 07:00  --------------------------------------------------------  IN: 780 mL / OUT: 600 mL / NET: 180 mL          PHYSICAL EXAM:    Constitutional: NAD  Neck:  No JVD  Respiratory: CTAB/L  Cardiovascular: S1 and S2  Gastrointestinal: BS+, soft, NT/ND  Extremities: No peripheral edema  Neurological:  , no focal deficits  Psychiatric: confused   : No Pearce  Skin: No rashes  Access: Not applicable    LABS:                        11.1   8.66  )-----------( 142      ( 08 Oct 2020 07:12 )             33.9     10-08    141  |  96  |  34<H>  ----------------------------<  152<H>  3.9   |  35<H>  |  0.71    Ca    9.4      08 Oct 2020 07:12    TPro  6.0  /  Alb  3.3  /  TBili  0.3  /  DBili  x   /  AST  26  /  ALT  52<H>  /  AlkPhos  81  10-08          Urine Studies:          RADIOLOGY & ADDITIONAL STUDIES:

## 2020-10-09 NOTE — PROGRESS NOTE ADULT - PROVIDER SPECIALTY LIST ADULT
Cardiology
Endocrinology
Hospitalist
Infectious Disease
Internal Medicine
Nephrology
Pulmonology
Cardiology
Cardiology
Pulmonology
Infectious Disease
Pulmonology
Endocrinology

## 2020-10-09 NOTE — PROGRESS NOTE ADULT - SUBJECTIVE AND OBJECTIVE BOX
NEPHROLOGY-[facility] [phone]        Patient seen and examined in bed.  She was in good spirits and the same mentally         MEDICATIONS  (STANDING):  albuterol/ipratropium for Nebulization 3 milliLiter(s) Nebulizer every 6 hours  aspirin enteric coated 81 milliGRAM(s) Oral daily  atorvastatin 80 milliGRAM(s) Oral at bedtime  budesonide 160 MICROgram(s)/formoterol 4.5 MICROgram(s) Inhaler 2 Puff(s) Inhalation two times a day  dextrose 5%. 1000 milliLiter(s) (50 mL/Hr) IV Continuous <Continuous>  dextrose 50% Injectable 12.5 Gram(s) IV Push once  dextrose 50% Injectable 25 Gram(s) IV Push once  dextrose 50% Injectable 25 Gram(s) IV Push once  enoxaparin Injectable 40 milliGRAM(s) SubCutaneous daily  furosemide    Tablet 40 milliGRAM(s) Oral daily  gabapentin 100 milliGRAM(s) Oral three times a day  insulin glargine Injectable (LANTUS) 25 Unit(s) SubCutaneous at bedtime  insulin lispro (HumaLOG) corrective regimen sliding scale   SubCutaneous three times a day before meals  insulin lispro (HumaLOG) corrective regimen sliding scale   SubCutaneous at bedtime  insulin lispro Injectable (HumaLOG) 13 Unit(s) SubCutaneous three times a day before meals  polyethylene glycol 3350 17 Gram(s) Oral daily  predniSONE   Tablet 20 milliGRAM(s) Oral every 12 hours  senna 2 Tablet(s) Oral at bedtime      VITAL:  T(C): , Max: 36.8 (10-08-20 @ 16:17)  T(F): , Max: 98.3 (10-08-20 @ 16:17)  HR: 72 (10-09-20 @ 06:46)  BP: 139/85 (10-09-20 @ 06:46)  BP(mean): --  RR: 18 (10-09-20 @ 06:46)  SpO2: 99% (10-09-20 @ 06:46)  Wt(kg): --    I and O's:    10-08 @ 07:01  -  10-09 @ 07:00  --------------------------------------------------------  IN: 800 mL / OUT: 1200 mL / NET: -400 mL          PHYSICAL EXAM:    Constitutional: NAD  Neck:  No JVD  Respiratory: Clear   Cardiovascular: S1 and S2  Gastrointestinal: BS+, soft, NT/ND  Extremities: No peripheral edema  Neurological:   no focal deficits  Psychiatric: Normal mood, normal affect  : No Pearce  Skin: No rashes  Access: Not applicable    LABS:                        10.8   9.33  )-----------( 144      ( 09 Oct 2020 08:50 )             33.3     10-09    140  |  95<L>  |  33<H>  ----------------------------<  199<H>  3.8   |  37<H>  |  0.70    Ca    9.0      09 Oct 2020 08:50    TPro  6.0  /  Alb  3.3  /  TBili  0.3  /  DBili  x   /  AST  26  /  ALT  52<H>  /  AlkPhos  81  10-08          Urine Studies:          RADIOLOGY & ADDITIONAL STUDIES:

## 2020-10-09 NOTE — PROGRESS NOTE ADULT - PROBLEM SELECTOR PROBLEM 2
UTI (urinary tract infection)
CHF (congestive heart failure)
UTI (urinary tract infection)

## 2020-10-09 NOTE — DISCHARGE NOTE PROVIDER - NSDCCPCAREPLAN_GEN_ALL_CORE_FT
PRINCIPAL DISCHARGE DIAGNOSIS  Diagnosis: Hyperglycemia  Assessment and Plan of Treatment: Resolved. Your glucose levels are now more controlled, hyperglycemia likley due to infection at time of admission      SECONDARY DISCHARGE DIAGNOSES  Diagnosis: UTI (urinary tract infection)  Assessment and Plan of Treatment: You have completed your antibiotics inpatient   Drink enough water and fluids to keep your urine clear or pale yellow.  Avoid caffeine, tea, and carbonated beverages. They tend to irritate your bladder.  Empty your bladder often. Avoid holding urine for long periods of time.  Empty your bladder before and after sexual intercourse.  After a bowel movement, women should cleanse from front to back. Use each tissue only once.  SEEK MEDICAL CARE IF:  You have back pain.  You develop a fever.  Your symptoms do not begin to resolve within 3 days.  SEEK IMMEDIATE MEDICAL CARE IF:  You have severe back pain or lower abdominal pain.  You develop chills.  You have nausea or vomiting.  You have continued burning or discomfort with urination.    Diagnosis: COPD with respiratory failure, acute  Assessment and Plan of Treatment: Call your Health Care provider upon arrival home to make a follow up appointment within one week.  Take all inhalers as prescribed by your Health Care Provider.  Take steroids as prescribed by your Health Care Provider.  If your cough increases infrequency and severity and/or you have shortness of breath or increased shortness of breath call your Health Care Provider.  If you develop fever, chills, night sweats, malaise, and/or change in mental status call your Health care Provider.  Nutrition is very important.  Eat small frequent meals.  Increase your activity as tolerated.  Do not stay in bed all day    Diagnosis: CHF (congestive heart failure)  Assessment and Plan of Treatment: Weigh yourself daily.  If you gain 3lbs in 3 days, or 5lbs in a week call your Health Care Provider.  Do not eat or drink foods containing more than 2000mg of salt (sodium) in your diet every day.  Call your Health Care Provider if you have any swelling or increased swelling in your feet, ankles, and/or stomach.  Take all of your medication as directed.  If you become dizzy call your Health Care Provider.    Diagnosis: Type 2 diabetes mellitus  Assessment and Plan of Treatment: HgA1C this admission was 8.1  Make sure you get your HgA1c checked every three months.  If you take oral diabetes medications, check your blood glucose two times a day.  If you take insulin, check your blood glucose before meals and at bedtime.  It's important not to skip any meals.  Keep a log of your blood glucose results and always take it with you to your doctor appointments.  Keep a list of your current medications including injectables and over the counter medications and bring this medication list with you to all your doctor appointments.  If you have not seen your ophthalmologist this year call for appointment.  Check your feet daily for redness, sores, or openings. Do not self treat. If no improvement in two days call your primary care physician for an appointment.  Low blood sugar (hypoglycemia) is a blood sugar below 70mg/dl. Check your blood sugar if you feel signs/symptoms of hypoglycemia. If your blood sugar is below 70 take 15 grams of carbohydrates (ex 4 oz of apple juice, 3-4 glucose tablets, or 4-6 oz of regular soda) wait 15 minutes and repeat blood sugar to make sure it comes up above 70.  If your blood sugar is above 70 and you are due for a meal, have a meal.  If you are not due for a meal have a snack.  This snack helps keeps your blood sugar at a safe range.

## 2020-10-09 NOTE — PROGRESS NOTE ADULT - SUBJECTIVE AND OBJECTIVE BOX
Chief complaint  Patient is a 80y old  Female who presents with a chief complaint of sob, hyperglycemia (09 Oct 2020 09:57)   Review of systems  Patient in bed, looks comfortable, no hypoglycemic episodes.  Planning DC to nursing home today.    Labs and Fingersticks  CAPILLARY BLOOD GLUCOSE      POCT Blood Glucose.: 191 mg/dL (09 Oct 2020 08:36)  POCT Blood Glucose.: 224 mg/dL (08 Oct 2020 21:25)  POCT Blood Glucose.: 120 mg/dL (08 Oct 2020 17:32)  POCT Blood Glucose.: 211 mg/dL (08 Oct 2020 12:56)    Medications  MEDICATIONS  (STANDING):  albuterol/ipratropium for Nebulization 3 milliLiter(s) Nebulizer every 6 hours  aspirin enteric coated 81 milliGRAM(s) Oral daily  atorvastatin 80 milliGRAM(s) Oral at bedtime  budesonide 160 MICROgram(s)/formoterol 4.5 MICROgram(s) Inhaler 2 Puff(s) Inhalation two times a day  dextrose 5%. 1000 milliLiter(s) (50 mL/Hr) IV Continuous <Continuous>  dextrose 50% Injectable 12.5 Gram(s) IV Push once  dextrose 50% Injectable 25 Gram(s) IV Push once  dextrose 50% Injectable 25 Gram(s) IV Push once  enoxaparin Injectable 40 milliGRAM(s) SubCutaneous daily  furosemide    Tablet 40 milliGRAM(s) Oral daily  gabapentin 100 milliGRAM(s) Oral three times a day  insulin glargine Injectable (LANTUS) 25 Unit(s) SubCutaneous at bedtime  insulin lispro (HumaLOG) corrective regimen sliding scale   SubCutaneous three times a day before meals  insulin lispro (HumaLOG) corrective regimen sliding scale   SubCutaneous at bedtime  insulin lispro Injectable (HumaLOG) 13 Unit(s) SubCutaneous three times a day before meals  polyethylene glycol 3350 17 Gram(s) Oral daily  predniSONE   Tablet 20 milliGRAM(s) Oral every 12 hours  senna 2 Tablet(s) Oral at bedtime      Physical Exam  General: Patient comfortable in bed  Vital Signs Last 12 Hrs  T(F): 98.1 (10-09-20 @ 09:36), Max: 98.1 (10-09-20 @ 01:36)  HR: 80 (10-09-20 @ 09:36) (72 - 82)  BP: 136/83 (10-09-20 @ 09:36) (136/73 - 139/85)  BP(mean): --  RR: 19 (10-09-20 @ 09:36) (18 - 19)  SpO2: 100% (10-09-20 @ 09:36) (98% - 100%)  Neck: No palpable thyroid nodules.         Chief complaint  Patient is a 80y old  Female who presents with a chief complaint of sob, hyperglycemia (09 Oct 2020 09:57)   Review of systems  Patient in bed, looks comfortable, no hypoglycemic episodes.  Planning DC to nursing home today.    Labs and Fingersticks  CAPILLARY BLOOD GLUCOSE      POCT Blood Glucose.: 191 mg/dL (09 Oct 2020 08:36)  POCT Blood Glucose.: 224 mg/dL (08 Oct 2020 21:25)  POCT Blood Glucose.: 120 mg/dL (08 Oct 2020 17:32)  POCT Blood Glucose.: 211 mg/dL (08 Oct 2020 12:56)    Medications  MEDICATIONS  (STANDING):  albuterol/ipratropium for Nebulization 3 milliLiter(s) Nebulizer every 6 hours  aspirin enteric coated 81 milliGRAM(s) Oral daily  atorvastatin 80 milliGRAM(s) Oral at bedtime  budesonide 160 MICROgram(s)/formoterol 4.5 MICROgram(s) Inhaler 2 Puff(s) Inhalation two times a day  dextrose 5%. 1000 milliLiter(s) (50 mL/Hr) IV Continuous <Continuous>  dextrose 50% Injectable 12.5 Gram(s) IV Push once  dextrose 50% Injectable 25 Gram(s) IV Push once  dextrose 50% Injectable 25 Gram(s) IV Push once  enoxaparin Injectable 40 milliGRAM(s) SubCutaneous daily  furosemide    Tablet 40 milliGRAM(s) Oral daily  gabapentin 100 milliGRAM(s) Oral three times a day  insulin glargine Injectable (LANTUS) 25 Unit(s) SubCutaneous at bedtime  insulin lispro (HumaLOG) corrective regimen sliding scale   SubCutaneous three times a day before meals  insulin lispro (HumaLOG) corrective regimen sliding scale   SubCutaneous at bedtime  insulin lispro Injectable (HumaLOG) 13 Unit(s) SubCutaneous three times a day before meals  polyethylene glycol 3350 17 Gram(s) Oral daily  predniSONE   Tablet 20 milliGRAM(s) Oral every 12 hours  senna 2 Tablet(s) Oral at bedtime      Physical Exam  General: Patient comfortable in bed  Vital Signs Last 12 Hrs  T(F): 98.1 (10-09-20 @ 09:36), Max: 98.1 (10-09-20 @ 01:36)  HR: 80 (10-09-20 @ 09:36) (72 - 82)  BP: 136/83 (10-09-20 @ 09:36) (136/73 - 139/85)  BP(mean): --  RR: 19 (10-09-20 @ 09:36) (18 - 19)  SpO2: 100% (10-09-20 @ 09:36) (98% - 100%)  Neck: No palpable thyroid nodules.

## 2020-10-09 NOTE — PROGRESS NOTE ADULT - ATTENDING COMMENTS
Gabriel Bruner  Pager: [phone]. If no response or past 5 pm call [phone].
Gabriel Bruner  Pager: [phone]. If no response or past 5 pm call [phone].     Please call ID service for questions over weekend at [phone].
Gabriel Bruner  Pager: [phone]. If no response or past 5 pm call [phone].
Gabriel Bruner  Pager: [phone]. If no response or past 5 pm call [phone].
Gabriel Bruner  Pager: [phone]. If no response or past 5 pm call [phone].
Agree with above NP note.  cv stable  cont current tx  cont lasix as ordered
Agree with above NP note.  cv stable  hydration per renal   volume status stable  med/renal f/u
Agree with above NP note.  cv stable  volume status stable  med/renal f/u
Agree with above NP note.  cv stable  volume status stable  off diuretics   bmp improved
Agree with above NP note.  cv stable  volume status stable  off diuretics  med/renal f/u
Agree with above NP note.  cv stable  cont current tx  cont lasix as ordered
Agree with above NP note.  cv stable  cont lasix daily   d/c planning
Agree with above NP note.  cv stable  hydration per renal   volume status stable  med/renal f/u
Agree with above NP note.  cv stable  volume status stable  med/renal f/u
Agree with above NP note.  cv stable  volume status stable  med/renal f/u
Agree with above NP note.  events noted  cont bipap as needed  s/p iv lasix for increased dyspnea  cont to monitor for further lasix need
Agree with above NP note.  improved following lasix  cont bipap prn  start oral lasix-40mg daily
agree with the above assessment and plan by CONCHITA Edwards.  CV stable  cont oral lasix  BCX+  IV abx per ID  echo
Patient seen and exam today at bedside. Chart, labs, vitals, radiology reviewed. Above H&P reviewed and edited where appropriate.  Agree with history and physical exam. Agree with assessment and plan.

## 2020-10-09 NOTE — PROGRESS NOTE ADULT - PROBLEM SELECTOR PLAN 2
Suggest to continue medications, monitoring, FU primary team/ID recommendations.
-C/w diuresis as tolerated, keep O>I as per cards
Suggest to continue medications, monitoring, FU primary team/ID recommendations.

## 2020-10-09 NOTE — PROGRESS NOTE ADULT - ASSESSMENT
80 year old F with pmhx of  DMT2 (on Metformin and insulin), COPD, CHF, HLD, HTN, kidney stones and pshx of R nephrectomy, L knee surgery presents to ED with elevated blood sugar, SOB, and lethargy.    Elevated HCO3 2ry to chronic respiratory acidosis  Hypernatremia - improved   CKD stage 3 at minimum from reduced nephron mass       Recommendations  - c/w lasix 40 mg po daily  - defer diamox  -Steroid taper per pulm   -Nutritional support   dc planning     Karishma [facility]   [employer]  [phone]

## 2020-10-09 NOTE — PROGRESS NOTE ADULT - SUBJECTIVE AND OBJECTIVE BOX
CARDIOLOGY FOLLOW UP - Dr. Mukherjee     CC no acute complaints       PHYSICAL EXAM:  T(C): 36.7 (10-09-20 @ 09:36), Max: 36.8 (10-08-20 @ 16:17)  HR: 80 (10-09-20 @ 09:36) (72 - 87)  BP: 136/83 (10-09-20 @ 09:36) (125/75 - 139/85)  RR: 19 (10-09-20 @ 09:36) (18 - 20)  SpO2: 100% (10-09-20 @ 09:36) (98% - 100%)  Wt(kg): --  I&O's Summary    08 Oct 2020 07:01  -  09 Oct 2020 07:00  --------------------------------------------------------  IN: 800 mL / OUT: 1200 mL / NET: -400 mL    09 Oct 2020 07:01  -  09 Oct 2020 09:57  --------------------------------------------------------  IN: 420 mL / OUT: 200 mL / NET: 220 mL        Appearance: Normal	  Cardiovascular: Normal S1 S2,RRR, No JVD, No murmurs  Respiratory: Lungs clear to auscultation	  Gastrointestinal:  Soft, Non-tender, + BS	  Extremities: Normal range of motion, No clubbing, cyanosis or edema        MEDICATIONS  (STANDING):  albuterol/ipratropium for Nebulization 3 milliLiter(s) Nebulizer every 6 hours  aspirin enteric coated 81 milliGRAM(s) Oral daily  atorvastatin 80 milliGRAM(s) Oral at bedtime  budesonide 160 MICROgram(s)/formoterol 4.5 MICROgram(s) Inhaler 2 Puff(s) Inhalation two times a day  dextrose 5%. 1000 milliLiter(s) (50 mL/Hr) IV Continuous <Continuous>  dextrose 50% Injectable 12.5 Gram(s) IV Push once  dextrose 50% Injectable 25 Gram(s) IV Push once  dextrose 50% Injectable 25 Gram(s) IV Push once  enoxaparin Injectable 40 milliGRAM(s) SubCutaneous daily  furosemide    Tablet 40 milliGRAM(s) Oral daily  gabapentin 100 milliGRAM(s) Oral three times a day  insulin glargine Injectable (LANTUS) 25 Unit(s) SubCutaneous at bedtime  insulin lispro (HumaLOG) corrective regimen sliding scale   SubCutaneous three times a day before meals  insulin lispro (HumaLOG) corrective regimen sliding scale   SubCutaneous at bedtime  insulin lispro Injectable (HumaLOG) 13 Unit(s) SubCutaneous three times a day before meals  polyethylene glycol 3350 17 Gram(s) Oral daily  predniSONE   Tablet 20 milliGRAM(s) Oral every 12 hours  senna 2 Tablet(s) Oral at bedtime      TELEMETRY: 	    ECG:  	  RADIOLOGY:   DIAGNOSTIC TESTING:  [ ] Echocardiogram:  [ ]  Catheterization:  [ ] Stress Test:    OTHER: 	    LABS:	 	                                10.8   9.33  )-----------( 144      ( 09 Oct 2020 08:50 )             33.3     10-09    140  |  95<L>  |  33<H>  ----------------------------<  199<H>  3.8   |  37<H>  |  0.70    Ca    9.0      09 Oct 2020 08:50    TPro  6.0  /  Alb  3.3  /  TBili  0.3  /  DBili  x   /  AST  26  /  ALT  52<H>  /  AlkPhos  81  10-08

## 2020-10-09 NOTE — PROGRESS NOTE ADULT - PROBLEM SELECTOR PLAN 1
Will continue current insulin regimen for now. Will continue monitoring FS and FU.   Suggest DC on current basal bolus insulin regimen with endo FU 2 weeks.  Counseled for compliance with consistent low carb diet.

## 2020-10-09 NOTE — PROGRESS NOTE ADULT - ASSESSMENT
Assessment  DMT2: 80y Female with DM T2 with hyperglycemia, A1C 8.1%, on basal bolus insulin, blood sugars fluctuating within overall acceptable range, no hypoglycemic episodes. Patient is eating meals, remains on po steroids, pending DC to Nursing Home.  UTI: on meds, stable, monitored.      Alexander Shaver MD  Cell: [phone]  Office: [phone]         Assessment  DMT2: 80y Female with DM T2 with hyperglycemia, A1C 8.1%, on basal bolus insulin, blood sugars fluctuating within overall acceptable range, no hypoglycemic episodes. Patient is eating meals, remains on po steroids, pending DC to Nursing Home.  UTI: on meds, stable, monitored.      Alexander Shaver MD  Cell: [phone]  Office: [phone]

## 2020-10-09 NOTE — PROGRESS NOTE ADULT - ASSESSMENT
80 year old F with pmhx of  DMT2 (on Metformin and insulin), COPD, CHF, HLD, HTN, kidney stones and pshx of R nephrectomy, L knee surgery presents to ED bibems with elevated blood sugar, SOB, and lethargy. Per EMS, pt with 485 blood sugar, BP of 144/75, and O2 sat 92% on room air.  Daughter notes period of incoherent speech over the weekend. Pt endorses excess urination and thirst over the last few days. She also reports mild SOB x few days that is worse when lying flat. Pt also reports constant CP that has been present for "quite a while", unable to specify how much time. Denies fever, chills, worsening leg swelling.    COPD:  Thoraco lumbar scoliosis  Uncontrolled DM:  UTI  Confusion  CHF    she is admitted with high blood glucose and found to have uti:  She has copd:  She was recently admitted to [facility] where she was treated with copd exacerbation:  At that time her CTA was negative and no indication of malignancy on ct chest :  Currently she is not SOB : her outpt  meds noted:  She is already on Symbicort as well as inhalers:  currently she is also being treated for chf   her venous ABG towards met alk side and seems to be a chronic retainer too   I don't think she needs bipap at this time:  She seems pretty confused: ct head is negative for bleed:  would defer to primary team :  DVT propahyxlis    9/23:  pt is not SOB at this time: but emotionally labile  not wheezing:   she is not wheezing  She has chronically elevated co2:  cont Symbicort  her venous abg reviewed:   CT head neg for bleed:   CICI NP    9/24:  she looks better today : she has coag neg staph in blood cultures: likely contaminant:  She is not wheezing:  On 2 L of oxygen : No resp distress:   Has chr hypercarbia:  Cont inhalers not wheezing: Blood glucose better controlled:   mentally she is better today :  CICI NP :  dvt propahyxlis    9/25:  she has metabolic alkalosis" with  increased co2:  reviewed nephrology   on normal saline   off diuretics n ow: \not wheezing:   despite iv fluids her hCO3 remains elevated:   DIAMOX X 1 TODASY AND REVIEWED HCO3 AGAIN TOMORROW:  cici renal:    9/26:  she is doing ok : still little bit confused:  her oxygenation is reasonable:   Her hco3 came down today : to monitor closely:  still confused:  blood glucose now controlled:  ? why she is confused:   ? neuro consult    9/27:  she is alert and awake: mildly confused:  no SOB :  but she is a chronic retainer:  Her o2 sao2 is OK on oxygen :  blood cultures contaminant from before:   hco3 same: at 39: monitor  MAy need repeat diamox if it increases further;   DVT proaphxylis  CICI NP      9/28:  pt is alert and awake: mildly confused:  not in any resp distress:   reced diamox yesterday : hco3 today at 38 :   Her o2 sao2 is pretty good:  Blood glucose is better controlled   Cont Symbicort and albuterol:  Last ABG was with chr retainer: PH was ok:  REPEAT abg   COVD IS NEGATIVE  DVT propahyxlis    9/29:  she is doing ok: still confused:  Not due to co2: SHE HAS CHR HYPERCARBIA   ph is reasonable   for ct abdomen now:  neuro follow u p:  Cont BD: not wheezing today :  DVT proptaexymoes  CICI NP    9/30:    she is doing same:  on 2 L of oxygen:  wean her off oxygen to-day ;  chest xray is reviewed   Sheis afebrile:  DVT propahxylis  CICI NP        10/1:  she b ecame very SOB after cleaning:  she says hse is SOB: needs CTA:  family has refused so far:  she did ahve cta in july that wasnegative for pe  She is not wheezing much   Cont BD andoxygen:  dopplers lf LE too:  CICI NPlois    10/2:    she looks pretty soB:  ABG has chr hypercapnic resp failure  Increase SM to 40 mg bid  She is not wheezing though :  she has bilateral pleural effusions: would give lasix 40 mg x 12 now:  Start bipap for work of breathing   ABG is good:  CICI NP: may need MICU :  dw daughter at bedside too     10/5:  she is doing much better:  change to po predniosne" 20 mg twice ad ay for 5 days:  cont BD:   no need for bipap  Blood glcuse control  DVT propahxylis:  CICI NP    10/6:  seems to be doing ok : no resp distress:  lungs are clear:  on oral steroids already :  Blood glcuse still high:  Cont to controll it: few more days of steroids   Cont BD:  DVT prophylaxisDW NP    10/7//      no PE : some pleural effusion with comprehensive ateelctasis:  dc bipap and do AM ABG tomorrow:   she is still on steroids and BD:  no PE:   blood glcuse better controlled:  dvt prophylaxis CICI NP    10/8  she seems to be very deconditioned:   bipap was dced yesterday : she did not use it last night:  ABG today with chr compensated hypercarbic resp failure  She seems very deconditioned would probably need rehab:   blood glucose controlled:   on 1 L of oxygen :  dvt propahyxlis  CICI NP    10/9  she seems to be doing better to me: doing reasonable :  for rehab today :  she gets sOB n some exertion has copd:  no pE  [id number] steriods tomorrow:  cont BD:]off bipap with no events:  has high chances of coming back to hospital   CICI NP

## 2020-10-09 NOTE — PROGRESS NOTE ADULT - REASON FOR ADMISSION
sob, hyperglycemia

## 2020-10-09 NOTE — DISCHARGE NOTE PROVIDER - CARE PROVIDER_API CALL
Chantelle Aguayo)  Internal Medicine  [address] [place] [address]  Phone: [phone]  Fax: [phone]  Follow Up Time:

## 2020-10-09 NOTE — DISCHARGE NOTE PROVIDER - NSDCMRMEDTOKEN_GEN_ALL_CORE_FT
acetaminophen 325 mg oral tablet: 2 tab(s) orally every 6 hours, As needed, Mild Pain (1 - 3)  aspirin 81 mg oral delayed release tablet: 1 tab(s) orally once a day  atorvastatin 80 mg oral tablet: 1 tab(s) orally once a day (at bedtime)  budesonide-formoterol 160 mcg-4.5 mcg/inh inhalation aerosol:  inhaled   furosemide 40 mg oral tablet: 1 tab(s) orally once a day  gabapentin 100 mg oral capsule: 1 cap(s) orally 3 times a day  haloperidol 1 mg oral tablet: 1 tab(s) orally every 6 hours, As needed, agitation  insulin lispro: 13 unit(s) injectable 3 times a day (before meals)  insulin lispro: 3 times a day (before meals)  1 Unit(s) if Glucose 151 - 200  2 Unit(s) if Glucose 201 - 250  3 Unit(s) if Glucose 251 - 300  4 Unit(s) if Glucose 301 - 350  5 Unit(s) if Glucose 351 - 400  6 Unit(s) if Glucose Greater Than 400  insulin lispro: unit(s) injectable once a day (at bedtime)  0 Unit(s) if Glucose 0 - 250  1 Unit(s) if Glucose 251 - 300  2 Unit(s) if Glucose 301 - 350  3 Unit(s) if Glucose 351 - 400  4 Unit(s) if Glucose Greater Than 400  ipratropium-albuterol 0.5 mg-2.5 mg/3 mLinhalation solution: 3 milliliter(s) inhaled every 6 hours  Lantus 100 units/mL subcutaneous solution: 25 unit(s) subcutaneous once a day (at bedtime)  Multiple Vitamins oral tablet: 1 tab(s) orally once a day  pantoprazole 40 mg oral delayed release tablet: 1 tab(s) orally once a day  polyethylene glycol 3350 oral powder for reconstitution: 17 gram(s) orally once a day  predniSONE 20 mg oral tablet: 1 tab(s) orally every 12 hours x 5 days last day 10/10/2020  senna oral tablet: 2 tab(s) orally once a day (at bedtime)  tiotropium 18 mcg inhalation capsule: 1 cap(s) inhaled once a day  Vitamin D3 1000 intl units oral tablet: 1 tab(s) orally once a day

## 2020-10-09 NOTE — DISCHARGE NOTE PROVIDER - HOSPITAL COURSE
80 year old F with pmhx of  DMT2 (on Metformin and insulin), COPD, CHF, HLD, HTN, kidney stones and pshx of R nephrectomy, L knee surgery presents to ED bibems with elevated blood sugar, SOB, and lethargy secondary to Sepsis sec to UTI (urinary tract infection) with bacteria with metabolic encephalopathy competed ertapenem course.   Hyperglycemia ion alkalosis, will continue to monitor FS and insulin regimen as recommended by Endocrine.   Metabolic encephalopathy now resolved, Haldol PRN for agitation, pulm followed for Hypercarbia now stable in 60s as patient has COPD, mental staus improved.   Will continue diuresis with Lasix for CHF. Patient is stable for discharge to Rehab.

## 2020-10-09 NOTE — PROGRESS NOTE ADULT - SUBJECTIVE AND OBJECTIVE BOX
Patient is a 80y old  Female who presents with a chief complaint of sob, hyperglycemia (09 Oct 2020 12:09)      Any change in ROS: Doing ok : but sh gets SOB on exertion coming out of bed:     MEDICATIONS  (STANDING):  albuterol/ipratropium for Nebulization 3 milliLiter(s) Nebulizer every 6 hours  aspirin enteric coated 81 milliGRAM(s) Oral daily  atorvastatin 80 milliGRAM(s) Oral at bedtime  budesonide 160 MICROgram(s)/formoterol 4.5 MICROgram(s) Inhaler 2 Puff(s) Inhalation two times a day  dextrose 5%. 1000 milliLiter(s) (50 mL/Hr) IV Continuous <Continuous>  dextrose 50% Injectable 12.5 Gram(s) IV Push once  dextrose 50% Injectable 25 Gram(s) IV Push once  dextrose 50% Injectable 25 Gram(s) IV Push once  enoxaparin Injectable 40 milliGRAM(s) SubCutaneous daily  furosemide    Tablet 40 milliGRAM(s) Oral daily  gabapentin 100 milliGRAM(s) Oral three times a day  insulin glargine Injectable (LANTUS) 25 Unit(s) SubCutaneous at bedtime  insulin lispro (HumaLOG) corrective regimen sliding scale   SubCutaneous three times a day before meals  insulin lispro (HumaLOG) corrective regimen sliding scale   SubCutaneous at bedtime  insulin lispro Injectable (HumaLOG) 13 Unit(s) SubCutaneous three times a day before meals  polyethylene glycol 3350 17 Gram(s) Oral daily  predniSONE   Tablet 20 milliGRAM(s) Oral every 12 hours  senna 2 Tablet(s) Oral at bedtime    MEDICATIONS  (PRN):  acetaminophen   Tablet .. 650 milliGRAM(s) Oral every 6 hours PRN Moderate Pain (4 - 6)  dextrose 40% Gel 15 Gram(s) Oral once PRN Blood Glucose LESS THAN 70 milliGRAM(s)/deciliter  glucagon  Injectable 1 milliGRAM(s) IntraMuscular once PRN Glucose LESS THAN 70 milligrams/deciliter  haloperidol     Tablet 1 milliGRAM(s) Oral every 6 hours PRN agitation    Vital Signs Last 24 Hrs  T(C): 36.7 (09 Oct 2020 09:36), Max: 36.8 (08 Oct 2020 16:17)  T(F): 98.1 (09 Oct 2020 09:36), Max: 98.3 (08 Oct 2020 16:17)  HR: 80 (09 Oct 2020 09:36) (72 - 87)  BP: 136/83 (09 Oct 2020 09:36) (125/75 - 139/85)  BP(mean): --  RR: 19 (09 Oct 2020 09:36) (18 - 19)  SpO2: 100% (09 Oct 2020 09:36) (98% - 100%)    I&O's Summary    08 Oct 2020 07:01  -  09 Oct 2020 07:00  --------------------------------------------------------  IN: 800 mL / OUT: 1200 mL / NET: -400 mL    09 Oct 2020 07:01  -  09 Oct 2020 13:42  --------------------------------------------------------  IN: 420 mL / OUT: 200 mL / NET: 220 mL          Physical Exam:   GENERAL: NAD, well-groomed, well-developed  HEENT: DON/   Atraumatic, Normocephalic  ENMT: No tonsillar erythema, exudates, or enlargement; Moist mucous membranes, Good dentition, No lesions  NECK: Supple, No JVD, Normal thyroid  CHEST/LUNG: Clear to auscultaion  CVS: Regular rate and rhythm; No murmurs, rubs, or gallops  GI: : Soft, Nontender, Nondistended; Bowel sounds present  NERVOUS SYSTEM:  Alert & Oriented X3  EXTREMITIES: - edema  LYMPH: No lymphadenopathy noted  SKIN: No rashes or lesions  ENDOCRINOLOGY: No Thyromegaly  PSYCH: Appropriate    Labs:  ABG - ( 08 Oct 2020 06:30 )  pH, Arterial: 7.44  pH, Blood: x     /  pCO2: 61    /  pO2: 110   / HCO3: 40    / Base Excess: 14.2  /  SaO2: 99                                          10.8   9.33  )-----------( 144      ( 09 Oct 2020 08:50 )             33.3                         11.1   8.66  )-----------( 142      ( 08 Oct 2020 07:12 )             33.9                         10.0   7.47  )-----------( 138      ( 07 Oct 2020 07:26 )             31.9     10-09    140  |  95<L>  |  33<H>  ----------------------------<  199<H>  3.8   |  37<H>  |  0.70  10-08    141  |  96  |  34<H>  ----------------------------<  152<H>  3.9   |  35<H>  |  0.71  10-07    139  |  95<L>  |  35<H>  ----------------------------<  156<H>  4.1   |  34<H>  |  0.72  10-06    138  |  93<L>  |  36<H>  ----------------------------<  375<H>  4.5   |  36<H>  |  0.75    Ca    9.0      09 Oct 2020 08:50  Ca    9.4      08 Oct 2020 07:12    TPro  6.0  /  Alb  3.3  /  TBili  0.3  /  DBili  x   /  AST  26  /  ALT  52<H>  /  AlkPhos  81  10-08  TPro  5.6<L>  /  Alb  2.9<L>  /  TBili  0.3  /  DBili  x   /  AST  29  /  ALT  47<H>  /  AlkPhos  86  10-07    CAPILLARY BLOOD GLUCOSE      POCT Blood Glucose.: 251 mg/dL (09 Oct 2020 12:52)  POCT Blood Glucose.: 191 mg/dL (09 Oct 2020 08:36)  POCT Blood Glucose.: 224 mg/dL (08 Oct 2020 21:25)  POCT Blood Glucose.: 120 mg/dL (08 Oct 2020 17:32)      LIVER FUNCTIONS - ( 08 Oct 2020 07:12 )  Alb: 3.3 g/dL / Pro: 6.0 g/dL / ALK PHOS: 81 U/L / ALT: 52 U/L / AST: 26 U/L / GGT: x               < from: CT Angio Chest w/ IV Cont (10.01.20 @ 19:39) >  HEART AND VESSELS: The heart is normal in size.  There are atherosclerotic calcifications of the aorta and coronary arteries.  There is no pericardial effusion.  No pulmonary embolism. Bilateral SVC'swith unchanged dilatation of the left-sided SVC. Aortic valve calcification.    UPPER ABDOMEN: Images of the upper abdomen demonstrate dystrophic calcifications within a atrophic right kidney with a fluid density right renal cyst.    BONES AND SOFT TISSUES: The bones are unremarkable.  Question left breast skin thickening laterally.    TUBES/LINES: None.    IMPRESSION:  Question left breast skin thickening laterally, correlate with physical exam and consider ultrasound as needed.    No pulmonary embolism.    Small bilateral pleural effusions with near complete collapse of both lower lobes and partial atelectasis of the left upper lobe.                  SANDRA VERA M.D., ATTENDING RADIOLOGIST  This document has been electronically signed. Oct 1 2020  9:16PM    < end of copied text >        RECENT CULTURES:        RESPIRATORY CULTURES:          Studies  Chest X-RAY  CT SCAN Chest   Venous Dopplers: LE:   CT Abdomen  Others

## 2020-10-11 PROBLEM — R73.03 PREDIABETES: Status: ACTIVE | Noted: 2019-01-01

## 2020-10-11 PROBLEM — I50.32 CHRONIC DIASTOLIC HEART FAILURE: Status: ACTIVE | Noted: 2019-01-01

## 2020-10-11 PROBLEM — I27.20 MODERATE TO SEVERE PULMONARY HYPERTENSION: Status: ACTIVE | Noted: 2019-01-01

## 2020-11-10 ENCOUNTER — NON-APPOINTMENT (OUTPATIENT)
Age: 80
End: 2020-11-10

## 2020-11-28 NOTE — PROGRESS NOTE ADULT - ASSESSMENT
Subjective:       Patient ID: Jena Jensen is a 38 y.o. female.    Chief Complaint: STD CHECK    Patient presents to clinic today requesting STD testing. She is not having any symptoms. She reports she recently found out her  was cheating on her. She is nursing 6 month old child. Patient is otherwise without concerns today.      Review of Systems   Constitutional: Negative for chills, fatigue, fever and unexpected weight change.   Eyes: Negative for visual disturbance.   Respiratory: Negative for shortness of breath.    Cardiovascular: Negative for chest pain.   Genitourinary: Negative for pelvic pain and vaginal discharge.   Musculoskeletal: Negative for myalgias.   Neurological: Negative for headaches.         Objective:      Physical Exam  Vitals signs reviewed.   Constitutional:       General: She is not in acute distress.     Appearance: She is well-developed.   HENT:      Head: Normocephalic and atraumatic.   Eyes:      General: Lids are normal. No scleral icterus.     Extraocular Movements: Extraocular movements intact.      Conjunctiva/sclera: Conjunctivae normal.      Pupils: Pupils are equal, round, and reactive to light.   Pulmonary:      Effort: Pulmonary effort is normal.   Neurological:      Mental Status: She is alert and oriented to person, place, and time.      Cranial Nerves: No cranial nerve deficit.      Gait: Gait normal.   Psychiatric:         Mood and Affect: Mood and affect normal.         Assessment:       1. Screening for STDs (sexually transmitted diseases)    2. Possible exposure to STD        Plan:     Problem List Items Addressed This Visit     None      Visit Diagnoses     Screening for STDs (sexually transmitted diseases)    -  Primary    Relevant Orders    Hepatitis B Core Antibody, IgM    Hepatitis B Surface Ab, Qualitative    Hepatitis B Surface Antigen    Hepatitis C Antibody    HIV 1/2 Ag/Ab (4th Gen)    RPR    Possible exposure to STD        Relevant Medications     azithromycin (ZITHROMAX) 1 gram Pack    cefTRIAXone injection 250 mg    lidocaine HCL 10 mg/ml (1%) injection 1 mL (Start on 11/28/2020  9:45 AM)    Other Relevant Orders    Urinalysis        After discussion of risks/benefits, due to inability to obtain gonorrhea/chlamydia screens due to reagent shortage due to covid, patient opts to be treated with rocephin and azithromycin for possible exposure to gonorrhea and chlamydia. Discussed breast feeding implications. She plans to pump and dump as she has frozen store of breast milk. She was advised to discuss when to resume breastfeeding with her child's pediatrician, she reports they have an appointment next Friday.    Flu vaccine administered today.  Patient will schedule establish care physical at her convenience.     Problem/Plan - 1:  ·  Problem: Dizziness.  Plan: - Neuro consulted. Likely 2/2 CVA vs. UTI.  - neuro and cards fu   - management as per neuro     Problem/Plan - 2:  ·  Problem: UTI (urinary tract infection).  Plan: - UA positive.   - ID fu   - Check UCx.     Problem/Plan - 3:  ·  Problem: COPD (chronic obstructive pulmonary disease).  Plan: - Resp stable.  - Will resume home meds once med rec available.     Problem/Plan - 4:  ·  Problem: HTN (hypertension).  Plan: - BP within normal limit.  - continue to monitor.

## 2020-12-29 NOTE — PROGRESS NOTE ADULT - PROBLEM SELECTOR PLAN 2
HD completed tolerated well. Net UF 1kg.    improved cough   occurring in ED, but w/o respiratory distress, wheezing or sign of increased respiratory effort at the time of being seen  - also complaining of cough productive of whitish sputum (no cough appreciated during evaluation by writer)  - on ventolin HFA and Tudorza Pressair PTA; held for now  - s/p prednisone 30 mg, magnesium 2 grams and Duo-nebs x 3 in the ED; continuing prednisone 20 mg x 3 doses, Duo-nebs  - already on ceftriaxone for UTI; azithromycin not added since patient improved  - HOB elevation  - if recurrent symptoms, would seek Pulmonology's input

## 2021-07-13 NOTE — ED ADULT NURSE NOTE - PMH
CHF (congestive heart failure)    COPD (chronic obstructive pulmonary disease)    GERD (gastroesophageal reflux disease)    HTN (hypertension)
ambulatory

## 2021-09-22 NOTE — ED ADULT NURSE NOTE - NSIMPLEMENTINTERV_GEN_ALL_ED
Reason for visit:   Chief Complaint   Patient presents with    Depression    Anxiety       HPI     Jered Kaiser is a 63 y o  female with depression  and anxiety  referred by  Dr Tobar Current  due to depressed mood, anxiety ,  sleep disturbance and  appetite changes   Onset of symptoms was a few months ago with gradually worsening course since that time  Psychosocial Stressors: health  She developed Shingles on 9/2017 and has lot of sequela  She had a neuropsychiatric evaluation and  Was found that her executive function has decreased , she was not able to continue working and that has affected her life  She has feel more depressed since them  She states her  is very supportive  [address] feels  anxious, denies any suicidal or homicidal ideation, plan or intent  She denies any psychotic symptoms or manic episode  Her PHQ-9 is 11         Review Of Systems:     Mood Depression   Behavior Normal    Thought Content Normal   General Sleep Disturbances and Decreased Functioning   Personality Normal   Other Psych Symptoms Normal   Constitutional Negative   ENT Negative   Cardiovascular Negative   Respiratory Negative   Gastrointestinal Negative   Genitourinary Negative   Musculoskeletal Negative   Integumentary Negative   Neurological Negative   Endocrine Normal    Other Symptoms Normal        Past Psychiatric History:      Past Inpatient Psychiatric Treatment:   Never admitted, was  In Partial on the past     Past Outpatient Psychiatric Treatment:    Current psychiatrist: Dr Tobar Current  Therapist: Tatum Lindsay  Past Suicide Attempts:    no  Past Violent Behavior:    no  Past Psychiatric Medication Trials:    Effexor XR, Trazodone, Abilify, Atarax and Ativan    Family Psychiatric History:   Family History   Problem Relation Age of Onset    Diabetes Mother     Hypertension Mother     Lupus Mother         systemic lupus erythematosus    Throat cancer Father        Social History:    Education: high school diploma/GED  Learning Disabilities: none  Marital history:   Living arrangement, social support: she lives with her    Occupational History: on permanent disability  Functioning Relationships: good support system  Other Pertinent History: no legal or      Social History     Substance and Sexual Activity   Drug Use Never       Traumatic History:       Abuse: none  Other Traumatic Events: none    The following portions of the patient's history were reviewed and updated as appropriate:   She  has a past medical history of Anxiety, Depression, Endometriosis, Head injury, Obesity, Shingles (herpes zoster) polyneuropathy, and Wrist fracture, right  She   Patient Active Problem List    Diagnosis Date Noted    Allergic rhinitis 02/25/2019    Fatigue 02/25/2019    Insomnia 02/25/2019    Osteoarthritis 02/25/2019    MDD (major depressive disorder), recurrent severe, without psychosis ([facility] [address] ) 07/23/2018    Herpes zoster complicated 43/76/1865    Memory loss 07/02/2018    History of bariatric surgery 05/04/2016    Orthostatic hypotension 05/04/2016    Benign essential hypertension 12/16/2015    Generalized osteoarthritis 12/16/2015    Headache 10/03/2012     She  has a past surgical history that includes Sleeve Gastroplasty (04/25/2016); LAPAROSCOPY; Panniculectomy; and Reduction mammaplasty  Her family history includes Diabetes in her mother; Hypertension in her mother; Lupus in her mother; Throat cancer in her father  She  reports that she quit smoking about 19 years ago  Her smoking use included cigarettes  She smoked 0 40 packs per day  She has never used smokeless tobacco  She reports that she drank alcohol  She reports that she does not use drugs    Current Outpatient Medications   Medication Sig Dispense Refill    albuterol (PROVENTIL HFA,VENTOLIN HFA) 90 mcg/act inhaler Inhale 2 puffs every 4 (four) hours as needed for wheezing      ARIPiprazole (ABILIFY) 5 mg tablet Take 5 mg by mouth daily      fluticasone (FLONASE) 50 mcg/act nasal spray 2 sprays into each nostril daily      gabapentin (NEURONTIN) 400 mg capsule Take 400 mg by mouth 2 (two) times a day      LORazepam (ATIVAN) 0 5 mg tablet Take 1 tablet (0 5 mg total) by mouth 2 (two) times a day 60 tablet 0    losartan (COZAAR) 50 mg tablet Take 50 mg by mouth daily      lovastatin (MEVACOR) 10 MG tablet Take 10 mg by mouth daily at bedtime      ranitidine (ZANTAC) 150 mg tablet Take 1 tablet by mouth every 24 hours      traZODone (DESYREL) 50 mg tablet Take 75 mg by mouth daily at bedtime      venlafaxine 225 MG TB24 Take 225 mg by mouth daily       No current facility-administered medications for this visit  She is allergic to lisinopril          Mental status:  Appearance calm and cooperative , adequate hygiene and grooming and good eye contact    Mood depressed and anxious   Affect affect was constricted   Speech speech soft   Thought Processes coherent/organized and normal thought processes   Hallucinations no hallucinations present    Thought Content no delusions   Abnormal Thoughts no suicidal thoughts  and no homicidal thoughts    Orientation  oriented to person and place and time   Remote Memory short term memory intact and long term memory intact   Attention Span concentration impaired   Intellect Appears to be of Average Intelligence   Fund of Knowledge displays adequate knowledge of current events, adequate fund of knowledge regarding past history and adequate fund of knowledge regarding vocabulary    Insight Insight intact   Judgement judgment was intact   Muscle Strength Muscle strength and tone were normal and Normal gait    Language no difficulty naming common objects, no difficulty repeating a phrase  and no difficulty writing a sentence    Pain moderate to severe   Pain Scale 5         Laboratory Results: No results found for this or any previous visit     Lab Results   Component Value Date    WBC 5 66 10/22/2018    HGB 13 0 10/22/2018    HCT 41 3 10/22/2018    MCV 93 10/22/2018     10/22/2018     Lab Results   Component Value Date    K 4 8 10/22/2018     10/22/2018    CO2 30 10/22/2018    AGAP 4 10/22/2018    BUN 13 10/22/2018    CREATININE 0 83 10/22/2018    GLUF 84 10/22/2018    CALCIUM 9 2 10/22/2018    AST 12 10/22/2018    ALT 24 10/22/2018    ALKPHOS 65 10/22/2018    TP 7 8 10/22/2018    TBILI 0 49 10/22/2018    EGFR 77 10/22/2018     No results found for: CHOL  Lab Results   Component Value Date    HDL 52 10/22/2018     Lab Results   Component Value Date    LDLCALC 93 10/22/2018     Lab Results   Component Value Date    TRIG 156 (H) 10/22/2018     No results found for: CHOLHDL      Assessment/Plan:      Diagnoses and all orders for this visit:    Generalized anxiety disorder  -     LORazepam (ATIVAN) 0 5 mg tablet; Take 1 tablet (0 5 mg total) by mouth 2 (two) times a day    MDD (major depressive disorder), recurrent severe, without psychosis ([facility] [address] )    Other orders  -     fluticasone (FLONASE) 50 mcg/act nasal spray; 2 sprays into each nostril daily  -     losartan (COZAAR) 50 mg tablet; Take 50 mg by mouth daily  -     ranitidine (ZANTAC) 150 mg tablet; Take 1 tablet by mouth every 24 hours  -     gabapentin (NEURONTIN) 400 mg capsule; Take 400 mg by mouth 2 (two) times a day  -     venlafaxine 225 MG TB24; Take 225 mg by mouth daily  -     Discontinue: LORazepam (ATIVAN) 0 5 mg tablet; Take 0 5 mg by mouth 2 (two) times a day  -     traZODone (DESYREL) 50 mg tablet; Take 75 mg by mouth daily at bedtime  -     ARIPiprazole (ABILIFY) 5 mg tablet; Take 5 mg by mouth daily  -     lovastatin (MEVACOR) 10 MG tablet; Take 10 mg by mouth daily at bedtime  -     albuterol (PROVENTIL HFA,VENTOLIN HFA) 90 mcg/act inhaler;  Inhale 2 puffs every 4 (four) hours as needed for wheezing          Treatment Recommendations- Risks Benefits         Immediate Medical/Psychiatric/Psychotherapy Treatments and Any Precautions:     1  Admit to [facility] 2  Medication Management 3  Group Therapy  Risks, Benefits And Possible Side Effects Of Medications:  Risks, benefits, and possible side effects of medications explained to patient and patient verbalizes understanding     Controlled Medication Discussion: Discussed with patient the risks of sedation, respiratory depression, impairment of ability to drive and potential for abuse and addiction related to treatment with benzodiazepine medications  The patient understands risk of treatment with benzodiazepine medications, agrees to not drive if feels impaired and agrees to take medications as prescribed  and The patient has been filling controlled prescriptions on time as prescribed to [facility]        [facility] Physician's Orders     Admit to: Partial Hospitalization, 5 x per week, for 15 days  Vital signs routine  Diet regular  Group Psychotherapy 9 x per week  Allied Therapy Group 6 x per week  Diagnosis:   1  Generalized anxiety disorder  LORazepam (ATIVAN) 0 5 mg tablet   2   MDD (major depressive disorder), recurrent severe, without psychosis ([facility] )       Medications:   Current Outpatient Medications:     albuterol (PROVENTIL HFA,VENTOLIN HFA) 90 mcg/act inhaler, Inhale 2 puffs every 4 (four) hours as needed for wheezing, Disp: , Rfl:     ARIPiprazole (ABILIFY) 5 mg tablet, Take 5 mg by mouth daily, Disp: , Rfl:     fluticasone (FLONASE) 50 mcg/act nasal spray, 2 sprays into each nostril daily, Disp: , Rfl:     gabapentin (NEURONTIN) 400 mg capsule, Take 400 mg by mouth 2 (two) times a day, Disp: , Rfl:     LORazepam (ATIVAN) 0 5 mg tablet, Take 1 tablet (0 5 mg total) by mouth 2 (two) times a day, Disp: 60 tablet, Rfl: 0    losartan (COZAAR) 50 mg tablet, Take 50 mg by mouth daily, Disp: , Rfl:     lovastatin (MEVACOR) 10 MG tablet, Take 10 mg by mouth daily at bedtime, Disp: , Rfl:     ranitidine (ZANTAC) 150 mg tablet, Take 1 tablet by mouth every 24 hours, Disp: , Rfl:     traZODone (DESYREL) 50 mg tablet, Take 75 mg by mouth daily at bedtime, Disp: , Rfl:     venlafaxine 225 MG TB24, Take 225 mg by mouth daily, Disp: , Rfl:     I certify that the continuation of Partial Hospitalization services is medically necessary to improve and/or maintain the patients condition and functional level, and to prevent relapse or hospitalization, and that this could not be done at a less intensive level of care  Malorie Rider MD No Implemented All Fall Risk Interventions:  [place] to call system. Call bell, personal items and telephone within reach. Instruct patient to call for assistance. Room bathroom lighting operational. Non-slip footwear when patient is off stretcher. Physically safe environment: no spills, clutter or unnecessary equipment. Stretcher in lowest position, wheels locked, appropriate side rails in place. Provide visual cue, wrist band, yellow gown, etc. Monitor gait and stability. Monitor for mental status changes and reorient to person, place, and time. Review medications for side effects contributing to fall risk. Reinforce activity limits and safety measures with patient and family.

## 2022-05-02 NOTE — H&P ADULT - NSHPSOCIALHISTORY_GEN_ALL_CORE
-ve Dapsone Pregnancy And Lactation Text: This medication is Pregnancy Category C and is not considered safe during pregnancy or breast feeding.

## 2022-06-22 NOTE — PHYSICAL THERAPY INITIAL EVALUATION ADULT - SITTING BALANCE: DYNAMIC
Subjective:   Krish Taylor is a 79 
100.4
good balance

## 2022-06-23 NOTE — PHYSICAL THERAPY INITIAL EVALUATION ADULT - GAIT DISTANCE, PT EVAL
From: Eva Osei  To: Narciso Ward  Sent: 6/22/2022 7:40 PM CDT  Subject: Work    Good evening I need a note just for tonight. Returning tomorrow night Thursday the night of the 23rd  
10 feet

## 2022-09-14 NOTE — PROGRESS NOTE ADULT - PROBLEM SELECTOR PLAN 1
SUBJECTIVE:   CC: Chelle is an 41 year old who presents for preventive health visit.     Patient has been advised of split billing requirements and indicates understanding: Yes  Healthy Habits:    Taking medications regularly:  0    PHQ-2 Total Score:PHQ2 Score: Incomplete.  Anxiety    History of Present Illness       Mental Health Follow-up:  Patient presents to follow-up on Depression & Anxiety.Patient's depression since last visit has been:  Better  The patient is not having other symptoms associated with depression.  Patient's anxiety since last visit has been:  Better  The patient is not having other symptoms associated with anxiety.  Any significant life events: No  Patient is not feeling anxious or having panic attacks.  Patient has no concerns about alcohol or drug use.    She eats 2-3 servings of fruits and vegetables daily.She consumes 1 sweetened beverage(s) daily.She exercises with enough effort to increase her heart rate 20 to 29 minutes per day.  She exercises with enough effort to increase her heart rate 5 days per week.   She is taking medications regularly.    Today's PHQ-9         PHQ-9 Total Score: 8    PHQ-9 Q9 Thoughts of better off dead/self-harm past 2 weeks :   Not at all    How difficult have these problems made it for you to do your work, take care of things at home, or get along with other people: Somewhat difficult  Today's ISABEL-7 Score: 7    Night sweats  For the past couple years: starting in 2020  Night sweats are not occurring nightly, they are only associated with menstrual cycle.  Menstrual cycle length and flow are also decreasing in amount. Now only notices 3 days of flow and much less heavy.  Has the copper IUD - has had in for the past 7-8 years.   Weight has been up and down, fluctuating per her normal. No significant changes.  Does have some looser stools - attributed to medications, sensitive stomach to foods  Not really noticing flushing of sorts.    Anxiety  Follow-Up    How are you doing with your anxiety since your last visit? No change    Are you having other symptoms that might be associated with anxiety? No    Have you had a significant life event? No     Are you feeling depressed? No    Do you have any concerns with your use of alcohol or other drugs? No    Social History     Tobacco Use     Smoking status: Current Some Day Smoker     Types: Cigarettes     Smokeless tobacco: Never Used     Tobacco comment: every now and then   Vaping Use     Vaping Use: Never used   Substance Use Topics     Alcohol use: Yes     Comment: 2 Drinks per Week     Drug use: No     Comment: None     ISABEL-7 SCORE 6/30/2022 7/14/2022 9/14/2022   Total Score - - 7 (mild anxiety)   Total Score 19 7 7     PHQ 4/1/2022 6/30/2022 9/14/2022   PHQ-9 Total Score 9 15 8   Q9: Thoughts of better off dead/self-harm past 2 weeks Not at all Not at all Not at all     Today's PHQ-2 Score:   PHQ-2 ( 1999 [employer]) 9/14/2022   Q1: Little interest or pleasure in doing things -   Q2: Feeling down, depressed or hopeless -   PHQ-2 Score -   PHQ-2 Total Score (12-17 Years)- Positive if 3 or more points; Administer PHQ-A if positive -   Q1: Little interest or pleasure in doing things -   Q2: Feeling down, depressed or hopeless -   PHQ-2 Score Incomplete     Has been taking the propranolol, occasionally forgots.  Feels like this is helpful.  Would like to continue this medication.  Doesn't want to gain a bunch of weight.    ADHD  Feels good on the Vyvanse.  Feels a little different than when on the Adderral - not as jittery and anxiety on Vyvanse.  Takes all the time, even on the weekends. Sometimes has a hard time getting up and going and this is really helpful.  Did take the VyVanse today - good blood pressure.  Helps focus with everything. Helps with cluster of thoughts, makes thoughts more cohesive.  Helps slow her brain down  Does have headaches, but this is not related to medication per patient.  No chest  "pain, SOB, palpitations  Has been on this medication for years.    Baclofen  Would like refill on baclofen for neck/back pain.  Depends on how much she is working.   Always in some sort of pain.    Abuse: Current or Past (Physical, Sexual or Emotional) - No  Do you feel safe in your environment? Yes    Social History     Tobacco Use     Smoking status: Current Some Day Smoker     Types: Cigarettes     Smokeless tobacco: Never Used     Tobacco comment: every now and then   Substance Use Topics     Alcohol use: Yes     Comment: 2 Drinks per Week       Alcohol Use 3/11/2019   Prescreen: >3 drinks/day or >7 drinks/week? No     Reviewed orders with patient.  Reviewed health maintenance and updated orders accordingly - yes    Breast Cancer Screening:    FHS-7: No flowsheet data found.    Patient under 40 years of age: Routine Mammogram Screening not recommended.   Pertinent mammograms are reviewed under the imaging tab.    History of abnormal Pap smear: Yes, did some procedure to freeze off. At age 20 or early 20s. Pretty normal since that time.     Reviewed and updated as needed this visit by clinical staff   Tobacco  Allergies  Meds  Problems  Med Hx  Surg Hx  Fam Hx            Reviewed and updated as needed this visit by Provider   Tobacco  Allergies   Problems  Med Hx  Surg Hx  Fam Hx             Review of Systems   Psychiatric/Behavioral: The patient is nervous/anxious.      OBJECTIVE:   /82 (BP Location: Right arm, Patient Position: Sitting, Cuff Size: Adult Regular)   Pulse 72   Resp 16   Ht 1.702 m (5' 7\")   Wt 65.9 kg (145 lb 4.8 oz)   LMP 09/11/2022 (Within Days)   BMI 22.76 kg/m       Physical Exam    GENERAL: healthy, alert and no distress  HEAD: Normocephalic, atraumatic.   EYES: PERRL. Normal conjunctivae, sclera.   ENT: Normal EAC and TMs bilaterally. Normal nares without congestion. Normal oropharynx.   NECK: Supple. No lymphadenopathy appreciated. Trachea midline. Thyroid not " enlarged, not TTP.  RESP: lungs clear to auscultation - no rales, rhonchi or wheezes  CV: regular rate and rhythm, normal S1 S2, no murmur, click, rub or gallop. No carotid bruits. No peripheral swelling noted.   ABDOMEN: soft, no TTP x4 quadrants. No hepatomegaly or masses appreciated. BS normactive.  : Normal external genitalia. Normal appearing cervix. Normal uterine size. No adnexal masses appreciated.  MSK: no gross musculoskeletal defects noted.  SKIN: no suspicious lesions or rashes.  EXT: Warm and well perfused.   NEURO: CNII-XII grossly intact. No focal deficits.  PSYCH: Groomed, dressed appropriately for weather. Normal mood with consistent affect.     ASSESSMENT/PLAN:   (Z00.00) Routine general medical examination at a health care facility  (primary encounter diagnosis)  (Z12.4) Cervical cancer screening  Declines lipid, glucose, BMP testing (low potassium, calcium in the past). Pap smear completed. Declines flu shot.  Plan: Pap Screen with HPV - recommended age 30 - 65         years    (F17.200) Tobacco use disorder  Pre-contemplative stage.     (F90.8) ADHD, adult residual type  Doing well on Vyvanse. Refill for 3 months.  - lisdexamfetamine (VYVANSE) 30 mg - start 9/17/22  - lisdexamfetamine (VYVANSE) 30 mg - start 10/17/22  - lisdexamfetamine (VYVANSE) 30 mg - start 11/17/22    (F41.1) Generalized anxiety disorder  Symptoms improved on propranolol. Was considering transitioning to selective serotonin reuptake inhibitor but very hesitant with side effects - see prior notes for more discussion. Will refill propranolol.    (R61) Night sweats  Ongoing for the past 2 years. Completely linked with menstrual cycle. No night sweats outside of just prior to menstrual cycle. Associated with decreased menstruation duration and lower flow but menstrual cycles are still regular. No recent weight loss. No other red flag symptoms. TSH last year WNL.   Plan: Follicle stimulating hormone    (M54.12) Cervical  "radiculopathy  Requesting refill of baclofen  Plan: baclofen (LIORESAL) 20 MG tablet    Does also endorse Raynaud's phenomenon. Did not have time to discuss today. Will need to follow up at next appointment.    COUNSELING:  Reviewed preventive health counseling, as reflected in patient instructions    Estimated body mass index is 22.76 kg/m  as calculated from the following:    Height as of this encounter: 1.702 m (5' 7\").    Weight as of this encounter: 65.9 kg (145 lb 4.8 oz).      She reports that she has been smoking cigarettes. She has never used smokeless tobacco.     Nicotine/Tobacco Cessation Plan:   Information offered: Patient not interested at this time    Counseling Resources:  ATP IV Guidelines  Pooled Cohorts Equation Calculator  Breast Cancer Risk Calculator  BRCA-Related Cancer Risk Assessment: FHS-7 Tool  FRAX Risk Assessment  ICSI Preventive Guidelines  Dietary Guidelines for Americans, 2010  USDA's MyPlate  ASA Prophylaxis  Lung CA Screening    Suman Au MD  [facility]  9/14/2022  " Will continue Lantus 33 units at bedtime.  Will increase Humalog to 13 units before each meal and continue Humalog correction scale coverage. Will continue monitoring FS and FU. Will adjust insulin doses as steroids are being tapered/dc.  Patient counseled for compliance with consistent low carb diet and physical activity as tolerated outpatient.

## 2022-11-03 NOTE — PROVIDER CONTACT NOTE (CRITICAL VALUE NOTIFICATION) - NAME OF MD/NP/PA/DO NOTIFIED:
CONCHITA .Irma Montano
Charmaine Solis
Charmaine Solis
Charmaine Solis NP
NP .Toby Reid
NP. Toby Reid
Tova LESTER
clear

## 2023-01-23 NOTE — DISCHARGE NOTE NURSING/CASE MANAGEMENT/SOCIAL WORK - NSSCCARECORD_GEN_ALL_CORE
Pt reports weakness, diarrhea, elevated blood sugar since discharge from [facility] for new onset diabetes.   
[facility]

## 2023-03-05 NOTE — ED PROVIDER NOTE - CROS ED ENMT ALL NEG
Problem: Chronic Conditions and Co-morbidities  Goal: Patient's chronic conditions and co-morbidity symptoms are monitored and maintained or improved  3/4/2023 2228 by Ivana Linares, RN  Outcome: Progressing  Flowsheets (Taken 3/4/2023 2228)  Care Plan - Patient's Chronic Conditions and Co-Morbidity Symptoms are Monitored and Maintained or Improved:   Monitor and assess patient's chronic conditions and comorbid symptoms for stability, deterioration, or improvement   Update acute care plan with appropriate goals if chronic or comorbid symptoms are exacerbated and prevent overall improvement and discharge   Collaborate with multidisciplinary team to address chronic and comorbid conditions and prevent exacerbation or deterioration     Problem: Discharge Planning  Goal: Discharge to home or other facility with appropriate resources  3/4/2023 2228 by Ivana Linares, RN  Outcome: Progressing  Flowsheets (Taken 3/4/2023 2228)  Discharge to home or other facility with appropriate resources:   Identify barriers to discharge with patient and caregiver   Identify discharge learning needs (meds, wound care, etc)   Arrange for needed discharge resources and transportation as appropriate   Refer to discharge planning if patient needs post-hospital services based on physician order or complex needs related to functional status, cognitive ability or social support system     Problem: Pain  Goal: Verbalizes/displays adequate comfort level or baseline comfort level  3/4/2023 2228 by Ivana Linares, RN  Outcome: Progressing  Flowsheets (Taken 3/4/2023 2228)  Verbalizes/displays adequate comfort level or baseline comfort level:   Encourage patient to monitor pain and request assistance   Assess pain using appropriate pain scale   Implement non-pharmacological measures as appropriate and evaluate response   Administer analgesics based on type and severity of pain and evaluate response     Problem: Skin/Tissue Integrity - Adult  Goal:  Skin integrity remains intact  3/4/2023 2228 by Marva Yoo RN  Outcome: Progressing  Flowsheets (Taken 3/4/2023 2228)  Skin Integrity Remains Intact: Monitor for areas of redness and/or skin breakdown    Problem: Skin/Tissue Integrity - Adult  Goal: Incisions, wounds, or drain sites healing without S/S of infection  3/4/2023 2228 by Marva Yoo RN  Outcome: Progressing  Flowsheets (Taken 3/4/2023 2228)  Incisions, Wounds, or Drain Sites Healing Without Sign and Symptoms of Infection:   ADMISSION and DAILY: Assess and document risk factors for pressure ulcer development   TWICE DAILY: Assess and document skin integrity   TWICE DAILY: Assess and document dressing/incision, wound bed, drain sites and surrounding tissue     Problem: Musculoskeletal - Adult  Goal: Return mobility to safest level of function  3/4/2023 2228 by Marva Yoo RN  Outcome: Progressing  Flowsheets (Taken 3/4/2023 2228)  Return Mobility to Safest Level of Function:   Assess patient stability and activity tolerance for standing, transferring and ambulating with or without assistive devices   Assist with transfers and ambulation using safe patient handling equipment as needed   Ensure adequate protection for wounds/incisions during mobilization   Obtain physical therapy/occupational therapy consults as needed   Instruct patient/family in ordered activity level     Problem: Musculoskeletal - Adult  Goal: Maintain proper alignment of affected body part  3/4/2023 2228 by Marva Yoo RN  Outcome: Progressing  Flowsheets (Taken 3/4/2023 2228)  Maintain proper alignment of affected body part:   Support and protect limb and body alignment per provider's orders   Instruct and reinforce with patient and family use of appropriate assistive device and precautions (e.g. spinal or hip dislocation precautions)     Problem: Musculoskeletal - Adult  Goal: Return ADL status to a safe level of function  3/4/2023 2228 by Marva Yoo RN  Outcome: Progressing  Flowsheets (Taken 3/4/2023 2228)  Return ADL Status to a Safe Level of Function:   Administer medication as ordered   Obtain physical therapy/occupational therapy consults as needed   Assist and instruct patient to increase activity and self care as tolerated   Assess activities of daily living deficits and provide assistive devices as needed     Problem: Infection - Adult  Goal: Absence of infection during hospitalization  3/4/2023 2228 by Angel Wilson RN  Outcome: Progressing  Flowsheets (Taken 3/4/2023 2228)  Absence of infection during hospitalization:   Monitor lab/diagnostic results   Assess and monitor for signs and symptoms of infection   Monitor all insertion sites i.e., indwelling lines, tubes and drains   Alma appropriate cooling/warming therapies per order   Administer medications as ordered   Instruct and encourage patient and family to use good hand hygiene technique     Problem: Safety - Adult  Goal: Free from fall injury  3/4/2023 2228 by Angel Wilson RN  Outcome: Progressing  Flowsheets (Taken 3/4/2023 2228)  Free From Fall Injury:   Instruct family/caregiver on patient safety   Based on caregiver fall risk screen, instruct family/caregiver to ask for assistance with transferring infant if caregiver noted to have fall risk factors      Care plan reviewed with patient. Patient verbalized understanding of the plan of care and contribute to goal setting. negative...

## 2023-04-28 NOTE — ED PROVIDER NOTE - ENDOCRINE NEGATIVE STATEMENT, MLM
no diabetes and no thyroid trouble. Island Pedicle Flap Text: The defect edges were debeveled with a #15 scalpel blade.  Given the location of the defect, shape of the defect and the proximity to free margins an island pedicle advancement flap was deemed most appropriate.  Using a sterile surgical marker, an appropriate advancement flap was drawn incorporating the defect, outlining the appropriate donor tissue and placing the expected incisions within the relaxed skin tension lines where possible.    The area thus outlined was incised deep to adipose tissue with a #15 scalpel blade.  The skin margins were undermined to an appropriate distance in all directions around the primary defect and laterally outward around the island pedicle utilizing iris scissors  and carried over to close the primary defect.  There was minimal undermining beneath the pedicle flap.

## 2023-06-14 NOTE — ED ADULT TRIAGE NOTE - TEMPERATURE IN FAHRENHEIT (DEGREES F)
Assessment & Plan     Alessio was seen today for pain.    Diagnoses and all orders for this visit:    Plantar fasciitis  -     diclofenac (VOLTAREN) 50 MG EC tablet; Take 1 tablet (50 mg) by mouth daily as needed for moderate pain (right foot)  -     Orthopedic  Referral; Future    Thomas's neuroma of right foot  -     Orthopedic  Referral; Future    Advised patient to proceed with foot X-rays if pain persisted. See Podiatry for steroid injections and/or further testing if pain failed to improve.     Return in about 1 month (around 7/14/2023) for Follow-up Visit with Specialist.    Yong Workman MD  [facility]   Alessio is a 32 year old, presenting for the following health issues:  Pain        6/14/2023     4:09 PM   Additional Questions   Roomed by darren walker   Accompanied by n/a         6/14/2023     4:09 PM   Patient Reported Additional Medications   Patient reports taking the following new medications n/a     Pain    History of Present Illness       Reason for visit:  Foot pain    He eats 2-3 servings of fruits and vegetables daily.He consumes 1 sweetened beverage(s) daily.He exercises with enough effort to increase his heart rate 20 to 29 minutes per day.  He exercises with enough effort to increase his heart rate 3 or less days per week.   He is taking medications regularly.  Patient presents to clinic for evaluation of right foot pain.  Pain started 3 years ago.  Episodic pain 2 times per month.  Patient was seen and evaluated by a provider in [place].  He was given a prescription of Celebrex and course of oral steroids.  He was also seen by another provider and was given a prescription of acetylfenac and colchicine. Ongoing pain in the bottom of the right foot.  Pain is intermittent in nature. 5-6/10.   Exacerbated by walking and pressure. alleviated by rest.     Review of Systems   Constitutional, HEENT, cardiovascular, pulmonary, gi and gu systems  "are negative, except as otherwise noted.      Objective    /80 (BP Location: Left arm, Patient Position: Sitting, Cuff Size: Adult Regular)   Pulse 92   Temp 97.2  F (36.2  C) (Temporal)   Resp 16   Ht 1.79 m (5' 10.47\")   Wt 89.8 kg (198 lb)   SpO2 97%   BMI 28.03 kg/m    Body mass index is 28.03 kg/m .  Physical Exam   GENERAL: healthy, alert and no distress  RESP: lungs clear to auscultation - no rales, rhonchi or wheezes  CV: regular rate and rhythm, normal S1 S2, no S3 or S4, no murmur, click or rub, no peripheral edema and peripheral pulses strong  ABDOMEN: soft, nontender, no hepatosplenomegaly, no masses and bowel sounds normal  MS: no gross musculoskeletal defects noted, no edema    No results found for any visits on 06/14/23.              " 99.8

## 2023-10-25 NOTE — ED ADULT TRIAGE NOTE - ADDITIONAL SAFETY/BANDS...
-- DO NOT REPLY / DO NOT REPLY ALL --  -- Message is from Engagement Center Operations ([facility]) --    General Patient Message:     Patient is requesting a call back as soon as possible. She said it is important. She has a medication question. Please contact patient as soon as possible to further assist.     Caller Information       Type Contact Phone/Fax    10/25/2023 04:08 PM CDT Phone (Incoming) Khushbu Wright (Self) [phone] (H)        Alternative phone number: [phone]    Can a detailed message be left? Yes    Message Turnaround: [place]:    Refer to site's KB page for routing instructions    Please give this turnaround time to the caller:   \"You can expect to receive a response 1-3 business days after your provider's clinical team reviews the message\"               Additional Safety/Bands:

## 2024-01-15 ENCOUNTER — TRANSCRIPTION ENCOUNTER (OUTPATIENT)
Age: 84
End: 2024-01-15

## 2024-12-03 NOTE — H&P ADULT - NSICDXFAMILYHX_GEN_ALL_CORE_FT
Please inform the patient that I received results of overnight oximetry on CPAP. It states that overnight oximetry was completed on CPAP with 1 L/min oxygen bleed in.  Please confirm that overnight oximetry was done on CPAP without oxygen bleed in.    Overnight oximetry completed on PAP therapy dated 11/25/2024, shows total time SpO2 </= 88% of 6 minute and 28 seconds.    If testing was completed on 1 L/min bleed into CPAP, recommend she increase to 2 L/min bleed into CPAP.    If testing was completed without supplemental oxygen, recommend patient use 1 L/min oxygen bleed into CPAP.    If you have any questions, please let me know.    Thank you, Sweta     FAMILY HISTORY:  FH: breast cancer, ~ mother  FH: CHF (congestive heart failure), ~ mother  FH: COPD (chronic obstructive pulmonary disease), ~ daughter (non-smoker)  FH: diabetes mellitus, ~ mother, grandmother

## 2025-03-27 NOTE — PROGRESS NOTE ADULT - SUBJECTIVE AND OBJECTIVE BOX
Patient is a 79y old  Female who presents with a chief complaint of N/V/D (22 Dec 2019 14:34)      INTERVAL HPI/OVERNIGHT EVENTS:  T(C): 36.7 (12-22-19 @ 15:44), Max: 36.8 (12-22-19 @ 12:01)  HR: 84 (12-22-19 @ 15:44) (73 - 84)  BP: 116/63 (12-22-19 @ 15:44) (108/56 - 127/61)  RR: 14 (12-22-19 @ 15:44) (14 - 16)  SpO2: 99% (12-22-19 @ 15:44) (99% - 100%)  Wt(kg): --  I&O's Summary    22 Dec 2019 07:01  -  22 Dec 2019 16:13  --------------------------------------------------------  IN: 200 mL / OUT: 0 mL / NET: 200 mL        LABS:                        11.4   5.61  )-----------( 197      ( 22 Dec 2019 05:58 )             35.0     12-22    139  |  96<L>  |  23  ----------------------------<  123<H>  3.8   |  30  |  0.90    Ca    9.2      22 Dec 2019 05:58  Mg     1.6     12-22    TPro  6.8  /  Alb  3.3  /  TBili  0.3  /  DBili  x   /  AST  22  /  ALT  17  /  AlkPhos  79  12-22        CAPILLARY BLOOD GLUCOSE      POCT Blood Glucose.: 119 mg/dL (22 Dec 2019 12:32)  POCT Blood Glucose.: 126 mg/dL (22 Dec 2019 08:40)  POCT Blood Glucose.: 138 mg/dL (21 Dec 2019 21:33)  POCT Blood Glucose.: 133 mg/dL (21 Dec 2019 17:50)            MEDICATIONS  (STANDING):  aspirin enteric coated 81 milliGRAM(s) Oral daily  atorvastatin 80 milliGRAM(s) Oral at bedtime  cholecalciferol 1000 Unit(s) Oral daily  gabapentin 100 milliGRAM(s) Oral three times a day  insulin lispro (HumaLOG) corrective regimen sliding scale   SubCutaneous three times a day before meals  insulin lispro (HumaLOG) corrective regimen sliding scale   SubCutaneous at bedtime  lidocaine   Patch 1 Patch Transdermal every 24 hours  meclizine 12.5 milliGRAM(s) Oral daily  multivitamin 1 Tablet(s) Oral daily  pantoprazole    Tablet 40 milliGRAM(s) Oral before breakfast    MEDICATIONS  (PRN):  acetaminophen   Tablet .. 650 milliGRAM(s) Oral every 6 hours PRN Mild Pain (1 - 3), Moderate Pain (4 - 6), Severe Pain (7 - 10)          PHYSICAL EXAM:  GENERAL: NAD, well-groomed, well-developed  HEAD:  Atraumatic, Normocephalic  CHEST/LUNG: Clear to percussion bilaterally; No rales, rhonchi, wheezing, or rubs  HEART: Regular rate and rhythm; No murmurs, rubs, or gallops  ABDOMEN: Soft, Nontender, Nondistended; Bowel sounds present  EXTREMITIES:  2+ Peripheral Pulses, No clubbing, cyanosis, or edema  LYMPH: No lymphadenopathy noted  SKIN: No rashes or lesions    Care Discussed with Consultants/Other Providers [ ] YES  [ ] NO negative

## 2025-06-17 NOTE — PROGRESS NOTE ADULT - SUBJECTIVE AND OBJECTIVE BOX
Repeat lynda on same day but before follow up visit in April 2027.     Urine today  ?next urine .         Patient is a 80y old  Female who presents with a chief complaint of sob, hyperglycemia (27 Sep 2020 09:47)      Any change in ROS: pt is do ng ok : alert and awake    MEDICATIONS  (STANDING):  ALBUTerol    90 MICROgram(s) HFA Inhaler 1 Puff(s) Inhalation every 4 hours  aspirin enteric coated 81 milliGRAM(s) Oral daily  atorvastatin 80 milliGRAM(s) Oral at bedtime  budesonide 160 MICROgram(s)/formoterol 4.5 MICROgram(s) Inhaler 2 Puff(s) Inhalation two times a day  dextrose 5%. 1000 milliLiter(s) (50 mL/Hr) IV Continuous <Continuous>  dextrose 50% Injectable 12.5 Gram(s) IV Push once  dextrose 50% Injectable 25 Gram(s) IV Push once  dextrose 50% Injectable 25 Gram(s) IV Push once  enoxaparin Injectable 40 milliGRAM(s) SubCutaneous daily  gabapentin 100 milliGRAM(s) Oral three times a day  influenza   Vaccine 0.5 milliLiter(s) IntraMuscular once  insulin glargine Injectable (LANTUS) 22 Unit(s) SubCutaneous at bedtime  insulin lispro (HumaLOG) corrective regimen sliding scale   SubCutaneous at bedtime  insulin lispro (HumaLOG) corrective regimen sliding scale   SubCutaneous three times a day before meals  insulin lispro Injectable (HumaLOG) 8 Unit(s) SubCutaneous three times a day before meals  magnesium sulfate  IVPB 1 Gram(s) IV Intermittent daily  polyethylene glycol 3350 17 Gram(s) Oral daily  potassium chloride   Solution 40 milliEquivalent(s) Oral every 2 hours  senna 2 Tablet(s) Oral at bedtime    MEDICATIONS  (PRN):  acetaminophen   Tablet .. 650 milliGRAM(s) Oral every 6 hours PRN Moderate Pain (4 - 6)  dextrose 40% Gel 15 Gram(s) Oral once PRN Blood Glucose LESS THAN 70 milliGRAM(s)/deciliter  glucagon  Injectable 1 milliGRAM(s) IntraMuscular once PRN Glucose LESS THAN 70 milligrams/deciliter  haloperidol     Tablet 1 milliGRAM(s) Oral every 6 hours PRN agitation    Vital Signs Last 24 Hrs  T(C): 36.4 (27 Sep 2020 04:19), Max: 36.9 (26 Sep 2020 12:25)  T(F): 97.6 (27 Sep 2020 04:19), Max: 98.4 (26 Sep 2020 12:25)  HR: 72 (27 Sep 2020 04:19) (67 - 72)  BP: 130/79 (27 Sep 2020 04:19) (128/66 - 134/81)  BP(mean): --  RR: 18 (27 Sep 2020 04:19) (18 - 19)  SpO2: 99% (27 Sep 2020 04:19) (99% - 100%)    I&O's Summary    26 Sep 2020 07:01  -  27 Sep 2020 07:00  --------------------------------------------------------  IN: 630 mL / OUT: 650 mL / NET: -20 mL    27 Sep 2020 07:01  -  27 Sep 2020 11:02  --------------------------------------------------------  IN: 240 mL / OUT: 0 mL / NET: 240 mL          Physical Exam:   GENERAL: NAD, well-groomed, well-developed  HEENT: DON/   Atraumatic, Normocephalic  ENMT: No tonsillar erythema, exudates, or enlargement; Moist mucous membranes, Good dentition, No lesions  NECK: Supple, No JVD, Normal thyroid  CHEST/LUNG: Clear to auscultaionrubs  CVS: Regular rate and rhythm; No murmurs, rubs, or gallops  GI: : Soft, Nontender, Nondistended; Bowel sounds present  NERVOUS SYSTEM:  Alert & Oriented X2  EXTREMITIES:  2+ Peripheral Pulses, No clubbing, cyanosis, or edema  LYMPH: No lymphadenopathy noted  SKIN: No rashes or lesions  ENDOCRINOLOGY: No Thyromegaly  PSYCH: Confused    Labs:  47, 47, 45                            13.8   8.31  )-----------( 170      ( 24 Sep 2020 11:55 )             43.9     09-27    140  |  94<L>  |  28<H>  ----------------------------<  232<H>  3.7   |  39<H>  |  0.80  09-26    141  |  93<L>  |  28<H>  ----------------------------<  186<H>  3.8   |  39<H>  |  0.78  09-25    144  |  94<L>  |  32<H>  ----------------------------<  282<H>  4.4   |  >45<HH>  |  0.94  09-24    146<H>  |  84<L>  |  37<H>  ----------------------------<  317<H>  3.3<L>   |  >45<HH>  |  1.12    Ca    9.5      27 Sep 2020 05:33  Ca    9.3      26 Sep 2020 06:18  Mg     2.1     09-26      CAPILLARY BLOOD GLUCOSE      POCT Blood Glucose.: 237 mg/dL (27 Sep 2020 08:45)  POCT Blood Glucose.: 185 mg/dL (27 Sep 2020 04:03)  POCT Blood Glucose.: 244 mg/dL (26 Sep 2020 21:48)  POCT Blood Glucose.: 218 mg/dL (26 Sep 2020 17:34)  POCT Blood Glucose.: 327 mg/dL (26 Sep 2020 12:39)        d< from: Xray Chest 1 View AP/PA (09.21.20 @ 14:26) >    EXAM:  XR CHEST AP OR PA 1V                            PROCEDURE DATE:  09/21/2020            INTERPRETATION:  CLINICAL INFORMATION: Shortness of breath.    A frontal view of the chest was obtained.    Comparison: 7/15/2020.    IMPRESSION:    The mediastinal cardiac silhouette is unremarkable.    Bibasilar atelectasis.    Severe thoracolumbar scoliosis.                      JONAS MILLER M.D., ATTENDING RADIOLOGIST  This document has been electronically signed. Sep 21 2020  2:29PM    < end of copied text >  < from: CT Head No Cont (09.21.20 @ 15:23) >    EXAM:  CT BRAIN                            PROCEDURE DATE:  09/21/2020            INTERPRETATION:  Clinical indication: Slurred speech.    Multiple axial sections were performed from base of skull to vertex without contrast enhancement. Coronal andsagittal reconstructions were performed as well    This exam is compared with prior noncontrast head CT performed on July 16, 2020.    Periventricular matter lucency is again seen which is likely related to chronic microvascular ischemic changes    There is no evidence acute hemorrhage mass or mass effect seen.    Evaluation of the osseous structures with the appears normal    The visualized paranasal sinuses mastoid and middle ear inspected clear.    IMPRESSION: No acute hemorrhage, mass or masseffect.    If symptoms continue MRI can be done for further evaluation if there are no contraindications.                  JORGE FUENTES M.D., ATTENDING RADIOLOGIST  This document has been electronically signed. Sep 21 2020  3:26PM    < end of copied text >  < from: CT Angio Chest w/ IV Cont (07.15.20 @ 14:57) >  COMPARISON: None.    PROCEDURE:   CT Angiography of the Chest.  90 ml of Omnipaque 350 was injected intravenously. 10 ml were discarded.  Sagittal and coronal reformats were performed as well as 3D (MIP) reconstructions.    FINDINGS:    LUNGS AND AIRWAYS: Patent central airways.  Very low lung volumes with bibasilar subsegmental atelectasis. Nodefinite pneumonia.  PLEURA: No pleural effusion.  MEDIASTINUM AND RYAN: No lymphadenopathy.  VESSELS: Satisfactory contrast bolus. No pulmonary emboli. Normal caliber thoracic aorta  HEART: Slightly enlarged. No pericardial effusion.  CHEST WALL ANDLOWER NECK: Slightly enlarged multinodular thyroid..  VISUALIZED UPPER ABDOMEN: Bilateral nonobstructive renal calculi. Additional dystrophic cortical calcifications within a markedly atrophic right kidney. Right renal cyst.  BONES: Advanced spinal degenerative changes and profound compound thoracolumbar scoliosis.    IMPRESSION:   Negative for pulmonary emboli.  Low lung volumes with bibasilar subsegmental atelectasis.  Additional findings as discussed                  LUMA ARANDA M.D., ATTENDING RADIOLOGIST  This document has been electronically signed. Jul 15 2020  3:15PM        < end of copied text >            RECENT CULTURES:  09-24 @ 22:03 .Urine Clean Catch (Midstream)                10,000 - 49,000 CFU/mL Yeast like cells  10,000 - 49,000 CFU/mL Coag Negative Staphylococcus "Susceptibilities not  performed"    09-24 @ 16:38 .Blood Blood-Peripheral                No growth to date.    09-22 @ 00:42 .Blood Blood                No Growth Final    09-21 @ 20:34 .Blood Blood   PCR    Growth in aerobic bottle: Gram Positive Cocci in Clusters  Growth in anaerobic bottle: Gram Positive Cocci in Clusters and Gram  Positive Cocci in Pairs and Chains    Blood Culture PCR  Blood Culture PCR  Blood Culture PCR     Growth in aerobic and anaerobic bottles: Staphylococcus cohnii Coag  Negative Staphylococcus  Single set isolate, possible contaminant. Contact  Microbiology if susceptibility testing clinically  indicated.  Growth in aerobic and anaerobic bottles:Streptococcus mitis/oralis group  Alpha hemolytic strep  (not Strep. pneumoniae or Enterococcus)  Single set isolate, possible contaminant. Contact  Microbiology if susceptibility testing clinically  indicated.  "Due to technical problems, Proteus sp. will Not be reported as part of  the BC[facility] panel until further notice"  ***Blood Panel PCR results on this specimen are available  approximately 3 hours after the Gram stain result.***  Gram stain, PCR, and/or culture results may not always  correspond due to difference in methodologies.  ************************************************************  This PCR assay was performed using [employer].  The following targets are tested for: Enterococcus,  vancomycin resistant enterococci, Listeria monocytogenes,  coagulase negative staphylococci, S. aureus,  methicillin resistant S. aureus, Streptococcus agalactiae  (Group B), S. pneumoniae, S. pyogenes (Group A),  Acinetobacter baumannii, Enterobacter cloacae, E. coli,  Klebsiella oxytoca, K. pneumoniae, Proteus sp.,  Serratia marcescens, Haemophilus influenzae,  Neisseria meningitidis, Pseudomonas aeruginosa, Candida  albicans, C. glabrata, C krusei, C parapsilosis,  C. tropicalis and the KPC resistance gene.    09-21 @ 17:24 .Urine Clean Catch (Midstream)                >=3 organisms. Probable collection contamination.          RESPIRATORY CULTURES:          Studies  Chest X-RAY  CT SCAN Chest   Venous Dopplers: LE:   CT Abdomen  Others

## 2025-07-14 NOTE — OCCUPATIONAL THERAPY INITIAL EVALUATION ADULT - IMPAIRED TRANSFERS: SIT/STAND, REHAB EVAL
Ambulatory Care Coordination Note     2025 11:31 AM     Patient Current Location:  [place]     ACM contacted the patient by telephone. Verified name and  with patient as identifiers.     Patient graduated from the High Risk Care Management program on 2025.  Patient verbalizes confidence in the ability to self-manage at this time..  Care management goals have been completed. No further Ambulatory Care Manager follow up scheduled.     Pt states she doesn't feel like she needs any more calls.       cognition/decreased ROM/decreased strength